# Patient Record
Sex: MALE | Race: BLACK OR AFRICAN AMERICAN | Employment: OTHER | URBAN - METROPOLITAN AREA
[De-identification: names, ages, dates, MRNs, and addresses within clinical notes are randomized per-mention and may not be internally consistent; named-entity substitution may affect disease eponyms.]

---

## 2017-02-11 ENCOUNTER — HOSPITAL ENCOUNTER (INPATIENT)
Facility: CLINIC | Age: 82
LOS: 5 days | Discharge: HOME | DRG: 190 | End: 2017-02-16
Attending: EMERGENCY MEDICINE | Admitting: INTERNAL MEDICINE
Payer: MEDICARE

## 2017-02-11 ENCOUNTER — APPOINTMENT (OUTPATIENT)
Dept: GENERAL RADIOLOGY | Facility: CLINIC | Age: 82
DRG: 190 | End: 2017-02-11
Attending: EMERGENCY MEDICINE
Payer: MEDICARE

## 2017-02-11 DIAGNOSIS — J44.9 COPD (CHRONIC OBSTRUCTIVE PULMONARY DISEASE) (H): Primary | ICD-10-CM

## 2017-02-11 DIAGNOSIS — J44.1 COPD EXACERBATION (H): ICD-10-CM

## 2017-02-11 LAB
ANION GAP SERPL CALCULATED.3IONS-SCNC: 11 MMOL/L (ref 3–14)
BASOPHILS # BLD AUTO: 0 10E9/L (ref 0–0.2)
BASOPHILS NFR BLD AUTO: 0.2 %
BUN SERPL-MCNC: 27 MG/DL (ref 7–30)
CALCIUM SERPL-MCNC: 8.7 MG/DL (ref 8.5–10.1)
CHLORIDE SERPL-SCNC: 99 MMOL/L (ref 94–109)
CO2 SERPL-SCNC: 27 MMOL/L (ref 20–32)
CREAT SERPL-MCNC: 3.18 MG/DL (ref 0.66–1.25)
DIFFERENTIAL METHOD BLD: ABNORMAL
EOSINOPHIL # BLD AUTO: 0.1 10E9/L (ref 0–0.7)
EOSINOPHIL NFR BLD AUTO: 0.7 %
ERYTHROCYTE [DISTWIDTH] IN BLOOD BY AUTOMATED COUNT: 15.2 % (ref 10–15)
FLUAV+FLUBV AG SPEC QL: NEGATIVE
FLUAV+FLUBV AG SPEC QL: NORMAL
GFR SERPL CREATININE-BSD FRML MDRD: 19 ML/MIN/1.7M2
GLUCOSE BLDC GLUCOMTR-MCNC: 134 MG/DL (ref 70–99)
GLUCOSE BLDC GLUCOMTR-MCNC: 366 MG/DL (ref 70–99)
GLUCOSE BLDC GLUCOMTR-MCNC: 392 MG/DL (ref 70–99)
GLUCOSE BLDC GLUCOMTR-MCNC: 414 MG/DL (ref 70–99)
GLUCOSE SERPL-MCNC: 108 MG/DL (ref 70–99)
HCT VFR BLD AUTO: 35.6 % (ref 40–53)
HGB BLD-MCNC: 11.3 G/DL (ref 13.3–17.7)
IMM GRANULOCYTES # BLD: 0.1 10E9/L (ref 0–0.4)
IMM GRANULOCYTES NFR BLD: 0.3 %
INTERPRETATION ECG - MUSE: NORMAL
LACTATE BLD-SCNC: 0.9 MMOL/L (ref 0.7–2.1)
LYMPHOCYTES # BLD AUTO: 1.3 10E9/L (ref 0.8–5.3)
LYMPHOCYTES NFR BLD AUTO: 7.8 %
MCH RBC QN AUTO: 27.4 PG (ref 26.5–33)
MCHC RBC AUTO-ENTMCNC: 31.7 G/DL (ref 31.5–36.5)
MCV RBC AUTO: 86 FL (ref 78–100)
MONOCYTES # BLD AUTO: 1.4 10E9/L (ref 0–1.3)
MONOCYTES NFR BLD AUTO: 8.5 %
NEUTROPHILS # BLD AUTO: 13.3 10E9/L (ref 1.6–8.3)
NEUTROPHILS NFR BLD AUTO: 82.5 %
NRBC # BLD AUTO: 0 10*3/UL
NRBC BLD AUTO-RTO: 0 /100
PLATELET # BLD AUTO: 249 10E9/L (ref 150–450)
POTASSIUM SERPL-SCNC: 4.1 MMOL/L (ref 3.4–5.3)
RBC # BLD AUTO: 4.13 10E12/L (ref 4.4–5.9)
SODIUM SERPL-SCNC: 137 MMOL/L (ref 133–144)
SPECIMEN SOURCE: NORMAL
TROPONIN I SERPL-MCNC: 0.06 UG/L (ref 0–0.04)
TROPONIN I SERPL-MCNC: 0.06 UG/L (ref 0–0.04)
TROPONIN I SERPL-MCNC: 0.07 UG/L (ref 0–0.04)
WBC # BLD AUTO: 16.1 10E9/L (ref 4–11)

## 2017-02-11 PROCEDURE — 71020 XR CHEST 2 VW: CPT

## 2017-02-11 PROCEDURE — 96365 THER/PROPH/DIAG IV INF INIT: CPT

## 2017-02-11 PROCEDURE — 25000132 ZZH RX MED GY IP 250 OP 250 PS 637: Mod: GY | Performed by: INTERNAL MEDICINE

## 2017-02-11 PROCEDURE — 84484 ASSAY OF TROPONIN QUANT: CPT | Performed by: EMERGENCY MEDICINE

## 2017-02-11 PROCEDURE — 80048 BASIC METABOLIC PNL TOTAL CA: CPT | Performed by: EMERGENCY MEDICINE

## 2017-02-11 PROCEDURE — 87804 INFLUENZA ASSAY W/OPTIC: CPT | Performed by: EMERGENCY MEDICINE

## 2017-02-11 PROCEDURE — 25000132 ZZH RX MED GY IP 250 OP 250 PS 637: Mod: GY | Performed by: EMERGENCY MEDICINE

## 2017-02-11 PROCEDURE — A9270 NON-COVERED ITEM OR SERVICE: HCPCS | Mod: GY | Performed by: EMERGENCY MEDICINE

## 2017-02-11 PROCEDURE — 94640 AIRWAY INHALATION TREATMENT: CPT | Mod: 76

## 2017-02-11 PROCEDURE — 00000146 ZZHCL STATISTIC GLUCOSE BY METER IP

## 2017-02-11 PROCEDURE — 25000125 ZZHC RX 250: Performed by: EMERGENCY MEDICINE

## 2017-02-11 PROCEDURE — 84484 ASSAY OF TROPONIN QUANT: CPT | Performed by: INTERNAL MEDICINE

## 2017-02-11 PROCEDURE — 96367 TX/PROPH/DG ADDL SEQ IV INF: CPT

## 2017-02-11 PROCEDURE — 99285 EMERGENCY DEPT VISIT HI MDM: CPT | Mod: 25

## 2017-02-11 PROCEDURE — 94640 AIRWAY INHALATION TREATMENT: CPT

## 2017-02-11 PROCEDURE — 99223 1ST HOSP IP/OBS HIGH 75: CPT | Mod: AI | Performed by: INTERNAL MEDICINE

## 2017-02-11 PROCEDURE — 25000131 ZZH RX MED GY IP 250 OP 636 PS 637: Mod: GY | Performed by: INTERNAL MEDICINE

## 2017-02-11 PROCEDURE — 96375 TX/PRO/DX INJ NEW DRUG ADDON: CPT

## 2017-02-11 PROCEDURE — A9270 NON-COVERED ITEM OR SERVICE: HCPCS | Mod: GY | Performed by: INTERNAL MEDICINE

## 2017-02-11 PROCEDURE — 25000128 H RX IP 250 OP 636: Performed by: INTERNAL MEDICINE

## 2017-02-11 PROCEDURE — 36415 COLL VENOUS BLD VENIPUNCTURE: CPT | Performed by: INTERNAL MEDICINE

## 2017-02-11 PROCEDURE — 25000125 ZZHC RX 250: Performed by: INTERNAL MEDICINE

## 2017-02-11 PROCEDURE — 21000001 ZZH R&B HEART CARE

## 2017-02-11 PROCEDURE — 93005 ELECTROCARDIOGRAM TRACING: CPT

## 2017-02-11 PROCEDURE — 85025 COMPLETE CBC W/AUTO DIFF WBC: CPT | Performed by: EMERGENCY MEDICINE

## 2017-02-11 PROCEDURE — 83605 ASSAY OF LACTIC ACID: CPT | Performed by: EMERGENCY MEDICINE

## 2017-02-11 PROCEDURE — 25000128 H RX IP 250 OP 636: Performed by: EMERGENCY MEDICINE

## 2017-02-11 PROCEDURE — 40000275 ZZH STATISTIC RCP TIME EA 10 MIN

## 2017-02-11 RX ORDER — LISINOPRIL 20 MG/1
20 TABLET ORAL DAILY
Status: DISCONTINUED | OUTPATIENT
Start: 2017-02-11 | End: 2017-02-16 | Stop reason: HOSPADM

## 2017-02-11 RX ORDER — DOXAZOSIN 4 MG/1
8 TABLET ORAL DAILY
Status: DISCONTINUED | OUTPATIENT
Start: 2017-02-11 | End: 2017-02-16 | Stop reason: HOSPADM

## 2017-02-11 RX ORDER — NICOTINE POLACRILEX 4 MG
15-30 LOZENGE BUCCAL
Status: DISCONTINUED | OUTPATIENT
Start: 2017-02-11 | End: 2017-02-16 | Stop reason: HOSPADM

## 2017-02-11 RX ORDER — EPLERENONE 25 MG/1
50 TABLET, FILM COATED ORAL DAILY
Status: DISCONTINUED | OUTPATIENT
Start: 2017-02-11 | End: 2017-02-16 | Stop reason: HOSPADM

## 2017-02-11 RX ORDER — ASPIRIN 325 MG
325 TABLET ORAL ONCE
Status: COMPLETED | OUTPATIENT
Start: 2017-02-11 | End: 2017-02-11

## 2017-02-11 RX ORDER — CARVEDILOL 12.5 MG/1
12.5 TABLET ORAL 2 TIMES DAILY
Status: DISCONTINUED | OUTPATIENT
Start: 2017-02-11 | End: 2017-02-16 | Stop reason: HOSPADM

## 2017-02-11 RX ORDER — AZITHROMYCIN 250 MG/1
250 TABLET, FILM COATED ORAL DAILY
Status: COMPLETED | OUTPATIENT
Start: 2017-02-12 | End: 2017-02-15

## 2017-02-11 RX ORDER — FUROSEMIDE 20 MG
20 TABLET ORAL DAILY
Status: DISCONTINUED | OUTPATIENT
Start: 2017-02-11 | End: 2017-02-16 | Stop reason: HOSPADM

## 2017-02-11 RX ORDER — DILTIAZEM HYDROCHLORIDE 300 MG/1
300 CAPSULE, EXTENDED RELEASE ORAL DAILY
COMMUNITY
End: 2017-07-09

## 2017-02-11 RX ORDER — ATORVASTATIN CALCIUM 40 MG/1
40 TABLET, FILM COATED ORAL AT BEDTIME
Status: DISCONTINUED | OUTPATIENT
Start: 2017-02-11 | End: 2017-02-16 | Stop reason: HOSPADM

## 2017-02-11 RX ORDER — ASPIRIN 81 MG/1
81 TABLET, CHEWABLE ORAL DAILY
COMMUNITY

## 2017-02-11 RX ORDER — LIDOCAINE 40 MG/G
CREAM TOPICAL
Status: DISCONTINUED | OUTPATIENT
Start: 2017-02-11 | End: 2017-02-16 | Stop reason: HOSPADM

## 2017-02-11 RX ORDER — DILTIAZEM HYDROCHLORIDE 300 MG/1
300 CAPSULE, EXTENDED RELEASE ORAL DAILY
Status: DISCONTINUED | OUTPATIENT
Start: 2017-02-11 | End: 2017-02-11

## 2017-02-11 RX ORDER — IPRATROPIUM BROMIDE AND ALBUTEROL SULFATE 2.5; .5 MG/3ML; MG/3ML
3 SOLUTION RESPIRATORY (INHALATION) ONCE
Status: COMPLETED | OUTPATIENT
Start: 2017-02-11 | End: 2017-02-11

## 2017-02-11 RX ORDER — HYDROCHLOROTHIAZIDE 25 MG/1
25 TABLET ORAL DAILY
Status: DISCONTINUED | OUTPATIENT
Start: 2017-02-11 | End: 2017-02-16 | Stop reason: HOSPADM

## 2017-02-11 RX ORDER — ALBUTEROL SULFATE 90 UG/1
1-2 AEROSOL, METERED RESPIRATORY (INHALATION) EVERY 4 HOURS PRN
COMMUNITY
End: 2017-07-13

## 2017-02-11 RX ORDER — CHLORAL HYDRATE 500 MG
1 CAPSULE ORAL DAILY
COMMUNITY

## 2017-02-11 RX ORDER — AZITHROMYCIN 250 MG/1
250 TABLET, FILM COATED ORAL DAILY
Status: DISCONTINUED | OUTPATIENT
Start: 2017-02-12 | End: 2017-02-11

## 2017-02-11 RX ORDER — METHYLPREDNISOLONE SODIUM SUCCINATE 125 MG/2ML
125 INJECTION, POWDER, LYOPHILIZED, FOR SOLUTION INTRAMUSCULAR; INTRAVENOUS ONCE
Status: COMPLETED | OUTPATIENT
Start: 2017-02-11 | End: 2017-02-11

## 2017-02-11 RX ORDER — ASPIRIN 81 MG/1
81 TABLET, CHEWABLE ORAL DAILY
Status: DISCONTINUED | OUTPATIENT
Start: 2017-02-12 | End: 2017-02-16 | Stop reason: HOSPADM

## 2017-02-11 RX ORDER — INSULIN GLARGINE 100 [IU]/ML
26 INJECTION, SOLUTION SUBCUTANEOUS AT BEDTIME
COMMUNITY
End: 2017-02-23

## 2017-02-11 RX ORDER — DEXTROSE MONOHYDRATE 25 G/50ML
25-50 INJECTION, SOLUTION INTRAVENOUS
Status: DISCONTINUED | OUTPATIENT
Start: 2017-02-11 | End: 2017-02-16 | Stop reason: HOSPADM

## 2017-02-11 RX ORDER — ONDANSETRON 4 MG/1
4 TABLET, ORALLY DISINTEGRATING ORAL EVERY 6 HOURS PRN
Status: DISCONTINUED | OUTPATIENT
Start: 2017-02-11 | End: 2017-02-16 | Stop reason: HOSPADM

## 2017-02-11 RX ORDER — ONDANSETRON 2 MG/ML
4 INJECTION INTRAMUSCULAR; INTRAVENOUS EVERY 6 HOURS PRN
Status: DISCONTINUED | OUTPATIENT
Start: 2017-02-11 | End: 2017-02-16 | Stop reason: HOSPADM

## 2017-02-11 RX ORDER — IPRATROPIUM BROMIDE AND ALBUTEROL SULFATE 2.5; .5 MG/3ML; MG/3ML
3 SOLUTION RESPIRATORY (INHALATION)
Status: DISCONTINUED | OUTPATIENT
Start: 2017-02-11 | End: 2017-02-16 | Stop reason: HOSPADM

## 2017-02-11 RX ORDER — AMOXICILLIN 250 MG
1-2 CAPSULE ORAL 2 TIMES DAILY PRN
Status: DISCONTINUED | OUTPATIENT
Start: 2017-02-11 | End: 2017-02-16 | Stop reason: HOSPADM

## 2017-02-11 RX ORDER — METHYLPREDNISOLONE SODIUM SUCCINATE 125 MG/2ML
60 INJECTION, POWDER, LYOPHILIZED, FOR SOLUTION INTRAMUSCULAR; INTRAVENOUS EVERY 8 HOURS
Status: DISCONTINUED | OUTPATIENT
Start: 2017-02-11 | End: 2017-02-13

## 2017-02-11 RX ORDER — ACETAMINOPHEN 325 MG/1
650 TABLET ORAL EVERY 4 HOURS PRN
Status: DISCONTINUED | OUTPATIENT
Start: 2017-02-11 | End: 2017-02-16 | Stop reason: HOSPADM

## 2017-02-11 RX ORDER — CHLORAL HYDRATE 500 MG
1 CAPSULE ORAL DAILY
Status: DISCONTINUED | OUTPATIENT
Start: 2017-02-11 | End: 2017-02-16 | Stop reason: HOSPADM

## 2017-02-11 RX ORDER — HYDRALAZINE HYDROCHLORIDE 20 MG/ML
10 INJECTION INTRAMUSCULAR; INTRAVENOUS EVERY 4 HOURS PRN
Status: DISCONTINUED | OUTPATIENT
Start: 2017-02-11 | End: 2017-02-16 | Stop reason: HOSPADM

## 2017-02-11 RX ORDER — NALOXONE HYDROCHLORIDE 0.4 MG/ML
.1-.4 INJECTION, SOLUTION INTRAMUSCULAR; INTRAVENOUS; SUBCUTANEOUS
Status: DISCONTINUED | OUTPATIENT
Start: 2017-02-11 | End: 2017-02-16 | Stop reason: HOSPADM

## 2017-02-11 RX ORDER — CEFTRIAXONE 1 G/1
1 INJECTION, POWDER, FOR SOLUTION INTRAMUSCULAR; INTRAVENOUS ONCE
Status: COMPLETED | OUTPATIENT
Start: 2017-02-11 | End: 2017-02-11

## 2017-02-11 RX ADMIN — AZITHROMYCIN MONOHYDRATE 500 MG: 500 INJECTION, POWDER, LYOPHILIZED, FOR SOLUTION INTRAVENOUS at 10:18

## 2017-02-11 RX ADMIN — Medication 1 G: at 13:42

## 2017-02-11 RX ADMIN — IPRATROPIUM BROMIDE AND ALBUTEROL SULFATE 3 ML: .5; 3 SOLUTION RESPIRATORY (INHALATION) at 15:22

## 2017-02-11 RX ADMIN — FUROSEMIDE 20 MG: 20 TABLET ORAL at 13:42

## 2017-02-11 RX ADMIN — DOXAZOSIN MESYLATE 8 MG: 4 TABLET ORAL at 21:37

## 2017-02-11 RX ADMIN — METHYLPREDNISOLONE SODIUM SUCCINATE 125 MG: 125 INJECTION, POWDER, FOR SOLUTION INTRAMUSCULAR; INTRAVENOUS at 08:24

## 2017-02-11 RX ADMIN — LISINOPRIL 20 MG: 20 TABLET ORAL at 13:42

## 2017-02-11 RX ADMIN — DILTIAZEM HYDROCHLORIDE 300 MG: 180 CAPSULE, EXTENDED RELEASE ORAL at 13:43

## 2017-02-11 RX ADMIN — IPRATROPIUM BROMIDE AND ALBUTEROL SULFATE 3 ML: .5; 3 SOLUTION RESPIRATORY (INHALATION) at 08:24

## 2017-02-11 RX ADMIN — HYDROCHLOROTHIAZIDE 25 MG: 25 TABLET ORAL at 13:42

## 2017-02-11 RX ADMIN — ASPIRIN 325 MG ORAL TABLET 325 MG: 325 PILL ORAL at 09:27

## 2017-02-11 RX ADMIN — ATORVASTATIN CALCIUM 40 MG: 40 TABLET, FILM COATED ORAL at 21:37

## 2017-02-11 RX ADMIN — CARVEDILOL 12.5 MG: 12.5 TABLET, FILM COATED ORAL at 21:37

## 2017-02-11 RX ADMIN — Medication 1 CAPSULE: at 13:43

## 2017-02-11 RX ADMIN — INSULIN ASPART 5 UNITS: 100 INJECTION, SOLUTION INTRAVENOUS; SUBCUTANEOUS at 18:28

## 2017-02-11 RX ADMIN — CARVEDILOL 12.5 MG: 12.5 TABLET, FILM COATED ORAL at 13:42

## 2017-02-11 RX ADMIN — IPRATROPIUM BROMIDE AND ALBUTEROL SULFATE 3 ML: .5; 3 SOLUTION RESPIRATORY (INHALATION) at 19:38

## 2017-02-11 RX ADMIN — METHYLPREDNISOLONE SODIUM SUCCINATE 62.5 MG: 125 INJECTION, POWDER, FOR SOLUTION INTRAMUSCULAR; INTRAVENOUS at 16:46

## 2017-02-11 RX ADMIN — IPRATROPIUM BROMIDE AND ALBUTEROL SULFATE 3 ML: .5; 3 SOLUTION RESPIRATORY (INHALATION) at 23:05

## 2017-02-11 RX ADMIN — INSULIN GLARGINE 26 UNITS: 100 INJECTION, SOLUTION SUBCUTANEOUS at 21:34

## 2017-02-11 RX ADMIN — CEFTRIAXONE 1 G: 1 INJECTION, POWDER, FOR SOLUTION INTRAMUSCULAR; INTRAVENOUS at 09:34

## 2017-02-11 RX ADMIN — EPLERENONE 50 MG: 25 TABLET, FILM COATED ORAL at 14:38

## 2017-02-11 ASSESSMENT — ENCOUNTER SYMPTOMS
FEVER: 0
COUGH: 1
VOMITING: 0
DIARRHEA: 0
SHORTNESS OF BREATH: 1

## 2017-02-11 NOTE — ED PROVIDER NOTES
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 The patient reports for the past 1.5 weeks he has had a non productive cough. Last night the patient had worsening shortness of breath and used his albuterol inhaler with minimal resolve of his symptoms. He was concerned he may have pneumonia which prompts his visit. The patient notes his grand daughter has had similar symptoms. The patient denies fevers, vomiting, diarrhea, ear pain, chest pain, urinary symptoms, or any associated symptoms. He notes history of low iron. Patient has received both flu and pneumonia shot this year. He is not on home oxygen.          Allergies:  The patient has no known allergies to medications.      Medications:    Atorvastatin  Albuterol inhaler   Aspirin 81 mg  Carvedilol  Aranesp  Colchicine  Tiazac    Past Medical History:    COPD (chronic obstructive pulmonary disease) (H)   HLD (hyperlipidemia)   HTN (hypertension)   CRF (chronic renal failure)   IDDM (insulin dependent diabetes mellitus) (H)   Renal calculi     Past Surgical History:    Tonsillectomy  Adenoidectomy  Renal stent    Family History:    The patient has no pertinent family history.     Social History:  The patient is a former smoker.  The patient consumes alcohol, 1 beer monthly.  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  Moved from [place] in 2012 to live with son.   Presents with son.     Review of Systems   Constitutional: Negative for fever.   HENT: Negative for ear pain.    Respiratory: Positive for cough and shortness of breath.    Cardiovascular: Negative for chest pain.   Gastrointestinal: Negative for vomiting and diarrhea.   All other systems reviewed and are negative.    Physical Exam   First Vitals:  BP: 176/78 mmHg  Pulse: 105  Temp: 97.5  F (36.4  C)  Resp: 20  Height:  "175.3 cm (5' 9\")  Weight: 86.5 kg (190 lb 11.2 oz)  SpO2: 93 %    Physical Exam  Constitutional:  Oriented to person, place, and time.      Appears well-developed and well-nourished. Occasional cough.  HENT:   Head:    Normocephalic and atraumatic.   Right Ear:   Tympanic membrane and external ear normal.   Left Ear:   Tympanic membrane and external ear normal.   Mouth/Throat:   Oropharynx is clear and moist.      Mucous membranes are normal.      1 cm lump on right side of tongue, patient states he has been evaluated for this.   Eyes:    Conjunctivae normal and EOM are normal.      Pupils are equal, round, and reactive to light.   Neck:    Normal range of motion. Neck supple.   Cardiovascular:  Normal rate, regular rhythm, S1 normal and S2 normal.      No gallop and no friction rub. 2-3/6 systolic murmur loudest over right upper chest.   Pulmonary/Chest:  No respiratory distress.      No rhonchi. No rales.      Decreased breath sounds bilaterally. Expiratory wheezes bilaterally with forced expiration.     Abdominal:   Soft. No hepatosplenomegaly. No tenderness.      No rebound and no CVA tenderness.   Musculoskeletal:  Normal range of motion.   Neurological:   Alert and oriented to person, place, and time. Normal strength.      GCS eye subscore is 4. GCS verbal subscore is 5.      GCS motor subscore is 6.   Skin:    Skin is warm and dry.   Psychiatric:   Normal mood and affect.      Speech is normal and behavior is normal.      Judgment and thought content normal.      Cognition and memory are normal.     Emergency Department Course   ECG:  Completed at 0824.   Rate 104 bpm. IN interval 136. QRS duration 86. QT/QTc 346/454. P-R-T axes 66 -6 87. Sinus tachycardia with PACs, ST & T wave abnormality consider lateral ischemia, abnormal ECG     Imaging:  Radiographic findings were communicated with the patient who voiced understanding of the findings.    Chest X-ray (PA & LAT):    Read but not transcribed " electronically, discussed with radiologist. Concerns for subtle left lobe infiltrate.    Laboratory:  CBC: WBC 16.1 (H), HGB 11.3 (L), ow wnl ()  BMP:  (H), Creat 3.18 (H), ow wnl   0800: Troponin: 0.057 (H)   Lactic acid: 0.9  Influenza A/B Antigen: Negative for Influenza Antigen A/B     Interventions:  Aspirin 325 mg tablet  Duoneb 3 mL neb solution  Solumedrol 125 mg  IV  Rocephin 1g IV  Azithromycin 500 mg IV    Emergency Department Course:  Nursing notes and vitals reviewed.  I performed an exam of the patient as documented above.     Blood drawn and patient swabbed. This was sent to the lab for further testing, results above.     The above imaging study(s) and ECG were ordered with results noted above.     The patient received the intervention(s) above.     Findings and plan explained to the Patient. Patient discharged home with instructions regarding supportive care, medications, and reasons to return. The importance of close follow-up was reviewed.     Impression & Plan    Medical Decision Making:  Seven CHA Hussein Finley is a 81 year old male, retired physician, with severe COPD, insulin dependent diabetic, renal failure on dialysis who comes in with cough for the past 1.5 weeks that he believes is pneumonia. He has had the cough however has not been short of breath until last night with exertion. No fevers or chills.  He did get a flu shot. He received a duoneb en route and here. He was able to speak in full sentences. He had decreased breath sounds bilaterally with wheezes with forced expiration. Evaluation here shows elevated white blood cell count. A chest x-ray with subtle left lower lobe infiltrate. Mildly positive troponin and EKG that shows inferolateral ST depression without old EKG for comparison. He received further neb here, solumedrol IV, rocephin, and azithromycin. On review of chart he does have severe aortic stenosis.    I believe most of the symptoms are primarily respiratory  with pneumonia and COPD. He does have a positive troponin with ST changes which I believe is secondary to myocardial strain. Will do flu swab just in light of this being in the community. He does have severe aortic stenosis which he was unaware. Has renal failure, will need dialysis while here.    Diagnosis:    ICD-10-CM    1. COPD (chronic obstructive pulmonary disease) (H) J44.9    2.      Pneumonia  3.      Myocardial strain with positive troponin and EKG changes.  4.      Renal failure on dialysis, will need to be dialyzed.     Disposition:  Inpatient [facility]SC under Dr. Peng.    Maverick BUI, am serving as a scribe at 10:34 AM on 2/11/2017 to document services personally performed by Dr. Molina, based on my observations and the provider's statements to me.      EMERGENCY DEPARTMENT        Adia Molina MD  02/11/17 2426

## 2017-02-11 NOTE — PHARMACY-ADMISSION MEDICATION HISTORY
Admission medication history interview status for the 2/11/2017  admission is complete. See EPIC admission navigator for prior to admission medications     Medication history source reliability:Good    Actions taken by pharmacist (provider contacted, etc):Interviewed patient, brought medication list from [facility] ([place])     Additional medication history information not noted on PTA med list :None    Medication reconciliation/reorder completed by provider prior to medication history? No    Time spent in this activity: 20 minutes    Prior to Admission medications    Medication Sig Last Dose Taking? Auth Provider   albuterol (PROAIR HFA/PROVENTIL HFA/VENTOLIN HFA) 108 (90 BASE) MCG/ACT Inhaler Inhale 1-2 puffs into the lungs every 4 hours as needed for shortness of breath / dyspnea or wheezing prn Yes Unknown, Entered By History   aspirin 81 MG chewable tablet Take 81 mg by mouth daily 2/10/2017 at Unknown time Yes Unknown, Entered By History   ATORVASTATIN CALCIUM PO Take 40 mg by mouth At Bedtime 2/10/2017 at Unknown time Yes Unknown, Entered By History   CARVEDILOL PO Take 12.5 mg by mouth 2 times daily 2/10/2017 at Unknown time Yes Unknown, Entered By History   VITAMIN D, CHOLECALCIFEROL, PO Take 2,000 Units by mouth every other day 2/10/2017 at Unknown time Yes Unknown, Entered By History   COLCHICINE PO Take 1 tab by mouth twice a day for 2 days then one every other day as needed prn No Unknown, Entered By History   darbepoetin libby (ARANESP) 100 MCG/0.5ML injection Inject 40 mcg Subcutaneous every 30 days Past Week at Unknown time Yes Unknown, Entered By History   diltiazem (TIAZAC) 300 MG 24 hr ER beaded capsule Take 300 mg by mouth daily 2/10/2017 at Unknown time Yes Unknown, Entered By History   DOXAZOSIN MESYLATE PO Take 8 mg by mouth daily 2/10/2017 at Unknown time Yes Unknown, Entered By History   EPLERENONE PO Take 50 mg by mouth daily 2/10/2017 at Unknown time Yes Unknown, Entered By  History   FUROSEMIDE PO Take 20 mg by mouth daily 2/10/2017 at Unknown time Yes Unknown, Entered By History   HYDROCHLOROTHIAZIDE PO Take 25 mg by mouth daily 2/10/2017 at Unknown time Yes Unknown, Entered By History   Insulin Aspart (NOVOLOG SC) Sliding scale 2/10/2017 at Unknown time Yes Unknown, Entered By History   insulin glargine (LANTUS) 100 UNIT/ML injection Inject 26 Units Subcutaneous At Bedtime 2/10/2017 at Unknown time Yes Unknown, Entered By History   LISINOPRIL PO Take 20 mg by mouth daily 2/10/2017 at Unknown time Yes Unknown, Entered By History   fish oil-omega-3 fatty acids 1000 MG capsule Take 1 g by mouth daily 2/10/2017 at Unknown time Yes Unknown, Entered By History   B Complex-C-Folic Acid (DIALYVITE 800 PO) Take 1 tablet by mouth daily 2/10/2017 at Unknown time Yes Unknown, Entered By History   tiotropium-olodaterol 2.5-2.5 MCG/ACT AERS Inhale 2 puffs into the lungs daily 2/10/2017 at Unknown time Yes Unknown, Entered By History

## 2017-02-11 NOTE — H&P
[facility]    History and Physical  Hospitalist       Date of Admission:  2/11/2017  Date of Service (when I saw the patient): 02/11/2017    Assessment and Plan  Seven Savage Jr. 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    Acute COPD Exacerbation  In a pt with known severe COPD and on maintenance therapy.  Not O2 dependent. Maintaining sat's thus far in the low 90's. CXR with possible LLL PNA but unclear. Influenza negative.     - Started on Solumedrol 60 mg Q8.   - Duonebs Q 4 and Q 2 prn.    - Treated with IV Rocephin and Azithro in the ER.  Continued on PO Azithro.   - O2 prn.    - Smoking cessation.     NSTEMI  Suspect a type II event in the setting of COPD exacerbation and poor clearance from his CKD. Never c/o chest pain. EKG with some lateral ST seg depression.  Stress test last year per his report with no sign of CAD.      - Will continue to trend trop's.   - C/w Asa, statin, BB, Ace-i.   - Recent Echo in 6/2016 with no WMA's.  Will not repeat at this time unless clinical picture warrants.    - Tele.   - No heparin for now.    ESRD on HD M/W/F   Cr at baseline of around 3.    - Will follow and if it looks like he will be here on Monday I will ask Nephrology to seem him for HD.    - C/w daniel meds.     H/o Severe AS  Denies exertional symptoms outside of his present exacerbation as above.  Echo in 6/2016 with EF 60%, severe AS with valva area 0.8cm, mild MR.    - Do not suspect any current symptoms related to his AS at present.    - Will follow.   - Recommend a Cardiology follow up to discuss valve repair.    Type II DM   On Lantus 26U Qpm and novolog sliding scale.    - Will c/w pta dosing with sliding scale.   - Mod carb diet.    - A1c.   - Will likely need to adjust insulin while on steroids.     HTN  Mildly  hypertensive on admit and suspect this was related to his resp distress.    - C/w pta meds and will follow.    - Hydralazine prn.        DVT Prophylaxis: Pneumatic Compression Devices  Code Status: Full Code as was d/w the patient.     Disposition:  Pending improvement in his acute COPD exacerbation and evaluation of elevated troponin.        Harjeet Peng       Primary Care Physician  No PCP presently.     Chief Complaint  Worsening SOB.     History is obtained from the patient    History of Present Illness  Seven Savage Jr. 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 The patient reports for the past 1.5 weeks he has had a non productive cough and some occasional sob.  His baby granddaughter has not been feeling well recently but other wise reports no other sick contacts.  Yesterday he reports feelig weaker than normal and with worsening sob last night with notable wheezing that did not respond to his usual inhalers. His sob was initially with exertion but has become to be present at rest as well. Due to ongoing and worsening symptoms he was worried he may have pneumonia and called 911 this am.  He denies any chest pain, fevers, chills, nausea, vomiting, diarrhea, dysuria, muscle aches or pains.     Here in the ER he has stbale blood pressures and is afebrile.  He was mildly tachycardic at 105 but saturating at 93% on RA after nebulizers by EMS but no reported hypoxia. Labs show a Cr of 3.18 (baseline), normal LA, Troponin mildy elevated at 0.057, WBC of 16.1 with a left shift and a Hgb of 11.3. Influenza negative.  CXR with hyperinflation and a possible mild haziness in the LLL but unclear if any PNA present.  Official reading is pending. EKG shows sinus tahcy with PAC's and some lateral St segment depression. He was treated with  steroids, nebs and Abx in the ER with some improvement.  He will be admitted for acute COPD exacerbation and elevated troponin.                  Past Medical History   I have reviewed this patient's medical history and updated it with pertinent information if needed.   Past Medical History   Diagnosis Date     COPD (chronic obstructive pulmonary disease) (H)      HLD (hyperlipidemia)      HTN (hypertension)      CRF (chronic renal failure)      IDDM (insulin dependent diabetes mellitus) (H)      Renal calculi      Heart murmur     - Severe AS    Past Surgical History  I have reviewed this patient's surgical history and updated it with pertinent information if needed.  Past Surgical History   Procedure Laterality Date     Tonsillectomy       Adenoidectomy       Endoscopic polypectomy nasal       Through vocal cords     Renal stent       For renal calculi       Prior to Admission Medications  Prior to Admission Medications   Prescriptions Last Dose Informant Patient Reported? Taking?   ATORVASTATIN CALCIUM PO 2/10/2017 at Unknown time Self Yes Yes   Sig: Take 40 mg by mouth At Bedtime   B Complex-C-Folic Acid (DIALYVITE 800 PO) 2/10/2017 at Unknown time Self Yes Yes   Sig: Take 1 tablet by mouth daily   CARVEDILOL PO 2/10/2017 at Unknown time Self Yes Yes   Sig: Take 12.5 mg by mouth 2 times daily   COLCHICINE PO prn Self Yes Yes   Sig: Take 0.6 mg by mouth every 48 hours as needed Take 1 tab by mouth twice a day for 2 days then one every other day as needed   DOXAZOSIN MESYLATE PO 2/10/2017 at Unknown time Self Yes Yes   Sig: Take 8 mg by mouth daily   EPLERENONE PO 2/10/2017 at Unknown time Self Yes Yes   Sig: Take 50 mg by mouth daily   FUROSEMIDE PO 2/10/2017 at Unknown time Self Yes Yes   Sig: Take 20 mg by mouth daily   HYDROCHLOROTHIAZIDE PO 2/10/2017 at Unknown time Self Yes Yes   Sig: Take 25 mg by mouth daily   Insulin Aspart (NOVOLOG SC) 2/10/2017 at Unknown time Self Yes Yes   Sig: Sliding scale    LISINOPRIL PO 2/10/2017 at Unknown time Self Yes Yes   Sig: Take 20 mg by mouth daily   VITAMIN D, CHOLECALCIFEROL, PO 2/10/2017 at Unknown time Self Yes Yes   Sig: Take 2,000 Units by mouth every other day   albuterol (PROAIR HFA/PROVENTIL HFA/VENTOLIN HFA) 108 (90 BASE) MCG/ACT Inhaler prn Self Yes Yes   Sig: Inhale 1-2 puffs into the lungs every 4 hours as needed for shortness of breath / dyspnea or wheezing   aspirin 81 MG chewable tablet 2/10/2017 at Unknown time Self Yes Yes   Sig: Take 81 mg by mouth daily   darbepoetin libby (ARANESP) 100 MCG/0.5ML injection Past Week at Unknown time Self Yes Yes   Sig: Inject 40 mcg Subcutaneous every 30 days   diltiazem (TIAZAC) 300 MG 24 hr ER beaded capsule 2/10/2017 at Unknown time Self Yes Yes   Sig: Take 300 mg by mouth daily   fish oil-omega-3 fatty acids 1000 MG capsule 2/10/2017 at Unknown time Self Yes Yes   Sig: Take 1 g by mouth daily   insulin glargine (LANTUS) 100 UNIT/ML injection 2/10/2017 at Unknown time Self Yes Yes   Sig: Inject 26 Units Subcutaneous At Bedtime   tiotropium-olodaterol 2.5-2.5 MCG/ACT AERS 2/10/2017 at Unknown time Self Yes Yes   Sig: Inhale 2 puffs into the lungs daily      Facility-Administered Medications: None     Allergies  No Known Allergies    Social History  I have reviewed this patient's social history and updated it with pertinent information if needed. Seven Savage Jr. Currently smokes an occasional cigar but has quit cigarettes in 2012 after a > 50 pack year hx. Denies drug use.    Family History  I have reviewed this patient's family history and updated it with pertinent information if needed.   - no pertinent family med hx.     Review of Systems  The 10 point Review of Systems is negative other than noted in the HPI or here.     Physical Exam  Temp: 97.5  F (36.4  C) Temp src: Oral BP: 157/70 mmHg Pulse: 102 Heart Rate: 98 Resp: 24 SpO2: 100 % O2 Device: None (Room air)    Vital Signs with Ranges  Temp:  [97.5  F (36.4   C)] 97.5  F (36.4  C)  Pulse:  [102-105] 102  Heart Rate:  [] 98  Resp:  [14-24] 24  BP: (154-176)/(70-86) 157/70 mmHg  SpO2:  [92 %-100 %] 100 %  190 lbs 11.17 oz    Constitutional: Elderly AAM.  Awake, alert, cooperative, no apparent distress but with notable exp wheezing.   Eyes: Conjunctiva and pupils examined and normal.  HEENT: Moist mucous membranes, normal dentition.   Respiratory: Decreased breath sounds throughout > at bases with expiratory wheezing.  No rales or rhonchi.   Cardiovascular: Regular rate and rhythm, normal S1 and S2, + 3-4/6 systolic murmur.   GI: Soft, non-distended, non-tender, normal bowel sounds.  Lymph/Hematologic: No anterior cervical or supraclavicular adenopathy.  Skin: No rashes, no cyanosis, no edema.  Musculoskeletal: No joint swelling, erythema or tenderness.  Neurologic: Cranial nerves 2-12 intact, normal strength and sensation. No focal deficits.  Psychiatric: Alert, oriented to person, place and time, no obvious anxiety or depression.    Data  Data reviewed today:  I personally reviewed the chest x-ray image(s) showing Possible LLL PNA. .    Recent Labs  Lab 02/11/17  0800   WBC 16.1*   HGB 11.3*   MCV 86         POTASSIUM 4.1   CHLORIDE 99   CO2 27   BUN 27   CR 3.18*   ANIONGAP 11   AARON 8.7   *   TROPI 0.057*       No results found for this or any previous visit (from the past 24 hour(s)).

## 2017-02-11 NOTE — ED NOTES
Bed: ED10  Expected date: 2/11/17  Expected time: 7:15 AM  Means of arrival: Ambulance  Comments:  Juan David 516 SOB 81 male

## 2017-02-11 NOTE — ED NOTES
Pt's WBC came back at 16 and HR >100; therefore, prompted to send lactic acid via EPIC pop-up. Specimen obtained and sent to the lab.

## 2017-02-11 NOTE — LETTER
Transition Communication Hand-off for Care Transitions to Next Level of Care Provider    Name: Seven Savage JrBaljinder  MRN #: [id number]  Primary Care Provider: Physician No Ref-Primary     Primary Clinic: No address on file     Reason for Hospitalization:  COPD (chronic obstructive pulmonary disease) (H) [J44.9]  Admit Date/Time: 2/11/2017  7:34 AM  Discharge Date: 2/16/2017  Payor Source: Payor: MEDICARE / Plan: MEDICARE / Product Type: Medicare /           Reason for Communication Hand-off Referral: Avoidable readmission within 30 days    Discharge Plan:       Concern for non-adherence with plan of care:   Y/N No    Already enrolled in Tele-monitoring program and name of program:  NO  Follow-up specialty is recommended: No    Follow-up plan:  Future Appointments  Date Time Provider Department Center   2/23/2017 3:20 PM Sandip Antunez MD [facility]            Key Recommendations:  Patient is establishing care with Dr. Sandip Antunez for first time and going home on nebulizers which is new.    hSerri Rosales    AVS/Discharge Summary is the source of truth; this is a helpful guide for improved communication of patient story

## 2017-02-11 NOTE — ED NOTES
80yo male who presents to ED with c/o non-productive cough x1-1.5 weeks. Last night developed SOB. Utilized albuterol inhaler for which he has for COPD with minimal relief, so came to ED today. Not on home O2. No recent steroid use. Has received flu vaccine this season as well as pneumonia vaccines previously. Has also been using Delsym OTC.    Denies fevers, chills, congestion, rhinorrhea, nausea, vomiting, diarrhea, constipation or changes with urination.    Pt lives with his son, daughter-in-law, and 2 granddaughters. 1 granddaughter has similar symptoms.    Hx of IDDM and CRF. Received dialysis M-W-F. Reports last dialysis was yesterday with normal amounts of fluid being taken off. Pt still makes urine. Is on furosemide and hydrochlorothiazide.    Lungs diminished bilaterally with expiratory wheezes. Received 1 DuoNeb via EMS. Heart regular and tachycardic with murmur. Pt reports hx of heart murmur.

## 2017-02-11 NOTE — IP AVS SNAPSHOT
[facility]    [address] [place] [address], [address]    [place] [address]    Phone:  [phone]                                       After Visit Summary   2/11/2017    Seven Savage Jr.    MRN: [id number]           After Visit Summary Signature Page     I have received my discharge instructions, and my questions have been answered. I have discussed any challenges I see with this plan with the nurse or doctor.    ..........................................................................................................................................  Patient/Patient Representative Signature      ..........................................................................................................................................  Patient Representative Print Name and Relationship to Patient    ..................................................               ................................................  Date                                            Time    ..........................................................................................................................................  Reviewed by Signature/Title    ...................................................              ..............................................  Date                                                            Time

## 2017-02-11 NOTE — IP AVS SNAPSHOT
MRN:[id number]                      After Visit Summary   2/11/2017    Seven Savage Jr.    MRN: [id number]           Thank you!     Thank you for choosing [facility] for your care. Our goal is always to provide you with excellent care. Hearing back from our patients is one way we can continue to improve our services. Please take a few minutes to complete the written survey that you may receive in the mail after you visit with us. Thank you!        Patient Information     Date Of Birth          1935        About your hospital stay     You were admitted on:  February 11, 2017 You last received care in the:  [facility]    You were discharged on:  February 16, 2017        Reason for your hospital stay       You were admitted for shortness of breath due to COPD exacerbation likely caused by viral URI.                  Who to Call     For medical emergencies, please call 911.  For non-urgent questions about your medical care, please call your primary care provider or clinic, None          Attending Provider     Provider Specialty    Adia Molina MD Emergency Medicine    Harjeet Peng MD Internal Medicine       Primary Care Provider    Physician No Ref-Primary       No address on file        After Care Instructions     Activity       Your activity upon discharge: activity as tolerated            Diet       Follow this diet upon discharge:       Combination Diet Dialysis Diet; 8509-2532 Calories: Moderate Consistent CHO (4-6 CHO units/meal)            Discharge Instructions       [facility] to provide nebulizer machine:  [phone]                  Follow-up Appointments     Follow-up and recommended labs and tests        Follow up to establish primary care provider within 7-14 days for hospital follow- up.  No follow up labs or test are needed.                  Your next 10 appointments already scheduled     Feb 23, 2017  3:20 PM CST   Office Visit  "with Sandip Antunez MD   [facility] ([facility]    [address] [address] [phone]           Bring a current list of meds and any records pertaining to this visit.  For Physicals, please bring immunization records and any forms needing to be filled out.  Please arrive 10 minutes early to complete paperwork.              Pending Results     Date and Time Order Name Status Description    2017 0745 Chest XR,  PA & LAT In process             Statement of Approval     Ordered          17 0836  I have reviewed and agree with all the recommendations and orders detailed in this document.  EFFECTIVE NOW     Approved and electronically signed by:  Brenden Nielsen MD             Admission Information     Date & Time Provider Department Dept. Phone    2017 Harjeet Peng MD [facility] [phone]      Your Vitals Were     Blood Pressure Pulse Temperature Respirations Height Weight    156/62 (BP Location: Right arm) 94 98  F (36.7  C) (Oral) 18 1.753 m (5' 9\") 85.8 kg (189 lb 2.5 oz)    Pulse Oximetry BMI (Body Mass Index)                95% 27.93 kg/m2          MyChart Information     [employer] lets you send messages to your doctor, view your test results, renew your prescriptions, schedule appointments and more. To sign up, go to [email or web address] Click on \"Log in\" on the left side of the screen, which will take you to the Welcome page. Then click on \"Sign up Now\" on the right side of the page.     You will be asked to enter the access code listed below, as well as some personal information. Please follow the directions to create your username and password.     Your access code is: [email or web address]  Expires: 2017  2:04 PM     Your access code will  in 90 days. If you need help or a new code, please call your [facility] or [phone].        Care EveryWhere ID     This " is your Care EveryWhere ID. This could be used by other organizations to access your [place] medical records  [id number]           Review of your medicines      START taking        Dose / Directions    order for DME        Equipment being ordered: Other: Nebulizer machine Treatment Diagnosis: COPD   Quantity:  1 Device   Refills:  0         CONTINUE these medicines which may have CHANGED, or have new prescriptions. If we are uncertain of the size of tablets/capsules you have at home, strength may be listed as something that might have changed.        Dose / Directions    * albuterol 108 (90 BASE) MCG/ACT Inhaler   Commonly known as:  PROAIR HFA/PROVENTIL HFA/VENTOLIN HFA   This may have changed:  Another medication with the same name was added. Make sure you understand how and when to take each.        Dose:  1-2 puff   Inhale 1-2 puffs into the lungs every 4 hours as needed for shortness of breath / dyspnea or wheezing   Refills:  0       * albuterol (2.5 MG/3ML) 0.083% neb solution   This may have changed:  You were already taking a medication with the same name, and this prescription was added. Make sure you understand how and when to take each.        Dose:  1 vial   Take 1 vial (2.5 mg) by nebulization every 6 hours as needed for shortness of breath / dyspnea or wheezing   Quantity:  25 vial   Refills:  0       * Notice:  This list has 2 medication(s) that are the same as other medications prescribed for you. Read the directions carefully, and ask your doctor or other care provider to review them with you.      CONTINUE these medicines which have NOT CHANGED        Dose / Directions    aspirin 81 MG chewable tablet        Dose:  81 mg   Take 81 mg by mouth daily   Refills:  0       ATORVASTATIN CALCIUM PO        Dose:  40 mg   Take 40 mg by mouth At Bedtime   Refills:  0       CARVEDILOL PO        Dose:  12.5 mg   Take 12.5 mg by mouth 2 times daily   Refills:  0       COLCHICINE PO        Dose:  0.6 mg    Take 0.6 mg by mouth every 48 hours as needed Take 1 tab by mouth twice a day for 2 days then one every other day as needed   Refills:  0       darbepoetin libby 100 MCG/0.5ML injection   Commonly known as:  ARANESP        Dose:  40 mcg   Inject 40 mcg Subcutaneous every 30 days   Refills:  0       DIALYVITE 800 PO        Dose:  1 tablet   Take 1 tablet by mouth daily   Refills:  0       diltiazem 300 MG 24 hr ER beaded capsule   Commonly known as:  TIAZAC        Dose:  300 mg   Take 300 mg by mouth daily   Refills:  0       DOXAZOSIN MESYLATE PO        Dose:  8 mg   Take 8 mg by mouth daily   Refills:  0       EPLERENONE PO        Dose:  50 mg   Take 50 mg by mouth daily   Refills:  0       fish oil-omega-3 fatty acids 1000 MG capsule        Dose:  1 g   Take 1 g by mouth daily   Refills:  0       FUROSEMIDE PO        Dose:  20 mg   Take 20 mg by mouth daily   Refills:  0       HYDROCHLOROTHIAZIDE PO        Dose:  25 mg   Take 25 mg by mouth daily   Refills:  0       insulin glargine 100 UNIT/ML injection   Commonly known as:  LANTUS        Dose:  26 Units   Inject 26 Units Subcutaneous At Bedtime   Refills:  0       LISINOPRIL PO        Dose:  20 mg   Take 20 mg by mouth daily   Refills:  0       NOVOLOG SC        Sliding scale   Refills:  0       tiotropium-olodaterol 2.5-2.5 MCG/ACT Aers        Dose:  2 puff   Inhale 2 puffs into the lungs daily   Refills:  0       VITAMIN D (CHOLECALCIFEROL) PO        Dose:  2000 Units   Take 2,000 Units by mouth every other day   Refills:  0            Where to get your medicines      These medications were sent to [facility] - [address]  [address] [facility] [address], [place] [address]     Phone:  [phone]     albuterol (2.5 MG/3ML) 0.083% neb solution         Some of these will need a paper prescription and others can be bought over the counter. Ask your nurse if you have questions.     Bring a paper prescription for each of these  medications     order for DME                Protect others around you: Learn how to safely use, store and throw away your medicines at www.disposemymeds.org.             Medication List: This is a list of all your medications and when to take them. Check marks below indicate your daily home schedule. Keep this list as a reference.      Medications           Morning Afternoon Evening Bedtime As Needed    * albuterol 108 (90 BASE) MCG/ACT Inhaler   Commonly known as:  PROAIR HFA/PROVENTIL HFA/VENTOLIN HFA   Inhale 1-2 puffs into the lungs every 4 hours as needed for shortness of breath / dyspnea or wheezing                                   * albuterol (2.5 MG/3ML) 0.083% neb solution   Take 1 vial (2.5 mg) by nebulization every 6 hours as needed for shortness of breath / dyspnea or wheezing                                   aspirin 81 MG chewable tablet   Take 81 mg by mouth daily   Last time this was given:  81 mg on 2/16/2017  9:15 AM   Next Dose Due:  Tomorrow morning on 2/17/17                                   ATORVASTATIN CALCIUM PO   Take 40 mg by mouth At Bedtime   Last time this was given:  40 mg on 2/15/2017  9:58 PM   Next Dose Due:  Today at bedtime 2/16/17                                   CARVEDILOL PO   Take 12.5 mg by mouth 2 times daily   Last time this was given:  12.5 mg on 2/16/2017  9:16 AM   Next Dose Due:  Today evening on 2/16/17                                      COLCHICINE PO   Take 0.6 mg by mouth every 48 hours as needed Take 1 tab by mouth twice a day for 2 days then one every other day as needed                                   darbepoetin libby 100 MCG/0.5ML injection   Commonly known as:  ARANESP   Inject 40 mcg Subcutaneous every 30 days                                DIALYVITE 800 PO   Take 1 tablet by mouth daily   Next Dose Due:  Tomorrow morning on 2/17/17                                   diltiazem 300 MG 24 hr ER beaded capsule   Commonly known as:  TIAZAC   Take 300 mg by  mouth daily   Next Dose Due:  Tomorrow on 2/17/17                                   DOXAZOSIN MESYLATE PO   Take 8 mg by mouth daily   Last time this was given:  8 mg on 2/15/2017 10:10 PM   Next Dose Due:  Today at bedtime on 2/16/17                                   EPLERENONE PO   Take 50 mg by mouth daily   Last time this was given:  50 mg on 2/16/2017  9:14 AM   Next Dose Due:  Tomorrow morning on 2/17/17                                   fish oil-omega-3 fatty acids 1000 MG capsule   Take 1 g by mouth daily   Last time this was given:  1 g on 2/16/2017  9:15 AM   Next Dose Due:  Tomorrow morning on 2/17/17                                   FUROSEMIDE PO   Take 20 mg by mouth daily   Last time this was given:  20 mg on 2/16/2017  9:15 AM   Next Dose Due:  Tomorrow morning on 2/17/17                                   HYDROCHLOROTHIAZIDE PO   Take 25 mg by mouth daily   Last time this was given:  25 mg on 2/16/2017  9:15 AM   Next Dose Due:  Tomorrow morning on 2/17/17                                   insulin glargine 100 UNIT/ML injection   Commonly known as:  LANTUS   Inject 26 Units Subcutaneous At Bedtime   Last time this was given:  36 Units on 2/15/2017  9:58 PM   Next Dose Due:  Today at bedtime on 2/16/17                                   LISINOPRIL PO   Take 20 mg by mouth daily   Last time this was given:  20 mg on 2/16/2017  9:15 AM   Next Dose Due:  Tomorrow morning on 2/17/17                                   NOVOLOG SC   Sliding scale                                order for DME   Equipment being ordered: Other: Nebulizer machine Treatment Diagnosis: COPD                                tiotropium-olodaterol 2.5-2.5 MCG/ACT Aers   Inhale 2 puffs into the lungs daily   Next Dose Due:  Today evening on 2/16/17                                   VITAMIN D (CHOLECALCIFEROL) PO   Take 2,000 Units by mouth every other day   Next Dose Due:  Tomorrow morning on 2/17/17                                   *  Notice:  This list has 2 medication(s) that are the same as other medications prescribed for you. Read the directions carefully, and ask your doctor or other care provider to review them with you.              More Information        Pneumonia (Adult)  Pneumonia is an infection deep within the lungs. It is in the small air sacs (alveoli). Pneumonia may be caused by a virus or bacteria. Pneumonia caused by bacteria is usually treated with an antibiotic. Severe cases may need to be treated in the hospital. Milder cases can be treated at home. Symptoms usually start to get better during the first 2 days of treatment.    Home care  Follow these guidelines when caring for yourself at home:    Rest at home for the first 2 to 3 days, or until you feel stronger. Don t let yourself get overly tired when you go back to your activities.    Stay away from cigarette smoke - yours or other people s.    You may use acetaminophen or ibuprofen to control fever or pain, unless another medicine was prescribed. If you have chronic liver or kidney disease, talk with your health care provider before using these medicines. Also talk with your provider if you ve had a stomach ulcer or GI bleeding. Don t give aspirin to anyone younger than 18 years of age who is ill with a fever. It may cause severe liver damage.    Your appetite may be poor, so a light diet is fine.    Drink 6 to 8 glasses of fluids every day to make sure you are getting enough fluids. Beverages can include water, sport drinks, sodas without caffeine, juices, tea, or soup. Fluids will help loosen secretions in the lung. This will make it easier for you to cough up the phlegm (sputum). If you also have heart or kidney disease, check with your health care provider before you drink extra fluids.    Take antibiotic medicine prescribed until it is all gone, even if you are feeling better after a few days.  Follow-up care  Follow up with your health care provider in the next 2  to 3 days, or as advised. This is to be sure the medicine is helping you get better.  If you are 65 or older, you should get a pneumococcal vaccine and a yearly flu (influenza) shot. You should also get these vaccines if you have chronic lung disease like asthma, emphysema, or COPD. Ask your provider about this.  When to seek medical advice  Call your health care provider right away if any of these occur:    You don t get better within the first 48 hours of treatment    Shortness of breath gets worse    Rapid breathing (more than 25 breaths per minute)    Coughing up blood    Chest pain gets worse with breathing    Fever of 102 F (38 C) or higher that doesn t get better with fever medicine    Weakness, dizziness, or fainting that gets worse    Thirst or dry mouth that gets worse    Sinus pain, headache, or a stiff neck    Chest pain not caused by coughing    3175-1496 [employer]. [address] [place] [address]. All rights reserved. This information is not intended as a substitute for professional medical care. Always follow your healthcare professional's instructions.                Discharge Instructions: COPD  You have been diagnosed with chronic obstructive pulmonary disease (COPD). This is a name given to a group of diseases that limit the flow of air in and out of your lungs. This makes it harder to breathe. With COPD, you are also more likely to get lung infections. COPD includes chronic bronchitis and emphysema. COPD is most often caused by heavy, long-term cigarette smoking.  Home care  Quit smoking    If you smoke, quit. It is the best thing you can do for your COPD and your overall health.    Join a stop-smoking program. There are even telephone, text message, and Internet programs to help you quit.    Ask your health care provider about medications or other methods to help you quit.    Ask family members to quit smoking as well.    Don't allow people to smoke in your home, in  your car, or when they are around you.  Protect yourself from infection    Wash your hands often. Do your best to keep your hands away from your face. Most germs are spread from your hands to your mouth.    Get a flu shot every year. Also ask your provider about pneumonia vaccines.    Avoid crowds. It's especially important to do this in the winter when more people have colds and flu.    To stay healthy, get enough sleep, exercise regularly, and eat a balanced diet. You should:    Get about 8 hours of sleep every night.    Try to exercise for at least 30 minutes on most days.    Have healthy foods including fruits and vegetables, 100% whole grains, lean meats and fish, and low-fat dairy products. Try to stay away from foods high in fats and sugar.  Take your medications  Take your medications exactly as directed. Don't skip doses.  Manage your stress  Stress can make COPD worse. Use this stress management technique:    Find a quiet place and sit or lie in a comfortable position.    Close your eyes and perform breathing exercises for several minutes. Ask your provider about the best way to breathe.  Pulmonary rehabilitation    Pulmonary rehab can help you feel better. These programs include exercise, breathing techniques, information about COPD, counseling, and help for smokers.    Ask your provider or your local hospital about programs in your area.  When to call your health care provider  Call your provider immediately if you have any of the following:    Shortness of breath, wheezing, or coughing    Increased mucus    Yellow, green, bloody, or smelly mucus    Fever or chills    Tightness in your chest that does not go away with rest or medication    An irregular heartbeat or a feeling that your heart is beating very fast    Swollen ankles     4950-0188 [employer]. [address] [place] [address], [place] [address]. All rights reserved. This information is not intended as a substitute for professional medical  care. Always follow your healthcare professional's instructions.

## 2017-02-11 NOTE — ED NOTES
"[facility]  ED Nurse Handoff Report    ED Chief complaint: Shortness of Breath      ED Diagnosis:   Final diagnoses:   COPD (chronic obstructive pulmonary disease) (H)       Code Status: Full Code    Allergies: No Known Allergies    Activity level:  Independent     Needed?: No    Isolation: No  Infection: Not Applicable    Bariatric?: No      Vital Signs:   Filed Vitals:    02/11/17 0737 02/11/17 0800 02/11/17 0900   BP: 176/78 173/74 154/86   Pulse: 105 102    Temp: 97.5  F (36.4  C)     TempSrc: Oral     Resp: 20 18 14   Height: 1.753 m (5' 9\")     Weight: 86.5 kg (190 lb 11.2 oz)     SpO2: 93% 95% 92%       Cardiac Rhythm: ,    SR with ST depression in inferior-lateral leads (nursing staff unaware if this is new or not given pt has just moved here from [place] in Nov. 2016 and I don't have an old EKG for comparison)    Pain level: 0-10 Pain Scale: 0    Is this patient confused?: No    Patient Report: 82yo male who presents to ED with c/o non-productive cough x1-1.5 weeks. Last night developed SOB. Utilized albuterol inhaler for which he has for COPD with minimal relief, so came to ED today. Not on home O2. No recent steroid use. Has received flu vaccine this season as well as pneumonia vaccines previously. Has also been using Delsym OTC.    Denies fevers, chills, congestion, rhinorrhea, nausea, vomiting, diarrhea, constipation or changes with urination.    Pt lives with his son, daughter-in-law, and 2 granddaughters. 1 granddaughter has similar symptoms.    Hx of IDDM and CRF. Received dialysis M-W-F. Reports last dialysis was yesterday with normal amounts of fluid being taken off. Pt still makes urine. Is on furosemide and hydrochlorothiazide.    Lungs diminished bilaterally with expiratory wheezes. Received 1 DuoNeb via EMS. Heart regular and tachycardic with murmur. Pt reports hx of heart murmur.    CXR -- (+) pneumonia with elevated WBC count; ASA given for elevated Troponin; " however, we don't know patient's baseline given renal failure    Family Comments: Son, Sarkis, at bedside.    OBS brochure/video discussed/provided to patient: N/A    ED Medications:   Medications   cefTRIAXone (ROCEPHIN) 1 g vial to attach to  mL bag for ADULTS or NS 50 mL bag for PEDS (1 g Intravenous New Bag 2/11/17 0934)   azithromycin (ZITHROMAX) 500 mg in NaCl 0.9 % 250 mL intermittent infusion (not administered)   methylPREDNISolone sodium succinate (solu-MEDROL) injection 125 mg (125 mg Intravenous Given 2/11/17 0824)   ipratropium - albuterol 0.5 mg/2.5 mg/3 mL (DUONEB) neb solution 3 mL (3 mLs Nebulization Given 2/11/17 0824)   aspirin tablet 325 mg (325 mg Oral Given 2/11/17 0927)       Drips infusing?:  Yes (Zithromax)    ED NURSE PHONE NUMBER: [phone]

## 2017-02-12 ENCOUNTER — APPOINTMENT (OUTPATIENT)
Dept: OCCUPATIONAL THERAPY | Facility: CLINIC | Age: 82
DRG: 190 | End: 2017-02-12
Attending: INTERNAL MEDICINE
Payer: MEDICARE

## 2017-02-12 LAB
ANION GAP SERPL CALCULATED.3IONS-SCNC: 12 MMOL/L (ref 3–14)
BUN SERPL-MCNC: 60 MG/DL (ref 7–30)
CALCIUM SERPL-MCNC: 8.9 MG/DL (ref 8.5–10.1)
CHLORIDE SERPL-SCNC: 97 MMOL/L (ref 94–109)
CO2 SERPL-SCNC: 25 MMOL/L (ref 20–32)
CREAT SERPL-MCNC: 3.78 MG/DL (ref 0.66–1.25)
ERYTHROCYTE [DISTWIDTH] IN BLOOD BY AUTOMATED COUNT: 14.7 % (ref 10–15)
GFR SERPL CREATININE-BSD FRML MDRD: 15 ML/MIN/1.7M2
GLUCOSE BLDC GLUCOMTR-MCNC: 181 MG/DL (ref 70–99)
GLUCOSE BLDC GLUCOMTR-MCNC: 240 MG/DL (ref 70–99)
GLUCOSE BLDC GLUCOMTR-MCNC: 325 MG/DL (ref 70–99)
GLUCOSE BLDC GLUCOMTR-MCNC: 344 MG/DL (ref 70–99)
GLUCOSE BLDC GLUCOMTR-MCNC: 370 MG/DL (ref 70–99)
GLUCOSE SERPL-MCNC: 177 MG/DL (ref 70–99)
HBA1C MFR BLD: 6.4 % (ref 4.3–6)
HCT VFR BLD AUTO: 32.1 % (ref 40–53)
HGB BLD-MCNC: 10.5 G/DL (ref 13.3–17.7)
MCH RBC QN AUTO: 27.4 PG (ref 26.5–33)
MCHC RBC AUTO-ENTMCNC: 32.7 G/DL (ref 31.5–36.5)
MCV RBC AUTO: 84 FL (ref 78–100)
PLATELET # BLD AUTO: 251 10E9/L (ref 150–450)
POTASSIUM SERPL-SCNC: 4.4 MMOL/L (ref 3.4–5.3)
RBC # BLD AUTO: 3.83 10E12/L (ref 4.4–5.9)
SODIUM SERPL-SCNC: 134 MMOL/L (ref 133–144)
WBC # BLD AUTO: 18.2 10E9/L (ref 4–11)

## 2017-02-12 PROCEDURE — 00000146 ZZHCL STATISTIC GLUCOSE BY METER IP

## 2017-02-12 PROCEDURE — 25000128 H RX IP 250 OP 636: Performed by: INTERNAL MEDICINE

## 2017-02-12 PROCEDURE — 93005 ELECTROCARDIOGRAM TRACING: CPT

## 2017-02-12 PROCEDURE — 99232 SBSQ HOSP IP/OBS MODERATE 35: CPT | Performed by: INTERNAL MEDICINE

## 2017-02-12 PROCEDURE — 99407 BEHAV CHNG SMOKING > 10 MIN: CPT | Performed by: OCCUPATIONAL THERAPIST

## 2017-02-12 PROCEDURE — 40000133 ZZH STATISTIC OT WARD VISIT: Performed by: OCCUPATIONAL THERAPIST

## 2017-02-12 PROCEDURE — 80048 BASIC METABOLIC PNL TOTAL CA: CPT | Performed by: INTERNAL MEDICINE

## 2017-02-12 PROCEDURE — 21000001 ZZH R&B HEART CARE

## 2017-02-12 PROCEDURE — 25000125 ZZHC RX 250: Performed by: INTERNAL MEDICINE

## 2017-02-12 PROCEDURE — 83036 HEMOGLOBIN GLYCOSYLATED A1C: CPT | Performed by: INTERNAL MEDICINE

## 2017-02-12 PROCEDURE — 85027 COMPLETE CBC AUTOMATED: CPT | Performed by: INTERNAL MEDICINE

## 2017-02-12 PROCEDURE — A9270 NON-COVERED ITEM OR SERVICE: HCPCS | Mod: GY | Performed by: INTERNAL MEDICINE

## 2017-02-12 PROCEDURE — 25000131 ZZH RX MED GY IP 250 OP 636 PS 637: Mod: GY | Performed by: INTERNAL MEDICINE

## 2017-02-12 PROCEDURE — 94640 AIRWAY INHALATION TREATMENT: CPT

## 2017-02-12 PROCEDURE — 25000132 ZZH RX MED GY IP 250 OP 250 PS 637: Mod: GY | Performed by: INTERNAL MEDICINE

## 2017-02-12 PROCEDURE — 94640 AIRWAY INHALATION TREATMENT: CPT | Mod: 76

## 2017-02-12 PROCEDURE — 40000275 ZZH STATISTIC RCP TIME EA 10 MIN

## 2017-02-12 PROCEDURE — 93010 ELECTROCARDIOGRAM REPORT: CPT | Performed by: INTERNAL MEDICINE

## 2017-02-12 PROCEDURE — 36415 COLL VENOUS BLD VENIPUNCTURE: CPT | Performed by: INTERNAL MEDICINE

## 2017-02-12 RX ORDER — NICOTINE POLACRILEX 4 MG
15-30 LOZENGE BUCCAL
Status: DISCONTINUED | OUTPATIENT
Start: 2017-02-12 | End: 2017-02-12

## 2017-02-12 RX ORDER — DEXTROSE MONOHYDRATE 25 G/50ML
25-50 INJECTION, SOLUTION INTRAVENOUS
Status: DISCONTINUED | OUTPATIENT
Start: 2017-02-12 | End: 2017-02-12

## 2017-02-12 RX ADMIN — AZITHROMYCIN 250 MG: 250 TABLET, FILM COATED ORAL at 13:22

## 2017-02-12 RX ADMIN — METHYLPREDNISOLONE SODIUM SUCCINATE 62.5 MG: 125 INJECTION, POWDER, FOR SOLUTION INTRAMUSCULAR; INTRAVENOUS at 17:15

## 2017-02-12 RX ADMIN — IPRATROPIUM BROMIDE AND ALBUTEROL SULFATE 3 ML: .5; 3 SOLUTION RESPIRATORY (INHALATION) at 23:43

## 2017-02-12 RX ADMIN — INSULIN ASPART 5 UNITS: 100 INJECTION, SOLUTION INTRAVENOUS; SUBCUTANEOUS at 13:22

## 2017-02-12 RX ADMIN — ASPIRIN 81 MG 81 MG: 81 TABLET ORAL at 08:36

## 2017-02-12 RX ADMIN — DOXAZOSIN MESYLATE 8 MG: 4 TABLET ORAL at 20:52

## 2017-02-12 RX ADMIN — DILTIAZEM HYDROCHLORIDE 300 MG: 180 CAPSULE, EXTENDED RELEASE ORAL at 08:35

## 2017-02-12 RX ADMIN — INSULIN GLARGINE 26 UNITS: 100 INJECTION, SOLUTION SUBCUTANEOUS at 20:52

## 2017-02-12 RX ADMIN — CARVEDILOL 12.5 MG: 12.5 TABLET, FILM COATED ORAL at 20:52

## 2017-02-12 RX ADMIN — FUROSEMIDE 20 MG: 20 TABLET ORAL at 08:35

## 2017-02-12 RX ADMIN — IPRATROPIUM BROMIDE AND ALBUTEROL SULFATE 3 ML: .5; 3 SOLUTION RESPIRATORY (INHALATION) at 12:12

## 2017-02-12 RX ADMIN — CARVEDILOL 12.5 MG: 12.5 TABLET, FILM COATED ORAL at 08:35

## 2017-02-12 RX ADMIN — ATORVASTATIN CALCIUM 40 MG: 40 TABLET, FILM COATED ORAL at 20:52

## 2017-02-12 RX ADMIN — Medication 1 G: at 08:37

## 2017-02-12 RX ADMIN — IPRATROPIUM BROMIDE AND ALBUTEROL SULFATE 3 ML: .5; 3 SOLUTION RESPIRATORY (INHALATION) at 07:43

## 2017-02-12 RX ADMIN — EPLERENONE 50 MG: 25 TABLET, FILM COATED ORAL at 08:36

## 2017-02-12 RX ADMIN — METHYLPREDNISOLONE SODIUM SUCCINATE 62.5 MG: 125 INJECTION, POWDER, FOR SOLUTION INTRAMUSCULAR; INTRAVENOUS at 08:31

## 2017-02-12 RX ADMIN — LISINOPRIL 20 MG: 20 TABLET ORAL at 08:36

## 2017-02-12 RX ADMIN — IPRATROPIUM BROMIDE AND ALBUTEROL SULFATE 3 ML: .5; 3 SOLUTION RESPIRATORY (INHALATION) at 20:37

## 2017-02-12 RX ADMIN — HYDROCHLOROTHIAZIDE 25 MG: 25 TABLET ORAL at 08:35

## 2017-02-12 RX ADMIN — IPRATROPIUM BROMIDE AND ALBUTEROL SULFATE 3 ML: .5; 3 SOLUTION RESPIRATORY (INHALATION) at 15:42

## 2017-02-12 RX ADMIN — Medication 1 CAPSULE: at 08:37

## 2017-02-12 RX ADMIN — INSULIN ASPART 5 UNITS: 100 INJECTION, SOLUTION INTRAVENOUS; SUBCUTANEOUS at 17:14

## 2017-02-12 RX ADMIN — METHYLPREDNISOLONE SODIUM SUCCINATE 62.5 MG: 125 INJECTION, POWDER, FOR SOLUTION INTRAMUSCULAR; INTRAVENOUS at 00:35

## 2017-02-12 NOTE — PROVIDER NOTIFICATION
MD Notification    Notified Person:  MD    Notified Persons Name: Dr. Alvarado    Notification Date/Time:2/12/17 0900    Notification Interaction:  Paged Physician    Purpose of Notification: pe declined 1 Unit of novolog for , pt states its not enough.    Orders Received:    Comments: Pt usually takes 12 u with meals

## 2017-02-12 NOTE — PLAN OF CARE
Problem: Goal Outcome Summary  Goal: Goal Outcome Summary  Outcome: No Change      A/Ox4. VSS on 1.5LPM NC. Sitting at EOB most of shift. Up w SBA. LS exp wheezes, infrequent, nonproductive cough - receives alma nebs with improvement. Tele ST. , 325 - on solumedrol, covered with SSI. LUE AV fistula. Heart murmur noted. On po abx/lasix. Will continue to monitor.

## 2017-02-12 NOTE — PLAN OF CARE
Problem: Goal Outcome Summary  Goal: Goal Outcome Summary  Outcome: No Change  A&O x4. VSS on room air. Up with SBA, calls appropriately. Lung sounds diminished with expiratory wheezes, infrequent non-productive cough - receiving scheduled nebs. Heart murmur present. Tele monitored, ST. Troponins slightly elevated, MD aware - next draw at 2000. Will continue to monitor.

## 2017-02-12 NOTE — PROGRESS NOTES
[facility]    Hospitalist Progress Note    Date of Service (when I saw the patient): 02/12/2017    Assessment & Plan   Seven Savage Jr. 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     Acute COPD Exacerbation  In a pt with known severe COPD and on maintenance therapy. Not O2 dependent. Maintaining sat's thus far in the low 90's. CXR with possible LLL PNA but unclear. Influenza negative.   - Wheezing improved today but still tight.   - Continues on Solumedrol 60 mg Q8.  - Duonebs Q 4 and Q 2 prn.   - Treated with IV Rocephin and Azithro in the ER. Continued on PO Azithro.  - Leukocytosis likely reactive and increased from steroids.   - Taper O2 as able.  On 1.5 L at present.   - Smoking cessation.  - Would like a nebulizer machine with duonebs on discharge.       NSTEMI  Suspect a type II event in the setting of COPD exacerbation and poor clearance from his CKD. Never c/o chest pain. EKG with some lateral ST seg depression. Stress test last year per his report with no sign of CAD.   - Trop peaked at 0.067.  No reported CP.   - C/w Asa, statin, BB, Ace-i.  - Recent Echo in 6/2016 with no WMA's. Will not repeat at this time unless clinical picture warrants.   - Tele.  - Repeat EKG.   - No heparin for now.     ESRD on HD M/W/F   Cr at baseline of around 3.   - Nephrology consulted for HD tomorrow.   - C/w pta meds.      H/o Severe AS  Denies exertional symptoms outside of his present exacerbation as above. Echo in 6/2016 with EF 60%, severe AS with valva area 0.8cm, mild MR.   - Do not suspect any current symptoms related to his AS.   - Recommend a Cardiology follow up to discuss valve repair and repeat Echo as outpt.     Type II DM   On Lantus 26U Qpm and novolog 12 U TID with meals.   - Will c/w pta dosing with sliding  scale.  - Mod carb diet.   - A1c 6.4 .  - Will likely need to adjust insulin while on steroids.      HTN  Mildly hypertensive on admit and suspect this was related to his resp distress.   - C/w pta meds and will follow.   - Hydralazine prn.         DVT Prophylaxis: Pneumatic Compression Devices  Code Status: Full Code as was d/w the patient.      Disposition: Pending continued improvement in his acute COPD exacerbation.  Will need HD tomorrow. Pending exam tomorrow may be able to d/c but explained to him he may need another day.          Harjeet Peng       Interval History   Wheezing improved but still sounds tight and with frequent cough. On CP.      -Data reviewed today: I reviewed all new labs and imaging results over the last 24 hours. I personally reviewed no images or EKG's today.    Physical Exam   Temp: 98.6  F (37  C) Temp src: Oral BP: 138/58 Pulse: 66 Heart Rate: 72 Resp: 18 SpO2: 98 % O2 Device: Nasal cannula Oxygen Delivery: 1.5 LPM  Vitals:    02/11/17 0737 02/12/17 0500   Weight: 86.5 kg (190 lb 11.2 oz) 85.4 kg (188 lb 4.4 oz)     Vital Signs with Ranges  Temp:  [97.9  F (36.6  C)-98.6  F (37  C)] 98.6  F (37  C)  Pulse:  [66] 66  Heart Rate:  [] 72  Resp:  [18-20] 18  BP: (138-171)/(57-90) 138/58  SpO2:  [94 %-100 %] 98 %  I/O last 3 completed shifts:  In: 120 [P.O.:120]  Out: 250 [Urine:250]    Gen: Patient in no acute distress.  Appears comfortable.  Heart:  S1S2+, regular rate and rhythm, 3/6 systolic murmur.  Lungs:  Decreased throughout and still a little tight, No wheezing, rales or rhonchi.   Abdomen:  Soft, non tender, non distended, bowel sounds positive.  Extremities:  No edema.    Medications        insulin aspart  12 Units Subcutaneous TID w/meals     aspirin  81 mg Oral Daily     atorvastatin (LIPITOR) tablet 40 mg  40 mg Oral At Bedtime     B complex-C-folic acid  1 capsule Oral Daily     carvedilol (COREG) tablet 12.5 mg  12.5 mg Oral BID     doxazosin  8 mg Oral  Daily     eplerenone  50 mg Oral Daily     fish oil-omega-3 fatty acids  1 g Oral Daily     furosemide (LASIX) tablet 20 mg  20 mg Oral Daily     hydrochlorothiazide (HYDRODIURIL) tablet 25 mg  25 mg Oral Daily     insulin glargine  26 Units Subcutaneous At Bedtime     lisinopril (PRINIVIL/ZESTRIL) tablet 20 mg  20 mg Oral Daily     sodium chloride (PF)  3 mL Intracatheter Q8H     ipratropium - albuterol 0.5 mg/2.5 mg/3 mL  3 mL Nebulization Q4H While awake     methylPREDNISolone  62.5 mg Intravenous Q8H     insulin aspart  1-7 Units Subcutaneous TID AC     insulin aspart  1-5 Units Subcutaneous At Bedtime     diltiazem  300 mg Oral Daily     - MEDICATION INSTRUCTIONS for Dialysis Patients -   Does not apply See Admin Instructions     azithromycin  250 mg Oral Daily       Data     Recent Labs  Lab 02/12/17  0605 02/11/17  2020 02/11/17  1500 02/11/17  0800   WBC 18.2*  --   --  16.1*   HGB 10.5*  --   --  11.3*   MCV 84  --   --  86     --   --  249     --   --  137   POTASSIUM 4.4  --   --  4.1   CHLORIDE 97  --   --  99   CO2 25  --   --  27   BUN 60*  --   --  27   CR 3.78*  --   --  3.18*   ANIONGAP 12  --   --  11   AARON 8.9  --   --  8.7   *  --   --  108*   TROPI  --  0.063* 0.067* 0.057*       No results found for this or any previous visit (from the past 24 hour(s)).

## 2017-02-12 NOTE — PLAN OF CARE
Problem: Goal Outcome Summary  Goal: Goal Outcome Summary  Outcome: No Change  A&O x4. VSS on 2 L O2. Up with SBA, calls appropriately. Lung sounds diminished with expiratory wheezes, infrequent non-productive cough - receiving scheduled nebs. Heart murmur present. Tele monitored, SR.  and 344. Novolog now scheduled 12 u and sliding scale.

## 2017-02-12 NOTE — PROGRESS NOTES
I spent >=10 minutes providing counseling regarding smoking cessation, this included cessation techniques, resources, strategies, treatment options.

## 2017-02-12 NOTE — CONSULTS
Renal Medicine Chart       MWF schedule [employer] [facility] ([facility])  Ran 02/10/17    2:45 run time  AVF  Dry weight 86.3 kg  Last off weight 86.1 kg    EPO (5200) and Hectorol (2) with run      Admitted with COPD exacerbation  No additional dialysis indicated     Wt Readings from Last 2 Encounters:   02/12/17 85.4 kg (188 lb 4.4 oz)       Plan am dialysis  Dialysis orders entered        Recent Labs  Lab 02/12/17  0605      POTASSIUM 4.4   CHLORIDE 97   CO2 25   ANIONGAP 12   *   BUN 60*   CR 3.78*   GFRESTIMATED 15*   GFRESTBLACK 19*   AARON 8.9           TRISHA Vivar    [facility]  [phone]

## 2017-02-13 LAB
ANION GAP SERPL CALCULATED.3IONS-SCNC: 13 MMOL/L (ref 3–14)
BUN SERPL-MCNC: 89 MG/DL (ref 7–30)
CALCIUM SERPL-MCNC: 8.4 MG/DL (ref 8.5–10.1)
CHLORIDE SERPL-SCNC: 93 MMOL/L (ref 94–109)
CO2 SERPL-SCNC: 25 MMOL/L (ref 20–32)
CREAT SERPL-MCNC: 4.17 MG/DL (ref 0.66–1.25)
GFR SERPL CREATININE-BSD FRML MDRD: 14 ML/MIN/1.7M2
GLUCOSE BLDC GLUCOMTR-MCNC: 175 MG/DL (ref 70–99)
GLUCOSE BLDC GLUCOMTR-MCNC: 266 MG/DL (ref 70–99)
GLUCOSE BLDC GLUCOMTR-MCNC: 306 MG/DL (ref 70–99)
GLUCOSE BLDC GLUCOMTR-MCNC: 328 MG/DL (ref 70–99)
GLUCOSE SERPL-MCNC: 308 MG/DL (ref 70–99)
INTERPRETATION ECG - MUSE: NORMAL
POTASSIUM SERPL-SCNC: 4.5 MMOL/L (ref 3.4–5.3)
SODIUM SERPL-SCNC: 131 MMOL/L (ref 133–144)

## 2017-02-13 PROCEDURE — 94640 AIRWAY INHALATION TREATMENT: CPT

## 2017-02-13 PROCEDURE — 40000275 ZZH STATISTIC RCP TIME EA 10 MIN

## 2017-02-13 PROCEDURE — 90937 HEMODIALYSIS REPEATED EVAL: CPT

## 2017-02-13 PROCEDURE — A9270 NON-COVERED ITEM OR SERVICE: HCPCS | Mod: GY | Performed by: HOSPITALIST

## 2017-02-13 PROCEDURE — 36415 COLL VENOUS BLD VENIPUNCTURE: CPT | Performed by: INTERNAL MEDICINE

## 2017-02-13 PROCEDURE — 25000132 ZZH RX MED GY IP 250 OP 250 PS 637: Mod: GY | Performed by: HOSPITALIST

## 2017-02-13 PROCEDURE — 21000001 ZZH R&B HEART CARE

## 2017-02-13 PROCEDURE — 25000125 ZZHC RX 250: Performed by: INTERNAL MEDICINE

## 2017-02-13 PROCEDURE — 99232 SBSQ HOSP IP/OBS MODERATE 35: CPT | Performed by: HOSPITALIST

## 2017-02-13 PROCEDURE — 94640 AIRWAY INHALATION TREATMENT: CPT | Mod: 76

## 2017-02-13 PROCEDURE — 63400005 ZZH RX 634: Performed by: INTERNAL MEDICINE

## 2017-02-13 PROCEDURE — 25000128 H RX IP 250 OP 636: Performed by: HOSPITALIST

## 2017-02-13 PROCEDURE — 25000128 H RX IP 250 OP 636: Performed by: INTERNAL MEDICINE

## 2017-02-13 PROCEDURE — A9270 NON-COVERED ITEM OR SERVICE: HCPCS | Mod: GY | Performed by: INTERNAL MEDICINE

## 2017-02-13 PROCEDURE — 25000131 ZZH RX MED GY IP 250 OP 636 PS 637: Mod: GY | Performed by: HOSPITALIST

## 2017-02-13 PROCEDURE — 25000132 ZZH RX MED GY IP 250 OP 250 PS 637: Mod: GY | Performed by: INTERNAL MEDICINE

## 2017-02-13 PROCEDURE — 80048 BASIC METABOLIC PNL TOTAL CA: CPT | Performed by: INTERNAL MEDICINE

## 2017-02-13 PROCEDURE — 00000146 ZZHCL STATISTIC GLUCOSE BY METER IP

## 2017-02-13 RX ORDER — METHYLPREDNISOLONE SODIUM SUCCINATE 125 MG/2ML
60 INJECTION, POWDER, LYOPHILIZED, FOR SOLUTION INTRAMUSCULAR; INTRAVENOUS EVERY 12 HOURS
Status: DISCONTINUED | OUTPATIENT
Start: 2017-02-13 | End: 2017-02-14

## 2017-02-13 RX ORDER — POLYETHYLENE GLYCOL 3350 17 G/17G
17 POWDER, FOR SOLUTION ORAL ONCE
Status: COMPLETED | OUTPATIENT
Start: 2017-02-13 | End: 2017-02-13

## 2017-02-13 RX ORDER — DOXERCALCIFEROL 4 UG/2ML
2 INJECTION INTRAVENOUS SEE ADMIN INSTRUCTIONS
Status: DISCONTINUED | OUTPATIENT
Start: 2017-02-13 | End: 2017-02-13

## 2017-02-13 RX ADMIN — ASPIRIN 81 MG 81 MG: 81 TABLET ORAL at 08:23

## 2017-02-13 RX ADMIN — METHYLPREDNISOLONE SODIUM SUCCINATE 62.5 MG: 125 INJECTION, POWDER, FOR SOLUTION INTRAMUSCULAR; INTRAVENOUS at 00:21

## 2017-02-13 RX ADMIN — Medication 1 G: at 08:23

## 2017-02-13 RX ADMIN — DOXAZOSIN MESYLATE 8 MG: 4 TABLET ORAL at 21:39

## 2017-02-13 RX ADMIN — ATORVASTATIN CALCIUM 40 MG: 40 TABLET, FILM COATED ORAL at 21:39

## 2017-02-13 RX ADMIN — IPRATROPIUM BROMIDE AND ALBUTEROL SULFATE 3 ML: .5; 3 SOLUTION RESPIRATORY (INHALATION) at 23:39

## 2017-02-13 RX ADMIN — EPLERENONE 50 MG: 25 TABLET, FILM COATED ORAL at 16:12

## 2017-02-13 RX ADMIN — EPOETIN ALFA 5000 UNITS: 3000 SOLUTION INTRAVENOUS; SUBCUTANEOUS at 15:01

## 2017-02-13 RX ADMIN — HYDROCHLOROTHIAZIDE 25 MG: 25 TABLET ORAL at 08:23

## 2017-02-13 RX ADMIN — CARVEDILOL 12.5 MG: 12.5 TABLET, FILM COATED ORAL at 21:39

## 2017-02-13 RX ADMIN — LISINOPRIL 20 MG: 20 TABLET ORAL at 08:26

## 2017-02-13 RX ADMIN — SODIUM CHLORIDE 250 ML: 9 INJECTION, SOLUTION INTRAVENOUS at 12:37

## 2017-02-13 RX ADMIN — IPRATROPIUM BROMIDE AND ALBUTEROL SULFATE 3 ML: .5; 3 SOLUTION RESPIRATORY (INHALATION) at 19:23

## 2017-02-13 RX ADMIN — Medication 1 CAPSULE: at 08:23

## 2017-02-13 RX ADMIN — CARVEDILOL 12.5 MG: 12.5 TABLET, FILM COATED ORAL at 09:57

## 2017-02-13 RX ADMIN — GUAIFENESIN 10 ML: 100 SOLUTION ORAL at 20:36

## 2017-02-13 RX ADMIN — INSULIN ASPART 15 UNITS: 100 INJECTION, SOLUTION INTRAVENOUS; SUBCUTANEOUS at 17:14

## 2017-02-13 RX ADMIN — IPRATROPIUM BROMIDE AND ALBUTEROL SULFATE 3 ML: .5; 3 SOLUTION RESPIRATORY (INHALATION) at 16:04

## 2017-02-13 RX ADMIN — AZITHROMYCIN 250 MG: 250 TABLET, FILM COATED ORAL at 16:12

## 2017-02-13 RX ADMIN — METHYLPREDNISOLONE SODIUM SUCCINATE 62.5 MG: 125 INJECTION, POWDER, FOR SOLUTION INTRAMUSCULAR; INTRAVENOUS at 20:36

## 2017-02-13 RX ADMIN — DILTIAZEM HYDROCHLORIDE 300 MG: 180 CAPSULE, EXTENDED RELEASE ORAL at 08:23

## 2017-02-13 RX ADMIN — METHYLPREDNISOLONE SODIUM SUCCINATE 62.5 MG: 125 INJECTION, POWDER, FOR SOLUTION INTRAMUSCULAR; INTRAVENOUS at 08:27

## 2017-02-13 RX ADMIN — POLYETHYLENE GLYCOL 3350 17 G: 17 POWDER, FOR SOLUTION ORAL at 16:14

## 2017-02-13 RX ADMIN — INSULIN ASPART 4 UNITS: 100 INJECTION, SOLUTION INTRAVENOUS; SUBCUTANEOUS at 08:28

## 2017-02-13 RX ADMIN — DOXERCALCIFEROL 2 MCG: 2 INJECTION, SOLUTION INTRAVENOUS at 12:38

## 2017-02-13 RX ADMIN — FUROSEMIDE 20 MG: 20 TABLET ORAL at 08:23

## 2017-02-13 RX ADMIN — IPRATROPIUM BROMIDE AND ALBUTEROL SULFATE 3 ML: .5; 3 SOLUTION RESPIRATORY (INHALATION) at 08:30

## 2017-02-13 RX ADMIN — IPRATROPIUM BROMIDE AND ALBUTEROL SULFATE 3 ML: .5; 3 SOLUTION RESPIRATORY (INHALATION) at 12:57

## 2017-02-13 RX ADMIN — INSULIN GLARGINE 36 UNITS: 100 INJECTION, SOLUTION SUBCUTANEOUS at 21:39

## 2017-02-13 NOTE — PROGRESS NOTES
Inpatient Dialysis Progress Note            Assessment and Plan:   1.  ESKD.  Good access function.  He does not seem to have much fluid on.  Set for 0.8 kg.      2.  Anemia.  HGB 10.5.  On EPO 5000 units.      3.  HTN.  BP did rise when he was in more respiratory distress. UP to 180/.   Now it is falling again as he is breathing more comfortably.      4.  FEN. K 3 for K 4.5.      5.  COPD:  Dyspnea is settling down with the addition of BIPAP.  Best to deliver his nebs on time.          Interval History:   Says he slept well.  He does OK when his nebs are on schedule.  His latest neb was a little late so he is feeling more SOB.  He did get the neb after arrival in dialysis, but he was still looking very SOB.  Talking in short sentences.  Sitting straight up on side of bed.  I ordered BIPAP for him and he says that he is feeling a little better.  His mouth is dry.        Dialysis Parameters:     Wt Readings from Last 4 Encounters:   02/13/17 86.5 kg (190 lb 11.2 oz)     I/O last 3 completed shifts:  In: 723 [P.O.:720; I.V.:3]  Out: 650 [Urine:650]  BP Readings from Last 3 Encounters:   02/13/17 158/74       Routine, ONE TIME, Starting today For 1 Occurrences  Weight Loss (kg): 0.8  Dialysis Temp: 36.5  C  Access Device: AVF  Access Site: L arm  Dialyzer: Revaclear  Dialysis Bath: K 3  Blood Flow Rate (mL/min): 400  Total Treatment Time (hrs): 2.75  Heparin: no         Medications and Allergies:   Reviewed in EPIC      epoetin libby (EPOGEN,PROCRIT) inj ESRD  5,000 Units Intravenous See Admin Instructions     doxercalciferol  2 mcg Intravenous See Admin Instructions     methylPREDNISolone  62.5 mg Intravenous Q12H     insulin aspart  1-10 Units Subcutaneous TID AC     insulin aspart  1-7 Units Subcutaneous At Bedtime     insulin aspart  15 Units Subcutaneous TID w/meals     insulin glargine  36 Units Subcutaneous At Bedtime     polyethylene glycol  17 g Oral Once     aspirin  81 mg Oral Daily     atorvastatin  (LIPITOR) tablet 40 mg  40 mg Oral At Bedtime     B complex-C-folic acid  1 capsule Oral Daily     carvedilol (COREG) tablet 12.5 mg  12.5 mg Oral BID     doxazosin  8 mg Oral Daily     eplerenone  50 mg Oral Daily     fish oil-omega-3 fatty acids  1 g Oral Daily     furosemide (LASIX) tablet 20 mg  20 mg Oral Daily     hydrochlorothiazide (HYDRODIURIL) tablet 25 mg  25 mg Oral Daily     lisinopril (PRINIVIL/ZESTRIL) tablet 20 mg  20 mg Oral Daily     sodium chloride (PF)  3 mL Intracatheter Q8H     ipratropium - albuterol 0.5 mg/2.5 mg/3 mL  3 mL Nebulization Q4H While awake     diltiazem  300 mg Oral Daily     - MEDICATION INSTRUCTIONS for Dialysis Patients -   Does not apply See Admin Instructions     azithromycin  250 mg Oral Daily     - MEDICATION INSTRUCTIONS -, - MEDICATION INSTRUCTIONS -, hypromellose-dextran, HOLD MEDICATION, guaiFENesin, naloxone, lidocaine, lidocaine 4%, sodium chloride (PF), acetaminophen, senna-docusate, magnesium hydroxide, ondansetron **OR** ondansetron, ipratropium - albuterol 0.5 mg/2.5 mg/3 mL, glucose **OR** dextrose **OR** glucagon, hydrALAZINE   No Known Allergies           Labs:     BMP  Recent Labs  Lab 02/13/17  0530 02/12/17  0605 02/11/17  0800   * 134 137   POTASSIUM 4.5 4.4 4.1   CHLORIDE 93* 97 99   AARON 8.4* 8.9 8.7   CO2 25 25 27   BUN 89* 60* 27   CR 4.17* 3.78* 3.18*   * 177* 108*     CBC  Recent Labs  Lab 02/12/17  0605 02/11/17  0800   WBC 18.2* 16.1*   HGB 10.5* 11.3*   HCT 32.1* 35.6*   MCV 84 86    249     No results found for: AST, ALT, GGT, ALKPHOS, BILITOTAL, BILICONJ, BILIDIRECT, ALTAGRACIA         Physical Exam:   Vitals were reviewed in [facility]    Wt Readings from Last 3 Encounters:   02/13/17 86.5 kg (190 lb 11.2 oz)       Intake/Output Summary (Last 24 hours) at 02/13/17 1322  Last data filed at 02/13/17 0835   Gross per 24 hour   Intake              603 ml   Output              700 ml   Net              -97 ml       GENERAL APPEARANCE:  pleasant, moderate respiratory distress.    HEENT:  Eyes/ears/nose grossly normal, neck supple  RESP: lungs very poor air entry, no wheezes or crackles audible  CV: distant heart tones  ABDOMEN: soft, nontender, bowel sounds normal  EXTREMITIES/SKIN: no edema, no rashes or lesions     Pt seen on dialysis.  Stable run.  Good BFR.      Attestation:  I have reviewed today's vital signs, notes, medications, labs and imaging.     Jeremy Waldrop MD  [facility]  [phone]

## 2017-02-13 NOTE — PROGRESS NOTES
Potassium   Date Value Ref Range Status   02/13/2017 4.5 3.4 - 5.3 mmol/L Final     Lab Results   Component Value Date    HGB 10.5 02/12/2017     Weight: 86.5 kg (190 lb 11.2 oz)  POST WT 85.8kg  DIALYSIS PROCEDURE NOTE    Patient dialyzed for 2.75 hrs. on a 3 K bath with a net fluid removal of  0.8L.  A BFR of 400 ml/min was obtained via a LUAF using 15gauge needles.    The patient was seen by Dr. Waldrop during the treatment.  Total heparin received during the treatment: 0 units. Sites held x 10 min then  pressure drsgs applied.  Meds  Given:EPO 5,000units IV and Hectoral 2mcg IV Complications: RR 25 SOB but pt refuses to pull off more than 0.8kg. Had a RT Neb and became more SOB pt went on Bipap 35% for one hour and then went to NP 4 liters at the end of dialysis. Pt is still wheezing at the end of dialysis.  Procedure and ESRD teaching done and questions answered. See flowsheet in EPIC for further details and post assessment.  Machine water alarm in place and functioning.  Consent for dialysis signed 2/13/17  Pt returned via Wheelchair  Vascular Access: Aseptic prep done for both on/off.  Report received from:Michelle Lopez R.N.  Report given to:Michelle Lopez R.n.  HEPATITIS B SURFACE ANTIGEN unknown HEPATITIS B SURFACE ANTIBODY Immune DATE 10/27/16  Chlorine/Chloramine water system checked every 4 hours.  Outpatient Dialysis at [facility]

## 2017-02-13 NOTE — PLAN OF CARE
Problem: Goal Outcome Summary  Goal: Goal Outcome Summary  Outcome: No Change  VSS. Tele SRc/ BBB. Pt had 21 beasts of VT and was asymptomatic. Pt still in dialysis. Pt was placed on BiPAP while in dialysis for increased SOB.

## 2017-02-13 NOTE — PROGRESS NOTES
[facility]    Hospitalist Progress Note    Date of Service (when I saw the patient): 02/13/2017    Assessment & Plan   Seven Savage Jr. 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     Acute COPD Exacerbation  In a pt with known severe COPD and on maintenance therapy. Not O2 dependent. CXR with possible LLL PNA but unclear. Influenza negative.  Mild leukocytosis at admission, increase felt due to steroids as continues to clinically improve.   - decrease solumedrol to 60 mg q12h  - Duonebs Q 4 and Q 2 prn.   - continue azithromycin  - Taper O2 as able  - Smoking cessation.  - Would like a nebulizer machine with duonebs on discharge.       NSTEMI  Suspect a type II event in the setting of COPD exacerbation and poor clearance from his CKD, trop peak of 0.067. Never c/o chest pain. EKG with some lateral ST seg depression. Stress test last year per his report with no sign of CAD.  Recent Echo in 6/2016 with no WMA's. Will not repeat at this time unless clinical picture warrants.   - C/w Asa, statin, BB, Ace-i.     ESRD on HD M/W/F   No significant electrolyte abnormalities or volume overload.   - Dialysis today  - C/w pta meds.      H/o Severe AS  Denies exertional symptoms outside of his present exacerbation as above. Echo in 6/2016 with EF 60%, severe AS with valva area 0.8cm, mild MR.   - Do not suspect any current symptoms related to his AS.   - Recommend a Cardiology follow up to discuss valve repair and repeat Echo as outpt.     Type II DM   On Lantus 26U Qpm and novolog 12 U TID with meals.  A1C 6.4%.  - uncontrolled due to steroids, should improve with taper  - increase to high dose SSI, increase lantus to 36 units qhs, increase mealtime insulin to 15 units     HTN  Mildly hypertensive on admit and suspect this was  related to his resp distress.   - C/w pta meds and will follow.   - Hydralazine prn.         DVT Prophylaxis: Pneumatic Compression Devices  Code Status: Full Code as was d/w the patient.      Disposition: Pending continued improvement in his acute COPD exacerbation, transition to oral steroids and weaning from oxygen, likely 2 days.      Brenden Nielsen       Interval History   Reports dyspnea this AM improved with neb, cough is improving, no fever/chills.  No diarrhea or nausea on antibiotics.    -Data reviewed today: I reviewed all new labs and imaging results over the last 24 hours. I personally reviewed no images or EKG's today.    Physical Exam   Temp: 97.3  F (36.3  C) Temp src: Oral BP: 163/70   Heart Rate: 72 Resp: 18 SpO2: 99 % O2 Device: Nasal cannula Oxygen Delivery: 2 LPM  Vitals:    02/11/17 0737 02/12/17 0500 02/13/17 0500   Weight: 86.5 kg (190 lb 11.2 oz) 85.4 kg (188 lb 4.4 oz) 86.5 kg (190 lb 11.2 oz)     Vital Signs with Ranges  Temp:  [97.3  F (36.3  C)-98.1  F (36.7  C)] 97.3  F (36.3  C)  Heart Rate:  [65-72] 72  Resp:  [18-24] 18  BP: (115-163)/(55-70) 163/70  SpO2:  [95 %-99 %] 99 %  I/O last 3 completed shifts:  In: 723 [P.O.:720; I.V.:3]  Out: 650 [Urine:650]    Gen: Patient in no acute distress.  Appears comfortable.  Heart:  S1S2+, regular rate and rhythm, 3/6 systolic murmur.  Lungs:  Mild expiratory wheezing with generally poor air movement, no crackles or tachypnea   Abdomen:  Soft, non tender, non distended, bowel sounds positive.  Extremities:  No edema.  Neuro:  Alert and appropriate, cranial nerves grossly intact    Medications        methylPREDNISolone  62.5 mg Intravenous Q12H     insulin aspart  1-10 Units Subcutaneous TID AC     insulin aspart  1-7 Units Subcutaneous At Bedtime     insulin aspart  15 Units Subcutaneous TID w/meals     insulin glargine  36 Units Subcutaneous At Bedtime     aspirin  81 mg Oral Daily     atorvastatin (LIPITOR) tablet 40 mg  40 mg Oral At Bedtime      B complex-C-folic acid  1 capsule Oral Daily     carvedilol (COREG) tablet 12.5 mg  12.5 mg Oral BID     doxazosin  8 mg Oral Daily     eplerenone  50 mg Oral Daily     fish oil-omega-3 fatty acids  1 g Oral Daily     furosemide (LASIX) tablet 20 mg  20 mg Oral Daily     hydrochlorothiazide (HYDRODIURIL) tablet 25 mg  25 mg Oral Daily     lisinopril (PRINIVIL/ZESTRIL) tablet 20 mg  20 mg Oral Daily     sodium chloride (PF)  3 mL Intracatheter Q8H     ipratropium - albuterol 0.5 mg/2.5 mg/3 mL  3 mL Nebulization Q4H While awake     diltiazem  300 mg Oral Daily     - MEDICATION INSTRUCTIONS for Dialysis Patients -   Does not apply See Admin Instructions     azithromycin  250 mg Oral Daily       Data     Recent Labs  Lab 02/13/17  0530 02/12/17  0605 02/11/17  2020 02/11/17  1500 02/11/17  0800   WBC  --  18.2*  --   --  16.1*   HGB  --  10.5*  --   --  11.3*   MCV  --  84  --   --  86   PLT  --  251  --   --  249   * 134  --   --  137   POTASSIUM 4.5 4.4  --   --  4.1   CHLORIDE 93* 97  --   --  99   CO2 25 25  --   --  27   BUN 89* 60*  --   --  27   CR 4.17* 3.78*  --   --  3.18*   ANIONGAP 13 12  --   --  11   AARON 8.4* 8.9  --   --  8.7   * 177*  --   --  108*   TROPI  --   --  0.063* 0.067* 0.057*       No results found for this or any previous visit (from the past 24 hour(s)).

## 2017-02-13 NOTE — PROVIDER NOTIFICATION
MD Notification    Notified Person:  MD    Notified Persons Name: Gia     Notification Date/Time: 2/13/17 1050    Notification Interaction:  Physician was paged     Purpose of Notification: Pt was sleeping and had 21 beats of VT. He was asymptomatic.     Orders Received: Awaiting for call back, no new orders at this time    Comments: Will continue to monitor,        Addendum: Physician returned call, asked what pt's potassium was and said to continue to monitor.

## 2017-02-14 LAB
GLUCOSE BLDC GLUCOMTR-MCNC: 193 MG/DL (ref 70–99)
GLUCOSE BLDC GLUCOMTR-MCNC: 198 MG/DL (ref 70–99)
GLUCOSE BLDC GLUCOMTR-MCNC: 218 MG/DL (ref 70–99)
GLUCOSE BLDC GLUCOMTR-MCNC: 255 MG/DL (ref 70–99)
GLUCOSE BLDC GLUCOMTR-MCNC: 258 MG/DL (ref 70–99)

## 2017-02-14 PROCEDURE — 21000001 ZZH R&B HEART CARE

## 2017-02-14 PROCEDURE — 94640 AIRWAY INHALATION TREATMENT: CPT

## 2017-02-14 PROCEDURE — 25000128 H RX IP 250 OP 636: Performed by: HOSPITALIST

## 2017-02-14 PROCEDURE — 25000131 ZZH RX MED GY IP 250 OP 636 PS 637: Mod: GY | Performed by: HOSPITALIST

## 2017-02-14 PROCEDURE — 94640 AIRWAY INHALATION TREATMENT: CPT | Mod: 76

## 2017-02-14 PROCEDURE — 40000275 ZZH STATISTIC RCP TIME EA 10 MIN

## 2017-02-14 PROCEDURE — 00000146 ZZHCL STATISTIC GLUCOSE BY METER IP

## 2017-02-14 PROCEDURE — 99232 SBSQ HOSP IP/OBS MODERATE 35: CPT | Performed by: HOSPITALIST

## 2017-02-14 PROCEDURE — 25000125 ZZHC RX 250: Performed by: INTERNAL MEDICINE

## 2017-02-14 PROCEDURE — A9270 NON-COVERED ITEM OR SERVICE: HCPCS | Mod: GY | Performed by: INTERNAL MEDICINE

## 2017-02-14 PROCEDURE — 25000132 ZZH RX MED GY IP 250 OP 250 PS 637: Mod: GY | Performed by: INTERNAL MEDICINE

## 2017-02-14 RX ORDER — PREDNISONE 20 MG/1
40 TABLET ORAL DAILY
Status: DISCONTINUED | OUTPATIENT
Start: 2017-02-15 | End: 2017-02-16

## 2017-02-14 RX ADMIN — HYDROCHLOROTHIAZIDE 25 MG: 25 TABLET ORAL at 08:50

## 2017-02-14 RX ADMIN — EPLERENONE 50 MG: 25 TABLET, FILM COATED ORAL at 08:51

## 2017-02-14 RX ADMIN — CARVEDILOL 12.5 MG: 12.5 TABLET, FILM COATED ORAL at 08:48

## 2017-02-14 RX ADMIN — IPRATROPIUM BROMIDE AND ALBUTEROL SULFATE 3 ML: .5; 3 SOLUTION RESPIRATORY (INHALATION) at 15:19

## 2017-02-14 RX ADMIN — FUROSEMIDE 20 MG: 20 TABLET ORAL at 08:51

## 2017-02-14 RX ADMIN — IPRATROPIUM BROMIDE AND ALBUTEROL SULFATE 3 ML: .5; 3 SOLUTION RESPIRATORY (INHALATION) at 19:08

## 2017-02-14 RX ADMIN — Medication 1 G: at 08:48

## 2017-02-14 RX ADMIN — DOXAZOSIN MESYLATE 8 MG: 4 TABLET ORAL at 22:13

## 2017-02-14 RX ADMIN — GUAIFENESIN 10 ML: 100 SOLUTION ORAL at 00:40

## 2017-02-14 RX ADMIN — IPRATROPIUM BROMIDE AND ALBUTEROL SULFATE 3 ML: .5; 3 SOLUTION RESPIRATORY (INHALATION) at 11:56

## 2017-02-14 RX ADMIN — LISINOPRIL 20 MG: 20 TABLET ORAL at 08:51

## 2017-02-14 RX ADMIN — ATORVASTATIN CALCIUM 40 MG: 40 TABLET, FILM COATED ORAL at 22:06

## 2017-02-14 RX ADMIN — INSULIN GLARGINE 36 UNITS: 100 INJECTION, SOLUTION SUBCUTANEOUS at 22:06

## 2017-02-14 RX ADMIN — DILTIAZEM HYDROCHLORIDE 300 MG: 180 CAPSULE, EXTENDED RELEASE ORAL at 08:50

## 2017-02-14 RX ADMIN — AZITHROMYCIN 250 MG: 250 TABLET, FILM COATED ORAL at 13:48

## 2017-02-14 RX ADMIN — INSULIN ASPART 15 UNITS: 100 INJECTION, SOLUTION INTRAVENOUS; SUBCUTANEOUS at 10:21

## 2017-02-14 RX ADMIN — GUAIFENESIN 10 ML: 100 SOLUTION ORAL at 08:48

## 2017-02-14 RX ADMIN — IPRATROPIUM BROMIDE AND ALBUTEROL SULFATE 3 ML: .5; 3 SOLUTION RESPIRATORY (INHALATION) at 08:20

## 2017-02-14 RX ADMIN — INSULIN ASPART 15 UNITS: 100 INJECTION, SOLUTION INTRAVENOUS; SUBCUTANEOUS at 18:41

## 2017-02-14 RX ADMIN — METHYLPREDNISOLONE SODIUM SUCCINATE 62.5 MG: 125 INJECTION, POWDER, FOR SOLUTION INTRAMUSCULAR; INTRAVENOUS at 08:49

## 2017-02-14 RX ADMIN — Medication 1 CAPSULE: at 08:50

## 2017-02-14 RX ADMIN — ONDANSETRON 4 MG: 4 TABLET, ORALLY DISINTEGRATING ORAL at 18:47

## 2017-02-14 RX ADMIN — SENNOSIDES AND DOCUSATE SODIUM 2 TABLET: 8.6; 5 TABLET ORAL at 08:49

## 2017-02-14 RX ADMIN — GUAIFENESIN 10 ML: 100 SOLUTION ORAL at 22:20

## 2017-02-14 RX ADMIN — INSULIN ASPART 15 UNITS: 100 INJECTION, SOLUTION INTRAVENOUS; SUBCUTANEOUS at 13:49

## 2017-02-14 RX ADMIN — SENNOSIDES AND DOCUSATE SODIUM 1 TABLET: 8.6; 5 TABLET ORAL at 18:43

## 2017-02-14 RX ADMIN — CARVEDILOL 12.5 MG: 12.5 TABLET, FILM COATED ORAL at 22:06

## 2017-02-14 RX ADMIN — GUAIFENESIN 10 ML: 100 SOLUTION ORAL at 18:40

## 2017-02-14 RX ADMIN — ASPIRIN 81 MG 81 MG: 81 TABLET ORAL at 08:50

## 2017-02-14 NOTE — PROGRESS NOTES
Patient is on 3L nasal cannula with SPO2 98%, BBS expiratory wheezes improved after nebs tx. Will continue to monitor.    Stevo Rowan RT  2/14/2017

## 2017-02-14 NOTE — PLAN OF CARE
Problem: Goal Outcome Summary  Goal: Goal Outcome Summary  Outcome: No Change  BiPAP off before pt returned from dialysis. LS diminished and wheezy with a tight cough. Will continue to monitor.

## 2017-02-14 NOTE — PLAN OF CARE
Problem: Goal Outcome Summary  Goal: Goal Outcome Summary  Outcome: No Change  Pt a/o, VSS w/ 3L o2 via nasal cannula. Tele SD w/ PAC & PVC. Denies pain. Up w/ SBA. LS diminished w/ expiratory wheezes throughout. Frequent, tight NPC. PRN Robitussin given per pt request. LUE fistula patent. BG  266 & 198. Receiving IV Solumedrol, nebs, & PO Zithromax. Will continue to monitor.

## 2017-02-14 NOTE — PLAN OF CARE
Problem: Goal Outcome Summary  Goal: Goal Outcome Summary  Outcome: No Change  Up with SBA, BGs remain high but  changed to oral steroids.Lungs diminished bilaterally, oxygen on at 3 liters per NC.

## 2017-02-14 NOTE — PROGRESS NOTES
[facility]    Hospitalist Progress Note    Date of Service (when I saw the patient): 02/14/2017    Assessment & Plan   Seven Savage Jr. 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     Acute COPD Exacerbation  In a pt with known severe COPD and on maintenance therapy. Not O2 dependent. CXR with possible LLL PNA but unclear. Influenza negative.  Mild leukocytosis at admission, increase felt due to steroids as continues to clinically improve. Counseled on smoking cessation.  - briefly on Bipap yesterday during dialysis but reports ongoing improvement and is feeling near his baseline  - stop solumedrol, start prednisone 40 mg po tomorrow  - continue Duonebs Q 4 and Q 2 prn.   - continue azithromycin  - Taper O2 as able  - Would like a nebulizer machine with duonebs on discharge.       NSTEMI  Suspect a type II event in the setting of COPD exacerbation and poor clearance from his CKD, trop peak of 0.067. Never c/o chest pain. EKG with some lateral ST seg depression. Stress test last year per his report with no sign of CAD.  Recent Echo in 6/2016 with no WMA's. Will not repeat at this time unless clinical picture warrants.   - C/w Asa, statin, BB, Ace-i     ESRD on HD M/W/F   No significant electrolyte abnormalities or volume overload.   - Dialyzed 2/13  - C/w pta meds.      H/o Severe AS  Denies exertional symptoms outside of his present exacerbation as above. Echo in 6/2016 with EF 60%, severe AS with valva area 0.8cm, mild MR. Do not suspect any current symptoms related to his AS.   - Recommend a Cardiology follow up to discuss valve repair and repeat Echo as outpt.     Type II DM   On Lantus 26U Qpm and novolog 12 U TID with meals.  A1C 6.4%.  - uncontrolled due to steroids  - as we will be tapering steroids, will  continue Lantus 36 units qhs, aspart 15 units with meals and high dose SSI and taper as appropriate     HTN  Mildly hypertensive on admit and suspect this was related to his resp distress.   - C/w pta meds and will follow.   - Hydralazine prn.         DVT Prophylaxis: Pneumatic Compression Devices  Code Status: Full Code       Disposition: Pending continued improvement in his acute COPD exacerbation, transition to oral steroids and weaning from oxygen, likely 1-2 days.      Brenden Nielsen       Interval History   Reports ongoing improvement in dyspnea.  States he needed Bipap yesterday due to missing his neb treatment.  Ongoing non-producitve cough.  No fever/chills or chest pressure.  No other complaints.    -Data reviewed today: I reviewed all new labs and imaging results over the last 24 hours. I personally reviewed no images or EKG's today.    Physical Exam   Temp: 97.4  F (36.3  C) Temp src: Oral BP: 161/64 Pulse: 76 Heart Rate: 65 Resp: 18 SpO2: 100 % O2 Device: Nasal cannula Oxygen Delivery: 2 LPM  Vitals:    02/12/17 0500 02/13/17 0500 02/14/17 0500   Weight: 85.4 kg (188 lb 4.4 oz) 86.5 kg (190 lb 11.2 oz) 87.6 kg (193 lb 2 oz)     Vital Signs with Ranges  Temp:  [97.4  F (36.3  C)-99.1  F (37.3  C)] 97.4  F (36.3  C)  Pulse:  [66-76] 76  Heart Rate:  [58-80] 65  Resp:  [16-20] 18  BP: (117-164)/(48-78) 161/64  FiO2 (%):  [35 %] 35 %  SpO2:  [98 %-100 %] 100 %  I/O last 3 completed shifts:  In: 240 [P.O.:240]  Out: 1525 [Urine:725; Other:800]    Gen: Patient in no acute distress.  Appears comfortable.  Heart:  S1S2+, regular rate and rhythm, 3/6 systolic murmur.  Lungs:  generally poor air movement, but no wheezing, crackles or tachypnea   Abdomen:  Soft, non tender, non distended, bowel sounds positive.  Extremities:  No edema.  Neuro:  Alert and appropriate, cranial nerves grossly intact    Medications     - MEDICATION INSTRUCTIONS -         insulin glargine  46 Units Subcutaneous At Bedtime      methylPREDNISolone  62.5 mg Intravenous Q12H     insulin aspart  1-10 Units Subcutaneous TID AC     insulin aspart  1-7 Units Subcutaneous At Bedtime     insulin aspart  15 Units Subcutaneous TID w/meals     aspirin  81 mg Oral Daily     atorvastatin (LIPITOR) tablet 40 mg  40 mg Oral At Bedtime     B complex-C-folic acid  1 capsule Oral Daily     carvedilol (COREG) tablet 12.5 mg  12.5 mg Oral BID     doxazosin  8 mg Oral Daily     eplerenone  50 mg Oral Daily     fish oil-omega-3 fatty acids  1 g Oral Daily     furosemide (LASIX) tablet 20 mg  20 mg Oral Daily     hydrochlorothiazide (HYDRODIURIL) tablet 25 mg  25 mg Oral Daily     lisinopril (PRINIVIL/ZESTRIL) tablet 20 mg  20 mg Oral Daily     sodium chloride (PF)  3 mL Intracatheter Q8H     ipratropium - albuterol 0.5 mg/2.5 mg/3 mL  3 mL Nebulization Q4H While awake     diltiazem  300 mg Oral Daily     - MEDICATION INSTRUCTIONS for Dialysis Patients -   Does not apply See Admin Instructions     azithromycin  250 mg Oral Daily       Data     Recent Labs  Lab 02/13/17  0530 02/12/17  0605 02/11/17  2020 02/11/17  1500 02/11/17  0800   WBC  --  18.2*  --   --  16.1*   HGB  --  10.5*  --   --  11.3*   MCV  --  84  --   --  86   PLT  --  251  --   --  249   * 134  --   --  137   POTASSIUM 4.5 4.4  --   --  4.1   CHLORIDE 93* 97  --   --  99   CO2 25 25  --   --  27   BUN 89* 60*  --   --  27   CR 4.17* 3.78*  --   --  3.18*   ANIONGAP 13 12  --   --  11   AARON 8.4* 8.9  --   --  8.7   * 177*  --   --  108*   TROPI  --   --  0.063* 0.067* 0.057*       No results found for this or any previous visit (from the past 24 hour(s)).

## 2017-02-15 LAB
ANION GAP SERPL CALCULATED.3IONS-SCNC: 10 MMOL/L (ref 3–14)
BUN SERPL-MCNC: 91 MG/DL (ref 7–30)
CALCIUM SERPL-MCNC: 8.3 MG/DL (ref 8.5–10.1)
CHLORIDE SERPL-SCNC: 98 MMOL/L (ref 94–109)
CO2 SERPL-SCNC: 27 MMOL/L (ref 20–32)
CREAT SERPL-MCNC: 4.01 MG/DL (ref 0.66–1.25)
ERYTHROCYTE [DISTWIDTH] IN BLOOD BY AUTOMATED COUNT: 14.5 % (ref 10–15)
GFR SERPL CREATININE-BSD FRML MDRD: 14 ML/MIN/1.7M2
GLUCOSE BLDC GLUCOMTR-MCNC: 164 MG/DL (ref 70–99)
GLUCOSE BLDC GLUCOMTR-MCNC: 208 MG/DL (ref 70–99)
GLUCOSE BLDC GLUCOMTR-MCNC: 279 MG/DL (ref 70–99)
GLUCOSE BLDC GLUCOMTR-MCNC: 431 MG/DL (ref 70–99)
GLUCOSE SERPL-MCNC: 231 MG/DL (ref 70–99)
HCT VFR BLD AUTO: 31.7 % (ref 40–53)
HGB BLD-MCNC: 10.2 G/DL (ref 13.3–17.7)
MCH RBC QN AUTO: 26.8 PG (ref 26.5–33)
MCHC RBC AUTO-ENTMCNC: 32.2 G/DL (ref 31.5–36.5)
MCV RBC AUTO: 83 FL (ref 78–100)
PLATELET # BLD AUTO: 291 10E9/L (ref 150–450)
POTASSIUM SERPL-SCNC: 4.6 MMOL/L (ref 3.4–5.3)
RBC # BLD AUTO: 3.81 10E12/L (ref 4.4–5.9)
SODIUM SERPL-SCNC: 135 MMOL/L (ref 133–144)
WBC # BLD AUTO: 15.7 10E9/L (ref 4–11)

## 2017-02-15 PROCEDURE — 25000132 ZZH RX MED GY IP 250 OP 250 PS 637: Mod: GY | Performed by: INTERNAL MEDICINE

## 2017-02-15 PROCEDURE — 90937 HEMODIALYSIS REPEATED EVAL: CPT

## 2017-02-15 PROCEDURE — 80048 BASIC METABOLIC PNL TOTAL CA: CPT | Performed by: HOSPITALIST

## 2017-02-15 PROCEDURE — A9270 NON-COVERED ITEM OR SERVICE: HCPCS | Mod: GY | Performed by: INTERNAL MEDICINE

## 2017-02-15 PROCEDURE — 25000128 H RX IP 250 OP 636: Performed by: INTERNAL MEDICINE

## 2017-02-15 PROCEDURE — 40000275 ZZH STATISTIC RCP TIME EA 10 MIN

## 2017-02-15 PROCEDURE — 36415 COLL VENOUS BLD VENIPUNCTURE: CPT | Performed by: HOSPITALIST

## 2017-02-15 PROCEDURE — 00000146 ZZHCL STATISTIC GLUCOSE BY METER IP

## 2017-02-15 PROCEDURE — 21000001 ZZH R&B HEART CARE

## 2017-02-15 PROCEDURE — 25000131 ZZH RX MED GY IP 250 OP 636 PS 637: Mod: GY | Performed by: HOSPITALIST

## 2017-02-15 PROCEDURE — 63400005 ZZH RX 634: Performed by: INTERNAL MEDICINE

## 2017-02-15 PROCEDURE — 94640 AIRWAY INHALATION TREATMENT: CPT

## 2017-02-15 PROCEDURE — 25000125 ZZHC RX 250: Performed by: INTERNAL MEDICINE

## 2017-02-15 PROCEDURE — 25000125 ZZHC RX 250: Performed by: HOSPITALIST

## 2017-02-15 PROCEDURE — 99232 SBSQ HOSP IP/OBS MODERATE 35: CPT | Performed by: INTERNAL MEDICINE

## 2017-02-15 PROCEDURE — 94640 AIRWAY INHALATION TREATMENT: CPT | Mod: 76

## 2017-02-15 PROCEDURE — 85027 COMPLETE CBC AUTOMATED: CPT | Performed by: HOSPITALIST

## 2017-02-15 RX ORDER — DOXERCALCIFEROL 4 UG/2ML
2 INJECTION INTRAVENOUS ONCE
Status: COMPLETED | OUTPATIENT
Start: 2017-02-15 | End: 2017-02-15

## 2017-02-15 RX ADMIN — MAGNESIUM HYDROXIDE 30 ML: 400 SUSPENSION ORAL at 16:52

## 2017-02-15 RX ADMIN — CARVEDILOL 12.5 MG: 12.5 TABLET, FILM COATED ORAL at 22:10

## 2017-02-15 RX ADMIN — IPRATROPIUM BROMIDE AND ALBUTEROL SULFATE 3 ML: .5; 3 SOLUTION RESPIRATORY (INHALATION) at 23:28

## 2017-02-15 RX ADMIN — EPOETIN ALFA 5000 UNITS: 3000 SOLUTION INTRAVENOUS; SUBCUTANEOUS at 13:05

## 2017-02-15 RX ADMIN — IPRATROPIUM BROMIDE AND ALBUTEROL SULFATE 3 ML: .5; 3 SOLUTION RESPIRATORY (INHALATION) at 20:08

## 2017-02-15 RX ADMIN — GUAIFENESIN 10 ML: 100 SOLUTION ORAL at 18:22

## 2017-02-15 RX ADMIN — PREDNISONE 40 MG: 20 TABLET ORAL at 10:28

## 2017-02-15 RX ADMIN — SALINE NASAL SPRAY 2 SPRAY: 1.5 SOLUTION NASAL at 18:20

## 2017-02-15 RX ADMIN — Medication 1 CAPSULE: at 16:56

## 2017-02-15 RX ADMIN — DOXERCALCIFEROL 2 MCG: 2 INJECTION, SOLUTION INTRAVENOUS at 12:57

## 2017-02-15 RX ADMIN — HYDROCHLOROTHIAZIDE 25 MG: 25 TABLET ORAL at 16:55

## 2017-02-15 RX ADMIN — EPLERENONE 50 MG: 25 TABLET, FILM COATED ORAL at 16:52

## 2017-02-15 RX ADMIN — ASPIRIN 81 MG 81 MG: 81 TABLET ORAL at 16:56

## 2017-02-15 RX ADMIN — FUROSEMIDE 20 MG: 20 TABLET ORAL at 16:56

## 2017-02-15 RX ADMIN — IPRATROPIUM BROMIDE AND ALBUTEROL SULFATE 3 ML: .5; 3 SOLUTION RESPIRATORY (INHALATION) at 13:53

## 2017-02-15 RX ADMIN — INSULIN ASPART 15 UNITS: 100 INJECTION, SOLUTION INTRAVENOUS; SUBCUTANEOUS at 09:30

## 2017-02-15 RX ADMIN — ATORVASTATIN CALCIUM 40 MG: 40 TABLET, FILM COATED ORAL at 21:58

## 2017-02-15 RX ADMIN — IPRATROPIUM BROMIDE AND ALBUTEROL SULFATE 3 ML: .5; 3 SOLUTION RESPIRATORY (INHALATION) at 01:04

## 2017-02-15 RX ADMIN — SODIUM CHLORIDE 250 ML: 9 INJECTION, SOLUTION INTRAVENOUS at 12:31

## 2017-02-15 RX ADMIN — INSULIN ASPART 15 UNITS: 100 INJECTION, SOLUTION INTRAVENOUS; SUBCUTANEOUS at 18:19

## 2017-02-15 RX ADMIN — DOXAZOSIN MESYLATE 8 MG: 4 TABLET ORAL at 22:10

## 2017-02-15 RX ADMIN — INSULIN GLARGINE 36 UNITS: 100 INJECTION, SOLUTION SUBCUTANEOUS at 21:58

## 2017-02-15 RX ADMIN — DILTIAZEM HYDROCHLORIDE 300 MG: 180 CAPSULE, EXTENDED RELEASE ORAL at 16:52

## 2017-02-15 RX ADMIN — IPRATROPIUM BROMIDE AND ALBUTEROL SULFATE 3 ML: .5; 3 SOLUTION RESPIRATORY (INHALATION) at 10:56

## 2017-02-15 RX ADMIN — IPRATROPIUM BROMIDE AND ALBUTEROL SULFATE 3 ML: .5; 3 SOLUTION RESPIRATORY (INHALATION) at 07:46

## 2017-02-15 RX ADMIN — Medication 1 G: at 16:55

## 2017-02-15 RX ADMIN — AZITHROMYCIN 250 MG: 250 TABLET, FILM COATED ORAL at 16:32

## 2017-02-15 RX ADMIN — LISINOPRIL 20 MG: 20 TABLET ORAL at 16:55

## 2017-02-15 RX ADMIN — IPRATROPIUM BROMIDE AND ALBUTEROL SULFATE 3 ML: .5; 3 SOLUTION RESPIRATORY (INHALATION) at 16:14

## 2017-02-15 NOTE — PROGRESS NOTES
Inpatient Dialysis Progress Note            Assessment and Plan:     1. ESKD. Good access function. He does not seem to have much fluid on. Set for 0.8 kg.      2. Anemia. HGB 10.2. On EPO 5000 units.      3. HTN. BP meds held for dialysis.       4. FEN. K 2 for K 4.6     5. COPD: Better.           Interval History:   Breathing better.  Not comfortable in bed.  Better in a chair.    May go home today or tomorrow.      Dialysis Parameters:     Wt Readings from Last 4 Encounters:   02/15/17 84.6 kg (186 lb 8.2 oz)     I/O last 3 completed shifts:  In: 400 [P.O.:400]  Out: 1085 [Urine:1085]  BP Readings from Last 3 Encounters:   02/15/17 130/61       Routine, ONE TIME, Starting today For 1 Occurrences  Weight Loss (kg): 0.8  Dialysis Temp: 36.5  C  Access Device: AVF  Access Site: L arm  Dialyzer: Revaclear  Dialysis Bath: K 2  Blood Flow Rate (mL/min): 400  Total Treatment Time (hrs): 2.75  Heparin: no         Medications and Allergies:   Reviewed in EPIC      sodium chloride 0.9%  250-1,000 mL Intravenous Once in dialysis     epoetin libby         epoetin libby         insulin glargine  36 Units Subcutaneous At Bedtime     predniSONE  40 mg Oral Daily     insulin aspart  1-10 Units Subcutaneous TID AC     insulin aspart  1-7 Units Subcutaneous At Bedtime     insulin aspart  15 Units Subcutaneous TID w/meals     aspirin  81 mg Oral Daily     atorvastatin (LIPITOR) tablet 40 mg  40 mg Oral At Bedtime     B complex-C-folic acid  1 capsule Oral Daily     carvedilol (COREG) tablet 12.5 mg  12.5 mg Oral BID     doxazosin  8 mg Oral Daily     eplerenone  50 mg Oral Daily     fish oil-omega-3 fatty acids  1 g Oral Daily     furosemide (LASIX) tablet 20 mg  20 mg Oral Daily     hydrochlorothiazide (HYDRODIURIL) tablet 25 mg  25 mg Oral Daily     lisinopril (PRINIVIL/ZESTRIL) tablet 20 mg  20 mg Oral Daily     sodium chloride (PF)  3 mL Intracatheter Q8H     ipratropium - albuterol 0.5 mg/2.5 mg/3 mL  3 mL Nebulization Q4H  While awake     diltiazem  300 mg Oral Daily     - MEDICATION INSTRUCTIONS for Dialysis Patients -   Does not apply See Admin Instructions     azithromycin  250 mg Oral Daily     sodium chloride 0.9%, - MEDICATION INSTRUCTIONS -, - MEDICATION INSTRUCTIONS -, hypromellose-dextran, HOLD MEDICATION, guaiFENesin, naloxone, lidocaine, lidocaine 4%, sodium chloride (PF), acetaminophen, senna-docusate, magnesium hydroxide, ondansetron **OR** ondansetron, ipratropium - albuterol 0.5 mg/2.5 mg/3 mL, glucose **OR** dextrose **OR** glucagon, hydrALAZINE   No Known Allergies           Labs:     BMP  Recent Labs  Lab 02/15/17  0610 02/13/17  0530 02/12/17  0605 02/11/17  0800    131* 134 137   POTASSIUM 4.6 4.5 4.4 4.1   CHLORIDE 98 93* 97 99   AARON 8.3* 8.4* 8.9 8.7   CO2 27 25 25 27   BUN 91* 89* 60* 27   CR 4.01* 4.17* 3.78* 3.18*   * 308* 177* 108*     CBC  Recent Labs  Lab 02/15/17  0610 02/12/17  0605 02/11/17  0800   WBC 15.7* 18.2* 16.1*   HGB 10.2* 10.5* 11.3*   HCT 31.7* 32.1* 35.6*   MCV 83 84 86    251 249     No results found for: AST, ALT, GGT, ALKPHOS, BILITOTAL, BILICONJ, BILIDIRECT, ALTAGRACIA         Physical Exam:   Vitals were reviewed in [facility]    Wt Readings from Last 3 Encounters:   02/15/17 84.6 kg (186 lb 8.2 oz)       Intake/Output Summary (Last 24 hours) at 02/15/17 1403  Last data filed at 02/15/17 1345   Gross per 24 hour   Intake              640 ml   Output              845 ml   Net             -205 ml       GENERAL APPEARANCE: pleasant, mild resp distress, a & o  HEENT:  Eyes/ears/nose grossly normal, neck supple  RESP: lungs reduced BS, mild exp wheezes  CV: distant heart tones.   ABDOMEN: soft, nontender, bowel sounds normal  EXTREMITIES/SKIN: no edema, no rashes or lesions     Pt seen on dialysis.  Stable run.  Good BFR.      Attestation:  I have reviewed today's vital signs, notes, medications, labs and imaging.     Jeremy Waldrop MD  [facility] -  Nephrology  [phone]

## 2017-02-15 NOTE — PROGRESS NOTES
Potassium   Date Value Ref Range Status   02/15/2017 4.6 3.4 - 5.3 mmol/L Final     Lab Results   Component Value Date    HGB 10.2 02/15/2017     Weight: 84.6 kg (186 lb 8.2 oz)  POST WT: 84.0 kg  DIALYSIS PROCEDURE NOTE    Patient dialyzed for 2.5 hrs. on a 2 K bath with a net fluid removal of  0.6 L.    Seven requested to end the treatment 15 minutes early. Encouraged him to complete treatment but he declined. MD notified.  A BFR of 400 ml/min was obtained via a left upper arm AVF using 15 gauge needles.      The patient was seen by Dr. Waldrop during the treatment.    Total heparin received during the treatment: 0 units.   Sites held x 15 min then  pressure drsgs applied.    Meds  Given: 5000 units epogen, 2mcg Hectorol   Complications: none.    Procedure and ESRD teaching done and questions answered.   See flowsheet in EPIC for further details and post assessment.  Machine water alarm in place and functioning.  Consent for dialysis verified.  Pt returned via wheelchair.  Vascular Access: Aseptic prep done for both on/off.  Report received from: Meredith Watkins RN  Report given to: Carlos Bryant RN  HEPATITIS B SURFACE ANTIGEN: unknown   HEPATITIS B SURFACE ANTIBODY: Immune DATE10/27/16  Chlorine/Chloramine water system checked every 4 hours.  Outpatient Dialysis at [facility], [facility]    Margot Dawkins RN  [facility] Acute [facility]ialysis

## 2017-02-15 NOTE — PLAN OF CARE
Problem: Goal Outcome Summary  Goal: Goal Outcome Summary  Outcome: No Change  Stable uneventful evening VSS. Denies pain. Cough persists but does get some relief with prescribed cough medication. Zofran given for complaints of nausea with relief. Hoping to discharge tomorrow. Blood sugars in the mid 200's.

## 2017-02-15 NOTE — PLAN OF CARE
Problem: Goal Outcome Summary  Goal: Goal Outcome Summary  Outcome: No Change  A&OX4. Denies chest pain, SOB on exertion. Vitals stable. Up with stand-by assist. TELE shows SR with PVC. LUE fistula intact, with positive bruit &thrill. On 2 L/min oxygen via NC, O2 sat 100%. Held all his medications except prednisone for the dialysis. Pt has gone for the dialysis. Plan to wean off the oxygen and ambulate and D/C today.

## 2017-02-15 NOTE — PLAN OF CARE
Problem: Goal Outcome Summary  Goal: Goal Outcome Summary  Outcome: No Change  VSS on 3LNC. A&O.Tele SR/SB overnight. No c/o pain. Nebs overnight for sob. Patient slept well between nursing cares. Left upper arm fistula intact. Plan for dialysis run today. Continue to monitor.

## 2017-02-15 NOTE — PROGRESS NOTES
[facility]    Hospitalist Progress Note    Date of Service (when I saw the patient): 02/15/2017    Assessment & Plan   Seven Savage Jr. 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     COPD with acute exacerbation  In a pt with known severe COPD and on maintenance therapy. Not O2 dependent. CXR without evidence of significant infiltrate / pneumonia. Influenza negative.  Mild leukocytosis at admission, increase felt due to steroids as continues to clinically improve. Counseled on smoking cessation.  -  Current home medications include: tiotropium-olodaterol      -   Interestingly, patient not on a long acting steroid inhaler  -  Treated with inhaled bronchodilators, steroid burst dose, antibiotics   -  Improved during hospital stay  -  Wean supplemental oxygen 2/15  -  Possible discharge home after HD today (2/15) if he respiratory status is stable with activity and off supplemental oxygen   -  Follow up with primary pulmonologist to assess appropriateness of current med regimen   - Would like a nebulizer machine with duonebs on discharge     Demand ischemia  Suspect a type II event in the setting of COPD exacerbation with hypoxia and poor clearance from his CKD, trop peak of 0.067. Never c/o chest pain. EKG with some lateral ST seg depression. Stress test last year per his report with no sign of CAD.  Recent Echo in 6/2016 with no WMA's. Will not repeat at this time unless clinical picture warrants.   -  Continue medical management with beta blocker, statin, ACE inhibitor, nitrates.  -  Recommend outpatient follow up with primary care physician for further monitoring  -  Would do outpatient stress test if symptoms develop (chest pain, dyspnea on exertion)     ESRD on HD M/W/F   No significant electrolyte abnormalities  or volume overload.   -  Nephrology consult noted      -   Dialyzed 2/13, 2/15     Severe Aortic stenosis  Denies exertional symptoms outside of his present exacerbation as above. Echo in 6/2016 with EF 60%, severe AS with valva area 0.8cm, mild MR. Do not suspect any current symptoms related to his AS.   - Recommend a Cardiology follow up to discuss valve repair and repeat Echo as outpt.     Type II DM   On Lantus 26U Qpm and novolog 12 U TID with meals.  A1C 6.4%.  - sub-optimal control due to steroids  - at discharge will resume home dose of Lantus as steroids will be tapered down     Hypertension   Mildly hypertensive on admit and suspect this was related to his resp distress.   - current home medications include: lisinopril, carvedilol, diltiazem CD, hydrochlorothiazide, eplerenone, doxazosin   -  Changes made during hospital stay: None      -   Pharmacy concerns about hydrochlorothiazide are noted but the patient is resistant to any changed in hydrochlorothiazide       DVT Prophylaxis: Pneumatic Compression Devices  Code Status: Full Code       Disposition: discharge later today if able to ambulate with minimal symptoms on room air, otherwise may discharge 2/16/17     Pan Henderson MD   Text page me here   Available 7 am - 6 pm        Interval History   The patient appears to be doing well. He is seen just before dialysis today. His breathing is improved. His cough is more productive now. He has no fever/chills. He reports ambulating some on the unit. He remains on supplemental oxygen but his SaO2 levels have been high (near 100%). Discussed possible discharge later today.     -Data reviewed today: I reviewed all new labs and imaging results over the last 24 hours. I personally reviewed no images or EKG's today.    Physical Exam   Temp: 97.5  F (36.4  C) Temp src: Oral BP: 145/61 Pulse: 83 Heart Rate: 74 Resp: 16 SpO2: 99 % O2 Device: Nasal cannula Oxygen Delivery: 2 LPM  Vitals:    02/13/17 0500 02/14/17  0500 02/15/17 0500   Weight: 86.5 kg (190 lb 11.2 oz) 87.6 kg (193 lb 2 oz) 84.6 kg (186 lb 8.2 oz)     Vital Signs with Ranges  Temp:  [97.4  F (36.3  C)-97.7  F (36.5  C)] 97.5  F (36.4  C)  Pulse:  [64-83] 83  Heart Rate:  [64-75] 74  Resp:  [16-20] 16  BP: (122-181)/(42-79) 145/61  SpO2:  [97 %-100 %] 99 %  I/O last 3 completed shifts:  In: 400 [P.O.:400]  Out: 1085 [Urine:1085]    General Appearance: Pleasant, alert. No acute distress  Head Exam: Normocephalic  Eye Exam: Conjunctiva clear  OroPharynx Exam: Membranes moist.  Neck Exam:  Supple.   Chest/Respiratory Exam: Normal chest wall and respirations. Decreased air entry bilaterally with no wheezes or crackles noted  Cardiovascular Exam: Regular rate and rhythm. Normal S1, S2. 3/6 systolic murmur. No gallops or rubs. No lower extremity edema.  Gastrointestinal Exam: Soft, nontender and nondistended. Bowel sounds present.  Skin: No rashes or jaundice.  Psychiatric Exam: Alert and oriented, appropriate affect.       Medications     - MEDICATION INSTRUCTIONS -       - MEDICATION INSTRUCTIONS -         sodium chloride 0.9%  250-1,000 mL Intravenous Once in dialysis     epoetin libby         epoetin libby         insulin glargine  36 Units Subcutaneous At Bedtime     predniSONE  40 mg Oral Daily     insulin aspart  1-10 Units Subcutaneous TID AC     insulin aspart  1-7 Units Subcutaneous At Bedtime     insulin aspart  15 Units Subcutaneous TID w/meals     aspirin  81 mg Oral Daily     atorvastatin (LIPITOR) tablet 40 mg  40 mg Oral At Bedtime     B complex-C-folic acid  1 capsule Oral Daily     carvedilol (COREG) tablet 12.5 mg  12.5 mg Oral BID     doxazosin  8 mg Oral Daily     eplerenone  50 mg Oral Daily     fish oil-omega-3 fatty acids  1 g Oral Daily     furosemide (LASIX) tablet 20 mg  20 mg Oral Daily     hydrochlorothiazide (HYDRODIURIL) tablet 25 mg  25 mg Oral Daily     lisinopril (PRINIVIL/ZESTRIL) tablet 20 mg  20 mg Oral Daily     sodium chloride (PF)   3 mL Intracatheter Q8H     ipratropium - albuterol 0.5 mg/2.5 mg/3 mL  3 mL Nebulization Q4H While awake     diltiazem  300 mg Oral Daily     - MEDICATION INSTRUCTIONS for Dialysis Patients -   Does not apply See Admin Instructions     azithromycin  250 mg Oral Daily       Data     Recent Labs  Lab 02/15/17  0610 02/13/17  0530 02/12/17  0605 02/11/17  2020 02/11/17  1500 02/11/17  0800   WBC 15.7*  --  18.2*  --   --  16.1*   HGB 10.2*  --  10.5*  --   --  11.3*   MCV 83  --  84  --   --  86     --  251  --   --  249    131* 134  --   --  137   POTASSIUM 4.6 4.5 4.4  --   --  4.1   CHLORIDE 98 93* 97  --   --  99   CO2 27 25 25  --   --  27   BUN 91* 89* 60*  --   --  27   CR 4.01* 4.17* 3.78*  --   --  3.18*   ANIONGAP 10 13 12  --   --  11   AARON 8.3* 8.4* 8.9  --   --  8.7   * 308* 177*  --   --  108*   TROPI  --   --   --  0.063* 0.067* 0.057*       No results found for this or any previous visit (from the past 24 hour(s)).

## 2017-02-16 ENCOUNTER — MEDICAL CORRESPONDENCE (OUTPATIENT)
Dept: HEALTH INFORMATION MANAGEMENT | Facility: CLINIC | Age: 82
End: 2017-02-16

## 2017-02-16 VITALS
RESPIRATION RATE: 18 BRPM | HEART RATE: 94 BPM | WEIGHT: 189.15 LBS | OXYGEN SATURATION: 98 % | TEMPERATURE: 98 F | BODY MASS INDEX: 28.02 KG/M2 | HEIGHT: 69 IN | SYSTOLIC BLOOD PRESSURE: 144 MMHG | DIASTOLIC BLOOD PRESSURE: 56 MMHG

## 2017-02-16 LAB
GLUCOSE BLDC GLUCOMTR-MCNC: 164 MG/DL (ref 70–99)
GLUCOSE BLDC GLUCOMTR-MCNC: 290 MG/DL (ref 70–99)

## 2017-02-16 PROCEDURE — 99239 HOSP IP/OBS DSCHRG MGMT >30: CPT | Performed by: HOSPITALIST

## 2017-02-16 PROCEDURE — 25000132 ZZH RX MED GY IP 250 OP 250 PS 637: Mod: GY | Performed by: HOSPITALIST

## 2017-02-16 PROCEDURE — 94640 AIRWAY INHALATION TREATMENT: CPT | Mod: 76

## 2017-02-16 PROCEDURE — 94640 AIRWAY INHALATION TREATMENT: CPT

## 2017-02-16 PROCEDURE — A9270 NON-COVERED ITEM OR SERVICE: HCPCS | Mod: GY | Performed by: HOSPITALIST

## 2017-02-16 PROCEDURE — 25000125 ZZHC RX 250: Performed by: INTERNAL MEDICINE

## 2017-02-16 PROCEDURE — A9270 NON-COVERED ITEM OR SERVICE: HCPCS | Mod: GY | Performed by: INTERNAL MEDICINE

## 2017-02-16 PROCEDURE — 25000132 ZZH RX MED GY IP 250 OP 250 PS 637: Mod: GY | Performed by: INTERNAL MEDICINE

## 2017-02-16 PROCEDURE — 00000146 ZZHCL STATISTIC GLUCOSE BY METER IP

## 2017-02-16 PROCEDURE — 40000275 ZZH STATISTIC RCP TIME EA 10 MIN

## 2017-02-16 RX ORDER — ALBUTEROL SULFATE 0.83 MG/ML
1 SOLUTION RESPIRATORY (INHALATION) EVERY 6 HOURS PRN
Qty: 25 VIAL | Refills: 0 | Status: SHIPPED | OUTPATIENT
Start: 2017-02-16 | End: 2017-07-13

## 2017-02-16 RX ORDER — POLYETHYLENE GLYCOL 3350 17 G/17G
17 POWDER, FOR SOLUTION ORAL 2 TIMES DAILY PRN
Status: DISCONTINUED | OUTPATIENT
Start: 2017-02-16 | End: 2017-02-16 | Stop reason: HOSPADM

## 2017-02-16 RX ORDER — BISACODYL 10 MG
10 SUPPOSITORY, RECTAL RECTAL DAILY PRN
Status: DISCONTINUED | OUTPATIENT
Start: 2017-02-16 | End: 2017-02-16 | Stop reason: HOSPADM

## 2017-02-16 RX ADMIN — POLYETHYLENE GLYCOL 3350 17 G: 17 POWDER, FOR SOLUTION ORAL at 12:41

## 2017-02-16 RX ADMIN — HYDROCHLOROTHIAZIDE 25 MG: 25 TABLET ORAL at 09:15

## 2017-02-16 RX ADMIN — IPRATROPIUM BROMIDE AND ALBUTEROL SULFATE 3 ML: .5; 3 SOLUTION RESPIRATORY (INHALATION) at 08:06

## 2017-02-16 RX ADMIN — Medication 1 CAPSULE: at 09:15

## 2017-02-16 RX ADMIN — IPRATROPIUM BROMIDE AND ALBUTEROL SULFATE 3 ML: .5; 3 SOLUTION RESPIRATORY (INHALATION) at 11:15

## 2017-02-16 RX ADMIN — ASPIRIN 81 MG 81 MG: 81 TABLET ORAL at 09:15

## 2017-02-16 RX ADMIN — EPLERENONE 50 MG: 25 TABLET, FILM COATED ORAL at 09:14

## 2017-02-16 RX ADMIN — CARVEDILOL 12.5 MG: 12.5 TABLET, FILM COATED ORAL at 09:16

## 2017-02-16 RX ADMIN — DILTIAZEM HYDROCHLORIDE 300 MG: 180 CAPSULE, EXTENDED RELEASE ORAL at 09:14

## 2017-02-16 RX ADMIN — INSULIN ASPART 15 UNITS: 100 INJECTION, SOLUTION INTRAVENOUS; SUBCUTANEOUS at 07:51

## 2017-02-16 RX ADMIN — BISACODYL 10 MG: 10 SUPPOSITORY RECTAL at 10:53

## 2017-02-16 RX ADMIN — LISINOPRIL 20 MG: 20 TABLET ORAL at 09:15

## 2017-02-16 RX ADMIN — Medication 1 G: at 09:15

## 2017-02-16 RX ADMIN — GUAIFENESIN 10 ML: 100 SOLUTION ORAL at 02:09

## 2017-02-16 RX ADMIN — FUROSEMIDE 20 MG: 20 TABLET ORAL at 09:15

## 2017-02-16 NOTE — PROVIDER NOTIFICATION
Hospitalist paged re:     MD returned page,  Sliding scale insulin dose given,no other order at this time.

## 2017-02-16 NOTE — PLAN OF CARE
Problem: Goal Outcome Summary  Goal: Goal Outcome Summary  Outcome: Improving  A&Ox4. VSS on RA.  Mod CHO diet. Up with SBA.  L arm fistula, positive for bruit and thrill. Denies pain.  BGL at bedtime 431, covered with sliding scale insulin, MD notified.  Lung sounds diminished. Continue to monitor.

## 2017-02-16 NOTE — PLAN OF CARE
Problem: Goal Outcome Summary  Goal: Goal Outcome Summary  Outcome: Adequate for Discharge Date Met:  02/16/17  VSS. Tele shows SR. Pt denies any CP or SOB. Pt didn't have BM since a week. Suppository give, didn't help. Pt refused enema. Mirala powder given. Discharge instructions, medication indications/side effects, and follow up instructions discussed w/ patient. Handouts given. All questions answered. All pt belongings are accounted for and sent home w/ pt. Pt left floor via wheelchair and was driven home by his son.

## 2017-02-16 NOTE — DISCHARGE SUMMARY
[facility]    Discharge Summary  Hospitalist    Date of Admission:  2/11/2017  Date of Discharge:  2/16/2017  Discharging Provider: Brenden Nielsen  Date of Service (when I saw the patient): 02/16/17    Discharge Diagnoses   Acute COPD exacerbation due to viral URI  NSTEMI, type 2  ESRD  Severe AS  DM II on long term insulin  HTN      History of Present Illness   Seven Savage Jr. is an 81 year old male who presented with shortness of breath, found to have COPD exacerbation.    Hospital Course   Seven Savage Jr. was admitted on 2/11/2017.  The following problems were addressed during his hospitalization:    Acute COPD exacerbation due to viral URI  Presents with progressive dyspnea with symptoms consistent with viral URI in setting of recent exposure to grandchild with URI.  CXR without evidence of significant infiltrate / pneumonia. Influenza negative. Mild leukocytosis at admission, increase felt due to steroids as clinically improved without antibiotics. Counseled on smoking cessation.  He completed 5-day steroid burst and course of azithromycin prior to discharge and was able to wean from oxygen.  He will continue PTA inhaler at discharge, will order albuterol neb prn for ongoing wheezing and follow up with PCP for further management.      NSTEMI, type 2  Suspect a type II event in the setting of COPD exacerbation with hypoxia and poor clearance from his CKD, trop peak of 0.067. Never c/o chest pain. EKG with some lateral ST seg depression. Stress test last year per his report with no sign of CAD. Recent Echo in 6/2016 with no WMA's.   Continue medical management with beta blocker, statin, ACE inhibitor, nitrates.     ESRD on HD M/W/F   No significant electrolyte abnormalities or volume overload.  Nephrology consulted for regular dialysis this admission.      Severe Aortic stenosis  Denies exertional symptoms outside of his present exacerbation as above. Echo in 6/2016 with EF 60%, severe AS  with valva area 0.8cm, mild MR. Do not suspect any current symptoms related to his AS.  Recommend Cardiology follow up to discuss valve repair and repeat Echo as outpt.      Type II DM on long term insulin  On Lantus 26U Qpm and novolog 12 U TID with meals. A1C 6.4%.  Hyperglycemic this admission due to steroids but will resume PTA regimen as steroid course complete at discharge.        Hypertension   Continue PTA lisinopril, carvedilol, diltiazem CD, hydrochlorothiazide, eplerenone, doxazosin.    Brenden Nielsen    Significant Results and Procedures   See imaging below    Pending Results   These results will be followed up by: N/A  Unresulted Labs Ordered in the Past 30 Days of this Admission     No orders found from 12/13/2016 to 2/12/2017.          Code Status   Full Code       Primary Care Physician   Physician No Ref-Primary    Constitutional: Well developed, well nourished male in no acute distress  Respiratory: Clear to auscultation bilaterally, no crackles or wheezes  Cardiovascular: Regular rate and rhythm, S1/S2 with 2/6 systolic murmur  GI: Abdomen soft, non-tender, non-distended, normal bowel sounds  Lymph/Hematologic: No edema  Musculoskeletal: Extremities warm and well perfused  Neurologic: Cranial nerves grossly intact, gross motor movements intact, alert and appropriate  Psychiatric: normal affect    Discharge Disposition   Discharged to home  Condition at discharge: Stable    Consultations This Hospital Stay   SMOKING CESSATION PROGRAM IP CONSULT  NEPHROLOGY IP CONSULT    Time Spent on this Encounter   IBrenden, personally saw the patient today and spent greater than 30 minutes discharging this patient.    Discharge Orders     Reason for your hospital stay   You were admitted for shortness of breath due to COPD exacerbation likely caused by viral URI.     Follow-up and recommended labs and tests    Follow up to establish primary care provider within 7-14 days for hospital follow- up.  No follow  up labs or test are needed.     Activity   Your activity upon discharge: activity as tolerated     Full Code     Diet   Follow this diet upon discharge:      Combination Diet Dialysis Diet; 5471-4075 Calories: Moderate Consistent CHO (4-6 CHO units/meal)       Discharge Medications   Current Discharge Medication List      START taking these medications    Details   albuterol (2.5 MG/3ML) 0.083% neb solution Take 1 vial (2.5 mg) by nebulization every 6 hours as needed for shortness of breath / dyspnea or wheezing  Qty: 25 vial, Refills: 0    Associated Diagnoses: COPD exacerbation (H)      order for DME Equipment being ordered: Other: Nebulizer machine  Treatment Diagnosis: COPD  Qty: 1 Device, Refills: 0    Associated Diagnoses: COPD exacerbation (H)         CONTINUE these medications which have NOT CHANGED    Details   albuterol (PROAIR HFA/PROVENTIL HFA/VENTOLIN HFA) 108 (90 BASE) MCG/ACT Inhaler Inhale 1-2 puffs into the lungs every 4 hours as needed for shortness of breath / dyspnea or wheezing      aspirin 81 MG chewable tablet Take 81 mg by mouth daily      ATORVASTATIN CALCIUM PO Take 40 mg by mouth At Bedtime      CARVEDILOL PO Take 12.5 mg by mouth 2 times daily      VITAMIN D, CHOLECALCIFEROL, PO Take 2,000 Units by mouth every other day      COLCHICINE PO Take 0.6 mg by mouth every 48 hours as needed Take 1 tab by mouth twice a day for 2 days then one every other day as needed      darbepoetin libby (ARANESP) 100 MCG/0.5ML injection Inject 40 mcg Subcutaneous every 30 days      diltiazem (TIAZAC) 300 MG 24 hr ER beaded capsule Take 300 mg by mouth daily      DOXAZOSIN MESYLATE PO Take 8 mg by mouth daily      EPLERENONE PO Take 50 mg by mouth daily      FUROSEMIDE PO Take 20 mg by mouth daily      HYDROCHLOROTHIAZIDE PO Take 25 mg by mouth daily      Insulin Aspart (NOVOLOG SC) Sliding scale      insulin glargine (LANTUS) 100 UNIT/ML injection Inject 26 Units Subcutaneous At Bedtime      LISINOPRIL PO Take  20 mg by mouth daily      fish oil-omega-3 fatty acids 1000 MG capsule Take 1 g by mouth daily      B Complex-C-Folic Acid (DIALYVITE 800 PO) Take 1 tablet by mouth daily      tiotropium-olodaterol 2.5-2.5 MCG/ACT AERS Inhale 2 puffs into the lungs daily           Allergies   No Known Allergies  Data   Most Recent 3 CBC's:  Recent Labs   Lab Test  02/15/17   0610  02/12/17   0605  02/11/17   0800   WBC  15.7*  18.2*  16.1*   HGB  10.2*  10.5*  11.3*   MCV  83  84  86   PLT  291  251  249      Most Recent 3 BMP's:  Recent Labs   Lab Test  02/15/17   0610  02/13/17   0530  02/12/17   0605   NA  135  131*  134   POTASSIUM  4.6  4.5  4.4   CHLORIDE  98  93*  97   CO2  27  25  25   BUN  91*  89*  60*   CR  4.01*  4.17*  3.78*   ANIONGAP  10  13  12   AARON  8.3*  8.4*  8.9   GLC  231*  308*  177*     Most Recent 3 Troponin's:  Recent Labs   Lab Test  02/11/17   2020  02/11/17   1500  02/11/17   0800   TROPI  0.063*  0.067*  0.057*     Most Recent TSH, T4 and A1c Labs:  Recent Labs   Lab Test  02/12/17   0605   A1C  6.4*     No results found for this or any previous visit.

## 2017-02-16 NOTE — PROGRESS NOTES
Patient oxygen weaned to room air and sating 95%. BBS diminished/rhonchi. Prn and scheduled neb tx given and tolerated well. Will continue to follow.  2/15/2017  Carlos A Patton

## 2017-02-16 NOTE — PLAN OF CARE
Problem: Goal Outcome Summary  Goal: Goal Outcome Summary  Outcome: Improving  Pt A/O, VSS on RA. Tele: SR. Pt denies pain/SOB. BG @0200= 290. Pt reported sleeping well overnight. Possible d/c today.

## 2017-02-23 ENCOUNTER — OFFICE VISIT (OUTPATIENT)
Dept: INTERNAL MEDICINE | Facility: CLINIC | Age: 82
End: 2017-02-23
Payer: MEDICARE

## 2017-02-23 VITALS
SYSTOLIC BLOOD PRESSURE: 112 MMHG | TEMPERATURE: 97.7 F | HEART RATE: 67 BPM | OXYGEN SATURATION: 93 % | HEIGHT: 69 IN | BODY MASS INDEX: 28.38 KG/M2 | DIASTOLIC BLOOD PRESSURE: 50 MMHG | WEIGHT: 191.6 LBS

## 2017-02-23 DIAGNOSIS — Z09 HOSPITAL DISCHARGE FOLLOW-UP: Primary | ICD-10-CM

## 2017-02-23 DIAGNOSIS — J44.1 CHRONIC OBSTRUCTIVE PULMONARY DISEASE WITH ACUTE EXACERBATION (H): ICD-10-CM

## 2017-02-23 DIAGNOSIS — N18.6 ESRD (END STAGE RENAL DISEASE) ON DIALYSIS (H): ICD-10-CM

## 2017-02-23 DIAGNOSIS — E78.5 HYPERLIPIDEMIA LDL GOAL <100: ICD-10-CM

## 2017-02-23 DIAGNOSIS — I10 ESSENTIAL HYPERTENSION, BENIGN: ICD-10-CM

## 2017-02-23 DIAGNOSIS — Z99.2 ESRD (END STAGE RENAL DISEASE) ON DIALYSIS (H): ICD-10-CM

## 2017-02-23 DIAGNOSIS — M54.50 CHRONIC BILATERAL LOW BACK PAIN WITHOUT SCIATICA: ICD-10-CM

## 2017-02-23 DIAGNOSIS — I35.0 AORTIC VALVE STENOSIS, UNSPECIFIED ETIOLOGY: ICD-10-CM

## 2017-02-23 DIAGNOSIS — Z99.2 TYPE 2 DIABETES MELLITUS WITH CHRONIC KIDNEY DISEASE ON CHRONIC DIALYSIS, WITH LONG-TERM CURRENT USE OF INSULIN (H): ICD-10-CM

## 2017-02-23 DIAGNOSIS — Z79.4 TYPE 2 DIABETES MELLITUS WITH CHRONIC KIDNEY DISEASE ON CHRONIC DIALYSIS, WITH LONG-TERM CURRENT USE OF INSULIN (H): ICD-10-CM

## 2017-02-23 DIAGNOSIS — Z71.89 COUNSELING REGARDING ADVANCED DIRECTIVES: ICD-10-CM

## 2017-02-23 DIAGNOSIS — N18.6 TYPE 2 DIABETES MELLITUS WITH CHRONIC KIDNEY DISEASE ON CHRONIC DIALYSIS, WITH LONG-TERM CURRENT USE OF INSULIN (H): ICD-10-CM

## 2017-02-23 DIAGNOSIS — E11.22 TYPE 2 DIABETES MELLITUS WITH CHRONIC KIDNEY DISEASE ON CHRONIC DIALYSIS, WITH LONG-TERM CURRENT USE OF INSULIN (H): ICD-10-CM

## 2017-02-23 DIAGNOSIS — G89.29 CHRONIC BILATERAL LOW BACK PAIN WITHOUT SCIATICA: ICD-10-CM

## 2017-02-23 PROCEDURE — 99204 OFFICE O/P NEW MOD 45 MIN: CPT | Performed by: INTERNAL MEDICINE

## 2017-02-23 RX ORDER — FUROSEMIDE 20 MG
20 TABLET ORAL DAILY
Qty: 90 TABLET | Refills: 3 | Status: SHIPPED | OUTPATIENT
Start: 2017-02-23 | End: 2018-03-20

## 2017-02-23 RX ORDER — LISINOPRIL 20 MG/1
20 TABLET ORAL DAILY
Qty: 90 TABLET | Refills: 3 | Status: SHIPPED | OUTPATIENT
Start: 2017-02-23 | End: 2018-03-20

## 2017-02-23 RX ORDER — FOLIC ACID/VIT B COMPLEX AND C 0.8 MG
1 TABLET ORAL DAILY
Qty: 90 TABLET | Refills: 1 | Status: SHIPPED | OUTPATIENT
Start: 2017-02-23 | End: 2017-09-08

## 2017-02-23 RX ORDER — ATORVASTATIN CALCIUM 40 MG/1
40 TABLET, FILM COATED ORAL AT BEDTIME
Qty: 90 TABLET | Refills: 1 | Status: SHIPPED | OUTPATIENT
Start: 2017-02-23 | End: 2017-11-10

## 2017-02-23 RX ORDER — CARVEDILOL 12.5 MG/1
12.5 TABLET ORAL 2 TIMES DAILY
Qty: 180 TABLET | Refills: 1 | Status: SHIPPED | OUTPATIENT
Start: 2017-02-23 | End: 2017-09-30

## 2017-02-23 RX ORDER — HYDROCHLOROTHIAZIDE 12.5 MG/1
25 CAPSULE ORAL EVERY MORNING
Qty: 60 CAPSULE | Refills: 5 | Status: SHIPPED | OUTPATIENT
Start: 2017-02-23 | End: 2017-07-01

## 2017-02-23 NOTE — MR AVS SNAPSHOT
After Visit Summary   2/23/2017    Seven Savage Jr.    MRN: [id number]           Patient Information     Date Of Birth          1935        Visit Information        Provider Department      2/23/2017 3:20 PM Sandip Antunez MD [facility]        Today's Diagnoses     Hospital discharge follow-up    -  1    Chronic obstructive pulmonary disease with acute exacerbation (H)        ESRD (end stage renal disease) on dialysis (H)        Type 2 diabetes mellitus with chronic kidney disease on chronic dialysis, with long-term current use of insulin (H)        Aortic valve stenosis, unspecified etiology        Hyperlipidemia LDL goal <100        Essential hypertension, benign        Counseling regarding advanced directives           Follow-ups after your visit        Additional Services     CARDIOLOGY EVAL ADULT REFERRAL       Your provider has referred you to:  [facility]: [facility] [phone]   [email or web address]    Please be aware that coverage of these services is subject to the terms and limitations of your health insurance plan.  Call member services at your health plan with any benefit or coverage questions.      Type of Referral:  New Cardiology Consult    Timeframe requested:  Less than 1 week    Please bring the following to your appointment:  >>   Any x-rays, CTs or MRIs which have been performed.  Contact the facility where they were done to arrange for  prior to your scheduled appointment.    >>   List of current medications  >>   This referral request   >>   Any documents/labs given to you for this referral                  Future tests that were ordered for you today     Open Future Orders        Priority Expected Expires Ordered    TSH with free T4 reflex Routine 2/23/2017 4/30/2017 2/23/2017    Lipid Profile Routine 2/23/2017 4/30/2017 2/23/2017    Hepatic panel Routine 2/23/2017  "2017            Who to contact     If you have questions or need follow up information about today's clinic visit or your schedule please contact [facility] directly at [phone].  Normal or non-critical lab and imaging results will be communicated to you by MyChart, letter or phone within 4 business days after the clinic has received the results. If you do not hear from us within 7 days, please contact the clinic through [facility] or phone. If you have a critical or abnormal lab result, we will notify you by phone as soon as possible.  Submit refill requests through [facility] or call your pharmacy and they will forward the refill request to us. Please allow 3 business days for your refill to be completed.          Additional Information About Your Visit        [facility] Information     [facility] [facility] lets you send messages to your doctor, view your test results, renew your prescriptions, schedule appointments and more. To sign up, go to [email or web address] . Click on \"Log in\" on the left side of the screen, which will take you to the Welcome page. Then click on \"Sign up Now\" on the right side of the page.     You will be asked to enter the access code listed below, as well as some personal information. Please follow the directions to create your username and password.     Your access code is: [email or web address]  Expires: 2017  2:04 PM     Your access code will  in 90 days. If you need help or a new code, please call your [place] clinic or [phone].        Care EveryWhere ID     This is your Care EveryWhere ID. This could be used by other organizations to access your [place] medical records  [id number]        Your Vitals Were     Pulse Temperature Height Pulse Oximetry BMI (Body Mass Index)       67 97.7  F (36.5  C) (Oral) 5' 9\" (1.753 m) 93% 28.29 kg/m2        Blood Pressure from Last 3 Encounters:   17 112/50   17 144/56    Weight from Last 3 Encounters: "   02/23/17 191 lb 9.6 oz (86.9 kg)   02/16/17 189 lb 2.5 oz (85.8 kg)              We Performed the Following     CARDIOLOGY EVAL ADULT REFERRAL          Today's Medication Changes          These changes are accurate as of: 2/23/17  3:32 PM.  If you have any questions, ask your nurse or doctor.               Start taking these medicines.        Dose/Directions    insulin glargine 100 UNIT/ML injection   Commonly known as:  LANTUS   Used for:  Type 2 diabetes mellitus with chronic kidney disease on chronic dialysis, with long-term current use of insulin (H)   Replaces:  insulin glargine 100 UNIT/ML injection   Started by:  Sandip Antunez MD        Dose:  26 Units   Inject 26 Units Subcutaneous At Bedtime   Quantity:  15 mL   Refills:  3       nebulizer/adult mask Kit   Used for:  Chronic obstructive pulmonary disease with acute exacerbation (H)   Started by:  Sandip Antunez MD        Dose:  1 Device   1 Device as needed   Quantity:  1 kit   Refills:  0         These medicines have changed or have updated prescriptions.        Dose/Directions    atorvastatin 40 MG tablet   Commonly known as:  LIPITOR   This may have changed:  medication strength   Used for:  Type 2 diabetes mellitus with chronic kidney disease on chronic dialysis, with long-term current use of insulin (H), Hyperlipidemia LDL goal <100   Changed by:  Sandip Antunez MD        Dose:  40 mg   Take 1 tablet (40 mg) by mouth At Bedtime   Quantity:  90 tablet   Refills:  1       carvedilol 12.5 MG tablet   Commonly known as:  COREG   This may have changed:  medication strength   Used for:  Aortic valve stenosis, unspecified etiology, Essential hypertension, benign   Changed by:  Sandip Antunez MD        Dose:  12.5 mg   Take 1 tablet (12.5 mg) by mouth 2 times daily   Quantity:  180 tablet   Refills:  1       DIALYVITE 800 0.8 MG Tabs   This may have changed:  medication strength   Used for:  ESRD (end stage renal disease) on dialysis (H)   Changed  by:  Sandip Antunez MD        Dose:  1 tablet   Take 1 tablet by mouth daily   Quantity:  90 tablet   Refills:  1       furosemide 20 MG tablet   Commonly known as:  LASIX   This may have changed:  medication strength   Used for:  ESRD (end stage renal disease) on dialysis (H), Essential hypertension, benign   Changed by:  Sandip Antunez MD        Dose:  20 mg   Take 1 tablet (20 mg) by mouth daily   Quantity:  90 tablet   Refills:  3       hydrochlorothiazide 12.5 MG capsule   Commonly known as:  MICROZIDE   This may have changed:    - medication strength  - when to take this   Used for:  ESRD (end stage renal disease) on dialysis (H)   Changed by:  Sandip Antunez MD        Dose:  25 mg   Take 2 capsules (25 mg) by mouth every morning   Quantity:  60 capsule   Refills:  5       lisinopril 20 MG tablet   Commonly known as:  PRINIVIL/ZESTRIL   This may have changed:  medication strength   Used for:  Type 2 diabetes 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 Make sure you understand how and when to take each.   Used for:  COPD exacerbation (H)        Equipment being ordered: Other: Nebulizer machine Treatment Diagnosis: COPD   Quantity:  1 Device   Refills:  0       * order for DME   This may have changed:  You were already taking a medication with the same name, and this prescription was added. Make sure you understand how and when to take each.   Used for:  Chronic obstructive pulmonary disease with acute exacerbation (H)   Changed by:  Sandip Antunez MD        Equipment being ordered: mask to use with Nebulizer   Quantity:  1 Device   Refills:  0       * Notice:  This list has 2 medication(s)  that are the same as other medications prescribed for you. Read the directions carefully, and ask your doctor or other care provider to review them with you.      Stop taking these medicines if you haven't already. Please contact your care team if you have questions.     insulin glargine 100 UNIT/ML injection   Commonly known as:  LANTUS   Replaced by:  insulin glargine 100 UNIT/ML injection   Stopped by:  Sandip Antunez MD                Where to get your medicines      These medications were sent to [facility] [id number] [place] [address] [address] AT [facility] [address], [facility] [address]     Phone:  [phone]     atorvastatin 40 MG tablet    carvedilol 12.5 MG tablet    DIALYVITE 800 0.8 MG Tabs    furosemide 20 MG tablet    hydrochlorothiazide 12.5 MG capsule    insulin glargine 100 UNIT/ML injection    lisinopril 20 MG tablet    nebulizer/adult mask Kit         Some of these will need a paper prescription and others can be bought over the counter.  Ask your nurse if you have questions.     Bring a paper prescription for each of these medications     order for DME                Primary Care Provider    Physician No Ref-Primary       No address on file        Thank you!     Thank you for choosing [facility]  for your care. Our goal is always to provide you with excellent care. Hearing back from our patients is one way we can continue to improve our services. Please take a few minutes to complete the written survey that you may receive in the mail after your visit with us. Thank you!             Your Updated Medication List - Protect others around you: Learn how to safely use, store and throw away your medicines at www.disposemymeds.org.          This list is accurate as of: 2/23/17  3:32 PM.  Always use your most recent med list.                   Brand Name Dispense Instructions for use    * albuterol 108 (90 BASE) MCG/ACT Inhaler    PROAIR  HFA/PROVENTIL HFA/VENTOLIN HFA     Inhale 1-2 puffs into the lungs every 4 hours as needed for shortness of breath / dyspnea or wheezing       * albuterol (2.5 MG/3ML) 0.083% neb solution     25 vial    Take 1 vial (2.5 mg) by nebulization every 6 hours as needed for shortness of breath / dyspnea or wheezing       aspirin 81 MG chewable tablet      Take 81 mg by mouth daily       atorvastatin 40 MG tablet    LIPITOR    90 tablet    Take 1 tablet (40 mg) by mouth At Bedtime       carvedilol 12.5 MG tablet    COREG    180 tablet    Take 1 tablet (12.5 mg) by mouth 2 times daily       COLCHICINE PO      Take 0.6 mg by mouth every 48 hours as needed Take 1 tab by mouth twice a day for 2 days then one every other day as needed       darbepoetin libby 100 MCG/0.5ML injection    ARANESP     Inject 40 mcg Subcutaneous every 30 days       DIALYVITE 800 0.8 MG Tabs     90 tablet    Take 1 tablet by mouth daily       diltiazem 300 MG 24 hr ER beaded capsule    TIAZAC     Take 300 mg by mouth daily       DOXAZOSIN MESYLATE PO      Take 8 mg by mouth daily       EPLERENONE PO      Take 50 mg by mouth daily       fish oil-omega-3 fatty acids 1000 MG capsule      Take 1 g by mouth daily       furosemide 20 MG tablet    LASIX    90 tablet    Take 1 tablet (20 mg) by mouth daily       hydrochlorothiazide 12.5 MG capsule    MICROZIDE    60 capsule    Take 2 capsules (25 mg) by mouth every morning       insulin glargine 100 UNIT/ML injection    LANTUS    15 mL    Inject 26 Units Subcutaneous At Bedtime       lisinopril 20 MG tablet    PRINIVIL/ZESTRIL    90 tablet    Take 1 tablet (20 mg) by mouth daily       nebulizer/adult mask Kit     1 kit    1 Device as needed       NOVOLOG SC      Sliding scale       * order for DME     1 Device    Equipment being ordered: Other: Nebulizer machine Treatment Diagnosis: COPD       * order for DME     1 Device    Equipment being ordered: mask to use with Nebulizer       tiotropium-olodaterol 2.5-2.5  MCG/ACT Aers      Inhale 2 puffs into the lungs daily       VITAMIN D (CHOLECALCIFEROL) PO      Take 2,000 Units by mouth every other day       * Notice:  This list has 4 medication(s) that are the same as other medications prescribed for you. Read the directions carefully, and ask your doctor or other care provider to review them with you.

## 2017-02-23 NOTE — NURSING NOTE
"Chief Complaint   Patient presents with     Hospital F/U       Initial /50 (BP Location: Right arm, Patient Position: Chair, Cuff Size: Adult Large)  Pulse 67  Temp 97.7  F (36.5  C) (Oral)  Ht 5' 9\" (1.753 m)  Wt 191 lb 9.6 oz (86.9 kg)  SpO2 93%  BMI 28.29 kg/m2 Estimated body mass index is 28.29 kg/(m^2) as calculated from the following:    Height as of this encounter: 5' 9\" (1.753 m).    Weight as of this encounter: 191 lb 9.6 oz (86.9 kg).  Medication Reconciliation: complete   Jessica Mendoza, MAGALI      "

## 2017-02-23 NOTE — PROGRESS NOTES
SUBJECTIVE:                                                    Seven Savage Jr. is a 81 year old male who presents to clinic today for the following health issues:    New patient, never seen.  Usually from [place].  M-W-TH dialysis via Dr. Samuel    [facility] Follow-up Visit:    Hospital/Nursing Home/IP Rehab Facility: [facility]  Date of Admission: 2/11/17  Date of Discharge: 2/16/17  Reason(s) for Admission: COPD            Problems taking medications regularly:  None       Medication changes since discharge: None       Problems adhering to non-medication therapy:  None    Summary of hospitalization:  [facility] discharge summary reviewed  Diagnostic Tests/Treatments reviewed.  Follow up needed: Renal, Cardiology  Other Healthcare Providers Involved in Patient s Care:         None  Update since discharge: stable.     Post Discharge Medication Reconciliation: discharge medications reconciled, continue medications without change.  Plan of care communicated with patient     Coding guidelines for this visit:  Type of Medical   Decision Making Face-to-Face Visit       within 7 Days of discharge Face-to-Face Visit        within 14 days of discharge   Moderate Complexity 18522 [id number]   High Complexity 04224 [id number]            Acute COPD exacerbation due to viral URI  Presents with progressive dyspnea with symptoms consistent with viral URI in setting of recent exposure to grandchild with URI. CXR without evidence of significant infiltrate / pneumonia. Influenza negative. Mild leukocytosis at admission, increase felt due to steroids as clinically improved without antibiotics. Counseled on smoking cessation. He completed 5-day steroid burst and course of azithromycin prior to discharge and was able to wean from oxygen. He will continue PTA inhaler at discharge, will order albuterol neb prn for ongoing wheezing and follow up with PCP for further management.      Demand ischemia  Suspect a type II  event in the setting of COPD exacerbation with hypoxia and poor clearance from his CKD, trop peak of 0.067. Never c/o chest pain. EKG with some lateral ST seg depression. Stress test last year per his report with no sign of CAD. Recent Echo in 6/2016 with no WMA's. Continue medical management with beta blocker, statin, ACE inhibitor, nitrates.      ESRD on HD M/W/F   No significant electrolyte abnormalities or volume overload. Nephrology consulted for regular dialysis this admission.      Severe Aortic stenosis  Denies exertional symptoms outside of his present exacerbation as above. Echo in 6/2016 with EF 60%, severe AS with valva area 0.8cm, mild MR. Do not suspect any current symptoms related to his AS. Recommend Cardiology follow up to discuss valve repair and repeat Echo as outpt.      Type II DM on long term insulin  On Lantus 26U Qpm and novolog 12 U TID with meals. A1C 6.4%. Hyperglycemic this admission due to steroids but will resume PTA regimen as steroid course complete at discharge.       Hypertension   Continue PTA lisinopril, carvedilol, diltiazem CD, hydrochlorothiazide, eplerenone, doxazosin.    Other concerns:  1. Requesting order for mask to use with nebulizer  2. Experiencing constipation- has been cutting back on Furosemide   3.  Also has chronic back pain and would like referral and prefers to see D.O.    Problem list and histories reviewed & adjusted, as indicated.  Additional history: as documented    Patient Active Problem List   Diagnosis     COPD exacerbation (H)     Type 2 diabetes mellitus with chronic kidney disease on chronic dialysis, with long-term current use of insulin (H)     Hyperlipidemia LDL goal <100     Aortic valve stenosis, unspecified etiology     ESRD (end stage renal disease) on dialysis (H)     Chronic obstructive pulmonary disease with acute exacerbation (H)     Past Surgical History   Procedure Laterality Date     Tonsillectomy       Adenoidectomy       Endoscopic  polypectomy nasal       Through vocal cords     Renal stent       For renal calculi       Social History   Substance Use Topics     Smoking status: Former Smoker     Smokeless tobacco: Not on file     Alcohol use Yes      Comment: 1 beer per month     History reviewed. No pertinent family history.      Current Outpatient Prescriptions   Medication Sig Dispense Refill     albuterol (2.5 MG/3ML) 0.083% neb solution Take 1 vial (2.5 mg) by nebulization every 6 hours as needed for shortness of breath / dyspnea or wheezing 25 vial 0     order for DME Equipment being ordered: Other: Nebulizer machine  Treatment Diagnosis: COPD 1 Device 0     albuterol (PROAIR HFA/PROVENTIL HFA/VENTOLIN HFA) 108 (90 BASE) MCG/ACT Inhaler Inhale 1-2 puffs into the lungs every 4 hours as needed for shortness of breath / dyspnea or wheezing       aspirin 81 MG chewable tablet Take 81 mg by mouth daily       ATORVASTATIN CALCIUM PO Take 40 mg by mouth At Bedtime       CARVEDILOL PO Take 12.5 mg by mouth 2 times daily       VITAMIN D, CHOLECALCIFEROL, PO Take 2,000 Units by mouth every other day       COLCHICINE PO Take 0.6 mg by mouth every 48 hours as needed Take 1 tab by mouth twice a day for 2 days then one every other day as needed       darbepoetin libby (ARANESP) 100 MCG/0.5ML injection Inject 40 mcg Subcutaneous every 30 days       diltiazem (TIAZAC) 300 MG 24 hr ER beaded capsule Take 300 mg by mouth daily       DOXAZOSIN MESYLATE PO Take 8 mg by mouth daily       EPLERENONE PO Take 50 mg by mouth daily       FUROSEMIDE PO Take 20 mg by mouth daily       HYDROCHLOROTHIAZIDE PO Take 25 mg by mouth daily       Insulin Aspart (NOVOLOG SC) Sliding scale       insulin glargine (LANTUS) 100 UNIT/ML injection Inject 26 Units Subcutaneous At Bedtime       LISINOPRIL PO Take 20 mg by mouth daily       fish oil-omega-3 fatty acids 1000 MG capsule Take 1 g by mouth daily       B Complex-C-Folic Acid (DIALYVITE 800 PO) Take 1 tablet by mouth daily   "     tiotropium-olodaterol 2.5-2.5 MCG/ACT AERS Inhale 2 puffs into the lungs daily       Recent Labs   Lab Test  02/15/17   0610  02/13/17   0530  02/12/17   0605   A1C   --    --   6.4*   CR  4.01*  4.17*  3.78*   GFRESTIMATED  14*  14*  15*   GFRESTBLACK  17*  17*  19*   POTASSIUM  4.6  4.5  4.4      BP Readings from Last 3 Encounters:   02/16/17 144/56    Wt Readings from Last 3 Encounters:   02/16/17 189 lb 2.5 oz (85.8 kg)            Labs reviewed in EPIC  Problem list, Medication list, Allergies, and Medical/Social/Surgical histories reviewed in [facility] and updated as appropriate.    ROS:  C: NEGATIVE for fever, chills, change in weight  E/M: NEGATIVE for ear, mouth and throat problems  CV: NEGATIVE for chest pain, palpitations or peripheral edema  GI: NEGATIVE for nausea, abdominal pain, heartburn, or change in bowel habits  : NEGATIVE for frequency, dysuria, or hematuria  H: NEGATIVE for bleeding problems  P: NEGATIVE for changes in mood or affect    OBJECTIVE:                                                    /50 (BP Location: Right arm, Patient Position: Chair, Cuff Size: Adult Large)  Pulse 67  Temp 97.7  F (36.5  C) (Oral)  Ht 5' 9\" (1.753 m)  Wt 191 lb 9.6 oz (86.9 kg)  SpO2 93%  BMI 28.29 kg/m2  There is no height or weight on file to calculate BMI.  GENERAL: alert and no distress  EYES: Eyes grossly normal to inspection, extraocular movements - intact, and PERRL  HENT: ear canals- normal; TMs- normal; Nose- normal; Mouth- no ulcers, no lesions  NECK: no tenderness, no adenopathy, no asymmetry, no masses, no stiffness; thyroid- normal to palpation  RESP: lungs clear to auscultation - no rales, no rhonchi, no wheezes  CV: regular rates and rhythm, normal S1 S2, no S3 or S4 and noted AS murmur, no click or rub -  MS: extremities- no gross deformities noted  PSYCH: Alert and oriented times 3; speech- coherent , normal rate and volume; able to articulate logical thoughts, able to abstract reason, no " tangential thoughts, no hallucinations or delusions, affect- normal.  Dialysis access left arm     ASSESSMENT/PLAN:                                                      (Z09) Hospital discharge follow-up  (primary encounter diagnosis)  Comment: stable as noted  Plan:     (J44.1) Chronic obstructive pulmonary disease with acute exacerbation (H)  Comment: appears at baseline  Plan: Respiratory Therapy Supplies (NEBULIZER/ADULT         MASK) KIT, order for DME            (N18.6,  Z99.2) ESRD (end stage renal disease) on dialysis (H)  Comment: dialysis as scheduled  Plan: B Complex-C-Folic Acid (DIALYVITE 800) 0.8 MG         TABS, hydrochlorothiazide (MICROZIDE) 12.5 MG         capsule, furosemide (LASIX) 20 MG tablet            (E11.22,  N18.6,  Z99.2,  Z79.4) Type 2 diabetes mellitus with chronic kidney disease on chronic dialysis, with long-term current use of insulin (H)  Comment:   Lab Results   Component Value Date    A1C 6.4 02/12/2017     Plan: atorvastatin (LIPITOR) 40 MG tablet, insulin         glargine (LANTUS) 100 UNIT/ML injection,         lisinopril (PRINIVIL/ZESTRIL) 20 MG tablet, TSH        with free T4 reflex, Lipid Profile, Hepatic         panel        Stable, labs 3 months    (I35.0) Aortic valve stenosis, unspecified etiology  Comment: referral sent, Cardiology to follow, asymptomatic  Plan: carvedilol (COREG) 12.5 MG tablet, CARDIOLOGY         EVAL ADULT REFERRAL            (E78.5) Hyperlipidemia LDL goal <100  Comment:   Plan: atorvastatin (LIPITOR) 40 MG tablet        Labs ordered as fasting    (I10) Essential hypertension, benign  Comment: stable on therapy  Plan: carvedilol (COREG) 12.5 MG tablet, furosemide         (LASIX) 20 MG tablet, lisinopril         (PRINIVIL/ZESTRIL) 20 MG tablet            (Z71.89) Counseling regarding advanced directives  Comment: noted  Plan:     (M54.5,  G89.29) Chronic bilateral low back pain without sciatica  Comment: referral sent as patient prefers to see  D.O.  Plan: ORTHO  REFERRAL            See Patient Instructions    Sandip Antunez MD  [facility]    THE MEDICATION LIST HAS BEEN FULLY RECONCILED BY THE M.MARLY AND THE NURSING STAFF.

## 2017-03-03 DIAGNOSIS — Z99.2 TYPE 2 DIABETES MELLITUS WITH CHRONIC KIDNEY DISEASE ON CHRONIC DIALYSIS, WITH LONG-TERM CURRENT USE OF INSULIN (H): ICD-10-CM

## 2017-03-03 DIAGNOSIS — Z79.4 TYPE 2 DIABETES MELLITUS WITH CHRONIC KIDNEY DISEASE ON CHRONIC DIALYSIS, WITH LONG-TERM CURRENT USE OF INSULIN (H): ICD-10-CM

## 2017-03-03 DIAGNOSIS — N18.6 TYPE 2 DIABETES MELLITUS WITH CHRONIC KIDNEY DISEASE ON CHRONIC DIALYSIS, WITH LONG-TERM CURRENT USE OF INSULIN (H): ICD-10-CM

## 2017-03-03 DIAGNOSIS — E11.22 TYPE 2 DIABETES MELLITUS WITH CHRONIC KIDNEY DISEASE ON CHRONIC DIALYSIS, WITH LONG-TERM CURRENT USE OF INSULIN (H): ICD-10-CM

## 2017-03-03 NOTE — TELEPHONE ENCOUNTER
Reason for Call:  Other call back    Detailed comments: patient would like call back from Dr. Antunez's Nurse. He said he's confused about the quantity of the medical ordered for him. (insulin glargine (LANTUS) 100 UNIT/ML injection)    Phone Number Patient can be reached at: Home number on file [phone] (home)    Best Time: anytime      Can we leave a detailed message on this number? YES    Call taken on 3/3/2017 at 4:11 PM by JADEN CRUZ

## 2017-03-06 NOTE — TELEPHONE ENCOUNTER
Patient requesting that Lantus Rx be changed to receive 3 month supply at once. Order pended for signature.  Jessica Mendoza CMA    insulin glargine (LANTUS) 100 UNIT/ML injection  Last Written Prescription Date: 2/23/17  Last Fill Quantity: 15 ml, # refills: 3  Last Office Visit with [facility], [facility] or [facility] prescribing provider:  2/23/17        BP Readings from Last 3 Encounters:   02/23/17 112/50   02/16/17 144/56     No results found for: MICROL  No results found for: MICROALBUMIN  Creatinine   Date Value Ref Range Status   02/15/2017 4.01 (H) 0.66 - 1.25 mg/dL Final   ]  GFR Estimate   Date Value Ref Range Status   02/15/2017 14 (L) >60 mL/min/1.7m2 Final     Comment:     Non  GFR Calc   02/13/2017 14 (L) >60 mL/min/1.7m2 Final     Comment:     Non  GFR Calc   02/12/2017 15 (L) >60 mL/min/1.7m2 Final     Comment:     Non  GFR Calc     GFR Estimate If Black   Date Value Ref Range Status   02/15/2017 17 (L) >60 mL/min/1.7m2 Final     Comment:      GFR Calc   02/13/2017 17 (L) >60 mL/min/1.7m2 Final     Comment:      GFR Calc   02/12/2017 19 (L) >60 mL/min/1.7m2 Final     Comment:      GFR Calc     No results found for: CHOL  No results found for: HDL  No results found for: LDL  No results found for: TRIG  No results found for: CHOLHDLRATIO  No results found for: AST  No results found for: ALT  Lab Results   Component Value Date    A1C 6.4 02/12/2017     Potassium   Date Value Ref Range Status   02/15/2017 4.6 3.4 - 5.3 mmol/L Final

## 2017-03-07 DIAGNOSIS — Z99.2 TYPE 2 DIABETES MELLITUS WITH CHRONIC KIDNEY DISEASE ON CHRONIC DIALYSIS, WITH LONG-TERM CURRENT USE OF INSULIN (H): Primary | ICD-10-CM

## 2017-03-07 DIAGNOSIS — N18.6 TYPE 2 DIABETES MELLITUS WITH CHRONIC KIDNEY DISEASE ON CHRONIC DIALYSIS, WITH LONG-TERM CURRENT USE OF INSULIN (H): Primary | ICD-10-CM

## 2017-03-07 DIAGNOSIS — Z79.4 TYPE 2 DIABETES MELLITUS WITH CHRONIC KIDNEY DISEASE ON CHRONIC DIALYSIS, WITH LONG-TERM CURRENT USE OF INSULIN (H): Primary | ICD-10-CM

## 2017-03-07 DIAGNOSIS — E11.22 TYPE 2 DIABETES MELLITUS WITH CHRONIC KIDNEY DISEASE ON CHRONIC DIALYSIS, WITH LONG-TERM CURRENT USE OF INSULIN (H): Primary | ICD-10-CM

## 2017-03-07 NOTE — TELEPHONE ENCOUNTER
Pharmacy requesting Lantus rx is changed to pens. New order pended for signature.  Jessica Mendoza, MAGALI

## 2017-03-21 DIAGNOSIS — I10 ESSENTIAL HYPERTENSION, BENIGN: Primary | ICD-10-CM

## 2017-03-21 RX ORDER — EPLERENONE 25 MG/1
50 TABLET, FILM COATED ORAL DAILY
Qty: 60 TABLET | Refills: 1 | Status: CANCELLED | OUTPATIENT
Start: 2017-03-21

## 2017-03-21 NOTE — TELEPHONE ENCOUNTER
EPLERENONE PO      Last Written Prescription Date:    Last Fill Quantity: ,   # refills:   Last Office Visit with [facility], [facility] or [facility] prescribing provider: 02/23/17  Future Office visit:       Routing refill request to provider for review/approval because:  Medication is reported/historical

## 2017-03-28 ENCOUNTER — TELEPHONE (OUTPATIENT)
Dept: INTERNAL MEDICINE | Facility: CLINIC | Age: 82
End: 2017-03-28

## 2017-03-28 NOTE — TELEPHONE ENCOUNTER
Reason for Call:  Medication or medication refill:    Do you use a [facility]?  Name of the pharmacy and phone number for the current request:  [facility] [address] [place] - [phone]    Name of the medication requested:   EPLERENONE PO     Other request: previous medication from patients prior doctor, pt would like to have filled    Can we leave a detailed message on this number? YES    Phone number patient can be reached at: pharmacy ph # [phone], fax # [phone]    Best Time: anytime    Call taken on 3/28/2017 at 1:15 PM by China Tolentino

## 2017-03-29 ENCOUNTER — TELEPHONE (OUTPATIENT)
Dept: INTERNAL MEDICINE | Facility: CLINIC | Age: 82
End: 2017-03-29

## 2017-03-29 NOTE — TELEPHONE ENCOUNTER
Reason for Call:  Other call back    Detailed comments: Pt saw Dr Antunez for the 1st time in Feb.  Dr Antunez filled 5 prescriptions at that time.  The pt states that there are 6 more to refill, but the pharmacy told the pt that he had to make an appt with Dr Antunez before the scripts could be filled.  The pt is upset and wants a call back.  [facility]  [facility] on [address]  [phone].    Phone Number Patient can be reached at: Home number on file [phone] (home)    Best Time: anytime    Can we leave a detailed message on this number? YES    Call taken on 3/29/2017 at 2:58 PM by TRAVIS MARINELLI

## 2017-03-30 ENCOUNTER — OFFICE VISIT (OUTPATIENT)
Dept: NEUROSURGERY | Facility: CLINIC | Age: 82
End: 2017-03-30
Attending: NURSE PRACTITIONER
Payer: MEDICARE

## 2017-03-30 VITALS
WEIGHT: 188 LBS | BODY MASS INDEX: 27.76 KG/M2 | TEMPERATURE: 98.1 F | DIASTOLIC BLOOD PRESSURE: 62 MMHG | SYSTOLIC BLOOD PRESSURE: 141 MMHG | HEART RATE: 69 BPM | OXYGEN SATURATION: 96 %

## 2017-03-30 DIAGNOSIS — M51.369 LUMBAR DEGENERATIVE DISC DISEASE: Primary | ICD-10-CM

## 2017-03-30 PROCEDURE — 99211 OFF/OP EST MAY X REQ PHY/QHP: CPT | Performed by: NURSE PRACTITIONER

## 2017-03-30 PROCEDURE — 99203 OFFICE O/P NEW LOW 30 MIN: CPT | Performed by: NURSE PRACTITIONER

## 2017-03-30 NOTE — PATIENT INSTRUCTIONS
Schedule physical therapy  Please contact the clinic if pain persists and would like an order for injections at [phone].

## 2017-03-30 NOTE — TELEPHONE ENCOUNTER
Left detailed message based off previous TE and consent below,  is not going to prescribe this med.

## 2017-03-30 NOTE — MR AVS SNAPSHOT
After Visit Summary   3/30/2017    Seven Savage Jr.    MRN: [id number]           Patient Information     Date Of Birth          1935        Visit Information        Provider Department      3/30/2017 1:50 PM Adia Ding NP [facility]        Today's Diagnoses     Lumbar degenerative disc disease    -  1      Care Instructions    Schedule physical therapy  Please contact the clinic if pain persists and would like an order for injections at [phone].          Follow-ups after your visit        Additional Services     [facility] PT, HAND, AND CHIROPRACTIC REFERRAL       **This order will print in the [facility] Scheduling Office**    Physical Therapy, Hand Therapy and Chiropractic Care are available through:    *[facility]  *[facility]  *[facility]    Call one number to schedule at any of the above locations: [phone].    Your provider has referred you to: Physical Therapy at [facility] or [facility]    Indication/Reason for Referral: Ankle Pain and Low Back Pain  Onset of Illness: chronic    Therapy Orders: Evaluate and Treat  Special Programs: None  Special Request: None    Lilian Quintana      Additional Comments for the Therapist or Chiropractor:       Please be aware that coverage of these services is subject to the terms and limitations of your health insurance plan.  Call member services at your health plan with any benefit or coverage questions.      Please bring the following to your appointment:    *Your personal calendar for scheduling future appointments  *Comfortable clothing                  Your next 10 appointments already scheduled     Mar 30, 2017  1:50 PM CDT   New Visit with Adia Ding NP   [facility] ([facility])    [address] [place] [address] [id number] [phone]              Who to contact     If you have questions or need  "follow up information about today's clinic visit or your schedule please contact [facility] directly at [phone].  Normal or non-critical lab and imaging results will be communicated to you by MyChart, letter or phone within 4 business days after the clinic has received the results. If you do not hear from us within 7 days, please contact the clinic through [employer] or phone. If you have a critical or abnormal lab result, we will notify you by phone as soon as possible.  Submit refill requests through [employer] or call your pharmacy and they will forward the refill request to us. Please allow 3 business days for your refill to be completed.          Additional Information About Your Visit        [employer] Information     [employer] lets you send messages to your doctor, view your test results, renew your prescriptions, schedule appointments and more. To sign up, go to [email or web address] . Click on \"Log in\" on the left side of the screen, which will take you to the Welcome page. Then click on \"Sign up Now\" on the right side of the page.     You will be asked to enter the access code listed below, as well as some personal information. Please follow the directions to create your username and password.     Your access code is: [email or web address]  Expires: 2017  3:04 PM     Your access code will  in 90 days. If you need help or a new code, please call your [place] clinic or [phone].        Care EveryWhere ID     This is your Care EveryWhere ID. This could be used by other organizations to access your [facility] [place] medical records  [id number]        Your Vitals Were     Pulse Temperature Pulse Oximetry BMI (Body Mass Index)          69 98.1  F (36.7  C) (Oral) 96% 27.76 kg/m2         Blood Pressure from Last 3 Encounters:   17 141/62   17 112/50   17 144/56    Weight from Last 3 Encounters:   17 188 lb (85.3 kg)   17 191 lb 9.6 oz (86.9 kg)   17 189 " lb 2.5 oz (85.8 kg)              We Performed the Following     [facility] PT, HAND, AND CHIROPRACTIC REFERRAL        Primary Care Provider    Physician No Ref-Primary       No address on file        Thank you!     Thank you for choosing [facility]  for your care. Our goal is always to provide you with excellent care. Hearing back from our patients is one way we can continue to improve our services. Please take a few minutes to complete the written survey that you may receive in the mail after your visit with us. Thank you!             Your Updated Medication List - Protect others around you: Learn how to safely use, store and throw away your medicines at www.disposemymeds.org.          This list is accurate as of: 3/30/17 11:29 AM.  Always use your most recent med list.                   Brand Name Dispense Instructions for use    * albuterol 108 (90 BASE) MCG/ACT Inhaler    PROAIR HFA/PROVENTIL HFA/VENTOLIN HFA     Inhale 1-2 puffs into the lungs every 4 hours as needed for shortness of breath / dyspnea or wheezing       * albuterol (2.5 MG/3ML) 0.083% neb solution     25 vial    Take 1 vial (2.5 mg) by nebulization every 6 hours as needed for shortness of breath / dyspnea or wheezing       aspirin 81 MG chewable tablet      Take 81 mg by mouth daily       atorvastatin 40 MG tablet    LIPITOR    90 tablet    Take 1 tablet (40 mg) by mouth At Bedtime       carvedilol 12.5 MG tablet    COREG    180 tablet    Take 1 tablet (12.5 mg) by mouth 2 times daily       COLCHICINE PO      Take 0.6 mg by mouth every 48 hours as needed Reported on 3/30/2017       darbepoetin libby 100 MCG/0.5ML injection    ARANESP     Inject 40 mcg Subcutaneous every 30 days       DIALYVITE 800 0.8 MG Tabs     90 tablet    Take 1 tablet by mouth daily       diltiazem 300 MG 24 hr ER beaded capsule    TIAZAC     Take 300 mg by mouth daily       DOXAZOSIN MESYLATE PO      Take 8 mg by mouth daily       EPLERENONE PO      Take  50 mg by mouth daily       fish oil-omega-3 fatty acids 1000 MG capsule      Take 1 g by mouth daily       furosemide 20 MG tablet    LASIX    90 tablet    Take 1 tablet (20 mg) by mouth daily       hydrochlorothiazide 12.5 MG capsule    MICROZIDE    60 capsule    Take 2 capsules (25 mg) by mouth every morning       * insulin glargine 100 UNIT/ML injection    LANTUS    24 mL    Inject 26 Units Subcutaneous At Bedtime       * insulin glargine 100 UNIT/ML injection    LANTUS SOLOSTAR    24 mL    Inject 26 Units Subcutaneous At Bedtime       lisinopril 20 MG tablet    PRINIVIL/ZESTRIL    90 tablet    Take 1 tablet (20 mg) by mouth daily       nebulizer/adult mask Kit     1 kit    1 Device as needed       NOVOLOG SC      Sliding scale       * order for DME     1 Device    Equipment being ordered: Other: Nebulizer machine Treatment Diagnosis: COPD       * order for DME     1 Device    Equipment being ordered: mask to use with Nebulizer       tiotropium-olodaterol 2.5-2.5 MCG/ACT Aers      Inhale 2 puffs into the lungs daily       VITAMIN D (CHOLECALCIFEROL) PO      Take 2,000 Units by mouth every other day       * Notice:  This list has 6 medication(s) that are the same as other medications prescribed for you. Read the directions carefully, and ask your doctor or other care provider to review them with you.

## 2017-03-30 NOTE — PROGRESS NOTES
"Dr. Mike Lopez  [facility]  Neurosurgery Clinic Visit        CC: Low back pain    Primary care Provider: No Ref-Primary, Physician      Reason For Visit:   I was asked by Dr. Sandip Antunez to consult on the patient for low back pain.      HPI: Seven Savage Jr. is a 81 year old male with a long-standing history of low back pain.  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  He states while in [place] he was been seen by a fellow osteopath who did regular manipulation for his low back pain, lastly in September 2016.  He states the pain is a constant \"discomfort\" that increases with walking and prolonged standing.  He finds comfort with sitting and with bending forward.  He denies radicular pain.  He has not had previous injections, but states he is leaning towards having them done as it has been suggested to him with previous evaluations.  He denies weakness to BLE or changes to bowel or bladder.  He does use a cane/wheelchair if he knows he has to go long distances.    Pain at its worst 3-4  Pain right now:  \".5\"  BP Readings from Last 5 Encounters:   03/30/17 141/62   02/23/17 112/50   02/16/17 144/56       Past Medical History:   Diagnosis Date     COPD (chronic obstructive pulmonary disease) (H)      CRF (chronic renal failure)      Heart murmur      HLD (hyperlipidemia)      HTN (hypertension)      IDDM (insulin dependent diabetes mellitus) (H)      Renal calculi        Past Medical History reviewed with patient during visit.    Past Surgical History:   Procedure Laterality Date     ADENOIDECTOMY       ENDOSCOPIC POLYPECTOMY NASAL      Through vocal cords     Renal Stent      For renal calculi     TONSILLECTOMY       Past Surgical History reviewed with patient during visit.    Current Outpatient Prescriptions   Medication     insulin glargine (LANTUS SOLOSTAR) 100 UNIT/ML injection     insulin glargine (LANTUS) 100 UNIT/ML injection     atorvastatin (LIPITOR) 40 MG tablet "     carvedilol (COREG) 12.5 MG tablet     B Complex-C-Folic Acid (DIALYVITE 800) 0.8 MG TABS     hydrochlorothiazide (MICROZIDE) 12.5 MG capsule     furosemide (LASIX) 20 MG tablet     lisinopril (PRINIVIL/ZESTRIL) 20 MG tablet     Respiratory Therapy Supplies (NEBULIZER/ADULT MASK) KIT     order for DME     albuterol (2.5 MG/3ML) 0.083% neb solution     order for DME     albuterol (PROAIR HFA/PROVENTIL HFA/VENTOLIN HFA) 108 (90 BASE) MCG/ACT Inhaler     aspirin 81 MG chewable tablet     VITAMIN D, CHOLECALCIFEROL, PO     darbepoetin libby (ARANESP) 100 MCG/0.5ML injection     diltiazem (TIAZAC) 300 MG 24 hr ER beaded capsule     DOXAZOSIN MESYLATE PO     EPLERENONE PO     Insulin Aspart (NOVOLOG SC)     fish oil-omega-3 fatty acids 1000 MG capsule     tiotropium-olodaterol 2.5-2.5 MCG/ACT AERS     COLCHICINE PO     No current facility-administered medications for this visit.        Allergies   Allergen Reactions     Pioglitazone Other (See Comments)     Edema & hay fever     Vytorin Other (See Comments)     Muscle aches       Social History     Social History     Marital status:      Spouse name: N/A     Number of children: N/A     Years of education: N/A     Social History Main Topics     Smoking status: Former Smoker     Smokeless tobacco: None     Alcohol use Yes      Comment: 1 beer per month     Drug use: No     Sexual activity: Not Currently     Other Topics Concern     None     Social History Narrative       History reviewed. No pertinent family history.      ROS: 10 point ROS neg other than the symptoms noted above in the HPI.    Vital Signs: /62 (BP Location: Right arm, Cuff Size: Adult Regular)  Pulse 69  Temp 98.1  F (36.7  C) (Oral)  Wt 188 lb (85.3 kg)  SpO2 96%  BMI 27.76 kg/m2    Examination:  Constitutional:  Alert, well nourished, NAD.  HEENT: Normocephalic, atraumatic.   Pulm:  Without shortness of breath   CV:  No pitting edema of BLE.    Neurological:  Awake  Alert  Oriented x  "3  Speech clear  Cranial nerves II - XII intact    Shoulder Abduction:  Right:  5/5   Left:  5/5  Biceps:                      Right:  5/5   Left:  5/5  Triceps:                     Right:  5/5   Left:  5/5  Wrist Extensors:       Right:  5/5   Left:  5/5  Wrist Flexors:           Right:  5/5   Left:  5/5  Intrinsics:                   Right:  5/5   Left:  5/5  Hip Flexor:                Right: 5/5  Left:  5/5  Hip Adductor:             Right:  5/5  Left:  5/5  Hip Abductor:             Right:  5/5  Left:  5/5  Gastroc Soleus:        Right:  5/5  Left:  5/5  Tib/Ant:                      Right:  5/5  Left:  5/5  EHL:                          Right:  5/5  Left:  5/5   Sensation normal to bilateral upper and lower extremities  DTR RLE 1+ LLE 0  Clonus negative    Gait: Able to stand from a seated position. Steady gait, non-myelopathic.  Cervical examination reveals good range of motion.  Mild tenderness to palpation of the cervical spine and paraspinous muscles bilaterally.    Lumbar examination reveals no tenderness of the spine but there is tenderness of paraspinous muscles bilaterally.  Able to perform lumbar flexion easily along with extension.  Hip height is symmetrical. Negative SI joint, sciatic notch or greater trochanteric tenderness to palpation bilaterally.  Straight leg raise is negative bilaterally.      Imaging: Lumbar MRI 8/16/17:  -Multilevel degenerative changes at L4-5, L5-S1 with significant central canal narrowing and mild neural foramen narrowing.    Assessment/Plan:   Lumbar DDD    Seven Savage Jr. is a 81 year old male with a long-standing history of low back pain.  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  He states while in [place] he was been seen by a fellow osteopath who did regular manipulation for his low back pain, lastly in September 2016.  He states the pain is a constant \"discomfort\" that increases with walking and prolonged standing. He denies radicular pain.  We " "reviewed MRI results today and discussed recommendations.  The patient expressed reluctance to have injections right now, \"But I know I'll probably need them.\"  We discussed LESI vs lumbar facet joint injections. He did voice that he prefer the injection be done by an anesthesiologist if he has one.  However, at this time he would like to start with a PT referral as he states he does not feel his pain is severe.  We will place the order and he will schedule.  If he finds the pain increases or continues to be bothersome he will contact us to order an injection.    Patient Instructions   Schedule physical therapy  Please contact the clinic if pain persists and would like an order for injections at [phone].      Adia Ding [facility]  [address] [address]    Tel [phone]  Pager [phone]    "

## 2017-04-01 ENCOUNTER — TRANSFERRED RECORDS (OUTPATIENT)
Dept: HEALTH INFORMATION MANAGEMENT | Facility: CLINIC | Age: 82
End: 2017-04-01

## 2017-04-06 ENCOUNTER — THERAPY VISIT (OUTPATIENT)
Dept: PHYSICAL THERAPY | Facility: CLINIC | Age: 82
End: 2017-04-06
Payer: MEDICARE

## 2017-04-06 DIAGNOSIS — M54.50 CHRONIC BILATERAL LOW BACK PAIN WITHOUT SCIATICA: Primary | ICD-10-CM

## 2017-04-06 DIAGNOSIS — G89.29 CHRONIC BILATERAL LOW BACK PAIN WITHOUT SCIATICA: Primary | ICD-10-CM

## 2017-04-06 PROCEDURE — 97161 PT EVAL LOW COMPLEX 20 MIN: CPT | Mod: GP | Performed by: PHYSICAL THERAPIST

## 2017-04-06 PROCEDURE — G8979 MOBILITY GOAL STATUS: HCPCS | Mod: GP | Performed by: PHYSICAL THERAPIST

## 2017-04-06 PROCEDURE — 97110 THERAPEUTIC EXERCISES: CPT | Mod: GP | Performed by: PHYSICAL THERAPIST

## 2017-04-06 PROCEDURE — G8978 MOBILITY CURRENT STATUS: HCPCS | Mod: GP | Performed by: PHYSICAL THERAPIST

## 2017-04-06 NOTE — PROGRESS NOTES
Subjective:                                       Pertinent medical history includes:  History of fractures, diabetes, high blood pressure, kidney disease and anemia.  Medical allergies: no.  Other surgeries include:  None reported.  Current medications:  Cardiac medication and high blood pressure medication.  Current occupation is Retired .        Barriers include:  None as reported by patient.    Red flags:  None as reported by patient.                        Objective:    System    Physical Exam    General     ROS    Assessment/Plan:

## 2017-04-06 NOTE — PROGRESS NOTES
Physical Therapy Initial Evaluation  April 6, 2017     Precautions/Restrictions/MD instructions: PT eval and treat.     Subjective:   Date of Onset: 3/30/17 (MD orders, chronic condition)  Primary Symptoms/Location of Pain: LBP with radiation into B buttocks. Quality of pain is dull and aching. Pains are described as intermittent in nature. Pain is rated 1/10.   History of symptoms: Pains began gradually as the result of insidious onset. Since onset, symptoms are same.  Worsened by: Standing, walking.    Alleviated by: Sitting, bending forward.  In the past had relief from fellow DO who performed adjustments.   General health as reported by patient: fair  Pertinent medical/surgical history: COPD, renal failure (ESRD), DM, anemia. Imaging: MRI. Current occupational status: Retired DO. Patient's goals are: decrease pain, increase standing/walking tolerance. Return to MD:  PRN.     Therapist Impression:   Seven Savage Jr. is a 81 year old male with chronic history of LBP. He presents with signs and symptoms consistent with lumbar stenosis. These impairments limit his ability to stand, walk for prolonged periods of time. Skilled PT services are necessary in order to reduce impairments and improve independent function.    Objective:  LUMBAR EXAMINATION    Posture: Loss of lumbar lordosis, increased kyphosis.  Able to ambulate short distances with SPC, longer distances uses WC.     Active ROM Limitation   Flexion     Adolfo? Nil, no pain  na   Extension Mod/major, prod pain LB    L R   Rotation Min Min   Sidebend Min/mod Min/mod     Neurological testing (myotomes, sensation, reflexes, nerve tension) not indicated at this time.    HIP RANGE OF MOTION SCREEN  (* = patient s pain)   PROM L PROM R   Hip Flx WNL WNL   Hip IR 90/90 35 35   Hip ER 90/90 50 50   Hip ABD     Hip Ext 0 0     Assessment/Plan:    The patient is a 81 year old male with chief complaint of LBP.    The patient has the following significant findings  with corresponding treatment plan.  Diagnosis 1:  Lumbar stenosis    Pain -  hot/cold therapy, US, electric stimulation, mechanical traction, manual therapy, splint/taping/bracing/orthotics, self management, education, directional preference exercise and home program  Decreased ROM/flexibility - manual therapy, therapeutic exercise and home program  Decreased joint mobility - manual therapy and therapeutic exercise  Decreased strength - therapeutic exercise, therapeutic activities and home program  Impaired gait - gait training  Impaired muscle performance - neuro re-education  Decreased function - therapeutic activities  Impaired posture - neuro re-education    Therapy Evaluation Codes:   1) History comprised of:   Personal factors that impact the plan of care:      Age and Time since onset of symptoms.    Comorbidity factors that impact the plan of care are:      None.     Medications impacting care: None.  2) Examination of Body Systems comprised of:   Body structures and functions that impact the plan of care:      Lumbar spine.   Activity limitations that impact the plan of care are:      Standing and Walking.   Clinical presentation characteristics are:    Stable/Uncomplicated.  3) Presentation comprised of:   Presentation scored as Low complexity with uncomplicated characteristics..  4) Decision-Making    Low complexity using standardized patient assessment instrument and/or measureable assessment of functional outcome.  Cumulative Therapy Evaluation is: Low complexity.    Previous and current functional limitations:  (See Goal Flow Sheet for this information)    Short term and Long term goals: (See Goal Flow Sheet for this information)     Communication ability:  Patient appears to be able to clearly communicate and understand verbal and written communication and follow directions correctly.  Treatment Explanation - The following has been discussed with the patient: RX ordered/plan of care, anticipated  outcomes, and possible risks and side effects.  This patient would benefit from PT intervention to resume normal activities.   Rehab potential is fair to good to meet PT goals per above-listed comorbidities.    Frequency:  1 X week, once daily  Duration:  for 5 weeks  Discharge Plan: Achieve all LTGs, be independent in home treatment program, and reach maximal therapeutic benefit.    Please refer to the daily flowsheet for treatment today, total treatment time and time spent performing 1:1 timed codes.

## 2017-04-06 NOTE — LETTER
DEPARTMENT OF HEALTH AND HUMAN SERVICES  CENTERS FOR MEDICARE & MEDICAID SERVICES    PLAN/UPDATED PLAN OF PROGRESS FOR OUTPATIENT REHABILITATION  PATIENTS NAME:  Seven Savage   : 1935  PROVIDER NUMBER:    [id number]  [id number]  [facility] PHYSICAL THERAPY  MEDICAL RECORD NUMBER: [id number]   START OF CARE DATE:  SOC Date: 17   TYPE:  PT  PRIMARY/TREATMENT DIAGNOSIS: (Pertinent Medical Diagnosis)  Chronic bilateral low back pain without sciatica  VISITS FROM START OF CARE:  1 Rxs Used: 1     Physical Therapy Initial Evaluation  2017   Precautions/Restrictions/MD instructions: PT eval and treat.     Subjective:   Date of Onset: 3/30/17 (MD orders, chronic condition)  Primary Symptoms/Location of Pain: LBP with radiation into B buttocks. Quality of pain is dull and aching. Pains are described as intermittent in nature. Pain is rated 1/10.   History of symptoms: Pains began gradually as the result of insidious onset. Since onset, symptoms are same.  Worsened by: Standing, walking.    Alleviated by: Sitting, bending forward.  In the past had relief from fellow DO who performed adjustments.   General health as reported by patient: fair  Pertinent medical/surgical history: COPD, renal failure (ESRD), DM, anemia. Imaging: MRI. Current occupational status: Retired DO. Patient's goals are: decrease pain, increase standing/walking tolerance. Return to MD:  PRN.   Therapist Impression:   Seven Savage Jr. is a 81 year old male with chronic history of LBP. He presents with signs and symptoms consistent with lumbar stenosis. These impairments limit his ability to stand, walk for prolonged periods of time. Skilled PT services are necessary in order to reduce impairments and improve independent function.  Pertinent medical history includes:  History of fractures, diabetes, high blood pressure, kidney disease and anemia.  Medical allergies: no.  Other  surgeries include:  None reported.  Current medications:  Cardiac medication and high blood pressure medication.  Current occupation is Retired .Barriers include:  None as reported by patient.Red flags:  None as reported by patient.  Objective:  PATIENTS NAME:  Seven Savage   : 1935    LUMBAR EXAMINATION  Posture: Loss of lumbar lordosis, increased kyphosis.  Able to ambulate short distances with SPC, longer distances uses WC.     Active ROM Limitation   Flexion     Adolfo? Nil, no pain  na   Extension Mod/major, prod pain LB    L R   Rotation Min Min   Sidebend Min/mod Min/mod   Neurological testing (myotomes, sensation, reflexes, nerve tension) not indicated at this time.  HIP RANGE OF MOTION SCREEN  (* = patient s pain)   PROM L PROM R   Hip Flx WNL WNL   Hip IR 90/90 35 35   Hip ER 90/90 50 50   Hip ABD     Hip Ext 0 0   Assessment/Plan:    The patient is a 81 year old male with chief complaint of LBP.    The patient has the following significant findings with corresponding treatment plan.  Diagnosis 1:  Lumbar stenosis    Pain -  hot/cold therapy, US, electric stimulation, mechanical traction, manual therapy, splint/taping/bracing/orthotics, self management, education, directional preference exercise and home program  Decreased ROM/flexibility - manual therapy, therapeutic exercise and home program  Decreased joint mobility - manual therapy and therapeutic exercise  Decreased strength - therapeutic exercise, therapeutic activities and home program  Impaired gait - gait training  Impaired muscle performance - neuro re-education  Decreased function - therapeutic activities  Impaired posture - neuro re-education  Therapy Evaluation Codes:   1) History comprised of:   Personal factors that impact the plan of care:      Age and Time since onset of symptoms.    Comorbidity factors that impact the plan of care are:      None.     Medications impacting care: None.  2) Examination of Body Systems comprised  "of:   Body structures and functions that impact the plan of care:      Lumbar spine.   Activity limitations that impact the plan of care are:      Standing and Walking.  PATIENTS NAME:  Seven Savage   : 1935     Clinical presentation characteristics are:    Stable/Uncomplicated.  3) Presentation comprised of:   Presentation scored as Low complexity with uncomplicated characteristics..  4) Decision-Making    Low complexity using standardized patient assessment instrument and/or measureable assessment of functional outcome.  Cumulative Therapy Evaluation is: Low complexity.  Previous and current functional limitations:  (See Goal Flow Sheet for this information)    Short term and Long term goals: (See Goal Flow Sheet for this information)   Communication ability:  Patient appears to be able to clearly communicate and understand verbal and written communication and follow directions correctly.  Treatment Explanation - The following has been discussed with the patient: RX ordered/plan of care, anticipated outcomes, and possible risks and side effects.  This patient would benefit from PT intervention to resume normal activities.   Rehab potential is fair to good to meet PT goals per above-listed comorbidities.  Frequency:  1 X week, once daily  Duration:  for 5 weeks  Discharge Plan: Achieve all LTGs, be independent in home treatment program, and reach maximal therapeutic benefit.    Caregiver Signature/Credentials _____________________________ Date ________       Treating Provider: Phil Teresa DPT, [place]   I have reviewed and certified the need for these services and plan of treatment while under my care.        PHYSICIAN'S SIGNATURE:   _____________________________________  Date___________    Adia Ding    Certification period:  Beginning of Cert date period: 17 to  End of Cert period date: 17     Functional Level Progress Report: Please see attached \"Goal Flow sheet for Functional " "level.\"    ____X____ Continue Services or       ________ DC Services                Service dates: From  SOC Date: 04/06/17 date to present                         "

## 2017-04-07 DIAGNOSIS — R26.9 IMPAIRED GAIT: ICD-10-CM

## 2017-04-07 DIAGNOSIS — M51.369 DDD (DEGENERATIVE DISC DISEASE), LUMBAR: Primary | ICD-10-CM

## 2017-04-07 NOTE — NURSING NOTE
Ok per Adia Ding CNP to order four wheeled walker for patient. DME order placed in [facility].

## 2017-04-11 ENCOUNTER — TELEPHONE (OUTPATIENT)
Dept: NEUROSURGERY | Facility: CLINIC | Age: 82
End: 2017-04-11

## 2017-04-11 ENCOUNTER — THERAPY VISIT (OUTPATIENT)
Dept: PHYSICAL THERAPY | Facility: CLINIC | Age: 82
End: 2017-04-11
Payer: MEDICARE

## 2017-04-11 DIAGNOSIS — M54.50 CHRONIC BILATERAL LOW BACK PAIN WITHOUT SCIATICA: ICD-10-CM

## 2017-04-11 DIAGNOSIS — G89.29 CHRONIC BILATERAL LOW BACK PAIN WITHOUT SCIATICA: ICD-10-CM

## 2017-04-11 PROCEDURE — 97140 MANUAL THERAPY 1/> REGIONS: CPT | Mod: GP | Performed by: PHYSICAL THERAPIST

## 2017-04-11 PROCEDURE — 97110 THERAPEUTIC EXERCISES: CPT | Mod: GP | Performed by: PHYSICAL THERAPIST

## 2017-04-11 PROCEDURE — G0283 ELEC STIM OTHER THAN WOUND: HCPCS | Mod: GP | Performed by: PHYSICAL THERAPIST

## 2017-04-11 NOTE — TELEPHONE ENCOUNTER
Pt stopped by clinic asking about a walker. States Phil, his PT mentioned he would talk to Adia Ding about this request. Ok to leave detailed message on pt's voicemail.

## 2017-04-11 NOTE — TELEPHONE ENCOUNTER
DME order placed for 4 wheeled walker. Spoke to patient. Gave him number for [facility] medical equipment in [place] [phone] to contact. Advised to call back if any further questions or concerns.

## 2017-04-18 ENCOUNTER — THERAPY VISIT (OUTPATIENT)
Dept: PHYSICAL THERAPY | Facility: CLINIC | Age: 82
End: 2017-04-18
Payer: MEDICARE

## 2017-04-18 ENCOUNTER — TELEPHONE (OUTPATIENT)
Dept: NEUROSURGERY | Facility: CLINIC | Age: 82
End: 2017-04-18

## 2017-04-18 DIAGNOSIS — G89.29 CHRONIC BILATERAL LOW BACK PAIN WITHOUT SCIATICA: ICD-10-CM

## 2017-04-18 DIAGNOSIS — M54.50 CHRONIC BILATERAL LOW BACK PAIN WITHOUT SCIATICA: ICD-10-CM

## 2017-04-18 PROCEDURE — 97140 MANUAL THERAPY 1/> REGIONS: CPT | Mod: GP | Performed by: PHYSICAL THERAPIST

## 2017-04-18 PROCEDURE — 97110 THERAPEUTIC EXERCISES: CPT | Mod: GP | Performed by: PHYSICAL THERAPIST

## 2017-04-18 PROCEDURE — G0283 ELEC STIM OTHER THAN WOUND: HCPCS | Mod: GP | Performed by: PHYSICAL THERAPIST

## 2017-04-18 NOTE — TELEPHONE ENCOUNTER
Pt states he attempted to get walker from  [employer], however they do not have new government regulations to cover it as his Medicare is primary. Requesting Rx be sent to [facility] in [place]. Fax: [phone]. Pt was given address to p/u once Rx is filled. [address] [place] [address] and phone number: [phone].

## 2017-04-25 ENCOUNTER — THERAPY VISIT (OUTPATIENT)
Dept: PHYSICAL THERAPY | Facility: CLINIC | Age: 82
End: 2017-04-25
Payer: MEDICARE

## 2017-04-25 DIAGNOSIS — M54.50 CHRONIC BILATERAL LOW BACK PAIN WITHOUT SCIATICA: ICD-10-CM

## 2017-04-25 DIAGNOSIS — G89.29 CHRONIC BILATERAL LOW BACK PAIN WITHOUT SCIATICA: ICD-10-CM

## 2017-04-25 PROCEDURE — 97110 THERAPEUTIC EXERCISES: CPT | Mod: GP | Performed by: PHYSICAL THERAPIST

## 2017-04-25 PROCEDURE — G0283 ELEC STIM OTHER THAN WOUND: HCPCS | Mod: GP | Performed by: PHYSICAL THERAPIST

## 2017-04-25 PROCEDURE — 97140 MANUAL THERAPY 1/> REGIONS: CPT | Mod: GP | Performed by: PHYSICAL THERAPIST

## 2017-04-26 ENCOUNTER — TELEPHONE (OUTPATIENT)
Dept: NEUROSURGERY | Facility: CLINIC | Age: 82
End: 2017-04-26

## 2017-04-26 NOTE — TELEPHONE ENCOUNTER
Pt states he attempted to call Thu in [place] after his order for a walker was faxed to them. They told pt that they will need the last office note and face sheet faxed to them (done today) and a call back from Adia Ding for possible clarification.

## 2017-04-27 NOTE — TELEPHONE ENCOUNTER
Called Thu ([phone]) and was redirected to contact their [facility] location that is currently working on this ([phone]). Spoke to [facility] staff member and they need office visit notes from clinic and PT. They will resubmit to medicare. [phone] 4/27/17.

## 2017-05-02 ENCOUNTER — THERAPY VISIT (OUTPATIENT)
Dept: PHYSICAL THERAPY | Facility: CLINIC | Age: 82
End: 2017-05-02
Payer: MEDICARE

## 2017-05-02 DIAGNOSIS — M54.50 CHRONIC BILATERAL LOW BACK PAIN WITHOUT SCIATICA: ICD-10-CM

## 2017-05-02 DIAGNOSIS — G89.29 CHRONIC BILATERAL LOW BACK PAIN WITHOUT SCIATICA: ICD-10-CM

## 2017-05-02 PROCEDURE — G0283 ELEC STIM OTHER THAN WOUND: HCPCS | Mod: GP | Performed by: PHYSICAL THERAPIST

## 2017-05-02 PROCEDURE — 97140 MANUAL THERAPY 1/> REGIONS: CPT | Mod: GP | Performed by: PHYSICAL THERAPIST

## 2017-05-02 PROCEDURE — 97110 THERAPEUTIC EXERCISES: CPT | Mod: GP | Performed by: PHYSICAL THERAPIST

## 2017-05-09 ENCOUNTER — TELEPHONE (OUTPATIENT)
Dept: NEUROSURGERY | Facility: CLINIC | Age: 82
End: 2017-05-09

## 2017-05-09 ENCOUNTER — THERAPY VISIT (OUTPATIENT)
Dept: PHYSICAL THERAPY | Facility: CLINIC | Age: 82
End: 2017-05-09
Payer: MEDICARE

## 2017-05-09 ENCOUNTER — TELEPHONE (OUTPATIENT)
Dept: PHYSICAL THERAPY | Facility: CLINIC | Age: 82
End: 2017-05-09

## 2017-05-09 DIAGNOSIS — G89.29 CHRONIC BILATERAL LOW BACK PAIN WITHOUT SCIATICA: ICD-10-CM

## 2017-05-09 DIAGNOSIS — M54.50 CHRONIC BILATERAL LOW BACK PAIN WITHOUT SCIATICA: ICD-10-CM

## 2017-05-09 PROCEDURE — 97110 THERAPEUTIC EXERCISES: CPT | Mod: GP | Performed by: PHYSICAL THERAPIST

## 2017-05-09 PROCEDURE — G0283 ELEC STIM OTHER THAN WOUND: HCPCS | Mod: GP | Performed by: PHYSICAL THERAPIST

## 2017-05-09 PROCEDURE — 97140 MANUAL THERAPY 1/> REGIONS: CPT | Mod: GP | Performed by: PHYSICAL THERAPIST

## 2017-05-09 NOTE — TELEPHONE ENCOUNTER
Lamar Cheek,    This is Phil Teresa, physical therapist at the [facility] in [place]. I am writing to you, based on my work with Mr. Savage in physical therapy, to confirm the necessity for him to be issued a four-wheeled walker in order to maximize his potential for therapeutic activities. I am basing this recommendation on his clinical presentation, primarily attributed to his symptomatic lumbar spinal stenosis and chronic COPD that limit him from walking any prolonged distance without needing to rest. He himself reports that a distance of walking from the car/parking lot into the foyer of a building can give him pain radiating into his hips and legs as well as make him short of breath. Additionally, by the end of the day these same problems limit his ability to perform independent mobility and activities of daily living both in his home and in the community.     If there is more information requested for review about his clinical status, please refer to my evaluation and flowsheets. Thanks very much, and please let me know if there is anything else you need from me.     Phil Teresa, DPT, OCS  [facility]

## 2017-05-09 NOTE — TELEPHONE ENCOUNTER
Thought was getting order for walker.   States order was incorrect.  Please call for clarification

## 2017-05-12 ENCOUNTER — DOCUMENTATION ONLY (OUTPATIENT)
Dept: NEUROSURGERY | Facility: CLINIC | Age: 82
End: 2017-05-12

## 2017-05-16 ENCOUNTER — THERAPY VISIT (OUTPATIENT)
Dept: PHYSICAL THERAPY | Facility: CLINIC | Age: 82
End: 2017-05-16
Payer: MEDICARE

## 2017-05-16 DIAGNOSIS — M54.50 CHRONIC BILATERAL LOW BACK PAIN WITHOUT SCIATICA: ICD-10-CM

## 2017-05-16 DIAGNOSIS — G89.29 CHRONIC BILATERAL LOW BACK PAIN WITHOUT SCIATICA: ICD-10-CM

## 2017-05-16 PROCEDURE — 97140 MANUAL THERAPY 1/> REGIONS: CPT | Mod: GP | Performed by: PHYSICAL THERAPIST

## 2017-05-16 PROCEDURE — 97110 THERAPEUTIC EXERCISES: CPT | Mod: GP | Performed by: PHYSICAL THERAPIST

## 2017-05-16 PROCEDURE — G0283 ELEC STIM OTHER THAN WOUND: HCPCS | Mod: GP | Performed by: PHYSICAL THERAPIST

## 2017-05-23 ENCOUNTER — THERAPY VISIT (OUTPATIENT)
Dept: PHYSICAL THERAPY | Facility: CLINIC | Age: 82
End: 2017-05-23
Payer: MEDICARE

## 2017-05-23 DIAGNOSIS — G89.29 CHRONIC BILATERAL LOW BACK PAIN WITHOUT SCIATICA: ICD-10-CM

## 2017-05-23 DIAGNOSIS — M54.50 CHRONIC BILATERAL LOW BACK PAIN WITHOUT SCIATICA: ICD-10-CM

## 2017-05-23 PROCEDURE — 97110 THERAPEUTIC EXERCISES: CPT | Mod: GP | Performed by: PHYSICAL THERAPIST

## 2017-05-23 PROCEDURE — G0283 ELEC STIM OTHER THAN WOUND: HCPCS | Mod: GP | Performed by: PHYSICAL THERAPIST

## 2017-05-23 PROCEDURE — 97140 MANUAL THERAPY 1/> REGIONS: CPT | Mod: GP | Performed by: PHYSICAL THERAPIST

## 2017-06-06 ENCOUNTER — THERAPY VISIT (OUTPATIENT)
Dept: PHYSICAL THERAPY | Facility: CLINIC | Age: 82
End: 2017-06-06
Payer: MEDICARE

## 2017-06-06 DIAGNOSIS — M54.50 CHRONIC BILATERAL LOW BACK PAIN WITHOUT SCIATICA: ICD-10-CM

## 2017-06-06 DIAGNOSIS — G89.29 CHRONIC BILATERAL LOW BACK PAIN WITHOUT SCIATICA: ICD-10-CM

## 2017-06-06 PROCEDURE — 97140 MANUAL THERAPY 1/> REGIONS: CPT | Mod: GP | Performed by: PHYSICAL THERAPIST

## 2017-06-06 PROCEDURE — G0283 ELEC STIM OTHER THAN WOUND: HCPCS | Mod: GP | Performed by: PHYSICAL THERAPIST

## 2017-06-06 PROCEDURE — 97110 THERAPEUTIC EXERCISES: CPT | Mod: GP | Performed by: PHYSICAL THERAPIST

## 2017-06-26 ENCOUNTER — THERAPY VISIT (OUTPATIENT)
Dept: CHIROPRACTIC MEDICINE | Facility: CLINIC | Age: 82
End: 2017-06-26
Payer: MEDICARE

## 2017-06-26 DIAGNOSIS — M99.02 THORACIC SEGMENT DYSFUNCTION: ICD-10-CM

## 2017-06-26 DIAGNOSIS — R29.3 POOR POSTURE: ICD-10-CM

## 2017-06-26 DIAGNOSIS — M54.50 CHRONIC BILATERAL LOW BACK PAIN WITHOUT SCIATICA: ICD-10-CM

## 2017-06-26 DIAGNOSIS — M99.03 SOMATIC DYSFUNCTION OF LUMBAR REGION: Primary | ICD-10-CM

## 2017-06-26 DIAGNOSIS — M53.3 SACRAL PAIN: ICD-10-CM

## 2017-06-26 DIAGNOSIS — G89.29 CHRONIC BILATERAL LOW BACK PAIN WITHOUT SCIATICA: ICD-10-CM

## 2017-06-26 DIAGNOSIS — M99.04 SOMATIC DYSFUNCTION OF SACRAL REGION: ICD-10-CM

## 2017-06-26 PROCEDURE — 99203 OFFICE O/P NEW LOW 30 MIN: CPT | Mod: GA | Performed by: CHIROPRACTOR

## 2017-06-26 PROCEDURE — 98941 CHIROPRACT MANJ 3-4 REGIONS: CPT | Mod: AT | Performed by: CHIROPRACTOR

## 2017-06-26 NOTE — PROGRESS NOTES
Initial Chiropractic Clinic Visit  1/8 treatments    PCP: No Ref-Primary, Physician    Seven Savage Jr. is a 82 year old male who is seen  as a self referral presenting with chronic low back pain . Patient reports that the onset was over 25 years ago however more recently in the past 2 years.  When asked, patient denies:, falling, slipping, bending and reaching or sleeping awkwardly. The pt is currently in PT and is 40% improved. The pt reports intermittent low back pain throughout the years. He has had several accidents in the past which have all contributed to spinal pain. The px is across the low back area in the sacral area. It does not radiate in the extremities. He denies weakness in the extremities, groin pain, bowel or bladder issues or other unusual sx. .  Prior to onset, the patient was able to stand erect and walk. Patient notes that due to symptoms, they can only stand or walk for short periods. Seven Savage Jr. notes   standing rated at a 3/10 painful and prior to this incident it was 1/10.        Injury: There was no acute injury associated with this episode    Location of Pain: bilateral low back and sacrum at the following level(s) T6 , T7 , L3 , L5 , Sacrum  and PSIS Right   Duration of Pain: in the past 2 years   Rating of Pain at worst: 3/10  Rating of Pain Currently: 3/10  Symptoms are better with: PT  Symptoms are worse with: standing and walking  Additional Features: none      Health History  as reported by the patient:    How does the patient rate their own health:   Fair    Current or past medical history:   Anemia, Diabetes, Emphysema, High blood pressure, History of fractures, Kidney disease and Weakness    Medical allergies  None    Past Traumas/Surgeries  Orthopedic: fx of L tibia    Family History  Family History   Problem Relation Age of Onset     Family history unknown: Yes       Medications:  Anti-depressants, Anti-inflammatory and High blood pressure    Occupation:  Retired  "    Primary job tasks:   Driving and Repetitive tasks    Barriers as home/work:   none    Additional health Issues:     None             Seven was asked to complete the Oswestry Low Back Disability Index and Lilian Start Back screening tool.  today in the office.  Disability score: 54%. Keel Start Total Score:5 Sub Score: 2    Review of Systems  Musculoskeletal: as above  Remainder of review of systems is negative including constitutional, CV, pulmonary, GI, Skin and Neurologic except as noted in HPI or medical history.    Past Medical History:   Diagnosis Date     COPD (chronic obstructive pulmonary disease) (H)      CRF (chronic renal failure)      Heart murmur      HLD (hyperlipidemia)      HTN (hypertension)      IDDM (insulin dependent diabetes mellitus) (H)      Renal calculi      Past Surgical History:   Procedure Laterality Date     ADENOIDECTOMY       ENDOSCOPIC POLYPECTOMY NASAL      Through vocal cords     Renal Stent      For renal calculi     TONSILLECTOMY       Objective  There were no vitals taken for this visit.      GENERAL APPEARANCE: healthy, alert and no distress   GAIT: NORMAL  SKIN: no suspicious lesions or rashes  NEURO: Normal strength and tone, mentation intact and speech normal  PSYCH:  mentation appears normal and affect normal/bright    Low back exam:    Inspection:  \"     no visible deformity in the low back       normal skin\",    ROM:       full flexion       limited extension due to pain    Tender:       paraspinal muscles       B QL    Non Tender:       remainder of lumbar spine    Strength:       hip flexion 5/5       knee extension 5/5       ankle dorsiflexion 5/5       ankle plantarflexion 5/5       dorsiflexion of the great toe 5/5    Reflexes:       patellar (L3, L4) symmetric normal       achilles tendons (S1) symmetric normal    Sensation:      grossly intact throughout lower extremities    Special tests:  Kemps - Right positive, low back pain and Left positive, low back pain, " SLR - Right positive low back pain and Right negative, Gaenslen's - Right negative and Left negative and Fabere - Right positive hip and low back pain and Left negative    Segmental spinal dysfunction/restrictions found at:T6 , T7 , L3 , L5 , Sacrum  and PSIS Right     The following soft tissue hypotonicities were observed:Quad lumb: bilateral, referred pain: no  T paraspinals: ache and dull pain, no    Trigger points were found in:none     Muscle spasm found in:Lumbar erector spine, Quad lumb and T-spine paraspinal      Radiology:  None     Assessment:    1. Somatic dysfunction of lumbar region    2. Chronic bilateral low back pain without sciatica    3. Somatic dysfunction of sacral region    4. Sacral pain    5. Thoracic segment dysfunction    6. Poor posture        RX ordered/plan of care  Anticipated outcomes  Possible risks and side effects    After discussing the risk and benefits of care, patient consented to treatment    Prognosis: Guarded      Patient's condition:  Patient had restrictions pre-manipulation    Treatment effectiveness:  Post manipulation there is better intersegmental movement and Patient claims to feel looser post manipulation      Plan:    Procedures:  Evaluation and Management:  57069 Moderate level exam 30 min    CMT:  20666 Chiropractic manipulative treatment 3-4 regions performed   Thoracic: Diversified, T6, T9, Prone  Lumbar: Diversified and Activator, L4, L5, Side posture  Pelvis: Drop Table, Sacrum , PSIS Right , Prone    Modalities:  56625: US:  1.4 Collado/cm squared for 4  minutes at 1 mhz   Location: B QL    Therapeutic procedures:  None    Response to Treatment  Reduction in symptoms as reported by patient      Treatment plan and goals:  Goals:  STANDING: the patient specific goal is to attain the pre-injury status of 1-2 hours comfortably     Frequency of care  Duration of care is estimated to be 4-8 weeks, from the initial treatment.  It is estimated that the patient will need  a total of 4-8 visits to resolve this episode.  For the initial therapeutic trial of care, the frequency is recommended at 1 X week, once daily.  A reevaluation would be clinically appropriate in 4-8 visits, to determine progress and further course of care.    In-Office Treatment  Evaluation  Spinal Chiropractic Manipulative Therapy  ACP discussion         Recommendations:    Instructions:expect soreness    Follow-up:  Return to care in  1 week with check-up.       Discussed the assessment with the patient.      Disclaimer: This note consists of symbols derived from keyboarding, dictation and/or voice recognition software. As a result, there may be errors in the script that have gone undetected. Please consider this when interpreting information found in this chart.

## 2017-06-26 NOTE — MR AVS SNAPSHOT
"              After Visit Summary   6/26/2017    Seven Savage Jr.    MRN: [id number]           Patient Information     Date Of Birth          1935        Visit Information        Provider Department      6/26/2017 3:00 PM Juan Ramon Villarreal [facility]        Today's Diagnoses     Somatic dysfunction of lumbar region    -  1    Chronic bilateral low back pain without sciatica        Somatic dysfunction of sacral region        Sacral pain        Thoracic segment dysfunction        Poor posture           Follow-ups after your visit        Your next 10 appointments already scheduled     Jul 03, 2017  3:00 PM CDT   [facility] Chiropractor with RAFFI Weston ([facility] Nicole  )    [id number]  [facility] [address] [phone]              Who to contact     If you have questions or need follow up information about today's clinic visit or your schedule please contact [facility] directly at [phone].  Normal or non-critical lab and imaging results will be communicated to you by enercasthart, letter or phone within 4 business days after the clinic has received the results. If you do not hear from us within 7 days, please contact the clinic through [employer] or phone. If you have a critical or abnormal lab result, we will notify you by phone as soon as possible.  Submit refill requests through "[employer] or call your pharmacy and they will forward the refill request to us. Please allow 3 business days for your refill to be completed.          Additional Information About Your Visit        enercasthart Information     "[employer] lets you send messages to your doctor, view your test results, renew your prescriptions, schedule appointments and more. To sign up, go to [email or web address]/"[employer] . Click on \"Log in\" on the left side of the screen, which will take you to the Welcome page. Then click on \"Sign up Now\" on the right side of the page.     You will be asked to enter the access code " listed below, as well as some personal information. Please follow the directions to create your username and password.     Your access code is: [email or web address]  Expires: 2017  5:34 PM     Your access code will  in 90 days. If you need help or a new code, please call your [facility] clinic or [phone].        Care EveryWhere ID     This is your Care EveryWhere ID. This could be used by other organizations to access your [facility] medical records  [id number]         Blood Pressure from Last 3 Encounters:   17 141/62   17 112/50   17 144/56    Weight from Last 3 Encounters:   17 85.3 kg (188 lb)   17 86.9 kg (191 lb 9.6 oz)   17 85.8 kg (189 lb 2.5 oz)              We Performed the Following     CHIROPRAC MANIP,SPINAL,3-4 REGIONS     [facility]        Primary Care Provider    Physician No Ref-Primary       No address on file        Equal Access to Services     KATIE [facility]: Hadii aad ku hadasho Soomaali, waaxda luqadaha, qaybta kaalmada adeegyakarin, catrachita coelho . So [facility] [phone].    ATENCIÓN: Si habla español, tiene a turpin disposición servicios gratuitos de asistencia lingüística. Llame al [phone].    We comply with applicable federal civil rights laws and [place] laws. We do not discriminate on the basis of race, color, national origin, age, disability sex, sexual orientation or gender identity.            Thank you!     Thank you for choosing [facility]  for your care. Our goal is always to provide you with excellent care. Hearing back from our patients is one way we can continue to improve our services. Please take a few minutes to complete the written survey that you may receive in the mail after your visit with us. Thank you!             Your Updated Medication List - Protect others around you: Learn how to safely use, store and throw away your medicines at www.disposemymeds.org.          This list is  accurate as of: 6/26/17  5:34 PM.  Always use your most recent med list.                   Brand Name Dispense Instructions for use Diagnosis    * albuterol 108 (90 BASE) MCG/ACT Inhaler    PROAIR HFA/PROVENTIL HFA/VENTOLIN HFA     Inhale 1-2 puffs into the lungs every 4 hours as needed for shortness of breath / dyspnea or wheezing        * albuterol (2.5 MG/3ML) 0.083% neb solution     25 vial    Take 1 vial (2.5 mg) by nebulization every 6 hours as needed for shortness of breath / dyspnea or wheezing    COPD exacerbation (H)       aspirin 81 MG chewable tablet      Take 81 mg by mouth daily        atorvastatin 40 MG tablet    LIPITOR    90 tablet    Take 1 tablet (40 mg) by mouth At Bedtime    Type 2 diabetes mellitus with chronic kidney disease on chronic dialysis, with long-term current use of insulin (H), Hyperlipidemia LDL goal <100       carvedilol 12.5 MG tablet    COREG    180 tablet    Take 1 tablet (12.5 mg) by mouth 2 times daily    Aortic valve stenosis, unspecified etiology, Essential hypertension, benign       COLCHICINE PO      Take 0.6 mg by mouth every 48 hours as needed Reported on 3/30/2017        darbepoetin libby 100 MCG/0.5ML injection    ARANESP     Inject 40 mcg Subcutaneous every 30 days        DIALYVITE 800 0.8 MG Tabs     90 tablet    Take 1 tablet by mouth daily    ESRD (end stage renal disease) on dialysis (H)       diltiazem 300 MG 24 hr ER beaded capsule    TIAZAC     Take 300 mg by mouth daily        doxazosin 8 MG tablet    CARDURA    90 tablet    TAKE 1 TABLET BY MOUTH EVERY NIGHT AT BEDTIME.    Urinary retention       EPLERENONE PO      Take 50 mg by mouth daily        fish oil-omega-3 fatty acids 1000 MG capsule      Take 1 g by mouth daily        furosemide 20 MG tablet    LASIX    90 tablet    Take 1 tablet (20 mg) by mouth daily    ESRD (end stage renal disease) on dialysis (H), Essential hypertension, benign       hydrochlorothiazide 12.5 MG capsule    MICROZIDE    60 capsule     Take 2 capsules (25 mg) by mouth every morning    ESRD (end stage renal disease) on dialysis (H)       * insulin glargine 100 UNIT/ML injection    LANTUS    24 mL    Inject 26 Units Subcutaneous At Bedtime    Type 2 diabetes mellitus with chronic kidney disease on chronic dialysis, with long-term current use of insulin (H)       * insulin glargine 100 UNIT/ML injection    LANTUS SOLOSTAR    24 mL    Inject 26 Units Subcutaneous At Bedtime    Type 2 diabetes mellitus with chronic kidney disease on chronic dialysis, with long-term current use of insulin (H)       lisinopril 20 MG tablet    PRINIVIL/ZESTRIL    90 tablet    Take 1 tablet (20 mg) by mouth daily    Type 2 diabetes mellitus with chronic kidney disease on chronic dialysis, with long-term current use of insulin (H), Essential hypertension, benign       nebulizer/adult mask Kit     1 kit    1 Device as needed    Chronic obstructive pulmonary disease with acute exacerbation (H)       NOVOLOG SC      Sliding scale        * order for DME     1 Device    Equipment being ordered: Other: Nebulizer machine Treatment Diagnosis: COPD    COPD exacerbation (H)       * order for DME     1 Device    Equipment being ordered: mask to use with Nebulizer    Chronic obstructive pulmonary disease with acute exacerbation (H)       * order for DME     1 Device    Equipment being ordered: Four wheeled walker    DDD (degenerative disc disease), lumbar, Impaired gait       tiotropium-olodaterol 2.5-2.5 MCG/ACT Aers      Inhale 2 puffs into the lungs daily        VITAMIN D (CHOLECALCIFEROL) PO      Take 2,000 Units by mouth every other day        * Notice:  This list has 7 medication(s) that are the same as other medications prescribed for you. Read the directions carefully, and ask your doctor or other care provider to review them with you.

## 2017-07-01 DIAGNOSIS — N18.6 ESRD (END STAGE RENAL DISEASE) ON DIALYSIS (H): ICD-10-CM

## 2017-07-01 DIAGNOSIS — Z99.2 ESRD (END STAGE RENAL DISEASE) ON DIALYSIS (H): ICD-10-CM

## 2017-07-01 NOTE — TELEPHONE ENCOUNTER
hydrochlorothiazide (MICROZIDE) 12.5 MG capsule      Last Written Prescription Date: 02/23/17  Last Fill Quantity: 60cap, # refills: 5  Last Office Visit with G, P or [facility] prescribing provider: 02/23/17       Potassium   Date Value Ref Range Status   02/15/2017 4.6 3.4 - 5.3 mmol/L Final     Creatinine   Date Value Ref Range Status   02/15/2017 4.01 (H) 0.66 - 1.25 mg/dL Final     BP Readings from Last 3 Encounters:   03/30/17 141/62   02/23/17 112/50   02/16/17 144/56

## 2017-07-03 ENCOUNTER — THERAPY VISIT (OUTPATIENT)
Dept: CHIROPRACTIC MEDICINE | Facility: CLINIC | Age: 82
End: 2017-07-03
Payer: MEDICARE

## 2017-07-03 DIAGNOSIS — M99.04 SOMATIC DYSFUNCTION OF SACRAL REGION: ICD-10-CM

## 2017-07-03 DIAGNOSIS — R29.3 POOR POSTURE: ICD-10-CM

## 2017-07-03 DIAGNOSIS — M53.3 SACRAL PAIN: ICD-10-CM

## 2017-07-03 DIAGNOSIS — G89.29 CHRONIC BILATERAL LOW BACK PAIN WITHOUT SCIATICA: ICD-10-CM

## 2017-07-03 DIAGNOSIS — M54.50 CHRONIC BILATERAL LOW BACK PAIN WITHOUT SCIATICA: ICD-10-CM

## 2017-07-03 DIAGNOSIS — M99.03 SOMATIC DYSFUNCTION OF LUMBAR REGION: ICD-10-CM

## 2017-07-03 PROCEDURE — 98941 CHIROPRACT MANJ 3-4 REGIONS: CPT | Mod: AT | Performed by: CHIROPRACTOR

## 2017-07-05 DIAGNOSIS — Z99.2 ESRD (END STAGE RENAL DISEASE) ON DIALYSIS (H): ICD-10-CM

## 2017-07-05 DIAGNOSIS — N18.6 ESRD (END STAGE RENAL DISEASE) ON DIALYSIS (H): ICD-10-CM

## 2017-07-05 RX ORDER — HYDROCHLOROTHIAZIDE 12.5 MG/1
25 CAPSULE ORAL EVERY MORNING
Qty: 60 CAPSULE | Refills: 5 | Status: SHIPPED | OUTPATIENT
Start: 2017-07-05 | End: 2017-07-05

## 2017-07-06 RX ORDER — HYDROCHLOROTHIAZIDE 12.5 MG/1
CAPSULE ORAL
Qty: 180 CAPSULE | Refills: 1 | Status: SHIPPED | OUTPATIENT
Start: 2017-07-06 | End: 2018-03-20

## 2017-07-09 ENCOUNTER — CONSENT FORM (OUTPATIENT)
Dept: INTERNAL MEDICINE | Facility: CLINIC | Age: 82
End: 2017-07-09

## 2017-07-09 DIAGNOSIS — I10 ESSENTIAL HYPERTENSION, BENIGN: Primary | ICD-10-CM

## 2017-07-09 NOTE — TELEPHONE ENCOUNTER
diltiazem (TIAZAC) 300 MG 24 hr ER beaded capsule         Last Written Prescription Date: historical  Last Fill Quantity: 90, # refills: historical    Last Office Visit with [facility], P or [facility] prescribing provider:  02/23/17   Future Office Visit:      BP Readings from Last 3 Encounters:   03/30/17 141/62   02/23/17 112/50   02/16/17 144/56     No results found for: ALT  No results found for: CHOL  No results found for: HDL  No results found for: LDL  No results found for: TRIG  No results found for: CHOLHDLRATIO

## 2017-07-10 DIAGNOSIS — I10 ESSENTIAL HYPERTENSION, BENIGN: ICD-10-CM

## 2017-07-10 RX ORDER — DILTIAZEM HYDROCHLORIDE 300 MG/1
300 CAPSULE, EXTENDED RELEASE ORAL DAILY
Qty: 30 CAPSULE | Refills: 0 | Status: SHIPPED | OUTPATIENT
Start: 2017-07-10 | End: 2018-05-22

## 2017-07-10 NOTE — TELEPHONE ENCOUNTER
mariana      Last Written Prescription Date:  na  Last Fill Quantity: na,   # refills: na  Last Office Visit with [facility], P or [facility] prescribing provider: 02/23/17  Future Office visit: 07/13/17   Next 5 appointments (look out 90 days)     Jul 13, 2017  2:20 PM CDT   [facility]   [facility]    [address] [id number] [phone]                   Routing refill request to provider for review/approval because:  Drug not active on patient's medication list

## 2017-07-11 RX ORDER — DILTIAZEM HYDROCHLORIDE 300 MG/1
CAPSULE, EXTENDED RELEASE ORAL
Qty: 90 CAPSULE | Refills: 0 | OUTPATIENT
Start: 2017-07-11

## 2017-07-13 ENCOUNTER — OFFICE VISIT (OUTPATIENT)
Dept: INTERNAL MEDICINE | Facility: CLINIC | Age: 82
End: 2017-07-13
Payer: MEDICARE

## 2017-07-13 VITALS
WEIGHT: 186 LBS | HEART RATE: 57 BPM | TEMPERATURE: 98.1 F | SYSTOLIC BLOOD PRESSURE: 140 MMHG | OXYGEN SATURATION: 97 % | BODY MASS INDEX: 27.47 KG/M2 | DIASTOLIC BLOOD PRESSURE: 60 MMHG

## 2017-07-13 DIAGNOSIS — N18.6 ESRD (END STAGE RENAL DISEASE) ON DIALYSIS (H): ICD-10-CM

## 2017-07-13 DIAGNOSIS — E11.22 TYPE 2 DIABETES MELLITUS WITH CHRONIC KIDNEY DISEASE ON CHRONIC DIALYSIS, WITH LONG-TERM CURRENT USE OF INSULIN (H): Primary | ICD-10-CM

## 2017-07-13 DIAGNOSIS — Z79.4 TYPE 2 DIABETES MELLITUS WITH CHRONIC KIDNEY DISEASE ON CHRONIC DIALYSIS, WITH LONG-TERM CURRENT USE OF INSULIN (H): Primary | ICD-10-CM

## 2017-07-13 DIAGNOSIS — R33.9 URINARY RETENTION: ICD-10-CM

## 2017-07-13 DIAGNOSIS — J44.1 COPD EXACERBATION (H): ICD-10-CM

## 2017-07-13 DIAGNOSIS — N18.6 TYPE 2 DIABETES MELLITUS WITH CHRONIC KIDNEY DISEASE ON CHRONIC DIALYSIS, WITH LONG-TERM CURRENT USE OF INSULIN (H): Primary | ICD-10-CM

## 2017-07-13 DIAGNOSIS — Z99.2 ESRD (END STAGE RENAL DISEASE) ON DIALYSIS (H): ICD-10-CM

## 2017-07-13 DIAGNOSIS — I10 ESSENTIAL HYPERTENSION, BENIGN: ICD-10-CM

## 2017-07-13 DIAGNOSIS — Z23 NEED FOR PNEUMOCOCCAL VACCINATION: ICD-10-CM

## 2017-07-13 DIAGNOSIS — Z99.2 TYPE 2 DIABETES MELLITUS WITH CHRONIC KIDNEY DISEASE ON CHRONIC DIALYSIS, WITH LONG-TERM CURRENT USE OF INSULIN (H): Primary | ICD-10-CM

## 2017-07-13 PROCEDURE — 99214 OFFICE O/P EST MOD 30 MIN: CPT | Performed by: INTERNAL MEDICINE

## 2017-07-13 RX ORDER — DOXAZOSIN 8 MG/1
8 TABLET ORAL AT BEDTIME
Qty: 90 TABLET | Refills: 3 | Status: CANCELLED | OUTPATIENT
Start: 2017-07-13

## 2017-07-13 RX ORDER — ALBUTEROL SULFATE 90 UG/1
1-2 AEROSOL, METERED RESPIRATORY (INHALATION) EVERY 4 HOURS PRN
Qty: 1 INHALER | Refills: 5 | Status: SHIPPED | OUTPATIENT
Start: 2017-07-13

## 2017-07-13 RX ORDER — EPLERENONE 50 MG/1
50 TABLET, FILM COATED ORAL DAILY
Qty: 90 TABLET | Refills: 3 | Status: CANCELLED | OUTPATIENT
Start: 2017-07-13

## 2017-07-13 RX ORDER — DILTIAZEM HYDROCHLORIDE 300 MG/1
300 CAPSULE, EXTENDED RELEASE ORAL DAILY
Qty: 90 CAPSULE | Refills: 3 | Status: CANCELLED | OUTPATIENT
Start: 2017-07-13

## 2017-07-13 RX ORDER — ALBUTEROL SULFATE 0.83 MG/ML
1 SOLUTION RESPIRATORY (INHALATION) EVERY 6 HOURS PRN
Qty: 25 VIAL | Refills: 11 | Status: SHIPPED | OUTPATIENT
Start: 2017-07-13 | End: 2018-03-19

## 2017-07-13 RX ORDER — ALBUTEROL SULFATE 0.83 MG/ML
SOLUTION RESPIRATORY (INHALATION)
Qty: 900 ML | Refills: 11 | OUTPATIENT
Start: 2017-07-13

## 2017-07-13 NOTE — PROGRESS NOTES
SUBJECTIVE:                                                    Seven Savage Jr. is a 82 year old male who presents to clinic today for the following health issues:    PPSV23- DUE, states he received in [place]     Patient has not follow up with cardiology     Diabetes Follow-up    Patient is checking blood sugars: four times daily.    Blood sugar testing frequency justification: Uncontrolled diabetes  Results are as follows:         am - 118-120         lunchtime - 114         suppertime - 115         bedtime - 110    Diabetic concerns: None     Symptoms of hypoglycemia (low blood sugar): none     Paresthesias (numbness or burning in feet) or sores: No     Date of last diabetic eye exam: 2017    Hypertension Follow-up      Outpatient blood pressures are being checked at dialysis.  Results are unknown.    Low Salt Diet: low salt      COPD Follow-Up    Symptoms are currently: slightly worsened- has been out of inhalers     Current fatigue or dyspnea with ambulation: worsened from baseline    Shortness of breath: slightly worsened    Increased or change in Cough/Sputum: yes     Fever(s): No    Baseline ambulation without stopping to rest feet. Able to walk up 1 flights of stairs without stopping to rest.    Any ER/UC or hospital admissions since your last visit? No     History   Smoking Status     Former Smoker   Smokeless Tobacco     Not on file     No results found for: FEV1, [id number]    Amount of exercise or physical activity: None    Problems taking medications regularly: Yes,  Ran out of medications     Medication side effects: none    Diet: regular (no restrictions)     Problem list and histories reviewed & adjusted, as indicated.  Additional history: as documented    Patient Active Problem List   Diagnosis     Type 2 diabetes mellitus with chronic kidney disease on chronic dialysis, with long-term current use of insulin (H)     Hyperlipidemia LDL goal <100     Aortic valve stenosis, unspecified etiology      ESRD (end stage renal disease) on dialysis (H)     Chronic obstructive pulmonary disease with acute exacerbation (H)     Essential hypertension, benign     Counseling regarding advanced directives     Chronic bilateral low back pain without sciatica     Somatic dysfunction of lumbar region     Somatic dysfunction of sacral region     Sacral pain     Poor posture     Past Surgical History:   Procedure Laterality Date     ADENOIDECTOMY       ENDOSCOPIC POLYPECTOMY NASAL      Through vocal cords     Renal Stent      For renal calculi     TONSILLECTOMY         Social History   Substance Use Topics     Smoking status: Former Smoker     Smokeless tobacco: Not on file     Alcohol use Yes      Comment: 1 beer per month     Family History   Problem Relation Age of Onset     Family history unknown: Yes         Current Outpatient Prescriptions   Medication Sig Dispense Refill     albuterol (2.5 MG/3ML) 0.083% neb solution Take 1 vial (2.5 mg) by nebulization every 6 hours as needed for shortness of breath / dyspnea or wheezing 25 vial 11     albuterol (PROAIR HFA/PROVENTIL HFA/VENTOLIN HFA) 108 (90 BASE) MCG/ACT Inhaler Inhale 1-2 puffs into the lungs every 4 hours as needed for shortness of breath / dyspnea or wheezing 1 Inhaler 5     tiotropium-olodaterol 2.5-2.5 MCG/ACT AERS Inhale 2 puffs into the lungs daily 1 Inhaler 5     diltiazem (TIAZAC) 300 MG 24 hr ER beaded capsule Take 1 capsule (300 mg) by mouth daily 30 capsule 0     doxazosin (CARDURA) 8 MG tablet TAKE 1 TABLET BY MOUTH EVERY NIGHT AT BEDTIME. 90 tablet 0     insulin glargine (LANTUS SOLOSTAR) 100 UNIT/ML injection Inject 26 Units Subcutaneous At Bedtime 24 mL 1     atorvastatin (LIPITOR) 40 MG tablet Take 1 tablet (40 mg) by mouth At Bedtime 90 tablet 1     carvedilol (COREG) 12.5 MG tablet Take 1 tablet (12.5 mg) by mouth 2 times daily 180 tablet 1     B Complex-C-Folic Acid (DIALYVITE 800) 0.8 MG TABS Take 1 tablet by mouth daily 90 tablet 1     furosemide  (LASIX) 20 MG tablet Take 1 tablet (20 mg) by mouth daily 90 tablet 3     lisinopril (PRINIVIL/ZESTRIL) 20 MG tablet Take 1 tablet (20 mg) by mouth daily 90 tablet 3     aspirin 81 MG chewable tablet Take 81 mg by mouth daily       VITAMIN D, CHOLECALCIFEROL, PO Take 2,000 Units by mouth every other day       darbepoetin libby (ARANESP) 100 MCG/0.5ML injection Inject 40 mcg Subcutaneous every 30 days       EPLERENONE PO Take 50 mg by mouth daily       Insulin Aspart (NOVOLOG SC) Sliding scale       fish oil-omega-3 fatty acids 1000 MG capsule Take 1 g by mouth daily       hydrochlorothiazide (MICROZIDE) 12.5 MG capsule TAKE 2 CAPSULES(25 MG) BY MOUTH EVERY MORNING (Patient not taking: Reported on 7/13/2017) 180 capsule 1     order for DME Equipment being ordered: Four wheeled walker 1 Device 0     [DISCONTINUED] insulin glargine (LANTUS) 100 UNIT/ML injection Inject 26 Units Subcutaneous At Bedtime 24 mL 1     Respiratory Therapy Supplies (NEBULIZER/ADULT MASK) KIT 1 Device as needed 1 kit 0     order for DME Equipment being ordered: Other: Nebulizer machine  Treatment Diagnosis: COPD 1 Device 0     [DISCONTINUED] albuterol (2.5 MG/3ML) 0.083% neb solution Take 1 vial (2.5 mg) by nebulization every 6 hours as needed for shortness of breath / dyspnea or wheezing 25 vial 0     COLCHICINE PO Take 0.6 mg by mouth every 48 hours as needed Reported on 3/30/2017       [DISCONTINUED] albuterol (PROAIR HFA/PROVENTIL HFA/VENTOLIN HFA) 108 (90 BASE) MCG/ACT Inhaler Inhale 1-2 puffs into the lungs every 4 hours as needed for shortness of breath / dyspnea or wheezing       Allergies   Allergen Reactions     Pioglitazone Other (See Comments)     Edema & hay fever     Vytorin Other (See Comments)     Muscle aches     BP Readings from Last 3 Encounters:   03/30/17 141/62   02/23/17 112/50   02/16/17 144/56    Wt Readings from Last 3 Encounters:   03/30/17 188 lb (85.3 kg)   02/23/17 191 lb 9.6 oz (86.9 kg)   02/16/17 189 lb 2.5 oz  (85.8 kg)            Reviewed and updated as needed this visit by clinical staff       Reviewed and updated as needed this visit by Provider         ROS:  C: NEGATIVE for fever, chills, change in weight  E/M: NEGATIVE for ear, mouth and throat problems  R: NEGATIVE for significant cough with baseline SOB  CV: NEGATIVE for chest pain, palpitations or peripheral edema  GI: NEGATIVE for nausea, abdominal pain, heartburn, or change in bowel habits  : NEGATIVE for frequency, dysuria, or hematuria  M: NEGATIVE for significant arthralgias or myalgia  H: NEGATIVE for bleeding problems  P: NEGATIVE for changes in mood or affect    OBJECTIVE:                                                    /60  Pulse 57  Temp 98.1  F (36.7  C) (Oral)  Wt 186 lb (84.4 kg)  SpO2 97%  BMI 27.47 kg/m2  Body mass index is 27.47 kg/(m^2).  GENERAL: alert and no distress using wheeled walker  EYES: Eyes grossly normal to inspection, extraocular movements - intact, and PERRL  HENT: ear canals- normal; TMs- normal; Nose- normal; Mouth- no ulcers, no lesions  NECK: no tenderness, no adenopathy, no asymmetry, no masses, no stiffness; thyroid- normal to palpation  RESP: lungs clear to auscultation but distant BS - no rales, no rhonchi, no wheezes  CV: regular rates and rhythm, normal S1 S2, no S3 or S4 and no click or rub   MS: extremities- no gross deformities noted with dialysis access left forearm.  PSYCH: Alert and oriented times 3; speech- coherent , normal rate and volume; able to articulate logical thoughts, able to abstract reason, no tangential thoughts, no hallucinations or delusions, affect- normal       ASSESSMENT/PLAN:                                                    (E11.22,  N18.6,  Z99.2,  Z79.4) Type 2 diabetes mellitus with chronic kidney disease on chronic dialysis, with long-term current use of insulin (H)  (primary encounter diagnosis)  Comment: labs ordered as fasting  Plan: Comprehensive metabolic panel,  Hemoglobin A1c,         Albumin Random Urine Quantitative, TSH with         free T4 reflex            (N18.6,  Z99.2) ESRD (end stage renal disease) on dialysis (H)  Comment: stable with dialysis scheduled 3x weekly  Plan: followed by Dr. Samuel    (I10) Essential hypertension, benign  Comment: stable on therapy  Plan: Comprehensive metabolic panel, Lipid Profile            (J44.1) COPD exacerbation (H)  Comment: refilled and stable  Plan: albuterol (2.5 MG/3ML) 0.083% neb solution,         albuterol (PROAIR HFA/PROVENTIL HFA/VENTOLIN         HFA) 108 (90 BASE) MCG/ACT Inhaler,         tiotropium-olodaterol 2.5-2.5 MCG/ACT AERS            (R33.9) Urinary retention  Comment: on therapy as ordererd  Plan:     (Z23) Need for pneumococcal vaccination  Comment: prior immunization done per patient.  Plan: CANCELED: Pneumococcal vaccine 23 valent PPSV23        (Pneumovax) [90669], CANCELED: ADMIN MEDICARE:         Pneumococcal Vaccine ()          See Patient Instructions    Sandip Antunez MD  [facility]    25 minutes spent with this patient, face to face, discussing treatment options for listed problems above as well as side effects of appropriate medications.  Counseling time extended beyond 50% of the clinic visit.  Medication dosing, treatment plan and follow-up were discussed. Also reviewed need for primary care testing for patient.

## 2017-07-13 NOTE — MR AVS SNAPSHOT
After Visit Summary   7/13/2017    Seven Savage Jr.    MRN: [id number]           Patient Information     Date Of Birth          1935        Visit Information        Provider Department      7/13/2017 2:20 PM Sandip Antunez MD [facility]        Today's Diagnoses     Type 2 diabetes mellitus with chronic kidney disease on chronic dialysis, with long-term current use of insulin (H)    -  1    Essential hypertension, benign        Urinary retention        Need for pneumococcal vaccination        COPD exacerbation (H)           Follow-ups after your visit        Future tests that were ordered for you today     Open Future Orders        Priority Expected Expires Ordered    Comprehensive metabolic panel Routine 7/13/2017 7/31/2017 7/13/2017    Lipid Profile Routine 7/13/2017 7/31/2017 7/13/2017    Hemoglobin A1c Routine 7/13/2017 7/31/2017 7/13/2017    Albumin Random Urine Quantitative Routine 7/13/2017 7/31/2017 7/13/2017    TSH with free T4 reflex Routine 7/13/2017 7/31/2017 7/13/2017            Who to contact     If you have questions or need follow up information about today's clinic visit or your schedule please contact [facility] directly at [phone].  Normal or non-critical lab and imaging results will be communicated to you by MyChart, letter or phone within 4 business days after the clinic has received the results. If you do not hear from us within 7 days, please contact the clinic through [employer] or phone. If you have a critical or abnormal lab result, we will notify you by phone as soon as possible.  Submit refill requests through [employer] or call your pharmacy and they will forward the refill request to us. Please allow 3 business days for your refill to be completed.          Additional Information About Your Visit        [employer] Information     [employer] lets you send messages to your doctor, view your test results, renew your  "prescriptions, schedule appointments and more. To sign up, go to [email or web address] . Click on \"Log in\" on the left side of the screen, which will take you to the Welcome page. Then click on \"Sign up Now\" on the right side of the page.     You will be asked to enter the access code listed below, as well as some personal information. Please follow the directions to create your username and password.     Your access code is: [email or web address]  Expires: 2017  5:34 PM     Your access code will  in 90 days. If you need help or a new code, please call your [facility] clinic or [phone].        Care EveryWhere ID     This is your Care EveryWhere ID. This could be used by other organizations to access your [facility] medical records  [id number]        Your Vitals Were     Pulse Temperature Pulse Oximetry BMI (Body Mass Index)          57 98.1  F (36.7  C) (Oral) 97% 27.47 kg/m2         Blood Pressure from Last 3 Encounters:   17 140/60   17 141/62   17 112/50    Weight from Last 3 Encounters:   17 186 lb (84.4 kg)   17 188 lb (85.3 kg)   17 191 lb 9.6 oz (86.9 kg)              We Performed the Following     ADMIN MEDICARE: Pneumococcal Vaccine ()     Pneumococcal vaccine 23 valent PPSV23  (Pneumovax) [42989]          Where to get your medicines      These medications were sent to [facility] [address] [facility] [address] AT [facility] [address]  [address] [facility] [address]     Phone:  [phone]     albuterol (2.5 MG/3ML) 0.083% neb solution    albuterol 108 (90 BASE) MCG/ACT Inhaler    tiotropium-olodaterol 2.5-2.5 MCG/ACT Aers          Primary Care Provider    Physician No Ref-Primary       No address on file        Equal Access to Services     LEONARD CONNELLY AH: Hadii aad ku hadasho Soomaali, waaxda luantoni childs waxay idiin hayaan adeeg kharash la'aan ah. [facility] [phone].    ATENCIÓN: Si corinne reed " a turpin disposición servicios gratuitos de asistencia lingüística. Sami benitez [phone].    We comply with applicable federal civil rights laws and [place] laws. We do not discriminate on the basis of race, color, national origin, age, disability sex, sexual orientation or gender identity.            Thank you!     Thank you for choosing [facility]  for your care. Our goal is always to provide you with excellent care. Hearing back from our patients is one way we can continue to improve our services. Please take a few minutes to complete the written survey that you may receive in the mail after your visit with us. Thank you!             Your Updated Medication List - Protect others around you: Learn how to safely use, store and throw away your medicines at www.disposemymeds.org.          This list is accurate as of: 7/13/17  2:33 PM.  Always use your most recent med list.                   Brand Name Dispense Instructions for use Diagnosis    * albuterol (2.5 MG/3ML) 0.083% neb solution     25 vial    Take 1 vial (2.5 mg) by nebulization every 6 hours as needed for shortness of breath / dyspnea or wheezing    COPD exacerbation (H)       * albuterol 108 (90 BASE) MCG/ACT Inhaler    PROAIR HFA/PROVENTIL HFA/VENTOLIN HFA    1 Inhaler    Inhale 1-2 puffs into the lungs every 4 hours as needed for shortness of breath / dyspnea or wheezing    COPD exacerbation (H)       aspirin 81 MG chewable tablet      Take 81 mg by mouth daily        atorvastatin 40 MG tablet    LIPITOR    90 tablet    Take 1 tablet (40 mg) by mouth At Bedtime    Type 2 diabetes mellitus with chronic kidney disease on chronic dialysis, with long-term current use of insulin (H), Hyperlipidemia LDL goal <100       carvedilol 12.5 MG tablet    COREG    180 tablet    Take 1 tablet (12.5 mg) by mouth 2 times daily    Aortic valve stenosis, unspecified etiology, Essential hypertension, benign       COLCHICINE PO      Take 0.6 mg by  mouth every 48 hours as needed Reported on 3/30/2017        darbepoetin libby 100 MCG/0.5ML injection    ARANESP     Inject 40 mcg Subcutaneous every 30 days        DIALYVITE 800 0.8 MG Tabs     90 tablet    Take 1 tablet by mouth daily    ESRD (end stage renal disease) on dialysis (H)       diltiazem 300 MG 24 hr ER beaded capsule    TIAZAC    30 capsule    Take 1 capsule (300 mg) by mouth daily    Essential hypertension, benign       doxazosin 8 MG tablet    CARDURA    90 tablet    TAKE 1 TABLET BY MOUTH EVERY NIGHT AT BEDTIME.    Urinary retention       EPLERENONE PO      Take 50 mg by mouth daily        fish oil-omega-3 fatty acids 1000 MG capsule      Take 1 g by mouth daily        furosemide 20 MG tablet    LASIX    90 tablet    Take 1 tablet (20 mg) by mouth daily    ESRD (end stage renal disease) on dialysis (H), Essential hypertension, benign       hydrochlorothiazide 12.5 MG capsule    MICROZIDE    180 capsule    TAKE 2 CAPSULES(25 MG) BY MOUTH EVERY MORNING    ESRD (end stage renal disease) on dialysis (H)       insulin glargine 100 UNIT/ML injection    LANTUS SOLOSTAR    24 mL    Inject 26 Units Subcutaneous At Bedtime    Type 2 diabetes mellitus with chronic kidney disease on chronic dialysis, with long-term current use of insulin (H)       lisinopril 20 MG tablet    PRINIVIL/ZESTRIL    90 tablet    Take 1 tablet (20 mg) by mouth daily    Type 2 diabetes mellitus with chronic kidney disease on chronic dialysis, with long-term current use of insulin (H), Essential hypertension, benign       nebulizer/adult mask Kit     1 kit    1 Device as needed    Chronic obstructive pulmonary disease with acute exacerbation (H)       NOVOLOG SC      Sliding scale        * order for DME     1 Device    Equipment being ordered: Other: Nebulizer machine Treatment Diagnosis: COPD    COPD exacerbation (H)       * order for DME     1 Device    Equipment being ordered: Four wheeled walker    DDD (degenerative disc disease),  lumbar, Impaired gait       tiotropium-olodaterol 2.5-2.5 MCG/ACT Aers     1 Inhaler    Inhale 2 puffs into the lungs daily    COPD exacerbation (H)       VITAMIN D (CHOLECALCIFEROL) PO      Take 2,000 Units by mouth every other day        * Notice:  This list has 4 medication(s) that are the same as other medications prescribed for you. Read the directions carefully, and ask your doctor or other care provider to review them with you.

## 2017-07-13 NOTE — NURSING NOTE
"Chief Complaint   Patient presents with     Hypertension     Diabetes     Recheck Medication       Initial /60  Pulse 57  Temp 98.1  F (36.7  C) (Oral)  Wt 186 lb (84.4 kg)  SpO2 97%  BMI 27.47 kg/m2 Estimated body mass index is 27.47 kg/(m^2) as calculated from the following:    Height as of 2/23/17: 5' 9\" (1.753 m).    Weight as of this encounter: 186 lb (84.4 kg).  Medication Reconciliation: complete   Jessica Mendoza, MAGALI      "

## 2017-08-15 DIAGNOSIS — E11.22 TYPE 2 DIABETES MELLITUS WITH CHRONIC KIDNEY DISEASE ON CHRONIC DIALYSIS, WITH LONG-TERM CURRENT USE OF INSULIN (H): ICD-10-CM

## 2017-08-15 DIAGNOSIS — N18.6 TYPE 2 DIABETES MELLITUS WITH CHRONIC KIDNEY DISEASE ON CHRONIC DIALYSIS, WITH LONG-TERM CURRENT USE OF INSULIN (H): ICD-10-CM

## 2017-08-15 DIAGNOSIS — I10 ESSENTIAL HYPERTENSION, BENIGN: ICD-10-CM

## 2017-08-15 DIAGNOSIS — Z99.2 TYPE 2 DIABETES MELLITUS WITH CHRONIC KIDNEY DISEASE ON CHRONIC DIALYSIS, WITH LONG-TERM CURRENT USE OF INSULIN (H): ICD-10-CM

## 2017-08-15 DIAGNOSIS — Z79.4 TYPE 2 DIABETES MELLITUS WITH CHRONIC KIDNEY DISEASE ON CHRONIC DIALYSIS, WITH LONG-TERM CURRENT USE OF INSULIN (H): ICD-10-CM

## 2017-08-15 LAB
ALBUMIN SERPL-MCNC: 3.4 G/DL (ref 3.4–5)
ALP SERPL-CCNC: 83 U/L (ref 40–150)
ALT SERPL W P-5'-P-CCNC: 21 U/L (ref 0–70)
ANION GAP SERPL CALCULATED.3IONS-SCNC: 8 MMOL/L (ref 3–14)
AST SERPL W P-5'-P-CCNC: 16 U/L (ref 0–45)
BILIRUB SERPL-MCNC: 0.7 MG/DL (ref 0.2–1.3)
BUN SERPL-MCNC: 34 MG/DL (ref 7–30)
CALCIUM SERPL-MCNC: 9.2 MG/DL (ref 8.5–10.1)
CHLORIDE SERPL-SCNC: 102 MMOL/L (ref 94–109)
CHOLEST SERPL-MCNC: 145 MG/DL
CO2 SERPL-SCNC: 28 MMOL/L (ref 20–32)
CREAT SERPL-MCNC: 3.3 MG/DL (ref 0.66–1.25)
CREAT UR-MCNC: 66 MG/DL
GFR SERPL CREATININE-BSD FRML MDRD: 18 ML/MIN/1.7M2
GLUCOSE SERPL-MCNC: 80 MG/DL (ref 70–99)
HBA1C MFR BLD: 6.1 % (ref 4.3–6)
HDLC SERPL-MCNC: 41 MG/DL
LDLC SERPL CALC-MCNC: 83 MG/DL
MICROALBUMIN UR-MCNC: 1040 MG/L
MICROALBUMIN/CREAT UR: 1571 MG/G CR (ref 0–17)
NONHDLC SERPL-MCNC: 104 MG/DL
POTASSIUM SERPL-SCNC: 3.8 MMOL/L (ref 3.4–5.3)
PROT SERPL-MCNC: 6.7 G/DL (ref 6.8–8.8)
SODIUM SERPL-SCNC: 138 MMOL/L (ref 133–144)
TRIGL SERPL-MCNC: 106 MG/DL
TSH SERPL DL<=0.005 MIU/L-ACNC: 2.18 MU/L (ref 0.4–4)

## 2017-08-15 PROCEDURE — 84443 ASSAY THYROID STIM HORMONE: CPT | Performed by: INTERNAL MEDICINE

## 2017-08-15 PROCEDURE — 83036 HEMOGLOBIN GLYCOSYLATED A1C: CPT | Performed by: INTERNAL MEDICINE

## 2017-08-15 PROCEDURE — 80061 LIPID PANEL: CPT | Performed by: INTERNAL MEDICINE

## 2017-08-15 PROCEDURE — 36415 COLL VENOUS BLD VENIPUNCTURE: CPT | Performed by: INTERNAL MEDICINE

## 2017-08-15 PROCEDURE — 82043 UR ALBUMIN QUANTITATIVE: CPT | Performed by: INTERNAL MEDICINE

## 2017-08-15 PROCEDURE — 80053 COMPREHEN METABOLIC PANEL: CPT | Performed by: INTERNAL MEDICINE

## 2017-08-23 DIAGNOSIS — Z99.2 TYPE 2 DIABETES MELLITUS WITH CHRONIC KIDNEY DISEASE ON CHRONIC DIALYSIS, WITH LONG-TERM CURRENT USE OF INSULIN (H): Primary | ICD-10-CM

## 2017-08-23 DIAGNOSIS — Z79.4 TYPE 2 DIABETES MELLITUS WITH CHRONIC KIDNEY DISEASE ON CHRONIC DIALYSIS, WITH LONG-TERM CURRENT USE OF INSULIN (H): Primary | ICD-10-CM

## 2017-08-23 DIAGNOSIS — N18.6 TYPE 2 DIABETES MELLITUS WITH CHRONIC KIDNEY DISEASE ON CHRONIC DIALYSIS, WITH LONG-TERM CURRENT USE OF INSULIN (H): Primary | ICD-10-CM

## 2017-08-23 DIAGNOSIS — E11.22 TYPE 2 DIABETES MELLITUS WITH CHRONIC KIDNEY DISEASE ON CHRONIC DIALYSIS, WITH LONG-TERM CURRENT USE OF INSULIN (H): Primary | ICD-10-CM

## 2017-08-23 RX ORDER — PEN NEEDLE, DIABETIC 31 GX5/16"
NEEDLE, DISPOSABLE MISCELLANEOUS
Qty: 100 EACH | Refills: 3 | Status: SHIPPED | OUTPATIENT
Start: 2017-08-23 | End: 2017-09-13

## 2017-09-08 DIAGNOSIS — Z99.2 ESRD (END STAGE RENAL DISEASE) ON DIALYSIS (H): ICD-10-CM

## 2017-09-08 DIAGNOSIS — N18.6 ESRD (END STAGE RENAL DISEASE) ON DIALYSIS (H): ICD-10-CM

## 2017-09-12 RX ORDER — FOLIC ACID/VIT B COMPLEX AND C 0.8 MG
TABLET ORAL
Qty: 90 TABLET | Refills: 0 | Status: SHIPPED | OUTPATIENT
Start: 2017-09-12 | End: 2018-01-02

## 2017-09-13 DIAGNOSIS — E11.22 TYPE 2 DIABETES MELLITUS WITH CHRONIC KIDNEY DISEASE ON CHRONIC DIALYSIS, WITH LONG-TERM CURRENT USE OF INSULIN (H): ICD-10-CM

## 2017-09-13 DIAGNOSIS — Z79.4 TYPE 2 DIABETES MELLITUS WITH CHRONIC KIDNEY DISEASE ON CHRONIC DIALYSIS, WITH LONG-TERM CURRENT USE OF INSULIN (H): ICD-10-CM

## 2017-09-13 DIAGNOSIS — Z99.2 TYPE 2 DIABETES MELLITUS WITH CHRONIC KIDNEY DISEASE ON CHRONIC DIALYSIS, WITH LONG-TERM CURRENT USE OF INSULIN (H): ICD-10-CM

## 2017-09-13 DIAGNOSIS — N18.6 TYPE 2 DIABETES MELLITUS WITH CHRONIC KIDNEY DISEASE ON CHRONIC DIALYSIS, WITH LONG-TERM CURRENT USE OF INSULIN (H): ICD-10-CM

## 2017-09-13 RX ORDER — PEN NEEDLE, DIABETIC 31 GX5/16"
NEEDLE, DISPOSABLE MISCELLANEOUS
Qty: 400 EACH | Refills: 0 | Status: SHIPPED | OUTPATIENT
Start: 2017-09-13 | End: 2017-11-11

## 2017-09-13 NOTE — TELEPHONE ENCOUNTER
novolog listed as historical.  Routing refill request to provider for review/approval because:  Medication is reported/historical

## 2017-09-14 RX ORDER — INSULIN ASPART 100 [IU]/ML
INJECTION, SOLUTION INTRAVENOUS; SUBCUTANEOUS
Qty: 45 ML | Refills: 0 | Status: SHIPPED | OUTPATIENT
Start: 2017-09-14 | End: 2018-03-20

## 2017-09-30 DIAGNOSIS — I35.0 AORTIC VALVE STENOSIS, UNSPECIFIED ETIOLOGY: ICD-10-CM

## 2017-09-30 DIAGNOSIS — I10 ESSENTIAL HYPERTENSION, BENIGN: ICD-10-CM

## 2017-10-02 RX ORDER — CARVEDILOL 12.5 MG/1
TABLET ORAL
Qty: 180 TABLET | Refills: 0 | Status: SHIPPED | OUTPATIENT
Start: 2017-10-02 | End: 2018-01-02

## 2017-10-02 NOTE — TELEPHONE ENCOUNTER
carvedilol (COREG) 12.5 MG tablet      Last Written Prescription Date: 2/23/2017  Last Fill Quantity: 180, # refills: 1    Last Office Visit with [facility], [facility] or [facility] prescribing provider:  7/13/2017   Future Office Visit:        BP Readings from Last 3 Encounters:   07/13/17 140/60   03/30/17 141/62   02/23/17 112/50

## 2017-11-10 DIAGNOSIS — N18.6 TYPE 2 DIABETES MELLITUS WITH CHRONIC KIDNEY DISEASE ON CHRONIC DIALYSIS, WITH LONG-TERM CURRENT USE OF INSULIN (H): ICD-10-CM

## 2017-11-10 DIAGNOSIS — E11.22 TYPE 2 DIABETES MELLITUS WITH CHRONIC KIDNEY DISEASE ON CHRONIC DIALYSIS, WITH LONG-TERM CURRENT USE OF INSULIN (H): ICD-10-CM

## 2017-11-10 DIAGNOSIS — Z79.4 TYPE 2 DIABETES MELLITUS WITH CHRONIC KIDNEY DISEASE ON CHRONIC DIALYSIS, WITH LONG-TERM CURRENT USE OF INSULIN (H): ICD-10-CM

## 2017-11-10 DIAGNOSIS — E78.5 HYPERLIPIDEMIA LDL GOAL <100: ICD-10-CM

## 2017-11-10 DIAGNOSIS — Z99.2 TYPE 2 DIABETES MELLITUS WITH CHRONIC KIDNEY DISEASE ON CHRONIC DIALYSIS, WITH LONG-TERM CURRENT USE OF INSULIN (H): ICD-10-CM

## 2017-11-10 RX ORDER — ATORVASTATIN CALCIUM 40 MG/1
TABLET, FILM COATED ORAL
Qty: 90 TABLET | Refills: 0 | Status: SHIPPED | OUTPATIENT
Start: 2017-11-10 | End: 2018-02-06

## 2017-11-11 DIAGNOSIS — E11.22 TYPE 2 DIABETES MELLITUS WITH CHRONIC KIDNEY DISEASE ON CHRONIC DIALYSIS, WITH LONG-TERM CURRENT USE OF INSULIN (H): ICD-10-CM

## 2017-11-11 DIAGNOSIS — Z79.4 TYPE 2 DIABETES MELLITUS WITH CHRONIC KIDNEY DISEASE ON CHRONIC DIALYSIS, WITH LONG-TERM CURRENT USE OF INSULIN (H): ICD-10-CM

## 2017-11-11 DIAGNOSIS — Z99.2 TYPE 2 DIABETES MELLITUS WITH CHRONIC KIDNEY DISEASE ON CHRONIC DIALYSIS, WITH LONG-TERM CURRENT USE OF INSULIN (H): ICD-10-CM

## 2017-11-11 DIAGNOSIS — N18.6 TYPE 2 DIABETES MELLITUS WITH CHRONIC KIDNEY DISEASE ON CHRONIC DIALYSIS, WITH LONG-TERM CURRENT USE OF INSULIN (H): ICD-10-CM

## 2017-11-14 RX ORDER — PEN NEEDLE, DIABETIC 31 GX5/16"
NEEDLE, DISPOSABLE MISCELLANEOUS
Qty: 400 EACH | Refills: 0 | Status: SHIPPED | OUTPATIENT
Start: 2017-11-14 | End: 2018-06-26

## 2017-11-27 ENCOUNTER — HOSPITAL ENCOUNTER (EMERGENCY)
Facility: CLINIC | Age: 82
Discharge: HOME | End: 2017-11-27
Attending: EMERGENCY MEDICINE | Admitting: EMERGENCY MEDICINE
Payer: MEDICARE

## 2017-11-27 ENCOUNTER — APPOINTMENT (OUTPATIENT)
Dept: GENERAL RADIOLOGY | Facility: CLINIC | Age: 82
End: 2017-11-27
Attending: EMERGENCY MEDICINE
Payer: MEDICARE

## 2017-11-27 VITALS
WEIGHT: 170 LBS | SYSTOLIC BLOOD PRESSURE: 166 MMHG | RESPIRATION RATE: 18 BRPM | HEART RATE: 64 BPM | TEMPERATURE: 97.5 F | BODY MASS INDEX: 25.18 KG/M2 | DIASTOLIC BLOOD PRESSURE: 67 MMHG | HEIGHT: 69 IN | OXYGEN SATURATION: 93 %

## 2017-11-27 DIAGNOSIS — Z99.2 DIALYSIS PATIENT (H): ICD-10-CM

## 2017-11-27 DIAGNOSIS — J81.0 ACUTE PULMONARY EDEMA (H): ICD-10-CM

## 2017-11-27 LAB
ALBUMIN SERPL-MCNC: 3.5 G/DL (ref 3.4–5)
ALP SERPL-CCNC: 87 U/L (ref 40–150)
ALT SERPL W P-5'-P-CCNC: 17 U/L (ref 0–70)
ANION GAP SERPL CALCULATED.3IONS-SCNC: 5 MMOL/L (ref 3–14)
AST SERPL W P-5'-P-CCNC: 18 U/L (ref 0–45)
BASOPHILS # BLD AUTO: 0 10E9/L (ref 0–0.2)
BASOPHILS NFR BLD AUTO: 0.3 %
BILIRUB SERPL-MCNC: 0.7 MG/DL (ref 0.2–1.3)
BUN SERPL-MCNC: 34 MG/DL (ref 7–30)
CALCIUM SERPL-MCNC: 8.9 MG/DL (ref 8.5–10.1)
CHLORIDE SERPL-SCNC: 103 MMOL/L (ref 94–109)
CO2 SERPL-SCNC: 32 MMOL/L (ref 20–32)
CREAT SERPL-MCNC: 3.75 MG/DL (ref 0.66–1.25)
DIFFERENTIAL METHOD BLD: ABNORMAL
EOSINOPHIL # BLD AUTO: 0.2 10E9/L (ref 0–0.7)
EOSINOPHIL NFR BLD AUTO: 1.9 %
ERYTHROCYTE [DISTWIDTH] IN BLOOD BY AUTOMATED COUNT: 14.5 % (ref 10–15)
GFR SERPL CREATININE-BSD FRML MDRD: 16 ML/MIN/1.7M2
GLUCOSE SERPL-MCNC: 106 MG/DL (ref 70–99)
HCT VFR BLD AUTO: 35.4 % (ref 40–53)
HGB BLD-MCNC: 11.3 G/DL (ref 13.3–17.7)
IMM GRANULOCYTES # BLD: 0 10E9/L (ref 0–0.4)
IMM GRANULOCYTES NFR BLD: 0.2 %
INR PPP: 1 (ref 0.86–1.14)
INTERPRETATION ECG - MUSE: NORMAL
LYMPHOCYTES # BLD AUTO: 0.8 10E9/L (ref 0.8–5.3)
LYMPHOCYTES NFR BLD AUTO: 8 %
MCH RBC QN AUTO: 27.5 PG (ref 26.5–33)
MCHC RBC AUTO-ENTMCNC: 31.9 G/DL (ref 31.5–36.5)
MCV RBC AUTO: 86 FL (ref 78–100)
MONOCYTES # BLD AUTO: 0.6 10E9/L (ref 0–1.3)
MONOCYTES NFR BLD AUTO: 6.1 %
NEUTROPHILS # BLD AUTO: 8.7 10E9/L (ref 1.6–8.3)
NEUTROPHILS NFR BLD AUTO: 83.5 %
NRBC # BLD AUTO: 0 10*3/UL
NRBC BLD AUTO-RTO: 0 /100
PLATELET # BLD AUTO: 313 10E9/L (ref 150–450)
POTASSIUM SERPL-SCNC: 3.9 MMOL/L (ref 3.4–5.3)
PROT SERPL-MCNC: 6.9 G/DL (ref 6.8–8.8)
RBC # BLD AUTO: 4.11 10E12/L (ref 4.4–5.9)
SODIUM SERPL-SCNC: 140 MMOL/L (ref 133–144)
TROPONIN I SERPL-MCNC: 0.1 UG/L (ref 0–0.04)
WBC # BLD AUTO: 10.5 10E9/L (ref 4–11)

## 2017-11-27 PROCEDURE — 94640 AIRWAY INHALATION TREATMENT: CPT

## 2017-11-27 PROCEDURE — 80053 COMPREHEN METABOLIC PANEL: CPT | Performed by: EMERGENCY MEDICINE

## 2017-11-27 PROCEDURE — 85025 COMPLETE CBC W/AUTO DIFF WBC: CPT | Performed by: EMERGENCY MEDICINE

## 2017-11-27 PROCEDURE — 71020 XR CHEST 2 VW: CPT

## 2017-11-27 PROCEDURE — 93005 ELECTROCARDIOGRAM TRACING: CPT

## 2017-11-27 PROCEDURE — 85610 PROTHROMBIN TIME: CPT | Performed by: EMERGENCY MEDICINE

## 2017-11-27 PROCEDURE — 25000125 ZZHC RX 250: Performed by: EMERGENCY MEDICINE

## 2017-11-27 PROCEDURE — 84484 ASSAY OF TROPONIN QUANT: CPT | Performed by: EMERGENCY MEDICINE

## 2017-11-27 PROCEDURE — 99285 EMERGENCY DEPT VISIT HI MDM: CPT | Mod: 25

## 2017-11-27 RX ORDER — IPRATROPIUM BROMIDE AND ALBUTEROL SULFATE 2.5; .5 MG/3ML; MG/3ML
3 SOLUTION RESPIRATORY (INHALATION) ONCE
Status: COMPLETED | OUTPATIENT
Start: 2017-11-27 | End: 2017-11-27

## 2017-11-27 RX ADMIN — IPRATROPIUM BROMIDE AND ALBUTEROL SULFATE 3 ML: .5; 3 SOLUTION RESPIRATORY (INHALATION) at 11:19

## 2017-11-27 ASSESSMENT — ENCOUNTER SYMPTOMS
FEVER: 0
COUGH: 0
SHORTNESS OF BREATH: 1

## 2017-11-27 NOTE — DISCHARGE INSTRUCTIONS
Pulmonary Edema  Your healthcare provider has told you that you have pulmonary edema. Read on to learn more about pulmonary edema and how it can be treated.  What is pulmonary edema?     Pulmonary edema is fluid in the air sacs (alveoli) in the lungs.   Pulmonary edema occurs when the air sacs (alveoli) in your lungs fill with fluid. The fluid buildup makes it hard for the lungs to do their job, including getting oxygen from the air you breathe. This can make it hard to breathe. The most common cause of pulmonary edema is heart failure. When the heart doesn t work properly, it can cause pressure to rise in the veins (blood vessels) of the lungs. As pressure builds, fluid leaks out of the congested veins. It fills the alveoli. The extra fluid prevents oxygen from moving through the lungs as it should. But heart failure isn t the only cause of pulmonary edema. Damage to the lungs or kidney failure can also cause fluid to fill the lungs. And in some cases, living or exercising at high altitudes can lead to fluid buildup in the lungs.  How is pulmonary edema diagnosed?  Your healthcare provider does an exam and asks about your health history. You may also have one or more of the following:    Blood tests to take samples of blood.    Imaging tests to take detailed pictures of inside the body. These may include a chest X-ray and ultrasound.    Electrocardiography (ECG)  and echocardiogram to test how well the heart is functioning.  How is pulmonary edema treated?  Treatment usually depends on what s causing the edema. For instance, if it s because of heart failure, treating the heart condition will treat the edema. Treatment can also ease symptoms. Therapy often includes the following:    Oxygen. This may be given through a mask that goes over the nose. It may be given through a small tube that sits under the nose. Sometimes, pressurized air will be needed through a tight fitting mask, using a machine called CPAP or  BiPAP. Or it may be given through a tube placed into the windpipe (trachea). If a tube is required, a ventilator, often called a breathing machine or respirator will be used.    Medicines. These may include water pills (diuretics) to help your body get rid of extra fluid. The fluid passes out of your body as urine. You may also need medicines to treat the heart. These can help your heart work better. This helps reduce fluid buildup in the lungs.  What are the long-term concerns?  If treated right away, pulmonary edema can be improved. It may even be cured. But in some cases, ongoing treatment is needed to help control the problem. This may require having procedures or taking medicines for months or years. In some cases, you may need to use oxygen or breathing equipment for a long time. This can lead to complications such as damage to lung tissue. Your healthcare provider can tell you more if needed.  Call 911  Call 911 if any of these occur:    Chest pain    Severe trouble breathing    Coughing up blood    Skin turns blue      When to call the healthcare provider  Call your the healthcare provider right away if you have any of the following:    Unusual or irregular heartbeat    Unable to speak full sentences before running out of breath    Sweating more than usual   Date Last Reviewed: 2/1/2017 2000-2017 [employer]. [address] [place] [address], [place] [address]. All rights reserved. This information is not intended as a substitute for professional medical care. Always follow your healthcare professional's instructions.

## 2017-11-27 NOTE — ED PROVIDER NOTES
History     Chief Complaint:  Shortness of Breath    HPI   Seven Savage Jr. is a 82 year old male former smoker with a history of hypertension, COPD, diabetes and chronic renal failure on dialysis who presents to the ED by ambulance for evaluation of shortness of breath. The patient reports his shortness of breath came on seven hours prior to arrival. He initially used a rescue inhaler, which he said helped him somewhat. However, he was getting ready to go to his dialysis appointment and states he got short of breath again while walking to his car. He mentions he came into the ED in February for a similar problem, but had a negative work up. He is not on any Oxygen at home. He denies chest pain, fever, cough, or any other symptoms. He has been going to [facility] dialysis in [place] for the past 18 months and his last treatment was three days ago.     Allergies:  Pioglitazone  Vytorin    Medications:    Insulin pen  Lipitor  Coreg  Novolog flexpen  Albuterol  Tiazac  Tiotropium-olodaterol  Cardura  Lasix  Lisinopril  Vitamin D  Colchicine  Aranesp  Eplerenone  Fish Oil    Past Medical History:    COPD  Chronic Renal Failure  Heart murmur  Hyperlipidemia  Hypertension  Diabetes Mellitus  Renal Calculi  Sacral pain  Aortic valve stenosis    Past Surgical History:    Adenoidectomy  Endoscopic polypectomy nasal  Renal stent  Tonsillectomy    Family History:    No past pertinent family history.    Social History:  The patient was brought to the ED by ambulance.  Smoking Status: Former cigarette smoker. Now smokes an occasional cigar.  Alcohol Use: Yes  Marital Status:        Review of Systems   Constitutional: Negative for fever.   Respiratory: Positive for shortness of breath. Negative for cough.    Cardiovascular: Negative for chest pain.   All other systems reviewed and are negative.    Physical Exam   First Vitals:  BP: 196/81  Temp: 97.5F (36.4C) Oral  HR: 73  Resp: 28  SpO2: 91%    Physical Exam  Nursing  note and vitals reviewed.  Constitutional:  Oriented to person, place, and time. Cooperative.   HENT:   Nose:    Nose normal.   Mouth/Throat:   Mucous membranes are normal.   Eyes:    Conjunctivae normal and EOM are normal.      Pupils are equal, round, and reactive to light.   Neck:    Trachea normal.   Cardiovascular:  Bradycardic, regular rhythm, normal heart sounds and normal pulses. No murmur heard.  Pulmonary/Chest:  Slightly tachypneic with slightly diminished breath sounds throughout, more so in the bases.  Abdominal:   Soft. Normal appearance and bowel sounds are normal.      There is no tenderness.      There is no rebound and no CVA tenderness.   Musculoskeletal:  Extremities atraumatic x 4.   Lymphadenopathy:  No cervical adenopathy.   Neurological:   Alert and oriented to person, place, and time. Normal strength.      No cranial nerve deficit or sensory deficit. GCS eye subscore is 4. GCS verbal subscore is 5. GCS motor subscore is 6.   Skin:    Skin is intact. No rash noted.   Psychiatric:   Normal mood and affect.    Emergency Department Course   ECG done at 1131. ECG read at 1140. Indication: Shortness of Breath  Rate 64 bpm. UT interval 174. QRS duration 98. QT/QTc 434/447. P-R-T axes 64 5 106.  Normal Sinus Rhythm.  Possible left atrial enlargement.  T wave abnormality, consider lateral ischemia.  Abnormal ECG.    Imaging:  Radiographic findings were communicated with the patient who voiced understanding of the findings.  XR Chest 2 Views:  Impression: Edema  Per Radiology.    Laboratory:  CBC: WBC 10.5, HGB 11.3(L),   CMP: Glucose 106(H), BUN 34(H), Creat 3.75(H), GFR 19(L) o/w WNL    INR: 1.00  Troponin I: 0.100(H)    Interventions:  1058 Duoneb 3mL    Emergency Department Course:  Nursing notes and vitals reviewed.  I performed an exam of the patient as documented above.     1233 I spoke to Dr. Moses in Nephrology regarding the patient.    1241 I rechecked the patient and informed him  he'll need to go to the dialysis appointment we just set up for him at [facility].    Findings and plan explained to the patient. Patient discharged home with instructions regarding supportive care, medications, and reasons to return. The importance of close follow-up was reviewed.     Impression & Plan    Medical Decision Making:  Seven Savage Jr. is an 82 year old male brought in by ambulance for further evaluation of shortness of breath. This started essentially today on his way to getting ready for dialysis, although he did have an episode earlier today as well. He has no other worrisome symptoms or findings on exam. However, I considered the possibility of pneumonia or CHF as potential causes, as well as cardiac ischemia and pulmonary vascular congestion. I also considered the possibility of a COPD exacerbation. He is not hypoxic here, though he did have somewhat diminished breath sounds and he was slightly tachypneic. I proceeded with the above work up, including EKG, blood work and chest xray. His chest xray is consistent with fluid overload. I do not feel that he needs to be admitted to the hospital though. I spoke with the on call Nephrologist, Dr. Moses, who was in agreement as well and also in agreement that the patient needs dialysis. He was able to arrange for the patient to receive dialysis at the [facility] facility just next door in the [facility]. Therefore, we will be discharging him from here to go immediately over there. He should follow up with his own doctors as soon as possible and return for any worsening symptoms or concerns.    Diagnosis:  1. (J81.0) Acute pulmonary edema (H)    2. (Z99.2) Dialysis patient (H)    Disposition:  The patient will be discharged home.    11/27/2017    EMERGENCY DEPARTMENT    RAMYA, Denisse Lua, am serving as a scribe at 10:55 on November 27, 2017  to document services personally performed by Dr. Simms based on my observations  and the provider's statements to me.           Nitish Simms MD  11/27/17 9681

## 2017-11-27 NOTE — ED NOTES
Bed: ED06  Expected date:   Expected time:   Means of arrival:   Comments:  trudi - 512 - 80's SOB eta 1049

## 2017-11-27 NOTE — ED AVS SNAPSHOT
Emergency Department    [address] [facility] [address]    Phone:  [phone]    Fax:  [phone]                                       Seven Savage Jr.   MRN: [id number]    Department:   Emergency Department   Date of Visit:  11/27/2017           Patient Information     Date Of Birth          1935        Your diagnoses for this visit were:     Acute pulmonary edema (H)     Dialysis patient (H)        You were seen by Nitish Simms MD.      Follow-up Information     Schedule an appointment as soon as possible for a visit with Clinic, [facility].    Contact information:    [address] [place]  [facility] [address] [phone]          Follow up with  Emergency Department.    Specialty:  EMERGENCY MEDICINE    Why:  If symptoms worsen    Contact information:    [address] [facility] [id number] [phone]        Discharge Instructions         Pulmonary Edema  Your healthcare provider has told you that you have pulmonary edema. Read on to learn more about pulmonary edema and how it can be treated.  What is pulmonary edema?     Pulmonary edema is fluid in the air sacs (alveoli) in the lungs.   Pulmonary edema occurs when the air sacs (alveoli) in your lungs fill with fluid. The fluid buildup makes it hard for the lungs to do their job, including getting oxygen from the air you breathe. This can make it hard to breathe. The most common cause of pulmonary edema is heart failure. When the heart doesn t work properly, it can cause pressure to rise in the veins (blood vessels) of the lungs. As pressure builds, fluid leaks out of the congested veins. It fills the alveoli. The extra fluid prevents oxygen from moving through the lungs as it should. But heart failure isn t the only cause of pulmonary edema. Damage to the lungs or kidney failure can also cause fluid to fill the lungs. And in some cases, living or exercising at high altitudes can  lead to fluid buildup in the lungs.  How is pulmonary edema diagnosed?  Your healthcare provider does an exam and asks about your health history. You may also have one or more of the following:    Blood tests to take samples of blood.    Imaging tests to take detailed pictures of inside the body. These may include a chest X-ray and ultrasound.    Electrocardiography (ECG)  and echocardiogram to test how well the heart is functioning.  How is pulmonary edema treated?  Treatment usually depends on what s causing the edema. For instance, if it s because of heart failure, treating the heart condition will treat the edema. Treatment can also ease symptoms. Therapy often includes the following:    Oxygen. This may be given through a mask that goes over the nose. It may be given through a small tube that sits under the nose. Sometimes, pressurized air will be needed through a tight fitting mask, using a machine called CPAP or BiPAP. Or it may be given through a tube placed into the windpipe (trachea). If a tube is required, a ventilator, often called a breathing machine or respirator will be used.    Medicines. These may include water pills (diuretics) to help your body get rid of extra fluid. The fluid passes out of your body as urine. You may also need medicines to treat the heart. These can help your heart work better. This helps reduce fluid buildup in the lungs.  What are the long-term concerns?  If treated right away, pulmonary edema can be improved. It may even be cured. But in some cases, ongoing treatment is needed to help control the problem. This may require having procedures or taking medicines for months or years. In some cases, you may need to use oxygen or breathing equipment for a long time. This can lead to complications such as damage to lung tissue. Your healthcare provider can tell you more if needed.  Call 911  Call 911 if any of these occur:    Chest pain    Severe trouble breathing    Coughing up  blood    Skin turns blue      When to call the healthcare provider  Call your the healthcare provider right away if you have any of the following:    Unusual or irregular heartbeat    Unable to speak full sentences before running out of breath    Sweating more than usual   Date Last Reviewed: 2/1/2017 2000-2017 [employer]. [address] [place] [id number]. All rights reserved. This information is not intended as a substitute for professional medical care. Always follow your healthcare professional's instructions.          24 Hour Appointment Hotline       To make an appointment at any [facility], call [phone] ([phone]). If you don't have a family doctor or clinic, we will help you find one. [facility] clinics are conveniently located to serve the needs of you and your family.             Review of your medicines      Our records show that you are taking the medicines listed below. If these are incorrect, please call your family doctor or clinic.        Dose / Directions Last dose taken    * albuterol (2.5 MG/3ML) 0.083% neb solution   Dose:  1 vial   Quantity:  25 vial        Take 1 vial (2.5 mg) by nebulization every 6 hours as needed for shortness of breath / dyspnea or wheezing   Refills:  11        * albuterol 108 (90 BASE) MCG/ACT Inhaler   Commonly known as:  PROAIR HFA/PROVENTIL HFA/VENTOLIN HFA   Dose:  1-2 puff   Quantity:  1 Inhaler        Inhale 1-2 puffs into the lungs every 4 hours as needed for shortness of breath / dyspnea or wheezing   Refills:  5        aspirin 81 MG chewable tablet   Dose:  81 mg        Take 81 mg by mouth daily   Refills:  0        atorvastatin 40 MG tablet   Commonly known as:  LIPITOR   Quantity:  90 tablet        TAKE 1 TABLET(40 MG) BY MOUTH AT BEDTIME   Refills:  0        B-D U/F 31G X 8 MM   Quantity:  400 each   Generic drug:  insulin pen needle        USE AS DIRECTED FOUR TIMES DAILY.   Refills:  0        carvedilol 12.5 MG  tablet   Commonly known as:  COREG   Quantity:  180 tablet        TAKE 1 TABLET(12.5 MG) BY MOUTH TWICE DAILY   Refills:  0        COLCHICINE PO   Dose:  0.6 mg        Take 0.6 mg by mouth every 48 hours as needed Reported on 3/30/2017   Refills:  0        darbepoetin libby 100 MCG/0.5ML injection   Commonly known as:  ARANESP   Dose:  40 mcg        Inject 40 mcg Subcutaneous every 30 days   Refills:  0        diltiazem 300 MG 24 hr ER beaded capsule   Commonly known as:  TIAZAC   Dose:  300 mg   Quantity:  30 capsule        Take 1 capsule (300 mg) by mouth daily   Refills:  0        doxazosin 8 MG tablet   Commonly known as:  CARDURA   Quantity:  90 tablet        TAKE 1 TABLET BY MOUTH EVERY NIGHT AT BEDTIME.   Refills:  0        EPLERENONE PO   Dose:  50 mg        Take 50 mg by mouth daily   Refills:  0        fish oil-omega-3 fatty acids 1000 MG capsule   Dose:  1 g        Take 1 g by mouth daily   Refills:  0        furosemide 20 MG tablet   Commonly known as:  LASIX   Dose:  20 mg   Quantity:  90 tablet        Take 1 tablet (20 mg) by mouth daily   Refills:  3        hydrochlorothiazide 12.5 MG capsule   Commonly known as:  MICROZIDE   Quantity:  180 capsule        TAKE 2 CAPSULES(25 MG) BY MOUTH EVERY MORNING   Refills:  1        insulin glargine 100 UNIT/ML injection   Commonly known as:  LANTUS SOLOSTAR   Dose:  26 Units   Quantity:  24 mL        Inject 26 Units Subcutaneous At Bedtime   Refills:  1        lisinopril 20 MG tablet   Commonly known as:  PRINIVIL/ZESTRIL   Dose:  20 mg   Quantity:  90 tablet        Take 1 tablet (20 mg) by mouth daily   Refills:  3        nebulizer/adult mask Kit   Dose:  1 Device   Quantity:  1 kit        1 Device as needed   Refills:  0        * NOVOLOG SC        Sliding scale   Refills:  0        * NovoLOG FLEXPEN 100 UNIT/ML injection   Quantity:  45 mL   Generic drug:  insulin aspart        INJECT UNDER THE SKIN USING SLIDING SCALE UP TO 50 UNITS EVERY DAY AS DIRECTED.    Refills:  0        * order for DME   Quantity:  1 Device        Equipment being ordered: Other: Nebulizer machine Treatment Diagnosis: COPD   Refills:  0        * order for DME   Quantity:  1 Device        Equipment being ordered: Four wheeled walker   Refills:  0        BRANDI-HEIDY Tabs   Quantity:  90 tablet        TAKE ONE TABLET BY MOUTH DAILY   Refills:  0        tiotropium-olodaterol 2.5-2.5 MCG/ACT Aers   Dose:  2 puff   Quantity:  1 Inhaler        Inhale 2 puffs into the lungs daily   Refills:  5        VITAMIN D (CHOLECALCIFEROL) PO   Dose:  2000 Units        Take 2,000 Units by mouth every other day   Refills:  0        * Notice:  This list has 6 medication(s) that are the same as other medications prescribed for you. Read the directions carefully, and ask your doctor or other care provider to review them with you.            Procedures and tests performed during your visit     CBC with platelets differential    Comprehensive metabolic panel    EKG 12-lead, tracing only    INR    Peripheral IV: Standard    Troponin I    XR Chest 2 Views      Orders Needing Specimen Collection     None      Pending Results     No orders found from 11/25/2017 to 11/28/2017.            Pending Culture Results     No orders found from 11/25/2017 to 11/28/2017.            Pending Results Instructions     If you had any lab results that were not finalized at the time of your Discharge, you can call the ED Lab Result RN at [phone]. You will be contacted by this team for any positive Lab results or changes in treatment. The nurses are available 7 days a week from 10A to 6:30P.  You can leave a message 24 hours per day and they will return your call.        Test Results From Your Hospital Stay        11/27/2017 11:53 AM      Component Results     Component Value Ref Range & Units Status    WBC 10.5 4.0 - 11.0 10e9/L Final    RBC Count 4.11 (L) 4.4 - 5.9 10e12/L Final    Hemoglobin 11.3 (L) 13.3 - 17.7 g/dL Final    Hematocrit  35.4 (L) 40.0 - 53.0 % Final    MCV 86 78 - 100 fl Final    MCH 27.5 26.5 - 33.0 pg Final    MCHC 31.9 31.5 - 36.5 g/dL Final    RDW 14.5 10.0 - 15.0 % Final    Platelet Count 313 150 - 450 10e9/L Final    Diff Method Automated Method  Final    % Neutrophils 83.5 % Final    % Lymphocytes 8.0 % Final    % Monocytes 6.1 % Final    % Eosinophils 1.9 % Final    % Basophils 0.3 % Final    % Immature Granulocytes 0.2 % Final    Nucleated RBCs 0 0 /100 Final    Absolute Neutrophil 8.7 (H) 1.6 - 8.3 10e9/L Final    Absolute Lymphocytes 0.8 0.8 - 5.3 10e9/L Final    Absolute Monocytes 0.6 0.0 - 1.3 10e9/L Final    Absolute Eosinophils 0.2 0.0 - 0.7 10e9/L Final    Absolute Basophils 0.0 0.0 - 0.2 10e9/L Final    Abs Immature Granulocytes 0.0 0 - 0.4 10e9/L Final    Absolute Nucleated RBC 0.0  Final         11/27/2017 12:05 PM      Component Results     Component Value Ref Range & Units Status    INR 1.00 0.86 - 1.14 Final         11/27/2017 12:18 PM      Component Results     Component Value Ref Range & Units Status    Sodium 140 133 - 144 mmol/L Final    Potassium 3.9 3.4 - 5.3 mmol/L Final    Chloride 103 94 - 109 mmol/L Final    Carbon Dioxide 32 20 - 32 mmol/L Final    Anion Gap 5 3 - 14 mmol/L Final    Glucose 106 (H) 70 - 99 mg/dL Final    Urea Nitrogen 34 (H) 7 - 30 mg/dL Final    Creatinine 3.75 (H) 0.66 - 1.25 mg/dL Final    GFR Estimate 16 (L) >60 mL/min/1.7m2 Final    Non  GFR Calc    GFR Estimate If Black 19 (L) >60 mL/min/1.7m2 Final    African American GFR Calc    Calcium 8.9 8.5 - 10.1 mg/dL Final    Bilirubin Total 0.7 0.2 - 1.3 mg/dL Final    Albumin 3.5 3.4 - 5.0 g/dL Final    Protein Total 6.9 6.8 - 8.8 g/dL Final    Alkaline Phosphatase 87 40 - 150 U/L Final    ALT 17 0 - 70 U/L Final    AST 18 0 - 45 U/L Final         11/27/2017 12:18 PM      Component Results     Component Value Ref Range & Units Status    Troponin I ES 0.100 (H) 0.000 - 0.045 ug/L Final    The 99th percentile for upper  reference range is 0.045 ug/L.  Troponin values   in the range of 0.045 - 0.120 ug/L may be associated with risks of adverse   clinical events.           11/27/2017 12:19 PM      Narrative     XR CHEST 2 VW 11/27/2017 11:56 AM    HISTORY: Short of breath.    COMPARISON: 2/11/2017    FINDINGS: Small bilateral pleural effusions and basilar atelectasis.  Upper lungs are clear. No pneumothorax. Moderate cardiomegaly.        Impression     IMPRESSION: Edema.    TONY PEARSON MD                Clinical Quality Measure: Blood Pressure Screening     Your blood pressure was checked while you were in the emergency department today. The last reading we obtained was  BP: 185/83 . Please read the guidelines below about what these numbers mean and what you should do about them.  If your systolic blood pressure (the top number) is less than 120 and your diastolic blood pressure (the bottom number) is less than 80, then your blood pressure is normal. There is nothing more that you need to do about it.  If your systolic blood pressure (the top number) is 120-139 or your diastolic blood pressure (the bottom number) is 80-89, your blood pressure may be higher than it should be. You should have your blood pressure rechecked within a year by a primary care provider.  If your systolic blood pressure (the top number) is 140 or greater or your diastolic blood pressure (the bottom number) is 90 or greater, you may have high blood pressure. High blood pressure is treatable, but if left untreated over time it can put you at risk for heart attack, stroke, or kidney failure. You should have your blood pressure rechecked by a primary care provider within the next 4 weeks.  If your provider in the emergency department today gave you specific instructions to follow-up with your doctor or provider even sooner than that, you should follow that instruction and not wait for up to 4 weeks for your follow-up visit.        Thank you for choosing [facility]  "      Thank you for choosing [facility] for your care. Our goal is always to provide you with excellent care. Hearing back from our patients is one way we can continue to improve our services. Please take a few minutes to complete the written survey that you may receive in the mail after you visit with us. Thank you!        [employer] Information     [employer] lets you send messages to your doctor, view your test results, renew your prescriptions, schedule appointments and more. To sign up, go to [email or web address] . Click on \"Log in\" on the left side of the screen, which will take you to the Welcome page. Then click on \"Sign up Now\" on the right side of the page.     You will be asked to enter the access code listed below, as well as some personal information. Please follow the directions to create your username and password.     Your access code is: [email or web address]  Expires: 2018 12:50 PM     Your access code will  in 90 days. If you need help or a new code, please call your [facility] clinic or [phone].        Care EveryWhere ID     This is your Care EveryWhere ID. This could be used by other organizations to access your [facility] medical records  [id number]        Equal Access to Services     LEONARD CONNELLY : Ellen phillipo Someghann, waaxda luqadaha, qaybta kaalmada adeegyada, catrachita dong. So [facility] [phone].    ATENCIÓN: Si habla español, tiene a turpin disposición servicios gratuitos de asistencia lingüística. Llame al [phone].    We comply with applicable federal civil rights laws and [place] laws. We do not discriminate on the basis of race, color, national origin, age, disability, sex, sexual orientation, or gender identity.            After Visit Summary       This is your record. Keep this with you and show to your community pharmacist(s) and doctor(s) at your next visit.                  "

## 2017-11-27 NOTE — ED AVS SNAPSHOT
Emergency Department    [address] [place] [address]    Phone:  [phone]    Fax:  [phone]                                       Seven Savage Jr.   MRN: [id number]    Department:   Emergency Department   Date of Visit:  11/27/2017           After Visit Summary Signature Page     I have received my discharge instructions, and my questions have been answered. I have discussed any challenges I see with this plan with the nurse or doctor.    ..........................................................................................................................................  Patient/Patient Representative Signature      ..........................................................................................................................................  Patient Representative Print Name and Relationship to Patient    ..................................................               ................................................  Date                                            Time    ..........................................................................................................................................  Reviewed by Signature/Title    ...................................................              ..............................................  Date                                                            Time

## 2017-12-07 ENCOUNTER — OFFICE VISIT (OUTPATIENT)
Dept: INTERNAL MEDICINE | Facility: CLINIC | Age: 82
End: 2017-12-07
Payer: MEDICARE

## 2017-12-07 VITALS
SYSTOLIC BLOOD PRESSURE: 138 MMHG | HEART RATE: 67 BPM | DIASTOLIC BLOOD PRESSURE: 48 MMHG | TEMPERATURE: 97.5 F | WEIGHT: 186.9 LBS | BODY MASS INDEX: 27.6 KG/M2 | OXYGEN SATURATION: 91 %

## 2017-12-07 DIAGNOSIS — J44.1 COPD EXACERBATION (H): ICD-10-CM

## 2017-12-07 PROCEDURE — 99214 OFFICE O/P EST MOD 30 MIN: CPT | Performed by: INTERNAL MEDICINE

## 2017-12-07 NOTE — PATIENT INSTRUCTIONS
*  Possible inflammation of the airways from viral upper respiratory infection.     *  Use the Stialto inhaler once per day, every day.     *  Use the Albuterol nebs or inahler as needed 4-5 times per day as needed for shortness of breath.    Use Albtuerol before you perform vigorous activity.     *  No evidence for bacterial infection based on exam and history,  no antibiotics indicated.     *  Continue all other medications at the same doses.  Contact your usual pharmacy if you need refills.     *  Check the echocardiogram when you return to [facility] to re-evalauate the status of the aortic valve.    *  Return to see Dr. Antunez in approximately  Mild-late February 2018, sooner if needed.  Please get nonfasting labs done in the [facility] lab 1-2 days before this appointment.  Call [phone] to schedule both appointments.

## 2017-12-07 NOTE — PROGRESS NOTES
SUBJECTIVE:   Seven Savage Jr. is a 82 year old male who presents to clinic today for the following health issues:      Pt is not taking HCTZ    ED/UC Followup:    Facility:  [facility]  Date of visit: 11/27/17  Reason for visit: SOB/pulmonary edema  Current Status: pt has been using nebulizers about TID w/ some relief, is able to walk from parking lot into clinic but any further exertion would trigger SOB       history of CHF.   No orthopena, some dyspnea on exertion.     Wt Readings from Last 10 Encounters:   12/07/17 186 lb 14.4 oz (84.8 kg)   11/27/17 170 lb (77.1 kg)   07/13/17 186 lb (84.4 kg)   03/30/17 188 lb (85.3 kg)   02/23/17 191 lb 9.6 oz (86.9 kg)   02/16/17 189 lb 2.5 oz (85.8 kg)           Problem list and histories reviewed & adjusted, as indicated.  Additional history: as documented        Reviewed and updated as needed this visit by clinical staff       Reviewed and updated as needed this visit by Provider           Past Medical History:  ---------------------------  Past Medical History:   Diagnosis Date     COPD (chronic obstructive pulmonary disease) (H)      CRF (chronic renal failure)      Heart murmur      HLD (hyperlipidemia)      HTN (hypertension)      IDDM (insulin dependent diabetes mellitus) (H)      Renal calculi        Past Surgical History:  ---------------------------  Past Surgical History:   Procedure Laterality Date     ADENOIDECTOMY       ENDOSCOPIC POLYPECTOMY NASAL      Through vocal cords     Renal Stent      For renal calculi     TONSILLECTOMY         Current Medications:  ---------------------------  Current Outpatient Prescriptions   Medication Sig Dispense Refill     B-D U/F 31G X 8 MM insulin pen needle USE AS DIRECTED FOUR TIMES DAILY. 400 each 0     atorvastatin (LIPITOR) 40 MG tablet TAKE 1 TABLET(40 MG) BY MOUTH AT BEDTIME 90 tablet 0     carvedilol (COREG) 12.5 MG tablet TAKE 1 TABLET(12.5 MG) BY MOUTH TWICE DAILY 180 tablet 0     NOVOLOG FLEXPEN 100 UNIT/ML soln  INJECT UNDER THE SKIN USING SLIDING SCALE UP TO 50 UNITS EVERY DAY AS DIRECTED. 45 mL 0     B Complex-C-Folic Acid (BRANDI-HEIDY) TABS TAKE ONE TABLET BY MOUTH DAILY 90 tablet 0     albuterol (2.5 MG/3ML) 0.083% neb solution Take 1 vial (2.5 mg) by nebulization every 6 hours as needed for shortness of breath / dyspnea or wheezing 25 vial 11     albuterol (PROAIR HFA/PROVENTIL HFA/VENTOLIN HFA) 108 (90 BASE) MCG/ACT Inhaler Inhale 1-2 puffs into the lungs every 4 hours as needed for shortness of breath / dyspnea or wheezing 1 Inhaler 5     tiotropium-olodaterol 2.5-2.5 MCG/ACT AERS Inhale 2 puffs into the lungs daily 1 Inhaler 5     diltiazem (TIAZAC) 300 MG 24 hr ER beaded capsule Take 1 capsule (300 mg) by mouth daily 30 capsule 0     doxazosin (CARDURA) 8 MG tablet TAKE 1 TABLET BY MOUTH EVERY NIGHT AT BEDTIME. 90 tablet 0     order for DME Equipment being ordered: Four wheeled walker 1 Device 0     insulin glargine (LANTUS SOLOSTAR) 100 UNIT/ML injection Inject 26 Units Subcutaneous At Bedtime 24 mL 1     furosemide (LASIX) 20 MG tablet Take 1 tablet (20 mg) by mouth daily 90 tablet 3     lisinopril (PRINIVIL/ZESTRIL) 20 MG tablet Take 1 tablet (20 mg) by mouth daily 90 tablet 3     Respiratory Therapy Supplies (NEBULIZER/ADULT MASK) KIT 1 Device as needed 1 kit 0     order for DME Equipment being ordered: Other: Nebulizer machine  Treatment Diagnosis: COPD 1 Device 0     aspirin 81 MG chewable tablet Take 81 mg by mouth daily       VITAMIN D, CHOLECALCIFEROL, PO Take 2,000 Units by mouth every other day       darbepoetin libby (ARANESP) 100 MCG/0.5ML injection Inject 40 mcg Subcutaneous every 30 days       EPLERENONE PO Take 50 mg by mouth daily       Insulin Aspart (NOVOLOG SC) Sliding scale       fish oil-omega-3 fatty acids 1000 MG capsule Take 1 g by mouth daily       hydrochlorothiazide (MICROZIDE) 12.5 MG capsule TAKE 2 CAPSULES(25 MG) BY MOUTH EVERY MORNING (Patient not taking: Reported on 7/13/2017) 180  capsule 1     COLCHICINE PO Take 0.6 mg by mouth every 48 hours as needed Reported on 3/30/2017         Allergies:  -------------  Allergies   Allergen Reactions     Pioglitazone Other (See Comments)     Edema & hay fever     Vytorin Other (See Comments)     Muscle aches       Social History:  -------------------  Social History     Social History     Marital status:      Spouse name: N/A     Number of children: N/A     Years of education: N/A     Occupational History     Not on file.     Social History Main Topics     Smoking status: Former Smoker     Smokeless tobacco: Never Used     Alcohol use Yes      Comment: 1 beer per month     Drug use: No     Sexual activity: Not Currently     Other Topics Concern     Not on file     Social History Narrative       Family Medical History:  ------------------------------  Family History   Problem Relation Age of Onset     Family history unknown: Yes         ROS:  REVIEW OF SYSTEMS:    RESP: positive for cough, dyspnea, wheezing; negative for hemoptysis   CV: negative for chest pain, palpitations, PND, orthopnea;   GI: negative for dysphagia, N/V, pain, melena, diarrhea and constipation  NEURO: negative for numbness/tingling, paralysis, incoordination, or focal weakness     OBJECTIVE:                                                    /48  Pulse 67  Temp 97.5  F (36.4  C) (Oral)  Wt 186 lb 14.4 oz (84.8 kg)  SpO2 91%  BMI 27.6 kg/m2     GENERAL alert and no distress  EYES:  Normal sclera,conjunctiva, EOMI  HENT: oral and posterior pharynx without lesions or erythema, facies symmetric  NECK: Neck supple. No LAD, without thyroidmegaly or JVD., Carotids without bruits.  RESP: breath sounds quiet but clear, diminished respiratory excursion, prolonged expiratory phase, no rhonci, few scattered end exp wheezes, no rales .  CV: RRR normal S1S2 without murmurs, rubs or gallops. PMI normal  LYMPH: no cervical lymph adenopathy appreciated  MS: extremities- no gross  deformities of the visible extremities noted, no edema  PSYCH: Alert and oriented times 3; speech- coherent  SKIN:  No obvious significant skin lesions on visible portions of face          ASSESSMENT/PLAN:                                                      (J44.1) COPD exacerbation (H)  Comment: Most liekly COPD exacerbation.   Does have history of CHF, but exam today not consistent with CHF.   Discussed issues of bronchial disease/bronchospasm.    Recommended symptomatic treatment with bronchodilator (albuterol) as needed.  Due to the degree of inflammation present in exam, Prednisone was not indicated.   Antibiotics are not indicated based on history and exam findings today.  Continue to treat any congestion as needed with decongestants, any allergic components with nonsedating antihistamine.  If sx. recur or worsen, may need more chronic/regular medication such as Advair or similar.  Patient was instructed to contact us if there is any worsening, changes in symptoms, or no improvement.     Plan:        *  Possible inflammation of the airways from viral upper respiratory infection.     *  Use the Stialto inhaler once per day, every day.     *  Use the Albuterol nebs or inahler as needed 4-5 times per day as needed for shortness of breath.    Use Albtuerol before you perform vigorous activity.     *  No evidence for bacterial infection based on exam and history,  no antibiotics indicated.     *  Continue all other medications at the same doses.  Contact your usual pharmacy if you need refills.     *  Check the echocardiogram when you return to [place] to re-evalauate the status of the aortic valve.    *  Return to see Dr. Antunez in approximately  Mild-late February 2018, sooner if needed.  Please [facility]fasting [facility] 1-2 days before this appointment.  Call [phone] to schedule both appointments.             See Patient Instructions    CARLOS ISSA M.D., MD  [facility]  [place]

## 2017-12-07 NOTE — MR AVS SNAPSHOT
After Visit Summary   12/7/2017    Seven Savage Jr.    MRN: [id number]           Patient Information     Date Of Birth          1935        Visit Information        Provider Department      12/7/2017 10:40 AM Werner Sherman MD [facility]        Today's Diagnoses     COPD exacerbation (H)          Care Instructions    *  Possible inflammation of the airways from viral upper respiratory infection.     *  Use the Stialto inhaler once per day, every day.     *  Use the Albuterol nebs or inahler as needed 4-5 times per day as needed for shortness of breath.    Use Albtuerol before you perform vigorous activity.     *  No evidence for bacterial infection based on exam and history,  no antibiotics indicated.     *  Continue all other medications at the same doses.  Contact your usual pharmacy if you need refills.     *  Check the echocardiogram when you return to [place] to re-evalauate the status of the aortic valve.    *  Return to see Dr. Antunez in approximately  Mild-late February 2018, sooner if needed.  Please get nonfasting labs done in the [facility] lab 1-2 days before this appointment.  Call [phone] to schedule both appointments.                 Follow-ups after your visit        Who to contact     If you have questions or need follow up information about today's clinic visit or your schedule please contact [facility] directly at [phone].  Normal or non-critical lab and imaging results will be communicated to you by MyChart, letter or phone within 4 business days after the clinic has received the results. If you do not hear from us within 7 days, please contact the clinic through MyChart or phone. If you have a critical or abnormal lab result, we will notify you by phone as soon as possible.  Submit refill requests through [employer] or call your pharmacy and they will forward the refill request to us. Please allow 3 business  "days for your refill to be completed.          Additional Information About Your Visit        [facility] Information     [employer] lets you send messages to your doctor, view your test results, renew your prescriptions, schedule appointments and more. To sign up, go to [email or web address] . Click on \"Log in\" on the left side of the screen, which will take you to the Welcome page. Then click on \"Sign up Now\" on the right side of the page.     You will be asked to enter the access code listed below, as well as some personal information. Please follow the directions to create your username and password.     Your access code is: [email or web address]  Expires: 2018 12:50 PM     Your access code will  in 90 days. If you need help or a new code, please call your [place] clinic or [phone].        Care EveryWhere ID     This is your Care EveryWhere ID. This could be used by other organizations to access your [place] medical records  [id number]        Your Vitals Were     Pulse Temperature Pulse Oximetry BMI (Body Mass Index)          67 97.5  F (36.4  C) (Oral) 91% 27.6 kg/m2         Blood Pressure from Last 3 Encounters:   17 138/48   17 166/67   17 140/60    Weight from Last 3 Encounters:   17 186 lb 14.4 oz (84.8 kg)   17 170 lb (77.1 kg)   17 186 lb (84.4 kg)              Today, you had the following     No orders found for display       Primary Care Provider Office Phone # Fax #    Sandip Antunez [phone]       [address] [facility] [address]        Equal Access to Services     KATIE CONNELLY : Ellen Elliott, chetna carr, antoni matson, catrachita dong. So [facility] [phone].    ATENCIÓN: Si habla español, tiene a turpin disposición servicios gratuitos de asistencia lingüística. Llame al [phone].    We comply with applicable federal civil rights laws and [place] laws. We do not " discriminate on the basis of race, color, national origin, age, disability, sex, sexual orientation, or gender identity.            Thank you!     Thank you for choosing [facility]  for your care. Our goal is always to provide you with excellent care. Hearing back from our patients is one way we can continue to improve our services. Please take a few minutes to complete the written survey that you may receive in the mail after your visit with us. Thank you!             Your Updated Medication List - Protect others around you: Learn how to safely use, store and throw away your medicines at www.disposemymeds.org.          This list is accurate as of: 12/7/17 12:06 PM.  Always use your most recent med list.                   Brand Name Dispense Instructions for use Diagnosis    * albuterol (2.5 MG/3ML) 0.083% neb solution     25 vial    Take 1 vial (2.5 mg) by nebulization every 6 hours as needed for shortness of breath / dyspnea or wheezing    COPD exacerbation (H)       * albuterol 108 (90 BASE) MCG/ACT Inhaler    PROAIR HFA/PROVENTIL HFA/VENTOLIN HFA    1 Inhaler    Inhale 1-2 puffs into the lungs every 4 hours as needed for shortness of breath / dyspnea or wheezing    COPD exacerbation (H)       aspirin 81 MG chewable tablet      Take 81 mg by mouth daily        atorvastatin 40 MG tablet    LIPITOR    90 tablet    TAKE 1 TABLET(40 MG) BY MOUTH AT BEDTIME    Type 2 diabetes mellitus with chronic kidney disease on chronic dialysis, with long-term current use of insulin (H), Hyperlipidemia LDL goal <100       B-D U/F 31G X 8 MM   Generic drug:  insulin pen needle     400 each    USE AS DIRECTED FOUR TIMES DAILY.    Type 2 diabetes mellitus with chronic kidney disease on chronic dialysis, with long-term current use of insulin (H)       carvedilol 12.5 MG tablet    COREG    180 tablet    TAKE 1 TABLET(12.5 MG) BY MOUTH TWICE DAILY    Aortic valve stenosis, unspecified etiology, Essential  hypertension, benign       COLCHICINE PO      Take 0.6 mg by mouth every 48 hours as needed Reported on 3/30/2017        darbepoetin libby 100 MCG/0.5ML injection    ARANESP     Inject 40 mcg Subcutaneous every 30 days        diltiazem 300 MG 24 hr ER beaded capsule    TIAZAC    30 capsule    Take 1 capsule (300 mg) by mouth daily    Essential hypertension, benign       doxazosin 8 MG tablet    CARDURA    90 tablet    TAKE 1 TABLET BY MOUTH EVERY NIGHT AT BEDTIME.    Urinary retention       EPLERENONE PO      Take 50 mg by mouth daily        fish oil-omega-3 fatty acids 1000 MG capsule      Take 1 g by mouth daily        furosemide 20 MG tablet    LASIX    90 tablet    Take 1 tablet (20 mg) by mouth daily    ESRD (end stage renal disease) on dialysis (H), Essential hypertension, benign       hydrochlorothiazide 12.5 MG capsule    MICROZIDE    180 capsule    TAKE 2 CAPSULES(25 MG) BY MOUTH EVERY MORNING    ESRD (end stage renal disease) on dialysis (H)       insulin glargine 100 UNIT/ML injection    LANTUS SOLOSTAR    24 mL    Inject 26 Units Subcutaneous At Bedtime    Type 2 diabetes mellitus with chronic kidney disease on chronic dialysis, with long-term current use of insulin (H)       lisinopril 20 MG tablet    PRINIVIL/ZESTRIL    90 tablet    Take 1 tablet (20 mg) by mouth daily    Type 2 diabetes mellitus with chronic kidney disease on chronic dialysis, with long-term current use of insulin (H), Essential hypertension, benign       nebulizer/adult mask Kit     1 kit    1 Device as needed    Chronic obstructive pulmonary disease with acute exacerbation (H)       * NOVOLOG SC      Sliding scale        * NovoLOG FLEXPEN 100 UNIT/ML injection   Generic drug:  insulin aspart     45 mL    INJECT UNDER THE SKIN USING SLIDING SCALE UP TO 50 UNITS EVERY DAY AS DIRECTED.    Type 2 diabetes mellitus with chronic kidney disease on chronic dialysis, with long-term current use of insulin (H)       * order for DME     1 Device     Equipment being ordered: Other: Nebulizer machine Treatment Diagnosis: COPD    COPD exacerbation (H)       * order for DME     1 Device    Equipment being ordered: Four wheeled walker    DDD (degenerative disc disease), lumbar, Impaired gait       BRANDI-HEIDY Tabs     90 tablet    TAKE ONE TABLET BY MOUTH DAILY    ESRD (end stage renal disease) on dialysis (H)       tiotropium-olodaterol 2.5-2.5 MCG/ACT Aers     1 Inhaler    Inhale 2 puffs into the lungs daily    COPD exacerbation (H)       VITAMIN D (CHOLECALCIFEROL) PO      Take 2,000 Units by mouth every other day        * Notice:  This list has 6 medication(s) that are the same as other medications prescribed for you. Read the directions carefully, and ask your doctor or other care provider to review them with you.

## 2017-12-07 NOTE — NURSING NOTE
"Chief Complaint   Patient presents with     ER F/U       Initial /48  Pulse 67  Temp 97.5  F (36.4  C) (Oral)  Wt 186 lb 14.4 oz (84.8 kg)  SpO2 91%  BMI 27.6 kg/m2 Estimated body mass index is 27.6 kg/(m^2) as calculated from the following:    Height as of 11/27/17: 5' 9\" (1.753 m).    Weight as of this encounter: 186 lb 14.4 oz (84.8 kg).  Medication Reconciliation: complete   Romina Whitney CMA      "

## 2017-12-08 ASSESSMENT — ANXIETY QUESTIONNAIRES
GAD7 TOTAL SCORE: 0
6. BECOMING EASILY ANNOYED OR IRRITABLE: NOT AT ALL
2. NOT BEING ABLE TO STOP OR CONTROL WORRYING: NOT AT ALL
IF YOU CHECKED OFF ANY PROBLEMS ON THIS QUESTIONNAIRE, HOW DIFFICULT HAVE THESE PROBLEMS MADE IT FOR YOU TO DO YOUR WORK, TAKE CARE OF THINGS AT HOME, OR GET ALONG WITH OTHER PEOPLE: SOMEWHAT DIFFICULT
3. WORRYING TOO MUCH ABOUT DIFFERENT THINGS: NOT AT ALL
7. FEELING AFRAID AS IF SOMETHING AWFUL MIGHT HAPPEN: NOT AT ALL
5. BEING SO RESTLESS THAT IT IS HARD TO SIT STILL: NOT AT ALL
1. FEELING NERVOUS, ANXIOUS, OR ON EDGE: NOT AT ALL

## 2017-12-08 ASSESSMENT — PATIENT HEALTH QUESTIONNAIRE - PHQ9
5. POOR APPETITE OR OVEREATING: NOT AT ALL
SUM OF ALL RESPONSES TO PHQ QUESTIONS 1-9: 1

## 2017-12-09 ASSESSMENT — ANXIETY QUESTIONNAIRES: GAD7 TOTAL SCORE: 0

## 2018-01-02 DIAGNOSIS — I35.0 AORTIC VALVE STENOSIS, UNSPECIFIED ETIOLOGY: ICD-10-CM

## 2018-01-02 DIAGNOSIS — N18.6 ESRD (END STAGE RENAL DISEASE) ON DIALYSIS (H): ICD-10-CM

## 2018-01-02 DIAGNOSIS — Z99.2 ESRD (END STAGE RENAL DISEASE) ON DIALYSIS (H): ICD-10-CM

## 2018-01-02 DIAGNOSIS — I10 ESSENTIAL HYPERTENSION, BENIGN: ICD-10-CM

## 2018-01-03 RX ORDER — FOLIC ACID/VIT B COMPLEX AND C 0.8 MG
TABLET ORAL
Qty: 90 TABLET | Refills: 3 | Status: SHIPPED | OUTPATIENT
Start: 2018-01-03 | End: 2018-11-15

## 2018-01-03 RX ORDER — CARVEDILOL 12.5 MG/1
TABLET ORAL
Qty: 180 TABLET | Refills: 3 | Status: SHIPPED | OUTPATIENT
Start: 2018-01-03 | End: 2018-11-15

## 2018-01-03 NOTE — TELEPHONE ENCOUNTER
B Complex-C-Folic Acid (BRANDI-HEIDY) TABS      Last Written Prescription Date:  9/12/17  Last Fill Quantity: 90 TABLET,   # refills: 0  Last Office Visit: 12/7/17  Future Office visit:       Routing refill request to provider for review/approval because:  Drug not on the [facility], [facility] or [facility] refill protocol or controlled substance        Requested Prescriptions   Pending Prescriptions Disp Refills                       carvedilol (COREG) 12.5 MG tablet [Pharmacy Med Name: CARVEDILOL 12.5MG TABLETS]  Last Written Prescription Date:  10/2/17  Last Fill Quantity: 180 TABLET,  # refills: 0   Last Office Visit with [facility], [facility] or [facility] prescribing provider:  12/7/17   Future Office Visit:      180 tablet 0     Sig: TAKE 1 TABLET(12.5 MG) BY MOUTH TWICE DAILY    Beta-Blockers Protocol Passed    1/2/2018 12:02 PM       Passed - Blood pressure under 140/90    BP Readings from Last 3 Encounters:   12/07/17 138/48   11/27/17 166/67   07/13/17 140/60                Passed - Patient is age 6 or older       Passed - Recent or future visit with authorizing provider's specialty    Patient had office visit in the last year or has a visit in the next 30 days with authorizing provider.  See chart review.

## 2018-01-06 DIAGNOSIS — J44.1 COPD EXACERBATION (H): ICD-10-CM

## 2018-01-06 NOTE — TELEPHONE ENCOUNTER
Requested Prescriptions   Pending Prescriptions Disp Refills     STIOLTO RESPIMAT 2.5-2.5 MCG/ACT AERS [Pharmacy Med Name: STIOLTO RESPIMAT 2.5/2.5MCG INH 4GM]  Last Written Prescription Date:  7/13/17  Last Fill Quantity: 1 inhaler,  # refills: 5   Last Office Visit with [facility] or [facility] prescribing provider:  12/7/17   Future Office Visit:      4 g 0     Sig: INHALE 2 PUFFS INTO THE LUNGS DAILY    Inhaled Steroids Protocol Passed    1/6/2018  3:45 AM       Passed - Patient is age 12 or older       Passed - Recent or future visit with authorizing provider's specialty    Patient had office visit in the last year or has a visit in the next 30 days with authorizing provider.  See chart review.

## 2018-01-08 RX ORDER — TIOTROPIUM BROMIDE AND OLODATEROL 3.124; 2.736 UG/1; UG/1
SPRAY, METERED RESPIRATORY (INHALATION)
Qty: 4 G | Refills: 10 | Status: SHIPPED | OUTPATIENT
Start: 2018-01-08 | End: 2018-12-26

## 2018-01-10 DIAGNOSIS — Z99.2 TYPE 2 DIABETES MELLITUS WITH CHRONIC KIDNEY DISEASE ON CHRONIC DIALYSIS, WITH LONG-TERM CURRENT USE OF INSULIN (H): ICD-10-CM

## 2018-01-10 DIAGNOSIS — Z79.4 TYPE 2 DIABETES MELLITUS WITH CHRONIC KIDNEY DISEASE ON CHRONIC DIALYSIS, WITH LONG-TERM CURRENT USE OF INSULIN (H): ICD-10-CM

## 2018-01-10 DIAGNOSIS — R33.9 URINARY RETENTION: ICD-10-CM

## 2018-01-10 DIAGNOSIS — E11.22 TYPE 2 DIABETES MELLITUS WITH CHRONIC KIDNEY DISEASE ON CHRONIC DIALYSIS, WITH LONG-TERM CURRENT USE OF INSULIN (H): ICD-10-CM

## 2018-01-10 DIAGNOSIS — N18.6 TYPE 2 DIABETES MELLITUS WITH CHRONIC KIDNEY DISEASE ON CHRONIC DIALYSIS, WITH LONG-TERM CURRENT USE OF INSULIN (H): ICD-10-CM

## 2018-01-10 RX ORDER — DOXAZOSIN 8 MG/1
TABLET ORAL
Qty: 90 TABLET | Refills: 3 | Status: SHIPPED | OUTPATIENT
Start: 2018-01-10 | End: 2018-12-31

## 2018-01-10 RX ORDER — HYDROCHLOROTHIAZIDE 25 MG/1
TABLET ORAL
Qty: 90 TABLET | Refills: 0 | OUTPATIENT
Start: 2018-01-10

## 2018-01-10 RX ORDER — INSULIN ASPART 100 [IU]/ML
INJECTION, SOLUTION INTRAVENOUS; SUBCUTANEOUS
Qty: 45 ML | Refills: 0 | OUTPATIENT
Start: 2018-01-10

## 2018-01-10 NOTE — TELEPHONE ENCOUNTER
"Requested Prescriptions   Pending Prescriptions Disp Refills     hydrochlorothiazide (HYDRODIURIL) 25 MG tablet [Pharmacy Med Name: HYDROCHLOROTHIAZIDE 25MG TABLETS]  Last Written Prescription Date:  7/06/2017  Last Fill Quantity: 180,  # refills: 1   Last Office Visit with [facility], [facility] or [facility] prescribing provider:  12/07/2017   Future Office Visit:      90 tablet 0     Sig: TAKE 1 TABLET BY MOUTH ONCE DAILY    Diuretics (Including Combos) Protocol Failed    1/10/2018  9:03 AM       Failed - Normal serum creatinine on file in past 12 months    Recent Labs   Lab Test  11/27/17   1143   CR  3.75*             Passed - Blood pressure under 140/90    BP Readings from Last 3 Encounters:   12/07/17 138/48   11/27/17 166/67 07/13/17 140/60                Passed - Recent or future visit with authorizing provider's specialty    Patient had office visit in the last year or has a visit in the next 30 days with authorizing provider.  See \"Patient Info\" tab in inbasket, or \"Choose Columns\" in Meds & Orders section of the refill encounter.              Passed - Patient is age 18 or older       Passed - Normal serum potassium on file in past 12 months    Recent Labs   Lab Test  11/27/17   1143   POTASSIUM  3.9                   Passed - Normal serum sodium on file in past 12 months    Recent Labs   Lab Test  11/27/17   1143   NA  140              NOVOLOG FLEXPEN 100 UNIT/ML soln [Pharmacy Med Name: NOVOLOG FLEXPEN INJ 5X3ML (ORANGE)]  Last Written Prescription Date:  9/14/2017  Last Fill Quantity: 45 mL,  # refills: 0   Last Office Visit with [facility], [facility] or [facility] prescribing provider:  12/07/2017   Future Office Visit:      45 mL 0     Sig: INJECT UNDER THE SKIN USING SLIDING SCALE UP TO 50 UNITS EVERY DAY AS DIRECTED.    Short Acting Insulin Protocol Passed    1/10/2018  9:03 AM       Passed - Blood pressure less than 140/90 in past 6 months    BP Readings from Last 3 Encounters:   12/07/17 138/48   11/27/17 166/67   07/13/17 " "140/60                Passed - LDL on file in past 12 months    Recent Labs   Lab Test  08/15/17   1219   LDL  83            Passed - Microalbumin on file in past 12 months    Recent Labs   Lab Test  08/15/17   1219   MICROL  1040   UMALCR  1571.00*            Passed - Serum creatinine on file in past 12 months    Recent Labs   Lab Test  11/27/17   1143   CR  3.75*            Passed - HgbA1C in past 3 or 6 months    Recent Labs   Lab Test  08/15/17   1219   A1C  6.1*            Passed - Patient is age 18 or older       Passed - Recent visit with authorizing provider's specialty in past 6 months    Patient had office visit in the last 6 months or has a visit in the next 30 days with authorizing provider.  See \"Patient Info\" tab in inbasket, or \"Choose Columns\" in Meds & Orders section of the refill encounter.             "

## 2018-02-06 DIAGNOSIS — N18.6 TYPE 2 DIABETES MELLITUS WITH CHRONIC KIDNEY DISEASE ON CHRONIC DIALYSIS, WITH LONG-TERM CURRENT USE OF INSULIN (H): ICD-10-CM

## 2018-02-06 DIAGNOSIS — Z79.4 TYPE 2 DIABETES MELLITUS WITH CHRONIC KIDNEY DISEASE ON CHRONIC DIALYSIS, WITH LONG-TERM CURRENT USE OF INSULIN (H): ICD-10-CM

## 2018-02-06 DIAGNOSIS — E11.22 TYPE 2 DIABETES MELLITUS WITH CHRONIC KIDNEY DISEASE ON CHRONIC DIALYSIS, WITH LONG-TERM CURRENT USE OF INSULIN (H): ICD-10-CM

## 2018-02-06 DIAGNOSIS — Z99.2 TYPE 2 DIABETES MELLITUS WITH CHRONIC KIDNEY DISEASE ON CHRONIC DIALYSIS, WITH LONG-TERM CURRENT USE OF INSULIN (H): ICD-10-CM

## 2018-02-06 DIAGNOSIS — E78.5 HYPERLIPIDEMIA LDL GOAL <100: ICD-10-CM

## 2018-02-06 RX ORDER — ATORVASTATIN CALCIUM 40 MG/1
TABLET, FILM COATED ORAL
Qty: 90 TABLET | Refills: 1 | Status: SHIPPED | OUTPATIENT
Start: 2018-02-06 | End: 2018-08-01

## 2018-02-06 NOTE — TELEPHONE ENCOUNTER
"Requested Prescriptions   Pending Prescriptions Disp Refills     atorvastatin (LIPITOR) 40 MG tablet [Pharmacy Med Name: ATORVASTATIN 40MG TABLETS]  Last Written Prescription Date:  11/10/2017  Last Fill Quantity: 90,  # refills: 0   Last Office Visit with [facility] provider:  12/07/2017   Future Office Visit:      90 tablet 0     Sig: TAKE 1 TABLET(40 MG) BY MOUTH AT BEDTIME    Statins Protocol Passed    2/6/2018  3:45 AM       Passed - LDL on file in past 12 months    Recent Labs   Lab Test  08/15/17   1219   LDL  83            Passed - No abnormal creatine kinase in past 12 months    No lab results found.         Passed - Recent or future visit with authorizing provider    Patient had office visit in the last year or has a visit in the next 30 days with authorizing provider.  See \"Patient Info\" tab in inbasket, or \"Choose Columns\" in Meds & Orders section of the refill encounter.            Passed - Patient is age 18 or older          "

## 2018-02-18 NOTE — PROGRESS NOTES
Visit #:  2    Subjective:  Seven Savage Jr. is a 82 year old male who is seen in f/u up for:        Somatic dysfunction of lumbar region  Somatic dysfunction of sacral region  Sacral pain  Poor posture  Chronic bilateral low back pain without sciatica.     Since last visit on 6/26/2017,  Seven Savage Jr. reports:    Area of chief complaint:  Thoracic and Lumbar :  Symptoms are graded at 4/10. The quality is described as stiff, achey, dull.  Motion has increased, but is still not normal. The pt reports 65% subjective improvement since initial presentation. He is pleased with his improvement. The px is on the R side of the low back area and hip. The pt denies radiation of pain in the extremities.  Patient feels that they are improved due to a reduction in symptoms.     Since last visit the patient feels that they are 65 percent  improved from last visit.       Objective:  The following was observed:    P: pain elicited on palpation, T6, T7, L4, L5, SACRUM, R PSIS  A: static palpation demonstrates intersegmental asymmetry , T6, T7, L4, L5, SACRUM, R PSIS  R: motion palpation notes restricted motion  T: hypertonicity at: Gluteal, Lumbar erector spine and Quad lumb R>>L    Segmental spinal dysfunction/restrictions found at:  T6, T7, L4, L5, SACRUM, R PSIS      Assessment:    Diagnoses:      1. Somatic dysfunction of lumbar region    2. Somatic dysfunction of sacral region    3. Sacral pain    4. Poor posture    5. Chronic bilateral low back pain without sciatica        Patient's condition:  Patient had restrictions pre-manipulation    Treatment effectiveness:  Post manipulation there is better intersegmental movement and Patient claims to feel looser post manipulation      Procedures:  CMT:  49580 Chiropractic manipulative treatment 3-4 regions performed   Thoracic: Diversified, T5, T6, T9, Prone  Lumbar: Diversified, L5, Side posture  Pelvis: Drop Table, Sacrum , PSIS Right , Prone    Modalities:  72832: US:   1.5 Coll[place]/cm squared for 3 minutes at 1 mhz   Location: R hip and low back    Therapeutic procedures:  None    Response to Treatment  Reduction in symptoms as reported by patient  STANDING: the patient specific goal is to attain the pre-injury status of 1-2 hours comfortably Prognosis: Good    Progress towards Goals: Patient is making progress towards the goal.     Recommendations:    Instructions:none     Follow-up:  Return to care in 1 week with ACP.          Cont. Synthroid

## 2018-03-14 DIAGNOSIS — Z99.2 TYPE 2 DIABETES MELLITUS WITH CHRONIC KIDNEY DISEASE ON CHRONIC DIALYSIS, WITH LONG-TERM CURRENT USE OF INSULIN (H): ICD-10-CM

## 2018-03-14 DIAGNOSIS — E11.22 TYPE 2 DIABETES MELLITUS WITH CHRONIC KIDNEY DISEASE ON CHRONIC DIALYSIS, WITH LONG-TERM CURRENT USE OF INSULIN (H): ICD-10-CM

## 2018-03-14 DIAGNOSIS — Z79.4 TYPE 2 DIABETES MELLITUS WITH CHRONIC KIDNEY DISEASE ON CHRONIC DIALYSIS, WITH LONG-TERM CURRENT USE OF INSULIN (H): ICD-10-CM

## 2018-03-14 DIAGNOSIS — N18.6 TYPE 2 DIABETES MELLITUS WITH CHRONIC KIDNEY DISEASE ON CHRONIC DIALYSIS, WITH LONG-TERM CURRENT USE OF INSULIN (H): ICD-10-CM

## 2018-03-14 RX ORDER — INSULIN GLARGINE 100 [IU]/ML
INJECTION, SOLUTION SUBCUTANEOUS
Qty: 30 ML | Refills: 0 | Status: SHIPPED | OUTPATIENT
Start: 2018-03-14 | End: 2018-03-20

## 2018-03-14 NOTE — LETTER
[facility]  [address] [id number] [phone]            Seven Savage .  [address] [facility] [address]        March 14, 2018    Dear Seven,    While refilling your prescription today, we noticed that you are due to have labs drawn.  We will refill your prescription for 30 days, but a follow-up appointment must be made before any additional refills can be approved.     Taking care of your health is important to us and we look forward to seeing you in the near future.  Please call us at [phone] or [phone] (or use LogoGrab) to schedule an appointment.     Please disregard this notice if you have already made an appointment.    Sincerely,        [facility]

## 2018-03-14 NOTE — TELEPHONE ENCOUNTER
"Requested Prescriptions   Pending Prescriptions Disp Refills     LANTUS SOLOSTAR 100 UNIT/ML soln [Pharmacy Med Name: LANTUS SOLOSTAR PEN INJ 5 X 3ML]  Last Written Prescription Date:  3/07/2017  Last Fill Quantity: 24 mL,  # refills: 1   Last office visit: 12/7/2017 with prescribing provider:  12/07/2017   Future Office Visit:     30 mL 0     Sig: INJECT 26 UNITS UNDER THE SKIN AT BEDTIME    Long Acting Insulin Protocol Failed    3/14/2018  3:46 AM       Failed - HgbA1C in past 3 or 6 months    Recent Labs   Lab Test  08/15/17   1219   A1C  6.1*            Passed - Blood pressure less than 140/90 in past 6 months    BP Readings from Last 3 Encounters:   12/07/17 138/48   11/27/17 166/67   07/13/17 140/60                Passed - LDL on file in past 12 months    Recent Labs   Lab Test  08/15/17   1219   LDL  83            Passed - Microalbumin on file in past 12 months    Recent Labs   Lab Test  08/15/17   1219   MICROL  1040   UMALCR  1571.00*            Passed - Serum creatinine on file in past 12 months    Recent Labs   Lab Test  11/27/17   1143   CR  3.75*            Passed - Patient is age 18 or older       Passed - Recent (6 mo) or future (30 days) visit within the authorizing provider's specialty    Patient had office visit in the last 6 months or has a visit in the next 30 days with authorizing provider or within the authorizing provider's specialty.  See \"Patient Info\" tab in inbasket, or \"Choose Columns\" in Meds & Orders section of the refill encounter.              "

## 2018-03-19 DIAGNOSIS — J44.1 COPD EXACERBATION (H): ICD-10-CM

## 2018-03-20 ENCOUNTER — OFFICE VISIT (OUTPATIENT)
Dept: INTERNAL MEDICINE | Facility: CLINIC | Age: 83
End: 2018-03-20
Payer: MEDICARE

## 2018-03-20 VITALS
HEART RATE: 62 BPM | HEIGHT: 69 IN | RESPIRATION RATE: 16 BRPM | BODY MASS INDEX: 28.26 KG/M2 | DIASTOLIC BLOOD PRESSURE: 68 MMHG | TEMPERATURE: 97.8 F | SYSTOLIC BLOOD PRESSURE: 138 MMHG | OXYGEN SATURATION: 93 % | WEIGHT: 190.8 LBS

## 2018-03-20 DIAGNOSIS — Z99.2 ESRD (END STAGE RENAL DISEASE) ON DIALYSIS (H): ICD-10-CM

## 2018-03-20 DIAGNOSIS — N18.6 TYPE 2 DIABETES MELLITUS WITH CHRONIC KIDNEY DISEASE ON CHRONIC DIALYSIS, WITH LONG-TERM CURRENT USE OF INSULIN (H): Primary | ICD-10-CM

## 2018-03-20 DIAGNOSIS — E11.22 TYPE 2 DIABETES MELLITUS WITH CHRONIC KIDNEY DISEASE ON CHRONIC DIALYSIS, WITH LONG-TERM CURRENT USE OF INSULIN (H): Primary | ICD-10-CM

## 2018-03-20 DIAGNOSIS — Z79.4 TYPE 2 DIABETES MELLITUS WITH CHRONIC KIDNEY DISEASE ON CHRONIC DIALYSIS, WITH LONG-TERM CURRENT USE OF INSULIN (H): Primary | ICD-10-CM

## 2018-03-20 DIAGNOSIS — N18.6 ESRD (END STAGE RENAL DISEASE) ON DIALYSIS (H): ICD-10-CM

## 2018-03-20 DIAGNOSIS — Z99.2 TYPE 2 DIABETES MELLITUS WITH CHRONIC KIDNEY DISEASE ON CHRONIC DIALYSIS, WITH LONG-TERM CURRENT USE OF INSULIN (H): Primary | ICD-10-CM

## 2018-03-20 DIAGNOSIS — I10 ESSENTIAL HYPERTENSION, BENIGN: ICD-10-CM

## 2018-03-20 LAB — HBA1C MFR BLD: 5.9 % (ref 4.3–6)

## 2018-03-20 PROCEDURE — 83036 HEMOGLOBIN GLYCOSYLATED A1C: CPT | Performed by: INTERNAL MEDICINE

## 2018-03-20 PROCEDURE — 36415 COLL VENOUS BLD VENIPUNCTURE: CPT | Performed by: INTERNAL MEDICINE

## 2018-03-20 PROCEDURE — 99214 OFFICE O/P EST MOD 30 MIN: CPT | Performed by: INTERNAL MEDICINE

## 2018-03-20 RX ORDER — FUROSEMIDE 20 MG
20 TABLET ORAL DAILY
Qty: 90 TABLET | Refills: 3 | Status: SHIPPED | OUTPATIENT
Start: 2018-03-20 | End: 2018-05-22

## 2018-03-20 RX ORDER — LISINOPRIL 20 MG/1
20 TABLET ORAL DAILY
Qty: 90 TABLET | Refills: 3 | Status: ON HOLD | OUTPATIENT
Start: 2018-03-20 | End: 2018-11-07

## 2018-03-20 RX ORDER — ALBUTEROL SULFATE 0.83 MG/ML
SOLUTION RESPIRATORY (INHALATION)
Qty: 75 ML | Refills: 2 | Status: SHIPPED | OUTPATIENT
Start: 2018-03-20 | End: 2018-04-14

## 2018-03-20 ASSESSMENT — PAIN SCALES - GENERAL: PAINLEVEL: MILD PAIN (2)

## 2018-03-20 NOTE — MR AVS SNAPSHOT
"              After Visit Summary   3/20/2018    Seven Savage Jr.    MRN: [id number]           Patient Information     Date Of Birth          1935        Visit Information        Provider Department      3/20/2018 11:40 AM Sandip Antunez MD [facility]        Today's Diagnoses     Type 2 diabetes mellitus with chronic kidney disease on chronic dialysis, with long-term current use of insulin (H)    -  1    ESRD (end stage renal disease) on dialysis (H)        Essential hypertension, benign           Follow-ups after your visit        Follow-up notes from your care team     Return in about 6 months (around 9/20/2018), or if symptoms worsen or fail to improve, for Lab Work, Routine Visit.      Who to contact     If you have questions or need follow up information about today's clinic visit or your schedule please contact [facility] directly at [phone].  Normal or non-critical lab and imaging results will be communicated to you by MyChart, letter or phone within 4 business days after the clinic has received the results. If you do not hear from us within 7 days, please contact the clinic through [email or web address] or phone. If you have a critical or abnormal lab result, we will notify you by phone as soon as possible.  Submit refill requests through Virtual[employer] or call your pharmacy and they will forward the refill request to us. Please allow 3 business days for your refill to be completed.          Additional Information About Your Visit        MyChart Information     Virtualmin lets you send messages to your doctor, view your test results, renew your prescriptions, schedule appointments and more. To sign up, go to [email or web address] . Click on \"Log in\" on the left side of the screen, which will take you to the Welcome page. Then click on \"Sign up Now\" on the right side of the page.     You will be asked to enter the access code listed below, as well as some personal " "information. Please follow the directions to create your username and password.     Your access code is: [email or web address]  Expires: 2018 11:57 AM     Your access code will  in 90 days. If you need help or a new code, please call your [place] clinic or [phone].        Care EveryWhere ID     This is your Care EveryWhere ID. This could be used by other organizations to access your [place] medical records  [id number]        Your Vitals Were     Pulse Temperature Respirations Height Pulse Oximetry BMI (Body Mass Index)    62 97.8  F (36.6  C) (Oral) 16 5' 9\" (1.753 m) 93% 28.18 kg/m2       Blood Pressure from Last 3 Encounters:   18 138/68   17 138/48   17 166/67    Weight from Last 3 Encounters:   18 190 lb 12.8 oz (86.5 kg)   17 186 lb 14.4 oz (84.8 kg)   17 170 lb (77.1 kg)              We Performed the Following     Hemoglobin A1c          Today's Medication Changes          These changes are accurate as of 3/20/18 11:57 AM.  If you have any questions, ask your nurse or doctor.               Start taking these medicines.        Dose/Directions    blood glucose monitoring test strip   Commonly known as:  no brand specified   Used for:  Type 2 diabetes mellitus with chronic kidney disease on chronic dialysis, with long-term current use of insulin (H)   Started by:  Sandip Antunez MD        Use to test blood sugars 3 times daily or as directed. Uses onetouch   Quantity:  300 strip   Refills:  11         These medicines have changed or have updated prescriptions.        Dose/Directions    insulin glargine 100 UNIT/ML injection   Commonly known as:  LANTUS SOLOSTAR   This may have changed:  See the new instructions.   Used for:  Type 2 diabetes mellitus with chronic kidney disease on chronic dialysis, with long-term current use of insulin (H)   Changed by:  Sandip Antunez MD        INJECT 26 UNITS UNDER THE SKIN AT BEDTIME   Quantity:  30 mL   Refills:  5       * " NOVOLOG SC   This may have changed:  Another medication with the same name was changed. Make sure you understand how and when to take each.   Changed by:  Sandip Antunez MD        Sliding scale   Refills:  0       * insulin aspart 100 UNIT/ML injection   Commonly known as:  NovoLOG FLEXPEN   This may have changed:  See the new instructions.   Used for:  Type 2 diabetes mellitus with chronic kidney disease on chronic dialysis, with long-term current use of insulin (H)   Changed by:  Sandip Antunez MD        INJECT UNDER THE SKIN USING SLIDING SCALE UP TO 50 UNITS EVERY DAY AS DIRECTED PRIOR TO MEALS.   Quantity:  45 mL   Refills:  0       * Notice:  This list has 2 medication(s) that are the same as other medications prescribed for you. Read the directions carefully, and ask your doctor or other care provider to review them with you.         Where to get your medicines      These medications were sent to [facility] [address] [place], [place] [id number] [facility] [address] [place] [address]  [address] [facility] [address]     Phone:  [phone]     blood glucose monitoring test strip    furosemide 20 MG tablet    insulin aspart 100 UNIT/ML injection    insulin glargine 100 UNIT/ML injection    lisinopril 20 MG tablet                Primary Care Provider Office Phone # Fax #    Sandip Antunez [phone]       [address] [facility] [address]        Equal Access to Services     LEONARD CONNELLY AH: Hadii aad ku hadasho Soomaali, waaxda luqadaha, qaybta kaalmada adeegyada, catrachita arce hayelizabeth dong. So [facility] [phone].    ATENCIÓN: Si habla español, tiene a turpin disposición servicios gratuitos de asistencia lingüística. Sami benitez [phone].    We comply with applicable federal civil rights laws and [place] laws. We do not discriminate on the basis of race, color, national origin, age, disability, sex, sexual orientation, or gender identity.            Thank you!      Thank you for choosing [facility]  for your care. Our goal is always to provide you with excellent care. Hearing back from our patients is one way we can continue to improve our services. Please take a few minutes to complete the written survey that you may receive in the mail after your visit with us. Thank you!             Your Updated Medication List - Protect others around you: Learn how to safely use, store and throw away your medicines at www.disposemymeds.org.          This list is accurate as of 3/20/18 11:57 AM.  Always use your most recent med list.                   Brand Name Dispense Instructions for use Diagnosis    * albuterol 108 (90 BASE) MCG/ACT Inhaler    PROAIR HFA/PROVENTIL HFA/VENTOLIN HFA    1 Inhaler    Inhale 1-2 puffs into the lungs every 4 hours as needed for shortness of breath / dyspnea or wheezing    COPD exacerbation (H)       * albuterol (2.5 MG/3ML) 0.083% neb solution     75 mL    TAKE 1 VIAL BY NEBULIZATION EVERY 6 HOURS AS NEEDED FOR SHORNESS OF BREATH/DYSPNEA OR WHEEZING    COPD exacerbation (H)       aspirin 81 MG chewable tablet      Take 81 mg by mouth daily        atorvastatin 40 MG tablet    LIPITOR    90 tablet    TAKE 1 TABLET(40 MG) BY MOUTH AT BEDTIME    Type 2 diabetes mellitus with chronic kidney disease on chronic dialysis, with long-term current use of insulin (H), Hyperlipidemia LDL goal <100       B-D U/F 31G X 8 MM   Generic drug:  insulin pen needle     400 each    USE AS DIRECTED FOUR TIMES DAILY.    Type 2 diabetes mellitus with chronic kidney disease on chronic dialysis, with long-term current use of insulin (H)       blood glucose monitoring test strip    no brand specified    300 strip    Use to test blood sugars 3 times daily or as directed. Uses onetouch    Type 2 diabetes mellitus with chronic kidney disease on chronic dialysis, with long-term current use of insulin (H)       carvedilol 12.5 MG tablet    COREG    180 tablet     TAKE 1 TABLET(12.5 MG) BY MOUTH TWICE DAILY    Aortic valve stenosis, unspecified etiology, Essential hypertension, benign       COLCHICINE PO      Take 0.6 mg by mouth every 48 hours as needed Reported on 3/30/2017        darbepoetin libby 100 MCG/0.5ML injection    ARANESP     Inject 40 mcg Subcutaneous every 30 days        diltiazem 300 MG 24 hr ER beaded capsule    TIAZAC    30 capsule    Take 1 capsule (300 mg) by mouth daily    Essential hypertension, benign       doxazosin 8 MG tablet    CARDURA    90 tablet    TAKE 1 TABLET BY MOUTH EVERY NIGHT AT BEDTIME.    Urinary retention       EPLERENONE PO      Take 50 mg by mouth daily        fish oil-omega-3 fatty acids 1000 MG capsule      Take 1 g by mouth daily        furosemide 20 MG tablet    LASIX    90 tablet    Take 1 tablet (20 mg) by mouth daily    ESRD (end stage renal disease) on dialysis (H), Essential hypertension, benign       insulin glargine 100 UNIT/ML injection    LANTUS SOLOSTAR    30 mL    INJECT 26 UNITS UNDER THE SKIN AT BEDTIME    Type 2 diabetes mellitus with chronic kidney disease on chronic dialysis, with long-term current use of insulin (H)       lisinopril 20 MG tablet    PRINIVIL/ZESTRIL    90 tablet    Take 1 tablet (20 mg) by mouth daily    Type 2 diabetes mellitus with chronic kidney disease on chronic dialysis, with long-term current use of insulin (H), Essential hypertension, benign       nebulizer/adult mask Kit kit     1 kit    1 Device as needed    Chronic obstructive pulmonary disease with acute exacerbation (H)       * NOVOLOG SC      Sliding scale        * insulin aspart 100 UNIT/ML injection    NovoLOG FLEXPEN    45 mL    INJECT UNDER THE SKIN USING SLIDING SCALE UP TO 50 UNITS EVERY DAY AS DIRECTED PRIOR TO MEALS.    Type 2 diabetes mellitus with chronic kidney disease on chronic dialysis, with long-term current use of insulin (H)       * order for DME     1 Device    Equipment being ordered: Other: Nebulizer machine  Treatment Diagnosis: COPD    COPD exacerbation (H)       * order for DME     1 Device    Equipment being ordered: Four wheeled walker    DDD (degenerative disc disease), lumbar, Impaired gait       BRANDI-HEIDY Tabs     90 tablet    TAKE ONE TABLET BY MOUTH DAILY    ESRD (end stage renal disease) on dialysis (H)       STIOLTO RESPIMAT 2.5-2.5 MCG/ACT Aers   Generic drug:  tiotropium-olodaterol     4 g    INHALE 2 PUFFS INTO THE LUNGS DAILY    COPD exacerbation (H)       VITAMIN D (CHOLECALCIFEROL) PO      Take 2,000 Units by mouth every other day        * Notice:  This list has 6 medication(s) that are the same as other medications prescribed for you. Read the directions carefully, and ask your doctor or other care provider to review them with you.

## 2018-03-20 NOTE — PROGRESS NOTES
SUBJECTIVE:   Seven Savage Jr. is a 82 year old male who presents to clinic today for the following health issues:    Diabetes Follow-up    Patient is checking blood sugars: three times daily.   Blood sugar testing frequency justification: Adjustment of medication(s)  Results are as follows:         am - 100-115         lunchtime - 110-120         suppertime - 120    Diabetic concerns: None     Symptoms of hypoglycemia (low blood sugar): none     Paresthesias (numbness or burning in feet) or sores: No     Date of last diabetic eye exam: 4/2017    BP Readings from Last 2 Encounters:   12/07/17 138/48   11/27/17 166/67     Hemoglobin A1C (%)   Date Value   08/15/2017 6.1 (H)   02/12/2017 6.4 (H)     LDL Cholesterol Calculated (mg/dL)   Date Value   08/15/2017 83     Hypertension Follow-up      Outpatient blood pressures are not being checked.    Low Salt Diet: low salt      Amount of exercise or physical activity: None    Problems taking medications regularly: No    Medication side effects: none    Diet: regular (no restrictions)    Patient is still undergoing a routine chronic hemodialysis 3 days a week Monday, Wednesday and Friday for roughly 2-4 hours.  He is followed by Dr. DUEÑAS.      Problem list and histories reviewed & adjusted, as indicated.  Additional history: as documented    Patient Active Problem List   Diagnosis     Type 2 diabetes mellitus with chronic kidney disease on chronic dialysis, with long-term current use of insulin (H)     Hyperlipidemia LDL goal <100     Aortic valve stenosis, unspecified etiology     ESRD (end stage renal disease) on dialysis (H)     Chronic obstructive pulmonary disease with acute exacerbation (H)     Essential hypertension, benign     Counseling regarding advanced directives     Chronic bilateral low back pain without sciatica     Somatic dysfunction of lumbar region     Somatic dysfunction of sacral region     Sacral pain     Past Surgical History:   Procedure  Laterality Date     ADENOIDECTOMY       ENDOSCOPIC POLYPECTOMY NASAL      Through vocal cords     Renal Stent      For renal calculi     TONSILLECTOMY         Social History   Substance Use Topics     Smoking status: Former Smoker     Smokeless tobacco: Never Used     Alcohol use Yes      Comment: 1 beer per month     Family History   Problem Relation Age of Onset     Family history unknown: Yes         Current Outpatient Prescriptions   Medication Sig Dispense Refill     albuterol (2.5 MG/3ML) 0.083% neb solution TAKE 1 VIAL BY NEBULIZATION EVERY 6 HOURS AS NEEDED FOR SHORNESS OF BREATH/DYSPNEA OR WHEEZING 75 mL 2     insulin glargine (LANTUS SOLOSTAR) 100 UNIT/ML injection INJECT 26 UNITS UNDER THE SKIN AT BEDTIME 30 mL 5     insulin aspart (NOVOLOG FLEXPEN) 100 UNIT/ML injection INJECT UNDER THE SKIN USING SLIDING SCALE UP TO 50 UNITS EVERY DAY AS DIRECTED PRIOR TO MEALS. 45 mL 0     furosemide (LASIX) 20 MG tablet Take 1 tablet (20 mg) by mouth daily 90 tablet 3     lisinopril (PRINIVIL/ZESTRIL) 20 MG tablet Take 1 tablet (20 mg) by mouth daily 90 tablet 3     blood glucose monitoring (NO BRAND SPECIFIED) test strip Use to test blood sugars 3 times daily or as directed. Uses onetouch 300 strip 11     atorvastatin (LIPITOR) 40 MG tablet TAKE 1 TABLET(40 MG) BY MOUTH AT BEDTIME 90 tablet 1     doxazosin (CARDURA) 8 MG tablet TAKE 1 TABLET BY MOUTH EVERY NIGHT AT BEDTIME. 90 tablet 3     STIOLTO RESPIMAT 2.5-2.5 MCG/ACT AERS INHALE 2 PUFFS INTO THE LUNGS DAILY 4 g 10     B Complex-C-Folic Acid (BRANDI-HEIDY) TABS TAKE ONE TABLET BY MOUTH DAILY 90 tablet 3     carvedilol (COREG) 12.5 MG tablet TAKE 1 TABLET(12.5 MG) BY MOUTH TWICE DAILY 180 tablet 3     B-D U/F 31G X 8 MM insulin pen needle USE AS DIRECTED FOUR TIMES DAILY. 400 each 0     albuterol (PROAIR HFA/PROVENTIL HFA/VENTOLIN HFA) 108 (90 BASE) MCG/ACT Inhaler Inhale 1-2 puffs into the lungs every 4 hours as needed for shortness of breath / dyspnea or wheezing 1  Inhaler 5     diltiazem (TIAZAC) 300 MG 24 hr ER beaded capsule Take 1 capsule (300 mg) by mouth daily 30 capsule 0     order for DME Equipment being ordered: Four wheeled walker 1 Device 0     Respiratory Therapy Supplies (NEBULIZER/ADULT MASK) KIT 1 Device as needed 1 kit 0     order for DME Equipment being ordered: Other: Nebulizer machine  Treatment Diagnosis: COPD 1 Device 0     aspirin 81 MG chewable tablet Take 81 mg by mouth daily       VITAMIN D, CHOLECALCIFEROL, PO Take 2,000 Units by mouth every other day       COLCHICINE PO Take 0.6 mg by mouth every 48 hours as needed Reported on 3/30/2017       darbepoetin libby (ARANESP) 100 MCG/0.5ML injection Inject 40 mcg Subcutaneous every 30 days       EPLERENONE PO Take 50 mg by mouth daily       Insulin Aspart (NOVOLOG SC) Sliding scale       fish oil-omega-3 fatty acids 1000 MG capsule Take 1 g by mouth daily       [DISCONTINUED] LANTUS SOLOSTAR 100 UNIT/ML soln INJECT 26 UNITS UNDER THE SKIN AT BEDTIME 30 mL 0     [DISCONTINUED] NOVOLOG FLEXPEN 100 UNIT/ML soln INJECT UNDER THE SKIN USING SLIDING SCALE UP TO 50 UNITS EVERY DAY AS DIRECTED. 45 mL 0     [DISCONTINUED] furosemide (LASIX) 20 MG tablet Take 1 tablet (20 mg) by mouth daily 90 tablet 3     [DISCONTINUED] lisinopril (PRINIVIL/ZESTRIL) 20 MG tablet Take 1 tablet (20 mg) by mouth daily 90 tablet 3     Allergies   Allergen Reactions     Pioglitazone Other (See Comments)     Edema & hay fever     Vytorin Other (See Comments)     Muscle aches     BP Readings from Last 3 Encounters:   12/07/17 138/48   11/27/17 166/67   07/13/17 140/60    Wt Readings from Last 3 Encounters:   12/07/17 186 lb 14.4 oz (84.8 kg)   11/27/17 170 lb (77.1 kg)   07/13/17 186 lb (84.4 kg)           Reviewed and updated as needed this visit by clinical staff       Reviewed and updated as needed this visit by Provider         ROS:  CONSTITUTIONAL: NEGATIVE for fever, chills, change in weight  EYES: NEGATIVE for vision changes or  "irritation  ENT/MOUTH: NEGATIVE for ear, mouth and throat problems  RESP: NEGATIVE for significant cough or SOB  CV: NEGATIVE for chest pain, palpitations or peripheral edema  GI: NEGATIVE for nausea, abdominal pain, heartburn, or change in bowel habits  : NEGATIVE for frequency, dysuria, or hematuria  MUSCULOSKELETAL: NEGATIVE for significant arthralgias or myalgia  NEURO: NEGATIVE for weakness, dizziness or paresthesias  HEME: NEGATIVE for bleeding problems  PSYCHIATRIC: NEGATIVE for changes in mood or affect    OBJECTIVE:                                                    /68  Pulse 62  Temp 97.8  F (36.6  C) (Oral)  Resp 16  Ht 5' 9\" (1.753 m)  Wt 190 lb 12.8 oz (86.5 kg)  SpO2 93%  BMI 28.18 kg/m2  Body mass index is 28.18 kg/(m^2).  GENERAL:  alert and no distress  EYES: Eyes grossly normal to inspection, extraocular movements - intact, and PERRL  HENT: ear canals- normal; TMs- normal; Nose- normal; Mouth- no ulcers, no lesions  NECK: no tenderness, no adenopathy, no asymmetry, no masses, no stiffness; thyroid- normal to palpation  RESP: lungs clear to auscultation - no rales, no rhonchi, no wheezes  CV: regular rates and rhythm, normal S1 S2, no S3 or S4 and no click or rub   MS: extremities- no gross deformities noted  NEURO:  No focal changes  Dialysis access site noted LUE  PSYCH: Alert and oriented times 3; speech- coherent , normal rate and volume; able to articulate logical thoughts, able to abstract reason, no tangential thoughts, no hallucinations or delusions, affect- normal     ASSESSMENT/PLAN:                                                      (E11.22,  N18.6,  Z99.2,  Z79.4) Type 2 diabetes mellitus with chronic kidney disease on chronic dialysis, with long-term current use of insulin (H)  (primary encounter diagnosis)  Comment: Stable and under fairly decent control continuing with medications as ordered 6 months A1c checked done  Plan: Hemoglobin A1c, insulin glargine (LANTUS    "      SOLOSTAR) 100 UNIT/ML injection, insulin aspart        (NOVOLOG FLEXPEN) 100 UNIT/ML injection,         lisinopril (PRINIVIL/ZESTRIL) 20 MG tablet,         blood glucose monitoring (NO BRAND SPECIFIED)         test strip            (N18.6,  Z99.2) ESRD (end stage renal disease) on dialysis (H)  Comment: On therapy as directed continuing with chronic dialysis  Plan: furosemide (LASIX) 20 MG tablet            (I10) Essential hypertension, benign  Comment: Stable at goal continue with medications as ordered  Plan: furosemide (LASIX) 20 MG tablet, lisinopril         (PRINIVIL/ZESTRIL) 20 MG tablet        Note that the patient has had a hydrochlorothiazide discontinued by another provider.      See Patient Instructions    Sandip Antunez MD  [facility]    THE MEDICATION LIST HAS BEEN FULLY RECONCILED BY THE [facility] AND THE NURSING STAFF.    25 minutes spent with this patient, face to face, discussing treatment options for listed problems above as well as side effects of appropriate medications.  Counseling time extended beyond 50% of the clinic visit.  Medication dosing, treatment plan and follow-up were discussed. Also reviewed need for primary care testing for patient.

## 2018-03-20 NOTE — LETTER
[facility]  [address] [address]  [phone]      3/20/2018       Seven Savage Jr.  [address] [facility] [address]        Dear Seven,      Your Hemoglobin A1C and blood sugar tests look good and I would continue with your medication without change.  These tests should be repeated in 6 months.      Sincerely,      Sandip Antunez MD  Internal Medicine

## 2018-03-20 NOTE — TELEPHONE ENCOUNTER
"Requested Prescriptions   Pending Prescriptions Disp Refills     albuterol (2.5 MG/3ML) 0.083% neb solution [Pharmacy Med Name: ALBUTEROL 0.083%(2.5MG/3ML) 25X3ML] 75 mL 0     Sig: TAKE 1 VIAL BY NEBULIZATION EVERY 6 HOURS AS NEEDED FOR SHORNESS OF BREATH/DYSPNEA OR WHEEZING    Asthma Maintenance Inhalers - Anticholinergics Passed    3/19/2018  3:46 AM       Passed - Patient is age 12 years or older       Passed - Recent (12 mo) or future (30 days) visit within the authorizing provider's specialty    Patient had office visit in the last 12 months or has a visit in the next 30 days with authorizing provider or within the authorizing provider's specialty.  See \"Patient Info\" tab in inbasket, or \"Choose Columns\" in Meds & Orders section of the refill encounter.            Prescription approved per [facility].    "

## 2018-04-03 DIAGNOSIS — I10 ESSENTIAL HYPERTENSION, BENIGN: Primary | ICD-10-CM

## 2018-04-03 RX ORDER — EPLERENONE 50 MG/1
TABLET, FILM COATED ORAL
Qty: 90 TABLET | Refills: 0 | OUTPATIENT
Start: 2018-04-03

## 2018-04-03 RX ORDER — EPLERENONE 25 MG/1
50 TABLET, FILM COATED ORAL DAILY
OUTPATIENT
Start: 2018-04-03

## 2018-04-03 NOTE — TELEPHONE ENCOUNTER
This is not a medication that I prescribed and appears have not prescribed thus needs to come from the originating provider.

## 2018-04-03 NOTE — TELEPHONE ENCOUNTER
Requested Prescriptions   Pending Prescriptions Disp Refills     eplerenone (INSPRA) 25 MG tablet  Last Written Prescription Date:  N/A  Last Fill Quantity: N/A,  # refills: N/A   Last Office Visit: 3/20/2018   Future Office Visit:          Sig: Take 2 tablets (50 mg) by mouth daily    There is no refill protocol information for this order

## 2018-04-03 NOTE — TELEPHONE ENCOUNTER
.Routing refill request to provider for review/approval because:  Medication is reported/historical

## 2018-04-06 ENCOUNTER — HOSPITAL ENCOUNTER (INPATIENT)
Facility: CLINIC | Age: 83
LOS: 3 days | Discharge: HOME | DRG: 193 | End: 2018-04-09
Attending: EMERGENCY MEDICINE | Admitting: INTERNAL MEDICINE
Payer: MEDICARE

## 2018-04-06 ENCOUNTER — APPOINTMENT (OUTPATIENT)
Dept: GENERAL RADIOLOGY | Facility: CLINIC | Age: 83
DRG: 193 | End: 2018-04-06
Attending: EMERGENCY MEDICINE
Payer: MEDICARE

## 2018-04-06 DIAGNOSIS — J18.9 COMMUNITY ACQUIRED PNEUMONIA, UNSPECIFIED LATERALITY: ICD-10-CM

## 2018-04-06 DIAGNOSIS — J44.1 COPD EXACERBATION (H): ICD-10-CM

## 2018-04-06 LAB
ANION GAP SERPL CALCULATED.3IONS-SCNC: 10 MMOL/L (ref 3–14)
BASOPHILS # BLD AUTO: 0 10E9/L (ref 0–0.2)
BASOPHILS NFR BLD AUTO: 0.1 %
BUN SERPL-MCNC: 47 MG/DL (ref 7–30)
CALCIUM SERPL-MCNC: 8.9 MG/DL (ref 8.5–10.1)
CHLORIDE SERPL-SCNC: 102 MMOL/L (ref 94–109)
CO2 SERPL-SCNC: 27 MMOL/L (ref 20–32)
CREAT SERPL-MCNC: 3.87 MG/DL (ref 0.66–1.25)
DIFFERENTIAL METHOD BLD: ABNORMAL
EOSINOPHIL # BLD AUTO: 0.4 10E9/L (ref 0–0.7)
EOSINOPHIL NFR BLD AUTO: 2.9 %
ERYTHROCYTE [DISTWIDTH] IN BLOOD BY AUTOMATED COUNT: 13.8 % (ref 10–15)
FLUAV+FLUBV AG SPEC QL: NEGATIVE
FLUAV+FLUBV AG SPEC QL: NEGATIVE
GFR SERPL CREATININE-BSD FRML MDRD: 15 ML/MIN/1.7M2
GLUCOSE BLDC GLUCOMTR-MCNC: 198 MG/DL (ref 70–99)
GLUCOSE BLDC GLUCOMTR-MCNC: 395 MG/DL (ref 70–99)
GLUCOSE BLDC GLUCOMTR-MCNC: 428 MG/DL (ref 70–99)
GLUCOSE SERPL-MCNC: 199 MG/DL (ref 70–99)
HBA1C MFR BLD: 5.8 % (ref 0–6.4)
HCT VFR BLD AUTO: 29.5 % (ref 40–53)
HGB BLD-MCNC: 9.4 G/DL (ref 13.3–17.7)
IMM GRANULOCYTES # BLD: 0.1 10E9/L (ref 0–0.4)
IMM GRANULOCYTES NFR BLD: 0.5 %
INTERPRETATION ECG - MUSE: NORMAL
LACTATE BLD-SCNC: 1.1 MMOL/L (ref 0.7–2)
LACTATE SERPL-SCNC: 0.9 MMOL/L (ref 0.4–2)
LYMPHOCYTES # BLD AUTO: 0.8 10E9/L (ref 0.8–5.3)
LYMPHOCYTES NFR BLD AUTO: 5.3 %
MCH RBC QN AUTO: 26.8 PG (ref 26.5–33)
MCHC RBC AUTO-ENTMCNC: 31.9 G/DL (ref 31.5–36.5)
MCV RBC AUTO: 84 FL (ref 78–100)
MONOCYTES # BLD AUTO: 1.1 10E9/L (ref 0–1.3)
MONOCYTES NFR BLD AUTO: 7.4 %
NEUTROPHILS # BLD AUTO: 12 10E9/L (ref 1.6–8.3)
NEUTROPHILS NFR BLD AUTO: 83.8 %
NRBC # BLD AUTO: 0 10*3/UL
NRBC BLD AUTO-RTO: 0 /100
PLATELET # BLD AUTO: 332 10E9/L (ref 150–450)
POTASSIUM SERPL-SCNC: 4.2 MMOL/L (ref 3.4–5.3)
PROCALCITONIN SERPL-MCNC: 0.43 NG/ML
RBC # BLD AUTO: 3.51 10E12/L (ref 4.4–5.9)
SODIUM SERPL-SCNC: 139 MMOL/L (ref 133–144)
SPECIMEN SOURCE: NORMAL
WBC # BLD AUTO: 14.4 10E9/L (ref 4–11)

## 2018-04-06 PROCEDURE — 83605 ASSAY OF LACTIC ACID: CPT | Performed by: EMERGENCY MEDICINE

## 2018-04-06 PROCEDURE — 99223 1ST HOSP IP/OBS HIGH 75: CPT | Mod: AI | Performed by: INTERNAL MEDICINE

## 2018-04-06 PROCEDURE — 94640 AIRWAY INHALATION TREATMENT: CPT

## 2018-04-06 PROCEDURE — A9270 NON-COVERED ITEM OR SERVICE: HCPCS | Mod: GY | Performed by: INTERNAL MEDICINE

## 2018-04-06 PROCEDURE — 83036 HEMOGLOBIN GLYCOSYLATED A1C: CPT | Performed by: EMERGENCY MEDICINE

## 2018-04-06 PROCEDURE — 84145 PROCALCITONIN (PCT): CPT | Performed by: EMERGENCY MEDICINE

## 2018-04-06 PROCEDURE — 87804 INFLUENZA ASSAY W/OPTIC: CPT | Performed by: EMERGENCY MEDICINE

## 2018-04-06 PROCEDURE — 87040 BLOOD CULTURE FOR BACTERIA: CPT | Performed by: INTERNAL MEDICINE

## 2018-04-06 PROCEDURE — 00000146 ZZHCL STATISTIC GLUCOSE BY METER IP

## 2018-04-06 PROCEDURE — 94640 AIRWAY INHALATION TREATMENT: CPT | Mod: 76

## 2018-04-06 PROCEDURE — 25000131 ZZH RX MED GY IP 250 OP 636 PS 637: Mod: GY | Performed by: INTERNAL MEDICINE

## 2018-04-06 PROCEDURE — 25000128 H RX IP 250 OP 636: Performed by: EMERGENCY MEDICINE

## 2018-04-06 PROCEDURE — 71046 X-RAY EXAM CHEST 2 VIEWS: CPT

## 2018-04-06 PROCEDURE — 96375 TX/PRO/DX INJ NEW DRUG ADDON: CPT

## 2018-04-06 PROCEDURE — 80048 BASIC METABOLIC PNL TOTAL CA: CPT | Performed by: EMERGENCY MEDICINE

## 2018-04-06 PROCEDURE — 25000125 ZZHC RX 250: Performed by: INTERNAL MEDICINE

## 2018-04-06 PROCEDURE — 25000125 ZZHC RX 250: Performed by: EMERGENCY MEDICINE

## 2018-04-06 PROCEDURE — 12000000 ZZH R&B MED SURG/OB

## 2018-04-06 PROCEDURE — 63400005 ZZH RX 634: Performed by: INTERNAL MEDICINE

## 2018-04-06 PROCEDURE — 25000128 H RX IP 250 OP 636: Performed by: INTERNAL MEDICINE

## 2018-04-06 PROCEDURE — 90937 HEMODIALYSIS REPEATED EVAL: CPT

## 2018-04-06 PROCEDURE — 83605 ASSAY OF LACTIC ACID: CPT | Performed by: INTERNAL MEDICINE

## 2018-04-06 PROCEDURE — 96374 THER/PROPH/DIAG INJ IV PUSH: CPT

## 2018-04-06 PROCEDURE — 25000132 ZZH RX MED GY IP 250 OP 250 PS 637: Mod: GY | Performed by: INTERNAL MEDICINE

## 2018-04-06 PROCEDURE — 5A1D70Z PERFORMANCE OF URINARY FILTRATION, INTERMITTENT, LESS THAN 6 HOURS PER DAY: ICD-10-PCS | Performed by: INTERNAL MEDICINE

## 2018-04-06 PROCEDURE — 99285 EMERGENCY DEPT VISIT HI MDM: CPT | Mod: 25

## 2018-04-06 PROCEDURE — 40000275 ZZH STATISTIC RCP TIME EA 10 MIN

## 2018-04-06 PROCEDURE — 85025 COMPLETE CBC W/AUTO DIFF WBC: CPT | Performed by: EMERGENCY MEDICINE

## 2018-04-06 PROCEDURE — 36415 COLL VENOUS BLD VENIPUNCTURE: CPT | Performed by: INTERNAL MEDICINE

## 2018-04-06 PROCEDURE — 25000131 ZZH RX MED GY IP 250 OP 636 PS 637: Mod: GY

## 2018-04-06 PROCEDURE — 93005 ELECTROCARDIOGRAM TRACING: CPT

## 2018-04-06 RX ORDER — EPLERENONE 25 MG/1
50 TABLET, FILM COATED ORAL DAILY
Status: DISCONTINUED | OUTPATIENT
Start: 2018-04-06 | End: 2018-04-09 | Stop reason: HOSPADM

## 2018-04-06 RX ORDER — AMOXICILLIN 250 MG
1 CAPSULE ORAL 2 TIMES DAILY PRN
Status: DISCONTINUED | OUTPATIENT
Start: 2018-04-06 | End: 2018-04-09 | Stop reason: HOSPADM

## 2018-04-06 RX ORDER — LISINOPRIL 20 MG/1
20 TABLET ORAL DAILY
Status: DISCONTINUED | OUTPATIENT
Start: 2018-04-06 | End: 2018-04-09 | Stop reason: HOSPADM

## 2018-04-06 RX ORDER — CEFTRIAXONE 2 G/1
2 INJECTION, POWDER, FOR SOLUTION INTRAMUSCULAR; INTRAVENOUS ONCE
Status: COMPLETED | OUTPATIENT
Start: 2018-04-06 | End: 2018-04-06

## 2018-04-06 RX ORDER — ATORVASTATIN CALCIUM 40 MG/1
40 TABLET, FILM COATED ORAL DAILY
Status: DISCONTINUED | OUTPATIENT
Start: 2018-04-06 | End: 2018-04-09 | Stop reason: HOSPADM

## 2018-04-06 RX ORDER — NICOTINE POLACRILEX 4 MG
15-30 LOZENGE BUCCAL
Status: DISCONTINUED | OUTPATIENT
Start: 2018-04-06 | End: 2018-04-09 | Stop reason: HOSPADM

## 2018-04-06 RX ORDER — DILTIAZEM HYDROCHLORIDE 300 MG/1
300 CAPSULE, EXTENDED RELEASE ORAL DAILY
Status: DISCONTINUED | OUTPATIENT
Start: 2018-04-06 | End: 2018-04-06

## 2018-04-06 RX ORDER — HEPARIN SODIUM 1000 [USP'U]/ML
1000 INJECTION, SOLUTION INTRAVENOUS; SUBCUTANEOUS
Status: COMPLETED | OUTPATIENT
Start: 2018-04-06 | End: 2018-04-06

## 2018-04-06 RX ORDER — AZITHROMYCIN 250 MG/1
250 TABLET, FILM COATED ORAL EVERY 24 HOURS
Status: DISCONTINUED | OUTPATIENT
Start: 2018-04-07 | End: 2018-04-08

## 2018-04-06 RX ORDER — IPRATROPIUM BROMIDE AND ALBUTEROL SULFATE 2.5; .5 MG/3ML; MG/3ML
3 SOLUTION RESPIRATORY (INHALATION)
Status: DISCONTINUED | OUTPATIENT
Start: 2018-04-06 | End: 2018-04-06

## 2018-04-06 RX ORDER — POLYETHYLENE GLYCOL 3350 17 G/17G
17 POWDER, FOR SOLUTION ORAL DAILY PRN
Status: DISCONTINUED | OUTPATIENT
Start: 2018-04-06 | End: 2018-04-09 | Stop reason: HOSPADM

## 2018-04-06 RX ORDER — FUROSEMIDE 20 MG
20 TABLET ORAL DAILY
Status: DISCONTINUED | OUTPATIENT
Start: 2018-04-06 | End: 2018-04-09 | Stop reason: HOSPADM

## 2018-04-06 RX ORDER — NALOXONE HYDROCHLORIDE 0.4 MG/ML
.1-.4 INJECTION, SOLUTION INTRAMUSCULAR; INTRAVENOUS; SUBCUTANEOUS
Status: DISCONTINUED | OUTPATIENT
Start: 2018-04-06 | End: 2018-04-09 | Stop reason: HOSPADM

## 2018-04-06 RX ORDER — HYDRALAZINE HYDROCHLORIDE 20 MG/ML
10 INJECTION INTRAMUSCULAR; INTRAVENOUS EVERY 4 HOURS PRN
Status: DISCONTINUED | OUTPATIENT
Start: 2018-04-06 | End: 2018-04-09 | Stop reason: HOSPADM

## 2018-04-06 RX ORDER — ONDANSETRON 4 MG/1
4 TABLET, ORALLY DISINTEGRATING ORAL EVERY 6 HOURS PRN
Status: DISCONTINUED | OUTPATIENT
Start: 2018-04-06 | End: 2018-04-09 | Stop reason: HOSPADM

## 2018-04-06 RX ORDER — AMOXICILLIN 250 MG
2 CAPSULE ORAL 2 TIMES DAILY PRN
Status: DISCONTINUED | OUTPATIENT
Start: 2018-04-06 | End: 2018-04-09 | Stop reason: HOSPADM

## 2018-04-06 RX ORDER — PROCHLORPERAZINE MALEATE 5 MG
5 TABLET ORAL EVERY 6 HOURS PRN
Status: DISCONTINUED | OUTPATIENT
Start: 2018-04-06 | End: 2018-04-09 | Stop reason: HOSPADM

## 2018-04-06 RX ORDER — OXYCODONE HYDROCHLORIDE 5 MG/1
5-10 TABLET ORAL
Status: DISCONTINUED | OUTPATIENT
Start: 2018-04-06 | End: 2018-04-09 | Stop reason: HOSPADM

## 2018-04-06 RX ORDER — DOXAZOSIN 4 MG/1
8 TABLET ORAL DAILY
Status: DISCONTINUED | OUTPATIENT
Start: 2018-04-06 | End: 2018-04-06

## 2018-04-06 RX ORDER — ACETAMINOPHEN 650 MG/1
650 SUPPOSITORY RECTAL EVERY 4 HOURS PRN
Status: DISCONTINUED | OUTPATIENT
Start: 2018-04-06 | End: 2018-04-09 | Stop reason: HOSPADM

## 2018-04-06 RX ORDER — PREDNISONE 20 MG/1
40 TABLET ORAL DAILY
Status: DISCONTINUED | OUTPATIENT
Start: 2018-04-06 | End: 2018-04-09 | Stop reason: HOSPADM

## 2018-04-06 RX ORDER — HEPARIN SODIUM 1000 [USP'U]/ML
1000 INJECTION, SOLUTION INTRAVENOUS; SUBCUTANEOUS CONTINUOUS
Status: DISCONTINUED | OUTPATIENT
Start: 2018-04-06 | End: 2018-04-06

## 2018-04-06 RX ORDER — PROCHLORPERAZINE 25 MG
12.5 SUPPOSITORY, RECTAL RECTAL EVERY 12 HOURS PRN
Status: DISCONTINUED | OUTPATIENT
Start: 2018-04-06 | End: 2018-04-09 | Stop reason: HOSPADM

## 2018-04-06 RX ORDER — DOXERCALCIFEROL 4 UG/2ML
6 INJECTION INTRAVENOUS
Status: COMPLETED | OUTPATIENT
Start: 2018-04-06 | End: 2018-04-06

## 2018-04-06 RX ORDER — CARVEDILOL 12.5 MG/1
12.5 TABLET ORAL 2 TIMES DAILY WITH MEALS
Status: DISCONTINUED | OUTPATIENT
Start: 2018-04-06 | End: 2018-04-09 | Stop reason: HOSPADM

## 2018-04-06 RX ORDER — ASPIRIN 81 MG/1
81 TABLET, CHEWABLE ORAL DAILY
Status: DISCONTINUED | OUTPATIENT
Start: 2018-04-06 | End: 2018-04-09 | Stop reason: HOSPADM

## 2018-04-06 RX ORDER — ONDANSETRON 2 MG/ML
4 INJECTION INTRAMUSCULAR; INTRAVENOUS EVERY 6 HOURS PRN
Status: DISCONTINUED | OUTPATIENT
Start: 2018-04-06 | End: 2018-04-09 | Stop reason: HOSPADM

## 2018-04-06 RX ORDER — DEXTROSE MONOHYDRATE 25 G/50ML
25-50 INJECTION, SOLUTION INTRAVENOUS
Status: DISCONTINUED | OUTPATIENT
Start: 2018-04-06 | End: 2018-04-09 | Stop reason: HOSPADM

## 2018-04-06 RX ORDER — ALBUTEROL SULFATE 0.83 MG/ML
2.5 SOLUTION RESPIRATORY (INHALATION)
Status: DISCONTINUED | OUTPATIENT
Start: 2018-04-06 | End: 2018-04-09 | Stop reason: HOSPADM

## 2018-04-06 RX ORDER — ACETAMINOPHEN 325 MG/1
650 TABLET ORAL EVERY 4 HOURS PRN
Status: DISCONTINUED | OUTPATIENT
Start: 2018-04-06 | End: 2018-04-09 | Stop reason: HOSPADM

## 2018-04-06 RX ORDER — METHYLPREDNISOLONE SODIUM SUCCINATE 125 MG/2ML
125 INJECTION, POWDER, LYOPHILIZED, FOR SOLUTION INTRAMUSCULAR; INTRAVENOUS ONCE
Status: COMPLETED | OUTPATIENT
Start: 2018-04-06 | End: 2018-04-06

## 2018-04-06 RX ORDER — DOXAZOSIN 4 MG/1
8 TABLET ORAL AT BEDTIME
Status: DISCONTINUED | OUTPATIENT
Start: 2018-04-06 | End: 2018-04-09 | Stop reason: HOSPADM

## 2018-04-06 RX ORDER — CEFTRIAXONE 1 G/1
1 INJECTION, POWDER, FOR SOLUTION INTRAMUSCULAR; INTRAVENOUS EVERY 24 HOURS
Status: DISCONTINUED | OUTPATIENT
Start: 2018-04-07 | End: 2018-04-08

## 2018-04-06 RX ADMIN — CARVEDILOL 12.5 MG: 12.5 TABLET, FILM COATED ORAL at 17:16

## 2018-04-06 RX ADMIN — DOXAZOSIN MESYLATE 8 MG: 4 TABLET ORAL at 21:01

## 2018-04-06 RX ADMIN — EPOETIN ALFA 4000 UNITS: 4000 SOLUTION INTRAVENOUS; SUBCUTANEOUS at 13:45

## 2018-04-06 RX ADMIN — METHYLPREDNISOLONE SODIUM SUCCINATE 125 MG: 125 INJECTION, POWDER, FOR SOLUTION INTRAMUSCULAR; INTRAVENOUS at 03:11

## 2018-04-06 RX ADMIN — HEPARIN SODIUM 1000 UNITS: 1000 INJECTION, SOLUTION INTRAVENOUS; SUBCUTANEOUS at 12:52

## 2018-04-06 RX ADMIN — DOXERCALCIFEROL 6 MCG: 4 INJECTION INTRAVENOUS at 12:51

## 2018-04-06 RX ADMIN — ALBUTEROL SULFATE 2.5 MG: 2.5 SOLUTION RESPIRATORY (INHALATION) at 08:12

## 2018-04-06 RX ADMIN — SODIUM CHLORIDE 250 ML: 9 INJECTION, SOLUTION INTRAVENOUS at 12:50

## 2018-04-06 RX ADMIN — UMECLIDINIUM BROMIDE AND VILANTEROL TRIFENATATE 1 PUFF: 62.5; 25 POWDER RESPIRATORY (INHALATION) at 08:32

## 2018-04-06 RX ADMIN — CEFTRIAXONE SODIUM 2 G: 2 INJECTION, POWDER, FOR SOLUTION INTRAMUSCULAR; INTRAVENOUS at 03:55

## 2018-04-06 RX ADMIN — HEPARIN SODIUM 1000 UNITS/HR: 1000 INJECTION, SOLUTION INTRAVENOUS; SUBCUTANEOUS at 12:52

## 2018-04-06 RX ADMIN — LISINOPRIL 20 MG: 20 TABLET ORAL at 19:12

## 2018-04-06 RX ADMIN — ATORVASTATIN CALCIUM 40 MG: 40 TABLET, FILM COATED ORAL at 21:01

## 2018-04-06 RX ADMIN — INSULIN ASPART 2 UNITS: 100 INJECTION, SOLUTION INTRAVENOUS; SUBCUTANEOUS at 08:32

## 2018-04-06 RX ADMIN — INSULIN GLARGINE 20 UNITS: 100 INJECTION, SOLUTION SUBCUTANEOUS at 21:00

## 2018-04-06 RX ADMIN — ASPIRIN 81 MG 81 MG: 81 TABLET ORAL at 19:11

## 2018-04-06 RX ADMIN — IPRATROPIUM BROMIDE AND ALBUTEROL SULFATE 3 ML: .5; 3 SOLUTION RESPIRATORY (INHALATION) at 03:50

## 2018-04-06 RX ADMIN — ALBUTEROL SULFATE 2.5 MG: 2.5 SOLUTION RESPIRATORY (INHALATION) at 19:47

## 2018-04-06 RX ADMIN — INSULIN ASPART 1 UNITS: 100 INJECTION, SOLUTION INTRAVENOUS; SUBCUTANEOUS at 17:17

## 2018-04-06 RX ADMIN — AZITHROMYCIN MONOHYDRATE 500 MG: 500 INJECTION, POWDER, LYOPHILIZED, FOR SOLUTION INTRAVENOUS at 05:44

## 2018-04-06 RX ADMIN — FUROSEMIDE 20 MG: 20 TABLET ORAL at 19:12

## 2018-04-06 RX ADMIN — IPRATROPIUM BROMIDE AND ALBUTEROL SULFATE 3 ML: .5; 3 SOLUTION RESPIRATORY (INHALATION) at 03:11

## 2018-04-06 RX ADMIN — PREDNISONE 40 MG: 20 TABLET ORAL at 08:30

## 2018-04-06 ASSESSMENT — ENCOUNTER SYMPTOMS
SHORTNESS OF BREATH: 1
ABDOMINAL PAIN: 0
COUGH: 1
DIARRHEA: 0
VOMITING: 0
NAUSEA: 0
CONSTIPATION: 0
FEVER: 0

## 2018-04-06 NOTE — PROGRESS NOTES
Non-charging note:    Mr Savage was admitted early this morning with what appears to be an acute COPD exacerbation in the setting of  B/l CAP. He is on appropriate therapy and will be seen by Nephrology for HD later today. His vitals are stable, he has remained afebrile and is saturating well on 1.5 L NC. Agree with plan as outlined in the H&P by Dr. Thomas. Will be followed in am.

## 2018-04-06 NOTE — IP AVS SNAPSHOT
MRN:[id number]                      After Visit Summary   4/6/2018    eSven Savage Jr.    MRN: [id number]           Thank you!     Thank you for choosing [facility] for your care. Our goal is always to provide you with excellent care. Hearing back from our patients is one way we can continue to improve our services. Please take a few minutes to complete the written survey that you may receive in the mail after you visit with us. Thank you!        Patient Information     Date Of Birth          1935        Designated Caregiver       Most Recent Value    Caregiver    Will someone help with your care after discharge? yes    Name of designated caregiver Seven    Phone number of caregiver [phone]    Caregiver address [address]      About your hospital stay     You were admitted on:  April 6, 2018 You last received care in the:  [facility] [address] [facility]    You were discharged on:  April 9, 2018        Reason for your hospital stay       Shortness of breath                  Who to Call     For medical emergencies, please call 911.  For non-urgent questions about your medical care, please call your primary care provider or clinic, [phone]          Attending Provider     Provider Specialty    August Hassan MD Emergency Medicine    [facility], Jessica CARMICHAEL MD Internal Medicine       Primary Care Provider Office Phone # Fax #    Sandip Antunez [phone]      After Care Instructions     Activity       Your activity upon discharge: activity as tolerated            Diet       Follow this diet upon discharge: Orders Placed This Encounter      Combination Diet Low Saturated Fat Na <2400mg Diet            Oxygen Adult       [facility] Oxygen Order 1 liter(s) by nasal cannula with activity with use of portable tank. Expected treatment length is 1 months.. Test on conserving device as applicable.    Patients who qualify for home O2 coverage  under the CMS guidelines require ABG tests or O2 sat readings obtained closest to, but no earlier than 2 days prior to the discharge, as evidence of the need for home oxygen therapy. Testing must be performed while patient is in the chronic stable state. See notes for O2 sats.    I certify that this patient, Seven Savage Jr. has been under my care and that I, or a nurse practitioner or physician's assistant working with me, had a face-to-face encounter that meets the face-to-face encounter requirements with this patient on 4/9/2018. The patient, Seven Savage Jr. was evaluated or treated in whole, or in part, for the following medical condition, which necessitates the use of the ordered oxygen. Treatment Diagnosis: End stage COPD    Attending Provider: Scotty Jin  Physician signature: See electronic signature associated with these discharge orders  Date of Order: April 9, 2018                  Follow-up Appointments     Follow-up and recommended labs and tests        Follow up with primary care provider, Sandip Antunez, as scheduled for you on Thursday 4/12/18 at 10:00am, for hospital follow- up.  The following labs/tests are recommended: cbc/bmp.                  Your next 10 appointments already scheduled     Apr 12, 2018 10:00 AM CDT   Office Visit with Sandip Antunez MD   [facility] ([facility]    [address] [place] [id number] [phone]           Bring a current list of meds and any records pertaining to this visit. For Physicals, please bring immunization records and any forms needing to be filled out. Please arrive 10 minutes early to complete paperwork.              Pending Results     Date and Time Order Name Status Description    4/6/2018 0556 Blood culture Preliminary     4/6/2018 0556 Blood culture Preliminary             Statement of Approval     Ordered          04/09/18 1131  I have reviewed and agree with all  "the recommendations and orders detailed in this document.  EFFECTIVE NOW     Approved and electronically signed by:  Scotty Jin DO           18 0916  I have reviewed and agree with all the recommendations and orders detailed in this document.  EFFECTIVE NOW     Approved and electronically signed by:  Scotty Jin DO             Admission Information     Date & Time Provider Department Dept. Phone    2018 Jessica Thomas MD [facility] [facility] [address] [facility] [phone]      Your Vitals Were     Blood Pressure Pulse Temperature Respirations Weight Pulse Oximetry    142/78 95 97.8  F (36.6  C) (Oral) 22 86.3 kg (190 lb 4.1 oz) 94%    BMI (Body Mass Index)                   28.1 kg/m2           MyChart Information     "[employer] lets you send messages to your doctor, view your test results, renew your prescriptions, schedule appointments and more. To sign up, go to [email or web address] . Click on \"Log in\" on the left side of the screen, which will take you to the Welcome page. Then click on \"Sign up Now\" on the right side of the page.     You will be asked to enter the access code listed below, as well as some personal information. Please follow the directions to create your username and password.     Your access code is: [email or web address]  Expires: 2018 11:57 AM     Your access code will  in 90 days. If you need help or a new code, please call your [facility] clinic or [phone].        Care EveryWhere ID     This is your Care EveryWhere ID. This could be used by other organizations to access your [facility] medical records  [id number]        Equal Access to Services     [facility] Center[facility]DR : Hadii gerri Elliott, waaxda luqadaha, qaybta kaalcatrachita connell. So [facility] [phone].    ATENCIÓN: Si habla español, tiene a turpin disposición servicios gratuitos de asistencia lingüística. Llame al [phone].    We comply with " applicable federal civil rights laws and [place] laws. We do not discriminate on the basis of race, color, national origin, age, disability, sex, sexual orientation, or gender identity.               Review of your medicines      START taking        Dose / Directions    levofloxacin 500 MG tablet   Commonly known as:  LEVAQUIN   Used for:  Community acquired pneumonia, unspecified laterality        Dose:  500 mg   Take 1 tablet (500 mg) by mouth daily for 7 days   Quantity:  5 tablet   Refills:  0       predniSONE 10 MG tablet   Commonly known as:  DELTASONE   Used for:  Community acquired pneumonia, unspecified laterality   Notes to Patient:  Make sure to follow instruction.         4 tabs daily for 3 days, then 3 tabs daily for 3 days, then 2 tabs daily for 3 days, then 1 tab daily for 3 days, then stop   Quantity:  30 tablet   Refills:  0         CONTINUE these medicines which have NOT CHANGED        Dose / Directions    * albuterol 108 (90 BASE) MCG/ACT Inhaler   Commonly known as:  PROAIR HFA/PROVENTIL HFA/VENTOLIN HFA        Dose:  1-2 puff   Inhale 1-2 puffs into the lungs every 4 hours as needed for shortness of breath / dyspnea or wheezing   Quantity:  1 Inhaler   Refills:  5       * albuterol (2.5 MG/3ML) 0.083% neb solution        TAKE 1 VIAL BY NEBULIZATION EVERY 6 HOURS AS NEEDED FOR SHORNESS OF BREATH/DYSPNEA OR WHEEZING   Quantity:  75 mL   Refills:  2       aspirin 81 MG chewable tablet        Dose:  81 mg   Take 81 mg by mouth daily   Refills:  0       atorvastatin 40 MG tablet   Commonly known as:  LIPITOR   Used for:  Type 2 diabetes mellitus with chronic kidney disease on chronic dialysis, with long-term current use of insulin (H), Hyperlipidemia LDL goal <100        TAKE 1 TABLET(40 MG) BY MOUTH AT BEDTIME   Quantity:  90 tablet   Refills:  1       B-D U/F 31G X 8 MM   Used for:  Type 2 diabetes mellitus with chronic kidney disease on chronic dialysis, with long-term current use of insulin (H)    Generic drug:  insulin pen needle        USE AS DIRECTED FOUR TIMES DAILY.   Quantity:  400 each   Refills:  0       blood glucose monitoring test strip   Commonly known as:  no brand specified   Used for:  Type 2 diabetes mellitus with chronic kidney disease on chronic dialysis, with long-term current use of insulin (H)        Use to test blood sugars 3 times daily or as directed. Uses onetouch   Quantity:  300 strip   Refills:  11       carvedilol 12.5 MG tablet   Commonly known as:  COREG   Used for:  Aortic valve stenosis, unspecified etiology, Essential hypertension, benign        TAKE 1 TABLET(12.5 MG) BY MOUTH TWICE DAILY   Quantity:  180 tablet   Refills:  3       COLCHICINE PO   Notes to Patient:  Resume home schedule         Dose:  0.6 mg   Take 0.6 mg by mouth every 48 hours as needed Reported on 3/30/2017   Refills:  0       darbepoetin libby 100 MCG/0.5ML injection   Commonly known as:  ARANESP   Notes to Patient:  Per dialysis dosing.         Inject Subcutaneous three times a week At diaylsis M,W,F. Dialysis ([facility]) doses, patient not sure about strength.   Refills:  0       diltiazem 300 MG 24 hr ER beaded capsule   Commonly known as:  TIAZAC   Used for:  Essential hypertension, benign        Dose:  300 mg   Take 1 capsule (300 mg) by mouth daily   Quantity:  30 capsule   Refills:  0       doxazosin 8 MG tablet   Commonly known as:  CARDURA   Used for:  Urinary retention        TAKE 1 TABLET BY MOUTH EVERY NIGHT AT BEDTIME.   Quantity:  90 tablet   Refills:  3       EPLERENONE PO        Dose:  50 mg   Take 50 mg by mouth daily   Refills:  0       fish oil-omega-3 fatty acids 1000 MG capsule   Notes to Patient:  Resume home dose and schedule         Dose:  1 g   Take 1 g by mouth daily   Refills:  0       furosemide 20 MG tablet   Commonly known as:  LASIX   Used for:  ESRD (end stage renal disease) on dialysis (H), Essential hypertension, benign        Dose:  20 mg   Take 1 tablet (20 mg) by mouth  daily   Quantity:  90 tablet   Refills:  3       insulin glargine 100 UNIT/ML injection   Commonly known as:  LANTUS        Dose:  23 Units   Inject 23 Units Subcutaneous At Bedtime (Patient will increase to 26 units at bedtime he states when BG is elevated.   Refills:  0       lisinopril 20 MG tablet   Commonly known as:  PRINIVIL/ZESTRIL   Used for:  Type 2 diabetes mellitus with chronic kidney disease on chronic dialysis, with long-term current use of insulin (H), Essential hypertension, benign        Dose:  20 mg   Take 1 tablet (20 mg) by mouth daily   Quantity:  90 tablet   Refills:  3       nebulizer/adult mask Kit kit   Used for:  Chronic obstructive pulmonary disease with acute exacerbation (H)        Dose:  1 Device   1 Device as needed   Quantity:  1 kit   Refills:  0       NOVOLOG SC   Notes to Patient:  With meals        Dose:  12 Units   Inject 12 Units Subcutaneous 3 times daily (with meals) Patient states with large meals he will increase to 14-16 units.   Refills:  0       * order for DME        Equipment being ordered: Other: Nebulizer machine Treatment Diagnosis: COPD   Quantity:  1 Device   Refills:  0       * order for DME   Used for:  DDD (degenerative disc disease), lumbar, Impaired gait        Equipment being ordered: Four wheeled walker   Quantity:  1 Device   Refills:  0       BRANDI-HEIDY Tabs   Used for:  ESRD (end stage renal disease) on dialysis (H)   Notes to Patient:  Resume home dose and schedule         TAKE ONE TABLET BY MOUTH DAILY   Quantity:  90 tablet   Refills:  3       STIOLTO RESPIMAT 2.5-2.5 MCG/ACT Aers   Generic drug:  tiotropium-olodaterol   Notes to Patient:  Resume home schedule.         INHALE 2 PUFFS INTO THE LUNGS DAILY   Quantity:  4 g   Refills:  10       VITAMIN D (CHOLECALCIFEROL) PO   Notes to Patient:  Resume home schedule         Dose:  2000 Units   Take 2,000 Units by mouth daily   Refills:  0       * Notice:  This list has 4 medication(s) that are the same as  other medications prescribed for you. Read the directions carefully, and ask your doctor or other care provider to review them with you.         Where to get your medicines      These medications were sent to [facility] [place], [place] - [address]  [address] [address]     Phone:  [phone]     levofloxacin 500 MG tablet    predniSONE 10 MG tablet                Protect others around you: Learn how to safely use, store and throw away your medicines at www.disposemymeds.org.        ANTIBIOTIC INSTRUCTION     You've Been Prescribed an Antibiotic - Now What?  Your healthcare team thinks that you or your loved one might have an infection. Some infections can be treated with antibiotics, which are powerful, life-saving drugs. Like all medications, antibiotics have side effects and should only be used when necessary. There are some important things you should know about your antibiotic treatment.      Your healthcare team may run tests before you start taking an antibiotic.    Your team may take samples (e.g., from your blood, urine or other areas) to run tests to look for bacteria. These test can be important to determine if you need an antibiotic at all and, if you do, which antibiotic will work best.      Within a few days, your healthcare team might change or even stop your antibiotic.    Your team may start you on an antibiotic while they are working to find out what is making you sick.    Your team might change your antibiotic because test results show that a different antibiotic would be better to treat your infection.    In some cases, once your team has more information, they learn that you do not need an antibiotic at all. They may find out that you don't have an infection, or that the antibiotic you're taking won't work against your infection. For example, an infection caused by a virus can't be treated with antibiotics. Staying on an antibiotic when you don't need  it is more likely to be harmful than helpful.      You may experience side effects from your antibiotic.    Like all medications, antibiotics have side effects. Some of these can be serious.    Let you healthcare team know if you have any known allergies when you are admitted to the hospital.    One significant side effect of nearly all antibiotics is the risk of severe and sometimes deadly diarrhea caused by Clostridium difficile (C. Difficile). This occurs when a person takes antibiotics because some good germs are destroyed. Antibiotic use allows C. diificile to take over, putting patients at high risk for this serious infection.    As a patient or caregiver, it is important to understand your or your loved one's antibiotic treatment. It is especially important for caregivers to speak up when patients can't speak for themselves. Here are some important questions to ask your healthcare team.    What infection is this antibiotic treating and how do you know I have that infection?    What side effects might occur from this antibiotic?    How long will I need to take this antibiotic?    Is it safe to take this antibiotic with other medications or supplements (e.g., vitamins) that I am taking?     Are there any special directions I need to know about taking this antibiotic? For example, should I take it with food?    How will I be monitored to know whether my infection is responding to the antibiotic?    What tests may help to make sure the right antibiotic is prescribed for me?      Information provided by:  www.cdc.gov/getsmart  U.S. Department of Health and Human Services  Centers for disease Control and Prevention  National Center for Emerging and Zoonotic Infectious Diseases  Division of Healthcare Quality Promotion             Medication List: This is a list of all your medications and when to take them. Check marks below indicate your daily home schedule. Keep this list as a reference.      Medications            Morning Afternoon Evening Bedtime As Needed    * albuterol 108 (90 BASE) MCG/ACT Inhaler   Commonly known as:  PROAIR HFA/PROVENTIL HFA/VENTOLIN HFA   Inhale 1-2 puffs into the lungs every 4 hours as needed for shortness of breath / dyspnea or wheezing                                   * albuterol (2.5 MG/3ML) 0.083% neb solution   TAKE 1 VIAL BY NEBULIZATION EVERY 6 HOURS AS NEEDED FOR SHORNESS OF BREATH/DYSPNEA OR WHEEZING   Last time this was given:  2.5 mg on 4/9/2018 12:05 PM                                   aspirin 81 MG chewable tablet   Take 81 mg by mouth daily   Last time this was given:  81 mg on 4/9/2018  9:03 AM   Next Dose Due:  4/10/2018 0900                                   atorvastatin 40 MG tablet   Commonly known as:  LIPITOR   TAKE 1 TABLET(40 MG) BY MOUTH AT BEDTIME   Last time this was given:  40 mg on 4/8/2018  9:53 PM   Next Dose Due:  4/9/2018 Bedtime                                   B-D U/F 31G X 8 MM   USE AS DIRECTED FOUR TIMES DAILY.   Generic drug:  insulin pen needle                                blood glucose monitoring test strip   Commonly known as:  no brand specified   Use to test blood sugars 3 times daily or as directed. Uses onetouch                                carvedilol 12.5 MG tablet   Commonly known as:  COREG   TAKE 1 TABLET(12.5 MG) BY MOUTH TWICE DAILY   Last time this was given:  12.5 mg on 4/9/2018  9:03 AM   Next Dose Due:  4/9/2018 dinner time.                                       COLCHICINE PO   Take 0.6 mg by mouth every 48 hours as needed Reported on 3/30/2017   Notes to Patient:  Resume home schedule                                    darbepoetin libby 100 MCG/0.5ML injection   Commonly known as:  ARANESP   Inject Subcutaneous three times a week At diaylsis M,W,F. Dialysis ([facility]) doses, patient not sure about strength.   Notes to Patient:  Per dialysis dosing.                                 diltiazem 300 MG 24 hr ER beaded capsule   Commonly known  as:  TIAZAC   Take 1 capsule (300 mg) by mouth daily   Next Dose Due:  4/10/2018 9 am                                    doxazosin 8 MG tablet   Commonly known as:  CARDURA   TAKE 1 TABLET BY MOUTH EVERY NIGHT AT BEDTIME.   Last time this was given:  8 mg on 4/8/2018  9:53 PM   Next Dose Due:  4/9/2018 bedtime                                   EPLERENONE PO   Take 50 mg by mouth daily   Last time this was given:  50 mg on 4/9/2018  9:03 AM   Next Dose Due:  4/10/2018 9am                                   fish oil-omega-3 fatty acids 1000 MG capsule   Take 1 g by mouth daily   Notes to Patient:  Resume home dose and schedule                                 furosemide 20 MG tablet   Commonly known as:  LASIX   Take 1 tablet (20 mg) by mouth daily   Last time this was given:  20 mg on 4/9/2018  9:03 AM   Next Dose Due:  4/10/2018 9am                                    insulin glargine 100 UNIT/ML injection   Commonly known as:  LANTUS   Inject 23 Units Subcutaneous At Bedtime (Patient will increase to 26 units at bedtime he states when BG is elevated.   Last time this was given:  26 Units on 4/8/2018  9:56 PM   Next Dose Due:  4/9/2018 bedtime                                   levofloxacin 500 MG tablet   Commonly known as:  LEVAQUIN   Take 1 tablet (500 mg) by mouth daily for 7 days   Last time this was given:  500 mg on 4/8/2018  8:46 AM                                lisinopril 20 MG tablet   Commonly known as:  PRINIVIL/ZESTRIL   Take 1 tablet (20 mg) by mouth daily   Last time this was given:  20 mg on 4/9/2018  9:03 AM                                   nebulizer/adult mask Kit kit   1 Device as needed                                NOVOLOG SC   Inject 12 Units Subcutaneous 3 times daily (with meals) Patient states with large meals he will increase to 14-16 units.   Notes to Patient:  With meals                                         * order for DME   Equipment being ordered: Other: Nebulizer machine Treatment  Diagnosis: COPD                                * order for DME   Equipment being ordered: Four wheeled walker                                predniSONE 10 MG tablet   Commonly known as:  DELTASONE   4 tabs daily for 3 days, then 3 tabs daily for 3 days, then 2 tabs daily for 3 days, then 1 tab daily for 3 days, then stop   Last time this was given:  40 mg on 4/9/2018  9:03 AM   Notes to Patient:  Make sure to follow instruction.                                    BRANDI-HEIDY Tabs   TAKE ONE TABLET BY MOUTH DAILY   Notes to Patient:  Resume home dose and schedule                                 STIOLTO RESPIMAT 2.5-2.5 MCG/ACT Aers   INHALE 2 PUFFS INTO THE LUNGS DAILY   Generic drug:  tiotropium-olodaterol   Notes to Patient:  Resume home schedule.                                    VITAMIN D (CHOLECALCIFEROL) PO   Take 2,000 Units by mouth daily   Notes to Patient:  Resume home schedule                                    * Notice:  This list has 4 medication(s) that are the same as other medications prescribed for you. Read the directions carefully, and ask your doctor or other care provider to review them with you.

## 2018-04-06 NOTE — PROGRESS NOTES
A&O x4, pleasant. A1, walker at baseline, given bedside urinal. VSS on 1.5L NC. Tele in place. IV abx running from ED. Carb count, glucose checks, and insulin. Nebs sched and PRN. Denies pain, N/V, SOB at rest. LEE. BS+, flatus+. LS exp wheezing bases primarily and dim on upper lobes. Cont pulse ox on - upper 90's O2 with NC 1.5L. Pt belongings with pt. Dialysis patient, L limb alert, bruit and thrill present. Neph consult placed.

## 2018-04-06 NOTE — ED PROVIDER NOTES
History     Chief Complaint:  Shortness of Breath    HPI   Seven Savage Jr. 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 The patient has had approximately 6 days of worsening shortness of breath, with some congestion and a productive nonbloody cough. Of note, he lives with his grandchildren, who frequently have URIs and he feels that his current symptoms likely represent a URI as well. When he felt that his symptoms were worsened this evening while trying to sleep, he contacted EMS for further evaluation here in the ED. The patient was sating at 80% on RA on EMS arrival and he was given one nebulizer en route. He denies any recent fevers, abdominal pain, nausea, vomiting, diarrhea, or constipation. The patient did receive his annual influenza vaccination this year. He has been going to dialysis as scheduled this week at his [facility].     Allergies:  Pioglitazone  Vytorin    Medications:    Albuterol   Insulin (Lantus, Novolog)  Lasix  Lisinopril  Lipitor  Cardura  Coreg  Diltiazem  Aspirin  Aransep    Past Medical History:    COPD  ESRD on dialysis  Aortic valve stenosis  Chronic back pain  Somatic dysfunction of sacral region  Heart Murmur  HTN  hyperlipidemia  DM  Kidney stones    Past Surgical History:    Adenoidectomy  Endoscopic nasal polypectomy  Renal stent placement  Tonsillectomy     Family History:    Unknown family history     Social History:  Presents alone.   Former Smoker.  Positive for alcohol use.   Marital Status:   [5]    Review of Systems   Constitutional: Negative for fever.   HENT: Positive for congestion.    Respiratory: Positive for cough and shortness of breath.    Gastrointestinal: Negative for abdominal pain, constipation, diarrhea, nausea and vomiting.   All other systems reviewed and are  negative.    Physical Exam     Patient Vitals for the past 24 hrs:   BP Temp Temp src Pulse Heart Rate Resp SpO2 Weight   04/06/18 0606 157/62 98  F (36.7  C) Oral 72 - 20 99 % 82.7 kg (182 lb 5.1 oz)   04/06/18 0345 146/58 - - - 68 - 96 % -   04/06/18 0316 151/61 98.4  F (36.9  C) Oral 67 - 18 98 % -   04/06/18 0315 151/61 - - - 68 - 97 % -     Physical Exam  General: Alert, appears chronically ill, otherwise well-developed. Cooperative.     In moderate respiratory distress  HEENT:  Head:  Atraumatic  Ears:  External ears are normal  Mouth/Throat:  Oropharynx is without erythema or exudate and mucous membranes are dry.   Eyes:   Conjunctivae normal and EOM are normal. No scleral icterus.  CV:  Tachycardic rate, regular rhythm, normal heart sounds and radial pulses are 2+ and symmetric.  Systolic murmur.  Resp:  Speaking in full sentences.  Moderate increased work of breath.  Tripoding for support of breathing.      Breath sounds are diminished to left upper with diffuse expiratory wheezing bilaterally  GI:  Abdomen is soft, no distension, no tenderness. No rebound or guarding.  No CVA tenderness bilaterally  MS:  Normal range of motion. No edema. Dialysis fistula to LUE.    Normal strength in all 4 extremities.     Back atraumatic.    No midline cervical, thoracic, or lumbar tenderness  Skin:  Warm and dry.  No rash or lesions noted.  Neuro: Alert. Normal strength.  GCS: 15  Psych:  Normal mood and affect.  Lymph: No anterior or posterior cervical lymphadenopathy noted.    Emergency Department Course   ECG:  Time: 0306  Vent. Rate 70 bpm. OK interval 190. QRS duration 94. QT/QTc 416/449. P-R-T axis 46 -25 106.  Normal sinus rhythm with sinus arrhythmia. ST and T wave abnormality, consider lateral ischemia. T-wave abnormality in lateral leads in seen on EKG dated 11/27/2017. Abnormal ECG. No significant change compared to EKG dated 11/27/2017. Read time: 0318    Imaging:  Radiographic findings were communicated with  the patient who voiced understanding of the findings.    XR Chest 2 views:   Ill-defined hazy opacities in the mid and inferior right  lung and mid left lung. Additionally, there are very small bilateral  pleural effusions. These findings most likely relate to pulmonary  edema, but an infection could have a similar appearance. As per radiology.     Laboratory:  CBC: WBC: 14.4 (H), HGB: 9.4 (L), PLT: 332  BMP: Glucose 199 (H), BUN 47 (H), Creatinine 3.87 (H), GFR Estimate 18 (L), o/w WNL     Lactic Acid: 0.9    Influenza A/B antigen: Negative for A and B    Interventions:  0311 Duoneb 3 mL Nebulization   0350 Duoneb 3 mL Nebulization   0351 Solu-medrol 125 mg IV  0355 Rocephin 2 g IV  0547 Azithromycin 500 mg IV    Emergency Department Course:  Nursing notes and vitals reviewed. 0256 I performed an exam of the patient as documented above.     EKG obtained in the ED, see results above.     IV inserted. Medicine administered as documented above. Blood drawn. This was sent to the lab for further testing, results above.    The patient was sent for a Chest XR while in the emergency department, findings above.     0342 I rechecked the patient and discussed the results of his workup thus far.     0401  I consulted with Dr. Thomas of the hospitalist services. He is in agreement to accept the patient for admission.    Findings and plan explained to the Patient who consents to admission. Discussed the patient with Dr. Thomas, who will admit the patient to a medical bed for further monitoring, evaluation, and treatment.    Impression & Plan    Medical Decision Making:  Patient is a 82-year-old male with a past medical history pertinent for COPD and ESRD currently on dialysis presents with worsening shortness of breath since Saturday.  Patient states he's had increased cough and sputum production with color change to the sputum in the last several days.  He does have sick exposures at home, including his grandchildren.  Patient  did receive the influenza vaccination this year.  Influenza negative on swab here.  Patient's lactate is less than two, low concern for severe sepsis.  Patient's white count is elevated at 14.4 and chest x-ray concerning for potential bilateral opacities.  With a color change in his sputum and increased cough and shortness of breath, I do suspect he may have a community-acquired pneumonia and will treat with ceftriaxone and azithromycin.  Patient also with diffuse expiratory wheezing and diminished sounds bilaterally.  Even after two DuoNeb administrations here, he continued to have significant wheezing.  He was given a dose of methylprednisolone out of concern for COPD exacerbation.  He will be admitted to the hospital due to continued expiratory wheezing, suspected COPD exacerbation & community-acquired pneumonia.  I spoke with Jessica Thomas MD who agreed to admission at this time.  Patient did dialyze fully on Wednesday and is due for dialysis this morning.  Updated patient with imaging and lab findings and need for admission.  Patient agreed to admission.    Diagnosis:    ICD-10-CM   1. Community acquired pneumonia, unspecified laterality J18.9   2. COPD exacerbation (H) J44.1     Disposition:  Admitted to Dr. Thomas of the hospitalist services    I, Lacy Campbell, am serving as a scribe on 4/6/2018 at 2:56 AM to personally document services performed by August Hassan MD based on my observations and the provider's statements to me.       Lacy Campbell  4/6/2018    EMERGENCY DEPARTMENT       August Hassan MD  04/06/18 0624

## 2018-04-06 NOTE — PROGRESS NOTES
Potassium   Date Value Ref Range Status   04/06/2018 4.2 3.4 - 5.3 mmol/L Final     Lab Results   Component Value Date    HGB 9.4 04/06/2018     Weight: 82.7 kg (182 lb 5.1 oz)  POST WT 81.3kg  DIALYSIS PROCEDURE NOTE  Hepatitis status of previous patient on machine log was checked and verified ok to use with this patients hepatitis status.  Patient dialyzed for 2.75 hrs. on a 3 K bath with a net fluid removal of  1.4L.  A BFR of 400 ml/min was obtained via a LAF using 15gauge needles.    The patient was seen by Dr. Waldrop during the treatment.  Total heparin received during the treatment: 1500 units. Sites held x 15 min then  pressure drsgs applied.  Meds  Given:EPO 4,000units IV and hectoral 6mcg Iv Complications: NONE.  Procedure and ESRD teaching done and questions answered. See flowsheet in EPIC for further details and post assessment.  Machine water alarm in place and functioning. Pt only wanted 1.4kg off due to hx cramping. Pt returned via wheelchair  Vascular Access: Aseptic prep done for both on/off.  Report received from:Christal Sanches R.N.  Report given to:Tiffanie Rodríguez R.N.  HEPATITIS B SURFACE ANTIGEN neg DATE 4/2/18 HEPATITIS B SURFACE ANTIBODY Succept DATE 5/1/17  Chlorine/Chloramine water system checked every 4 hours.  Outpatient Dialysis at [facility]

## 2018-04-06 NOTE — PHARMACY-ADMISSION MEDICATION HISTORY
Admission medication history interview status for the 4/6/2018  admission is complete. See EPIC admission navigator for prior to admission medications     Medication history source reliability:Good    Actions taken by pharmacist (provider contacted, etc):Verified all Rx meds through patient's Surescript records in EPIC     Additional medication history information not noted on PTA med list :None    Medication reconciliation/reorder completed by provider prior to medication history? Yes    Time spent in this activity: 20 minutes    Prior to Admission medications    Medication Sig Last Dose Taking? Auth Provider   insulin glargine (LANTUS) 100 UNIT/ML injection Inject 23 Units Subcutaneous At Bedtime (Patient will increase to 26 units at bedtime he states when BG is elevated. 4/5/2018 at hs Yes Unknown, Entered By History   albuterol (2.5 MG/3ML) 0.083% neb solution TAKE 1 VIAL BY NEBULIZATION EVERY 6 HOURS AS NEEDED FOR SHORNESS OF BREATH/DYSPNEA OR WHEEZING 4/5/2018 at Unknown time Yes Sandip Antunez MD   furosemide (LASIX) 20 MG tablet Take 1 tablet (20 mg) by mouth daily 4/5/2018 at am Yes Sandip Antunez MD   lisinopril (PRINIVIL/ZESTRIL) 20 MG tablet Take 1 tablet (20 mg) by mouth daily 4/5/2018 at am Yes Sandip Antunez MD   blood glucose monitoring (NO BRAND SPECIFIED) test strip Use to test blood sugars 3 times daily or as directed. Uses onetouch 4/5/2018 at Unknown time Yes Sandip Antunez MD   atorvastatin (LIPITOR) 40 MG tablet TAKE 1 TABLET(40 MG) BY MOUTH AT BEDTIME 4/5/2018 at hs Yes Sandip Antunez MD   doxazosin (CARDURA) 8 MG tablet TAKE 1 TABLET BY MOUTH EVERY NIGHT AT BEDTIME. 4/5/2018 at hs Yes Sandip Antunez MD   STIOLTO RESPIMAT 2.5-2.5 MCG/ACT AERS INHALE 2 PUFFS INTO THE LUNGS DAILY 4/5/2018 at am Yes Sandip Antunez MD   B Complex-C-Folic Acid (BRANDI-HEIDY) TABS TAKE ONE TABLET BY MOUTH DAILY 4/5/2018 at am Yes Sandip Antunez MD   carvedilol (COREG) 12.5 MG tablet TAKE 1 TABLET(12.5  MG) BY MOUTH TWICE DAILY 4/5/2018 at Unknown time Yes Sandip Antunez MD   B-D U/F 31G X 8 MM insulin pen needle USE AS DIRECTED FOUR TIMES DAILY. 4/5/2018 at Unknown time Yes Sandip Antunez MD   albuterol (PROAIR HFA/PROVENTIL HFA/VENTOLIN HFA) 108 (90 BASE) MCG/ACT Inhaler Inhale 1-2 puffs into the lungs every 4 hours as needed for shortness of breath / dyspnea or wheezing 4/6/2018 at Unknown time Yes Sandip Antunez MD   diltiazem (TIAZAC) 300 MG 24 hr ER beaded capsule Take 1 capsule (300 mg) by mouth daily 4/5/2018 at am Yes Sandip Antunez MD   aspirin 81 MG chewable tablet Take 81 mg by mouth daily 4/5/2018 at am Yes Unknown, Entered By History   VITAMIN D, CHOLECALCIFEROL, PO Take 2,000 Units by mouth daily  4/5/2018 at am Yes Unknown, Entered By History   COLCHICINE PO Take 0.6 mg by mouth every 48 hours as needed Reported on 3/30/2017 Past Month at Unknown time Yes Unknown, Entered By History   darbepoetin libby (ARANESP) 100 MCG/0.5ML injection Inject Subcutaneous three times a week At diaylsis M,W,F Past Week at Unknown time Yes Unknown, Entered By History   EPLERENONE PO Take 50 mg by mouth daily 4/6/2018 at am Yes Unknown, Entered By History   Insulin Aspart (NOVOLOG SC) Inject 12 Units Subcutaneous 3 times daily (with meals) Patient states with large meals he will increase to 14-16 units. 4/5/2018 at Unknown time Yes Unknown, Entered By History   fish oil-omega-3 fatty acids 1000 MG capsule Take 1 g by mouth daily 4/5/2018 at am Yes Unknown, Entered By History   order for DME Equipment being ordered: Adia Reyes NP   Respiratory Therapy Supplies (NEBULIZER/ADULT MASK) KIT 1 Device as needed   Sandip Antunez MD   order for DME Equipment being ordered: Other: Nebulizer machine  Treatment Diagnosis: COPD   Brenden Nielsen MD

## 2018-04-06 NOTE — PLAN OF CARE
"Problem: Patient Care Overview  Goal: Plan of Care/Patient Progress Review  Outcome: Improving  A&Ox4. Full code. VSS on 1.5 L NC. Here w/ COPD exac and PNA. LS exp wheezing throughout. Has nonproductive cough. Duonebs and prednisone ordered. Dyspneic on exertion. Currently in dialysis, left fistula in place. Tele is NSR w/ PACs. BG was 198 before breakfast, has not had another meal. Denied pain. Sepsis screen alert d/t tachypnea and elevated WBC, lactic acid was 1.1. Up w/ 1 assist, has refused to walk this shift, states that he will \"wait to do PT at home if I need it then.\" Fall precautions in place. Will cont to monitor.      "

## 2018-04-06 NOTE — IP AVS SNAPSHOT
[facility] [facility]    [address] [place] [address]    Phone:  [phone]                                       After Visit Summary   4/6/2018    Seven Savage Jr.    MRN: [id number]           After Visit Summary Signature Page     I have received my discharge instructions, and my questions have been answered. I have discussed any challenges I see with this plan with the nurse or doctor.    ..........................................................................................................................................  Patient/Patient Representative Signature      ..........................................................................................................................................  Patient Representative Print Name and Relationship to Patient    ..................................................               ................................................  Date                                            Time    ..........................................................................................................................................  Reviewed by Signature/Title    ...................................................              ..............................................  Date                                                            Time

## 2018-04-06 NOTE — CONSULTS
[facility]    Nephrology Consultation     Date of Admission:  4/6/2018    Assessment & Plan      Seven Savage Jr. is a 82 year old male with ESRD who was admitted on 4/6/2018.     1) ESRD:  He has HD F [facility]. Dr. Samuel, [facility], , 2.75 H, TW 84.5 kg.      2) SOB:  This may be a combination of COPD, infection, and volume overload.  He deserves treatment for all three.  He agrees to UF of 1.4 Liters.    3) Anemia:  HGB 9.4.  He is on EPO 2000 units, but his HGB is falling.  I will give him 4000 units.    4) MBD: He in on Hectorol 6 mcg for 2 HPT -  (goal is 150-600).    5) HTN:  BP borderline controlled.  Will follow.      Plan:    HD per usual schedule.  UF 1.4 L as per his wishes  ABX, bronchodilators per hosp team.      Jeremy Waldrop MD  [facility] - Nephrology  [phone]    Reason for Consult      I was asked to see the patient for ESRD.      Primary Care Physician      Sandip Antunez    Chief Complaint      SOB    History is obtained from the patient and chart review.      History of Present Illness      Seven Savage Jr. is a 82 year old male with ESRD who presents with SOB.    He noted increased SOB following an easter gathering of family.  His grandchildren had some URI that he thinks they may have given him.  His SOB progressed to the point of seeking medical attention early this AM.    He does have leukocytosis, infiltrate on CXR and mild hypoxia.    Past Medical History   I have reviewed this patient's medical history and updated it with pertinent information if needed.   Past Medical History:   Diagnosis Date     COPD (chronic obstructive pulmonary disease) (H)      CRF (chronic renal failure)      Heart murmur      HLD (hyperlipidemia)      HTN (hypertension)      IDDM (insulin dependent diabetes mellitus) (H)      Renal calculi        Past Surgical History   I have reviewed this patient's surgical history and updated it with pertinent  information if needed.  Past Surgical History:   Procedure Laterality Date     ADENOIDECTOMY       ENDOSCOPIC POLYPECTOMY NASAL      Through vocal cords     Renal Stent      For renal calculi     TONSILLECTOMY         Prior to Admission Medications   Prior to Admission Medications   Prescriptions Last Dose Informant Patient Reported? Taking?   B Complex-C-Folic Acid (BRANDI-HEIDY) TABS 2018 at am Pharmacy No Yes   Sig: TAKE ONE TABLET BY MOUTH DAILY   B-D U/F 31G X 8 MM insulin pen needle 2018 at Unknown time Pharmacy No Yes   Sig: USE AS DIRECTED FOUR TIMES DAILY.   COLCHICINE PO Past Month at Unknown time Self Yes Yes   Sig: Take 0.6 mg by mouth every 48 hours as needed Reported on 3/30/2017   EPLERENONE PO 2018 at am Pharmacy Yes Yes   Sig: Take 50 mg by mouth daily   Insulin Aspart (NOVOLOG SC) 2018 at Unknown time Pharmacy Yes Yes   Sig: Inject 12 Units Subcutaneous 3 times daily (with meals) Patient states with large meals he will increase to 14-16 units.   Respiratory Therapy Supplies (NEBULIZER/ADULT MASK) KIT  Pharmacy No No   Si Device as needed   STIOLTO RESPIMAT 2.5-2.5 MCG/ACT AERS 2018 at am Pharmacy No Yes   Sig: INHALE 2 PUFFS INTO THE LUNGS DAILY   VITAMIN D, CHOLECALCIFEROL, PO 2018 at am Self Yes Yes   Sig: Take 2,000 Units by mouth daily    albuterol (2.5 MG/3ML) 0.083% neb solution 2018 at Unknown time Pharmacy No Yes   Sig: TAKE 1 VIAL BY NEBULIZATION EVERY 6 HOURS AS NEEDED FOR SHORNESS OF BREATH/DYSPNEA OR WHEEZING   albuterol (PROAIR HFA/PROVENTIL HFA/VENTOLIN HFA) 108 (90 BASE) MCG/ACT Inhaler 2018 at Unknown time Pharmacy No Yes   Sig: Inhale 1-2 puffs into the lungs every 4 hours as needed for shortness of breath / dyspnea or wheezing   aspirin 81 MG chewable tablet 2018 at am Self Yes Yes   Sig: Take 81 mg by mouth daily   atorvastatin (LIPITOR) 40 MG tablet 2018 at hs Pharmacy No Yes   Sig: TAKE 1 TABLET(40 MG) BY MOUTH AT BEDTIME   blood glucose  monitoring (NO BRAND SPECIFIED) test strip 4/5/2018 at Unknown time Pharmacy No Yes   Sig: Use to test blood sugars 3 times daily or as directed. Uses onetouch   carvedilol (COREG) 12.5 MG tablet 4/5/2018 at Unknown time Pharmacy No Yes   Sig: TAKE 1 TABLET(12.5 MG) BY MOUTH TWICE DAILY   darbepoetin libby (ARANESP) 100 MCG/0.5ML injection Past Week at Unknown time Self Yes Yes   Sig: Inject Subcutaneous three times a week At [facility] M,W,F. Dialysis ([facility]) doses, patient not sure about strength.   diltiazem (TIAZAC) 300 MG 24 hr ER beaded capsule 4/5/2018 at am Pharmacy No Yes   Sig: Take 1 capsule (300 mg) by mouth daily   doxazosin (CARDURA) 8 MG tablet 4/5/2018 at hs Pharmacy No Yes   Sig: TAKE 1 TABLET BY MOUTH EVERY NIGHT AT BEDTIME.   fish oil-omega-3 fatty acids 1000 MG capsule 4/5/2018 at am Self Yes Yes   Sig: Take 1 g by mouth daily   furosemide (LASIX) 20 MG tablet 4/5/2018 at am Pharmacy No Yes   Sig: Take 1 tablet (20 mg) by mouth daily   insulin glargine (LANTUS) 100 UNIT/ML injection 4/5/2018 at hs Pharmacy Yes Yes   Sig: Inject 23 Units Subcutaneous At Bedtime (Patient will increase to 26 units at bedtime he states when BG is elevated.   lisinopril (PRINIVIL/ZESTRIL) 20 MG tablet 4/5/2018 at am Pharmacy No Yes   Sig: Take 1 tablet (20 mg) by mouth daily   order for DME  Pharmacy No No   Sig: Equipment being ordered: Other: Nebulizer machine  Treatment Diagnosis: COPD   order for DME  Pharmacy No No   Sig: Equipment being ordered: Four wheeled walker      Facility-Administered Medications: None     Allergies   Allergies   Allergen Reactions     Pioglitazone Other (See Comments)     Edema & hay fever     Vytorin Other (See Comments)     Muscle aches       Social History   I have reviewed this patient's social history and updated it with pertinent information if needed. Seven Savage Jr.  reports that he has quit smoking. He has never used smokeless tobacco. He reports that he drinks alcohol. He  reports that he does not use illicit drugs.    Family History   I have reviewed this patient's family history and updated it with pertinent information if needed.   Family History   Problem Relation Age of Onset     Family history unknown: Yes       Review of Systems   The 10 point Review of Systems is negative other than noted in the HPI.      Physical Exam   Temp: 97.8  F (36.6  C) Temp src: Oral BP: 150/78 Pulse: 77 Heart Rate: 65 Resp: 20 SpO2: 99 % O2 Device: Nasal cannula Oxygen Delivery: 1.5 LPM  Vital Signs with Ranges  Temp:  [97.8  F (36.6  C)-98.4  F (36.9  C)] 97.8  F (36.6  C)  Pulse:  [67-77] 77  Heart Rate:  [65-68] 65  Resp:  [18-22] 20  BP: (139-173)/(54-78) 150/78  SpO2:  [95 %-99 %] 99 %  182 lbs 5.13 oz    GENERAL: healthy, alert, NAD  HEENT:  Normocephalic. No gross abnormalities.  Pupils equal.  MMM.  Dentition is ok.  CV: RRR, systole M, no clicks, gallops, or rubs, tr BLE ankle edema, no carotid bruits  RESP: Reduced BS bilat, prolonged exp phase, mild exp wheeze  GI: Abdomen soft/nt/nd, BS normal. No masses, organomegaly  MUSCULOSKELETAL: extremities nl - no gross deformities noted, L AVF  SKIN: no suspicious lesions or rashes, dry to touch  NEURO:  Strength normal and symmetric.   PSYCH: mood good, affect appropriate  LYMPH: No palpable ant/post cervical and supraclavicular adenopathy    Data   BMP  Recent Labs  Lab 04/06/18  0305      POTASSIUM 4.2   CHLORIDE 102   AARON 8.9   CO2 27   BUN 47*   CR 3.87*   *     [email or web address](phos:4)  CBC)  Recent Labs  Lab 04/06/18  0305   WBC 14.4*   HGB 9.4*   HCT 29.5*   MCV 84        No lab results found in last 7 days.    Invalid input(s): BILIRUBININDIRECT  No lab results found in last 7 days.  No results found for: D2VIT, D3VIT, DTOT    Recent Labs  Lab 04/06/18  0305   HGB 9.4*   HCT 29.5*   MCV 84     No results for input(s): PTHI in the last 168 hours.

## 2018-04-06 NOTE — PROGRESS NOTES
[facility] Progress Note  [facility]     attempted three visits, sleeping and not in room.    SH will attempt to visit over the weekend.      Bong Montanez  Chaplain Resident

## 2018-04-06 NOTE — ED NOTES
[facility]  ED Nurse Handoff Report    ED Chief complaint: Shortness of Breath (increased SOB tonight, EMS brought in gave 1 duoneb in route sats in the 80's on RA O2 applied @ 4L josue to 95%)      ED Diagnosis:   Final diagnoses:   Community acquired pneumonia, unspecified laterality   COPD exacerbation (H)       Code Status:  MD to address    Allergies:   Allergies   Allergen Reactions     Pioglitazone Other (See Comments)     Edema & hay fever     Vytorin Other (See Comments)     Muscle aches       Activity level - Baseline/Home:  Independent, Walks with cane or walker.  Short distance in home independent usually.     Activity Level - Current:   Stand with Assist     Needed?: No    Isolation: No  Infection: Not Applicable    Bariatric?: No    Vital Signs:   Vitals:    04/06/18 0315 04/06/18 0316 04/06/18 0345   BP: 151/61 151/61 146/58   Pulse:  67    Resp:  18    Temp:  98.4  F (36.9  C)    TempSrc:  Oral    SpO2: 97% 98% 96%       Cardiac Rhythm: ,        Pain level:   0  Is this patient confused?: No     Patient Report: Initial Complaint: SOB  Focused Assessment:  Seven is an 81 yo male, presents via EMS with SOB.  Worse over last 5 days.  Lives with grandchildren who have frequent URI.  Hx of ERSD & COPD.  Last dialysis on Wed (dialysis M/W/F).  Patient reports SOB worse when he was sleeping.  EMS gave duoneb en route, O2 sat in 80s.  Not O2 dependent at home.  Patient reports congestion & productive cough, cough noted in ED which has not been productive.  Expiratory wheezing noted & diminshed breath sounds bilaterally.  Diminished more to upper lobes.  Received 2 duonebs in ED.  O2 sat on room air is 97%.  IV antibiotics started in ED & being treated for community acquired pneumonia.    Tests Performed:  Labs, Chest XR  Abnormal Results:  Refer to EPIC  Treatments provided:  Yanelis Albarado    Family Comments:  None present    OBS brochure/video discussed/provided to patient:  N/A    ED Medications:   Medications   ipratropium - albuterol 0.5 mg/2.5 mg/3 mL (DUONEB) neb solution 3 mL (3 mLs Nebulization Given 4/6/18 0350)   cefTRIAXone (ROCEPHIN) 2 g vial to attach to  ml bag for ADULTS or NS 50 ml bag for PEDS (2 g Intravenous New Bag 4/6/18 0355)   azithromycin (ZITHROMAX) 500 mg in sodium chloride 0.9 % 250 mL intermittent infusion (not administered)   methylPREDNISolone sodium succinate (solu-MEDROL) injection 125 mg (125 mg Intravenous Given 4/6/18 0311)       Drips infusing?:  No    For the majority of the shift this patient was Green.   Interventions performed were monitor, assess.    Severe Sepsis OR Septic Shock Diagnosis Present: No      ED NURSE PHONE NUMBER: [phone]

## 2018-04-06 NOTE — H&P
Admitted:     04/06/2018      PRIMARY CARE PHYSICIAN:  Sandip Antunez MD      PRIMARY NEPHROLOGIST:  Dr. Harjeet Samuel.      CHIEF COMPLAINT:  Shortness of breath.      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  He appears to be a reliable historian.  He states since Monday, April 2, he has noted increasing shortness of breath along with wheezing and nonproductive cough.  He reports no fever, chills, chest pain, nausea, vomiting, diarrhea or other complaints.  In the ER, the patient has been evaluated by Dr. August Hassan, the ER attending physician.  The hematology profile is significant for elevated white count of 14.4.  The screening for influenza is negative.  The electrocardiogram reveals normal sinus rhythm with sinus arrhythmia, and ST-T wave changes in lateral leads.  The chest x-ray shows an ill-defined hazy opacities in the mid and inferior right lung and mid left lung, as well as very small bilateral pleural effusions.  This finding likely represents pulmonary edema versus infection.  In the Emergency Department, the patient has been treated with intravenous ceftriaxone and azithromycin, as well as DuoNeb and methylprednisone.  He is currently saturating 91% on room air and has stable vital signs.  He is being admitted to the hospital under inpatient status.      PAST MEDICAL HISTORY:   1.  Chronic obstructive pulmonary disease.   2.  End-stage renal disease, on hemodialysis every Monday, Wednesday, Friday.   3.  Hypertension.   4.  Dyslipidemia.   5.  Diabetes mellitus type 2 with the most recent hemoglobin A1c of 5.9% and 03/20/2018   6.   Nephrolithiasis.   7.  Severe aortic stenosis.  The valve area was 0.8 square cm with an EF of 60% by echocardiogram in 2016.   8.  Chronic anemia.   9.  Type 2 non-ST elevation myocardial infarction in 2017 due to acute exacerbation of chronic obstructive pulmonary disease.      PAST SURGICAL HISTORY:   1.  Tonsillectomy.   2.  Adenoidectomy.   3.  Endoscopic polypectomy of the vocal cords.   4.  Ureteral stent placement.      FAMILY HISTORY:  Father  of myocarditis at the age of 35.  Mother had diabetes and  at age of 70.      SOCIAL HISTORY:  The patient is .  He is a retired psychiatrist.  He quit smoking 7 years ago.  Prior to that he had smoked more than 50 years of smoking cigarettes 1 pack a day.  He drinks alcohol on very rare occasions.  He does use a cane for ambulation.  He does not use recreational drugs.      MEDICATIONS:    Prior to Admission Medications   Prescriptions Last Dose Informant Patient Reported? Taking?   B Complex-C-Folic Acid (BRANDI-HEIDY) TABS   No No   Sig: TAKE ONE TABLET BY MOUTH DAILY   B-D U/F 31G X 8 MM insulin pen needle   No No   Sig: USE AS DIRECTED FOUR TIMES DAILY.   COLCHICINE PO  Self Yes No   Sig: Take 0.6 mg by mouth every 48 hours as needed Reported on 3/30/2017   EPLERENONE PO  Self Yes No   Sig: Take 50 mg by mouth daily   Insulin Aspart (NOVOLOG SC)  Self Yes No   Sig: Sliding scale   Respiratory Therapy Supplies (NEBULIZER/ADULT MASK) KIT   No No   Si Device as needed   STIOLTO RESPIMAT 2.5-2.5 MCG/ACT AERS   No No   Sig: INHALE 2 PUFFS INTO THE LUNGS DAILY   VITAMIN D, CHOLECALCIFEROL, PO  Self Yes No   Sig: Take 2,000 Units by mouth every other day   albuterol (2.5 MG/3ML) 0.083% neb solution   No No   Sig: TAKE 1 VIAL BY NEBULIZATION EVERY 6 HOURS AS NEEDED FOR SHORNESS OF BREATH/DYSPNEA OR WHEEZING   albuterol (PROAIR HFA/PROVENTIL HFA/VENTOLIN HFA) 108 (90 BASE) MCG/ACT Inhaler   No No   Sig: Inhale 1-2 puffs into the lungs every 4  hours as needed for shortness of breath / dyspnea or wheezing   aspirin 81 MG chewable tablet  Self Yes No   Sig: Take 81 mg by mouth daily   atorvastatin (LIPITOR) 40 MG tablet   No No   Sig: TAKE 1 TABLET(40 MG) BY MOUTH AT BEDTIME   blood glucose monitoring (NO BRAND SPECIFIED) test strip   No No   Sig: Use to test blood sugars 3 times daily or as directed. Uses onetouch   carvedilol (COREG) 12.5 MG tablet   No No   Sig: TAKE 1 TABLET(12.5 MG) BY MOUTH TWICE DAILY   darbepoetin libby (ARANESP) 100 MCG/0.5ML injection  Self Yes No   Sig: Inject 40 mcg Subcutaneous every 30 days   diltiazem (TIAZAC) 300 MG 24 hr ER beaded capsule   No No   Sig: Take 1 capsule (300 mg) by mouth daily   doxazosin (CARDURA) 8 MG tablet   No No   Sig: TAKE 1 TABLET BY MOUTH EVERY NIGHT AT BEDTIME.   fish oil-omega-3 fatty acids 1000 MG capsule  Self Yes No   Sig: Take 1 g by mouth daily   furosemide (LASIX) 20 MG tablet   No No   Sig: Take 1 tablet (20 mg) by mouth daily   insulin aspart (NOVOLOG FLEXPEN) 100 UNIT/ML injection   No No   Sig: INJECT UNDER THE SKIN USING SLIDING SCALE UP TO 50 UNITS EVERY DAY AS DIRECTED PRIOR TO MEALS.   insulin glargine (LANTUS SOLOSTAR) 100 UNIT/ML injection   No No   Sig: INJECT 26 UNITS UNDER THE SKIN AT BEDTIME   lisinopril (PRINIVIL/ZESTRIL) 20 MG tablet   No No   Sig: Take 1 tablet (20 mg) by mouth daily   order for DME   No No   Sig: Equipment being ordered: Other: Nebulizer machine  Treatment Diagnosis: COPD   order for DME   No No   Sig: Equipment being ordered: Four wheeled walker      Facility-Administered Medications: None     ALLERGIES AND  INTOLERANCES:  PIOGLITAZONE AND VYTORIN.      REVIEW OF SYSTEMS:  A 10-point review of system was performed thoroughly and was negative except as stated in the history of present illness.      PHYSICAL EXAMINATION:   GENERAL:  This patient is a very pleasant elderly gentleman who is in no acute distress.   VITAL SIGNS:  Blood pressure 145/58, heart rate  68, respiratory rate 18, temperature 98.4 degrees Fahrenheit, oxygen saturation 92% on room air.   HEENT:  The pupils are round, equal, reactive to light bilaterally.  There is no conjunctival pallor or scleral icterus.  The oral mucosa appears moist.   NECK:  Supple.  I could not appreciate elevated JVP.   LUNGS:  There are bilateral diffuse expiratory wheezing.   CARDIOVASCULAR:  There is normal S1 and S2 with regular rhythm.  There is a grade 2/6 systolic murmur best audible in the left upper sternal border.   ABDOMEN:  Soft, nontender, nondistended.  Bowel sounds are present.   EXTREMITIES:  There is no calf tenderness or lower extremity edema.   SKIN:  There is no jaundice, cyanosis or acute rash.   NEUROLOGIC:  He is awake, alert, oriented x 3.  There is no focal deficit on gross examination.      LABORATORY DATA:   1.  Chemistry profile:  Sodium 139, potassium 4.2, creatinine 3.7, calcium 8.9, glucose 199.   2.  Hematology profile:  White count 14.4, hemoglobin 9.4, platelet count 332,000, MCV 84.   3.  Screening for influenza is negative.      ASSESSMENT AND PLAN:  Mr. Seven Savage is a delightful 82-year-old gentleman with a past medical history notable for COPD, end-stage renal disease, hypertension, dyslipidemia, severe aortic stenosis, and chronic anemia, who has presented with increasing shortness of breath since 04/02/2018, along with wheezing and productive cough.        1.  Acute chronic obstructive pulmonary disease exacerbation with suspected bilateral community-acquired pneumonia:  The patient has presented with several days of increasing shortness of breath, wheezing and nonproductive cough.  No fever or chills.  He has elevated white count 14.4, normal lactic acid of 0.9 and bilateral ill-defined hazy opacities in the mid and inferior right and mid left lung suggestive of possible infection.  The screening for influenza is negative.  I will check a procalcitonin level.  He will be treated with  intravenous ceftriaxone and azithromycin per community-acquired pneumonia.  Additionally, I will treat the patient with prednisone 40 mg p.o. daily for 5 days as well as albuterol nebs, both scheduled and p.r.n.  He will also continue with prior to admission Stiolto.   2.  End-stage renal disease:  The patient is on hemodialysis every Monday, Wednesday and Friday.  He is due for hemodialysis later today.  I will place a consult for Nephrology Service.   3.  Hypertension:  His blood pressure is currently controlled.  He will continue on prior to admission carvedilol 12.5 mg p.o. b.i.d., diltiazem 300 mg p.o. daily, Cardura 8 mg p.o. at bedtime,  Lisinopril 20 mg p.o. daily and Eplerenone 50 mg p.o. daily.  Additionally, I will have IV hydralazine available p.r.n.   4.  Dyslipidemia:  He will continue with prior to admission Lipitor 40 mg p.o. at bedtime.   5.  Diabetes mellitus type 2:  The most recent hemoglobin A1c was 5.9% on 03/20/2018.  His blood glucose is currently less than 200.  At home he is on Lantus 26 units subq at bedtime as well as sliding scale NovoLog.  I will reduce the dose of Lantus to 20 units subcu.  This can be up-titrated based on his blood glucose readings.  In addition, I will place him on sliding scale Novolin and diabetic diet.   6.  Severe aortic stenosis: I I will defer the management to his primary care physicians.   7.  Chronic anemia:  His hemoglobin is stable at 9.4 today.  He is on Aranesp.  I will defer the management to his nephrologist.   8.  Deep venous thrombosis prophylaxis:  Pneumatic compression devices for now.      CODE STATUS:  It was discussed and a full code was established.      DISPOSITION:  I anticipate more than 48 hours of hospital stay.      I would like to thank Dr. Sandip Antunez for allowing the Hospitalist Service to participate in the care of this patient.         DAVID PIERCE MD             D: 04/06/2018   T: 04/06/2018   MT: MEHUL      Name:     TOMMY  MISHA   MRN:      [id number]        Account:      [id number]   :      1935        Admitted:     2018                   Document: [id number]

## 2018-04-06 NOTE — PROGRESS NOTES
RECEIVING UNIT ED HANDOFF REVIEW    ED Nurse Handoff Report was reviewed by: Carol Teran on April 6, 2018 at 5:23 AM

## 2018-04-07 LAB
ANION GAP SERPL CALCULATED.3IONS-SCNC: 9 MMOL/L (ref 3–14)
BUN SERPL-MCNC: 45 MG/DL (ref 7–30)
CALCIUM SERPL-MCNC: 9 MG/DL (ref 8.5–10.1)
CHLORIDE SERPL-SCNC: 98 MMOL/L (ref 94–109)
CO2 SERPL-SCNC: 29 MMOL/L (ref 20–32)
CREAT SERPL-MCNC: 3.51 MG/DL (ref 0.66–1.25)
ERYTHROCYTE [DISTWIDTH] IN BLOOD BY AUTOMATED COUNT: 13.7 % (ref 10–15)
GFR SERPL CREATININE-BSD FRML MDRD: 17 ML/MIN/1.7M2
GLUCOSE BLDC GLUCOMTR-MCNC: 167 MG/DL (ref 70–99)
GLUCOSE BLDC GLUCOMTR-MCNC: 203 MG/DL (ref 70–99)
GLUCOSE BLDC GLUCOMTR-MCNC: 262 MG/DL (ref 70–99)
GLUCOSE BLDC GLUCOMTR-MCNC: 311 MG/DL (ref 70–99)
GLUCOSE BLDC GLUCOMTR-MCNC: 335 MG/DL (ref 70–99)
GLUCOSE BLDC GLUCOMTR-MCNC: 391 MG/DL (ref 70–99)
GLUCOSE BLDC GLUCOMTR-MCNC: 440 MG/DL (ref 70–99)
GLUCOSE SERPL-MCNC: 293 MG/DL (ref 70–99)
HCT VFR BLD AUTO: 27.8 % (ref 40–53)
HGB BLD-MCNC: 9 G/DL (ref 13.3–17.7)
MCH RBC QN AUTO: 27.3 PG (ref 26.5–33)
MCHC RBC AUTO-ENTMCNC: 32.4 G/DL (ref 31.5–36.5)
MCV RBC AUTO: 84 FL (ref 78–100)
PLATELET # BLD AUTO: 335 10E9/L (ref 150–450)
POTASSIUM SERPL-SCNC: 4.3 MMOL/L (ref 3.4–5.3)
RBC # BLD AUTO: 3.3 10E12/L (ref 4.4–5.9)
SODIUM SERPL-SCNC: 136 MMOL/L (ref 133–144)
WBC # BLD AUTO: 15 10E9/L (ref 4–11)

## 2018-04-07 PROCEDURE — 36415 COLL VENOUS BLD VENIPUNCTURE: CPT | Performed by: INTERNAL MEDICINE

## 2018-04-07 PROCEDURE — 00000146 ZZHCL STATISTIC GLUCOSE BY METER IP

## 2018-04-07 PROCEDURE — 94640 AIRWAY INHALATION TREATMENT: CPT | Mod: 76

## 2018-04-07 PROCEDURE — 25000132 ZZH RX MED GY IP 250 OP 250 PS 637: Mod: GY | Performed by: INTERNAL MEDICINE

## 2018-04-07 PROCEDURE — 25000131 ZZH RX MED GY IP 250 OP 636 PS 637: Mod: GY

## 2018-04-07 PROCEDURE — 80048 BASIC METABOLIC PNL TOTAL CA: CPT | Performed by: INTERNAL MEDICINE

## 2018-04-07 PROCEDURE — 94640 AIRWAY INHALATION TREATMENT: CPT

## 2018-04-07 PROCEDURE — 25000131 ZZH RX MED GY IP 250 OP 636 PS 637: Mod: GY | Performed by: HOSPITALIST

## 2018-04-07 PROCEDURE — 40000275 ZZH STATISTIC RCP TIME EA 10 MIN

## 2018-04-07 PROCEDURE — 12000000 ZZH R&B MED SURG/OB

## 2018-04-07 PROCEDURE — 25000125 ZZHC RX 250: Performed by: INTERNAL MEDICINE

## 2018-04-07 PROCEDURE — 99232 SBSQ HOSP IP/OBS MODERATE 35: CPT | Performed by: HOSPITALIST

## 2018-04-07 PROCEDURE — A9270 NON-COVERED ITEM OR SERVICE: HCPCS | Mod: GY | Performed by: INTERNAL MEDICINE

## 2018-04-07 PROCEDURE — 25000128 H RX IP 250 OP 636: Performed by: INTERNAL MEDICINE

## 2018-04-07 PROCEDURE — 94799 UNLISTED PULMONARY SVC/PX: CPT

## 2018-04-07 PROCEDURE — 85027 COMPLETE CBC AUTOMATED: CPT | Performed by: INTERNAL MEDICINE

## 2018-04-07 RX ADMIN — AZITHROMYCIN MONOHYDRATE 250 MG: 250 TABLET ORAL at 00:19

## 2018-04-07 RX ADMIN — PREDNISONE 40 MG: 20 TABLET ORAL at 08:35

## 2018-04-07 RX ADMIN — GUAIFENESIN 10 ML: 200 SOLUTION ORAL at 08:50

## 2018-04-07 RX ADMIN — INSULIN GLARGINE 26 UNITS: 100 INJECTION, SOLUTION SUBCUTANEOUS at 21:57

## 2018-04-07 RX ADMIN — DILTIAZEM HYDROCHLORIDE 300 MG: 180 CAPSULE, EXTENDED RELEASE ORAL at 08:36

## 2018-04-07 RX ADMIN — LISINOPRIL 20 MG: 20 TABLET ORAL at 08:36

## 2018-04-07 RX ADMIN — CARVEDILOL 12.5 MG: 12.5 TABLET, FILM COATED ORAL at 17:49

## 2018-04-07 RX ADMIN — CARVEDILOL 12.5 MG: 12.5 TABLET, FILM COATED ORAL at 08:36

## 2018-04-07 RX ADMIN — ASPIRIN 81 MG 81 MG: 81 TABLET ORAL at 08:36

## 2018-04-07 RX ADMIN — DOXAZOSIN MESYLATE 8 MG: 4 TABLET ORAL at 21:56

## 2018-04-07 RX ADMIN — EPLERENONE 50 MG: 25 TABLET ORAL at 08:35

## 2018-04-07 RX ADMIN — ALBUTEROL SULFATE 2.5 MG: 2.5 SOLUTION RESPIRATORY (INHALATION) at 20:10

## 2018-04-07 RX ADMIN — ALBUTEROL SULFATE 2.5 MG: 2.5 SOLUTION RESPIRATORY (INHALATION) at 12:49

## 2018-04-07 RX ADMIN — FUROSEMIDE 20 MG: 20 TABLET ORAL at 08:36

## 2018-04-07 RX ADMIN — ALBUTEROL SULFATE 2.5 MG: 2.5 SOLUTION RESPIRATORY (INHALATION) at 07:43

## 2018-04-07 RX ADMIN — SENNOSIDES AND DOCUSATE SODIUM 2 TABLET: 8.6; 5 TABLET ORAL at 14:22

## 2018-04-07 RX ADMIN — UMECLIDINIUM BROMIDE AND VILANTEROL TRIFENATATE 1 PUFF: 62.5; 25 POWDER RESPIRATORY (INHALATION) at 08:34

## 2018-04-07 RX ADMIN — ATORVASTATIN CALCIUM 40 MG: 40 TABLET, FILM COATED ORAL at 21:56

## 2018-04-07 RX ADMIN — CEFTRIAXONE 1 G: 1 INJECTION, POWDER, FOR SOLUTION INTRAMUSCULAR; INTRAVENOUS at 04:35

## 2018-04-07 NOTE — PLAN OF CARE
Problem: Patient Care Overview  Goal: Plan of Care/Patient Progress Review  Outcome: No Change  Pt A/O x4. VSS on 1.5L. Exp wheezes throughout lungs. Scheduled nebs. Nonproductive cough, I/S encouraged. Medications administered after dialysis that were held in AM per pharmacist and pt's regular routine. Pt denies pain. Low sat fat, low NA diet, tolerating. , 395, recheck after correction dose, 428, MD notified 8u ordered, pt refusing extra dose. Pt refusing bed alarm. Up with SBA. Voiding adequately. IV SL, IV abx. Dialysis removed 1.4L. Tele NSR. Anticipated D/C per MD pending. Nursing continue to monitor.

## 2018-04-07 NOTE — PROGRESS NOTES
SPIRITUAL HEALTH SERVICES Progress Note  [facility]     visited pt on request. Pt was sitting up in bed and welcomed visit. Pt shared wide ranging discussion of theology, history, and politics. Pt displayed no distress and reports being well supported and connected to family and multiple [place] communities. Pt reports that he is two wonderful grandchildren get him sick every year and he spends a few days in the hospital. SH provided generous reflective listening, laughter and support. No plans to follow but SH remains available by request.    aLz Quintanilla M.Div.  Chaplain Resident  [phone] Pager

## 2018-04-07 NOTE — PLAN OF CARE
Problem: Patient Care Overview  Goal: Plan of Care/Patient Progress Review  Outcome: Improving  Pt A&O x4. Up x1/SBA to BR. Use of BS urinal. Tolerating low fat, low Na diet; adequate PO intake. VSS on 1.5 lpm of O2 NC, O2 sats %. LS expiratory wheezes with an infrequent nonproductive cough. IV SL patent. L arm fistula, bruit and thrill present. Denies pain. . Tele NSR. D/C pending progress, nursing will continue to monitor.

## 2018-04-07 NOTE — PROGRESS NOTES
Hospitalist cross cover note:    Paged by RN regarding: BS>400  Novolog 8 units SC x1 ordered, patient on steroid, changed ISS to high insulin resistence.    Boyd Max MD  Hospitalist.

## 2018-04-07 NOTE — PROGRESS NOTES
Patient is on RA with SpO2 in the low to mid 90's. BS diminished. Pt instructed on the use of acapella. Pt has a nonproductive cough. All nebs were given as ordered.  Will cont to follow.  4/7/2018  Jazmine Lacey RRT

## 2018-04-07 NOTE — PROGRESS NOTES
[facility]  Hospitalist Progress Note        Scotty Jin, DO  04/07/2018        Interval History:      Patient states that he is doing well.          Assessment and Plan:        Mr. Seven Savage is a delightful 82-year-old gentleman with a past medical history notable for COPD, end-stage renal disease, hypertension, dyslipidemia, severe aortic stenosis, and chronic anemia, who has presented with increasing shortness of breath since 04/02/2018, along with wheezing and productive cough.          Acute chronic obstructive pulmonary disease exacerbation with suspected bilateral community-acquired pneumonia:  The patient has presented with several days of increasing shortness of breath, wheezing and nonproductive cough.    - Ceftriaxone and azithromycin.   - Pulmonary hygiene.   - Steroid taper.   - Nebs.   - continue with prior to admission Stiolto.     End-stage renal disease:  The patient is on hemodialysis every Monday, Wednesday and Friday.  - Consult for Nephrology Service.     Hypertension:  His blood pressure is controlled.    - Continue on prior to admission carvedilol, diltiazem, Cardura,  Lisinopril and Eplerenone.    - IV hydralazine available p.r.n.     Dyslipidemia: Stable.   - Continue prior to admission Lipitor.     Diabetes mellitus type 2:  The most recent hemoglobin A1c was 5.9% on 03/20/2018.   - ISS.   - Reduced dose lantus at 26 units qhs.     Severe aortic stenosis: Stable.  - Monitor.     Chronic anemia:  His hemoglobin is stable at 9.4 on admission.  - Aranesp.   - Monitor.     Deep venous thrombosis prophylaxis:  Pneumatic compression devices for now.       CODE: full code      DISPOSITION: Discharge to home once weaned from oxygen and transitioned to oral antibiotics.     Pain: # Pain Assessment:  Current Pain Score 4/7/2018   Patient currently in pain? denies   Pain score (0-10) -   Seven gutiérrez pain level was assessed and he currently denies pain.                      Physical Exam:      Heart Rate: 77    Blood pressure 135/62, pulse 77, temperature 97.8  F (36.6  C), resp. rate 18, weight 83.1 kg (183 lb 3.2 oz), SpO2 92 %.    Vitals:    18 0606 18 0643   Weight: 82.7 kg (182 lb 5.1 oz) 83.1 kg (183 lb 3.2 oz)       Vital Sign Ranges  Temperature Temp  Av.8  F (36.6  C)  Min: 97.4  F (36.3  C)  Max: 98.4  F (36.9  C)   Blood pressure Systolic (24hrs), Av , Min:125 , Max:176        Diastolic (24hrs), Av, Min:58, Max:78      Pulse No Data Recorded   Respirations Resp  Av.8  Min: 18  Max: 20   Pulse oximetry SpO2  Av.1 %  Min: 92 %  Max: 100 %     Vital Signs with Ranges  Temp:  [97.4  F (36.3  C)-98.4  F (36.9  C)] 97.8  F (36.6  C)  Heart Rate:  [65-99] 77  Resp:  [18-20] 18  BP: (125-176)/(58-78) 135/62  SpO2:  [92 %-100 %] 92 %    I/O Last 3 Shifts:   I/O last 3 completed shifts:  In: 480 [P.O.:480]  Out: 1550 [Urine:150; Other:1400]    I/O past 24 hours:     Intake/Output Summary (Last 24 hours) at 18 0817  Last data filed at 18 2041   Gross per 24 hour   Intake              480 ml   Output             1550 ml   Net            -1070 ml     GENERAL: Alert and oriented. NAD. Conversational, appropriate.   HEENT: Normocephalic. EOMI. No icterus or injection. Nares normal.   LUNGS: Wheezing present. No dyspnea at rest.   HEART: Regular rate, systolic murmur. Extremities perfused.   ABDOMEN: Soft, nontender, and nondistended. Positive bowel sounds.   EXTREMITIES: No LE edema noted.   NEUROLOGIC: Moves extremities x4 on command. No acute focal neurologic abnormalities noted.          Prior to Admission Medications:        Prescriptions Prior to Admission   Medication Sig Dispense Refill Last Dose     insulin glargine (LANTUS) 100 UNIT/ML injection Inject 23 Units Subcutaneous At Bedtime (Patient will increase to 26 units at bedtime he states when BG is elevated.   2018 at hs     albuterol (2.5 MG/3ML) 0.083% neb solution TAKE 1  VIAL BY NEBULIZATION EVERY 6 HOURS AS NEEDED FOR SHORNESS OF BREATH/DYSPNEA OR WHEEZING 75 mL 2 4/5/2018 at Unknown time     furosemide (LASIX) 20 MG tablet Take 1 tablet (20 mg) by mouth daily 90 tablet 3 4/5/2018 at am     lisinopril (PRINIVIL/ZESTRIL) 20 MG tablet Take 1 tablet (20 mg) by mouth daily 90 tablet 3 4/5/2018 at am     blood glucose monitoring (NO BRAND SPECIFIED) test strip Use to test blood sugars 3 times daily or as directed. Uses onetouch 300 strip 11 4/5/2018 at Unknown time     atorvastatin (LIPITOR) 40 MG tablet TAKE 1 TABLET(40 MG) BY MOUTH AT BEDTIME 90 tablet 1 4/5/2018 at hs     doxazosin (CARDURA) 8 MG tablet TAKE 1 TABLET BY MOUTH EVERY NIGHT AT BEDTIME. 90 tablet 3 4/5/2018 at hs     STIOLTO RESPIMAT 2.5-2.5 MCG/ACT AERS INHALE 2 PUFFS INTO THE LUNGS DAILY 4 g 10 4/5/2018 at am     B Complex-C-Folic Acid (BRANDI-HEIDY) TABS TAKE ONE TABLET BY MOUTH DAILY 90 tablet 3 4/5/2018 at am     carvedilol (COREG) 12.5 MG tablet TAKE 1 TABLET(12.5 MG) BY MOUTH TWICE DAILY 180 tablet 3 4/5/2018 at Unknown time     B-D U/F 31G X 8 MM insulin pen needle USE AS DIRECTED FOUR TIMES DAILY. 400 each 0 4/5/2018 at Unknown time     albuterol (PROAIR HFA/PROVENTIL HFA/VENTOLIN HFA) 108 (90 BASE) MCG/ACT Inhaler Inhale 1-2 puffs into the lungs every 4 hours as needed for shortness of breath / dyspnea or wheezing 1 Inhaler 5 4/6/2018 at Unknown time     diltiazem (TIAZAC) 300 MG 24 hr ER beaded capsule Take 1 capsule (300 mg) by mouth daily 30 capsule 0 4/5/2018 at am     aspirin 81 MG chewable tablet Take 81 mg by mouth daily   4/5/2018 at am     VITAMIN D, CHOLECALCIFEROL, PO Take 2,000 Units by mouth daily    4/5/2018 at am     COLCHICINE PO Take 0.6 mg by mouth every 48 hours as needed Reported on 3/30/2017   Past Month at Unknown time     darbepoetin libby (ARANESP) 100 MCG/0.5ML injection Inject Subcutaneous three times a week At diaylsis M,W,F. Dialysis ([facility]) doses, patient not sure about strength.    Past Week at Unknown time     EPLERENONE PO Take 50 mg by mouth daily   4/6/2018 at am     Insulin Aspart (NOVOLOG SC) Inject 12 Units Subcutaneous 3 times daily (with meals) Patient states with large meals he will increase to 14-16 units.   4/5/2018 at Unknown time     fish oil-omega-3 fatty acids 1000 MG capsule Take 1 g by mouth daily   4/5/2018 at am     order for DME Equipment being ordered: Four wheeled walker 1 Device 0 Taking     Respiratory Therapy Supplies (NEBULIZER/ADULT MASK) KIT 1 Device as needed 1 kit 0 Taking     order for DME Equipment being ordered: Other: Nebulizer machine  Treatment Diagnosis: COPD 1 Device 0 Taking            Medications:        Current Facility-Administered Medications   Medication Last Rate     Current Facility-Administered Medications   Medication Dose Route Frequency     aspirin  81 mg Oral Daily     atorvastatin  40 mg Oral Daily     carvedilol  12.5 mg Oral BID w/meals     eplerenone  50 mg Oral Daily     furosemide  20 mg Oral Daily     insulin glargine  20 Units Subcutaneous At Bedtime     lisinopril  20 mg Oral Daily     umeclidinium-vilanterol  1 puff Inhalation Daily     cefTRIAXone  1 g Intravenous Q24H     azithromycin  250 mg Oral Q24H     predniSONE  40 mg Oral Daily     albuterol  2.5 mg Nebulization Q6H     diltiazem  300 mg Oral Daily     - MEDICATION INSTRUCTIONS for Dialysis Patients -   Does not apply See Admin Instructions     doxazosin  8 mg Oral At Bedtime     insulin aspart  8 Units Subcutaneous Once     insulin aspart  1-10 Units Subcutaneous TID AC     insulin aspart  1-7 Units Subcutaneous At Bedtime     Current Facility-Administered Medications   Medication Dose Route Frequency     glucose  15-30 g Oral Q15 Min PRN    Or     dextrose  25-50 mL Intravenous Q15 Min PRN    Or     glucagon  1 mg Subcutaneous Q15 Min PRN     naloxone  0.1-0.4 mg Intravenous Q2 Min PRN     melatonin  1 mg Oral At Bedtime PRN     acetaminophen  650 mg Oral Q4H PRN      acetaminophen  650 mg Rectal Q4H PRN     oxyCODONE IR  5-10 mg Oral Q3H PRN     polyethylene glycol  17 g Oral Daily PRN     senna-docusate  1 tablet Oral BID PRN    Or     senna-docusate  2 tablet Oral BID PRN     ondansetron  4 mg Oral Q6H PRN    Or     ondansetron  4 mg Intravenous Q6H PRN     prochlorperazine  5 mg Intravenous Q6H PRN    Or     prochlorperazine  5 mg Oral Q6H PRN    Or     prochlorperazine  12.5 mg Rectal Q12H PRN     albuterol  2.5 mg Nebulization Q2H PRN     hydrALAZINE  10 mg Intravenous Q4H PRN     guaiFENesin  10 mL Oral Q4H PRN            Data:      Lab data reviewed.     Recent Labs  Lab 04/06/18  0305   HGB 9.4*   MCV 84         POTASSIUM 4.2   CHLORIDE 102   CO2 27   BUN 47*   CR 3.87*   ANIONGAP 10   AARON 8.9   *           Imaging:      Imaging data reviewed.     Dr. Scotty Jin D.O.  [facility] Hospitalist  Pager [phone]

## 2018-04-08 LAB
GLUCOSE BLDC GLUCOMTR-MCNC: 158 MG/DL (ref 70–99)
GLUCOSE BLDC GLUCOMTR-MCNC: 175 MG/DL (ref 70–99)
GLUCOSE BLDC GLUCOMTR-MCNC: 231 MG/DL (ref 70–99)
GLUCOSE BLDC GLUCOMTR-MCNC: 316 MG/DL (ref 70–99)
GLUCOSE BLDC GLUCOMTR-MCNC: 357 MG/DL (ref 70–99)

## 2018-04-08 PROCEDURE — 25000132 ZZH RX MED GY IP 250 OP 250 PS 637: Mod: GY | Performed by: HOSPITALIST

## 2018-04-08 PROCEDURE — A9270 NON-COVERED ITEM OR SERVICE: HCPCS | Mod: GY | Performed by: INTERNAL MEDICINE

## 2018-04-08 PROCEDURE — 12000000 ZZH R&B MED SURG/OB

## 2018-04-08 PROCEDURE — 94640 AIRWAY INHALATION TREATMENT: CPT

## 2018-04-08 PROCEDURE — 40000275 ZZH STATISTIC RCP TIME EA 10 MIN

## 2018-04-08 PROCEDURE — 25000132 ZZH RX MED GY IP 250 OP 250 PS 637: Mod: GY | Performed by: INTERNAL MEDICINE

## 2018-04-08 PROCEDURE — 00000146 ZZHCL STATISTIC GLUCOSE BY METER IP

## 2018-04-08 PROCEDURE — A9270 NON-COVERED ITEM OR SERVICE: HCPCS | Mod: GY | Performed by: HOSPITALIST

## 2018-04-08 PROCEDURE — 25000131 ZZH RX MED GY IP 250 OP 636 PS 637: Mod: GY | Performed by: HOSPITALIST

## 2018-04-08 PROCEDURE — 99232 SBSQ HOSP IP/OBS MODERATE 35: CPT | Performed by: HOSPITALIST

## 2018-04-08 PROCEDURE — 80048 BASIC METABOLIC PNL TOTAL CA: CPT | Performed by: HOSPITALIST

## 2018-04-08 PROCEDURE — 25000125 ZZHC RX 250: Performed by: INTERNAL MEDICINE

## 2018-04-08 PROCEDURE — 25000131 ZZH RX MED GY IP 250 OP 636 PS 637: Mod: GY

## 2018-04-08 PROCEDURE — 94640 AIRWAY INHALATION TREATMENT: CPT | Mod: 76

## 2018-04-08 PROCEDURE — 25000128 H RX IP 250 OP 636: Performed by: INTERNAL MEDICINE

## 2018-04-08 RX ORDER — PREDNISONE 10 MG/1
TABLET ORAL
Qty: 30 TABLET | Refills: 0 | Status: SHIPPED | OUTPATIENT
Start: 2018-04-08 | End: 2018-05-22

## 2018-04-08 RX ORDER — LEVOFLOXACIN 500 MG/1
500 TABLET, FILM COATED ORAL DAILY
Qty: 5 TABLET | Refills: 0 | Status: SHIPPED | OUTPATIENT
Start: 2018-04-08 | End: 2018-04-15

## 2018-04-08 RX ORDER — LEVOFLOXACIN 500 MG/1
500 TABLET, FILM COATED ORAL ONCE
Status: COMPLETED | OUTPATIENT
Start: 2018-04-08 | End: 2018-04-08

## 2018-04-08 RX ORDER — LEVOFLOXACIN 250 MG/1
250 TABLET, FILM COATED ORAL EVERY OTHER DAY
Status: DISCONTINUED | OUTPATIENT
Start: 2018-04-10 | End: 2018-04-09 | Stop reason: HOSPADM

## 2018-04-08 RX ADMIN — ASPIRIN 81 MG 81 MG: 81 TABLET ORAL at 08:46

## 2018-04-08 RX ADMIN — ATORVASTATIN CALCIUM 40 MG: 40 TABLET, FILM COATED ORAL at 21:53

## 2018-04-08 RX ADMIN — ALBUTEROL SULFATE 2.5 MG: 2.5 SOLUTION RESPIRATORY (INHALATION) at 07:46

## 2018-04-08 RX ADMIN — PREDNISONE 40 MG: 20 TABLET ORAL at 08:45

## 2018-04-08 RX ADMIN — LISINOPRIL 20 MG: 20 TABLET ORAL at 08:47

## 2018-04-08 RX ADMIN — DOXAZOSIN MESYLATE 8 MG: 4 TABLET ORAL at 21:53

## 2018-04-08 RX ADMIN — CARVEDILOL 12.5 MG: 12.5 TABLET, FILM COATED ORAL at 18:08

## 2018-04-08 RX ADMIN — CARVEDILOL 12.5 MG: 12.5 TABLET, FILM COATED ORAL at 08:45

## 2018-04-08 RX ADMIN — ALBUTEROL SULFATE 2.5 MG: 2.5 SOLUTION RESPIRATORY (INHALATION) at 20:02

## 2018-04-08 RX ADMIN — DILTIAZEM HYDROCHLORIDE 300 MG: 180 CAPSULE, EXTENDED RELEASE ORAL at 08:45

## 2018-04-08 RX ADMIN — LEVOFLOXACIN 500 MG: 500 TABLET, FILM COATED ORAL at 08:46

## 2018-04-08 RX ADMIN — INSULIN GLARGINE 26 UNITS: 100 INJECTION, SOLUTION SUBCUTANEOUS at 21:56

## 2018-04-08 RX ADMIN — AZITHROMYCIN MONOHYDRATE 250 MG: 250 TABLET ORAL at 00:17

## 2018-04-08 RX ADMIN — CEFTRIAXONE 1 G: 1 INJECTION, POWDER, FOR SOLUTION INTRAMUSCULAR; INTRAVENOUS at 04:52

## 2018-04-08 RX ADMIN — EPLERENONE 50 MG: 25 TABLET ORAL at 08:46

## 2018-04-08 RX ADMIN — UMECLIDINIUM BROMIDE AND VILANTEROL TRIFENATATE 1 PUFF: 62.5; 25 POWDER RESPIRATORY (INHALATION) at 08:45

## 2018-04-08 RX ADMIN — FUROSEMIDE 20 MG: 20 TABLET ORAL at 08:46

## 2018-04-08 RX ADMIN — ALBUTEROL SULFATE 2.5 MG: 2.5 SOLUTION RESPIRATORY (INHALATION) at 13:39

## 2018-04-08 NOTE — PROGRESS NOTES
Patient is on RA with SpO2 in the mid 90's. BS diminished. All nebs were given as ordered.  Will cont to follow.  4/8/2018  Jazmine Lacey RRT

## 2018-04-08 NOTE — PROVIDER NOTIFICATION
MD Notification    Notified Person: MD    Notified Person Name: Dr. Max    Notification Date/Time: 04/07/18 at 1825    Notification Interaction: Paged on call    Purpose of Notification: BG recheck was 440 after insulin given    Orders Received: New orders for insulin    Comments:

## 2018-04-08 NOTE — PLAN OF CARE
Problem: Patient Care Overview  Goal: Plan of Care/Patient Progress Review  Outcome: Improving  [id number] Pt is A&Ox4. VSS on RA with O2 sats in mid 90s. LS expiratory wheezes and diminished.  Infrequent nonproductive cough. On scheduled nebs. Tele d/c, loss of IV access. On PO abx. Denies pain or any other discomforts. Up with SBA. Bruit and thrill present on L arm fistula. Cont B/B, uses urinal. BM x1. Low fat/Na diet with good appetite. BGM: 158, 231 and 357.  Discharge pending tomorrow 4/9.

## 2018-04-08 NOTE — PLAN OF CARE
Problem: Patient Care Overview  Goal: Plan of Care/Patient Progress Review  Outcome: Improving  Pt is A&Ox4. VSS on RA with O2 sats in mid 90s. LS dm/clear bilaterally. Infrequent nonproductive cough. On scheduled nebs.Tele NSR with PAC. Denies pain or any other discomforts. Up with SBA. Declined to ambulate in [place]. Bruit and thrill present on L arm fistula. Cont B/B, uses urinal. No Bm this shift. Low fat/Na diet with good appetite.  at dinner. Recheck after insulin was 440, notified provider and new order for insulin were placed. BG at . Will continue to monitor.  Full code

## 2018-04-08 NOTE — PROGRESS NOTES
[facility]  Hospitalist Progress Note        Scotty Jin,   04/08/2018        Interval History:      Patient states he is still having shortness of breath and coughing today.          Assessment and Plan:        Mr. Seven Savage is a delightful 82-year-old gentleman with a past medical history notable for COPD, end-stage renal disease, hypertension, dyslipidemia, severe aortic stenosis, and chronic anemia, who has presented with increasing shortness of breath since 04/02/2018, along with wheezing and productive cough.           Acute chronic obstructive pulmonary disease exacerbation with suspected bilateral community-acquired pneumonia:  The patient has presented with several days of increasing shortness of breath, wheezing and nonproductive cough.    - Patient refusing IV, discussed risks and benefits including death. Patient chooses to not have IV at this time. Verbalized understanding.   - Pulmonary hygiene.   - Steroid taper.   - Nebs.   - continue with prior to admission Stiolto.      End-stage renal disease:  The patient is on hemodialysis every Monday, Wednesday and Friday.  - Consult for Nephrology Service.      Hypertension:  His blood pressure is controlled.    - Continue on prior to admission carvedilol, diltiazem, Cardura,  Lisinopril and Eplerenone.    - IV hydralazine available p.r.n.      Dyslipidemia: Stable.   - Continue prior to admission Lipitor.      Diabetes mellitus type 2:  The most recent hemoglobin A1c was 5.9% on 03/20/2018.   - ISS, high.   - Lantus at 26 units qhs.      Severe aortic stenosis: Stable.  - Monitor.      Chronic anemia:  His hemoglobin is stable at 9.4 on admission.  - Aranesp.   - Monitor.      Deep venous thrombosis prophylaxis:  Pneumatic compression devices for now.       CODE: full code      DISPOSITION: Discharge to home following dialysis on 4/9.     Pain: # Pain Assessment:  Current Pain Score 4/8/2018   Patient currently in pain?  denies   Pain score (0-10) -   Seven gutiérrez pain level was assessed and he currently denies pain.                     Physical Exam:      Heart Rate: 62    Blood pressure 135/53, pulse 59, temperature 98  F (36.7  C), temperature source Oral, resp. rate 16, weight 86 kg (189 lb 9.5 oz), SpO2 95 %.    Vitals:    18 0606 18 0643 18 0702   Weight: 82.7 kg (182 lb 5.1 oz) 83.1 kg (183 lb 3.2 oz) 86 kg (189 lb 9.5 oz)       Vital Sign Ranges  Temperature Temp  Av.6  F (36.4  C)  Min: 97.2  F (36.2  C)  Max: 98  F (36.7  C)   Blood pressure Systolic (24hrs), Av , Min:105 , Max:147        Diastolic (24hrs), Av, Min:45, Max:58      Pulse Pulse  Av.5  Min: 59  Max: 62   Respirations Resp  Av  Min: 16  Max: 16   Pulse oximetry SpO2  Av.9 %  Min: 92 %  Max: 95 %     Vital Signs with Ranges  Temp:  [97.2  F (36.2  C)-98  F (36.7  C)] 98  F (36.7  C)  Pulse:  [59-62] 59  Heart Rate:  [62] 62  Resp:  [16] 16  BP: (105-147)/(45-58) 135/53  SpO2:  [92 %-95 %] 95 %    I/O Last 3 Shifts:   I/O last 3 completed shifts:  In: 520 [P.O.:520]  Out: 325 [Urine:325]    I/O past 24 hours:     Intake/Output Summary (Last 24 hours) at 18 0803  Last data filed at 18 1728   Gross per 24 hour   Intake              240 ml   Output              150 ml   Net               90 ml     GENERAL: Alert and oriented. NAD. Conversational, appropriate.   HEENT: Normocephalic. EOMI. No icterus or injection. Nares normal.   LUNGS: Wheezing present, improved. No dyspnea at rest.   HEART: Regular rate, systolic murmur. Extremities perfused.   ABDOMEN: Soft, nontender, and nondistended. Positive bowel sounds.   EXTREMITIES: No LE edema noted.   NEUROLOGIC: Moves extremities x4 on command. No acute focal neurologic abnormalities noted.          Prior to Admission Medications:        Prescriptions Prior to Admission   Medication Sig Dispense Refill Last Dose     insulin glargine (LANTUS) 100 UNIT/ML injection  Inject 23 Units Subcutaneous At Bedtime (Patient will increase to 26 units at bedtime he states when BG is elevated.   4/5/2018 at hs     albuterol (2.5 MG/3ML) 0.083% neb solution TAKE 1 VIAL BY NEBULIZATION EVERY 6 HOURS AS NEEDED FOR SHORNESS OF BREATH/DYSPNEA OR WHEEZING 75 mL 2 4/5/2018 at Unknown time     furosemide (LASIX) 20 MG tablet Take 1 tablet (20 mg) by mouth daily 90 tablet 3 4/5/2018 at am     lisinopril (PRINIVIL/ZESTRIL) 20 MG tablet Take 1 tablet (20 mg) by mouth daily 90 tablet 3 4/5/2018 at am     blood glucose monitoring (NO BRAND SPECIFIED) test strip Use to test blood sugars 3 times daily or as directed. Uses onetouch 300 strip 11 4/5/2018 at Unknown time     atorvastatin (LIPITOR) 40 MG tablet TAKE 1 TABLET(40 MG) BY MOUTH AT BEDTIME 90 tablet 1 4/5/2018 at hs     doxazosin (CARDURA) 8 MG tablet TAKE 1 TABLET BY MOUTH EVERY NIGHT AT BEDTIME. 90 tablet 3 4/5/2018 at hs     STIOLTO RESPIMAT 2.5-2.5 MCG/ACT AERS INHALE 2 PUFFS INTO THE LUNGS DAILY 4 g 10 4/5/2018 at am     B Complex-C-Folic Acid (BRANDI-HEIDY) TABS TAKE ONE TABLET BY MOUTH DAILY 90 tablet 3 4/5/2018 at am     carvedilol (COREG) 12.5 MG tablet TAKE 1 TABLET(12.5 MG) BY MOUTH TWICE DAILY 180 tablet 3 4/5/2018 at Unknown time     B-D U/F 31G X 8 MM insulin pen needle USE AS DIRECTED FOUR TIMES DAILY. 400 each 0 4/5/2018 at Unknown time     albuterol (PROAIR HFA/PROVENTIL HFA/VENTOLIN HFA) 108 (90 BASE) MCG/ACT Inhaler Inhale 1-2 puffs into the lungs every 4 hours as needed for shortness of breath / dyspnea or wheezing 1 Inhaler 5 4/6/2018 at Unknown time     diltiazem (TIAZAC) 300 MG 24 hr ER beaded capsule Take 1 capsule (300 mg) by mouth daily 30 capsule 0 4/5/2018 at am     aspirin 81 MG chewable tablet Take 81 mg by mouth daily   4/5/2018 at am     VITAMIN D, CHOLECALCIFEROL, PO Take 2,000 Units by mouth daily    4/5/2018 at am     COLCHICINE PO Take 0.6 mg by mouth every 48 hours as needed Reported on 3/30/2017   Past Month at  Unknown time     darbepoetin libby (ARANESP) 100 MCG/0.5ML injection Inject Subcutaneous three times a week At diaylsis M,W,F. Dialysis ([facility]) doses, patient not sure about strength.   Past Week at Unknown time     EPLERENONE PO Take 50 mg by mouth daily   4/6/2018 at am     Insulin Aspart (NOVOLOG SC) Inject 12 Units Subcutaneous 3 times daily (with meals) Patient states with large meals he will increase to 14-16 units.   4/5/2018 at Unknown time     fish oil-omega-3 fatty acids 1000 MG capsule Take 1 g by mouth daily   4/5/2018 at am     order for DME Equipment being ordered: Four wheeled walker 1 Device 0 Taking     Respiratory Therapy Supplies (NEBULIZER/ADULT MASK) KIT 1 Device as needed 1 kit 0 Taking     order for DME Equipment being ordered: Other: Nebulizer machine  Treatment Diagnosis: COPD 1 Device 0 Taking            Medications:        Current Facility-Administered Medications   Medication Last Rate     Current Facility-Administered Medications   Medication Dose Route Frequency     insulin glargine  26 Units Subcutaneous At Bedtime     insulin aspart  6 Units Subcutaneous TID w/meals     aspirin  81 mg Oral Daily     atorvastatin  40 mg Oral Daily     carvedilol  12.5 mg Oral BID w/meals     eplerenone  50 mg Oral Daily     furosemide  20 mg Oral Daily     lisinopril  20 mg Oral Daily     umeclidinium-vilanterol  1 puff Inhalation Daily     cefTRIAXone  1 g Intravenous Q24H     azithromycin  250 mg Oral Q24H     predniSONE  40 mg Oral Daily     albuterol  2.5 mg Nebulization Q6H     diltiazem  300 mg Oral Daily     - MEDICATION INSTRUCTIONS for Dialysis Patients -   Does not apply See Admin Instructions     doxazosin  8 mg Oral At Bedtime     insulin aspart  1-10 Units Subcutaneous TID AC     insulin aspart  1-7 Units Subcutaneous At Bedtime     Current Facility-Administered Medications   Medication Dose Route Frequency     glucose  15-30 g Oral Q15 Min PRN    Or     dextrose  25-50 mL Intravenous Q15  Min PRN    Or     glucagon  1 mg Subcutaneous Q15 Min PRN     naloxone  0.1-0.4 mg Intravenous Q2 Min PRN     melatonin  1 mg Oral At Bedtime PRN     acetaminophen  650 mg Oral Q4H PRN     acetaminophen  650 mg Rectal Q4H PRN     oxyCODONE IR  5-10 mg Oral Q3H PRN     polyethylene glycol  17 g Oral Daily PRN     senna-docusate  1 tablet Oral BID PRN    Or     senna-docusate  2 tablet Oral BID PRN     ondansetron  4 mg Oral Q6H PRN    Or     ondansetron  4 mg Intravenous Q6H PRN     prochlorperazine  5 mg Intravenous Q6H PRN    Or     prochlorperazine  5 mg Oral Q6H PRN    Or     prochlorperazine  12.5 mg Rectal Q12H PRN     albuterol  2.5 mg Nebulization Q2H PRN     hydrALAZINE  10 mg Intravenous Q4H PRN     guaiFENesin  10 mL Oral Q4H PRN            Data:      Lab data reviewed.     Recent Labs  Lab 04/07/18  0815 04/06/18  0305   HGB 9.0* 9.4*   MCV 84 84    332    139   POTASSIUM 4.3 4.2   CHLORIDE 98 102   CO2 29 27   BUN 45* 47*   CR 3.51* 3.87*   ANIONGAP 9 10   AARON 9.0 8.9   * 199*           Imaging:      Imaging data reviewed.     Dr. Scotty Jin D.O.  [facility]  Pager [phone]

## 2018-04-08 NOTE — PROVIDER NOTIFICATION
MD Notification    Notified Person: MD    Notified Person Name: Dr. Jin    Notification Date/Time: 8:23 AM April 8, 2018    Notification Interaction: paged/ telphone    Purpose of Notification: Loss of IV access, pt. On IV abx and tele. Pt. Does not want a new IV access.  Pt. Refusing AM lab draws.    Orders Received: On PO abx. Tele d/c    Comments:

## 2018-04-08 NOTE — PLAN OF CARE
Patient has been assessed for Home Oxygen needs. Oxygen readings:    *Pulse oximetry (SpO2) = 94% on room air at rest while awake.    *SpO2 improved to % on liters/minute at rest.    *SpO2 = 86-88% on room air during activity/with exercise.    *SpO2 improved to 92% on 1 liters/minute during activity/with exercise.

## 2018-04-08 NOTE — PLAN OF CARE
Problem: Patient Care Overview  Goal: Plan of Care/Patient Progress Review  Outcome: Improving  A&Ox4. VSS on RA. Tele sinus dysrhythmia. Assist x1. Low fat, low Na diet. C/o being constipated due to diet. LS diminished. No edema. Denies pain. Will continue to monitor.     0500 rocephin started, infiltrated. Pt c/o burning. IV stopped. Hot pack given. Pt upset to be woken up in the AM for cares, swearing, and refused to have new IV put in. Pt notified that antibiotics cannot be completed and that being on tele and a full code required a patent IV site but pt refused.

## 2018-04-09 ENCOUNTER — DOCUMENTATION ONLY (OUTPATIENT)
Dept: OTHER | Facility: CLINIC | Age: 83
End: 2018-04-09

## 2018-04-09 VITALS
HEART RATE: 95 BPM | OXYGEN SATURATION: 96 % | TEMPERATURE: 97.5 F | BODY MASS INDEX: 28.1 KG/M2 | WEIGHT: 190.26 LBS | SYSTOLIC BLOOD PRESSURE: 160 MMHG | RESPIRATION RATE: 18 BRPM | DIASTOLIC BLOOD PRESSURE: 64 MMHG

## 2018-04-09 LAB
ANION GAP SERPL CALCULATED.3IONS-SCNC: 12 MMOL/L (ref 3–14)
BUN SERPL-MCNC: 90 MG/DL (ref 7–30)
CALCIUM SERPL-MCNC: 8.4 MG/DL (ref 8.5–10.1)
CHLORIDE SERPL-SCNC: 97 MMOL/L (ref 94–109)
CO2 SERPL-SCNC: 23 MMOL/L (ref 20–32)
CREAT SERPL-MCNC: 5.31 MG/DL (ref 0.66–1.25)
ERYTHROCYTE [DISTWIDTH] IN BLOOD BY AUTOMATED COUNT: 13.7 % (ref 10–15)
GFR SERPL CREATININE-BSD FRML MDRD: 10 ML/MIN/1.7M2
GLUCOSE BLDC GLUCOMTR-MCNC: 137 MG/DL (ref 70–99)
GLUCOSE BLDC GLUCOMTR-MCNC: 204 MG/DL (ref 70–99)
GLUCOSE BLDC GLUCOMTR-MCNC: 256 MG/DL (ref 70–99)
GLUCOSE SERPL-MCNC: 219 MG/DL (ref 70–99)
HCT VFR BLD AUTO: 27.4 % (ref 40–53)
HGB BLD-MCNC: 8.9 G/DL (ref 13.3–17.7)
LACTATE BLD-SCNC: 2.2 MMOL/L (ref 0.7–2)
MCH RBC QN AUTO: 26.9 PG (ref 26.5–33)
MCHC RBC AUTO-ENTMCNC: 32.5 G/DL (ref 31.5–36.5)
MCV RBC AUTO: 83 FL (ref 78–100)
PLATELET # BLD AUTO: 373 10E9/L (ref 150–450)
POTASSIUM SERPL-SCNC: 4.5 MMOL/L (ref 3.4–5.3)
RBC # BLD AUTO: 3.31 10E12/L (ref 4.4–5.9)
SODIUM SERPL-SCNC: 132 MMOL/L (ref 133–144)
WBC # BLD AUTO: 14.4 10E9/L (ref 4–11)

## 2018-04-09 PROCEDURE — 40000275 ZZH STATISTIC RCP TIME EA 10 MIN

## 2018-04-09 PROCEDURE — 25000132 ZZH RX MED GY IP 250 OP 250 PS 637: Mod: GY | Performed by: INTERNAL MEDICINE

## 2018-04-09 PROCEDURE — 83605 ASSAY OF LACTIC ACID: CPT | Performed by: HOSPITALIST

## 2018-04-09 PROCEDURE — 94640 AIRWAY INHALATION TREATMENT: CPT | Mod: 76

## 2018-04-09 PROCEDURE — 99207 ZZC NON-BILLABLE SERV PER CHARTING: CPT | Performed by: NURSE PRACTITIONER

## 2018-04-09 PROCEDURE — 99239 HOSP IP/OBS DSCHRG MGMT >30: CPT | Performed by: HOSPITALIST

## 2018-04-09 PROCEDURE — 90937 HEMODIALYSIS REPEATED EVAL: CPT

## 2018-04-09 PROCEDURE — 63400005 ZZH RX 634: Performed by: INTERNAL MEDICINE

## 2018-04-09 PROCEDURE — 94640 AIRWAY INHALATION TREATMENT: CPT

## 2018-04-09 PROCEDURE — 00000146 ZZHCL STATISTIC GLUCOSE BY METER IP

## 2018-04-09 PROCEDURE — 80048 BASIC METABOLIC PNL TOTAL CA: CPT | Performed by: INTERNAL MEDICINE

## 2018-04-09 PROCEDURE — 85027 COMPLETE CBC AUTOMATED: CPT | Performed by: INTERNAL MEDICINE

## 2018-04-09 PROCEDURE — A9270 NON-COVERED ITEM OR SERVICE: HCPCS | Mod: GY | Performed by: INTERNAL MEDICINE

## 2018-04-09 PROCEDURE — 25000128 H RX IP 250 OP 636: Performed by: INTERNAL MEDICINE

## 2018-04-09 PROCEDURE — 25000125 ZZHC RX 250: Performed by: INTERNAL MEDICINE

## 2018-04-09 RX ORDER — HEPARIN SODIUM 1000 [USP'U]/ML
500 INJECTION, SOLUTION INTRAVENOUS; SUBCUTANEOUS
Status: COMPLETED | OUTPATIENT
Start: 2018-04-09 | End: 2018-04-09

## 2018-04-09 RX ORDER — DOXERCALCIFEROL 4 UG/2ML
6 INJECTION INTRAVENOUS
Status: COMPLETED | OUTPATIENT
Start: 2018-04-09 | End: 2018-04-09

## 2018-04-09 RX ORDER — HEPARIN SODIUM 1000 [USP'U]/ML
500 INJECTION, SOLUTION INTRAVENOUS; SUBCUTANEOUS CONTINUOUS
Status: DISCONTINUED | OUTPATIENT
Start: 2018-04-09 | End: 2018-04-09

## 2018-04-09 RX ADMIN — GUAIFENESIN 10 ML: 200 SOLUTION ORAL at 09:07

## 2018-04-09 RX ADMIN — UMECLIDINIUM BROMIDE AND VILANTEROL TRIFENATATE 1 PUFF: 62.5; 25 POWDER RESPIRATORY (INHALATION) at 09:08

## 2018-04-09 RX ADMIN — LISINOPRIL 20 MG: 20 TABLET ORAL at 09:03

## 2018-04-09 RX ADMIN — ASPIRIN 81 MG 81 MG: 81 TABLET ORAL at 09:03

## 2018-04-09 RX ADMIN — SODIUM CHLORIDE 250 ML: 9 INJECTION, SOLUTION INTRAVENOUS at 13:50

## 2018-04-09 RX ADMIN — PREDNISONE 40 MG: 20 TABLET ORAL at 09:03

## 2018-04-09 RX ADMIN — HEPARIN SODIUM 500 UNITS: 1000 INJECTION, SOLUTION INTRAVENOUS; SUBCUTANEOUS at 13:52

## 2018-04-09 RX ADMIN — EPOETIN ALFA 4000 UNITS: 4000 SOLUTION INTRAVENOUS; SUBCUTANEOUS at 16:02

## 2018-04-09 RX ADMIN — ALBUTEROL SULFATE 2.5 MG: 2.5 SOLUTION RESPIRATORY (INHALATION) at 07:41

## 2018-04-09 RX ADMIN — DOXERCALCIFEROL 6 MCG: 4 INJECTION, SOLUTION INTRAVENOUS at 13:50

## 2018-04-09 RX ADMIN — CARVEDILOL 12.5 MG: 12.5 TABLET, FILM COATED ORAL at 09:03

## 2018-04-09 RX ADMIN — FUROSEMIDE 20 MG: 20 TABLET ORAL at 09:03

## 2018-04-09 RX ADMIN — ALBUTEROL SULFATE 2.5 MG: 2.5 SOLUTION RESPIRATORY (INHALATION) at 12:05

## 2018-04-09 RX ADMIN — HEPARIN SODIUM 500 UNITS/HR: 1000 INJECTION, SOLUTION INTRAVENOUS; SUBCUTANEOUS at 13:57

## 2018-04-09 RX ADMIN — ALBUTEROL SULFATE 2.5 MG: 2.5 SOLUTION RESPIRATORY (INHALATION) at 00:07

## 2018-04-09 RX ADMIN — DILTIAZEM HYDROCHLORIDE 300 MG: 180 CAPSULE, EXTENDED RELEASE ORAL at 09:03

## 2018-04-09 RX ADMIN — EPLERENONE 50 MG: 25 TABLET ORAL at 09:03

## 2018-04-09 NOTE — PLAN OF CARE
Problem: Patient Care Overview  Goal: Plan of Care/Patient Progress Review  Outcome: No Change  Alert & oriented x 4, SBA, denies pain, VSS on RA except BP elevated 166/68,  and 204, lung sound diminished,dialysis patient, left arm fistula, bruit and thrill present, possible discharge today after dialysis.

## 2018-04-09 NOTE — PROVIDER NOTIFICATION
MD Notification    Notified Person: Nurse Practioner     Notified Person Name: Darshana Washington     Notification Date/Time: 4/9/18 1600    Notification Interaction: in person    Purpose of Notification: elevated lactic acid 2.2    Orders Received: none    Comments: Per sepsis protocol, hospitalist was notified of elevated lactic acid

## 2018-04-09 NOTE — PROGRESS NOTES
Received intake call for home oxygen at 11:45AM. Reviewed patient's chart; most documentation is in good working order, however, physician notes need to be signed and include something explaining need for oxygen.   12:17AM- Spoke to care coordinator, Re, and explained what we needed with the physician notes. She understood and would work on it. She informed us that patient is about to go to dialysis and will be there for the new few hours, so we may need to wait until patient is done at dialysis to deliver oxygen.  12:20PM- Called to offer choice and patient is okay with [facility] setting them up. Discussed equipment with patient and informed them that we would be to bedside when they return from dialysis.   3:22PM- Spoke with care coordinator, Re, confirmed we received the physician notes, and she suggested we call to dialysis to see if we can take O2 there to educate the patient, and then drop it off in his room so that it is waiting for him there.   3:25PM- Spoke to nurse in dialysis and she approved us bringing oxygen there to educate patient, and gave us the room number.   3:40PM- At bedside with O2.

## 2018-04-09 NOTE — PROGRESS NOTES
[facility]    Sepsis Evaluation Progress Note    Date of Service: 04/09/2018    I was called to see Seven Birdcesar Finley due to abnormal vital signs triggering the Sepsis SIRS screening alert. He is known to have an infection.     Physical Exam    Vital Signs:  Temp: 97.8  F (36.6  C) Temp src: Oral BP: 142/78 Pulse: 95 Heart Rate: 65 Resp: 22 SpO2: 94 % O2 Device: None (Room air)      Lab:  Lactic Acid   Date Value Ref Range Status   04/09/2018 2.2 (H) 0.7 - 2.0 mmol/L Final   page received at 1557 today    The patient is at baseline mental status.    The rest of their physical exam is significant for   Follows commands to wiggle toes & show 2 fingers bilat, fluent speech  3/6 LIONEL loudest in 2nd RSB,  systolic  Exp wheeze bilat uppe & lower lobes, RR 18  NABS, S/NT/ND  +1-2 L ankle edema >RLE.    Assessment and Plan    The SIRS and exam findings are likely due to WBC likely 2/2 steroids, there is no sign of sepsis at this time.    Disposition: The patient will remain on the current unit. We will continue to monitor this patient closely.Pt is scheduled to be dismissed from hospital today.  Discussed case w/ rounding physician.  No need to postpone discharge as scheduled and he may go.     25 minutes spent reviewing chart, examining pt, d/w rounding physician, etc.    NADIR Chaidez CNP

## 2018-04-09 NOTE — DISCHARGE SUMMARY
[facility]    Discharge Summary  Hospitalist    Date of Admission:  4/6/2018  Date of Discharge:  4/9/2018  Discharging Provider: Scotty Jin  Date of Service (when I saw the patient): 04/09/18    History of Present Illness   Mr. 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  He appears to be a reliable historian.  He states since Monday, April 2, he has noted increasing shortness of breath along with wheezing and nonproductive cough.  He reports no fever, chills, chest pain, nausea, vomiting, diarrhea or other complaints.  In the ER, the patient has been evaluated by Dr. August Hassan, the ER attending physician.  The hematology profile is significant for elevated white count of 14.4.  The screening for influenza is negative.  The electrocardiogram reveals normal sinus rhythm with sinus arrhythmia, and ST-T wave changes in lateral leads.  The chest x-ray shows an ill-defined hazy opacities in the mid and inferior right lung and mid left lung, as well as very small bilateral pleural effusions.  This finding likely represents pulmonary edema versus infection.  In the Emergency Department, the patient has been treated with intravenous ceftriaxone and azithromycin, as well as DuoNeb and methylprednisone.  He is currently saturating 91% on room air and has stable vital signs.  He is being admitted to the hospital under inpatient status.     Hospital Course   Seven Savage Jr. was admitted on 4/6/2018.  The following problems were addressed during his hospitalization:    Mr. Seven Savage is a delightful 82-year-old gentleman with a past medical history notable for COPD,  end-stage renal disease, hypertension, dyslipidemia, severe aortic stenosis, and chronic anemia, who has presented with increasing shortness of breath since 04/02/2018, along with wheezing and productive cough.            Acute chronic obstructive pulmonary disease exacerbation with suspected bilateral community-acquired pneumonia:  The patient has presented with several days of increasing shortness of breath, wheezing and nonproductive cough.    - Prednisone taper.   - Home oxygen, qualification completed 4/8 with 1 liter required with activity.   - Pulmonary hygiene.   - Steroid taper.   - continue with prior to admission Stiolto.       End-stage renal disease:  The patient is on hemodialysis every Monday, Wednesday and Friday.  - Consult for Nephrology Service.       Hypertension:  His blood pressure is controlled.    - Continue on prior to admission carvedilol, diltiazem, Cardura,  Lisinopril and Eplerenone.        Dyslipidemia: Stable.   - Continue prior to admission Lipitor.       Diabetes mellitus type 2:  The most recent hemoglobin A1c was 5.9% on 03/20/2018.   - Continue home regimen, discussed with patient.       Severe aortic stenosis: Stable.  - Monitor.       Chronic anemia:  His hemoglobin is stable at 9.4 on admission.  - Aranesp.   - Monitor.       Pending Results   These results will be followed up by PCP  Unresulted Labs Ordered in the Past 30 Days of this Admission     Date and Time Order Name Status Description    4/6/2018 0556 Blood culture Preliminary     4/6/2018 0556 Blood culture Preliminary           Code Status   Full Code       Primary Care Physician   Sandip Antunez    Physical Exam   Temp: 97.5  F (36.4  C) Temp src: Oral BP: 144/50 Pulse: 68 Heart Rate: 66 Resp: 18 SpO2: 93 % O2 Device: None (Room air)    Vitals:    04/07/18 0643 04/08/18 0702 04/09/18 0611   Weight: 83.1 kg (183 lb 3.2 oz) 86 kg (189 lb 9.5 oz) 86.3 kg (190 lb 4.1 oz)     Vital Signs with Ranges  Temp:  [97.5  F  (36.4  C)-97.8  F (36.6  C)] 97.5  F (36.4  C)  Pulse:  [63-68] 68  Heart Rate:  [60-66] 66  Resp:  [18] 18  BP: (144-181)/(50-73) 144/50  SpO2:  [93 %-98 %] 93 %  I/O last 3 completed shifts:  In: 840 [P.O.:840]  Out: 975 [Urine:975]    GENERAL: Alert and oriented. NAD. Conversational, appropriate.   HEENT: Normocephalic. EOMI. No icterus or injection. Nares normal.   LUNGS: Mild wheezing present, improved. No dyspnea at rest.   HEART: Regular rate, systolic murmur. Extremities perfused.   ABDOMEN: Soft, nontender, and nondistended. Positive bowel sounds.   EXTREMITIES: No LE edema noted.   NEUROLOGIC: Moves extremities x4 on command. No acute focal neurologic abnormalities noted.     Discharge Disposition   Discharged to home  Condition at discharge: Stable    Consultations This Hospital Stay   NEPHROLOGY IP CONSULT    Time Spent on this Encounter   I, Scotty Jin, personally saw the patient today and spent greater than 30 minutes discharging this patient.    Discharge Orders     Reason for your hospital stay   Shortness of breath     Follow-up and recommended labs and tests    Follow up with primary care provider, Sandip Antunez, as scheduled for you on Thursday 4/12/18 at 10:00am, for hospital follow- up.  The following labs/tests are recommended: cbc/bmp.     Activity   Your activity upon discharge: activity as tolerated     Full Code     Oxygen Adult   [place] Oxygen Order 1 liter(s) by nasal cannula with activity with use of portable tank. Expected treatment length is 1 months.. Test on conserving device as applicable.    Patients who qualify for home O2 coverage under the CMS guidelines require ABG tests or O2 sat readings obtained closest to, but no earlier than 2 days prior to the discharge, as evidence of the need for home oxygen therapy. Testing must be performed while patient is in the chronic stable state. See notes for O2 sats.    I certify that this patient, Seven Savage . has been  under my care and that I, or a nurse practitioner or physician's assistant working with me, had a face-to-face encounter that meets the face-to-face encounter requirements with this patient on 4/9/2018. The patient, Seven Savage Jr. was evaluated or treated in whole, or in part, for the following medical condition, which necessitates the use of the ordered oxygen. Treatment Diagnosis: End stage COPD    Attending Provider: Scotty Jin  Physician signature: See electronic signature associated with these discharge orders  Date of Order: April 9, 2018     Diet   Follow this diet upon discharge: Orders Placed This Encounter     Combination Diet Low Saturated Fat Na <2400mg Diet       Discharge Medications   Current Discharge Medication List      START taking these medications    Details   predniSONE (DELTASONE) 10 MG tablet 4 tabs daily for 3 days, then 3 tabs daily for 3 days, then 2 tabs daily for 3 days, then 1 tab daily for 3 days, then stop  Qty: 30 tablet, Refills: 0    Associated Diagnoses: Community acquired pneumonia, unspecified laterality      levofloxacin (LEVAQUIN) 500 MG tablet Take 1 tablet (500 mg) by mouth daily for 7 days  Qty: 5 tablet, Refills: 0    Associated Diagnoses: Community acquired pneumonia, unspecified laterality         CONTINUE these medications which have NOT CHANGED    Details   insulin glargine (LANTUS) 100 UNIT/ML injection Inject 23 Units Subcutaneous At Bedtime (Patient will increase to 26 units at bedtime he states when BG is elevated.      albuterol (2.5 MG/3ML) 0.083% neb solution TAKE 1 VIAL BY NEBULIZATION EVERY 6 HOURS AS NEEDED FOR SHORNESS OF BREATH/DYSPNEA OR WHEEZING  Qty: 75 mL, Refills: 2    Associated Diagnoses: COPD exacerbation (H)      furosemide (LASIX) 20 MG tablet Take 1 tablet (20 mg) by mouth daily  Qty: 90 tablet, Refills: 3    Comments: Profile Rx: patient will contact pharmacy when needed  Associated Diagnoses: ESRD (end stage renal disease) on  dialysis (H); Essential hypertension, benign      lisinopril (PRINIVIL/ZESTRIL) 20 MG tablet Take 1 tablet (20 mg) by mouth daily  Qty: 90 tablet, Refills: 3    Associated Diagnoses: Type 2 diabetes mellitus with chronic kidney disease on chronic dialysis, with long-term current use of insulin (H); Essential hypertension, benign      blood glucose monitoring (NO BRAND SPECIFIED) test strip Use to test blood sugars 3 times daily or as directed. Uses onetouch  Qty: 300 strip, Refills: 11    Associated Diagnoses: Type 2 diabetes mellitus with chronic kidney disease on chronic dialysis, with long-term current use of insulin (H)      atorvastatin (LIPITOR) 40 MG tablet TAKE 1 TABLET(40 MG) BY MOUTH AT BEDTIME  Qty: 90 tablet, Refills: 1    Associated Diagnoses: Type 2 diabetes mellitus with chronic kidney disease on chronic dialysis, with long-term current use of insulin (H); Hyperlipidemia LDL goal <100      doxazosin (CARDURA) 8 MG tablet TAKE 1 TABLET BY MOUTH EVERY NIGHT AT BEDTIME.  Qty: 90 tablet, Refills: 3    Associated Diagnoses: Urinary retention      STIOLTO RESPIMAT 2.5-2.5 MCG/ACT AERS INHALE 2 PUFFS INTO THE LUNGS DAILY  Qty: 4 g, Refills: 10    Associated Diagnoses: COPD exacerbation (H)      B Complex-C-Folic Acid (BRANDI-HEIDY) TABS TAKE ONE TABLET BY MOUTH DAILY  Qty: 90 tablet, Refills: 3    Associated Diagnoses: ESRD (end stage renal disease) on dialysis (H)      carvedilol (COREG) 12.5 MG tablet TAKE 1 TABLET(12.5 MG) BY MOUTH TWICE DAILY  Qty: 180 tablet, Refills: 3    Associated Diagnoses: Aortic valve stenosis, unspecified etiology; Essential hypertension, benign      B-D U/F 31G X 8 MM insulin pen needle USE AS DIRECTED FOUR TIMES DAILY.  Qty: 400 each, Refills: 0    Associated Diagnoses: Type 2 diabetes mellitus with chronic kidney disease on chronic dialysis, with long-term current use of insulin (H)      albuterol (PROAIR HFA/PROVENTIL HFA/VENTOLIN HFA) 108 (90 BASE) MCG/ACT Inhaler Inhale 1-2  puffs into the lungs every 4 hours as needed for shortness of breath / dyspnea or wheezing  Qty: 1 Inhaler, Refills: 5    Associated Diagnoses: COPD exacerbation (H)      diltiazem (TIAZAC) 300 MG 24 hr ER beaded capsule Take 1 capsule (300 mg) by mouth daily  Qty: 30 capsule, Refills: 0    Associated Diagnoses: Essential hypertension, benign      aspirin 81 MG chewable tablet Take 81 mg by mouth daily      VITAMIN D, CHOLECALCIFEROL, PO Take 2,000 Units by mouth daily       COLCHICINE PO Take 0.6 mg by mouth every 48 hours as needed Reported on 3/30/2017      darbepoetin libby (ARANESP) 100 MCG/0.5ML injection Inject Subcutaneous three times a week At diaylsis M,W,F. Dialysis ([facility]) doses, patient not sure about strength.      EPLERENONE PO Take 50 mg by mouth daily      Insulin Aspart (NOVOLOG SC) Inject 12 Units Subcutaneous 3 times daily (with meals) Patient states with large meals he will increase to 14-16 units.      fish oil-omega-3 fatty acids 1000 MG capsule Take 1 g by mouth daily      !! order for DME Equipment being ordered: Four wheeled walker  Qty: 1 Device, Refills: 0    Associated Diagnoses: DDD (degenerative disc disease), lumbar; Impaired gait      Respiratory Therapy Supplies (NEBULIZER/ADULT MASK) KIT 1 Device as needed  Qty: 1 kit, Refills: 0    Associated Diagnoses: Chronic obstructive pulmonary disease with acute exacerbation (H)      !! order for DME Equipment being ordered: Other: Nebulizer machine  Treatment Diagnosis: COPD  Qty: 1 Device, Refills: 0    Associated Diagnoses: COPD exacerbation (H)       !! - Potential duplicate medications found. Please discuss with provider.        Allergies   Allergies   Allergen Reactions     Pioglitazone Other (See Comments)     Edema & hay fever     Vytorin Other (See Comments)     Muscle aches     Data   Most Recent 3 CBC's:  Recent Labs   Lab Test  04/07/18   0815  04/06/18   0305  11/27/17   1143   WBC  15.0*  14.4*  10.5   HGB  9.0*  9.4*  11.3*    MCV  84  84  86   PLT  335  332  313      Most Recent 3 BMP's:  Recent Labs   Lab Test  04/07/18   0815  04/06/18   0305  11/27/17   1143   NA  136  139  140   POTASSIUM  4.3  4.2  3.9   CHLORIDE  98  102  103   CO2  29  27  32   BUN  45*  47*  34*   CR  3.51*  3.87*  3.75*   ANIONGAP  9  10  5   AARON  9.0  8.9  8.9   GLC  293*  199*  106*     Most Recent 2 LFT's:  Recent Labs   Lab Test  11/27/17   1143  08/15/17   1219   AST  18  16   ALT  17  21   ALKPHOS  87  83   BILITOTAL  0.7  0.7     Most Recent INR's and Anticoagulation Dosing History:  Anticoagulation Dose History     Recent Dosing and Labs Latest Ref Rng & Units 11/27/2017    INR 0.86 - 1.14 1.00        Most Recent 3 Troponin's:  Recent Labs   Lab Test  11/27/17   1143  02/11/17   2020  02/11/17   1500   TROPI  0.100*  0.063*  0.067*     Most Recent Cholesterol Panel:  Recent Labs   Lab Test  08/15/17   1219   CHOL  145   LDL  83   HDL  41   TRIG  106     Most Recent 6 Bacteria Isolates From Any Culture (See EPIC Reports for Culture Details):  Recent Labs   Lab Test  04/06/18   0823  04/06/18   0640   CULT  No growth after 3 days  No growth after 3 days     Most Recent TSH, T4 and A1c Labs:  Recent Labs   Lab Test  04/06/18   0305   08/15/17   1219   TSH   --    --   2.18   A1C  5.8   < >  6.1*    < > = values in this interval not displayed.     Results for orders placed or performed during the hospital encounter of 04/06/18   XR Chest 2 Views    Narrative    CHEST 2 VIEWS  4/6/2018 3:32 AM     HISTORY: Shortness of breath.    COMPARISON: 11/27/2017.    FINDINGS: Ill-defined hazy opacities in the mid and inferior right  lung and less so in the mid left lung. The size of the cardiac  silhouette is within normal limits. Very small bilateral pleural  effusions. Atherosclerotic calcification in the thoracic aorta.      Impression    IMPRESSION: Ill-defined hazy opacities in the mid and inferior right  lung and mid left lung. Additionally, there are very  small bilateral  pleural effusions. These findings most likely relate to pulmonary  edema, but an infection could have a similar appearance.    BRANDI SWIFT MD

## 2018-04-09 NOTE — PROGRESS NOTES
Potassium   Date Value Ref Range Status   04/09/2018 4.5 3.4 - 5.3 mmol/L Final     Lab Results   Component Value Date    HGB 8.9 04/09/2018     Weight: 86.3 kg (190 lb 4.1 oz)  POST WT 84.9kg  DIALYSIS PROCEDURE NOTE  Hepatitis status of previous patient on machine log was checked and verified ok to use with this patients hepatitis status.  Patient dialyzed for 2.75 hrs. on a 3 K bath with a net fluid removal of  1.4L.  A BFR of 400 ml/min was obtained via a LAF using 15gauge needles.    The patient was seen by Dr. Aguillon during the treatment.  Total heparin received during the treatment: 1500 units. Sites held x 15 min then  pressure drsgs applied.  Meds  Given:EPO 4,000units IV and Hectoral 6mcg IV Complications:  NONE.  Procedure and ESRD teaching done and questions answered. See flowsheet in EPIC for further details and post assessment.  Machine water alarm in place and functioning.  Pt returned via wheelchair  Vascular Access: Aseptic prep done for both on/off.  Report received from:Jeramy Mari R.N.  Report given to:Ellen Perry R.N.  HEPATITIS B SURFACE ANTIGEN neg DATE4/2/18 HEPATITIS B SURFACE ANTIBODY Succept DATE 5/1/16  Chlorine/Chloramine water system checked every 4 hours.  Outpatient Dialysis at [facility]

## 2018-04-09 NOTE — PROGRESS NOTES
Care Coordinator met with pt concerning discharging home today after dialysis.  Oxygen has been ordered through [facility] and a follow up appointment has been made for 4/12/18 with his PCP.    Addendum:Pt's oxygen has been delivered and have communicated to the [facility] dialysis unit.

## 2018-04-09 NOTE — PROGRESS NOTES
Discharge    Patient discharged to home via car with Son  Care plan note: Patient completed his dialysis treatment. Medications and AVS reviewed. Patient vocalized understanding. RX sent with patient. Left with son at 1730.            Listed belongings gathered and returned to patient. Yes   Care Plan and Patient education resolved: Yes   Prescriptions if needed, hard copies sent with patient  NA  Home and hospital acquired medications returned to patient: Yes   Medication Bin checked and emptied on discharge Yes  Follow up appointment made for patient: Yes

## 2018-04-09 NOTE — DISCHARGE INSTRUCTIONS
Oxygen Provider:  Arranged through [employer], contact number [phone].  If you have any questions or concerns please call the oxygen company directly.    Resume dialysis at the [facility] unit M/W/F at your usual time.  [phone].

## 2018-04-09 NOTE — PLAN OF CARE
Problem: Patient Care Overview  Goal: Plan of Care/Patient Progress Review  Outcome: No Change  Care Plan Summary Note: Hypertensive, am BP meds given, rechecked--144/50. +BS, lungs diminished, ronchi on bases, on RA, josue dropped to <88% when sleeping, recovered quickly. /256. Covered with SSI. SBA x1, uses urinal. Tolerating diet. Transferred to HD unit at 1300. Report given to HD RN--BMP/CBC to be drawn by HD RN. Sepsis fired for WBC of 14.4 and RR 22. Lactic drawn by HD RN, result pending. Plan to d/c to home after HD. Monitor.

## 2018-04-09 NOTE — PROGRESS NOTES
[facility]     Renal Progress Note       SHORTHAND KEY FOR MY NOTES:  c = with, s = without, p = after, a = before, x = except, asx = asymptomatic, tx = transplant or treatment, sx = symptoms or symptomatic, cx = canceled or culture, rxn = reaction, yday = yesterday, nl = normal, abx = antibiotics, fxn = function, dx = diagnosis, dz = disease, m/h = melena/hematochezia, c/d/l/ha = cramping/dizziness/lightheadedness/headache, d/c = discharge or diarrhea/constipation, f/c/n/v = fevers/chills/nausea/vomiting, cp/sob = chest pain/shortness of breath.         Assessment/Plan:     1.  ESKD.  Pt dialysing per MWF schedule.  He is above his EDW today, but unsure if the wts are to be believed.  A.  Next run on Weds at [place].  B.  Will call them c a new EDW.    2.  HTN.  Controlled c meds, volume removal.  A.  Continue same plan.    3.  Anemia.  Hb is < 9, but seemingly stable.  No signs of bleeding.  A.  Follow hb, clinically.  B.  Increase EPO to 6,000 qhd as outpt.        Interval History:     Pt is feeling ok.  He is not having any cp/sob/abd pain.  He is dc'ing to home p HD today.          Medications and Allergies:       sodium chloride 0.9%  250 mL Intravenous Once in dialysis     sodium chloride 0.9%  300 mL Hemodialysis Machine Once     epoetin libby (EPOGEN,PROCRIT) inj ESRD  4,000 Units Intravenous Once     doxercalciferol  6 mcg Intravenous Once in dialysis     heparin (porcine)  500 Units Hemodialysis Machine Once in dialysis     insulin aspart  1-22 Units Subcutaneous TID AC     insulin aspart  1-16 Units Subcutaneous At Bedtime     [START ON 4/10/2018] levofloxacin  250 mg Oral Every Other Day     insulin glargine  26 Units Subcutaneous At Bedtime     insulin aspart  6 Units Subcutaneous TID w/meals     aspirin  81 mg Oral Daily     atorvastatin  40 mg Oral Daily     carvedilol  12.5 mg Oral BID w/meals     eplerenone  50 mg Oral Daily     furosemide  20 mg Oral Daily     lisinopril  20 mg  Oral Daily     umeclidinium-vilanterol  1 puff Inhalation Daily     predniSONE  40 mg Oral Daily     albuterol  2.5 mg Nebulization Q6H     diltiazem  300 mg Oral Daily     - MEDICATION INSTRUCTIONS for Dialysis Patients -   Does not apply See Admin Instructions     doxazosin  8 mg Oral At Bedtime      Allergies   Allergen Reactions     Pioglitazone Other (See Comments)     Edema & hay fever     Vytorin Other (See Comments)     Muscle aches          Physical Exam:     Vitals were reviewed    Heart Rate: 66, Blood pressure 145/78, pulse 95, temperature 97.8  F (36.6  C), temperature source Oral, resp. rate 22, weight 86.3 kg (190 lb 4.1 oz), SpO2 94 %.  Wt Readings from Last 3 Encounters:   04/09/18 86.3 kg (190 lb 4.1 oz)   03/20/18 86.5 kg (190 lb 12.8 oz)   12/07/17 84.8 kg (186 lb 14.4 oz)     Intake/Output Summary (Last 24 hours) at 04/09/18 1349  Last data filed at 04/09/18 1047   Gross per 24 hour   Intake              840 ml   Output             1100 ml   Net             -260 ml     GENERAL APPEARANCE: pleasant, NAD, alert  HEENT:  Eyes/ears/nose/neck grossly normal  RESP: lungs cta b c good efforts, no crackles  CV: RRR, nl S1/S2  ABDOMEN: o/s/nt/nd  EXTREMITIES/SKIN: no significant ble edema    Pt seen on HD.  Stable run.  Good BFR via LAF.  -1.5L UF goal.         Data:     CBC RESULTS:     Recent Labs  Lab 04/09/18  1315 04/07/18  0815 04/06/18  0305   WBC 14.4* 15.0* 14.4*   RBC 3.31* 3.30* 3.51*   HGB 8.9* 9.0* 9.4*   HCT 27.4* 27.8* 29.5*    335 332     Basic Metabolic Panel:    Recent Labs  Lab 04/07/18  0815 04/06/18  0305    139   POTASSIUM 4.3 4.2   CHLORIDE 98 102   CO2 29 27   BUN 45* 47*   CR 3.51* 3.87*   * 199*   AARON 9.0 8.9     INRNo lab results found in last 7 days.   Attestation:   I have reviewed today's relevant vital signs, notes, medications, labs and imaging.    Rhys Aguillon MD  [facility] - Nephrology  [phone]

## 2018-04-10 ENCOUNTER — TELEPHONE (OUTPATIENT)
Dept: INTERNAL MEDICINE | Facility: CLINIC | Age: 83
End: 2018-04-10

## 2018-04-10 NOTE — TELEPHONE ENCOUNTER
"Hospital/TCU/ED for chronic condition Discharge Protocol    \"Hi, my name is Celio Mendiola, a registered nurse, and I am calling from [facility].  I am calling to follow up and see how things are going for you after your recent emergency visit/hospital/TCU stay.\"    Tell me how you are doing now that you are home?\" on steroids and antibiotics, dong well at home, got oxygen generator but has not used it yet, using nebs      Discharge Instructions    \"Let's review your discharge instructions.  What is/are the follow-up recommendations?  Pt. Response: seeing Dr. Antunez on Thursday    \"Has an appointment with your primary care provider been scheduled?\"   Yes. (confirm)    \"When you see the provider, I would recommend that you bring your medications with you.\"    Medications    \"Tell me what changed about your medicines when you discharged?\"    Changes to chronic meds?    0-1    \"What questions do you have about your medications?\"    None     New diagnoses of heart failure, COPD, diabetes, or MI?    No     On insulin: \"Did you start on insulin in the hospital or did you have your insulin dose changed?\"  No         Medication reconciliation completed? Yes  Was MTM referral placed (*Make sure to put transitions as reason for referral)?   No    Call Summary    \"What questions or concerns do you have about your recent visit and your follow-up care?\"     none    \"If you have questions or things don't continue to improve, we encourage you contact us through the main clinic number (give number).  Even if the clinic is not open, triage nurses are available 24/7 to help you.     We would like you to know that our clinic has extended hours (provide information).  We also have urgent care (provide details on closest location and hours/contact info)\"      \"Thank you for your time and take care!\"             "

## 2018-04-10 NOTE — TELEPHONE ENCOUNTER
Patient was previously getting this filled by a Dr. Toi Hurtado ([phone]) in [place] and he no longer see's this doctor so pt was wanting it to be filled by current pcp. Per pharmacy patient is taking 50mg 1 time daily

## 2018-04-10 NOTE — TELEPHONE ENCOUNTER
Patient discharged from hospital yesterday. Was given Levaquin with instructions to take for 7 days but only 5 pills prescribed. Patient said he was given 2 doses in hospital but can only find one charted, last on 4/8. Patient said he did not take a dose when he got home yesterday (4/9) because he thinks they gave it to him in hospital. Plans on taking it this morning and will wait to take Renal vitamin until this evening as he was told to space them out.   Patient also says he was given an oxygen generator but has not used it yet. Plans on setting it up today. Says he was told it was optional to use but he desated with activity in the hospital per patient. Is using nebs.    Patient does not need to be called back unless any changes recommended. Will see PCP on 4/12.

## 2018-04-11 DIAGNOSIS — I10 ESSENTIAL HYPERTENSION, BENIGN: ICD-10-CM

## 2018-04-11 DIAGNOSIS — N18.6 ESRD (END STAGE RENAL DISEASE) ON DIALYSIS (H): ICD-10-CM

## 2018-04-11 DIAGNOSIS — Z99.2 ESRD (END STAGE RENAL DISEASE) ON DIALYSIS (H): ICD-10-CM

## 2018-04-11 RX ORDER — FUROSEMIDE 20 MG
TABLET ORAL
Qty: 90 TABLET | Refills: 0 | OUTPATIENT
Start: 2018-04-11

## 2018-04-11 RX ORDER — EPLERENONE 50 MG/1
50 TABLET, FILM COATED ORAL DAILY
Qty: 30 TABLET | Refills: 1 | Status: SHIPPED | OUTPATIENT
Start: 2018-04-11 | End: 2018-05-22

## 2018-04-11 NOTE — TELEPHONE ENCOUNTER
Please note that request like this are medication refill requests therefore they need to be entered as a refill and not just forwarded to the physician, thanks

## 2018-04-12 ENCOUNTER — OFFICE VISIT (OUTPATIENT)
Dept: INTERNAL MEDICINE | Facility: CLINIC | Age: 83
End: 2018-04-12
Payer: MEDICARE

## 2018-04-12 VITALS
BODY MASS INDEX: 27.72 KG/M2 | WEIGHT: 187.7 LBS | DIASTOLIC BLOOD PRESSURE: 72 MMHG | OXYGEN SATURATION: 94 % | TEMPERATURE: 97.3 F | SYSTOLIC BLOOD PRESSURE: 130 MMHG | HEART RATE: 75 BPM

## 2018-04-12 DIAGNOSIS — I35.0 AORTIC VALVE STENOSIS, UNSPECIFIED ETIOLOGY: ICD-10-CM

## 2018-04-12 DIAGNOSIS — Z99.2 TYPE 2 DIABETES MELLITUS WITH CHRONIC KIDNEY DISEASE ON CHRONIC DIALYSIS, WITH LONG-TERM CURRENT USE OF INSULIN (H): ICD-10-CM

## 2018-04-12 DIAGNOSIS — J18.9 PNEUMONIA DUE TO INFECTIOUS ORGANISM, UNSPECIFIED LATERALITY, UNSPECIFIED PART OF LUNG: ICD-10-CM

## 2018-04-12 DIAGNOSIS — Z09 HOSPITAL DISCHARGE FOLLOW-UP: Primary | ICD-10-CM

## 2018-04-12 DIAGNOSIS — I10 ESSENTIAL HYPERTENSION, BENIGN: ICD-10-CM

## 2018-04-12 DIAGNOSIS — Z79.4 TYPE 2 DIABETES MELLITUS WITH CHRONIC KIDNEY DISEASE ON CHRONIC DIALYSIS, WITH LONG-TERM CURRENT USE OF INSULIN (H): ICD-10-CM

## 2018-04-12 DIAGNOSIS — N18.6 ESRD (END STAGE RENAL DISEASE) ON DIALYSIS (H): ICD-10-CM

## 2018-04-12 DIAGNOSIS — Z99.2 ESRD (END STAGE RENAL DISEASE) ON DIALYSIS (H): ICD-10-CM

## 2018-04-12 DIAGNOSIS — J44.1 CHRONIC OBSTRUCTIVE PULMONARY DISEASE WITH ACUTE EXACERBATION (H): Chronic | ICD-10-CM

## 2018-04-12 DIAGNOSIS — N18.6 TYPE 2 DIABETES MELLITUS WITH CHRONIC KIDNEY DISEASE ON CHRONIC DIALYSIS, WITH LONG-TERM CURRENT USE OF INSULIN (H): ICD-10-CM

## 2018-04-12 DIAGNOSIS — E11.22 TYPE 2 DIABETES MELLITUS WITH CHRONIC KIDNEY DISEASE ON CHRONIC DIALYSIS, WITH LONG-TERM CURRENT USE OF INSULIN (H): ICD-10-CM

## 2018-04-12 LAB
BACTERIA SPEC CULT: NO GROWTH
BACTERIA SPEC CULT: NO GROWTH
Lab: NORMAL
SPECIMEN SOURCE: NORMAL
SPECIMEN SOURCE: NORMAL

## 2018-04-12 PROCEDURE — 99495 TRANSJ CARE MGMT MOD F2F 14D: CPT | Performed by: INTERNAL MEDICINE

## 2018-04-12 ASSESSMENT — PAIN SCALES - GENERAL: PAINLEVEL: NO PAIN (0)

## 2018-04-12 NOTE — NURSING NOTE
"Chief Complaint   Patient presents with     Hospital F/U       Initial /72  Pulse 75  Temp 97.3  F (36.3  C) (Oral)  Wt 187 lb 11.2 oz (85.1 kg)  SpO2 94%  BMI 27.72 kg/m2 Estimated body mass index is 27.72 kg/(m^2) as calculated from the following:    Height as of 3/20/18: 5' 9\" (1.753 m).    Weight as of this encounter: 187 lb 11.2 oz (85.1 kg).  Medication Reconciliation: complete   Jessica Mendoza CMA      "

## 2018-04-12 NOTE — PROGRESS NOTES
SUBJECTIVE:   Seven Savage Jr. is a 82 year old male who presents to clinic today for the following health issues:    Hospital Follow-up Visit:    Hospital/Nursing Home/IP Rehab Facility: [facility]  Date of Admission: 4/6/18  Date of Discharge: 4/9/18  Reason(s) for Admission: COPD exacerbation, community acquired pneumonia             Problems taking medications regularly:  None       Medication changes since discharge: None       Problems adhering to non-medication therapy:  None    Summary of hospitalization:  [facility] discharge summary reviewed  Diagnostic Tests/Treatments reviewed.  Follow up needed: noted  Other Healthcare Providers Involved in Patient s Care:         Homecare  Update since discharge: stable.     Post Discharge Medication Reconciliation: discharge medications reconciled, continue medications without change.  Plan of care communicated with patient     Coding guidelines for this visit:  Type of Medical   Decision Making Face-to-Face Visit       within 7 Days of discharge Face-to-Face Visit        within 14 days of discharge   Moderate Complexity [id number]   High Complexity [id number]            Acute chronic obstructive pulmonary disease exacerbation with suspected bilateral community-acquired pneumonia:  The patient has presented with several days of increasing shortness of breath, wheezing and nonproductive cough.    - Prednisone taper.   - Home oxygen, qualification completed 4/8 with 1 liter required with activity.   - Pulmonary hygiene.   - Steroid taper.   - continue with prior to admission Stiolto.       End-stage renal disease:  The patient is on hemodialysis every Monday, Wednesday and Friday.  - Consult for Nephrology Service.       Hypertension:  His blood pressure is controlled.    - Continue on prior to admission carvedilol, diltiazem, Cardura,  Lisinopril and Eplerenone.        Dyslipidemia: Stable.   - Continue prior to admission Lipitor.        Diabetes mellitus type 2:  The most recent hemoglobin A1c was 5.9% on 03/20/2018.   - Continue home regimen, discussed with patient.       Severe aortic stenosis: Stable.  - Monitor.         Other concerns:  1. Requesting DME order for portable oxygen tank. Fax to [facility] medical.  2. Requesting to update handicap parking certificate. Patient states he has problems with stability from spinal stenosis and trouble walking long distances due to shortness of breath     Problem list and histories reviewed & adjusted, as indicated.  Additional history: as documented    Patient Active Problem List   Diagnosis     Type 2 diabetes mellitus with chronic kidney disease on chronic dialysis, with long-term current use of insulin (H)     Hyperlipidemia LDL goal <100     Aortic valve stenosis, unspecified etiology     ESRD (end stage renal disease) on dialysis (H)     Chronic obstructive pulmonary disease with acute exacerbation (H)     Essential hypertension, benign     Counseling regarding advanced directives     Chronic bilateral low back pain without sciatica     Somatic dysfunction of lumbar region     Somatic dysfunction of sacral region     Sacral pain     COPD exacerbation (H)     Past Surgical History:   Procedure Laterality Date     ADENOIDECTOMY       ENDOSCOPIC POLYPECTOMY NASAL      Through vocal cords     Renal Stent      For renal calculi     TONSILLECTOMY         Social History   Substance Use Topics     Smoking status: Former Smoker     Smokeless tobacco: Never Used     Alcohol use Yes      Comment: 1 beer per month     Family History   Problem Relation Age of Onset     Family history unknown: Yes         Current Outpatient Prescriptions   Medication Sig Dispense Refill     eplerenone (INSPRA) 50 MG tablet Take 1 tablet (50 mg) by mouth daily 30 tablet 1     predniSONE (DELTASONE) 10 MG tablet 4 tabs daily for 3 days, then 3 tabs daily for 3 days, then 2 tabs daily for 3 days, then 1 tab daily for 3 days,  then stop 30 tablet 0     levofloxacin (LEVAQUIN) 500 MG tablet Take 1 tablet (500 mg) by mouth daily for 7 days 5 tablet 0     insulin glargine (LANTUS) 100 UNIT/ML injection Inject 23 Units Subcutaneous At Bedtime (Patient will increase to 26 units at bedtime he states when BG is elevated.       albuterol (2.5 MG/3ML) 0.083% neb solution TAKE 1 VIAL BY NEBULIZATION EVERY 6 HOURS AS NEEDED FOR SHORNESS OF BREATH/DYSPNEA OR WHEEZING 75 mL 2     furosemide (LASIX) 20 MG tablet Take 1 tablet (20 mg) by mouth daily 90 tablet 3     lisinopril (PRINIVIL/ZESTRIL) 20 MG tablet Take 1 tablet (20 mg) by mouth daily 90 tablet 3     blood glucose monitoring (NO BRAND SPECIFIED) test strip Use to test blood sugars 3 times daily or as directed. Uses onetouch 300 strip 11     atorvastatin (LIPITOR) 40 MG tablet TAKE 1 TABLET(40 MG) BY MOUTH AT BEDTIME 90 tablet 1     doxazosin (CARDURA) 8 MG tablet TAKE 1 TABLET BY MOUTH EVERY NIGHT AT BEDTIME. 90 tablet 3     STIOLTO RESPIMAT 2.5-2.5 MCG/ACT AERS INHALE 2 PUFFS INTO THE LUNGS DAILY 4 g 10     B Complex-C-Folic Acid (BRANDI-HEIDY) TABS TAKE ONE TABLET BY MOUTH DAILY 90 tablet 3     carvedilol (COREG) 12.5 MG tablet TAKE 1 TABLET(12.5 MG) BY MOUTH TWICE DAILY 180 tablet 3     B-D U/F 31G X 8 MM insulin pen needle USE AS DIRECTED FOUR TIMES DAILY. 400 each 0     albuterol (PROAIR HFA/PROVENTIL HFA/VENTOLIN HFA) 108 (90 BASE) MCG/ACT Inhaler Inhale 1-2 puffs into the lungs every 4 hours as needed for shortness of breath / dyspnea or wheezing 1 Inhaler 5     diltiazem (TIAZAC) 300 MG 24 hr ER beaded capsule Take 1 capsule (300 mg) by mouth daily 30 capsule 0     order for DME Equipment being ordered: Four wheeled walker 1 Device 0     Respiratory Therapy Supplies (NEBULIZER/ADULT MASK) KIT 1 Device as needed 1 kit 0     order for DME Equipment being ordered: Other: Nebulizer machine  Treatment Diagnosis: COPD 1 Device 0     aspirin 81 MG chewable tablet Take 81 mg by mouth daily        VITAMIN D, CHOLECALCIFEROL, PO Take 2,000 Units by mouth daily        COLCHICINE PO Take 0.6 mg by mouth every 48 hours as needed Reported on 3/30/2017       darbepoetin libby (ARANESP) 100 MCG/0.5ML injection Inject Subcutaneous three times a week At [facility] M[facility]F. Dialysis ([facility]) doses, patient not sure about strength.       EPLERENONE PO Take 50 mg by mouth daily       Insulin Aspart (NOVOLOG SC) Inject 12 Units Subcutaneous 3 times daily (with meals) Patient states with large meals he will increase to 14-16 units.       fish oil-omega-3 fatty acids 1000 MG capsule Take 1 g by mouth daily       Allergies   Allergen Reactions     Pioglitazone Other (See Comments)     Edema & hay fever     Vytorin Other (See Comments)     Muscle aches     BP Readings from Last 3 Encounters:   04/09/18 160/64   03/20/18 138/68   12/07/17 138/48    Wt Readings from Last 3 Encounters:   04/09/18 190 lb 4.1 oz (86.3 kg)   03/20/18 190 lb 12.8 oz (86.5 kg)   12/07/17 186 lb 14.4 oz (84.8 kg)                    Reviewed and updated as needed this visit by clinical staff  Tobacco  Allergies  Meds  Med Hx  Surg Hx  Fam Hx  Soc Hx      Reviewed and updated as needed this visit by Provider         ROS:  CONSTITUTIONAL: NEGATIVE for fever, chills, change in weight  ENT/MOUTH: NEGATIVE for ear, mouth and throat problems  RESP: NEGATIVE for significant cough  CV: NEGATIVE for chest pain, palpitations or peripheral edema  GI: NEGATIVE for nausea, abdominal pain, heartburn, or change in bowel habits  : NEGATIVE for frequency, dysuria, or hematuria  MUSCULOSKELETAL: NEGATIVE for significant arthralgias or myalgia  HEME: NEGATIVE for bleeding problems  PSYCHIATRIC: NEGATIVE for changes in mood or affect    OBJECTIVE:                                                    /72  Pulse 75  Temp 97.3  F (36.3  C) (Oral)  Wt 187 lb 11.2 oz (85.1 kg)  SpO2 94%  BMI 27.72 kg/m2  Body mass index is 27.72 kg/(m^2).  GENERAL: alert and no  distress  EYES: Eyes grossly normal to inspection, extraocular movements - intact, and PERRL  HENT: ear canals- normal; TMs- normal; Nose- normal; Mouth- no ulcers, no lesions  NECK: no tenderness, no adenopathy, no asymmetry, no masses, no stiffness; thyroid- normal to palpation  RESP: lungs clear to auscultation - no rales, no rhonchi, no wheezes  CV: regular rates and rhythm, normal S1 S2, no S3 or S4 and noted AS murmur, no click or rub -  MS: extremities- no gross deformities noted  PSYCH: Alert and oriented times 3; speech- coherent , normal rate and volume; able to articulate logical thoughts, able to abstract reason, no tangential thoughts, no hallucinations or delusions, affect- normal     Lab Results   Component Value Date    A1C 5.8 04/06/2018    A1C 5.9 03/20/2018    A1C 6.1 08/15/2017    A1C 6.4 02/12/2017       ASSESSMENT/PLAN:                                                      (Z09) Hospital discharge follow-up  (primary encounter diagnosis)  Comment: stable on therapy  Plan:     (J18.9) Pneumonia due to infectious organism, unspecified laterality, unspecified part of lung  Comment: Able on oral therapy suggest repeat chest x-ray 2 weeks after completion.  Orders placed  Plan: order for DME, XR Chest 2 Views            (J44.1) Chronic obstructive pulmonary disease with acute exacerbation (H)  Comment: Stable with the disability parking sticker filled out as well as order for O2 portable constant  Plan: order for DME        Ice patient and he really should be seen by pulmonary but he prefers to wait at this    (E11.22,  N18.6,  Z99.2,  Z79.4) Type 2 diabetes mellitus with chronic kidney disease on chronic dialysis, with long-term current use of insulin (H)  Comment:   Lab Results   Component Value Date    A1C 5.8 04/06/2018    A1C 5.9 03/20/2018    A1C 6.1 08/15/2017    A1C 6.4 02/12/2017     Plan: We will at present time per noted A1c of 5.8 continue as direct    (I35.0) Aortic valve stenosis,  unspecified etiology  Comment: Discussed need for baseline follow-up with cardiology to continue to follow aortic stenosis per  Plan: CARDIOLOGY EVAL ADULT REFERRAL            (N18.6,  Z99.2) ESRD (end stage renal disease) on dialysis (H)  Comment: Recent 4/9/2018 labs noted and followed for end-stage renal disease  Plan: Dialysis as scheduled    (I10) Essential hypertension, benign  Comment: Stable on current therapy continue with medical management  Plan:       See Patient Instructions    Sandip Antunez MD  [facility]    THE MEDICATION LIST HAS BEEN FULLY RECONCILED BY THE M.D. AND THE NURSING STAFF.

## 2018-04-12 NOTE — MR AVS SNAPSHOT
After Visit Summary   4/12/2018    Seven Savage Jr.    MRN: [id number]           Patient Information     Date Of Birth          1935        Visit Information        Provider Department      4/12/2018 10:00 AM Sandip Antunez MD [facility]        Today's Diagnoses     Hospital discharge follow-up    -  1    Pneumonia due to infectious organism, unspecified laterality, unspecified part of lung        Chronic obstructive pulmonary disease with acute exacerbation (H)        Type 2 diabetes mellitus with chronic kidney disease on chronic dialysis, with long-term current use of insulin (H)        Aortic valve stenosis, unspecified etiology        ESRD (end stage renal disease) on dialysis (H)        Essential hypertension, benign           Follow-ups after your visit        Additional Services     CARDIOLOGY EVAL ADULT REFERRAL       Preferred location:  [facility] [phone]   [email or web address]    Please be aware that coverage of these services is subject to the terms and limitations of your health insurance plan.  Call member services at your health plan with any benefit or coverage questions.      Please bring the following to your appointment:  Any x-rays, CTs or MRIs which have been performed. Contact the facility where they were done to arrange for  prior to your scheduled appointment.    List of current medications  This referral request   Any documents/labs given to you for this referral                  Who to contact     If you have questions or need follow up information about today's clinic visit or your schedule please contact [facility] directly at [phone].  Normal or non-critical lab and imaging results will be communicated to you by MyChart, letter or phone within 4 business days after the clinic has received the results. If you do not hear from us  "within 7 days, please contact the clinic through [employer] or phone. If you have a critical or abnormal lab result, we will notify you by phone as soon as possible.  Submit refill requests through [employer] or call your pharmacy and they will forward the refill request to us. Please allow 3 business days for your refill to be completed.          Additional Information About Your Visit        [employer] Information     [employer] lets you send messages to your doctor, view your test results, renew your prescriptions, schedule appointments and more. To sign up, go to [email or web address] . Click on \"Log in\" on the left side of the screen, which will take you to the Welcome page. Then click on \"Sign up Now\" on the right side of the page.     You will be asked to enter the access code listed below, as well as some personal information. Please follow the directions to create your username and password.     Your access code is: [email or web address]  Expires: 2018 11:57 AM     Your access code will  in 90 days. If you need help or a new code, please call your [facility] clinic or [phone].        Care EveryWhere ID     This is your Care EveryWhere ID. This could be used by other organizations to access your [facility] medical records  [id number]        Your Vitals Were     Pulse Temperature Pulse Oximetry BMI (Body Mass Index)          75 97.3  F (36.3  C) (Oral) 94% 27.72 kg/m2         Blood Pressure from Last 3 Encounters:   18 140/72   18 160/64   18 138/68    Weight from Last 3 Encounters:   18 187 lb 11.2 oz (85.1 kg)   18 190 lb 4.1 oz (86.3 kg)   18 190 lb 12.8 oz (86.5 kg)              We Performed the Following     CARDIOLOGY EVAL ADULT REFERRAL          Today's Medication Changes          These changes are accurate as of 18 10:18 AM.  If you have any questions, ask your nurse or doctor.               Start taking these medicines.        Dose/Directions    order for DME "   Used for:  Chronic obstructive pulmonary disease with acute exacerbation (H), Pneumonia due to infectious organism, unspecified laterality, unspecified part of lung   Started by:  Sandip Antunez MD        Equipment being ordered: portable Oxygen concentrator   Quantity:  1 Device   Refills:  0            Where to get your medicines      Some of these will need a paper prescription and others can be bought over the counter.  Ask your nurse if you have questions.     Bring a paper prescription for each of these medications     order for DME                Primary Care Provider Office Phone # Fax #    Sandip Antunez [phone]       [address] [facility] [address]        Equal Access to Services     [facility]: Hadii aad ku hadasho Soomaali, waaxda luqadaha, qaybta kaalmada adeegyada, waxay idiin hayaan adeeg shubham coelho . So [facility] [phone].    ATENCIÓN: Si habla español, tiene a turpin disposición servicios gratuitos de asistencia lingüística. Llame al [phone].    We comply with applicable federal civil rights laws and [place] laws. We do not discriminate on the basis of race, color, national origin, age, disability, sex, sexual orientation, or gender identity.            Thank you!     Thank you for choosing [facility]  for your care. Our goal is always to provide you with excellent care. Hearing back from our patients is one way we can continue to improve our services. Please take a few minutes to complete the written survey that you may receive in the mail after your visit with us. Thank you!             Your Updated Medication List - Protect others around you: Learn how to safely use, store and throw away your medicines at www.disposemymeds.org.          This list is accurate as of 4/12/18 10:18 AM.  Always use your most recent med list.                   Brand Name Dispense Instructions for use Diagnosis    * albuterol 108 (90 Base) MCG/ACT  Inhaler    PROAIR HFA/PROVENTIL HFA/VENTOLIN HFA    1 Inhaler    Inhale 1-2 puffs into the lungs every 4 hours as needed for shortness of breath / dyspnea or wheezing    COPD exacerbation (H)       * albuterol (2.5 MG/3ML) 0.083% neb solution     75 mL    TAKE 1 VIAL BY NEBULIZATION EVERY 6 HOURS AS NEEDED FOR SHORNESS OF BREATH/DYSPNEA OR WHEEZING    COPD exacerbation (H)       aspirin 81 MG chewable tablet      Take 81 mg by mouth daily        atorvastatin 40 MG tablet    LIPITOR    90 tablet    TAKE 1 TABLET(40 MG) BY MOUTH AT BEDTIME    Type 2 diabetes mellitus with chronic kidney disease on chronic dialysis, with long-term current use of insulin (H), Hyperlipidemia LDL goal <100       B-D U/F 31G X 8 MM   Generic drug:  insulin pen needle     400 each    USE AS DIRECTED FOUR TIMES DAILY.    Type 2 diabetes mellitus with chronic kidney disease on chronic dialysis, with long-term current use of insulin (H)       blood glucose monitoring test strip    no brand specified    300 strip    Use to test blood sugars 3 times daily or as directed. Uses onetouch    Type 2 diabetes mellitus with chronic kidney disease on chronic dialysis, with long-term current use of insulin (H)       carvedilol 12.5 MG tablet    COREG    180 tablet    TAKE 1 TABLET(12.5 MG) BY MOUTH TWICE DAILY    Aortic valve stenosis, unspecified etiology, Essential hypertension, benign       COLCHICINE PO      Take 0.6 mg by mouth every 48 hours as needed Reported on 3/30/2017        darbepoetin libby 100 MCG/0.5ML injection    ARANESP     Inject Subcutaneous three times a week At [facility] M,W,F. Dialysis ([facility]) doses, patient not sure about strength.        diltiazem 300 MG 24 hr ER beaded capsule    TIAZAC    30 capsule    Take 1 capsule (300 mg) by mouth daily    Essential hypertension, benign       doxazosin 8 MG tablet    CARDURA    90 tablet    TAKE 1 TABLET BY MOUTH EVERY NIGHT AT BEDTIME.    Urinary retention       * EPLERENONE PO      Take 50  mg by mouth daily        * eplerenone 50 MG tablet    INSPRA    30 tablet    Take 1 tablet (50 mg) by mouth daily    Essential hypertension, benign       fish oil-omega-3 fatty acids 1000 MG capsule      Take 1 g by mouth daily        furosemide 20 MG tablet    LASIX    90 tablet    Take 1 tablet (20 mg) by mouth daily    ESRD (end stage renal disease) on dialysis (H), Essential hypertension, benign       insulin glargine 100 UNIT/ML injection    LANTUS     Inject 23 Units Subcutaneous At Bedtime (Patient will increase to 26 units at bedtime he states when BG is elevated.        levofloxacin 500 MG tablet    LEVAQUIN    5 tablet    Take 1 tablet (500 mg) by mouth daily for 7 days    Community acquired pneumonia, unspecified laterality       lisinopril 20 MG tablet    PRINIVIL/ZESTRIL    90 tablet    Take 1 tablet (20 mg) by mouth daily    Type 2 diabetes mellitus with chronic kidney disease on chronic dialysis, with long-term current use of insulin (H), Essential hypertension, benign       nebulizer/adult mask Kit kit     1 kit    1 Device as needed    Chronic obstructive pulmonary disease with acute exacerbation (H)       NOVOLOG SC      Inject 12 Units Subcutaneous 3 times daily (with meals) Patient states with large meals he will increase to 14-16 units.        * order for DME     1 Device    Equipment being ordered: Other: Nebulizer machine Treatment Diagnosis: COPD    COPD exacerbation (H)       * order for DME     1 Device    Equipment being ordered: Four wheeled walker    DDD (degenerative disc disease), lumbar, Impaired gait       order for DME     1 Device    Equipment being ordered: portable Oxygen concentrator    Chronic obstructive pulmonary disease with acute exacerbation (H), Pneumonia due to infectious organism, unspecified laterality, unspecified part of lung       predniSONE 10 MG tablet    DELTASONE    30 tablet    4 tabs daily for 3 days, then 3 tabs daily for 3 days, then 2 tabs daily for 3 days,  then 1 tab daily for 3 days, then stop    Community acquired pneumonia, unspecified laterality       BRANDI-HEIDY Tabs     90 tablet    TAKE ONE TABLET BY MOUTH DAILY    ESRD (end stage renal disease) on dialysis (H)       STIOLTO RESPIMAT 2.5-2.5 MCG/ACT Aers   Generic drug:  tiotropium-olodaterol     4 g    INHALE 2 PUFFS INTO THE LUNGS DAILY    COPD exacerbation (H)       VITAMIN D (CHOLECALCIFEROL) PO      Take 2,000 Units by mouth daily        * Notice:  This list has 6 medication(s) that are the same as other medications prescribed for you. Read the directions carefully, and ask your doctor or other care provider to review them with you.

## 2018-04-14 DIAGNOSIS — J44.1 COPD EXACERBATION (H): ICD-10-CM

## 2018-04-16 ENCOUNTER — MEDICAL CORRESPONDENCE (OUTPATIENT)
Dept: HEALTH INFORMATION MANAGEMENT | Facility: CLINIC | Age: 83
End: 2018-04-16

## 2018-04-16 RX ORDER — ALBUTEROL SULFATE 0.83 MG/ML
SOLUTION RESPIRATORY (INHALATION)
Qty: 75 ML | Refills: 3 | Status: SHIPPED | OUTPATIENT
Start: 2018-04-16 | End: 2018-10-02

## 2018-04-16 NOTE — TELEPHONE ENCOUNTER
"Requested Prescriptions   Pending Prescriptions Disp Refills     albuterol (2.5 MG/3ML) 0.083% neb solution [Pharmacy Med Name: ALBUTEROL 0.083%(2.5MG/3ML) 25X3ML]  Last Written Prescription Date:  3/20/2018  Last Fill Quantity: 75 mL,  # refills: 2   Last office visit: 4/12/2018 with prescribing provider:  4/12/2018   Future Office Visit:     75 mL 0     Sig: TAKE 1 VIAL BY NEBULIZATION EVERY 6 HOURS AS NEEDED FOR SHORNESS OF BREATH/DYSPNEA OR WHEEZING    Asthma Maintenance Inhalers - Anticholinergics Passed    4/14/2018  3:46 AM       Passed - Patient is age 12 years or older       Passed - Recent (12 mo) or future (30 days) visit within the authorizing provider's specialty    Patient had office visit in the last 12 months or has a visit in the next 30 days with authorizing provider or within the authorizing provider's specialty.  See \"Patient Info\" tab in inbasket, or \"Choose Columns\" in Meds & Orders section of the refill encounter.            No flowsheet data found.    "

## 2018-04-24 ENCOUNTER — TRANSFERRED RECORDS (OUTPATIENT)
Dept: HEALTH INFORMATION MANAGEMENT | Facility: CLINIC | Age: 83
End: 2018-04-24

## 2018-05-14 DIAGNOSIS — J44.1 COPD EXACERBATION (H): ICD-10-CM

## 2018-05-15 RX ORDER — ALBUTEROL SULFATE 0.83 MG/ML
SOLUTION RESPIRATORY (INHALATION)
Qty: 75 ML | Refills: 3 | OUTPATIENT
Start: 2018-05-15

## 2018-05-22 ENCOUNTER — OFFICE VISIT (OUTPATIENT)
Dept: INTERNAL MEDICINE | Facility: CLINIC | Age: 83
End: 2018-05-22
Payer: MEDICARE

## 2018-05-22 VITALS
BODY MASS INDEX: 27.47 KG/M2 | DIASTOLIC BLOOD PRESSURE: 70 MMHG | WEIGHT: 186 LBS | RESPIRATION RATE: 18 BRPM | TEMPERATURE: 98.1 F | HEART RATE: 59 BPM | SYSTOLIC BLOOD PRESSURE: 150 MMHG | OXYGEN SATURATION: 90 %

## 2018-05-22 DIAGNOSIS — Z79.4 TYPE 2 DIABETES MELLITUS WITH CHRONIC KIDNEY DISEASE ON CHRONIC DIALYSIS, WITH LONG-TERM CURRENT USE OF INSULIN (H): ICD-10-CM

## 2018-05-22 DIAGNOSIS — Z99.2 ESRD (END STAGE RENAL DISEASE) ON DIALYSIS (H): Primary | ICD-10-CM

## 2018-05-22 DIAGNOSIS — J44.1 CHRONIC OBSTRUCTIVE PULMONARY DISEASE WITH ACUTE EXACERBATION (H): Chronic | ICD-10-CM

## 2018-05-22 DIAGNOSIS — I10 ESSENTIAL HYPERTENSION, BENIGN: ICD-10-CM

## 2018-05-22 DIAGNOSIS — N18.6 TYPE 2 DIABETES MELLITUS WITH CHRONIC KIDNEY DISEASE ON CHRONIC DIALYSIS, WITH LONG-TERM CURRENT USE OF INSULIN (H): ICD-10-CM

## 2018-05-22 DIAGNOSIS — I35.0 AORTIC VALVE STENOSIS, UNSPECIFIED ETIOLOGY: ICD-10-CM

## 2018-05-22 DIAGNOSIS — E11.22 TYPE 2 DIABETES MELLITUS WITH CHRONIC KIDNEY DISEASE ON CHRONIC DIALYSIS, WITH LONG-TERM CURRENT USE OF INSULIN (H): ICD-10-CM

## 2018-05-22 DIAGNOSIS — N18.6 ESRD (END STAGE RENAL DISEASE) ON DIALYSIS (H): Primary | ICD-10-CM

## 2018-05-22 DIAGNOSIS — Z99.2 TYPE 2 DIABETES MELLITUS WITH CHRONIC KIDNEY DISEASE ON CHRONIC DIALYSIS, WITH LONG-TERM CURRENT USE OF INSULIN (H): ICD-10-CM

## 2018-05-22 PROCEDURE — 99214 OFFICE O/P EST MOD 30 MIN: CPT | Performed by: INTERNAL MEDICINE

## 2018-05-22 RX ORDER — DILTIAZEM HYDROCHLORIDE 300 MG/1
300 CAPSULE, EXTENDED RELEASE ORAL DAILY
Qty: 90 CAPSULE | Refills: 1 | Status: SHIPPED | OUTPATIENT
Start: 2018-05-22 | End: 2018-11-19

## 2018-05-22 RX ORDER — FUROSEMIDE 20 MG
20 TABLET ORAL DAILY
Qty: 90 TABLET | Refills: 1 | Status: SHIPPED | OUTPATIENT
Start: 2018-05-22 | End: 2018-11-15

## 2018-05-22 RX ORDER — EPLERENONE 50 MG/1
50 TABLET, FILM COATED ORAL DAILY
Qty: 30 TABLET | Refills: 0 | Status: SHIPPED | OUTPATIENT
Start: 2018-05-22 | End: 2018-06-21

## 2018-05-22 NOTE — MR AVS SNAPSHOT
After Visit Summary   5/22/2018    Seven Savage Jr.    MRN: [id number]           Patient Information     Date Of Birth          1935        Visit Information        Provider Department      5/22/2018 3:40 PM Sandip Antunez MD [facility]        Today's Diagnoses     ESRD (end stage renal disease) on dialysis (H)    -  1    Type 2 diabetes mellitus with chronic kidney disease on chronic dialysis, with long-term current use of insulin (H)        Chronic obstructive pulmonary disease with acute exacerbation (H)        Aortic valve stenosis, unspecified etiology        Essential hypertension, benign           Follow-ups after your visit        Follow-up notes from your care team     Return if symptoms worsen or fail to improve.      Your next 10 appointments already scheduled     Jun 21, 2018  9:15 AM CDT   New Visit with Daria Hastings DO   [facility] ([facility])    [address] [place] [address] [id number] [phone] OPT 2              Who to contact     If you have questions or need follow up information about today's clinic visit or your schedule please contact [facility] directly at [phone].  Normal or non-critical lab and imaging results will be communicated to you by MyChart, letter or phone within 4 business days after the clinic has received the results. If you do not hear from us within 7 days, please contact the clinic through MyChart or phone. If you have a critical or abnormal lab result, we will notify you by phone as soon as possible.  Submit refill requests through [employer] or call your pharmacy and they will forward the refill request to us. Please allow 3 business days for your refill to be completed.          Additional Information About Your Visit        MyChart Information     [employer] lets you send messages to your doctor, view your test results,  "renew your prescriptions, schedule appointments and more. To sign up, go to [email or web address] . Click on \"Log in\" on the left side of the screen, which will take you to the Welcome page. Then click on \"Sign up Now\" on the right side of the page.     You will be asked to enter the access code listed below, as well as some personal information. Please follow the directions to create your username and password.     Your access code is: [email or web address]  Expires: 2018 11:57 AM     Your access code will  in 90 days. If you need help or a new code, please call your [facility] clinic or [phone].        Care EveryWhere ID     This is your Care EveryWhere ID. This could be used by other organizations to access your [facility] medical records  [id number]        Your Vitals Were     Pulse Temperature Respirations Pulse Oximetry BMI (Body Mass Index)       59 98.1  F (36.7  C) (Oral) 18 90% 27.47 kg/m2        Blood Pressure from Last 3 Encounters:   18 150/70   18 130/72   18 160/64    Weight from Last 3 Encounters:   18 186 lb (84.4 kg)   18 187 lb 11.2 oz (85.1 kg)   18 190 lb 4.1 oz (86.3 kg)              Today, you had the following     No orders found for display         Today's Medication Changes          These changes are accurate as of 18  4:04 PM.  If you have any questions, ask your nurse or doctor.               These medicines have changed or have updated prescriptions.        Dose/Directions    atorvastatin 40 MG tablet   Commonly known as:  LIPITOR   This may have changed:  See the new instructions.   Used for:  Type 2 diabetes mellitus with chronic kidney disease on chronic dialysis, with long-term current use of insulin (H), Hyperlipidemia LDL goal <100        TAKE 1 TABLET(40 MG) BY MOUTH AT BEDTIME   Quantity:  90 tablet   Refills:  1            Where to get your medicines      These medications were sent to [facility] [address] [place] [address] - " [address] AT [facility] [address]  [address], [facility] [address]     Phone:  [phone]     diltiazem 300 MG 24 hr ER beaded capsule    eplerenone 50 MG tablet    furosemide 20 MG tablet                Primary Care Provider Office Phone # Fax #    Sandip Antunez [phone]       [address]  [facility] [address]        Equal Access to Services     LEONARD CONNELLY : Hadii aad ku hadasho Soomaali, waaxda luqadaha, qaybta kaalmada adeegyada, waxay idiin hayaan adeeg kharash la'elizabeth dong. [facility] [phone].    ATENCIÓN: Si habla español, tiene a turpin disposición servicios gratuitos de asistencia lingüística. [facility] [phone].    We comply with applicable federal civil rights laws and [place] laws. We do not discriminate on the basis of race, color, national origin, age, disability, sex, sexual orientation, or gender identity.            Thank you!     Thank you for choosing [facility]  for your care. Our goal is always to provide you with excellent care. Hearing back from our patients is one way we can continue to improve our services. Please take a few minutes to complete the written survey that you may receive in the mail after your visit with us. Thank you!             Your Updated Medication List - Protect others around you: Learn how to safely use, store and throw away your medicines at www.disposemymeds.org.          This list is accurate as of 5/22/18  4:04 PM.  Always use your most recent med list.                   Brand Name Dispense Instructions for use Diagnosis    * albuterol 108 (90 Base) MCG/ACT Inhaler    PROAIR HFA/PROVENTIL HFA/VENTOLIN HFA    1 Inhaler    Inhale 1-2 puffs into the lungs every 4 hours as needed for shortness of breath / dyspnea or wheezing    COPD exacerbation (H)       * albuterol (2.5 MG/3ML) 0.083% neb solution     75 mL    TAKE 1 VIAL BY NEBULIZATION EVERY 6 HOURS AS NEEDED FOR SHORNESS OF BREATH/DYSPNEA OR  WHEEZING    COPD exacerbation (H)       aspirin 81 MG chewable tablet      Take 81 mg by mouth daily        atorvastatin 40 MG tablet    LIPITOR    90 tablet    TAKE 1 TABLET(40 MG) BY MOUTH AT BEDTIME    Type 2 diabetes mellitus with chronic kidney disease on chronic dialysis, with long-term current use of insulin (H), Hyperlipidemia LDL goal <100       B-D U/F 31G X 8 MM   Generic drug:  insulin pen needle     400 each    USE AS DIRECTED FOUR TIMES DAILY.    Type 2 diabetes mellitus with chronic kidney disease on chronic dialysis, with long-term current use of insulin (H)       blood glucose monitoring test strip    no brand specified    300 strip    Use to test blood sugars 3 times daily or as directed. Uses onetouch    Type 2 diabetes mellitus with chronic kidney disease on chronic dialysis, with long-term current use of insulin (H)       carvedilol 12.5 MG tablet    COREG    180 tablet    TAKE 1 TABLET(12.5 MG) BY MOUTH TWICE DAILY    Aortic valve stenosis, unspecified etiology, Essential hypertension, benign       COLCHICINE PO      Take 0.6 mg by mouth every 48 hours as needed Reported on 3/30/2017        darbepoetin libby 100 MCG/0.5ML injection    ARANESP     [facility] doses, patient not sure about strength.        diltiazem 300 MG 24 hr ER beaded capsule    TIAZAC    90 capsule    Take 1 capsule (300 mg) by mouth daily    Essential hypertension, benign       doxazosin 8 MG tablet    CARDURA    90 tablet    TAKE 1 TABLET BY MOUTH EVERY NIGHT AT BEDTIME.    Urinary retention       * EPLERENONE PO      Take 50 mg by mouth daily        * eplerenone 50 MG tablet    INSPRA    30 tablet    Take 1 tablet (50 mg) by mouth daily    Essential hypertension, benign       fish oil-omega-3 fatty acids 1000 MG capsule      Take 1 g by mouth daily        furosemide 20 MG tablet    LASIX    90 tablet    Take 1 tablet (20 mg) by mouth daily    ESRD (end stage  renal disease) on dialysis (H), Essential hypertension, benign       insulin glargine 100 UNIT/ML injection    LANTUS     Inject 23 Units Subcutaneous At Bedtime (Patient will increase to 26 units at bedtime he states when BG is elevated.        lisinopril 20 MG tablet    PRINIVIL/ZESTRIL    90 tablet    Take 1 tablet (20 mg) by mouth daily    Type 2 diabetes mellitus with chronic kidney disease on chronic dialysis, with long-term current use of insulin (H), Essential hypertension, benign       nebulizer/adult mask Kit kit     1 kit    1 Device as needed    Chronic obstructive pulmonary disease with acute exacerbation (H)       NOVOLOG SC      Inject 12 Units Subcutaneous 3 times daily (with meals) Patient states with large meals he will increase to 14-16 units.        * order for DME     1 Device    Equipment being ordered: Other: Nebulizer machine Treatment Diagnosis: COPD    COPD exacerbation (H)       * order for DME     1 Device    Equipment being ordered: Four wheeled walker    DDD (degenerative disc disease), lumbar, Impaired gait       order for DME     1 Device    Equipment being ordered: portable Oxygen concentrator    Chronic obstructive pulmonary disease with acute exacerbation (H), Pneumonia due to infectious organism, unspecified laterality, unspecified part of lung       BRANDI-HEIDY Tabs     90 tablet    TAKE ONE TABLET BY MOUTH DAILY    ESRD (end stage renal disease) on dialysis (H)       STIOLTO RESPIMAT 2.5-2.5 MCG/ACT Aers   Generic drug:  tiotropium-olodaterol     4 g    INHALE 2 PUFFS INTO THE LUNGS DAILY    COPD exacerbation (H)       VITAMIN D (CHOLECALCIFEROL) PO      Take 2,000 Units by mouth daily        * Notice:  This list has 6 medication(s) that are the same as other medications prescribed for you. Read the directions carefully, and ask your doctor or other care provider to review them with you.

## 2018-05-22 NOTE — PROGRESS NOTES
SUBJECTIVE:   Seven Savage Jr. is a 83 year old male who presents to clinic today for the following health issues:    Swelling       Duration: Began middle of April     Description (location/character/radiation): Bilateral leg swelling/ tightness     Intensity:  moderate    Accompanying signs and symptoms: Hx of chronic LBP- hard to walk with foot swelling. Seen by ortho and x-ray was normal of feet except for swelling      History (similar episodes/previous evaluation): hx of CKD- dialysis every other day     Precipitating or alleviating factors: None    Therapies tried and outcome: Increase lasix to 40 mg for a period of time- helped. Back to taking 20 mg tablet      Other concerns:  1. Cardiology appt 6/24- requesting refills until seen     Problem list and histories reviewed & adjusted, as indicated.  Additional history: as documented    Patient Active Problem List   Diagnosis     Type 2 diabetes mellitus with chronic kidney disease on chronic dialysis, with long-term current use of insulin (H)     Hyperlipidemia LDL goal <100     Aortic valve stenosis, unspecified etiology     ESRD (end stage renal disease) on dialysis (H)     Chronic obstructive pulmonary disease with acute exacerbation (H)     Essential hypertension, benign     Counseling regarding advanced directives     Chronic bilateral low back pain without sciatica     Somatic dysfunction of lumbar region     Somatic dysfunction of sacral region     Sacral pain     COPD exacerbation (H)     Past Surgical History:   Procedure Laterality Date     ADENOIDECTOMY       ENDOSCOPIC POLYPECTOMY NASAL      Through vocal cords     Renal Stent      For renal calculi     TONSILLECTOMY         Social History   Substance Use Topics     Smoking status: Former Smoker     Smokeless tobacco: Never Used     Alcohol use Yes      Comment: 1 beer per month     Family History   Problem Relation Age of Onset     Family history unknown: Yes         Current Outpatient  Prescriptions   Medication Sig Dispense Refill     albuterol (2.5 MG/3ML) 0.083% neb solution TAKE 1 VIAL BY NEBULIZATION EVERY 6 HOURS AS NEEDED FOR SHORNESS OF BREATH/DYSPNEA OR WHEEZING 75 mL 3     albuterol (PROAIR HFA/PROVENTIL HFA/VENTOLIN HFA) 108 (90 BASE) MCG/ACT Inhaler Inhale 1-2 puffs into the lungs every 4 hours as needed for shortness of breath / dyspnea or wheezing 1 Inhaler 5     aspirin 81 MG chewable tablet Take 81 mg by mouth daily       atorvastatin (LIPITOR) 40 MG tablet TAKE 1 TABLET(40 MG) BY MOUTH AT BEDTIME 90 tablet 1     B Complex-C-Folic Acid (BRANDI-HEIDY) TABS TAKE ONE TABLET BY MOUTH DAILY 90 tablet 3     B-D U/F 31G X 8 MM insulin pen needle USE AS DIRECTED FOUR TIMES DAILY. 400 each 0     blood glucose monitoring (NO BRAND SPECIFIED) test strip Use to test blood sugars 3 times daily or as directed. Uses onetouch 300 strip 11     carvedilol (COREG) 12.5 MG tablet TAKE 1 TABLET(12.5 MG) BY MOUTH TWICE DAILY 180 tablet 3     COLCHICINE PO Take 0.6 mg by mouth every 48 hours as needed Reported on 3/30/2017       darbepoetin libby (ARANESP) 100 MCG/0.5ML injection [facility] doses, patient not sure about strength.       diltiazem (TIAZAC) 300 MG 24 hr ER beaded capsule Take 1 capsule (300 mg) by mouth daily 30 capsule 0     doxazosin (CARDURA) 8 MG tablet TAKE 1 TABLET BY MOUTH EVERY NIGHT AT BEDTIME. 90 tablet 3     eplerenone (INSPRA) 50 MG tablet Take 1 tablet (50 mg) by mouth daily 30 tablet 1     EPLERENONE PO Take 50 mg by mouth daily       fish oil-omega-3 fatty acids 1000 MG capsule Take 1 g by mouth daily       furosemide (LASIX) 20 MG tablet Take 1 tablet (20 mg) by mouth daily 90 tablet 3     Insulin Aspart (NOVOLOG SC) Inject 12 Units Subcutaneous 3 times daily (with meals) Patient states with large meals he will increase to 14-16 units.       insulin glargine (LANTUS) 100 UNIT/ML injection Inject 23 Units  Subcutaneous At Bedtime (Patient will increase to 26 units at bedtime he states when BG is elevated.       lisinopril (PRINIVIL/ZESTRIL) 20 MG tablet Take 1 tablet (20 mg) by mouth daily 90 tablet 3     order for DME Equipment being ordered: Other: Nebulizer machine  Treatment Diagnosis: COPD 1 Device 0     order for DME Equipment being ordered: Four wheeled walker 1 Device 0     order for DME Equipment being ordered: portable Oxygen concentrator 1 Device 0     Respiratory Therapy Supplies (NEBULIZER/ADULT MASK) KIT 1 Device as needed 1 kit 0     STIOLTO RESPIMAT 2.5-2.5 MCG/ACT AERS INHALE 2 PUFFS INTO THE LUNGS DAILY 4 g 10     VITAMIN D, CHOLECALCIFEROL, PO Take 2,000 Units by mouth daily        Allergies   Allergen Reactions     Pioglitazone Other (See Comments)     Edema & hay fever     Vytorin Other (See Comments)     Muscle aches     BP Readings from Last 3 Encounters:   04/12/18 130/72   04/09/18 160/64   03/20/18 138/68    Wt Readings from Last 3 Encounters:   04/12/18 187 lb 11.2 oz (85.1 kg)   04/09/18 190 lb 4.1 oz (86.3 kg)   03/20/18 190 lb 12.8 oz (86.5 kg)            Reviewed and updated as needed this visit by clinical staff  Tobacco  Allergies  Meds  Med Hx  Surg Hx  Fam Hx  Soc Hx      Reviewed and updated as needed this visit by Provider       ROS:  CONSTITUTIONAL: NEGATIVE for fever, chills, change in weight  ENT/MOUTH: NEGATIVE for ear, mouth and throat problems  RESP: NEGATIVE for significant cough or SOB  GI: NEGATIVE for nausea, abdominal pain, heartburn, or change in bowel habits  : NEGATIVE for frequency, dysuria, or hematuria  MUSCULOSKELETAL: NEGATIVE for significant arthralgias or myalgia  HEME: NEGATIVE for bleeding problems  PSYCHIATRIC: NEGATIVE for changes in mood or affect    OBJECTIVE:                                                    /70  Pulse 59  Temp 98.1  F (36.7  C) (Oral)  Resp 18  Wt 186 lb (84.4 kg)  SpO2 90%  BMI 27.47 kg/m2  Body mass index is 27.47  kg/(m^2).  GENERAL: alert and no distress un=sing cane.  EYES: Eyes grossly normal to inspection, extraocular movements - intact, and PERRL  HENT: ear canals- normal; TMs- normal; Nose- normal; Mouth- no ulcers, no lesions  NECK: no tenderness, no adenopathy, no asymmetry, no masses, no stiffness; thyroid- normal to palpation  RESP: lungs clear to auscultation - no rales, no rhonchi, no wheezes  CV: regular rates and rhythm, normal S1 S2, no S3 or S4 and no click or rub   MS: extremities- no gross deformities noted  NEURO:  No focal changes  PSYCH: Alert and oriented times 3; speech- coherent , normal rate and volume; able to articulate logical thoughts, able to abstract reason, no tangential thoughts, no hallucinations or delusions, affect- normal     Lab Results   Component Value Date    A1C 5.8 04/06/2018    A1C 5.9 03/20/2018    A1C 6.1 08/15/2017    A1C 6.4 02/12/2017       ASSESSMENT/PLAN:                                                      (N18.6,  Z99.2) ESRD (end stage renal disease) on dialysis (H)  (primary encounter diagnosis)  Comment: We will continue with his dialysis as scheduled under the guidance of Dr. Diaz's  Plan: furosemide (LASIX) 20 MG tablet            (E11.22,  N18.6,  Z99.2,  Z79.4) Type 2 diabetes mellitus with chronic kidney disease on chronic dialysis, with long-term current use of insulin (H)  Comment: Stable per last A1c on current therapy  Plan:     (J44.1) Chronic obstructive pulmonary disease with acute exacerbation (H)  Comment: [facility] where he apparently will be getting a follow-up chest x-ray.  Plan:     (I35.0) Aortic valve stenosis, unspecified etiology  Comment: Stable on therapy continue as directed and follow-up with cardiology as ordered  Plan:     (I10) Essential hypertension, benign  Comment: We will continue with current medication and I have refilled a short supply for the patient until he can  get in to see a specialist as per his record  Plan: eplerenone (INSPRA) 50 MG tablet, diltiazem         (TIAZAC) 300 MG 24 hr ER beaded capsule,         furosemide (LASIX) 20 MG tablet              See Patient Instructions    Sandip Antnuez MD  [facility]    THE MEDICATION LIST HAS BEEN FULLY RECONCILED BY THE [facility]. AND THE NURSING STAFF.    25 minutes spent with this patient, face to face, discussing treatment options for listed problems above as well as side effects of appropriate medications.  Counseling time extended beyond 50% of the clinic visit.  Medication dosing, treatment plan and follow-up were discussed. Also reviewed need for primary care testing for patient.

## 2018-05-24 ENCOUNTER — TRANSFERRED RECORDS (OUTPATIENT)
Dept: HEALTH INFORMATION MANAGEMENT | Facility: CLINIC | Age: 83
End: 2018-05-24

## 2018-06-06 DIAGNOSIS — I10 ESSENTIAL HYPERTENSION, BENIGN: ICD-10-CM

## 2018-06-06 RX ORDER — EPLERENONE 50 MG/1
TABLET, FILM COATED ORAL
Qty: 30 TABLET | Refills: 0 | Status: SHIPPED | OUTPATIENT
Start: 2018-06-06 | End: 2018-06-21

## 2018-06-06 NOTE — TELEPHONE ENCOUNTER
Routing refill request to provider for review/approval because:  Labs out of range:  Creat, sodium, blood pressure

## 2018-06-06 NOTE — TELEPHONE ENCOUNTER
"Requested Prescriptions   Pending Prescriptions Disp Refills     eplerenone (INSPRA) 50 MG tablet [Pharmacy Med Name: EPLERENONE 50MG TABLETS] 30 tablet 0     Sig: TAKE 1 TABLET(50 MG) BY MOUTH DAILY    Diuretics (Including Combos) Protocol Failed    6/6/2018  9:26 AM       Failed - Blood pressure under 140/90 in past 12 months    BP Readings from Last 3 Encounters:   05/22/18 150/70   04/12/18 130/72   04/09/18 160/64                Failed - Normal serum creatinine on file in past 12 months    Recent Labs   Lab Test  04/09/18   1315   CR  5.31*             Failed - Normal serum sodium on file in past 12 months    Recent Labs   Lab Test  04/09/18   1315   NA  132*             Passed - Recent (12 mo) or future (30 days) visit within the authorizing provider's specialty    Patient had office visit in the last 12 months or has a visit in the next 30 days with authorizing provider or within the authorizing provider's specialty.  See \"Patient Info\" tab in inbasket, or \"Choose Columns\" in Meds & Orders section of the refill encounter.           Passed - Patient is age 18 or older       Passed - Normal serum potassium on file in past 12 months    Recent Labs   Lab Test  04/09/18   1315   POTASSIUM  4.5                    Last Written Prescription Date:  hist  Last Fill Quantity: hist,  # refills: hist   Last office visit: 5/22/2018 with prescribing provider:  5/22/18   Future Office Visit:      "

## 2018-06-07 ENCOUNTER — TRANSFERRED RECORDS (OUTPATIENT)
Dept: HEALTH INFORMATION MANAGEMENT | Facility: CLINIC | Age: 83
End: 2018-06-07

## 2018-06-14 ENCOUNTER — THERAPY VISIT (OUTPATIENT)
Dept: CHIROPRACTIC MEDICINE | Facility: CLINIC | Age: 83
End: 2018-06-14
Payer: MEDICARE

## 2018-06-14 DIAGNOSIS — M99.02 THORACIC SEGMENT DYSFUNCTION: ICD-10-CM

## 2018-06-14 DIAGNOSIS — M99.04 SOMATIC DYSFUNCTION OF SACRAL REGION: ICD-10-CM

## 2018-06-14 DIAGNOSIS — M53.3 SACRAL PAIN: ICD-10-CM

## 2018-06-14 DIAGNOSIS — M54.50 CHRONIC BILATERAL LOW BACK PAIN WITHOUT SCIATICA: ICD-10-CM

## 2018-06-14 DIAGNOSIS — M99.03 SOMATIC DYSFUNCTION OF LUMBAR REGION: Primary | ICD-10-CM

## 2018-06-14 DIAGNOSIS — G89.29 CHRONIC BILATERAL LOW BACK PAIN WITHOUT SCIATICA: ICD-10-CM

## 2018-06-14 PROCEDURE — 99212 OFFICE O/P EST SF 10 MIN: CPT | Mod: GA | Performed by: CHIROPRACTOR

## 2018-06-14 PROCEDURE — 98941 CHIROPRACT MANJ 3-4 REGIONS: CPT | Mod: AT | Performed by: CHIROPRACTOR

## 2018-06-14 NOTE — PROGRESS NOTES
Initial Chiropractic Clinic Visit  1/8 treatments    PCP: Sandip Savage Jr. is a 83 year old male who is seen  as a self referral presenting with chronic low back pain . Patient reports that the onset was over 25 years ago however more recently in the past 2 years.  When asked, patient denies:, falling, slipping, bending and reaching or sleeping awkwardly. The pt has had Chiropractic last year with good success.  The pt reports intermittent low back pain throughout the years. He has had several accidents in the past which have all contributed to spinal pain. The px is across the low back area in the sacral area. It does not radiate in the extremities. He denies weakness in the extremities, groin pain, bowel or bladder issues or other unusual sx. .  Prior to onset, the patient was able to stand erect and walk. Patient notes that due to symptoms, they can only stand or walk for short periods. Sevne Savage Jr. notes   standing rated at a 3/10 painful and prior to this incident it was 1/10.        Injury: There was no acute injury associated with this episode    Location of Pain: bilateral low back and sacrum at the following level(s) T6 , T7 , L3 , L5 , Sacrum  and PSIS Right   Duration of Pain: in the past 2 years   Rating of Pain at worst: 4/10  Rating of Pain Currently: 3/10  Symptoms are better with: PT  Symptoms are worse with: standing and walking  Additional Features: none      Health History  as reported by the patient:    How does the patient rate their own health:   Fair    Current or past medical history:   Anemia, Diabetes, Emphysema, High blood pressure, History of fractures, Kidney disease and Weakness    Medical allergies  None    Past Traumas/Surgeries  Orthopedic: fx of L tibia    Family History  Family History   Problem Relation Age of Onset     Family history unknown: Yes       Medications:  Anti-depressants, Anti-inflammatory and High blood pressure    Occupation:  Retired  "    Primary job tasks:   Driving and Repetitive tasks    Barriers as home/work:   none    Additional health Issues:     None             Seven was asked to complete the Oswestry Low Back Disability Index and Lilian Start Back screening tool.  today in the office.  Disability score: 58%. Keel Start Total Score:6 Sub Score: 3    Review of Systems  Musculoskeletal: as above  Remainder of review of systems is negative including constitutional, CV, pulmonary, GI, Skin and Neurologic except as noted in HPI or medical history.    Past Medical History:   Diagnosis Date     COPD (chronic obstructive pulmonary disease) (H)      CRF (chronic renal failure)      Heart murmur      HLD (hyperlipidemia)      HTN (hypertension)      IDDM (insulin dependent diabetes mellitus) (H)      Renal calculi      Past Surgical History:   Procedure Laterality Date     ADENOIDECTOMY       ENDOSCOPIC POLYPECTOMY NASAL      Through vocal cords     Renal Stent      For renal calculi     TONSILLECTOMY       Objective  There were no vitals taken for this visit.      GENERAL APPEARANCE: healthy, alert and no distress   GAIT: NORMAL  SKIN: no suspicious lesions or rashes  NEURO: Normal strength and tone, mentation intact and speech normal  PSYCH:  mentation appears normal and affect normal/bright    Low back exam:    Inspection:  \"     no visible deformity in the low back       normal skin\",    ROM:       full flexion       limited extension due to pain    Tender:       paraspinal muscles       B QL    Non Tender:       remainder of lumbar spine    Strength:       hip flexion 5/5       knee extension 5/5       ankle dorsiflexion 5/5       ankle plantarflexion 5/5       dorsiflexion of the great toe 5/5    Reflexes:       patellar (L3, L4) symmetric normal       achilles tendons (S1) symmetric normal    Sensation:      grossly intact throughout lower extremities    Special tests:  Kemps - Right positive, low back pain and Left positive, low back pain, " SLR - Right positive low back pain and Right negative, Gaenslen's - Right negative and Left negative and Fabere - Right positive hip and low back pain and Left negative    Segmental spinal dysfunction/restrictions found at:T6 , T7 , L3 , L5 , Sacrum  and PSIS Right     The following soft tissue hypotonicities were observed:Quad lumb: bilateral, referred pain: no  T paraspinals: ache and dull pain, no    Trigger points were found in:none     Muscle spasm found in:Lumbar erector spine, Quad lumb and T-spine paraspinal      Radiology:  None     Assessment:    1. Somatic dysfunction of lumbar region    2. Somatic dysfunction of sacral region    3. Sacral pain    4. Chronic bilateral low back pain without sciatica    5. Thoracic segment dysfunction        RX ordered/plan of care  Anticipated outcomes  Possible risks and side effects    After discussing the risk and benefits of care, patient consented to treatment    Prognosis: Guarded      Patient's condition:  Patient had restrictions pre-manipulation    Treatment effectiveness:  Post manipulation there is better intersegmental movement and Patient claims to feel looser post manipulation      Plan:    Procedures:  Evaluation and Management:  84084 low to moderate level exam 30 min    CMT:  69843 Chiropractic manipulative treatment 3-4 regions performed   Thoracic: Diversified, T6, T9, Prone  Lumbar: Diversified and Activator, L4, L5, Side posture  Pelvis: Drop Table, Sacrum , PSIS Right , Prone    Modalities:  17786: US:  1.5 Collado/cm squared for 4  minutes at 1 mhz   Location: B QL    Therapeutic procedures:  None    Response to Treatment  Reduction in symptoms as reported by patient      Treatment plan and goals:  Goals:  STANDING: the patient specific goal is to attain the pre-injury status of 1-2 hours comfortably     Frequency of care  Duration of care is estimated to be 4-8 weeks, from the initial treatment.  It is estimated that the patient will need a total of  4-8 visits to resolve this episode.  For the initial therapeutic trial of care, the frequency is recommended at 1 X week, once daily.  A reevaluation would be clinically appropriate in 4-8 visits, to determine progress and further course of care.    In-Office Treatment  Evaluation  Spinal Chiropractic Manipulative Therapy  ACP discussion         Recommendations:    Instructions:expect soreness    Follow-up:  Return to care in  1 week with check-up.       Discussed the assessment with the patient.      Disclaimer: This note consists of symbols derived from keyboarding, dictation and/or voice recognition software. As a result, there may be errors in the script that have gone undetected. Please consider this when interpreting information found in this chart.

## 2018-06-21 ENCOUNTER — OFFICE VISIT (OUTPATIENT)
Dept: CARDIOLOGY | Facility: CLINIC | Age: 83
End: 2018-06-21
Attending: INTERNAL MEDICINE
Payer: MEDICARE

## 2018-06-21 VITALS
BODY MASS INDEX: 27.74 KG/M2 | HEIGHT: 69 IN | OXYGEN SATURATION: 93 % | SYSTOLIC BLOOD PRESSURE: 156 MMHG | DIASTOLIC BLOOD PRESSURE: 68 MMHG | WEIGHT: 187.3 LBS | HEART RATE: 64 BPM

## 2018-06-21 DIAGNOSIS — I10 ESSENTIAL HYPERTENSION, BENIGN: ICD-10-CM

## 2018-06-21 DIAGNOSIS — N18.6 ESRD (END STAGE RENAL DISEASE) ON DIALYSIS (H): ICD-10-CM

## 2018-06-21 DIAGNOSIS — I35.0 NONRHEUMATIC AORTIC VALVE STENOSIS: ICD-10-CM

## 2018-06-21 DIAGNOSIS — J43.9 PULMONARY EMPHYSEMA, UNSPECIFIED EMPHYSEMA TYPE (H): ICD-10-CM

## 2018-06-21 DIAGNOSIS — Z99.2 ESRD (END STAGE RENAL DISEASE) ON DIALYSIS (H): ICD-10-CM

## 2018-06-21 DIAGNOSIS — E78.5 HYPERLIPIDEMIA LDL GOAL <100: Primary | ICD-10-CM

## 2018-06-21 PROCEDURE — 99203 OFFICE O/P NEW LOW 30 MIN: CPT | Performed by: INTERNAL MEDICINE

## 2018-06-21 PROCEDURE — 93000 ELECTROCARDIOGRAM COMPLETE: CPT | Performed by: INTERNAL MEDICINE

## 2018-06-21 RX ORDER — EPLERENONE 50 MG/1
50 TABLET, FILM COATED ORAL DAILY
Qty: 90 TABLET | Refills: 4 | Status: SHIPPED | OUTPATIENT
Start: 2018-06-21 | End: 2019-06-26

## 2018-06-21 NOTE — MR AVS SNAPSHOT
After Visit Summary   6/21/2018    Seven Savage Jr.    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 (H)        Pulmonary emphysema, unspecified emphysema type (H)        Nonrheumatic aortic valve stenosis           Follow-ups after your visit        Additional Services     Follow-Up with Cardiologist                 Your next 10 appointments already scheduled     Nov 26, 2018  2:15 PM CST   Return Visit with Daria Hastings DO   [facility] ([facility])    [address] [place] [id number] [phone] OPT 2              Future tests that were ordered for you today     Open Future Orders        Priority Expected Expires Ordered    Follow-Up with Cardiologist Routine 10/19/2018 6/21/2019 6/21/2018            Who to contact     If you have questions or need follow up information about today's clinic visit or your schedule please contact [facility] directly at [phone].  Normal or non-critical lab and imaging results will be communicated to you by MyChart, letter or phone within 4 business days after the clinic has received the results. If you do not hear from us within 7 days, please contact the clinic through MyChart or phone. If you have a critical or abnormal lab result, we will notify you by phone as soon as possible.  Submit refill requests through [employer] or call your pharmacy and they will forward the refill request to us. Please allow 3 business days for your refill to be completed.          Additional Information About Your Visit       "  Care EveryWhere ID     This is your Care EveryWhere ID. This could be used by other organizations to access your [place] medical records  [id number]        Your Vitals Were     Pulse Height Pulse Oximetry BMI (Body Mass Index)          64 1.753 m (5' 9\") 93% 27.66 kg/m2         Blood Pressure from Last 3 Encounters:   06/21/18 156/68   05/22/18 150/70   04/12/18 130/72    Weight from Last 3 Encounters:   06/21/18 85 kg (187 lb 4.8 oz)   05/22/18 84.4 kg (186 lb)   04/12/18 85.1 kg (187 lb 11.2 oz)              We Performed the Following     EKG 12-lead complete w/read - Clinics (performed today)          Today's Medication Changes          These changes are accurate as of 6/21/18 10:05 AM.  If you have any questions, ask your nurse or doctor.               These medicines have changed or have updated prescriptions.        Dose/Directions    atorvastatin 40 MG tablet   Commonly known as:  LIPITOR   This may have changed:  See the new instructions.   Used for:  Type 2 diabetes mellitus with chronic kidney disease on chronic dialysis, with long-term current use of insulin (H), Hyperlipidemia LDL goal <100        TAKE 1 TABLET(40 MG) BY MOUTH AT BEDTIME   Quantity:  90 tablet   Refills:  1            Where to get your medicines      [facility] [address] [place] [address] AVE S AT [address]  [address] [facility] [address]     Phone:  [phone]     eplerenone 50 MG tablet                Primary Care Provider Office Phone # Fax #    Sandip Antunez [phone]       [address] [facility] [address]        Equal Access to Services     LEONARD CONNELLY AH: Ellen Elliott, wapriya carr, antoni kaalmada huyen, catrachita dong. So [facility] [phone].    ATENCIÓN: Si habla español, tiene a turpin disposición servicios gratuitos de asistencia lingüística. Llame al [phone].    We comply " with applicable federal civil rights laws and [place] laws. We do not discriminate on the basis of race, color, national origin, age, disability, sex, sexual orientation, or gender identity.            Thank you!     Thank you for choosing [facility]  for your care. Our goal is always to provide you with excellent care. Hearing back from our patients is one way we can continue to improve our services. Please take a few minutes to complete the written survey that you may receive in the mail after your visit with us. Thank you!             Your Updated Medication List - Protect others around you: Learn how to safely use, store and throw away your medicines at www.disposemymeds.org.          This list is accurate as of 6/21/18 10:05 AM.  Always use your most recent med list.                   Brand Name Dispense Instructions for use Diagnosis    * albuterol 108 (90 Base) MCG/ACT Inhaler    PROAIR HFA/PROVENTIL HFA/VENTOLIN HFA    1 Inhaler    Inhale 1-2 puffs into the lungs every 4 hours as needed for shortness of breath / dyspnea or wheezing    COPD exacerbation (H)       * albuterol (2.5 MG/3ML) 0.083% neb solution     75 mL    TAKE 1 VIAL BY NEBULIZATION EVERY 6 HOURS AS NEEDED FOR SHORNESS OF BREATH/DYSPNEA OR WHEEZING    COPD exacerbation (H)       aspirin 81 MG chewable tablet      Take 81 mg by mouth daily        atorvastatin 40 MG tablet    LIPITOR    90 tablet    TAKE 1 TABLET(40 MG) BY MOUTH AT BEDTIME    Type 2 diabetes mellitus with chronic kidney disease on chronic dialysis, with long-term current use of insulin (H), Hyperlipidemia LDL goal <100       B-D U/F 31G X 8 MM   Generic drug:  insulin pen needle     400 each    USE AS DIRECTED FOUR TIMES DAILY.    Type 2 diabetes mellitus with chronic kidney disease on chronic dialysis, with long-term current use of insulin (H)       blood glucose monitoring test strip    no brand specified    300 strip    Use to test  blood sugars 3 times daily or as directed. Uses onetouch    Type 2 diabetes mellitus with chronic kidney disease on chronic dialysis, with long-term current use of insulin (H)       carvedilol 12.5 MG tablet    COREG    180 tablet    TAKE 1 TABLET(12.5 MG) BY MOUTH TWICE DAILY    Aortic valve stenosis, unspecified etiology, Essential hypertension, benign       COLCHICINE PO      Take 0.6 mg by mouth every 48 hours as needed Reported on 3/30/2017        darbepoetin libby 100 MCG/0.5ML injection    ARANESP     Inject Subcutaneous three times a week At [facility] ([facility]) doses, patient not sure about strength.        diltiazem 300 MG 24 hr ER beaded capsule    TIAZAC    90 capsule    Take 1 capsule (300 mg) by mouth daily    Essential hypertension, benign       doxazosin 8 MG tablet    CARDURA    90 tablet    TAKE 1 TABLET BY MOUTH EVERY NIGHT AT BEDTIME.    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        lisinopril 20 MG tablet    PRINIVIL/ZESTRIL    90 tablet    Take 1 tablet (20 mg) by mouth daily    Type 2 diabetes mellitus with chronic kidney disease on chronic dialysis, with long-term current use of insulin (H), Essential hypertension, benign       nebulizer/adult mask Kit kit     1 kit    1 Device as needed    Chronic obstructive pulmonary disease with acute exacerbation (H)       NOVOLOG SC      Inject 12 Units Subcutaneous 3 times daily (with meals) Patient states with  large meals he will increase to 14-16 units.        * order for DME     1 Device    Equipment being ordered: Other: Nebulizer machine Treatment Diagnosis: COPD    COPD exacerbation (H)       * order for DME     1 Device    Equipment being ordered: Four wheeled walker    DDD (degenerative disc disease), lumbar, Impaired gait       order for DME     1 Device    Equipment being ordered: portable Oxygen concentrator    Chronic obstructive pulmonary disease with acute exacerbation (H), Pneumonia due to infectious organism, unspecified laterality, unspecified part of lung       BRANDI-HEIDY Tabs     90 tablet    TAKE ONE TABLET BY MOUTH DAILY    ESRD (end stage renal disease) on dialysis (H)       STIOLTO RESPIMAT 2.5-2.5 MCG/ACT Aers   Generic drug:  tiotropium-olodaterol     4 g    INHALE 2 PUFFS INTO THE LUNGS DAILY    COPD exacerbation (H)       VITAMIN D (CHOLECALCIFEROL) PO      Take 2,000 Units by mouth daily        * Notice:  This list has 4 medication(s) that are the same as other medications prescribed for you. Read the directions carefully, and ask your doctor or other care provider to review them with you.

## 2018-06-21 NOTE — PROGRESS NOTES
Service Date: 06/21/2018      HISTORY OF PRESENT ILLNESS:  Mr. Savage is a very pleasant 83-year-old male who is a transplant from the [place], [place] area.  He moved here recently to help take care of his son who has an eye condition and also help with him financially.      He has a longstanding history of COPD, emphysema from previous tobacco abuse, end-stage renal disease and is on dialysis.  He is committed to [facility] here 3 times per week for dialysis and is following with Dr. Samuel in Nephrology.  He was also diagnosed with aortic stenosis and most recent echocardiogram from 06/07, prior to his move, showed that he had normal LV systolic function with an EF estimated around 55%-60%.  He does have moderate LVH, moderate mitral insufficiency and severe aortic stenosis with a mean gradient of 50, a valve area calculated at 0.7.  He had borderline elevated pulmonary pressures and RV function appeared normal.        He was recently hospitalized in April for a CHF and COPD exacerbation and treated for this successfully.  He states this was due to dietary indiscretion when he went back to [place].  He did eat a very high salt intake which led to his breathing difficulties.  He has subsequently since decrease his sodium, however, it is difficult for him because of the type of foods that he loves.        He has been maintained on an unusual antihypertensive regimen for a dialysis patient.  He has been on eplerenone 50 mg daily in combination with lisinopril 20 mg daily, diltiazem and Cardura as well as carvedilol.  He has been on this combination, including eplerenone, for over 4 years and not had any difficulty with potassium levels.  He was recently prescribed oxygen and uses this intermittently with activity.  He is denying any difficulty breathing at nighttime currently and he denies any peripheral edema.      We did perform an electrocardiogram in office today, which I reviewed.  This essentially shows a normal  sinus rhythm with left axis deviation.  He has ST and T wave abnormalities seen in the lateral leads suggesting possible ischemia in this area.  He has no known history of coronary artery disease.  He has seen a pulmonologist here in [place] in late May who diagnosed him with moderately severe COPD, reduced DLCO consistent with emphysema.  He is on a regimen of inhalers for this as well.      PHYSICAL EXAMINATION:  His blood pressure is 156/68, pulse of 64, weight 187, a body mass index 27.   Cardiovascular tones today are regular, suggesting a normal sinus rhythm.  He has a soft systolic ejection murmur heard best at the right upper sternal border.  He does have a fistula in the left arm, creating bruit as well.  His lungs are diminished posteriorly markedly, but I do not appreciate any rales and he has no peripheral edema on exam.      IMPRESSION:   In summary, Mr. Savage is a very pleasant 83-year-old male with severe aortic stenosis with recent hospitalization for congestive heart failure and pneumonia with underlying history of moderately severe COPD, end-stage renal disease on dialysis and significant spinal stenosis.  He requires intermittent oxygen during the day with activity.  Currently, his breathing is stable and he is not experiencing any symptoms or increased work of breathing and his exam does not support acute decompensated heart failure at this time.      We did discuss possible management options for his aortic valve disease which seems to be his primary cardiac issue, although, I suspect he likely has underlying coronary disease as well.  Mr. Savage and I had a very nice conversation about his philosophy and management of medicine in his health issues.  He states he has led a very full life at age 83 and he is not interested in pursuing any type of aggressive surgical procedures, whether it be cardiac or noncardiac at this time.  His main issues are concerns are with his quality of life.     I  did talk to him a little bit today about a transcutaneous aortic valve replacement that he has upfront decreased risks as compared to an open heart surgery, which I do not feel he would be a good candidate for anyways.  He is willing to at least research this and consider it, but at this time he again reaffirms that he does not want any type of aggressive intervention or surgical procedures, so we will focus on his quality of life, attempting to reduce his sodium intake as much as possible and possibly adjusting his Lasix dose as needed when he chooses to enjoy the foods that he loves that are a little bit higher in salt.  He does continue to urinate and respond to Lasix, so this may be helpful for days when he is eating something with a higher salt intake.     Again, he is on unusual regimen for his antihypertensive medications; however, this has seemingly worked well for him and he has not had any issues, so I have no problem continuing his eplerenone as he will be closely monitoring his potassium levels through dialysis.  He does understand the risks, potential provocation of angina or myocardial infarction with this as well.     He is requesting a routine followup to ensure that his quality of life is maximized with our conservative medical therapy, so I will recommend to follow up in 3-4 months with him.  He will also contact me if he has any interest in pursuing a TAVR or seeing the Structural Heart team.        Please feel free to contact me with any questions you have in regards to his care.  Thank you for allowing me to participate in the care of your very nice patient.      cc:      Sandip Antunez MD     [facility]   [address] [place] [id number]         FAY SAMANIEGO DO             D: 2018   T: 2018   MT: HUNTER      Name:     MISHA SANDERS   MRN:      [id number]        Account:      [id number]   :      1935           Service Date: 2018      Document:  [id number]

## 2018-06-21 NOTE — PROGRESS NOTES
HPI and Plan:   See dictation    Orders Placed This Encounter   Procedures     Follow-Up with Cardiologist     EKG 12-lead complete w/read - Clinics (performed today)       Orders Placed This Encounter   Medications     eplerenone (INSPRA) 50 MG tablet     Sig: Take 1 tablet (50 mg) by mouth daily     Dispense:  90 tablet     Refill:  4       Medications Discontinued During This Encounter   Medication Reason     eplerenone (INSPRA) 50 MG tablet Medication Reconciliation Clean Up     EPLERENONE PO Medication Reconciliation Clean Up     eplerenone (INSPRA) 50 MG tablet Reorder         Encounter Diagnoses   Name Primary?     Hyperlipidemia LDL goal <100 Yes     Essential hypertension, benign      ESRD (end stage renal disease) on dialysis (H)      Pulmonary emphysema, unspecified emphysema type (H)      Nonrheumatic aortic valve stenosis        CURRENT MEDICATIONS:  Current Outpatient Prescriptions   Medication Sig Dispense Refill     albuterol (2.5 MG/3ML) 0.083% neb solution TAKE 1 VIAL BY NEBULIZATION EVERY 6 HOURS AS NEEDED FOR SHORNESS OF BREATH/DYSPNEA OR WHEEZING 75 mL 3     albuterol (PROAIR HFA/PROVENTIL HFA/VENTOLIN HFA) 108 (90 BASE) MCG/ACT Inhaler Inhale 1-2 puffs into the lungs every 4 hours as needed for shortness of breath / dyspnea or wheezing 1 Inhaler 5     aspirin 81 MG chewable tablet Take 81 mg by mouth daily       atorvastatin (LIPITOR) 40 MG tablet TAKE 1 TABLET(40 MG) BY MOUTH AT BEDTIME (Patient taking differently: 20 mg) 90 tablet 1     B Complex-C-Folic Acid (BRANDI-HEIDY) TABS TAKE ONE TABLET BY MOUTH DAILY 90 tablet 3     B-D U/F 31G X 8 MM insulin pen needle USE AS DIRECTED FOUR TIMES DAILY. 400 each 0     blood glucose monitoring (NO BRAND SPECIFIED) test strip Use to test blood sugars 3 times daily or as directed. Uses onetouch 300 strip 11     carvedilol (COREG) 12.5 MG tablet TAKE 1 TABLET(12.5 MG) BY MOUTH TWICE DAILY 180 tablet 3     COLCHICINE PO Take 0.6 mg by mouth every 48 hours as  needed Reported on 3/30/2017       darbepoetin libby (ARANESP) 100 MCG/0.5ML injection [facility] doses, patient not sure about strength.       diltiazem (TIAZAC) 300 MG 24 hr ER beaded capsule Take 1 capsule (300 mg) by mouth daily 90 capsule 1     doxazosin (CARDURA) 8 MG tablet TAKE 1 TABLET BY MOUTH EVERY NIGHT AT BEDTIME. 90 tablet 3     eplerenone (INSPRA) 50 MG tablet Take 1 tablet (50 mg) by mouth daily 90 tablet 4     fish oil-omega-3 fatty acids 1000 MG capsule Take 1 g by mouth daily       furosemide (LASIX) 20 MG tablet Take 1 tablet (20 mg) by mouth daily 90 tablet 1     Insulin Aspart (NOVOLOG SC) Inject 12 Units Subcutaneous 3 times daily (with meals) Patient states with large meals he will increase to 14-16 units.       insulin glargine (LANTUS) 100 UNIT/ML injection Inject 23 Units Subcutaneous At Bedtime (Patient will increase to 26 units at bedtime he states when BG is elevated.       lisinopril (PRINIVIL/ZESTRIL) 20 MG tablet Take 1 tablet (20 mg) by mouth daily 90 tablet 3     order for DME Equipment being ordered: portable Oxygen concentrator 1 Device 0     order for DME Equipment being ordered: Four wheeled walker 1 Device 0     order for DME Equipment being ordered: Other: Nebulizer machine  Treatment Diagnosis: COPD 1 Device 0     Respiratory Therapy Supplies (NEBULIZER/ADULT MASK) KIT 1 Device as needed 1 kit 0     STIOLTO RESPIMAT 2.5-2.5 MCG/ACT AERS INHALE 2 PUFFS INTO THE LUNGS DAILY 4 g 10     VITAMIN D, CHOLECALCIFEROL, PO Take 2,000 Units by mouth daily        [DISCONTINUED] eplerenone (INSPRA) 50 MG tablet TAKE 1 TABLET(50 MG) BY MOUTH DAILY 30 tablet 0     [DISCONTINUED] eplerenone (INSPRA) 50 MG tablet Take 1 tablet (50 mg) by mouth daily 30 tablet 0     [DISCONTINUED] EPLERENONE PO Take 50 mg by mouth daily         ALLERGIES     Allergies   Allergen Reactions     Levaquin [Levofloxacin]      edema     Pioglitazone  "Other (See Comments)     Edema & hay fever     Vytorin Other (See Comments)     Muscle aches       PAST MEDICAL HISTORY:  Past Medical History:   Diagnosis Date     COPD (chronic obstructive pulmonary disease) (H)      CRF (chronic renal failure)      Heart murmur      HLD (hyperlipidemia)      HTN (hypertension)      IDDM (insulin dependent diabetes mellitus) (H)      Renal calculi        PAST SURGICAL HISTORY:  Past Surgical History:   Procedure Laterality Date     ADENOIDECTOMY       ENDOSCOPIC POLYPECTOMY NASAL      Through vocal cords     Renal Stent      For renal calculi     TONSILLECTOMY         FAMILY HISTORY:  Family History   Problem Relation Age of Onset     Family history unknown: Yes       SOCIAL HISTORY:  Social History     Social History     Marital status:      Spouse name: N/A     Number of children: N/A     Years of education: N/A     Social History Main Topics     Smoking status: Former Smoker     Packs/day: 1.00     Years: 70.00     Types: Cigarettes     Smokeless tobacco: Never Used     Alcohol use Yes      Comment: average 1 beer month     Drug use: No     Sexual activity: Not Currently     Other Topics Concern     None     Social History Narrative       Review of Systems:  Skin:  Negative       Eyes:  Positive for glasses    ENT:  Negative      Respiratory:  Positive for dyspnea on exertion O2-2LPM PRN   Cardiovascular:  Negative;palpitations;chest pain;edema;syncope or near-syncope;lightheadedness;dizziness Positive for;fatigue;exercise intolerance    Gastroenterology: Positive for nausea    Genitourinary:  Negative      Musculoskeletal:  Positive for back pain    Neurologic:  Negative      Psychiatric:  Positive for sleep disturbances    Heme/Lymph/Imm:  Negative      Endocrine:  Positive for diabetes dialysis 3 days week    Physical Exam:  Vitals: /68 (BP Location: Right arm, Cuff Size: Adult Large)  Pulse 64  Ht 1.753 m (5' 9\")  Wt 85 kg (187 lb 4.8 oz)  SpO2 93%  BMI " 27.66 kg/m2    Constitutional:           Skin:             Head:           Eyes:           Lymph:      ENT:           Neck:           Respiratory:            Cardiac:                                                           GI:           Extremities and Muscular Skeletal:                 Neurological:           Psych:             CC  Sandip Antunez MD  [address] [place] [id number]

## 2018-06-21 NOTE — LETTER
6/21/2018    Sandip Antunez MD  [address] [facility] [id number]    RE: Seven Savage Jr.       Dear Colleague,    I had the pleasure of seeing Seven Savage Jr. in the [facility].    HPI and Plan:   See dictation    Orders Placed This Encounter   Procedures     Follow-Up with Cardiologist     EKG 12-lead complete w/read - Clinics (performed today)       Orders Placed This Encounter   Medications     eplerenone (INSPRA) 50 MG tablet     Sig: Take 1 tablet (50 mg) by mouth daily     Dispense:  90 tablet     Refill:  4       Medications Discontinued During This Encounter   Medication Reason     eplerenone (INSPRA) 50 MG tablet Medication Reconciliation Clean Up     EPLERENONE PO Medication Reconciliation Clean Up     eplerenone (INSPRA) 50 MG tablet Reorder         Encounter Diagnoses   Name Primary?     Hyperlipidemia LDL goal <100 Yes     Essential hypertension, benign      ESRD (end stage renal disease) on dialysis (H)      Pulmonary emphysema, unspecified emphysema type (H)      Nonrheumatic aortic valve stenosis        CURRENT MEDICATIONS:  Current Outpatient Prescriptions   Medication Sig Dispense Refill     albuterol (2.5 MG/3ML) 0.083% neb solution TAKE 1 VIAL BY NEBULIZATION EVERY 6 HOURS AS NEEDED FOR SHORNESS OF BREATH/DYSPNEA OR WHEEZING 75 mL 3     albuterol (PROAIR HFA/PROVENTIL HFA/VENTOLIN HFA) 108 (90 BASE) MCG/ACT Inhaler Inhale 1-2 puffs into the lungs every 4 hours as needed for shortness of breath / dyspnea or wheezing 1 Inhaler 5     aspirin 81 MG chewable tablet Take 81 mg by mouth daily       atorvastatin (LIPITOR) 40 MG tablet TAKE 1 TABLET(40 MG) BY MOUTH AT BEDTIME (Patient taking differently: 20 mg) 90 tablet 1     B Complex-C-Folic Acid (BRANDI-HEIDY) TABS TAKE ONE TABLET BY MOUTH DAILY 90 tablet 3     B-D U/F 31G X 8 MM insulin pen needle USE AS DIRECTED FOUR TIMES DAILY. 400 each 0     blood glucose monitoring (NO BRAND SPECIFIED) test strip  Use to test blood sugars 3 times daily or as directed. Uses onetouch 300 strip 11     carvedilol (COREG) 12.5 MG tablet TAKE 1 TABLET(12.5 MG) BY MOUTH TWICE DAILY 180 tablet 3     COLCHICINE PO Take 0.6 mg by mouth every 48 hours as needed Reported on 3/30/2017       darbepoetin libby (ARANESP) 100 MCG/0.5ML injection Inject Subcutaneous three times a week At di[facility]lsis M,W,F. Dialysis ([facility]) doses, patient not sure about strength.       diltiazem (TIAZAC) 300 MG 24 hr ER beaded capsule Take 1 capsule (300 mg) by mouth daily 90 capsule 1     doxazosin (CARDURA) 8 MG tablet TAKE 1 TABLET BY MOUTH EVERY NIGHT AT BEDTIME. 90 tablet 3     eplerenone (INSPRA) 50 MG tablet Take 1 tablet (50 mg) by mouth daily 90 tablet 4     fish oil-omega-3 fatty acids 1000 MG capsule Take 1 g by mouth daily       furosemide (LASIX) 20 MG tablet Take 1 tablet (20 mg) by mouth daily 90 tablet 1     Insulin Aspart (NOVOLOG SC) Inject 12 Units Subcutaneous 3 times daily (with meals) Patient states with large meals he will increase to 14-16 units.       insulin glargine (LANTUS) 100 UNIT/ML injection Inject 23 Units Subcutaneous At Bedtime (Patient will increase to 26 units at bedtime he states when BG is elevated.       lisinopril (PRINIVIL/ZESTRIL) 20 MG tablet Take 1 tablet (20 mg) by mouth daily 90 tablet 3     order for DME Equipment being ordered: portable Oxygen concentrator 1 Device 0     order for DME Equipment being ordered: Four wheeled walker 1 Device 0     order for DME Equipment being ordered: Other: Nebulizer machine  Treatment Diagnosis: COPD 1 Device 0     Respiratory Therapy Supplies (NEBULIZER/ADULT MASK) KIT 1 Device as needed 1 kit 0     STIOLTO RESPIMAT 2.5-2.5 MCG/ACT AERS INHALE 2 PUFFS INTO THE LUNGS DAILY 4 g 10     VITAMIN D, CHOLECALCIFEROL, PO Take 2,000 Units by mouth daily        [DISCONTINUED] eplerenone (INSPRA) 50 MG tablet TAKE 1 TABLET(50 MG) BY MOUTH DAILY 30 tablet 0     [DISCONTINUED] eplerenone  (INSPRA) 50 MG tablet Take 1 tablet (50 mg) by mouth daily 30 tablet 0     [DISCONTINUED] EPLERENONE PO Take 50 mg by mouth daily         ALLERGIES     Allergies   Allergen Reactions     Levaquin [Levofloxacin]      edema     Pioglitazone Other (See Comments)     Edema & hay fever     Vytorin Other (See Comments)     Muscle aches       PAST MEDICAL HISTORY:  Past Medical History:   Diagnosis Date     COPD (chronic obstructive pulmonary disease) (H)      CRF (chronic renal failure)      Heart murmur      HLD (hyperlipidemia)      HTN (hypertension)      IDDM (insulin dependent diabetes mellitus) (H)      Renal calculi        PAST SURGICAL HISTORY:  Past Surgical History:   Procedure Laterality Date     ADENOIDECTOMY       ENDOSCOPIC POLYPECTOMY NASAL      Through vocal cords     Renal Stent      For renal calculi     TONSILLECTOMY         FAMILY HISTORY:  Family History   Problem Relation Age of Onset     Family history unknown: Yes       SOCIAL HISTORY:  Social History     Social History     Marital status:      Spouse name: N/A     Number of children: N/A     Years of education: N/A     Social History Main Topics     Smoking status: Former Smoker     Packs/day: 1.00     Years: 70.00     Types: Cigarettes     Smokeless tobacco: Never Used     Alcohol use Yes      Comment: average 1 beer month     Drug use: No     Sexual activity: Not Currently     Other Topics Concern     None     Social History Narrative       Review of Systems:  Skin:  Negative       Eyes:  Positive for glasses    ENT:  Negative      Respiratory:  Positive for dyspnea on exertion O2-2LPM PRN   Cardiovascular:  Negative;palpitations;chest pain;edema;syncope or near-syncope;lightheadedness;dizziness Positive for;fatigue;exercise intolerance    Gastroenterology: Positive for nausea    Genitourinary:  Negative      Musculoskeletal:  Positive for back pain    Neurologic:  Negative      Psychiatric:  Positive for sleep disturbances   "  Heme/Lymph/Imm:  Negative      Endocrine:  Positive for diabetes dialysis 3 days week    Physical Exam:  Vitals: /68 (BP Location: Right arm, Cuff Size: Adult Large)  Pulse 64  Ht 1.753 m (5' 9\")  Wt 85 kg (187 lb 4.8 oz)  SpO2 93%  BMI 27.66 kg/m2    Constitutional:           Skin:             Head:           Eyes:           Lymph:      ENT:           Neck:           Respiratory:            Cardiac:                                                           GI:           Extremities and Muscular Skeletal:                 Neurological:           Psych:             CC  Sandip Antunez MD  [address] [place] [id number]                    Thank you for allowing me to participate in the care of your patient.      Sincerely,     Daria Hastings, DO     [facility]    cc:   Sandip Antunez MD  [address] [place] [phone]        "

## 2018-06-22 DIAGNOSIS — N18.6 TYPE 2 DIABETES MELLITUS WITH CHRONIC KIDNEY DISEASE ON CHRONIC DIALYSIS, WITH LONG-TERM CURRENT USE OF INSULIN (H): ICD-10-CM

## 2018-06-22 DIAGNOSIS — E11.22 TYPE 2 DIABETES MELLITUS WITH CHRONIC KIDNEY DISEASE ON CHRONIC DIALYSIS, WITH LONG-TERM CURRENT USE OF INSULIN (H): ICD-10-CM

## 2018-06-22 DIAGNOSIS — Z99.2 TYPE 2 DIABETES MELLITUS WITH CHRONIC KIDNEY DISEASE ON CHRONIC DIALYSIS, WITH LONG-TERM CURRENT USE OF INSULIN (H): ICD-10-CM

## 2018-06-22 DIAGNOSIS — Z79.4 TYPE 2 DIABETES MELLITUS WITH CHRONIC KIDNEY DISEASE ON CHRONIC DIALYSIS, WITH LONG-TERM CURRENT USE OF INSULIN (H): ICD-10-CM

## 2018-06-22 NOTE — TELEPHONE ENCOUNTER
"Requested Prescriptions   Pending Prescriptions Disp Refills     NOVOLOG FLEXPEN 100 UNIT/ML soln [Pharmacy Med Name: NOVOLOG FLEXPEN INJ 3ML (ORANGE)] 45 mL 0    Last Written Prescription Date:    Last Fill Quantity: ,  # refills:    Last office visit: 5/22/2018 with prescribing provider:  05/22/18   Future Office Visit:  0  Requested Prescriptions   Pending Prescriptions Disp Refills     NOVOLOG FLEXPEN 100 UNIT/ML soln [Pharmacy Med Name: NOVOLOG FLEXPEN INJ 3ML (ORANGE)] 45 mL 0     Sig: INJECT UNDER THE SKIN USING SLIDING SCALE UP TO 50 UNITS EVERY DAY AS DIRECTED BEFORE A MEAL    Short Acting Insulin Protocol Failed    6/22/2018  8:58 AM       Failed - Blood pressure less than 140/90 in past 6 months    BP Readings from Last 3 Encounters:   06/21/18 156/68   05/22/18 150/70   04/12/18 130/72                Passed - LDL on file in past 12 months    Recent Labs   Lab Test  08/15/17   1219   LDL  83            Passed - Microalbumin on file in past 12 months    Recent Labs   Lab Test  08/15/17   1219   MICROL  1040   UMALCR  1571.00*            Passed - Serum creatinine on file in past 12 months    Recent Labs   Lab Test  04/09/18   1315   CR  5.31*            Passed - HgbA1C in past 3 or 6 months    If HgbA1C is 8 or greater, it needs to be on file within the past 3 months.  If less than 8, must be on file within the past 6 months.     Recent Labs   Lab Test  04/06/18   0305   A1C  5.8            Passed - Patient is age 18 or older       Passed - Recent (6 mo) or future (30 days) visit within the authorizing provider's specialty    Patient had office visit in the last 6 months or has a visit in the next 30 days with authorizing provider or within the authorizing provider's specialty.  See \"Patient Info\" tab in inbasket, or \"Choose Columns\" in Meds & Orders section of the refill encounter.           Sig: INJECT UNDER THE SKIN USING SLIDING SCALE UP TO 50 UNITS EVERY DAY AS DIRECTED BEFORE A MEAL    Short Acting " "Insulin Protocol Failed    6/22/2018  8:58 AM       Failed - Blood pressure less than 140/90 in past 6 months    BP Readings from Last 3 Encounters:   06/21/18 156/68   05/22/18 150/70   04/12/18 130/72                Passed - LDL on file in past 12 months    Recent Labs   Lab Test  08/15/17   1219   LDL  83            Passed - Microalbumin on file in past 12 months    Recent Labs   Lab Test  08/15/17   1219   MICROL  1040   UMALCR  1571.00*            Passed - Serum creatinine on file in past 12 months    Recent Labs   Lab Test  04/09/18   1315   CR  5.31*            Passed - HgbA1C in past 3 or 6 months    If HgbA1C is 8 or greater, it needs to be on file within the past 3 months.  If less than 8, must be on file within the past 6 months.     Recent Labs   Lab Test  04/06/18   0305   A1C  5.8            Passed - Patient is age 18 or older       Passed - Recent (6 mo) or future (30 days) visit within the authorizing provider's specialty    Patient had office visit in the last 6 months or has a visit in the next 30 days with authorizing provider or within the authorizing provider's specialty.  See \"Patient Info\" tab in inbasket, or \"Choose Columns\" in Meds & Orders section of the refill encounter.            "

## 2018-06-24 RX ORDER — INSULIN ASPART 100 [IU]/ML
INJECTION, SOLUTION INTRAVENOUS; SUBCUTANEOUS
Qty: 45 ML | Refills: 0 | Status: SHIPPED | OUTPATIENT
Start: 2018-06-24 | End: 2018-09-21

## 2018-06-26 DIAGNOSIS — N18.6 TYPE 2 DIABETES MELLITUS WITH CHRONIC KIDNEY DISEASE ON CHRONIC DIALYSIS, WITH LONG-TERM CURRENT USE OF INSULIN (H): ICD-10-CM

## 2018-06-26 DIAGNOSIS — E11.22 TYPE 2 DIABETES MELLITUS WITH CHRONIC KIDNEY DISEASE ON CHRONIC DIALYSIS, WITH LONG-TERM CURRENT USE OF INSULIN (H): ICD-10-CM

## 2018-06-26 DIAGNOSIS — Z99.2 TYPE 2 DIABETES MELLITUS WITH CHRONIC KIDNEY DISEASE ON CHRONIC DIALYSIS, WITH LONG-TERM CURRENT USE OF INSULIN (H): ICD-10-CM

## 2018-06-26 DIAGNOSIS — Z79.4 TYPE 2 DIABETES MELLITUS WITH CHRONIC KIDNEY DISEASE ON CHRONIC DIALYSIS, WITH LONG-TERM CURRENT USE OF INSULIN (H): ICD-10-CM

## 2018-06-26 NOTE — TELEPHONE ENCOUNTER
"Requested Prescriptions   Pending Prescriptions Disp Refills     B-D U/F 31G X 8 MM insulin pen needle [Pharmacy Med Name: B-D PEN NDL SHRT [id number](5/16) MEDARDO] 400 each 0     Sig: USE AS DIRECTED FOUR TIMES DAILY.    Diabetic Supplies Protocol Passed    6/26/2018  2:40 PM       Passed - Patient is 18 years of age or older       Passed - Recent (6 mo) or future (30 days) visit within the authorizing provider's specialty    Patient had office visit in the last 6 months or has a visit in the next 30 days with authorizing provider.  See \"Patient Info\" tab in inbasket, or \"Choose Columns\" in Meds & Orders section of the refill encounter.            Last Written Prescription Date:  11/14/17  Last Fill Quantity: 400,  # refills: 0   Last office visit: 5/22/2018 with prescribing provider:  5/22/18   Future Office Visit:      "

## 2018-06-27 RX ORDER — PEN NEEDLE, DIABETIC 31 GX5/16"
NEEDLE, DISPOSABLE MISCELLANEOUS
Qty: 400 EACH | Refills: 1 | Status: SHIPPED | OUTPATIENT
Start: 2018-06-27 | End: 2019-01-20

## 2018-07-06 DIAGNOSIS — Z79.4 TYPE 2 DIABETES MELLITUS WITH CHRONIC KIDNEY DISEASE ON CHRONIC DIALYSIS, WITH LONG-TERM CURRENT USE OF INSULIN (H): ICD-10-CM

## 2018-07-06 DIAGNOSIS — E11.22 TYPE 2 DIABETES MELLITUS WITH CHRONIC KIDNEY DISEASE ON CHRONIC DIALYSIS, WITH LONG-TERM CURRENT USE OF INSULIN (H): ICD-10-CM

## 2018-07-06 DIAGNOSIS — N18.6 TYPE 2 DIABETES MELLITUS WITH CHRONIC KIDNEY DISEASE ON CHRONIC DIALYSIS, WITH LONG-TERM CURRENT USE OF INSULIN (H): ICD-10-CM

## 2018-07-06 DIAGNOSIS — Z99.2 TYPE 2 DIABETES MELLITUS WITH CHRONIC KIDNEY DISEASE ON CHRONIC DIALYSIS, WITH LONG-TERM CURRENT USE OF INSULIN (H): ICD-10-CM

## 2018-07-06 NOTE — TELEPHONE ENCOUNTER
"Requested Prescriptions   Pending Prescriptions Disp Refills     LANTUS SOLOSTAR 100 UNIT/ML soln [Pharmacy Med Name: LANTUS SOLOSTAR PEN INJ 3ML]   Last Written Prescription Date:  n/a  Last Fill Quantity: n/a,   # refills: n/a  Last Office Visit: 05/22/2018  Future Office visit:       Routing refill request to provider for review/approval because:  Drug not active on patient's medication list     30 mL 0     Sig: INJECT 26 UNITS UNDER THE SKIN AT BEDTIME    Long Acting Insulin Protocol Failed    7/6/2018  3:46 AM       Failed - Blood pressure less than 140/90 in past 6 months    BP Readings from Last 3 Encounters:   06/21/18 156/68   05/22/18 150/70   04/12/18 130/72                Passed - LDL on file in past 12 months    Recent Labs   Lab Test  08/15/17   1219   LDL  83            Passed - Microalbumin on file in past 12 months    Recent Labs   Lab Test  08/15/17   1219   MICROL  1040   UMALCR  1571.00*            Passed - Serum creatinine on file in past 12 months    Recent Labs   Lab Test  04/09/18   1315   CR  5.31*            Passed - HgbA1C in past 3 or 6 months    If HgbA1C is 8 or greater, it needs to be on file within the past 3 months.  If less than 8, must be on file within the past 6 months.     Recent Labs   Lab Test  04/06/18   0305   A1C  5.8            Passed - Patient is age 18 or older       Passed - Recent (6 mo) or future (30 days) visit within the authorizing provider's specialty    Patient had office visit in the last 6 months or has a visit in the next 30 days with authorizing provider or within the authorizing provider's specialty.  See \"Patient Info\" tab in inbasket, or \"Choose Columns\" in Meds & Orders section of the refill encounter.              "

## 2018-07-09 RX ORDER — INSULIN GLARGINE 100 [IU]/ML
INJECTION, SOLUTION SUBCUTANEOUS
Qty: 30 ML | Refills: 0 | Status: SHIPPED | OUTPATIENT
Start: 2018-07-09 | End: 2018-11-05

## 2018-08-01 DIAGNOSIS — E11.22 TYPE 2 DIABETES MELLITUS WITH CHRONIC KIDNEY DISEASE ON CHRONIC DIALYSIS, WITH LONG-TERM CURRENT USE OF INSULIN (H): ICD-10-CM

## 2018-08-01 DIAGNOSIS — E78.5 HYPERLIPIDEMIA LDL GOAL <100: ICD-10-CM

## 2018-08-01 DIAGNOSIS — Z79.4 TYPE 2 DIABETES MELLITUS WITH CHRONIC KIDNEY DISEASE ON CHRONIC DIALYSIS, WITH LONG-TERM CURRENT USE OF INSULIN (H): ICD-10-CM

## 2018-08-01 DIAGNOSIS — N18.6 TYPE 2 DIABETES MELLITUS WITH CHRONIC KIDNEY DISEASE ON CHRONIC DIALYSIS, WITH LONG-TERM CURRENT USE OF INSULIN (H): ICD-10-CM

## 2018-08-01 DIAGNOSIS — Z99.2 TYPE 2 DIABETES MELLITUS WITH CHRONIC KIDNEY DISEASE ON CHRONIC DIALYSIS, WITH LONG-TERM CURRENT USE OF INSULIN (H): ICD-10-CM

## 2018-08-01 RX ORDER — ATORVASTATIN CALCIUM 40 MG/1
TABLET, FILM COATED ORAL
Qty: 90 TABLET | Refills: 0 | Status: SHIPPED | OUTPATIENT
Start: 2018-08-01 | End: 2018-10-30

## 2018-08-01 NOTE — TELEPHONE ENCOUNTER
"Requested Prescriptions   Pending Prescriptions Disp Refills     atorvastatin (LIPITOR) 40 MG tablet [Pharmacy Med Name: ATORVASTATIN 40MG TABLETS] 90 tablet 0     Sig: TAKE 1 TABLET(40 MG) BY MOUTH AT BEDTIME    Statins Protocol Passed    8/1/2018  3:45 AM       Passed - LDL on file in past 12 months    Recent Labs   Lab Test  08/15/17   1219   LDL  83            Passed - No abnormal creatine kinase in past 12 months    No lab results found.            Passed - Recent (12 mo) or future (30 days) visit within the authorizing provider's specialty    Patient had office visit in the last 12 months or has a visit in the next 30 days with authorizing provider or within the authorizing provider's specialty.  See \"Patient Info\" tab in inbasket, or \"Choose Columns\" in Meds & Orders section of the refill encounter.           Passed - Patient is age 18 or older        Last Written Prescription Date:  2/6/18  Last Fill Quantity: 90,  # refills: 1   Last office visit: 5/22/2018 with prescribing provider:  5/22/18   Future Office Visit:      "

## 2018-09-21 DIAGNOSIS — Z79.4 TYPE 2 DIABETES MELLITUS WITH CHRONIC KIDNEY DISEASE ON CHRONIC DIALYSIS, WITH LONG-TERM CURRENT USE OF INSULIN (H): ICD-10-CM

## 2018-09-21 DIAGNOSIS — E11.22 TYPE 2 DIABETES MELLITUS WITH CHRONIC KIDNEY DISEASE ON CHRONIC DIALYSIS, WITH LONG-TERM CURRENT USE OF INSULIN (H): ICD-10-CM

## 2018-09-21 DIAGNOSIS — Z99.2 TYPE 2 DIABETES MELLITUS WITH CHRONIC KIDNEY DISEASE ON CHRONIC DIALYSIS, WITH LONG-TERM CURRENT USE OF INSULIN (H): ICD-10-CM

## 2018-09-21 DIAGNOSIS — N18.6 TYPE 2 DIABETES MELLITUS WITH CHRONIC KIDNEY DISEASE ON CHRONIC DIALYSIS, WITH LONG-TERM CURRENT USE OF INSULIN (H): ICD-10-CM

## 2018-09-21 NOTE — TELEPHONE ENCOUNTER
"Requested Prescriptions   Pending Prescriptions Disp Refills     NOVOLOG FLEXPEN 100 UNIT/ML soln [Pharmacy Med Name: NOVOLOG FLEXPEN INJ 3ML (ORANGE)] 45 mL 0    Last Written Prescription Date:  6/24/2018  Last Fill Quantity: 45mL,  # refills: 0   Last office visit: 5/22/2018 with prescribing provider:  5/22/2018   Future Office Visit:   Next 5 appointments (look out 90 days)     Nov 01, 2018  1:45 PM CDT   Return Visit with Daria Hastings DO   [facility] ([facility])    [address] [place] [address] [address]  [facility] [address] [phone]                  Sig: INJECT UNDER THE SKIN USING SLIDING SCALE UP TO 50 UNITS EVERY DAY AS DIRECTED BEFORE A MEAL    Short Acting Insulin Protocol Failed    9/21/2018  3:45 AM       Failed - Blood pressure less than 140/90 in past 6 months    BP Readings from Last 3 Encounters:   06/21/18 156/68   05/22/18 150/70   04/12/18 130/72                Failed - LDL on file in past 12 months    Recent Labs   Lab Test  08/15/17   1219   LDL  83            Passed - Microalbumin on file in past 12 months    Recent Labs   Lab Test  08/15/17   1219   MICROL  1040   UMALCR  1571.00*            Passed - Serum creatinine on file in past 12 months    Recent Labs   Lab Test  04/09/18   1315   CR  5.31*            Passed - HgbA1C in past 3 or 6 months    If HgbA1C is 8 or greater, it needs to be on file within the past 3 months.  If less than 8, must be on file within the past 6 months.     Recent Labs   Lab Test  04/06/18   0305   A1C  5.8            Passed - Patient is age 18 or older       Passed - Recent (6 mo) or future (30 days) visit within the authorizing provider's specialty    Patient had office visit in the last 6 months or has a visit in the next 30 days with authorizing provider or within the authorizing provider's specialty.  See \"Patient Info\" tab in inbasket, or \"Choose Columns\" in Meds & Orders section of the refill " encounter.

## 2018-09-24 RX ORDER — INSULIN ASPART 100 [IU]/ML
INJECTION, SOLUTION INTRAVENOUS; SUBCUTANEOUS
Qty: 45 ML | Refills: 0 | Status: SHIPPED | OUTPATIENT
Start: 2018-09-24 | End: 2018-11-05

## 2018-09-24 NOTE — TELEPHONE ENCOUNTER
Routing refill request to provider for review/approval because:  Labs out of range:  Creatinine   Elevated BP

## 2018-10-02 DIAGNOSIS — J44.1 COPD EXACERBATION (H): ICD-10-CM

## 2018-10-02 RX ORDER — ALBUTEROL SULFATE 0.83 MG/ML
SOLUTION RESPIRATORY (INHALATION)
Qty: 75 ML | Refills: 7 | Status: SHIPPED | OUTPATIENT
Start: 2018-10-02

## 2018-10-02 NOTE — TELEPHONE ENCOUNTER
"Requested Prescriptions   Pending Prescriptions Disp Refills     albuterol (2.5 MG/3ML) 0.083% neb solution [Pharmacy Med Name: ALBUTEROL 0.083%(2.5MG/3ML) 25X3ML] 75 mL 0     Sig: TAKE 1 VIAL BY NEBULIZATION EVERY 6 HOURS AS NEEDED FOR SHORNESS OF BREATH/DYSPNEA OR WHEEZING    Asthma Maintenance Inhalers - Anticholinergics Passed    10/2/2018  1:53 PM       Passed - Patient is age 12 years or older       Passed - Recent (12 mo) or future (30 days) visit within the authorizing provider's specialty    Patient had office visit in the last 12 months or has a visit in the next 30 days with authorizing provider or within the authorizing provider's specialty.  See \"Patient Info\" tab in inbasket, or \"Choose Columns\" in Meds & Orders section of the refill encounter.              "

## 2018-10-30 DIAGNOSIS — E78.5 HYPERLIPIDEMIA LDL GOAL <100: ICD-10-CM

## 2018-10-30 DIAGNOSIS — Z79.4 TYPE 2 DIABETES MELLITUS WITH CHRONIC KIDNEY DISEASE ON CHRONIC DIALYSIS, WITH LONG-TERM CURRENT USE OF INSULIN (H): ICD-10-CM

## 2018-10-30 DIAGNOSIS — E11.22 TYPE 2 DIABETES MELLITUS WITH CHRONIC KIDNEY DISEASE ON CHRONIC DIALYSIS, WITH LONG-TERM CURRENT USE OF INSULIN (H): ICD-10-CM

## 2018-10-30 DIAGNOSIS — N18.6 TYPE 2 DIABETES MELLITUS WITH CHRONIC KIDNEY DISEASE ON CHRONIC DIALYSIS, WITH LONG-TERM CURRENT USE OF INSULIN (H): ICD-10-CM

## 2018-10-30 DIAGNOSIS — Z99.2 TYPE 2 DIABETES MELLITUS WITH CHRONIC KIDNEY DISEASE ON CHRONIC DIALYSIS, WITH LONG-TERM CURRENT USE OF INSULIN (H): ICD-10-CM

## 2018-10-30 RX ORDER — ATORVASTATIN CALCIUM 40 MG/1
TABLET, FILM COATED ORAL
Qty: 30 TABLET | Refills: 0 | Status: SHIPPED | OUTPATIENT
Start: 2018-10-30 | End: 2018-11-05

## 2018-10-30 NOTE — TELEPHONE ENCOUNTER
"Requested Prescriptions   Pending Prescriptions Disp Refills     atorvastatin (LIPITOR) 40 MG tablet [Pharmacy Med Name: ATORVASTATIN 40MG TABLETS]  Last Written Prescription Date:  08/01/2018  Last Fill Quantity: 90,  # refills: 0   Last office visit: 5/22/2018 with prescribing provider:   ZULEIKA  Future Office Visit:   Next 5 appointments (look out 90 days)     Nov 01, 2018  1:45 PM CDT   Return Visit with Daria Hastings DO   [facility]    [address]  [facility] [address] [phone]                  90 tablet 0     Sig: TAKE 1 TABLET(40 MG) BY MOUTH AT BEDTIME    Statins Protocol Failed    10/30/2018  3:46 AM       Failed - LDL on file in past 12 months    Recent Labs   Lab Test  08/15/17   1219   LDL  83            Passed - No abnormal creatine kinase in past 12 months    No lab results found.            Passed - Recent (12 mo) or future (30 days) visit within the authorizing provider's specialty    Patient had office visit in the last 12 months or has a visit in the next 30 days with authorizing provider or within the authorizing provider's specialty.  See \"Patient Info\" tab in inbasket, or \"Choose Columns\" in Meds & Orders section of the refill encounter.             Passed - Patient is age 18 or older          "

## 2018-10-30 NOTE — LETTER
[facility]  [address] [id number] [phone]            Seven Savage .  [address]  [facility] [address]        October 30, 2018    Dear Seven,    While refilling your prescription today, we noticed that you are due to have labs drawn.  We will refill your prescription for 30 days, but a follow-up appointment must be made before any additional refills can be approved.     Taking care of your health is important to us and we look forward to seeing you in the near future.  Please call us at [phone] or [phone] (or use Best[employer]) to schedule an appointment.     Please disregard this notice if you have already made an appointment.    Sincerely,        [facility]

## 2018-11-01 ENCOUNTER — OFFICE VISIT (OUTPATIENT)
Dept: CARDIOLOGY | Facility: CLINIC | Age: 83
End: 2018-11-01
Payer: MEDICARE

## 2018-11-01 VITALS
WEIGHT: 189.8 LBS | SYSTOLIC BLOOD PRESSURE: 146 MMHG | BODY MASS INDEX: 28.11 KG/M2 | HEART RATE: 64 BPM | HEIGHT: 69 IN | DIASTOLIC BLOOD PRESSURE: 54 MMHG

## 2018-11-01 DIAGNOSIS — I35.0 NONRHEUMATIC AORTIC VALVE STENOSIS: ICD-10-CM

## 2018-11-01 DIAGNOSIS — Z99.2 ESRD (END STAGE RENAL DISEASE) ON DIALYSIS (H): ICD-10-CM

## 2018-11-01 DIAGNOSIS — I10 ESSENTIAL HYPERTENSION, BENIGN: ICD-10-CM

## 2018-11-01 DIAGNOSIS — J43.9 PULMONARY EMPHYSEMA, UNSPECIFIED EMPHYSEMA TYPE (H): ICD-10-CM

## 2018-11-01 DIAGNOSIS — N18.6 ESRD (END STAGE RENAL DISEASE) ON DIALYSIS (H): ICD-10-CM

## 2018-11-01 DIAGNOSIS — E78.5 HYPERLIPIDEMIA LDL GOAL <100: ICD-10-CM

## 2018-11-01 PROCEDURE — 99213 OFFICE O/P EST LOW 20 MIN: CPT | Performed by: INTERNAL MEDICINE

## 2018-11-01 NOTE — PROGRESS NOTES
HPI and Plan:   See dictation    Orders Placed This Encounter   Procedures     Follow-Up with Cardiologist       No orders of the defined types were placed in this encounter.      There are no discontinued medications.      Encounter Diagnoses   Name Primary?     Hyperlipidemia LDL goal <100      Essential hypertension, benign      Nonrheumatic aortic valve stenosis      Pulmonary emphysema, unspecified emphysema type (H)      ESRD (end stage renal disease) on dialysis (H)        CURRENT MEDICATIONS:  Current Outpatient Prescriptions   Medication Sig Dispense Refill     albuterol (2.5 MG/3ML) 0.083% neb solution TAKE 1 VIAL BY NEBULIZATION EVERY 6 HOURS AS NEEDED FOR SHORNESS OF BREATH/DYSPNEA OR WHEEZING 75 mL 7     albuterol (PROAIR HFA/PROVENTIL HFA/VENTOLIN HFA) 108 (90 BASE) MCG/ACT Inhaler Inhale 1-2 puffs into the lungs every 4 hours as needed for shortness of breath / dyspnea or wheezing 1 Inhaler 5     aspirin 81 MG chewable tablet Take 81 mg by mouth daily       atorvastatin (LIPITOR) 40 MG tablet TAKE 1 TABLET(40 MG) BY MOUTH AT BEDTIME 30 tablet 0     B Complex-C-Folic Acid (BRANDI-HEIDY) TABS TAKE ONE TABLET BY MOUTH DAILY 90 tablet 3     B-D U/F 31G X 8 MM insulin pen needle USE AS DIRECTED FOUR TIMES DAILY. 400 each 1     blood glucose monitoring (NO BRAND SPECIFIED) test strip Use to test blood sugars 3 times daily or as directed. Uses onetouch 300 strip 11     carvedilol (COREG) 12.5 MG tablet TAKE 1 TABLET(12.5 MG) BY MOUTH TWICE DAILY 180 tablet 3     darbepoetin libby (ARANESP) 100 MCG/0.5ML injection Inject Subcutaneous three times a week At diaylsis M,W,F. Dialysis ([facility]) doses, patient not sure about strength.       diltiazem (TIAZAC) 300 MG 24 hr ER beaded capsule Take 1 capsule (300 mg) by mouth daily 90 capsule 1     doxazosin (CARDURA) 8 MG tablet TAKE 1 TABLET BY MOUTH EVERY NIGHT AT BEDTIME. 90 tablet 3     eplerenone (INSPRA) 50 MG tablet Take 1 tablet (50 mg) by mouth daily 90 tablet 4      furosemide (LASIX) 20 MG tablet Take 1 tablet (20 mg) by mouth daily 90 tablet 1     Insulin Aspart (NOVOLOG SC) Inject 12 Units Subcutaneous 3 times daily (with meals) Patient states with large meals he will increase to 14-16 units.       LANTUS SOLOSTAR 100 UNIT/ML soln INJECT 26 UNITS UNDER THE SKIN AT BEDTIME 30 mL 0     lisinopril (PRINIVIL/ZESTRIL) 20 MG tablet Take 1 tablet (20 mg) by mouth daily 90 tablet 3     NOVOLOG FLEXPEN 100 UNIT/ML soln INJECT UNDER THE SKIN USING SLIDING SCALE UP TO 50 UNITS EVERY DAY AS DIRECTED BEFORE A MEAL 45 mL 0     order for DME Equipment being ordered: portable Oxygen concentrator 1 Device 0     order for DME Equipment being ordered: Four wheeled walker 1 Device 0     order for DME Equipment being ordered: Other: Nebulizer machine  Treatment Diagnosis: COPD 1 Device 0     Respiratory Therapy Supplies (NEBULIZER/ADULT MASK) KIT 1 Device as needed 1 kit 0     STIOLTO RESPIMAT 2.5-2.5 MCG/ACT AERS INHALE 2 PUFFS INTO THE LUNGS DAILY 4 g 10     VITAMIN D, CHOLECALCIFEROL, PO Take 2,000 Units by mouth daily        COLCHICINE PO Take 0.6 mg by mouth every 48 hours as needed Reported on 3/30/2017       fish oil-omega-3 fatty acids 1000 MG capsule Take 1 g by mouth daily       insulin glargine (LANTUS) 100 UNIT/ML injection Inject 23 Units Subcutaneous At Bedtime (Patient will increase to 26 units at bedtime he states when BG is elevated.         ALLERGIES     Allergies   Allergen Reactions     Levaquin [Levofloxacin]      edema     Pioglitazone Other (See Comments)     Edema & hay fever     Vytorin Other (See Comments)     Muscle aches       PAST MEDICAL HISTORY:  Past Medical History:   Diagnosis Date     COPD (chronic obstructive pulmonary disease) (H)      CRF (chronic renal failure)      Heart murmur      HLD (hyperlipidemia)      HTN (hypertension)      IDDM (insulin dependent diabetes mellitus) (H)      Renal calculi        PAST SURGICAL HISTORY:  Past Surgical History:  "  Procedure Laterality Date     ADENOIDECTOMY       ENDOSCOPIC POLYPECTOMY NASAL      Through vocal cords     Renal Stent      For renal calculi     TONSILLECTOMY         FAMILY HISTORY:  Family History   Problem Relation Age of Onset     Family history unknown: Yes       SOCIAL HISTORY:  Social History     Social History     Marital status:      Spouse name: N/A     Number of children: N/A     Years of education: N/A     Social History Main Topics     Smoking status: Former Smoker     Packs/day: 1.00     Years: 70.00     Types: Cigarettes     Quit date: 2011     Smokeless tobacco: Never Used     Alcohol use Yes      Comment: average 1 beer month     Drug use: No     Sexual activity: Not Currently     Other Topics Concern     None     Social History Narrative       Review of Systems:  Skin:  Positive for nail changes brittle   Eyes:  Positive for glasses    ENT:  Negative      Respiratory:  Positive for dyspnea on exertion     Cardiovascular:    fatigue;Positive for    Gastroenterology: Negative      Genitourinary:  Negative      Musculoskeletal:  Positive for   spinal stenosis  Neurologic:  Negative      Psychiatric:  Negative      Heme/Lymph/Imm:  Negative      Endocrine:  Positive for diabetes      Physical Exam:  Vitals: /54  Pulse 64  Ht 1.753 m (5' 9\")  Wt 86.1 kg (189 lb 12.8 oz)  BMI 28.03 kg/m2    Constitutional:  cooperative, alert and oriented, well developed, well nourished, in no acute distress        Skin:  warm and dry to the touch          Head:  normocephalic        Eyes:  pupils equal and round        Lymph:      ENT:  no pallor or cyanosis        Neck:  not assessed this visit        Respiratory:       diminished but otherwise clear    Cardiac: regular rhythm       systolic ejection murmur;RUSB          pulses below the femoral arteries are diminished                                      GI:  abdomen soft        Extremities and Muscular Skeletal:  no edema          "     Neurological:  no gross motor deficits;affect appropriate   walks with walker    Psych:  Alert and Oriented x 3          CC  Daria Hastings,   [address]  [place] [address]

## 2018-11-01 NOTE — LETTER
11/1/2018      Sandip Antunez MD  [address] [facility] [address]      RE: Seven CHA Hussein Liang.       Dear Colleague,    I had the pleasure of seeing Seven Birdcesar Finley in the [facility].    Service Date: 11/01/2018      REFERRING PHYSICIAN:  Dr. Sandip Antunez.      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  He is here for a followup visit today.  I saw him initially in June and had discussed options for his severe aortic stenosis.  He is very reluctant to undergoing any type of procedure or aggressive treatment of his aortic stenosis.  We spent a great deal of time talking about the options.  Ultimately he opted for just continued medical management.  In the last 3 months, it sounds like he had gotten a little bit of trouble when he went back to [place].  He indulged in several of his favorite foods and wound up hospitalized and intubated for probably congestive heart failure, I assume, given his salt intake.  They did discuss his salt intake with him and he is much more vigilant in restricting his sodium intake now and he has been doing better over the last several months.  He lives with his family and walks with a walker.  He is not experiencing any orthopneic symptoms currently.  His weight has been pretty stable.  He is dialyzing regularly and overall in general feeling stable with dyspnea with moderate exertion.      PHYSICAL EXAMINATION:     VITAL SIGNS:  Today, his blood pressure is 146/54, pulse is 64, weight 189.   CARDIOVASCULAR:  Tones are regular with occasional ectopy noted.  He has a grade 2 systolic ejection murmur heard without gallop.     LUNGS:  Lungs appeared clear and diminished in the bases posteriorly.     EXTREMITIES:  I do not appreciate any significant peripheral edema.      SUMMARY:  Mr. Savage is a very pleasant 83-year-old male  with a history of severe aortic stenosis, recent hospitalization for suspected acute diastolic congestive heart failure related to severe aortic stenosis.  He is now very vigilant about his salt intake and is improved back to his baseline over the last couple of months.  He is seen by a pulmonologist for his progressive COPD and he follows with Nephrology for his end-stage renal disease, dialyzing 3 days per week.  Once again, I discussed with him the possibility of undergoing a minimally invasive procedure such as TAVR or being considered for this for treatment of his aortic stenosis and once again he is reluctant to any type of procedure related to fixing the valve.  We will focus our goals on improving the quality of his life.  I reaffirmed the importance of a low-sodium diet and would consider, especially around the holidays, increasing his Lasix dose if he does indulge in a salty diet to avoid recurrent heart failure.  This, however, is mainly maintained by his nephrologist and I talked to him a little bit today about how I do not really feel like I have much more to offer him in regards to his valvular heart disease as this is primarily a structural problem and a surgical issue.  It will continue to deteriorate over time which we talked about today, but he would like to continue to follow with me and has suggested a followup visit so I am happy to see him back and continue to follow along in his care.        Please feel free to contact me with any questions you have in regards to his care.      cc:   Sandip Antunez MD   [address]  [address]         FAY SAMANIEGO DO             D: 2018   T: 2018   MT: NÉSTOR      Name:     MISHA SANDERS   MRN:      [id number]        Account:      [id number]   :      1935           Service Date: 2018      Document: [id number]         No facility-administered encounter  medications on file as of 11/1/2018.      Outpatient Encounter Prescriptions as of 11/1/2018   Medication Sig Dispense Refill     albuterol (2.5 MG/3ML) 0.083% neb solution TAKE 1 VIAL BY NEBULIZATION EVERY 6 HOURS AS NEEDED FOR SHORNESS OF BREATH/DYSPNEA OR WHEEZING 75 mL 7     albuterol (PROAIR HFA/PROVENTIL HFA/VENTOLIN HFA) 108 (90 BASE) MCG/ACT Inhaler Inhale 1-2 puffs into the lungs every 4 hours as needed for shortness of breath / dyspnea or wheezing 1 Inhaler 5     aspirin 81 MG chewable tablet Take 81 mg by mouth daily       B Complex-C-Folic Acid (BRANDI-HEIDY) TABS TAKE ONE TABLET BY MOUTH DAILY 90 tablet 3     B-D U/F 31G X 8 MM insulin pen needle USE AS DIRECTED FOUR TIMES DAILY. 400 each 1     blood glucose monitoring (NO BRAND SPECIFIED) test strip Use to test blood sugars 3 times daily or as directed. Uses onetouch 300 strip 11     carvedilol (COREG) 12.5 MG tablet TAKE 1 TABLET(12.5 MG) BY MOUTH TWICE DAILY 180 tablet 3     darbepoetin libby (ARANESP) 100 MCG/0.5ML [facility] doses, patient not sure about strength.       diltiazem (TIAZAC) 300 MG 24 hr ER beaded capsule Take 1 capsule (300 mg) by mouth daily 90 capsule 1     doxazosin (CARDURA) 8 MG tablet TAKE 1 TABLET BY MOUTH EVERY NIGHT AT BEDTIME. 90 tablet 3     eplerenone (INSPRA) 50 MG tablet Take 1 tablet (50 mg) by mouth daily 90 tablet 4     furosemide (LASIX) 20 MG tablet Take 1 tablet (20 mg) by mouth daily 90 tablet 1     Insulin Aspart (NOVOLOG SC) Inject 12 Units Subcutaneous 3 times daily (with meals) Patient states with large meals he will increase to 14-16 units.       lisinopril (PRINIVIL/ZESTRIL) 20 MG tablet Take 1 tablet (20 mg) by mouth daily 90 tablet 3     order for DME Equipment being ordered: portable Oxygen concentrator 1 Device 0     order for DME Equipment being ordered: Four wheeled walker 1 Device 0     order for DME Equipment being ordered:  Other: Nebulizer machine  Treatment Diagnosis: COPD 1 Device 0     Respiratory Therapy Supplies (NEBULIZER/ADULT MASK) KIT 1 Device as needed 1 kit 0     STIOLTO RESPIMAT 2.5-2.5 MCG/ACT AERS INHALE 2 PUFFS INTO THE LUNGS DAILY 4 g 10     VITAMIN D, CHOLECALCIFEROL, PO Take 2,000 Units by mouth daily        [DISCONTINUED] atorvastatin (LIPITOR) 40 MG tablet TAKE 1 TABLET(40 MG) BY MOUTH AT BEDTIME 30 tablet 0     [DISCONTINUED] LANTUS SOLOSTAR 100 UNIT/ML soln INJECT 26 UNITS UNDER THE SKIN AT BEDTIME 30 mL 0     [DISCONTINUED] NOVOLOG FLEXPEN 100 UNIT/ML soln INJECT UNDER THE SKIN USING SLIDING SCALE UP TO 50 UNITS EVERY DAY AS DIRECTED BEFORE A MEAL 45 mL 0     COLCHICINE PO Take 0.6 mg by mouth every 48 hours as needed (gout attacks) Reported on 3/30/2017       fish oil-omega-3 fatty acids 1000 MG capsule Take 1 g by mouth daily       insulin glargine (LANTUS) 100 UNIT/ML injection Inject 23 Units Subcutaneous At Bedtime (Patient will increase to 26 units at bedtime he states when BG is elevated.         Again, thank you for allowing me to participate in the care of your patient.      Sincerely,    Daria Hastings, DO     [facility]

## 2018-11-01 NOTE — PROGRESS NOTES
Service Date: 11/01/2018      REFERRING PHYSICIAN:  Dr. Sandip Antunez.      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  He is here for a followup visit today.  I saw him initially in June and had discussed options for his severe aortic stenosis.  He is very reluctant to undergoing any type of procedure or aggressive treatment of his aortic stenosis.  We spent a great deal of time talking about the options.  Ultimately he opted for just continued medical management.  In the last 3 months, it sounds like he had gotten a little bit of trouble when he went back to [place].  He indulged in several of his favorite foods and wound up hospitalized and intubated for probably congestive heart failure, I assume, given his salt intake.  They did discuss his salt intake with him and he is much more vigilant in restricting his sodium intake now and he has been doing better over the last several months.  He lives with his family and walks with a walker.  He is not experiencing any orthopneic symptoms currently.  His weight has been pretty stable.  He is dialyzing regularly and overall in general feeling stable with dyspnea with moderate exertion.      PHYSICAL EXAMINATION:     VITAL SIGNS:  Today, his blood pressure is 146/54, pulse is 64, weight 189.   CARDIOVASCULAR:  Tones are regular with occasional ectopy noted.  He has a grade 2 systolic ejection murmur heard without gallop.     LUNGS:  Lungs appeared clear and diminished in the bases posteriorly.     EXTREMITIES:  I do not appreciate any significant peripheral edema.      SUMMARY:  Mr. Savage is a very pleasant 83-year-old male with a history of severe aortic stenosis, recent hospitalization for suspected acute diastolic congestive heart failure related to severe aortic stenosis.  He is now very vigilant about his salt intake and is improved back to his  baseline over the last couple of months.  He is seen by a pulmonologist for his progressive COPD and he follows with Nephrology for his end-stage renal disease, dialyzing 3 days per week.  Once again, I discussed with him the possibility of undergoing a minimally invasive procedure such as TAVR or being considered for this for treatment of his aortic stenosis and once again he is reluctant to any type of procedure related to fixing the valve.  We will focus our goals on improving the quality of his life.  I reaffirmed the importance of a low-sodium diet and would consider, especially around the holidays, increasing his Lasix dose if he does indulge in a salty diet to avoid recurrent heart failure.  This, however, is mainly maintained by his nephrologist and I talked to him a little bit today about how I do not really feel like I have much more to offer him in regards to his valvular heart disease as this is primarily a structural problem and a surgical issue.  It will continue to deteriorate over time which we talked about today, but he would like to continue to follow with me and has suggested a followup visit so I am happy to see him back and continue to follow along in his care.        Please feel free to contact me with any questions you have in regards to his care.      cc:   Sandip Antunez MD   [address]  [address]         FAY SAMANIEGO DO             D: 2018   T: 2018   MT: NÉSTOR      Name:     MISHA SANDERS   MRN:      [id number]        Account:      [id number]   :      1935           Service Date: 2018      Document: [id number]

## 2018-11-01 NOTE — LETTER
11/1/2018    Sandip Antunez MD  [address] [facility] [address]    RE: Seven Savage Jr.       Dear Colleague,    I had the pleasure of seeing Seven Savage Jr. in the [facility].    HPI and Plan:   See dictation    Orders Placed This Encounter   Procedures     Follow-Up with Cardiologist       No orders of the defined types were placed in this encounter.      There are no discontinued medications.      Encounter Diagnoses   Name Primary?     Hyperlipidemia LDL goal <100      Essential hypertension, benign      Nonrheumatic aortic valve stenosis      Pulmonary emphysema, unspecified emphysema type (H)      ESRD (end stage renal disease) on dialysis (H)        CURRENT MEDICATIONS:  Current Outpatient Prescriptions   Medication Sig Dispense Refill     albuterol (2.5 MG/3ML) 0.083% neb solution TAKE 1 VIAL BY NEBULIZATION EVERY 6 HOURS AS NEEDED FOR SHORNESS OF BREATH/DYSPNEA OR WHEEZING 75 mL 7     albuterol (PROAIR HFA/PROVENTIL HFA/VENTOLIN HFA) 108 (90 BASE) MCG/ACT Inhaler Inhale 1-2 puffs into the lungs every 4 hours as needed for shortness of breath / dyspnea or wheezing 1 Inhaler 5     aspirin 81 MG chewable tablet Take 81 mg by mouth daily       atorvastatin (LIPITOR) 40 MG tablet TAKE 1 TABLET(40 MG) BY MOUTH AT BEDTIME 30 tablet 0     B Complex-C-Folic Acid (BRANDI-HEIDY) TABS TAKE ONE TABLET BY MOUTH DAILY 90 tablet 3     B-D U/F 31G X 8 MM insulin pen needle USE AS DIRECTED FOUR TIMES DAILY. 400 each 1     blood glucose monitoring (NO BRAND SPECIFIED) test strip Use to test blood sugars 3 times daily or as directed. Uses onetouch 300 strip 11     carvedilol (COREG) 12.5 MG tablet TAKE 1 TABLET(12.5 MG) BY MOUTH TWICE DAILY 180 tablet 3     darbepoetin libby (ARANESP) 100 MCG/0.5ML injection [facility] doses, patient not sure about strength.       diltiazem (TIAZAC) 300 MG 24 hr ER beaded capsule  Take 1 capsule (300 mg) by mouth daily 90 capsule 1     doxazosin (CARDURA) 8 MG tablet TAKE 1 TABLET BY MOUTH EVERY NIGHT AT BEDTIME. 90 tablet 3     eplerenone (INSPRA) 50 MG tablet Take 1 tablet (50 mg) by mouth daily 90 tablet 4     furosemide (LASIX) 20 MG tablet Take 1 tablet (20 mg) by mouth daily 90 tablet 1     Insulin Aspart (NOVOLOG SC) Inject 12 Units Subcutaneous 3 times daily (with meals) Patient states with large meals he will increase to 14-16 units.       LANTUS SOLOSTAR 100 UNIT/ML soln INJECT 26 UNITS UNDER THE SKIN AT BEDTIME 30 mL 0     lisinopril (PRINIVIL/ZESTRIL) 20 MG tablet Take 1 tablet (20 mg) by mouth daily 90 tablet 3     NOVOLOG FLEXPEN 100 UNIT/ML soln INJECT UNDER THE SKIN USING SLIDING SCALE UP TO 50 UNITS EVERY DAY AS DIRECTED BEFORE A MEAL 45 mL 0     order for DME Equipment being ordered: portable Oxygen concentrator 1 Device 0     order for DME Equipment being ordered: Four wheeled walker 1 Device 0     order for DME Equipment being ordered: Other: Nebulizer machine  Treatment Diagnosis: COPD 1 Device 0     Respiratory Therapy Supplies (NEBULIZER/ADULT MASK) KIT 1 Device as needed 1 kit 0     STIOLTO RESPIMAT 2.5-2.5 MCG/ACT AERS INHALE 2 PUFFS INTO THE LUNGS DAILY 4 g 10     VITAMIN D, CHOLECALCIFEROL, PO Take 2,000 Units by mouth daily        COLCHICINE PO Take 0.6 mg by mouth every 48 hours as needed Reported on 3/30/2017       fish oil-omega-3 fatty acids 1000 MG capsule Take 1 g by mouth daily       insulin glargine (LANTUS) 100 UNIT/ML injection Inject 23 Units Subcutaneous At Bedtime (Patient will increase to 26 units at bedtime he states when BG is elevated.         ALLERGIES     Allergies   Allergen Reactions     Levaquin [Levofloxacin]      edema     Pioglitazone Other (See Comments)     Edema & hay fever     Vytorin Other (See Comments)     Muscle aches       PAST MEDICAL HISTORY:  Past Medical History:   Diagnosis Date     COPD (chronic obstructive pulmonary disease)  "(H)      CRF (chronic renal failure)      Heart murmur      HLD (hyperlipidemia)      HTN (hypertension)      IDDM (insulin dependent diabetes mellitus) (H)      Renal calculi        PAST SURGICAL HISTORY:  Past Surgical History:   Procedure Laterality Date     ADENOIDECTOMY       ENDOSCOPIC POLYPECTOMY NASAL      Through vocal cords     Renal Stent      For renal calculi     TONSILLECTOMY         FAMILY HISTORY:  Family History   Problem Relation Age of Onset     Family history unknown: Yes       SOCIAL HISTORY:  Social History     Social History     Marital status:      Spouse name: N/A     Number of children: N/A     Years of education: N/A     Social History Main Topics     Smoking status: Former Smoker     Packs/day: 1.00     Years: 70.00     Types: Cigarettes     Quit date: 2011     Smokeless tobacco: Never Used     Alcohol use Yes      Comment: average 1 beer month     Drug use: No     Sexual activity: Not Currently     Other Topics Concern     None     Social History Narrative       Review of Systems:  Skin:  Positive for nail changes brittle   Eyes:  Positive for glasses    ENT:  Negative      Respiratory:  Positive for dyspnea on exertion     Cardiovascular:    fatigue;Positive for    Gastroenterology: Negative      Genitourinary:  Negative      Musculoskeletal:  Positive for   spinal stenosis  Neurologic:  Negative      Psychiatric:  Negative      Heme/Lymph/Imm:  Negative      Endocrine:  Positive for diabetes      Physical Exam:  Vitals: /54  Pulse 64  Ht 1.753 m (5' 9\")  Wt 86.1 kg (189 lb 12.8 oz)  BMI 28.03 kg/m2    Constitutional:  cooperative, alert and oriented, well developed, well nourished, in no acute distress        Skin:  warm and dry to the touch          Head:  normocephalic        Eyes:  pupils equal and round        Lymph:      ENT:  no pallor or cyanosis        Neck:  not assessed this visit        Respiratory:       diminished but otherwise clear    Cardiac: regular " rhythm       systolic ejection murmur;RUSB          pulses below the femoral arteries are diminished                                      GI:  abdomen soft        Extremities and Muscular Skeletal:  no edema              Neurological:  no gross motor deficits;affect appropriate   walks with walker    Psych:  Alert and Oriented x 3          CC  Daria Hastings DO  [address]  [place] [address]                    Thank you for allowing me to participate in the care of your patient.      Sincerely,     Daria Hastings DO     [facility]    cc:   Daria Hastings DO  [address]  [place] [address]

## 2018-11-01 NOTE — MR AVS SNAPSHOT
"              After Visit Summary   11/1/2018    Seven Savage Jr.    MRN: [id number]           Patient Information     Date Of Birth          1935        Visit Information        Provider Department      11/1/2018 1:45 PM Daria Hastings DO [facility]        Today's Diagnoses     Hyperlipidemia LDL goal <100        Essential hypertension, benign        Nonrheumatic aortic valve stenosis        Pulmonary emphysema, unspecified emphysema type (H)        ESRD (end stage renal disease) on dialysis (H)           Follow-ups after your visit        Additional Services     Follow-Up with Cardiologist                 Future tests that were ordered for you today     Open Future Orders        Priority Expected Expires Ordered    Follow-Up with Cardiologist Routine 3/1/2019 11/1/2019 11/1/2018            Who to contact     If you have questions or need follow up information about today's clinic visit or your schedule please contact [facility] directly at [phone].  Normal or non-critical lab and imaging results will be communicated to you by MyChart, letter or phone within 4 business days after the clinic has received the results. If you do not hear from us within 7 days, please contact the clinic through MyChart or phone. If you have a critical or abnormal lab result, we will notify you by phone as soon as possible.  Submit refill requests through [employer] or call your pharmacy and they will forward the refill request to us. Please allow 3 business days for your refill to be completed.          Additional Information About Your Visit        Care EveryWhere ID     This is your Care EveryWhere ID. This could be used by other organizations to access your [facility] medical records  [id number]        Your Vitals Were     Pulse Height BMI (Body Mass Index)             64 1.753 m (5' 9\") 28.03 kg/m2          Blood Pressure " from Last 3 Encounters:   11/01/18 146/54   06/21/18 156/68   05/22/18 150/70    Weight from Last 3 Encounters:   11/01/18 86.1 kg (189 lb 12.8 oz)   06/21/18 85 kg (187 lb 4.8 oz)   05/22/18 84.4 kg (186 lb)              We Performed the Following     Follow-Up with Cardiologist        Primary Care Provider Office Phone # Fax #    Sandip Antunez [phone]       [address] [place] [id number]        Equal Access to Services     [facility]: Hadii aad ku hadasho Soomaali, waaxda luqadaha, qaybta kaalmada adeegyada, catrachita coelho . So [facility] [phone].    ATENCIÓN: Si habla español, tiene a turpin disposición servicios gratuitos de asistencia lingüística. [facility] [phone].    We comply with applicable federal civil rights laws and [place] laws. We do not discriminate on the basis of race, color, national origin, age, disability, sex, sexual orientation, or gender identity.            Thank you!     Thank you for choosing [facility]  for your care. Our goal is always to provide you with excellent care. Hearing back from our patients is one way we can continue to improve our services. Please take a few minutes to complete the written survey that you may receive in the mail after your visit with us. Thank you!             Your Updated Medication List - Protect others around you: Learn how to safely use, store and throw away your medicines at www.disposemymeds.org.          This list is accurate as of 11/1/18  2:18 PM.  Always use your most recent med list.                   Brand Name Dispense Instructions for use Diagnosis    * albuterol 108 (90 Base) MCG/ACT inhaler    PROAIR HFA/PROVENTIL HFA/VENTOLIN HFA    1 Inhaler    Inhale 1-2 puffs into the lungs every 4 hours as needed for shortness of breath / dyspnea or wheezing    COPD exacerbation (H)       * albuterol (2.5 MG/3ML) 0.083% neb solution     75 mL    TAKE  1 VIAL BY NEBULIZATION EVERY 6 HOURS AS NEEDED FOR SHORNESS OF BREATH/DYSPNEA OR WHEEZING    COPD exacerbation (H)       aspirin 81 MG chewable tablet      Take 81 mg by mouth daily        atorvastatin 40 MG tablet    LIPITOR    30 tablet    TAKE 1 TABLET(40 MG) BY MOUTH AT BEDTIME    Type 2 diabetes mellitus with chronic kidney disease on chronic dialysis, with long-term current use of insulin (H), Hyperlipidemia LDL goal <100       B-D U/F 31G X 8 MM   Generic drug:  insulin pen needle     400 each    USE AS DIRECTED FOUR TIMES DAILY.    Type 2 diabetes mellitus with chronic kidney disease on chronic dialysis, with long-term current use of insulin (H)       blood glucose monitoring test strip    no brand specified    300 strip    Use to test blood sugars 3 times daily or as directed. Uses onetouch    Type 2 diabetes mellitus with chronic kidney disease on chronic dialysis, with long-term current use of insulin (H)       carvedilol 12.5 MG tablet    COREG    180 tablet    TAKE 1 TABLET(12.5 MG) BY MOUTH TWICE DAILY    Aortic valve stenosis, unspecified etiology, Essential hypertension, benign       COLCHICINE PO      Take 0.6 mg by mouth every 48 hours as needed Reported on 3/30/2017        darbepoetin libby 100 MCG/0.5ML injection    ARANESP     Inject Subcutaneous three times a week At diaylsis M,W,F. Dialysis ([facility]) doses, patient not sure about strength.        diltiazem 300 MG 24 hr ER beaded capsule    TIAZAC    90 capsule    Take 1 capsule (300 mg) by mouth daily    Essential hypertension, benign       doxazosin 8 MG tablet    CARDURA    90 tablet    TAKE 1 TABLET BY MOUTH EVERY NIGHT AT BEDTIME.    Urinary retention       eplerenone 50 MG tablet    INSPRA    90 tablet    Take 1 tablet (50 mg) by mouth daily    Essential hypertension, benign       fish oil-omega-3 fatty acids 1000 MG capsule      Take 1 g by mouth daily        furosemide 20 MG tablet    LASIX    90 tablet    Take 1 tablet (20 mg) by mouth  daily    ESRD (end stage renal disease) on dialysis (H), Essential hypertension, benign       * insulin glargine 100 UNIT/ML injection    LANTUS     Inject 23 Units Subcutaneous At Bedtime (Patient will increase to 26 units at bedtime he states when BG is elevated.        * LANTUS SOLOSTAR 100 UNIT/ML injection   Generic drug:  insulin glargine     30 mL    INJECT 26 UNITS UNDER THE SKIN AT BEDTIME    Type 2 diabetes mellitus with chronic kidney disease on chronic dialysis, with long-term current use of insulin (H)       lisinopril 20 MG tablet    PRINIVIL/ZESTRIL    90 tablet    Take 1 tablet (20 mg) by mouth daily    Type 2 diabetes mellitus with chronic kidney disease on chronic dialysis, with long-term current use of insulin (H), Essential hypertension, benign       nebulizer/adult mask Kit kit     1 kit    1 Device as needed    Chronic obstructive pulmonary disease with acute exacerbation (H)       * NOVOLOG SC      Inject 12 Units Subcutaneous 3 times daily (with meals) Patient states with large meals he will increase to 14-16 units.        * NovoLOG FLEXPEN 100 UNIT/ML injection   Generic drug:  insulin aspart     45 mL    INJECT UNDER THE SKIN USING SLIDING SCALE UP TO 50 UNITS EVERY DAY AS DIRECTED BEFORE A MEAL    Type 2 diabetes mellitus with chronic kidney disease on chronic dialysis, with long-term current use of insulin (H)       * order for DME     1 Device    Equipment being ordered: Other: Nebulizer machine Treatment Diagnosis: COPD    COPD exacerbation (H)       * order for DME     1 Device    Equipment being ordered: Four wheeled walker    DDD (degenerative disc disease), lumbar, Impaired gait       order for DME     1 Device    Equipment being ordered: portable Oxygen concentrator    Chronic obstructive pulmonary disease with acute exacerbation (H), Pneumonia due to infectious organism, unspecified laterality, unspecified part of lung       BRANDI-HEIDY Tabs     90 tablet    TAKE ONE TABLET BY MOUTH  DAILY    ESRD (end stage renal disease) on dialysis (H)       STIOLTO RESPIMAT 2.5-2.5 MCG/ACT Aers   Generic drug:  tiotropium-olodaterol     4 g    INHALE 2 PUFFS INTO THE LUNGS DAILY    COPD exacerbation (H)       VITAMIN D (CHOLECALCIFEROL) PO      Take 2,000 Units by mouth daily        * Notice:  This list has 8 medication(s) that are the same as other medications prescribed for you. Read the directions carefully, and ask your doctor or other care provider to review them with you.

## 2018-11-05 ENCOUNTER — HOSPITAL ENCOUNTER (INPATIENT)
Facility: CLINIC | Age: 83
LOS: 3 days | Discharge: HOME | DRG: 291 | End: 2018-11-08
Attending: EMERGENCY MEDICINE | Admitting: HOSPITALIST
Payer: MEDICARE

## 2018-11-05 ENCOUNTER — APPOINTMENT (OUTPATIENT)
Dept: GENERAL RADIOLOGY | Facility: CLINIC | Age: 83
DRG: 291 | End: 2018-11-05
Attending: EMERGENCY MEDICINE
Payer: MEDICARE

## 2018-11-05 DIAGNOSIS — I50.9 ACUTE ON CHRONIC CONGESTIVE HEART FAILURE, UNSPECIFIED HEART FAILURE TYPE (H): ICD-10-CM

## 2018-11-05 DIAGNOSIS — Z99.2 END STAGE RENAL FAILURE ON DIALYSIS (H): ICD-10-CM

## 2018-11-05 DIAGNOSIS — K21.9 GASTROESOPHAGEAL REFLUX DISEASE WITHOUT ESOPHAGITIS: Primary | ICD-10-CM

## 2018-11-05 DIAGNOSIS — J44.9 CHRONIC OBSTRUCTIVE PULMONARY DISEASE, UNSPECIFIED COPD TYPE (H): ICD-10-CM

## 2018-11-05 DIAGNOSIS — N18.6 END STAGE RENAL FAILURE ON DIALYSIS (H): ICD-10-CM

## 2018-11-05 PROBLEM — J96.21 ACUTE ON CHRONIC RESPIRATORY FAILURE WITH HYPOXIA (H): Status: ACTIVE | Noted: 2018-11-05

## 2018-11-05 PROBLEM — R06.02 SHORTNESS OF BREATH: Status: ACTIVE | Noted: 2018-11-05

## 2018-11-05 LAB
ALBUMIN SERPL-MCNC: 3.3 G/DL (ref 3.4–5)
ALP SERPL-CCNC: 55 U/L (ref 40–150)
ALT SERPL W P-5'-P-CCNC: 15 U/L (ref 0–70)
ANION GAP SERPL CALCULATED.3IONS-SCNC: 8 MMOL/L (ref 3–14)
AST SERPL W P-5'-P-CCNC: 13 U/L (ref 0–45)
BASOPHILS # BLD AUTO: 0 10E9/L (ref 0–0.2)
BASOPHILS NFR BLD AUTO: 0.2 %
BILIRUB SERPL-MCNC: 0.5 MG/DL (ref 0.2–1.3)
BUN SERPL-MCNC: 55 MG/DL (ref 7–30)
CALCIUM SERPL-MCNC: 9.2 MG/DL (ref 8.5–10.1)
CHLORIDE SERPL-SCNC: 104 MMOL/L (ref 94–109)
CO2 SERPL-SCNC: 27 MMOL/L (ref 20–32)
CREAT SERPL-MCNC: 5.5 MG/DL (ref 0.66–1.25)
DIFFERENTIAL METHOD BLD: ABNORMAL
EOSINOPHIL # BLD AUTO: 0.3 10E9/L (ref 0–0.7)
EOSINOPHIL NFR BLD AUTO: 1.9 %
ERYTHROCYTE [DISTWIDTH] IN BLOOD BY AUTOMATED COUNT: 15.9 % (ref 10–15)
GFR SERPL CREATININE-BSD FRML MDRD: 10 ML/MIN/1.7M2
GLUCOSE BLDC GLUCOMTR-MCNC: 102 MG/DL (ref 70–99)
GLUCOSE BLDC GLUCOMTR-MCNC: 110 MG/DL (ref 70–99)
GLUCOSE BLDC GLUCOMTR-MCNC: 167 MG/DL (ref 70–99)
GLUCOSE BLDC GLUCOMTR-MCNC: 279 MG/DL (ref 70–99)
GLUCOSE SERPL-MCNC: 105 MG/DL (ref 70–99)
HCT VFR BLD AUTO: 27.3 % (ref 40–53)
HGB BLD-MCNC: 8.5 G/DL (ref 13.3–17.7)
IMM GRANULOCYTES # BLD: 0 10E9/L (ref 0–0.4)
IMM GRANULOCYTES NFR BLD: 0.3 %
LACTATE BLD-SCNC: 0.5 MMOL/L (ref 0.7–2)
LYMPHOCYTES # BLD AUTO: 1 10E9/L (ref 0.8–5.3)
LYMPHOCYTES NFR BLD AUTO: 7.6 %
MCH RBC QN AUTO: 26.6 PG (ref 26.5–33)
MCHC RBC AUTO-ENTMCNC: 31.1 G/DL (ref 31.5–36.5)
MCV RBC AUTO: 85 FL (ref 78–100)
MONOCYTES # BLD AUTO: 0.9 10E9/L (ref 0–1.3)
MONOCYTES NFR BLD AUTO: 6.9 %
NEUTROPHILS # BLD AUTO: 10.8 10E9/L (ref 1.6–8.3)
NEUTROPHILS NFR BLD AUTO: 83.1 %
NRBC # BLD AUTO: 0 10*3/UL
NRBC BLD AUTO-RTO: 0 /100
NT-PROBNP SERPL-MCNC: 8698 PG/ML (ref 0–1800)
PLATELET # BLD AUTO: 338 10E9/L (ref 150–450)
PLATELET # BLD AUTO: 342 10E9/L (ref 150–450)
POTASSIUM SERPL-SCNC: 4.3 MMOL/L (ref 3.4–5.3)
PROCALCITONIN SERPL-MCNC: 0.33 NG/ML
PROT SERPL-MCNC: 6.6 G/DL (ref 6.8–8.8)
RBC # BLD AUTO: 3.2 10E12/L (ref 4.4–5.9)
SODIUM SERPL-SCNC: 139 MMOL/L (ref 133–144)
TROPONIN I SERPL-MCNC: 0.05 UG/L (ref 0–0.04)
TROPONIN I SERPL-MCNC: 0.07 UG/L (ref 0–0.04)
WBC # BLD AUTO: 13 10E9/L (ref 4–11)

## 2018-11-05 PROCEDURE — 96374 THER/PROPH/DIAG INJ IV PUSH: CPT

## 2018-11-05 PROCEDURE — 84484 ASSAY OF TROPONIN QUANT: CPT | Performed by: EMERGENCY MEDICINE

## 2018-11-05 PROCEDURE — 80053 COMPREHEN METABOLIC PANEL: CPT | Performed by: EMERGENCY MEDICINE

## 2018-11-05 PROCEDURE — 25000128 H RX IP 250 OP 636: Performed by: HOSPITALIST

## 2018-11-05 PROCEDURE — 90937 HEMODIALYSIS REPEATED EVAL: CPT

## 2018-11-05 PROCEDURE — 85049 AUTOMATED PLATELET COUNT: CPT | Performed by: INTERNAL MEDICINE

## 2018-11-05 PROCEDURE — 99223 1ST HOSP IP/OBS HIGH 75: CPT | Mod: AI | Performed by: HOSPITALIST

## 2018-11-05 PROCEDURE — 25000131 ZZH RX MED GY IP 250 OP 636 PS 637: Mod: GY | Performed by: HOSPITALIST

## 2018-11-05 PROCEDURE — 93005 ELECTROCARDIOGRAM TRACING: CPT

## 2018-11-05 PROCEDURE — 83605 ASSAY OF LACTIC ACID: CPT | Performed by: HOSPITALIST

## 2018-11-05 PROCEDURE — 12000000 ZZH R&B MED SURG/OB

## 2018-11-05 PROCEDURE — 84145 PROCALCITONIN (PCT): CPT | Performed by: EMERGENCY MEDICINE

## 2018-11-05 PROCEDURE — A9270 NON-COVERED ITEM OR SERVICE: HCPCS | Mod: GY | Performed by: HOSPITALIST

## 2018-11-05 PROCEDURE — G0499 HEPB SCREEN HIGH RISK INDIV: HCPCS | Performed by: INTERNAL MEDICINE

## 2018-11-05 PROCEDURE — 25000128 H RX IP 250 OP 636: Performed by: INTERNAL MEDICINE

## 2018-11-05 PROCEDURE — 83880 ASSAY OF NATRIURETIC PEPTIDE: CPT | Performed by: EMERGENCY MEDICINE

## 2018-11-05 PROCEDURE — 85025 COMPLETE CBC W/AUTO DIFF WBC: CPT | Performed by: EMERGENCY MEDICINE

## 2018-11-05 PROCEDURE — 25000125 ZZHC RX 250: Performed by: HOSPITALIST

## 2018-11-05 PROCEDURE — 25000128 H RX IP 250 OP 636: Performed by: EMERGENCY MEDICINE

## 2018-11-05 PROCEDURE — 71046 X-RAY EXAM CHEST 2 VIEWS: CPT

## 2018-11-05 PROCEDURE — 84484 ASSAY OF TROPONIN QUANT: CPT | Performed by: HOSPITALIST

## 2018-11-05 PROCEDURE — 40000275 ZZH STATISTIC RCP TIME EA 10 MIN

## 2018-11-05 PROCEDURE — 94640 AIRWAY INHALATION TREATMENT: CPT | Mod: 76

## 2018-11-05 PROCEDURE — 63400005 ZZH RX 634: Performed by: INTERNAL MEDICINE

## 2018-11-05 PROCEDURE — 25000132 ZZH RX MED GY IP 250 OP 250 PS 637: Mod: GY | Performed by: HOSPITALIST

## 2018-11-05 PROCEDURE — 86706 HEP B SURFACE ANTIBODY: CPT | Performed by: INTERNAL MEDICINE

## 2018-11-05 PROCEDURE — 99285 EMERGENCY DEPT VISIT HI MDM: CPT | Mod: 25

## 2018-11-05 PROCEDURE — 94640 AIRWAY INHALATION TREATMENT: CPT

## 2018-11-05 PROCEDURE — 84484 ASSAY OF TROPONIN QUANT: CPT | Performed by: INTERNAL MEDICINE

## 2018-11-05 PROCEDURE — 00000146 ZZHCL STATISTIC GLUCOSE BY METER IP

## 2018-11-05 PROCEDURE — 5A1D70Z PERFORMANCE OF URINARY FILTRATION, INTERMITTENT, LESS THAN 6 HOURS PER DAY: ICD-10-PCS | Performed by: INTERNAL MEDICINE

## 2018-11-05 RX ORDER — AMOXICILLIN 250 MG
2 CAPSULE ORAL 2 TIMES DAILY PRN
Status: DISCONTINUED | OUTPATIENT
Start: 2018-11-05 | End: 2018-11-08 | Stop reason: HOSPADM

## 2018-11-05 RX ORDER — ONDANSETRON 2 MG/ML
4 INJECTION INTRAMUSCULAR; INTRAVENOUS EVERY 6 HOURS PRN
Status: DISCONTINUED | OUTPATIENT
Start: 2018-11-05 | End: 2018-11-08 | Stop reason: HOSPADM

## 2018-11-05 RX ORDER — AMOXICILLIN 250 MG
1 CAPSULE ORAL 2 TIMES DAILY PRN
Status: DISCONTINUED | OUTPATIENT
Start: 2018-11-05 | End: 2018-11-08 | Stop reason: HOSPADM

## 2018-11-05 RX ORDER — PROCHLORPERAZINE 25 MG
12.5 SUPPOSITORY, RECTAL RECTAL EVERY 12 HOURS PRN
Status: DISCONTINUED | OUTPATIENT
Start: 2018-11-05 | End: 2018-11-08 | Stop reason: HOSPADM

## 2018-11-05 RX ORDER — ATORVASTATIN CALCIUM 20 MG/1
20 TABLET, FILM COATED ORAL AT BEDTIME
Status: DISCONTINUED | OUTPATIENT
Start: 2018-11-05 | End: 2018-11-08 | Stop reason: HOSPADM

## 2018-11-05 RX ORDER — NALOXONE HYDROCHLORIDE 0.4 MG/ML
.1-.4 INJECTION, SOLUTION INTRAMUSCULAR; INTRAVENOUS; SUBCUTANEOUS
Status: DISCONTINUED | OUTPATIENT
Start: 2018-11-05 | End: 2018-11-08 | Stop reason: HOSPADM

## 2018-11-05 RX ORDER — DOXERCALCIFEROL 4 UG/2ML
7.5 INJECTION INTRAVENOUS
Status: COMPLETED | OUTPATIENT
Start: 2018-11-05 | End: 2018-11-05

## 2018-11-05 RX ORDER — ALBUMIN, HUMAN INJ 5% 5 %
250 SOLUTION INTRAVENOUS
Status: DISCONTINUED | OUTPATIENT
Start: 2018-11-05 | End: 2018-11-05

## 2018-11-05 RX ORDER — ACETAMINOPHEN 325 MG/1
650 TABLET ORAL EVERY 4 HOURS PRN
Status: DISCONTINUED | OUTPATIENT
Start: 2018-11-05 | End: 2018-11-08 | Stop reason: HOSPADM

## 2018-11-05 RX ORDER — ASPIRIN 81 MG/1
81 TABLET, CHEWABLE ORAL DAILY
Status: DISCONTINUED | OUTPATIENT
Start: 2018-11-05 | End: 2018-11-08 | Stop reason: HOSPADM

## 2018-11-05 RX ORDER — HEPARIN SODIUM 5000 [USP'U]/.5ML
5000 INJECTION, SOLUTION INTRAVENOUS; SUBCUTANEOUS EVERY 12 HOURS
Status: DISCONTINUED | OUTPATIENT
Start: 2018-11-05 | End: 2018-11-08 | Stop reason: HOSPADM

## 2018-11-05 RX ORDER — NICOTINE POLACRILEX 4 MG
15-30 LOZENGE BUCCAL
Status: DISCONTINUED | OUTPATIENT
Start: 2018-11-05 | End: 2018-11-08 | Stop reason: HOSPADM

## 2018-11-05 RX ORDER — BISACODYL 10 MG
10 SUPPOSITORY, RECTAL RECTAL DAILY PRN
Status: DISCONTINUED | OUTPATIENT
Start: 2018-11-05 | End: 2018-11-08 | Stop reason: HOSPADM

## 2018-11-05 RX ORDER — ONDANSETRON 4 MG/1
4 TABLET, ORALLY DISINTEGRATING ORAL EVERY 6 HOURS PRN
Status: DISCONTINUED | OUTPATIENT
Start: 2018-11-05 | End: 2018-11-08 | Stop reason: HOSPADM

## 2018-11-05 RX ORDER — EPLERENONE 25 MG/1
50 TABLET, FILM COATED ORAL DAILY
Status: DISCONTINUED | OUTPATIENT
Start: 2018-11-06 | End: 2018-11-08 | Stop reason: HOSPADM

## 2018-11-05 RX ORDER — DEXTROSE MONOHYDRATE 25 G/50ML
25-50 INJECTION, SOLUTION INTRAVENOUS
Status: DISCONTINUED | OUTPATIENT
Start: 2018-11-05 | End: 2018-11-08 | Stop reason: HOSPADM

## 2018-11-05 RX ORDER — ATORVASTATIN CALCIUM 40 MG/1
20 TABLET, FILM COATED ORAL AT BEDTIME
COMMUNITY
End: 2018-11-15

## 2018-11-05 RX ORDER — CARVEDILOL 12.5 MG/1
12.5 TABLET ORAL 2 TIMES DAILY WITH MEALS
Status: DISCONTINUED | OUTPATIENT
Start: 2018-11-05 | End: 2018-11-08 | Stop reason: HOSPADM

## 2018-11-05 RX ORDER — FUROSEMIDE 20 MG
20 TABLET ORAL DAILY
Status: DISCONTINUED | OUTPATIENT
Start: 2018-11-05 | End: 2018-11-08 | Stop reason: HOSPADM

## 2018-11-05 RX ORDER — IPRATROPIUM BROMIDE AND ALBUTEROL SULFATE 2.5; .5 MG/3ML; MG/3ML
3 SOLUTION RESPIRATORY (INHALATION)
Status: COMPLETED | OUTPATIENT
Start: 2018-11-05 | End: 2018-11-06

## 2018-11-05 RX ORDER — DOXAZOSIN 4 MG/1
8 TABLET ORAL AT BEDTIME
Status: DISCONTINUED | OUTPATIENT
Start: 2018-11-05 | End: 2018-11-08 | Stop reason: HOSPADM

## 2018-11-05 RX ORDER — PROCHLORPERAZINE MALEATE 5 MG
5 TABLET ORAL EVERY 6 HOURS PRN
Status: DISCONTINUED | OUTPATIENT
Start: 2018-11-05 | End: 2018-11-08 | Stop reason: HOSPADM

## 2018-11-05 RX ORDER — METHYLPREDNISOLONE SODIUM SUCCINATE 125 MG/2ML
125 INJECTION, POWDER, LYOPHILIZED, FOR SOLUTION INTRAMUSCULAR; INTRAVENOUS ONCE
Status: COMPLETED | OUTPATIENT
Start: 2018-11-05 | End: 2018-11-05

## 2018-11-05 RX ORDER — HYDRALAZINE HYDROCHLORIDE 20 MG/ML
10 INJECTION INTRAMUSCULAR; INTRAVENOUS EVERY 4 HOURS PRN
Status: DISCONTINUED | OUTPATIENT
Start: 2018-11-05 | End: 2018-11-08 | Stop reason: HOSPADM

## 2018-11-05 RX ORDER — ALBUTEROL SULFATE 0.83 MG/ML
3 SOLUTION RESPIRATORY (INHALATION)
Status: DISCONTINUED | OUTPATIENT
Start: 2018-11-05 | End: 2018-11-08 | Stop reason: HOSPADM

## 2018-11-05 RX ORDER — ALBUMIN (HUMAN) 12.5 G/50ML
50 SOLUTION INTRAVENOUS
Status: DISCONTINUED | OUTPATIENT
Start: 2018-11-05 | End: 2018-11-05

## 2018-11-05 RX ORDER — DILTIAZEM HYDROCHLORIDE 300 MG/1
300 CAPSULE, EXTENDED RELEASE ORAL DAILY
Status: DISCONTINUED | OUTPATIENT
Start: 2018-11-06 | End: 2018-11-05

## 2018-11-05 RX ORDER — LISINOPRIL 20 MG/1
20 TABLET ORAL DAILY
Status: DISCONTINUED | OUTPATIENT
Start: 2018-11-06 | End: 2018-11-06

## 2018-11-05 RX ORDER — METHYLPREDNISOLONE SODIUM SUCCINATE 125 MG/2ML
60 INJECTION, POWDER, LYOPHILIZED, FOR SOLUTION INTRAMUSCULAR; INTRAVENOUS EVERY 12 HOURS
Status: DISCONTINUED | OUTPATIENT
Start: 2018-11-05 | End: 2018-11-08 | Stop reason: HOSPADM

## 2018-11-05 RX ADMIN — INSULIN GLARGINE 23 UNITS: 100 INJECTION, SOLUTION SUBCUTANEOUS at 22:47

## 2018-11-05 RX ADMIN — INSULIN ASPART 12 UNITS: 100 INJECTION, SOLUTION INTRAVENOUS; SUBCUTANEOUS at 17:41

## 2018-11-05 RX ADMIN — DOXERCALCIFEROL 7.5 MCG: 4 INJECTION, SOLUTION INTRAVENOUS at 15:35

## 2018-11-05 RX ADMIN — DOXAZOSIN 8 MG: 4 TABLET ORAL at 22:42

## 2018-11-05 RX ADMIN — METHYLPREDNISOLONE SODIUM SUCCINATE 62.5 MG: 125 INJECTION, POWDER, FOR SOLUTION INTRAMUSCULAR; INTRAVENOUS at 22:51

## 2018-11-05 RX ADMIN — SODIUM CHLORIDE 300 ML: 9 INJECTION, SOLUTION INTRAVENOUS at 16:10

## 2018-11-05 RX ADMIN — METHYLPREDNISOLONE SODIUM SUCCINATE 125 MG: 125 INJECTION, POWDER, FOR SOLUTION INTRAMUSCULAR; INTRAVENOUS at 09:40

## 2018-11-05 RX ADMIN — IPRATROPIUM BROMIDE AND ALBUTEROL SULFATE 3 ML: 2.5; .5 SOLUTION RESPIRATORY (INHALATION) at 11:35

## 2018-11-05 RX ADMIN — ATORVASTATIN CALCIUM 20 MG: 20 TABLET, FILM COATED ORAL at 22:42

## 2018-11-05 RX ADMIN — CARVEDILOL 12.5 MG: 12.5 TABLET, FILM COATED ORAL at 17:41

## 2018-11-05 RX ADMIN — FUROSEMIDE 20 MG: 20 TABLET ORAL at 11:23

## 2018-11-05 RX ADMIN — SODIUM CHLORIDE 250 ML: 9 INJECTION, SOLUTION INTRAVENOUS at 13:00

## 2018-11-05 RX ADMIN — ASPIRIN 81 MG 81 MG: 81 TABLET ORAL at 11:24

## 2018-11-05 RX ADMIN — UMECLIDINIUM BROMIDE AND VILANTEROL TRIFENATATE 1 PUFF: 62.5; 25 POWDER RESPIRATORY (INHALATION) at 17:40

## 2018-11-05 RX ADMIN — IPRATROPIUM BROMIDE AND ALBUTEROL SULFATE 3 ML: 2.5; .5 SOLUTION RESPIRATORY (INHALATION) at 19:24

## 2018-11-05 RX ADMIN — IPRATROPIUM BROMIDE AND ALBUTEROL SULFATE 3 ML: 2.5; .5 SOLUTION RESPIRATORY (INHALATION) at 23:25

## 2018-11-05 RX ADMIN — HEPARIN SODIUM 5000 UNITS: 5000 INJECTION, SOLUTION INTRAVENOUS; SUBCUTANEOUS at 22:50

## 2018-11-05 RX ADMIN — INSULIN ASPART 1 UNITS: 100 INJECTION, SOLUTION INTRAVENOUS; SUBCUTANEOUS at 17:40

## 2018-11-05 RX ADMIN — EPOETIN ALFA 5000 UNITS: 3000 SOLUTION INTRAVENOUS; SUBCUTANEOUS at 15:35

## 2018-11-05 ASSESSMENT — ENCOUNTER SYMPTOMS
CHILLS: 0
ABDOMINAL PAIN: 0
DIARRHEA: 0
NAUSEA: 0
VOMITING: 0
FEVER: 0
CHEST TIGHTNESS: 0
BACK PAIN: 0
SHORTNESS OF BREATH: 1

## 2018-11-05 ASSESSMENT — ACTIVITIES OF DAILY LIVING (ADL)
ADLS_ACUITY_SCORE: 11
ADLS_ACUITY_SCORE: 12

## 2018-11-05 NOTE — PROGRESS NOTES
"MD Notification    Notified Person: MD    Notified Person Name:   Amberly    Notification Date/Time:  11/5/2018 1253    Notification Interaction:  Web-paged MD    Purpose of Notification:  Troponin is elevated at 0.046.  Troponin recheck was ordered for 1200.  Patient refused blood draw stating \"I will get it done in dialysis when I go this afternoon.\"    Orders Received:  OK for troponin to be drawn in dialysis.    Comments:  Patient denies pain or other discomfort.  VSS.  Will continue to monitor.      "

## 2018-11-05 NOTE — ED NOTES
Bed: ED07  Expected date:   Expected time:   Means of arrival:   Comments:  514  83 M resp ditress/neb  0703

## 2018-11-05 NOTE — ED PROVIDER NOTES
History     Chief Complaint:  Shortness of Breath    HPI   Seven Savage Jr. is a 83 year old male with a history of COPD, aortic stenosis, end stage renal failure on dialysis MWF, and type 2 diabetes who presents with shortness of breath. The patient reports that 2 days ago he went to [facility] where he had a 10 oz. rib eye steak. He states he typically has issues with retention after having salty meals, so he generally avoids them. The patient states he woke up yesterday short of breath. He is on home oxygen and usually doesn't use it in the morning, but had to keep it on all day yesterday. The patient gave himself albuterol nebulization both yesterday and this morning without significant improvement. He subsequently called EMS who had him on 3 L of oxygen in route to the ED and administered a DuoNeb. The patient states that he has had to be intubated for similar problems with shortness of breath in the past. He denies any fevers, chills, chest pain, pressure or heaviness, or any swelling in his legs. He states that he makes urine everyday and is unsure whether he has gained weight because he has not weighed himself yet today. The patient's PCP is Dr. Sandip Antunez, his cardiologist is Dr. Denisse Hastings, and his nephrologist is Dr. Power. The patient was last dialyzed 3 days ago according to schedule.     Allergies:  Levaquin  Pioglitazone  Vytorin     Medications:    Albuterol  Aspirine  Lipitor  Chandrika-Jocelin  Coreg  Colchicine PO  Aranesp  Tiazac  Cardura  Inspra  Fish oil omega 3 fatty acids  Lasix  Novolog SC  Lantus  Lantus Solostar  Lisinopril  Novolog FlexPen  Stiolto Respimat  Vitamin D, Cholecalciferol PO    Past Medical History:    COPD  CRF  ESRD  Heart Murmur  HLD  Hypertension  IDDM  Renal calculi  Hyperlipidemia  Thoracic segment dysfunction  Type 2 diabetes    Past Surgical History:    Adenoidectomy  Endoscopic polypectomy nasal - through vocal chords  Renal stent for renal  calculi  Tonsillectomy    Family History:    History reviewed. No pertinent family history.     Social History:  Smoking Status: Former Smoker  Alcohol Use: Rare  Patient presents via EMS.  Marital Status:       Review of Systems   Constitutional: Negative for chills and fever.   Respiratory: Positive for shortness of breath. Negative for chest tightness.    Cardiovascular: Negative for chest pain and leg swelling.   Gastrointestinal: Negative for abdominal pain, diarrhea, nausea and vomiting.   Musculoskeletal: Negative for back pain.   All other systems reviewed and are negative.      Physical Exam     Patient Vitals for the past 24 hrs:   BP Temp Temp src Heart Rate Resp SpO2 Weight   11/05/18 1000 147/61 - - 61 15 - -   11/05/18 0945 - - - 61 22 97 % -   11/05/18 0930 145/58 - - 59 13 - -   11/05/18 0915 - - - 58 11 91 % -   11/05/18 0900 138/52 - - 63 16 - -   11/05/18 0817 - - - - - 91 % 77.4 kg (170 lb 9.6 oz)   11/05/18 0807 104/75 98.1  F (36.7  C) Oral 98 18 90 % 84.4 kg (186 lb)   11/05/18 0800 104/75 - - 65 17 - -   11/05/18 0759 - - - 67 24 (!) 86 % -       Physical Exam  GENERAL: well developed, pleasant  HEAD: atraumatic  EYES: pupils reactive, extraocular muscles intact, conjunctivae normal  ENT:  mucus membranes moist  NECK:  trachea midline, normal range of motion  RESPIRATORY: no tachypnea. Diminished lung sounds.  CVS: normal S1/S2, intact distal pulses.  3-4/6 systolic murmur.  ABDOMEN: soft, nontender, nondistention  MUSCULOSKELETAL: no deformities  SKIN: warm and dry, no acute rashes or ulceration  NEURO: GCS 15, cranial nerves intact, alert and oriented x3  PSYCH:  Mood/affect normal    Emergency Department Course     ECG (8:58:37):  Rate 66 bpm. TN interval 210 ms. QRS duration 98 ms. QT/QTc 442/463 ms. P-R-T axes 46 -62 121.   Sinus rhythm with marked sinus arrhythmia with 1st degree AV block.  Left axis deviation.  Incomplete right bundle branch block.  Possible Anteroseptal  infarct, age undetermined.  ST & T wave abnormality, consider lateral ischemia.  Abnormal ECG.   Interpreted at 0902 by Gage Sanches MD.    Imaging:  Radiographic findings were communicated with the patient who voiced understanding of the findings.    XR Chest 2 Views  1. Right basilar infiltrate may be pneumonia.  2. The lungs are hyperinflated.  As read by Radiology.    Laboratory:    CBC: WBC 13.0 (H), HGB 8.5 (L), RBC 3.20 (L), HCT 27.3 (L), MCHC 31.1 (L), RDW 15.9 (H), o/w AWNL ()    CMP: Creatinine 5.50 (H), Glucose 105 (H), BUN 55 (H), GFR Estimate 12 (L), Albumin 3.3 (L), Protein Total 6.6 (L), o/w AWNL    Nt probnp inpatient: 8698 (H)    0802: Troponin I: 0.046 (H)    Procalcitonin: 0.33    Glucose by meter: 110 (H)    Interventions:    0940: soul-Medrol 125 mg IV    Emergency Department Course:  Past medical records, nursing notes, and vitals reviewed.  0804: I performed an exam of the patient and obtained history, as documented above.    IV inserted and blood drawn.    The patient was sent for a chest X-ray while in the emergency department, findings above.    0924: I discussed the case with Dr. Gaming of the hospitalist service regarding the patient.    0930: I discussed the case with Dr. Rodriguez of Nephrology regarding the patient.    0937: Rechecked the patient, findings and plan explained to the patient, who consents to admission. Discussed the patient with Dr. Gaming, who will admit the patient to a monitored bed for further observation, evaluation, and treatment.    Impression & Plan      Medical Decision Making:  Patient presents with shortness of breath and consider broad differential including COPD exacerbation, CHF with food indiscretions, renal failure with chronic dialysis requiring treatment, pneumonia, acute coronary syndrome, amongst others.  Patient does have a history of COPD as well as CHF and notes some recent food indiscretions.  Certainly COPD or CHF symptoms be most  likely.  I am not getting any infectious component from him.  Patient was given duo nebs prior to my arrival and by report had diminished lung sounds and improvement after the neb.  Patient given Solu-Medrol as well.  Spoke with hospitalist as well as nephrology to get him dialyzed.  Dr. Concepcion knows him well and notes that frequently his short of breath episodes are more COPD as opposed to heart failure or renal disease related.    Diagnosis:    ICD-10-CM    1. Acute on chronic congestive heart failure, unspecified heart failure type (H) I50.9 Procalcitonin     Procalcitonin     CANCELED: Procalcitonin     CANCELED: Procalcitonin   2. End stage renal failure on dialysis (H) N18.6     Z99.2    3. Chronic obstructive pulmonary disease, unspecified COPD type (H) J44.9        Disposition:  Admitted to [facility]    Rose Hinson  11/5/2018    EMERGENCY DEPARTMENT  I, Rose Sravan, am serving as a scribe at 8:04 AM on 11/5/2018 to document services personally performed by Gage Sanches MD based on my observations and the provider's statements to me.        Gage Sanches MD  11/05/18 1936

## 2018-11-05 NOTE — PROGRESS NOTES
Admission    Patient arrives to room 636-2 via cart from ED.  Care plan note: Patient is A&Ox4; able to state needs; independent.  VSS; LS diminished with fine rales; LEE; O2sats 90's  On 2L per NC.  Patient denies pain/N/V.    Inpatient nursing criteria listed below were met:    PCD's Documented: Yes  Skin issues/needs documented : Yes  Isolation education started/completed NA  Patient allergies verified with patient: Yes  Verified completion of [place] Risk Assessment Tool:  Yes  Verified completion of Guardianship screening tool: Yes  Fall Prevention: Care plan updated, Education given and documented Yes  Care Plan initiated: Yes  Home medications documented in belongings flowsheet: NA  Patient belongings documented in belongings flowsheet: Yes  Reminder note (belongings/ medications) placed in discharge instructions: Yes  Admission profile/ required documentation complete: Yes

## 2018-11-05 NOTE — PROGRESS NOTES
MD Notification    Notified Person: MD    Notified Person Name:   Amberly    Notification Date/Time:  11/5/2018 4095    Notification Interaction:  Talked with MD.    Purpose of Notification:  Sepsis BPA fired.  Lactate came back at 0.5.  Also, troponin went up to 0.070 from 0.046.    Orders Received:  Recheck troponin as previously ordered.    Comments:  WBC 13 but patient has been afebrile; VSS.  Patient is still in dialysis.  Will continue to monitor.

## 2018-11-05 NOTE — PROGRESS NOTES
Lab Results   Component Value Date    POTASSIUM 4.3 11/05/2018     Lab Results   Component Value Date    HGB 8.5 11/05/2018          Hemodialysis Note:      All machine safety checks completed and passed.  Total chlorine checks less than 0.1ppm   All connections secured, saline line double clamped.  Venous and arterial parameters set  Report rec'd from Danna Junior RN    Rec'd pt per bed acc'd by transport team. Consent obtained for dialysis Rx this hospitalization.  Hepatitis B labs verified on machine log from previous patient.  Good circulation to fistula arm. Time out taken.    LAF cannulated with 15 Ga needles with no difficulty. Tourniquet used.      Pt ran 2.45 hours on a K 3 bath, with 2.5L removed. Blood flow rate of 400 ml/min was obtained.   PRE-WEIGHT:77.4kgs,    TARGET WEIGHT 83.5kgs,       Complications: None.   Education regarding ESRD and fluid removal given to patient with good understanding.     Protocol explained to staff RN regarding possibility of incapacitated dialysis RN.  Vascular Access: Aseptic prep done for both on/off  Catheter Access: Aseptic prep done for both on/off       Dressing intact to IJ catheter.  Total Heparin given: 0    Meds given: Epogen and Hectorol.      Dr. Aguillon visited pt during run.   Transducers checked Q 15 minutes, remain clear throughout Rx.  Machine water alarms in place and functioning.  Total blood volume processed:71.9L  Hemostasis of needle sites achieved after 10 minutes. Patient dialyzes at [place] Q M-W-F.  POST ASSESSMENTS: Skin warm and dry, alert, denies discomfort, Lungs clear, Resp rate regular, apical rate regular. No edema.  See flowsheet in EPIC for further details.  Report given to Danna Junior RN.   Stacy Rossi RN  [facility]

## 2018-11-05 NOTE — PROGRESS NOTES
RECEIVING UNIT ED HANDOFF REVIEW    ED Nurse Handoff Report was reviewed by: Danna Junior on November 5, 2018 at 10:01 AM

## 2018-11-05 NOTE — PHARMACY-ADMISSION MEDICATION HISTORY
Admission medication history interview status for the 11/5/2018  admission is complete. See EPIC admission navigator for prior to admission medications     Medication history source reliability:Good    Actions taken by pharmacist (provider contacted, etc):  Reviewed Sure scripts and interviewed pt.  Pt knows his medications well.     Additional medication history information not noted on PTA med list :    --  Pt hasn't used colchicine in 3 years but still have it for prn gout attacks.    Medication reconciliation/reorder completed by provider prior to medication history? No    Time spent in this activity: 20 minutes    Prior to Admission medications    Medication Sig Last Dose Taking? Auth Provider   albuterol (2.5 MG/3ML) 0.083% neb solution TAKE 1 VIAL BY NEBULIZATION EVERY 6 HOURS AS NEEDED FOR SHORNESS OF BREATH/DYSPNEA OR WHEEZING prn Yes Sandip Antunez MD   albuterol (PROAIR HFA/PROVENTIL HFA/VENTOLIN HFA) 108 (90 BASE) MCG/ACT Inhaler Inhale 1-2 puffs into the lungs every 4 hours as needed for shortness of breath / dyspnea or wheezing prn Yes Sandip Antunez MD   aspirin 81 MG chewable tablet Take 81 mg by mouth daily 11/4/2018 at am Yes Unknown, Entered By History   atorvastatin (LIPITOR) 40 MG tablet Take 20 mg by mouth At Bedtime 11/4/2018 at hs Yes Unknown, Entered By History   carvedilol (COREG) 12.5 MG tablet TAKE 1 TABLET(12.5 MG) BY MOUTH TWICE DAILY 11/4/2018 Yes Sandip Antunez MD   COLCHICINE PO Take 0.6 mg by mouth every 48 hours as needed (gout attacks) Reported on 3/30/2017 prn Yes Unknown, Entered By History   darbepoetin libby (ARANESP) 100 MCG/0.5ML injection Inject Sub[facility] a [facility] doses, patient not sure about strength. 11/2/2018 Yes Unknown, Entered By History   diltiazem (TIAZAC) 300 MG 24 hr ER beaded capsule Take 1 capsule (300 mg) by mouth daily 11/4/2018 Yes Sandip Antunez MD   doxazosin (CARDURA) 8 MG tablet TAKE 1 TABLET BY  MOUTH EVERY NIGHT AT BEDTIME. 11/4/2018 at hs Yes Sandip Antunez MD   eplerenone (INSPRA) 50 MG tablet Take 1 tablet (50 mg) by mouth daily 11/4/2018 at am Yes Daria Hastings DO   fish oil-omega-3 fatty acids 1000 MG capsule Take 1 g by mouth daily 11/4/2018 at am Yes Unknown, Entered By History   furosemide (LASIX) 20 MG tablet Take 1 tablet (20 mg) by mouth daily 11/4/2018 at am Yes Sandip Antunez MD   Insulin Aspart (NOVOLOG SC) Inject 12 Units Subcutaneous 3 times daily (with meals) Patient states with large meals he will increase to 14-16 units. 11/4/2018 Yes Unknown, Entered By History   insulin glargine (LANTUS) 100 UNIT/ML injection Inject 23 Units Subcutaneous At Bedtime (Patient will increase to 26 units at bedtime he states when BG is elevated. 11/4/2018 at hs Yes Unknown, Entered By History   lisinopril (PRINIVIL/ZESTRIL) 20 MG tablet Take 1 tablet (20 mg) by mouth daily 11/4/2018 at am Yes Sandip Antunez MD   STIOLTO RESPIMAT 2.5-2.5 MCG/ACT AERS INHALE 2 PUFFS INTO THE LUNGS DAILY 11/4/2018 Yes Sandip Antunez MD   VITAMIN D, CHOLECALCIFEROL, PO Take 2,000 Units by mouth daily  11/4/2018 Yes Unknown, Entered By History   B Complex-C-Folic Acid (BRANDI-HEIDY) TABS TAKE ONE TABLET BY MOUTH DAILY   Sandip Antunez MD   B-D U/F 31G X 8 MM insulin pen needle USE AS DIRECTED FOUR TIMES DAILY.   Sandip Antunez MD   blood glucose monitoring (NO BRAND SPECIFIED) test strip Use to test blood sugars 3 times daily or as directed. Uses onetouch   Sandip Antunez MD   order for DME Equipment being ordered: portable Oxygen concentrator   Sandip Antunez MD   order for DME Equipment being ordered: Four wheeled Adia Cooper NP   order for DME Equipment being ordered: Other: Nebulizer machine  Treatment Diagnosis: COPD   Brenden Nielsen MD   Respiratory Therapy Supplies (NEBULIZER/ADULT MASK) KIT 1 Device as needed   Sandip Antunez MD

## 2018-11-05 NOTE — ED NOTES
[facility]  ED Nurse Handoff Report    ED Chief complaint: Shortness of Breath (pt has several commorbities, increased shoprtnes of breath as of the last two days , due for dialysis this morning, copd. )      ED Diagnosis:   Final diagnoses:   Acute on chronic congestive heart failure, unspecified heart failure type (H)   End stage renal failure on dialysis (H)   Chronic obstructive pulmonary disease, unspecified COPD type (H)       Code Status: Full Code    Allergies:   Allergies   Allergen Reactions     Levaquin [Levofloxacin]      edema     Pioglitazone Other (See Comments)     Edema & hay fever     Vytorin Other (See Comments)     Muscle aches       Activity level - Baseline/Home:  Independent    Activity Level - Current:   Independent     Needed?: No    Isolation: No  Infection: Not Applicable  Bariatric?: No    Vital Signs:   Vitals:    11/05/18 0800 11/05/18 0807 11/05/18 0817 11/05/18 0900   BP: 104/75 104/75  138/52   Resp: 17 18  16   Temp:  98.1  F (36.7  C)     TempSrc:  Oral     SpO2:  90% 91%    Weight:  84.4 kg (186 lb) 77.4 kg (170 lb 9.6 oz)        Cardiac Rhythm: ,  Sr       Pain level:      Is this patient confused?: No   [place] - Suicide Severity Rating Scale Completed?  Yes  If yes, what color did the patient score?  White    Patient Report: Initial Complaint: increased LEE. Unable to carry on activities without becoming very short of breath   Focused Assessment: alert , became quite short of breath just getting up to scale to weigh. Vitals are stable , not complaining of pain.   Tests Performed: labs, chest xray   Abnormal Results:   Labs Ordered and Resulted from Time of ED Arrival Up to the Time of Departure from the ED   CBC WITH PLATELETS DIFFERENTIAL - Abnormal; Notable for the following:        Result Value    WBC 13.0 (*)     RBC Count 3.20 (*)     Hemoglobin 8.5 (*)     Hematocrit 27.3 (*)     MCHC 31.1 (*)     RDW 15.9 (*)     Absolute Neutrophil 10.8 (*)      All other components within normal limits   COMPREHENSIVE METABOLIC PANEL - Abnormal; Notable for the following:     Glucose 105 (*)     Urea Nitrogen 55 (*)     Creatinine 5.50 (*)     GFR Estimate 10 (*)     GFR Estimate If Black 12 (*)     Albumin 3.3 (*)     Protein Total 6.6 (*)     All other components within normal limits   GLUCOSE BY METER - Abnormal; Notable for the following:     Glucose 110 (*)     All other components within normal limits   NT PROBNP INPATIENT - Abnormal; Notable for the following:     N-Terminal Pro BNP Inpatient 8698 (*)     All other components within normal limits   TROPONIN I - Abnormal; Notable for the following:     Troponin I ES 0.046 (*)     All other components within normal limits   PROCALCITONIN     Treatments provided: pt will be admistted and will need to have dialysis     Family Comments: lives with son , son not present, pt is capable to call and update.     OBS brochure/video discussed/provided to patient/family: N/A              Name of person given brochure if not patient: na              Relationship to patient: na    ED Medications: Medications - No data to display    Drips infusing?:  No    For the majority of the shift this patient was Green.   Interventions performed were admission, oxygen     Severe Sepsis OR Septic Shock Diagnosis Present: No    To be done/followed up on inpatient unit:  dialysis    ED NURSE PHONE NUMBER: [phone]

## 2018-11-05 NOTE — H&P
Admitted:     11/05/2018      PRIMARY CARE PHYSICIAN:  Sandip Antunez MD.      CHIEF COMPLAINT:  Shortness of breath.      HISTORY OF PRESENT ILLNESS:  Mr. Seven Savage is a very pleasant 83-year-old male with past medical history as listed below, notably for ESRD on dialysis, severe aortic stenosis who presented to the ER today with complaints of shortness of breath.  He is on Monday, Wednesday, Friday dialysis, follows Cardiology for severe aortic stenosis, had his last round of dialysis on Friday and after that, he reports that he has been having some dietary indiscretion and has had increased salt intake and fluid intake, went to have steak over the weekend and has been feeling more short of breath over the weekend.  He does use oxygen at home, mostly at night and with some activity, but over the week and found himself using oxygen throughout the day and at night.  He denies any fever, chills or rigors, denied chest pain.  Denies cough, no pain in the abdomen.  Reports a normal bowel and bladder habit.  He presented to ER, was seen by Dr. Sanches and was noted to be low 80s on room air.  He was given a dose of Solu-Medrol and hospitalist was requested admission for further evaluation.  Dr. Sanches also talked to Dr. Samuel from Nephrology who plans to do dialysis today.      REVIEW OF SYSTEMS:  A 10-point review of system was done and was negative apart from those mentioned in the history of present illness.      PAST MEDICAL HISTORY:   1.  Diabetes mellitus type 2.  A1c from 04/2018 was 5.8.   2.  Dyslipidemia.   3.  End-stage renal disease, Monday, Wednesday, Friday dialysis.   4.  Anemia of chronic disease.   5.  Severe aortic stenosis, follows Cardiology and does not want aggressive intervention.   6.  COPD on 2-3 liters home oxygen mostly at night and with activity.   7.  CHF with preserved ejection fraction.  Echo from 06/2018 with EF 55-60% and severe aortic stenosis 0.7 cm2.     8.  Chronic troponin  elevation.   9.  BPH.      MEDICATIONS PRIOR TO ADMISSION:  Prescriptions Prior to Admission   Medication Sig Dispense Refill Last Dose     albuterol (2.5 MG/3ML) 0.083% neb solution TAKE 1 VIAL BY NEBULIZATION EVERY 6 HOURS AS NEEDED FOR SHORNESS OF BREATH/DYSPNEA OR WHEEZING 75 mL 7 prn     albuterol (PROAIR HFA/PROVENTIL HFA/VENTOLIN HFA) 108 (90 BASE) MCG/ACT Inhaler Inhale 1-2 puffs into the lungs every 4 hours as needed for shortness of breath / dyspnea or wheezing 1 Inhaler 5 prn     aspirin 81 MG chewable tablet Take 81 mg by mouth daily   11/4/2018 at am     atorvastatin (LIPITOR) 40 MG tablet Take 20 mg by mouth At Bedtime   11/4/2018 at hs     carvedilol (COREG) 12.5 MG tablet TAKE 1 TABLET(12.5 MG) BY MOUTH TWICE DAILY 180 tablet 3 11/4/2018     COLCHICINE PO Take 0.6 mg by mouth every 48 hours as needed (gout attacks) Reported on 3/30/2017   prn     darbepoetin libby (ARANESP) 100 MCG/0.5ML injection [facility] doses, patient not sure about strength.   11/2/2018     diltiazem (TIAZAC) 300 MG 24 hr ER beaded capsule Take 1 capsule (300 mg) by mouth daily 90 capsule 1 11/4/2018     doxazosin (CARDURA) 8 MG tablet TAKE 1 TABLET BY MOUTH EVERY NIGHT AT BEDTIME. 90 tablet 3 11/4/2018 at hs     eplerenone (INSPRA) 50 MG tablet Take 1 tablet (50 mg) by mouth daily 90 tablet 4 11/4/2018 at am     fish oil-omega-3 fatty acids 1000 MG capsule Take 1 g by mouth daily   11/4/2018 at am     furosemide (LASIX) 20 MG tablet Take 1 tablet (20 mg) by mouth daily 90 tablet 1 11/4/2018 at am     Insulin Aspart (NOVOLOG SC) Inject 12 Units Subcutaneous 3 times daily (with meals) Patient states with large meals he will increase to 14-16 units.   11/4/2018     insulin glargine (LANTUS) 100 UNIT/ML injection Inject 23 Units Subcutaneous At Bedtime (Patient will increase to 26 units at bedtime he states when BG is elevated.   11/4/2018 at      lisinopril  (PRINIVIL/ZESTRIL) 20 MG tablet Take 1 tablet (20 mg) by mouth daily 90 tablet 3 11/4/2018 at am     STIOLTO RESPIMAT 2.5-2.5 MCG/ACT AERS INHALE 2 PUFFS INTO THE LUNGS DAILY 4 g 10 11/4/2018     VITAMIN D, CHOLECALCIFEROL, PO Take 2,000 Units by mouth daily    11/4/2018     B Complex-C-Folic Acid (BRANDI-HEIDY) TABS TAKE ONE TABLET BY MOUTH DAILY 90 tablet 3 Taking     B-D U/F 31G X 8 MM insulin pen needle USE AS DIRECTED FOUR TIMES DAILY. 400 each 1 Taking     blood glucose monitoring (NO BRAND SPECIFIED) test strip Use to test blood sugars 3 times daily or as directed. Uses onetouch 300 strip 11 Taking     order for DME Equipment being ordered: portable Oxygen concentrator 1 Device 0 Taking     order for DME Equipment being ordered: Four wheeled walker 1 Device 0 Taking     order for DME Equipment being ordered: Other: Nebulizer machine  Treatment Diagnosis: COPD 1 Device 0 Taking     Respiratory Therapy Supplies (NEBULIZER/ADULT MASK) KIT 1 Device as needed 1 kit 0 Taking        ALLERGIES:  LEVAQUIN, PIOGLITAZONE and VYTORIN.      SOCIAL HISTORY:  He is a retired psychiatrist.  Quit smoking around 2011.  Prior to that had a 50-pack-year smoking history.  Takes alcohol rarely.  Denies illicit drug use.      FAMILY HISTORY:  Reviewed and not pertinent to current presentation.      PHYSICAL EXAMINATION:   GENERAL:  The patient is conscious, alert, oriented x3, lying comfortably in bed in no apparent distress.  No use of accessory muscles of respiration.   VITAL SIGNS:  Temperature 98.1, heart rate of 61, blood pressure 145/58, saturation 97% on 2-3 liters oxygen.  Does desaturate to mid 70s on exertion.   HEENT:  Pupils are equal and reactive to light and accommodation.  Extraocular movements are intact.  Oral mucosa is moist.   NECK:  Supple, no JVD.   RESPIRATORY:  Diminished breath sounds bilaterally, no significant wheezing or crepitations.   CARDIOVASCULAR:  Normal S1-S2, regular rate and rhythm.  Systolic murmur  present.   ABDOMEN:  Soft, nontender, nondistended, no guarding, rigidity or rebound tenderness.   LOWER EXTREMITIES:  With no edema.   NEUROLOGIC:  No focal neurological deficits noted.  Cranial nerves II-XII grossly intact.   PSYCHIATRIC:  Normal mood and affect.      LABORATORY DATA:  Reviewed in Epic.  CMP notable for creatinine 5.50.  BNP elevated at 698.  Troponin I elevated at 0.046.  CBC with WBC of 13, hemoglobin 8.5, platelet 342.  Chest x-ray reviewed by me shows some bilateral pulmonary vascular congestion, hyperinflated lung fields.  Per Radiology read, there is also a right basilar infiltrate which could be pneumonia.  Chest x-ray does not look much different compared to prior chest x-ray from April.  X-ray was reviewed by myself.      EKG reviewed by me shows a normal sinus rhythm, no acute ST-T wave changes.      ASSESSMENT AND PLAN:  Mr. Seven Savage is an 83-year-old male with diabetes, hypertension, dyslipidemia, ESRD on dialysis, severe aortic stenosis, COPD, CHF with preserved ejection fraction, chronically elevated troponin and BPH who presented to the ER today with complaints of shortness of breath.     1.  Shortness of breath, acute on chronic hypoxic respiratory failure.    - The etiology seems to be multifactorial, could be related to fluid overload related to dietary indiscretion in the setting of ESRD on hemodialysis, CHF with preserved ejection fraction and could also be secondary to likely COPD exacerbation.  Although chest x-ray notes a right lower lobe infiltrate, he has no fever, no cough.  Leukocytosis is chronic.  Do not suspect pneumonia at this time, so will hold off on antibiotics.  We will check a procalcitonin.  Nephrology contacted to initiate dialysis.  Will start DuoNebs q.4 hours as scheduled and albuterol nebs q.2h p.r.n. We will also start Solu-Medrol b.i.d.  Will resume his PTA nebs and inhalers when verified.  Will try to wean down oxygen.     2.  Diabetes mellitus  type 2.  Will resume PTA and NovoLog and Lantus when verified by pharmacy.  We will also have him on sliding scale insulin.   3.  Hypertension.  Resume prior to admission Coreg, diltiazem, lisinopril when verified with pharmacy.   4.  Dyslipidemia.  Resume Lipitor when verified.   5.  Anemia of chronic disease.  Hemoglobin seems to be stable around baseline.   6.  Elevated troponin.  This seems to be chronically elevated.  Denies any chest pain. . No EKG changes.  I do not suspect ACS however, will monitor her on telemetry for the next 24 hours and will trend troponin.   7.  Severe aortic stenosis.  Last echo from 2018 showed EF of 55% -60% and severe aortic stenosis with a valve area of 0.7 cm2.  He followed up with Dr. Hastings and has declined any aggressive intervention.   8.  BPH.  Resume doxazosin when verified.   9.  Deep venous thrombosis prophylaxis with subcutaneous heparin.      CODE STATUS:  FULL CODE.         KIRA CLIFFORD MD             D: 2018   T: 2018   MT: MARCELINA      Name:     MISHA SANDERS   MRN:      [id number]        Account:      [id number]   :      1935        Admitted:     2018                   Document: [id number]       cc: Sandip Antunez MD

## 2018-11-05 NOTE — CONSULTS
"[facility]    RENAL CONSULTATION NOTE    REFERRING MD:  Dr. Gaming    REASON FOR CONSULTATION:  ESKD, SOB    DATE OF CONSULTATION: 11/05/18    SHORTHAND KEY FOR MY NOTES:  c = with, s = without, p = after, a = before, x = except, asx = asymptomatic, tx = transplant or treatment, sx = symptoms or symptomatic, cx = canceled or culture, rxn = reaction, yday = yesterday, nl = normal, abx = antibiotics, fxn = function, dx = diagnosis, dz = disease, m/h = melena/hematochezia, c/d/l/ha = cramping/dizziness/lightheadedness/headache, d/c = discharge or diarrhea/constipation, f/c/n/v = fevers/chills/nausea/vomiting, cp/sob = chest pain/shortness of breath.    HPI: Seven Savage Jr. is a 83 year old male c ESKD 2 DM2/HTN who dialyses MWF via a LAF at the [facility] under the care of Dr. Samuel/Bethanie Cervantes CNP and was admitted on 11/5/2018 c SOB.    Pt states that he has been orthopneic for 2-3d, but has not had any PND.  In addition, he has been having LEE when walking around the house, and he attributes this to his worsening anemia.  He has been losing wt steadily for the past few mos as his appetite hasn't been great.  Denies any cp/sob at rest, abd pain/f/c/n/v.  He still urinates \"49 oz a day\" and denies any UTI sx of d/h.      Currently, he is feeling ok c the aid of supp o2.  He is concerned that he might cramp since \"I cramp when they pull more than 1.2L from me.\"      ROS:  A complete review of systems was performed and is negative x as noted above.    PMH:    Past Medical History:   Diagnosis Date     COPD (chronic obstructive pulmonary disease) (H)      CRF (chronic renal failure)      Heart murmur      HLD (hyperlipidemia)      HTN (hypertension)      IDDM (insulin dependent diabetes mellitus) (H)      Renal calculi      PSH:    Past Surgical History:   Procedure Laterality Date     ADENOIDECTOMY       ENDOSCOPIC POLYPECTOMY NASAL      Through vocal cords     Renal " Stent      For renal calculi     TONSILLECTOMY       MEDICATIONS:      - MEDICATION INSTRUCTIONS for Dialysis Patients -   Does not apply See Admin Instructions     aspirin  81 mg Oral Daily     atorvastatin  20 mg Oral At Bedtime     carvedilol  12.5 mg Oral BID w/meals     [START ON 11/6/2018] diltiazem  300 mg Oral Daily     doxazosin  8 mg Oral At Bedtime     [START ON 11/6/2018] eplerenone  50 mg Oral Daily     furosemide  20 mg Oral Daily     heparin  5,000 Units Subcutaneous Q12H     insulin aspart  1-7 Units Subcutaneous TID AC     insulin aspart  1-5 Units Subcutaneous At Bedtime     insulin aspart  12 Units Subcutaneous TID w/meals     insulin glargine  23 Units Subcutaneous At Bedtime     ipratropium - albuterol 0.5 mg/2.5 mg/3 mL  3 mL Nebulization Q4H While awake     [START ON 11/6/2018] lisinopril  20 mg Oral Daily     methylPREDNISolone  62.5 mg Intravenous Q12H     umeclidinium-vilanterol  1 puff Inhalation Daily     ALLERGIES:    Allergies as of 11/05/2018 - Sandip as Reviewed 11/05/2018   Allergen Reaction Noted     Levaquin [levofloxacin]  06/21/2018     Pioglitazone Other (See Comments) 05/11/2011     Vytorin Other (See Comments) 05/11/2011     FH:    Family History   Problem Relation Age of Onset     Family history unknown: Yes   R/NC    SH:    Social History     Social History     Marital status:      Spouse name: N/A     Number of children: N/A     Years of education: N/A     Occupational History     Not on file.     Social History Main Topics     Smoking status: Former Smoker     Packs/day: 1.00     Years: 70.00     Types: Cigarettes     Quit date: 2011     Smokeless tobacco: Never Used     Alcohol use Yes      Comment: average 1 beer month     Drug use: No     Sexual activity: Not Currently     Other Topics Concern     Not on file     Social History Narrative     PHYSICAL EXAM:    /71  Temp 98.2  F (36.8  C) (Oral)  Resp 24  Wt 77.4 kg (170 lb 9.6 oz)  SpO2 94%  BMI 25.19  kg/m2    GENERAL: awake, alert, NAD  HEENT:  Normocephalic. No gross abnormalities.  MMM.  Dentition is ok x missing a few teeth.  Pupils equal.  EOMI.  No scleral icterus.  CV: RRR c 2/6 murmurs, no clicks, gallops, or rubs, no ble edema.  RESP: Clear bilaterally with good efforts  GI: abdomen o/s/nt/nd, BS present. No masses, organomegaly  MUSCULOSKELETAL: extremities nl - no gross deformities noted  SKIN: no suspicious lesions or rashes, dry to touch  NEURO:  strength ok, symmetric.   PSYCH: mood good, affect appropriate  LYMPH: no palpable ant/post cervical and supraclavicular adenopathy  OTHER:  Access - LAF    Pt seen on HD.  Stable run expected.  -2.5L UF goal.  Good BFR via LAF.    LABS:      CBC RESULTS:     Recent Labs  Lab 11/05/18  0802   WBC 13.0*   RBC 3.20*   HGB 8.5*   HCT 27.3*        BMP RESULTS:    Recent Labs  Lab 11/05/18  0802      POTASSIUM 4.3   CHLORIDE 104   CO2 27   BUN 55*   CR 5.50*   *   AARON 9.2     INRNo lab results found in last 7 days.     DIAGNOSTICS:  Personally reviewed CXR - possible RLL infiltrate, cardiomegaly, some pulm congestion    A/P:  Seven Savage . is a 83 year old male c ESKD who has SOB.    1.  ESKD.  Pt is due to dialyse today per MWF schedule.  He is below his EDW due to steadily losing wt.  Even so, he is sob and seems volume up.  He is concerned re pulling too much fluid due to cramping, but is willing to push it today.  A.  HD today c -2.5L UF goal.  B.  Next planned run on Weds.    2.  SOB.  Pt's CXR shows a possible infiltrate, but he doesn't seem to have pneumonia clinically (no cough, f/c).  It seems more like fluid overload.  A.  Push fluid removal.  B.  May need an extra UF run if breathing doesn't improve.    3.  Anemia.  Pt denies any GI or other bleeding sx.  His hb has been steadily decreasing for the past 6 wks (11s to 8s), prob from holding EPO.  In addition, there could be a dilutional component.  His fatigue is prob related  to this.  A.  Follow hb, clinically.  B.  Continue EPO c HD.  C.  Check fe studies and replace prn.    4.  HTN.  Pt's BP is quite high and he states that he's always in the 170-180.  He will need a new EDW.  A.  Continue same meds/doses.  B.  Follow BPs as we push his EDW.     5.  FEN.  Electrolytes are ok.    Thank you for this consultation. We will follow c you.  Please call if any questions.    Attestation:   I have reviewed today's relevant vital signs, notes, medications, labs and imaging.    Rhys Aguillon MD  [facility] - Nephrology  [phone]

## 2018-11-05 NOTE — IP AVS SNAPSHOT
[facility] [address] [facility]    [address] [address]    Phone:  [phone]                                       After Visit Summary   11/5/2018    Seven Savage Jr.    MRN: [id number]           After Visit Summary Signature Page     I have received my discharge instructions, and my questions have been answered. I have discussed any challenges I see with this plan with the nurse or doctor.    ..........................................................................................................................................  Patient/Patient Representative Signature      ..........................................................................................................................................  Patient Representative Print Name and Relationship to Patient    ..................................................               ................................................  Date                                   Time    ..........................................................................................................................................  Reviewed by Signature/Title    ...................................................              ..............................................  Date                                               Time          22EPIC Rev 08/18

## 2018-11-05 NOTE — IP AVS SNAPSHOT
MRN:[id number]                      After Visit Summary   11/5/2018    Seven Savage Jr.    MRN: [id number]           Thank you!     Thank you for choosing [facility] for your care. Our goal is always to provide you with excellent care. Hearing back from our patients is one way we can continue to improve our services. Please take a few minutes to complete the written survey that you may receive in the mail after you visit with us. Thank you!        Patient Information     Date Of Birth          1935        Designated Caregiver       Most Recent Value    Caregiver    Will someone help with your care after discharge? yes    Name of designated caregiver Seven - son    Phone number of caregiver [phone]    Caregiver [place] [address] [address]      About your hospital stay     You were admitted on:  November 5, 2018 You last received care in the:  [facility] [address] [facility]    You were discharged on:  November 8, 2018        Reason for your hospital stay       Fluid overload                  Who to Call     For medical emergencies, please call 911.  For non-urgent questions about your medical care, please call your primary care provider or clinic, [phone]          Attending Provider     Provider Specialty    Gage Sanches MD Emergency Medicine    [facility]Ad[facility] CenterAbiodun ball MD Internal Medicine       Primary Care Provider Office Phone # Fax #    Sandip Antunez [phone]      After Care Instructions     Activity       Your activity upon discharge: activity as tolerated            Diet       Follow this diet upon discharge: Orders Placed This Encounter      Moderate Consistent CHO Diet                  Follow-up Appointments     Follow-up and recommended labs and tests        Follow up with primary care provider, Sandip Antunez, within 7 days for hospital follow- up.  No follow up labs or test are needed.  Follow up with cardiology  as planned in January  Follow up with nephrology as planned                  Your next 10 appointments already scheduled     Nov 15, 2018  3:20 PM CST   [facility]    [address] [address] [phone]           Bring a current list of meds and any records pertaining to this visit. For Physicals, please bring immunization records and any forms needing to be filled out. Please arrive 10 minutes early to complete paperwork.            Nathaniel 10, 2019  1:45 PM CST   Return Visit with Daria Hastings DO   [facility] ([facility])    [address]  [facility] [place] [facility] [address] [phone] [address]              Further instructions from your care team       Patient Belongings: clothing and necklace.    Pending Results     No orders found from 11/3/2018 to 11/6/2018.            Statement of Approval     Ordered          11/07/18 5005  I have reviewed and agree with all the recommendations and orders detailed in this document.  EFFECTIVE NOW     Approved and electronically signed by:  Jose E Grady DO             Admission Information     Date & Time Provider Department Dept. Phone    11/5/2018 Abiodun Gaming MD [facility] [address] [facility] [phone]      Your Vitals Were     Blood Pressure Pulse Temperature Respirations Weight Pulse Oximetry    140/59 (BP Location: Right arm) 83 97.4  F (36.3  C) (Oral) 16 80.7 kg (177 lb 14.6 oz) 92%    BMI (Body Mass Index)                   26.27 kg/m2           Care EveryWhere ID     This is your Care EveryWhere ID. This could be used by other organizations to access your [facility] medical records  [id number]        Equal Access to Services     LEONARD CONNELLY AH: Ellen Elliott, chetna carr, catrachita barry  saramkdavon polo'aan ah. So [facility] [phone].    ATENCIÓN: Si addila susie, tiene a turpin disposición servicios gratuitos de asistencia lingüística. Sami al [phone].    We comply with applicable federal civil rights laws and [place] laws. We do not discriminate on the basis of race, color, national origin, age, disability, sex, sexual orientation, or gender identity.               Review of your medicines      START taking        Dose / Directions    irbesartan 150 MG tablet   Commonly known as:  AVAPRO   Used for:  Acute on chronic congestive heart failure, unspecified heart failure type (H)        Dose:  150 mg   Take 1 tablet (150 mg) by mouth daily   Quantity:  30 tablet   Refills:  0       predniSONE 20 MG tablet   Commonly known as:  DELTASONE   Used for:  Chronic obstructive pulmonary disease, unspecified COPD type (H)        Dose:  40 mg   Take 2 tablets (40 mg) by mouth daily for 3 days   Quantity:  6 tablet   Refills:  0       ranitidine 150 MG tablet   Commonly known as:  ZANTAC   Used for:  Gastroesophageal reflux disease without esophagitis        Dose:  150 mg   Take 1 tablet (150 mg) by mouth daily as needed for heartburn   Quantity:  30 tablet   Refills:  0         CONTINUE these medicines which have NOT CHANGED        Dose / Directions    * albuterol 108 (90 Base) MCG/ACT inhaler   Commonly known as:  PROAIR HFA/PROVENTIL HFA/VENTOLIN HFA   Used for:  COPD exacerbation (H)        Dose:  1-2 puff   Inhale 1-2 puffs into the lungs every 4 hours as needed for shortness of breath / dyspnea or wheezing   Quantity:  1 Inhaler   Refills:  5       * albuterol (2.5 MG/3ML) 0.083% neb solution   Used for:  COPD exacerbation (H)        TAKE 1 VIAL BY NEBULIZATION EVERY 6 HOURS AS NEEDED FOR SHORNESS OF BREATH/DYSPNEA OR WHEEZING   Quantity:  75 mL   Refills:  7       aspirin 81 MG chewable tablet        Dose:  81 mg   Take 81 mg by mouth daily   Refills:  0       atorvastatin 40 MG tablet   Commonly known as:   LIPITOR        Dose:  20 mg   Take 20 mg by mouth At Bedtime   Refills:  0       B-D U/F 31G X 8 MM   Used for:  Type 2 diabetes mellitus with chronic kidney disease on chronic dialysis, with long-term current use of insulin (H)   Generic drug:  insulin pen needle        USE AS DIRECTED FOUR TIMES DAILY.   Quantity:  400 each   Refills:  1       blood glucose monitoring test strip   Commonly known as:  no brand specified   Used for:  Type 2 diabetes mellitus with chronic kidney disease on chronic dialysis, with long-term current use of insulin (H)        Use to test blood sugars 3 times daily or as directed. Uses onetouch   Quantity:  300 strip   Refills:  11       carvedilol 12.5 MG tablet   Commonly known as:  COREG   Used for:  Aortic valve stenosis, unspecified etiology, Essential hypertension, benign        TAKE 1 TABLET(12.5 MG) BY MOUTH TWICE DAILY   Quantity:  180 tablet   Refills:  3       COLCHICINE PO        Dose:  0.6 mg   Take 0.6 mg by mouth every 48 hours as needed (gout attacks) Reported on 3/30/2017   Refills:  0       darbepoetin libby 100 MCG/0.5ML injection   Commonly known as:  ARANESP        Inject Subcutaneous three times a week At diaylsis M,W,F. Dialysis ([facility]) doses, patient not sure about strength.   Refills:  0       diltiazem 300 MG 24 hr ER beaded capsule   Commonly known as:  TIAZAC   Used for:  Essential hypertension, benign        Dose:  300 mg   Take 1 capsule (300 mg) by mouth daily   Quantity:  90 capsule   Refills:  1       doxazosin 8 MG tablet   Commonly known as:  CARDURA   Used for:  Urinary retention        TAKE 1 TABLET BY MOUTH EVERY NIGHT AT BEDTIME.   Quantity:  90 tablet   Refills:  3       eplerenone 50 MG tablet   Commonly known as:  INSPRA   Used for:  Essential hypertension, benign        Dose:  50 mg   Take 1 tablet (50 mg) by mouth daily   Quantity:  90 tablet   Refills:  4       fish oil-omega-3 fatty acids 1000 MG capsule        Dose:  1 g   Take 1 g by mouth  daily   Refills:  0       furosemide 20 MG tablet   Commonly known as:  LASIX   Used for:  ESRD (end stage renal disease) on dialysis (H), Essential hypertension, benign        Dose:  20 mg   Take 1 tablet (20 mg) by mouth daily   Quantity:  90 tablet   Refills:  1       insulin glargine 100 UNIT/ML injection   Commonly known as:  LANTUS        Dose:  23 Units   Inject 23 Units Subcutaneous At Bedtime (Patient will increase to 26 units at bedtime he states when BG is elevated.   Refills:  0       nebulizer/adult mask Kit kit   Used for:  Chronic obstructive pulmonary disease with acute exacerbation (H)        Dose:  1 Device   1 Device as needed   Quantity:  1 kit   Refills:  0       NOVOLOG SC        Dose:  12 Units   Inject 12 Units Subcutaneous 3 times daily (with meals) Patient states with large meals he will increase to 14-16 units.   Refills:  0       * order for DME   Used for:  COPD exacerbation (H)        Equipment being ordered: Other: Nebulizer machine Treatment Diagnosis: COPD   Quantity:  1 Device   Refills:  0       * order for DME   Used for:  DDD (degenerative disc disease), lumbar, Impaired gait        Equipment being ordered: Four wheeled walker   Quantity:  1 Device   Refills:  0       order for DME   Used for:  Chronic obstructive pulmonary disease with acute exacerbation (H), Pneumonia due to infectious organism, unspecified laterality, unspecified part of lung        Equipment being ordered: portable Oxygen concentrator   Quantity:  1 Device   Refills:  0       BRANDI-HEIDY Tabs   Used for:  ESRD (end stage renal disease) on dialysis (H)        TAKE ONE TABLET BY MOUTH DAILY   Quantity:  90 tablet   Refills:  3       STIOLTO RESPIMAT 2.5-2.5 MCG/ACT Aers   Used for:  COPD exacerbation (H)   Generic drug:  tiotropium-olodaterol        INHALE 2 PUFFS INTO THE LUNGS DAILY   Quantity:  4 g   Refills:  10       VITAMIN D (CHOLECALCIFEROL) PO        Dose:  2000 Units   Take 2,000 Units by mouth daily    Refills:  0       * Notice:  This list has 4 medication(s) that are the same as other medications prescribed for you. Read the directions carefully, and ask your doctor or other care provider to review them with you.      STOP taking     lisinopril 20 MG tablet   Commonly known as:  PRINIVIL/ZESTRIL                Where to get your medicines      These medications were sent to [facility] [place], [place] - [address]  [address], [place] [address]     Phone:  [phone]     irbesartan 150 MG tablet    predniSONE 20 MG tablet    ranitidine 150 MG tablet                Protect others around you: Learn how to safely use, store and throw away your medicines at www.disposemymeds.org.             Medication List: This is a list of all your medications and when to take them. Check marks below indicate your daily home schedule. Keep this list as a reference.      Medications           Morning Afternoon Evening Bedtime As Needed    * albuterol 108 (90 Base) MCG/ACT inhaler   Commonly known as:  PROAIR HFA/PROVENTIL HFA/VENTOLIN HFA   Inhale 1-2 puffs into the lungs every 4 hours as needed for shortness of breath / dyspnea or wheezing                                * albuterol (2.5 MG/3ML) 0.083% neb solution   TAKE 1 VIAL BY NEBULIZATION EVERY 6 HOURS AS NEEDED FOR SHORNESS OF BREATH/DYSPNEA OR WHEEZING   Last time this was given:  2.5 mg on 11/7/2018  6:39 PM                                aspirin 81 MG chewable tablet   Take 81 mg by mouth daily   Last time this was given:  81 mg on 11/8/2018  8:34 AM   Next Dose Due:  Tomorrow                                   atorvastatin 40 MG tablet   Commonly known as:  LIPITOR   Take 20 mg by mouth At Bedtime   Last time this was given:  20 mg on 11/7/2018  9:36 PM   Next Dose Due:  Tonight                                   B-BHAKTI U/F 31G X 8 MM   USE AS DIRECTED FOUR TIMES DAILY.   Generic drug:  insulin pen needle                                 blood glucose monitoring test strip   Commonly known as:  no brand specified   Use to test blood sugars 3 times daily or as directed. Uses onetouch                                carvedilol 12.5 MG tablet   Commonly known as:  COREG   TAKE 1 TABLET(12.5 MG) BY MOUTH TWICE DAILY   Last time this was given:  12.5 mg on 11/8/2018  8:34 AM                                COLCHICINE PO   Take 0.6 mg by mouth every 48 hours as needed (gout attacks) Reported on 3/30/2017                                darbepoetin libby 100 MCG/0.5ML injection   Commonly known as:  ARANESP   Inject Subcutaneous three times a week At [facility] M,W[facility]. Dialysis ([facility]) doses, patient not sure about strength.                                diltiazem 300 MG 24 hr ER beaded capsule   Commonly known as:  TIAZAC   Take 1 capsule (300 mg) by mouth daily                                doxazosin 8 MG tablet   Commonly known as:  CARDURA   TAKE 1 TABLET BY MOUTH EVERY NIGHT AT BEDTIME.   Last time this was given:  8 mg on 11/7/2018  9:37 PM   Next Dose Due:  Tonight                                   eplerenone 50 MG tablet   Commonly known as:  INSPRA   Take 1 tablet (50 mg) by mouth daily   Last time this was given:  50 mg on 11/8/2018  8:33 AM   Next Dose Due:  Tomorrow                                   fish oil-omega-3 fatty acids 1000 MG capsule   Take 1 g by mouth daily                                furosemide 20 MG tablet   Commonly known as:  LASIX   Take 1 tablet (20 mg) by mouth daily   Last time this was given:  20 mg on 11/8/2018  8:34 AM   Next Dose Due:  Tomorrow                                   insulin glargine 100 UNIT/ML injection   Commonly known as:  LANTUS   Inject 23 Units Subcutaneous At Bedtime (Patient will increase to 26 units at bedtime he states when BG is elevated.   Last time this was given:  23 Units on 11/7/2018 10:43 PM                                irbesartan 150 MG tablet   Commonly known as:  AVAPRO   Take 1 tablet  (150 mg) by mouth daily   Last time this was given:  150 mg on 11/8/2018  8:34 AM   Next Dose Due:  Tomorrow                                   nebulizer/adult mask Kit kit   1 Device as needed                                NOVOLOG SC   Inject 12 Units Subcutaneous 3 times daily (with meals) Patient states with large meals he will increase to 14-16 units.                                * order for DME   Equipment being ordered: Other: Nebulizer machine Treatment Diagnosis: COPD                                * order for DME   Equipment being ordered: Four wheeled walker                                order for DME   Equipment being ordered: portable Oxygen concentrator                                predniSONE 20 MG tablet   Commonly known as:  DELTASONE   Take 2 tablets (40 mg) by mouth daily for 3 days   Next Dose Due:  Tomorrow                                   ranitidine 150 MG tablet   Commonly known as:  ZANTAC   Take 1 tablet (150 mg) by mouth daily as needed for heartburn                                BRANDI-HEIDY Tabs   TAKE ONE TABLET BY MOUTH DAILY                                STIOLTO RESPIMAT 2.5-2.5 MCG/ACT Aers   INHALE 2 PUFFS INTO THE LUNGS DAILY   Generic drug:  tiotropium-olodaterol                                VITAMIN D (CHOLECALCIFEROL) PO   Take 2,000 Units by mouth daily                                * Notice:  This list has 4 medication(s) that are the same as other medications prescribed for you. Read the directions carefully, and ask your doctor or other care provider to review them with you.

## 2018-11-06 ENCOUNTER — APPOINTMENT (OUTPATIENT)
Dept: CARDIOLOGY | Facility: CLINIC | Age: 83
DRG: 291 | End: 2018-11-06
Attending: INTERNAL MEDICINE
Payer: MEDICARE

## 2018-11-06 PROBLEM — Z71.89 COUNSELING REGARDING ADVANCED DIRECTIVES: Status: RESOLVED | Noted: 2017-02-23 | Resolved: 2018-11-06

## 2018-11-06 LAB
GLUCOSE BLDC GLUCOMTR-MCNC: 192 MG/DL (ref 70–99)
GLUCOSE BLDC GLUCOMTR-MCNC: 206 MG/DL (ref 70–99)
GLUCOSE BLDC GLUCOMTR-MCNC: 221 MG/DL (ref 70–99)
GLUCOSE BLDC GLUCOMTR-MCNC: 251 MG/DL (ref 70–99)
GLUCOSE BLDC GLUCOMTR-MCNC: 296 MG/DL (ref 70–99)
HBV SURFACE AB SERPL IA-ACNC: 6.07 M[IU]/ML
HBV SURFACE AG SERPL QL IA: NONREACTIVE

## 2018-11-06 PROCEDURE — 40000275 ZZH STATISTIC RCP TIME EA 10 MIN

## 2018-11-06 PROCEDURE — 25000125 ZZHC RX 250: Performed by: HOSPITALIST

## 2018-11-06 PROCEDURE — 93306 TTE W/DOPPLER COMPLETE: CPT | Mod: 26 | Performed by: INTERNAL MEDICINE

## 2018-11-06 PROCEDURE — 25000132 ZZH RX MED GY IP 250 OP 250 PS 637: Mod: GY | Performed by: HOSPITALIST

## 2018-11-06 PROCEDURE — A9270 NON-COVERED ITEM OR SERVICE: HCPCS | Mod: GY | Performed by: HOSPITALIST

## 2018-11-06 PROCEDURE — 25000128 H RX IP 250 OP 636: Performed by: HOSPITALIST

## 2018-11-06 PROCEDURE — 99232 SBSQ HOSP IP/OBS MODERATE 35: CPT | Performed by: INTERNAL MEDICINE

## 2018-11-06 PROCEDURE — 94640 AIRWAY INHALATION TREATMENT: CPT

## 2018-11-06 PROCEDURE — 93306 TTE W/DOPPLER COMPLETE: CPT

## 2018-11-06 PROCEDURE — 12000000 ZZH R&B MED SURG/OB

## 2018-11-06 PROCEDURE — 00000146 ZZHCL STATISTIC GLUCOSE BY METER IP

## 2018-11-06 PROCEDURE — 94640 AIRWAY INHALATION TREATMENT: CPT | Mod: 76

## 2018-11-06 PROCEDURE — 25000131 ZZH RX MED GY IP 250 OP 636 PS 637: Mod: GY | Performed by: HOSPITALIST

## 2018-11-06 PROCEDURE — 99222 1ST HOSP IP/OBS MODERATE 55: CPT | Mod: 25 | Performed by: INTERNAL MEDICINE

## 2018-11-06 RX ORDER — IRBESARTAN 150 MG/1
150 TABLET ORAL DAILY
Status: DISCONTINUED | OUTPATIENT
Start: 2018-11-07 | End: 2018-11-08 | Stop reason: HOSPADM

## 2018-11-06 RX ADMIN — HEPARIN SODIUM 5000 UNITS: 5000 INJECTION, SOLUTION INTRAVENOUS; SUBCUTANEOUS at 10:48

## 2018-11-06 RX ADMIN — IPRATROPIUM BROMIDE AND ALBUTEROL SULFATE 3 ML: 2.5; .5 SOLUTION RESPIRATORY (INHALATION) at 07:30

## 2018-11-06 RX ADMIN — ASPIRIN 81 MG 81 MG: 81 TABLET ORAL at 08:21

## 2018-11-06 RX ADMIN — INSULIN ASPART 12 UNITS: 100 INJECTION, SOLUTION INTRAVENOUS; SUBCUTANEOUS at 08:33

## 2018-11-06 RX ADMIN — DOXAZOSIN 8 MG: 4 TABLET ORAL at 22:59

## 2018-11-06 RX ADMIN — HEPARIN SODIUM 5000 UNITS: 5000 INJECTION, SOLUTION INTRAVENOUS; SUBCUTANEOUS at 23:02

## 2018-11-06 RX ADMIN — INSULIN ASPART 2 UNITS: 100 INJECTION, SOLUTION INTRAVENOUS; SUBCUTANEOUS at 18:44

## 2018-11-06 RX ADMIN — INSULIN ASPART 4 UNITS: 100 INJECTION, SOLUTION INTRAVENOUS; SUBCUTANEOUS at 08:33

## 2018-11-06 RX ADMIN — METHYLPREDNISOLONE SODIUM SUCCINATE 62.5 MG: 125 INJECTION, POWDER, FOR SOLUTION INTRAMUSCULAR; INTRAVENOUS at 23:03

## 2018-11-06 RX ADMIN — IPRATROPIUM BROMIDE AND ALBUTEROL SULFATE 3 ML: 2.5; .5 SOLUTION RESPIRATORY (INHALATION) at 11:30

## 2018-11-06 RX ADMIN — CARVEDILOL 12.5 MG: 12.5 TABLET, FILM COATED ORAL at 18:44

## 2018-11-06 RX ADMIN — EPLERENONE 50 MG: 25 TABLET ORAL at 08:20

## 2018-11-06 RX ADMIN — INSULIN GLARGINE 23 UNITS: 100 INJECTION, SOLUTION SUBCUTANEOUS at 23:02

## 2018-11-06 RX ADMIN — LISINOPRIL 20 MG: 20 TABLET ORAL at 08:21

## 2018-11-06 RX ADMIN — METHYLPREDNISOLONE SODIUM SUCCINATE 62.5 MG: 125 INJECTION, POWDER, FOR SOLUTION INTRAMUSCULAR; INTRAVENOUS at 10:48

## 2018-11-06 RX ADMIN — IPRATROPIUM BROMIDE AND ALBUTEROL SULFATE 3 ML: 2.5; .5 SOLUTION RESPIRATORY (INHALATION) at 15:10

## 2018-11-06 RX ADMIN — INSULIN ASPART 2 UNITS: 100 INJECTION, SOLUTION INTRAVENOUS; SUBCUTANEOUS at 14:17

## 2018-11-06 RX ADMIN — FUROSEMIDE 20 MG: 20 TABLET ORAL at 08:21

## 2018-11-06 RX ADMIN — CARVEDILOL 12.5 MG: 12.5 TABLET, FILM COATED ORAL at 08:21

## 2018-11-06 RX ADMIN — INSULIN ASPART 12 UNITS: 100 INJECTION, SOLUTION INTRAVENOUS; SUBCUTANEOUS at 18:44

## 2018-11-06 RX ADMIN — DILTIAZEM HYDROCHLORIDE 300 MG: 180 CAPSULE, EXTENDED RELEASE ORAL at 08:20

## 2018-11-06 RX ADMIN — UMECLIDINIUM BROMIDE AND VILANTEROL TRIFENATATE 1 PUFF: 62.5; 25 POWDER RESPIRATORY (INHALATION) at 08:21

## 2018-11-06 RX ADMIN — INSULIN ASPART 12 UNITS: 100 INJECTION, SOLUTION INTRAVENOUS; SUBCUTANEOUS at 14:16

## 2018-11-06 RX ADMIN — ATORVASTATIN CALCIUM 20 MG: 20 TABLET, FILM COATED ORAL at 22:59

## 2018-11-06 ASSESSMENT — ACTIVITIES OF DAILY LIVING (ADL)
ADLS_ACUITY_SCORE: 11

## 2018-11-06 NOTE — PLAN OF CARE
Problem: Patient Care Overview  Goal: Plan of Care/Patient Progress Review  Outcome: No Change  BP elevated, other VSS, O2 low 90s 2L NC, LEE. LS dim. Tele NSR. A&Ox4. Up independently, calls appropriately. Tolerating mod carb/renal diet, good appetite. Blood sugars 296 and 204. On IV Solu Medrol BID. HD access fistula on Lt upper arm, dressing CDI, +bruit/thrill, scheduled for dialysis tomorrow. IVSL. Pt declined lab draws, MD aware. Cardiothoracic surgery following for hx of severe aortic stenosis. Echo done this shift. Pt declining surgery at this point. RN will cont to monitor.

## 2018-11-06 NOTE — CONSULTS
Full Cariology consultation dictated.  CV Surgery consultation ordered for severe aortic stenosis.  David Dukes MD, FACC  November 6, 2018 12:17 PM  [id number]

## 2018-11-06 NOTE — PLAN OF CARE
Problem: Patient Care Overview  Goal: Plan of Care/Patient Progress Review  Outcome: Improving  1075-4559: A&Ox4.  Independent.  VSS on 2 LPM via NC, baseline use PRN at home.  Tele: NSR.  Tolerates Mod CHO diet.  Saline locked, received IV steroids.  , 251. LS diminished, LEE.  Heart murmur.  LUE fistula with positive thrill/bruit, covered from receiving dialysis yesterday.  Refused trop recheck last night despite increasing trop levels.  Only wants blood draws during dialysis for fear of anemia.  Nephrology following.  Discharge pending.

## 2018-11-06 NOTE — PLAN OF CARE
Problem: Patient Care Overview  Goal: Plan of Care/Patient Progress Review  Outcome: No Change  Patient is A&Ox4; able to make his needs known; independent.  Sepsis BPA fired - lactate came back at 0.5; WBC elevated otherwise VSS; tele SR with 1st degree AVB.  LS diminished with fine rales; LEE but O2sats are maintained in hi-90's on 2L (baseline).   and 167; tolerating diet well.  Troponin was elevated and went up from 0.046 to 0.070 - MD aware.  Patient denies CP/pain/N/V.  LUE shunt with (+) b/t; had dialysis this pm (dialyzes MWF) - Nephrology following.

## 2018-11-06 NOTE — PROGRESS NOTES
[facility]    Hospitalist Progress Note    Assessment & Plan   Seven Savage is an 83-year-old male with diabetes, hypertension, dyslipidemia, ESRD on dialysis, severe aortic stenosis, COPD, CHF with preserved ejection fraction, chronically elevated troponin and BPH who presented to the ER today with complaints of shortness of breath.     Acute on chronic hypoxic respiratory failure  Possible HFpEF decompensation   Fluid overload in setting of ESRD  Possible COPD exacerbation   Severe aortic stenosis   Suspected to be multifactorial due to above.  Although chest x-ray noted a right lower lobe infiltrate, he has no fever, no cough and his procalcitonin was normal for an ESRD patient.  Leukocytosis was noted but this appears to be chronic.  Do not suspect pneumonia at this time  Last echo from 06/2018 showed EF of 55% -60% and severe aortic stenosis with a valve area of 0.7 cm2.  He has followed up with Dr. Hastings and had been declining any surgical intervention but is now amenable   - Continue Duonebs q4h with Albuterol PRN   - Daily Anoro Ellipta   - Titrate O2 as tolerated   - Will diuresis with dialysis  - Continue Solu-Medrol 62.5 mg q12h for now.  Likely switch to Prednisone tomorrow or day after   - PTA Lasix 20 mg PO   - Echocardiogram ordered   - Nephrology following to help with dialysis.  Patient is normally a M/W/F dialysis   - Cardiology consulted given the severe AS.  Plan to consult surgery though I suspect he is a poor surgical candidate     Diabetes mellitus type 2  Last HgbA1c from 4/6/18 was 5.8.   PTA on Lantus 23 U at bedtime and Novolo 12 U QAC  - Lantus 23 U at bedtime  - Medium intensity SSI    Hypertension  HLP   - Lisinopril switched to Irbesartan 150 mg   - PTA Coreg 12.5 mg BID, Diltiazem 300 mg   - PTA Lipitor    Anemia of chronic disease  Hemoglobin seems to be stable around baseline.     Elevated troponin  This seems to be chronically elevated.  Denies any chest pain. .  No EKG changes.  I do not suspect ACS however    BPH  - PTA Cardura       DVT Prophylaxis: Heparin SQ  Code Status: Full Code    Disposition: Expected discharge TBD.  Still undergoing cardiac evaluation for his AS     Jose E MCKEON Miguel A,   Text Page (7am to 6pm)    Interval History   Patient seen and examined.  His breathing is improved.  No pain currently.  Had a long discussion regarding his AS and he stated to me he would now like to pursue further discussions regarding intervention.     -Data reviewed today: I reviewed all new labs and imaging results over the last 24 hours. I personally reviewed no images or EKG's today.    Physical Exam   Temp: 98.2  F (36.8  C) Temp src: Oral BP: 142/64 Pulse: 83 Heart Rate: 82 Resp: 20 SpO2: 93 % O2 Device: Nasal cannula Oxygen Delivery: 2 LPM  Vitals:    11/05/18 0807 11/05/18 0817   Weight: 84.4 kg (186 lb) 77.4 kg (170 lb 9.6 oz)     Vital Signs with Ranges  Temp:  [97.4  F (36.3  C)-98.3  F (36.8  C)] 98.2  F (36.8  C)  Pulse:  [83] 83  Heart Rate:  [68-83] 82  Resp:  [18-22] 20  BP: (117-169)/(55-64) 142/64  SpO2:  [93 %-100 %] 93 %  I/O last 3 completed shifts:  In: 760 [P.O.:760]  Out: 2725 [Urine:225; Other:2500]    Constitutional: Awake, alert, cooperative, no apparent distress  Respiratory: Clear to auscultation bilaterally, no crackles or wheezing  Cardiovascular: Regular rate and rhythm, normal S1 and S2, and murmur noted  GI: Normal bowel sounds, soft, non-distended, non-tender  Skin/Integumen: No rashes, no cyanosis  MSK:  Trace lower extremity edema noted    Medications       - MEDICATION INSTRUCTIONS for Dialysis Patients -   Does not apply See Admin Instructions     aspirin  81 mg Oral Daily     atorvastatin  20 mg Oral At Bedtime     carvedilol  12.5 mg Oral BID w/meals     diltiazem  300 mg Oral Daily     doxazosin  8 mg Oral At Bedtime     eplerenone  50 mg Oral Daily     furosemide  20 mg Oral Daily     heparin  5,000 Units Subcutaneous Q12H     insulin  aspart  1-7 Units Subcutaneous TID AC     insulin aspart  1-5 Units Subcutaneous At Bedtime     insulin aspart  12 Units Subcutaneous TID w/meals     insulin glargine  23 Units Subcutaneous At Bedtime     ipratropium - albuterol 0.5 mg/2.5 mg/3 mL  3 mL Nebulization Q4H While awake     [START ON 11/7/2018] irbesartan  150 mg Oral Daily     methylPREDNISolone  62.5 mg Intravenous Q12H     sodium chloride (PF)  3 mL Intracatheter Q8H     umeclidinium-vilanterol  1 puff Inhalation Daily       Data     Recent Labs  Lab 11/05/18  1330 11/05/18  0802   WBC  --  13.0*   HGB  --  8.5*   MCV  --  85    342   NA  --  139   POTASSIUM  --  4.3   CHLORIDE  --  104   CO2  --  27   BUN  --  55*   CR  --  5.50*   ANIONGAP  --  8   AARON  --  9.2   GLC  --  105*   ALBUMIN  --  3.3*   PROTTOTAL  --  6.6*   BILITOTAL  --  0.5   ALKPHOS  --  55   ALT  --  15   AST  --  13   TROPI 0.070* 0.046*       Imaging:   No results found for this or any previous visit (from the past 24 hour(s)).

## 2018-11-06 NOTE — CONSULTS
Consult Date:  11/06/2018      CARDIOLOGY CONSULTATION        PRIMARY CARE PHYSICIAN:  Dr. Sandip Antunez      REASON FOR CONSULTATION:  I have been asked by Dr. Jose E Grady to see Seven Savage for evaluation and treatment of severe aortic stenosis.      HISTORY OF PRESENT ILLNESS:  Seven Savage Jr. is a pleasant 83-year-old male originally from [place], moving to [place] and then to [place] to be closer to his family.  This patient is followed by Dr. Denisse Hastings at the [facility].  He has a longstanding history of COPD from previous tobacco abuse, end-stage renal disease, and is on hemodialysis 3 times a week.  He was diagnosed with aortic stenosis and an echocardiogram from 6/7 showed normal left ventricular function with ejection fraction of 50-60%, moderate mitral insufficiency, moderate LVH, and severe aortic stenosis with a mean gradient of 50 mmHg and a valve area calculated at 0.7 cm2.  He had borderline elevated pulmonary pressures and RV function appeared normal.  He was hospitalized in April for congestive heart failure and COPD exacerbation and treated successfully.  This was for the most part due to dietary indiscretions when he went back to [place].  He continues to eat at restaurants such as [employer] , with significant salt intake.        He has a history of hypertension and has been on eplerenone 50 mg daily in combination with lisinopril 20 mg daily, diltiazem, and Cardura, as well as carvedilol.  He has not had any difficulty with his potassium in the last 4 years with this combination.  He was started on oxygen this summer and uses this intermittently, especially with activity.  The patient last saw Dr. Hastings last week and a discussion regarding aortic valve replacement ensued.  The patient was adamant that he did not want to pursue either surgical intervention or TAVR at this time.  The patient states that he went to a restaurant 4 days ago.  The  next morning, he became increasingly short of breath and requiring supplemental oxygen.  He was intending to try to make it through the rest of the weekend until Monday for his hemodialysis, but by yesterday morning he was so short of breath he came to the emergency room.  After dialysis, he is having less shortness of breath.  It is unclear whether this represented a COPD exacerbation or fluid overload.  His chest x-ray demonstrated a right basilar infiltrate, but no pulmonary congestion.  He did not have sputum production, fever, chills, or evidence of pneumonitis.  Other than his dietary indiscretions, the patient notes that he is on a no added salt diet.  He uses his inhalers as directed.  The patient states he is now ready to consider intervention for his aortic stenosis and requests CV Surgery consultation.      ALLERGIES:  LEVAQUIN, PIOGLITAZONE, AND VYTORIN.      MEDICATIONS PRIOR TO ADMISSION:   1.  Albuterol inhaler every 4 hours as needed.   2.  Aspirin 81 mg daily.   3.  Atorvastatin 40 mg one half tablet at bedtime.   4.  Carvedilol 12.5 mg twice daily.    5.  Colchicine 0.6 mg by mouth every 48 hours as needed for gout.   6.  Aranesp 100 mcg injection 3 times a week.   7.  Diltiazem 300 mg 24-hour ER beaded capsule daily.   8.  Doxazosin 8 mg tablet every night at bedtime.     9.  Eplerenone 50 mg tablet daily.   10.  Fish oil omega-3 fatty acid 1000 mg capsule daily.   11.  Furosemide 20 mg by mouth daily.   12.  NovoLog insulin 12 units subcutaneous daily with meals.   13.  Lantus 100 unit/mL injection 23 units subcutaneous at bedtime.   14.  Lisinopril 20 mg daily.   15.  Stiolto Respimat 25-25 inhale 2 puffs into the lungs daily.   16.  Vitamin D 2000 units by mouth daily.   17.  Chandrika-Jocelin tabs 1 by mouth daily.      PAST MEDICAL HISTORY:   1.  End-stage renal disease with dialysis Monday, Wednesday, and Friday.   2.  Type 2 diabetes mellitus with hemoglobin A1c 5.8-5.9%.   3.  Dyslipidemia.   4.   Anemia of chronic disease.   5.  Severe aortic stenosis, as mentioned above.   6.  COPD with 2-3 liters home oxygen mostly at night and with activity.   7.  Chronic troponin elevation.   8.  Benign prostatic hypertrophy.   9.  Status post tonsillectomy.   10.  Renal stent for renal calculus.   11.  Endoscopic polypectomy of his nose.   12.  Adenoidectomy.      No history of peptic ulcer disease, cancer, tuberculosis, hyper or hypothyroidism.      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  He quit smoking in 2014 after a 50-pack-year history.  Rarely drinks alcohol.  Denies illicit drug use.      FAMILY HISTORY:  No premature atherosclerosis.      REVIEW OF SYSTEMS:  A 12-point review of system is performed with pertinent positives and negatives listed in the HPI.  All other review of systems are asked and are negative.      PHYSICAL EXAMINATION:   VITAL SIGNS:  Blood pressure is 142/64, pulse is 82 and regular, respiratory rate 20 and unlabored.  The patient is afebrile.  Current weight is 171 pounds (77 kg).   GENERAL:  The patient is an alert -American male in no acute distress.   HEENT:  Normocephalic, atraumatic without xanthelasma.  No arcus senilis.  No oropharyngeal lesions.  Mucous membranes are moist.   NECK:  Supple without thyromegaly.  Carotid upstroke is brisk with a referred murmur.  No bruits.   CHEST:  Clear to auscultation with increased AP diameter, prolonged expiratory phase.  No wheezes, rales, or rhonchi.  No kyphosis or scoliosis.   CARDIAC:  Regular rate and rhythm, normal S1 and S2 without S3 or S4 gallop.  There is a 2-3/6 roaring systolic ejection murmur, left lower sternal border, the right upper sternal border, and softly into the bilateral carotids.  No other murmurs heard.  No heave, rub, or thrill.  No JVD or HJR.  Pulses are intact without bruits.  No tenderness to palpation across the precordium.   ABDOMEN:  Soft, nontender without organomegaly.   Bowel sounds are present.  No bruits.   EXTREMITIES:  Without cyanosis, clubbing, or edema.  His dialysis fistula is located in the left upper extremity.   SKIN:  Warm, dry, and intact.   NEUROLOGIC:  Alert and oriented x 3.  Cranial nerves II through XII grossly intact.   PSYCHIATRIC:  Affect is normal.      Chest x-ray shows probable COPD.  Right basilar infiltrate.  No pulmonary vascular congestion.  I personally reviewed the patient's chest x-ray.  EKG from yesterday is not available.      LABORATORY:  Glucose is variable at 167-296.  Troponin I ES 0.046 and 0.070.  Creatinine 5.5, BUN 55.  N-terminal proBNP 86 and 98.  Procalcitonin 0.33.  White count 13,000, hemoglobin 8.5, platelet count 342,000.      ASSESSMENT:   1.  Seven Savage is a delightful 83-year-old male with severe aortic stenosis with multiple hospitalizations for probable congestive heart failure and possibly pneumonia with underlying history of moderately severe COPD and end-stage renal disease on dialysis.  He requires intermittent oxygen during the day with activity and at night.  His shortness of breath may or may not have been due to pulmonary edema on this visit.  At this point, the patient's severe aortic stenosis needs to be attended to.  The patient is now willing to visit with the CV surgeon and discuss both surgical intervention or TAVR replacement of his aortic valve.  We discussed his sodium indiscretions and the need for avoidance of restaurant food.  He currently is stable and lungs are clear.   2.  Hypertension.  I have taken the liberty of changing his lisinopril to irbesartan 150 mg daily.  We will need to carefully monitor his potassium levels with his current medications.   3.  End-stage kidney disease, on hemodialysis, currently stable.   4.  Type 2 diabetes mellitus, currently stable.   5.  Dyslipidemia.  The patient remains on Lipitor.     6.  Before surgery for aortic valve replacement, coronary angiography would be  necessary.   7.  Benign prostatic hypertrophy.  The patient is taking doxazosin.      PLAN:   1.  CV Surgery consultation.   2.  Change lisinopril to irbesartan for better blood pressure control.   3.  Continue hemodialysis as before.   4.  Reduce sodium intake from outside meal sources.      It is my pleasure to assist in the care of Misha Sanders .  All his questions were answered to his satisfaction.         KARMEN IRIZARRY MD, [facility]             D: 2018   T: 2018   MT: NG      Name:     MISHA SANDERS   MRN:      [id number]        Account:       [id number]   :      1935           Consult Date:  2018      Document: [id number]       cc: Jose E Ding MD

## 2018-11-06 NOTE — PROGRESS NOTES
"SPIRITUAL HEALTH SERVICES Progress Note  [facility]    Visited pt Seven per pt/family request. Pt welcomed  and said that pt loves to talk with .    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 Pt is currently living with his son, and his son is central to his support system.   Pt loves to talk about history and [employer]. Pt shared various stories about  history, Adventist, and Anabaptism.   Pt said, \"My grandfather was a , and I wanted to be a , but became a psychiatrist.\" \" I love God and all my people.\"      provided listening in care and reflective questions.     I and SH remain available per pt/family request.     Deborah Raygoza  Chaplain Resident     "

## 2018-11-06 NOTE — CONSULTS
CARDIAC SURGERY CONSULT NOTE    Consult Reason: aortic valve stenosis    HPI: 83 year old gentleman with known aortic stenosis. He recently moved here from [place]. He was admitted with dyspnea yesterday.    He has been followed with ECHO for known valve disease for some time. His ECHO report from earlier this year shows valve area of 0.7cm2 and MG of 50. His additional medical history is notable for diabetes, ESRD on dialysis, chronic anemia, and COPD with home O2 use.    He affirms the history as noted. He states that he does not want open heart surgery.       A/P: Patient is a 83 year old male with multiple medical issues, including severe aortic valve stenosis. His STS mortality risk for AVR alone is almost 20%. He is a very poor surgical candidate, and additionally he is not interested in surgery.     Consider evaluation for TAVR if the patient is amenable. Would need ECHO, CTA, carotids, heart cath.      Thank you for the opportunity to participate in the care of this patient.      Yassine Elmore MD        PMH:  Past Medical History:   Diagnosis Date     COPD (chronic obstructive pulmonary disease) (H)      CRF (chronic renal failure)      Heart murmur      HLD (hyperlipidemia)      HTN (hypertension)      IDDM (insulin dependent diabetes mellitus) (H)      Renal calculi          PSH:  Past Surgical History:   Procedure Laterality Date     ADENOIDECTOMY       ENDOSCOPIC POLYPECTOMY NASAL      Through vocal cords     Renal Stent      For renal calculi     TONSILLECTOMY           FH:  Family history is unknown by patient.        Allergies:  Allergies   Allergen Reactions     Levaquin [Levofloxacin]      edema     Pioglitazone Other (See Comments)     Edema & hay fever     Vytorin Other (See Comments)     Muscle aches       Home Meds:  Prescriptions Prior to Admission   Medication Sig Dispense Refill Last Dose     albuterol (2.5 MG/3ML) 0.083% neb solution TAKE 1 VIAL BY NEBULIZATION EVERY 6 HOURS AS NEEDED  FOR SHORNESS OF BREATH/DYSPNEA OR WHEEZING 75 mL 7 prn     albuterol (PROAIR HFA/PROVENTIL HFA/VENTOLIN HFA) 108 (90 BASE) MCG/ACT Inhaler Inhale 1-2 puffs into the lungs every 4 hours as needed for shortness of breath / dyspnea or wheezing 1 Inhaler 5 prn     aspirin 81 MG chewable tablet Take 81 mg by mouth daily   11/4/2018 at am     atorvastatin (LIPITOR) 40 MG tablet Take 20 mg by mouth At Bedtime   11/4/2018 at hs     carvedilol (COREG) 12.5 MG tablet TAKE 1 TABLET(12.5 MG) BY MOUTH TWICE DAILY 180 tablet 3 11/4/2018     COLCHICINE PO Take 0.6 mg by mouth every 48 hours as needed (gout attacks) Reported on 3/30/2017   prn     darbepoetin libby (ARANESP) 100 MCG/0.5ML injection [facility] doses, patient not sure about strength.   11/2/2018     diltiazem (TIAZAC) 300 MG 24 hr ER beaded capsule Take 1 capsule (300 mg) by mouth daily 90 capsule 1 11/4/2018     doxazosin (CARDURA) 8 MG tablet TAKE 1 TABLET BY MOUTH EVERY NIGHT AT BEDTIME. 90 tablet 3 11/4/2018 at hs     eplerenone (INSPRA) 50 MG tablet Take 1 tablet (50 mg) by mouth daily 90 tablet 4 11/4/2018 at am     fish oil-omega-3 fatty acids 1000 MG capsule Take 1 g by mouth daily   11/4/2018 at am     furosemide (LASIX) 20 MG tablet Take 1 tablet (20 mg) by mouth daily 90 tablet 1 11/4/2018 at am     Insulin Aspart (NOVOLOG SC) Inject 12 Units Subcutaneous 3 times daily (with meals) Patient states with large meals he will increase to 14-16 units.   11/4/2018     insulin glargine (LANTUS) 100 UNIT/ML injection Inject 23 Units Subcutaneous At Bedtime (Patient will increase to 26 units at bedtime he states when BG is elevated.   11/4/2018 at hs     lisinopril (PRINIVIL/ZESTRIL) 20 MG tablet Take 1 tablet (20 mg) by mouth daily 90 tablet 3 11/4/2018 at am     STIOLTO RESPIMAT 2.5-2.5 MCG/ACT AERS INHALE 2 PUFFS INTO THE LUNGS DAILY 4 g 10 11/4/2018     VITAMIN D, CHOLECALCIFEROL, PO Take 2,000  Units by mouth daily    11/4/2018     B Complex-C-Folic Acid (BRANDI-HEIDY) TABS TAKE ONE TABLET BY MOUTH DAILY 90 tablet 3 Taking     B-D U/F 31G X 8 MM insulin pen needle USE AS DIRECTED FOUR TIMES DAILY. 400 each 1 Taking     blood glucose monitoring (NO BRAND SPECIFIED) test strip Use to test blood sugars 3 times daily or as directed. Uses onetouch 300 strip 11 Taking     order for DME Equipment being ordered: portable Oxygen concentrator 1 Device 0 Taking     order for DME Equipment being ordered: Four wheeled walker 1 Device 0 Taking     order for DME Equipment being ordered: Other: Nebulizer machine  Treatment Diagnosis: COPD 1 Device 0 Taking     Respiratory Therapy Supplies (NEBULIZER/ADULT MASK) KIT 1 Device as needed 1 kit 0 Taking       ROS: + as noted aboe    Physical Exam:  Temp:  [97.4  F (36.3  C)-98.3  F (36.8  C)] 98.3  F (36.8  C)  Pulse:  [83] 83  Heart Rate:  [68-83] 82  Resp:  [18-22] 20  BP: (117-180)/(55-82) 142/64  SpO2:  [93 %-100 %] 93 %    Gen: NAD, elderly, frail appearing  Lungs: on oxygen, slightly labored breathing  Abd: non distended  Ext: cool  Neuro: AOx3    Labs:  ABG No lab results found in last 7 days.  CBC  Recent Labs  Lab 11/05/18  1330 11/05/18  0802   WBC  --  13.0*   HGB  --  8.5*    342     BMP  Recent Labs  Lab 11/05/18  0802      POTASSIUM 4.3   CHLORIDE 104   CO2 27   BUN 55*   CR 5.50*   *     LFT  Recent Labs  Lab 11/05/18  0802   AST 13   ALT 15   ALKPHOS 55   BILITOTAL 0.5   ALBUMIN 3.3*     PancreasNo lab results found in last 7 days.    Imaging:    None pertinent

## 2018-11-06 NOTE — PLAN OF CARE
Problem: Diabetes Comorbidity  Goal: Diabetes  Patient comorbidity will be monitored for signs and symptoms of hyperglycemia or hypoglycemia. Problems will be absent, minimized or managed by discharge/transition of care.   Outcome: Declining  Blood sugars uncontrolled secondary to steroid use.  Reports having good control of blood sugars at home with A1c's in the 5-5.5 range.

## 2018-11-07 LAB
ALBUMIN SERPL-MCNC: 3.1 G/DL (ref 3.4–5)
ANION GAP SERPL CALCULATED.3IONS-SCNC: 11 MMOL/L (ref 3–14)
BUN SERPL-MCNC: 89 MG/DL (ref 7–30)
CALCIUM SERPL-MCNC: 9.4 MG/DL (ref 8.5–10.1)
CHLORIDE SERPL-SCNC: 98 MMOL/L (ref 94–109)
CO2 SERPL-SCNC: 26 MMOL/L (ref 20–32)
CREAT SERPL-MCNC: 5.62 MG/DL (ref 0.66–1.25)
GFR SERPL CREATININE-BSD FRML MDRD: 10 ML/MIN/1.7M2
GLUCOSE BLDC GLUCOMTR-MCNC: 137 MG/DL (ref 70–99)
GLUCOSE BLDC GLUCOMTR-MCNC: 182 MG/DL (ref 70–99)
GLUCOSE BLDC GLUCOMTR-MCNC: 224 MG/DL (ref 70–99)
GLUCOSE BLDC GLUCOMTR-MCNC: 229 MG/DL (ref 70–99)
GLUCOSE SERPL-MCNC: 223 MG/DL (ref 70–99)
PHOSPHATE SERPL-MCNC: 4.7 MG/DL (ref 2.5–4.5)
POTASSIUM SERPL-SCNC: 4.7 MMOL/L (ref 3.4–5.3)
SODIUM SERPL-SCNC: 135 MMOL/L (ref 133–144)

## 2018-11-07 PROCEDURE — 25000125 ZZHC RX 250: Performed by: HOSPITALIST

## 2018-11-07 PROCEDURE — 25000128 H RX IP 250 OP 636: Performed by: INTERNAL MEDICINE

## 2018-11-07 PROCEDURE — 99232 SBSQ HOSP IP/OBS MODERATE 35: CPT | Performed by: INTERNAL MEDICINE

## 2018-11-07 PROCEDURE — A9270 NON-COVERED ITEM OR SERVICE: HCPCS | Mod: GY | Performed by: HOSPITALIST

## 2018-11-07 PROCEDURE — 00000146 ZZHCL STATISTIC GLUCOSE BY METER IP

## 2018-11-07 PROCEDURE — 94640 AIRWAY INHALATION TREATMENT: CPT

## 2018-11-07 PROCEDURE — A9270 NON-COVERED ITEM OR SERVICE: HCPCS | Mod: GY | Performed by: INTERNAL MEDICINE

## 2018-11-07 PROCEDURE — 90937 HEMODIALYSIS REPEATED EVAL: CPT

## 2018-11-07 PROCEDURE — 94640 AIRWAY INHALATION TREATMENT: CPT | Mod: 76

## 2018-11-07 PROCEDURE — 25000131 ZZH RX MED GY IP 250 OP 636 PS 637: Mod: GY | Performed by: HOSPITALIST

## 2018-11-07 PROCEDURE — 25000132 ZZH RX MED GY IP 250 OP 250 PS 637: Mod: GY | Performed by: INTERNAL MEDICINE

## 2018-11-07 PROCEDURE — 40000275 ZZH STATISTIC RCP TIME EA 10 MIN

## 2018-11-07 PROCEDURE — 80069 RENAL FUNCTION PANEL: CPT | Performed by: INTERNAL MEDICINE

## 2018-11-07 PROCEDURE — 25000128 H RX IP 250 OP 636: Performed by: HOSPITALIST

## 2018-11-07 PROCEDURE — 12000000 ZZH R&B MED SURG/OB

## 2018-11-07 PROCEDURE — 25000132 ZZH RX MED GY IP 250 OP 250 PS 637: Mod: GY | Performed by: HOSPITALIST

## 2018-11-07 PROCEDURE — 99207 ZZC CDG-CODE CATEGORY CHANGED: CPT | Performed by: INTERNAL MEDICINE

## 2018-11-07 PROCEDURE — 63400005 ZZH RX 634: Performed by: INTERNAL MEDICINE

## 2018-11-07 RX ORDER — CALCIUM CARBONATE 500 MG/1
1000 TABLET, CHEWABLE ORAL
Status: DISCONTINUED | OUTPATIENT
Start: 2018-11-07 | End: 2018-11-08 | Stop reason: HOSPADM

## 2018-11-07 RX ORDER — IRBESARTAN 150 MG/1
150 TABLET ORAL DAILY
Qty: 30 TABLET | Refills: 0 | Status: SHIPPED | OUTPATIENT
Start: 2018-11-08 | End: 2018-11-15

## 2018-11-07 RX ORDER — HEPARIN SODIUM 1000 [USP'U]/ML
500 INJECTION, SOLUTION INTRAVENOUS; SUBCUTANEOUS
Status: COMPLETED | OUTPATIENT
Start: 2018-11-07 | End: 2018-11-07

## 2018-11-07 RX ORDER — HEPARIN SODIUM 1000 [USP'U]/ML
500 INJECTION, SOLUTION INTRAVENOUS; SUBCUTANEOUS CONTINUOUS
Status: DISCONTINUED | OUTPATIENT
Start: 2018-11-07 | End: 2018-11-08

## 2018-11-07 RX ORDER — DOXERCALCIFEROL 4 UG/2ML
7.5 INJECTION INTRAVENOUS ONCE
Status: COMPLETED | OUTPATIENT
Start: 2018-11-07 | End: 2018-11-07

## 2018-11-07 RX ORDER — PREDNISONE 20 MG/1
40 TABLET ORAL DAILY
Qty: 6 TABLET | Refills: 0 | Status: ON HOLD | OUTPATIENT
Start: 2018-11-08 | End: 2019-04-25

## 2018-11-07 RX ADMIN — INSULIN ASPART 12 UNITS: 100 INJECTION, SOLUTION INTRAVENOUS; SUBCUTANEOUS at 18:04

## 2018-11-07 RX ADMIN — ALBUTEROL SULFATE 2.5 MG: 2.5 SOLUTION RESPIRATORY (INHALATION) at 18:39

## 2018-11-07 RX ADMIN — METHYLPREDNISOLONE SODIUM SUCCINATE 62.5 MG: 125 INJECTION, POWDER, FOR SOLUTION INTRAMUSCULAR; INTRAVENOUS at 21:37

## 2018-11-07 RX ADMIN — HEPARIN SODIUM 5000 UNITS: 5000 INJECTION, SOLUTION INTRAVENOUS; SUBCUTANEOUS at 21:41

## 2018-11-07 RX ADMIN — CALCIUM CARBONATE (ANTACID) CHEW TAB 500 MG 1000 MG: 500 CHEW TAB at 18:54

## 2018-11-07 RX ADMIN — METHYLPREDNISOLONE SODIUM SUCCINATE 62.5 MG: 125 INJECTION, POWDER, FOR SOLUTION INTRAMUSCULAR; INTRAVENOUS at 10:18

## 2018-11-07 RX ADMIN — EPOETIN ALFA 10000 UNITS: 10000 SOLUTION INTRAVENOUS; SUBCUTANEOUS at 16:13

## 2018-11-07 RX ADMIN — ATORVASTATIN CALCIUM 20 MG: 20 TABLET, FILM COATED ORAL at 21:36

## 2018-11-07 RX ADMIN — DOXERCALCIFEROL 7.5 MCG: 4 INJECTION, SOLUTION INTRAVENOUS at 16:12

## 2018-11-07 RX ADMIN — HEPARIN SODIUM 500 UNITS/HR: 1000 INJECTION INTRAVENOUS; SUBCUTANEOUS at 16:17

## 2018-11-07 RX ADMIN — EPLERENONE 50 MG: 25 TABLET ORAL at 08:13

## 2018-11-07 RX ADMIN — HEPARIN SODIUM 5000 UNITS: 5000 INJECTION, SOLUTION INTRAVENOUS; SUBCUTANEOUS at 10:18

## 2018-11-07 RX ADMIN — INSULIN ASPART 12 UNITS: 100 INJECTION, SOLUTION INTRAVENOUS; SUBCUTANEOUS at 08:14

## 2018-11-07 RX ADMIN — SODIUM CHLORIDE 200 ML: 9 INJECTION, SOLUTION INTRAVENOUS at 16:09

## 2018-11-07 RX ADMIN — CALCIUM CARBONATE (ANTACID) CHEW TAB 500 MG 1000 MG: 500 CHEW TAB at 13:36

## 2018-11-07 RX ADMIN — ALBUTEROL SULFATE 2.5 MG: 2.5 SOLUTION RESPIRATORY (INHALATION) at 08:52

## 2018-11-07 RX ADMIN — DILTIAZEM HYDROCHLORIDE 300 MG: 180 CAPSULE, EXTENDED RELEASE ORAL at 08:13

## 2018-11-07 RX ADMIN — ASPIRIN 81 MG 81 MG: 81 TABLET ORAL at 08:13

## 2018-11-07 RX ADMIN — IRBESARTAN 150 MG: 150 TABLET ORAL at 08:13

## 2018-11-07 RX ADMIN — UMECLIDINIUM BROMIDE AND VILANTEROL TRIFENATATE 1 PUFF: 62.5; 25 POWDER RESPIRATORY (INHALATION) at 08:15

## 2018-11-07 RX ADMIN — INSULIN ASPART 2 UNITS: 100 INJECTION, SOLUTION INTRAVENOUS; SUBCUTANEOUS at 08:14

## 2018-11-07 RX ADMIN — HEPARIN SODIUM 500 UNITS: 1000 INJECTION INTRAVENOUS; SUBCUTANEOUS at 16:17

## 2018-11-07 RX ADMIN — DOXAZOSIN 8 MG: 4 TABLET ORAL at 21:37

## 2018-11-07 RX ADMIN — SODIUM CHLORIDE 200 ML: 9 INJECTION, SOLUTION INTRAVENOUS at 16:08

## 2018-11-07 RX ADMIN — INSULIN GLARGINE 23 UNITS: 100 INJECTION, SOLUTION SUBCUTANEOUS at 22:43

## 2018-11-07 RX ADMIN — CARVEDILOL 12.5 MG: 12.5 TABLET, FILM COATED ORAL at 08:13

## 2018-11-07 RX ADMIN — FUROSEMIDE 20 MG: 20 TABLET ORAL at 08:13

## 2018-11-07 ASSESSMENT — ACTIVITIES OF DAILY LIVING (ADL)
ADLS_ACUITY_SCORE: 12
ADLS_ACUITY_SCORE: 11

## 2018-11-07 NOTE — PLAN OF CARE
Problem: Patient Care Overview  Goal: Plan of Care/Patient Progress Review  Outcome: No Change  A/O x4. VSS on 2 l nc. Up ind. Dialysis today. No c/o pain overnight. Mod carb diet. LEE. Cardiothoracic following for aortic stenosis, pt has declined surgery at this point. Nursing will continue to monitor. Tele nsr.

## 2018-11-07 NOTE — PROGRESS NOTES
"[facility]    Cardiology Progress Note    Date of Service (when I saw the patient): 11/07/2018     Assessment & Plan   Seven Savage Jr. is a 83 year old male who was admitted on 11/5/2018. He is a retired physician.     Severe Aortic Stenosis  -echo this admission shows severe aortic stenosis, mean gradient 54mm Hg, VITO 0.9 cm2, DI 0.26, pEF. Not significantly changed from June echo in [place].  -pt met with surgery today, he would not agree to surgery, he is also a poor surgical candidate.  -pt understands the TAVR procedure, he explained it to me along with significant risks and benefits. At this time he does not want to pursue it, but said he'd consider it in a year. \"I'm not afraid to die\"  -pt wants to meet with Dr. Hastings in January to discuss, in the mean time he does not want cardiac hospital f/u. He does not want to start the testing that would be necessary should he decide to be considered for TAVR. He understands he would need coronary angiogram and CUS.      COPD  -intermittent O2 dependency (2L)    HTN  -ACE switched to ARB yesterday  -controlled    ESKD  -hemodialysis M-W-F    DM2    Dyslipidemia  -lipitor    Follow up 1/10 with Dr. Hastings in [place]. Pt does not want [facility] cardiology f/u, he is not planning to pursue TAVR at this time. He plans to follow up with PMD and nephrology.    Interval History   Feels good, wants dialysis and to go home. Denies CP or SOB    Physical Exam   Temp: 98  F (36.7  C) Temp src: Oral BP: 127/53   Heart Rate: 68 Resp: 18 SpO2: 95 % O2 Device: Nasal cannula Oxygen Delivery: 2 LPM  Vitals:    11/05/18 0807 11/05/18 0817   Weight: 84.4 kg (186 lb) 77.4 kg (170 lb 9.6 oz)     Vital Signs with Ranges  Temp:  [97.6  F (36.4  C)-98.8  F (37.1  C)] 98  F (36.7  C)  Heart Rate:  [68-70] 68  Resp:  [18] 18  BP: (111-130)/(48-61) 127/53  SpO2:  [93 %-98 %] 95 %  I/O last 3 completed shifts:  In: 980 [P.O.:980]  Out: 400 [Urine:400]    Constitutional     alert " and oriented, in no acute distress.     Skin     warm and dry to touch    ENT     no pallor or cyanosis    Neck    Supple    Chest     no tenderness to palpation    Lungs  clear to auscultation, poor air movement    Cardiac  regular rhythm, holosystolic murmur    Abdomen     abdomen soft    Extremities and Back     No edema observed.        Neurological     no gross motor deficits noted, affect appropriate, oriented to time, person and place.    Medications       - MEDICATION INSTRUCTIONS for Dialysis Patients -   Does not apply See Admin Instructions     aspirin  81 mg Oral Daily     atorvastatin  20 mg Oral At Bedtime     carvedilol  12.5 mg Oral BID w/meals     diltiazem  300 mg Oral Daily     doxazosin  8 mg Oral At Bedtime     eplerenone  50 mg Oral Daily     furosemide  20 mg Oral Daily     heparin  5,000 Units Subcutaneous Q12H     insulin aspart  1-7 Units Subcutaneous TID AC     insulin aspart  1-5 Units Subcutaneous At Bedtime     insulin aspart  12 Units Subcutaneous TID w/meals     insulin glargine  23 Units Subcutaneous At Bedtime     irbesartan  150 mg Oral Daily     methylPREDNISolone  62.5 mg Intravenous Q12H     sodium chloride (PF)  3 mL Intracatheter Q8H     umeclidinium-vilanterol  1 puff Inhalation Daily       Data   Results for orders placed or performed during the hospital encounter of 18 (from the past 24 hour(s))   Glucose by meter   Result Value Ref Range    Glucose 206 (H) 70 - 99 mg/dL   Echocardiogram Complete    Narrative    [id number]  [facility]  [id number]  [id number]^[email or web address]^[email or web address]^DAVID           [facility]  Echocardiography Laboratory  [address] [place] [address]        Name: MISHA SANDERS  MRN: [id number]  : 1935  Study Date: 2018 01:38 PM  Age: 83 yrs  Gender: Male  Patient Location: [facility]  Reason For Study: Aortic Valve Disorder  Ordering Physician: SOLO OLIVO  Referring Physician: KENDALL SANCHEZ  Performed By: Usha Martines      BSA: 1.9 m2  Height: 69 in  Weight: 170 lb  HR: 66  BP: 142/64 mmHg  _____________________________________________________________________________  __        Procedure  Complete Portable Echo Adult.  _____________________________________________________________________________  __        Interpretation Summary     1. The left ventricle is normal in size. The visual ejection fraction is  estimated at 60-65%.  2. The right ventricle is normal in structure, function and size.  3. There is moderate (2+) mitral regurgitation.  4. Severe valvular aortic stenosis. Mean 54mmHg, Vmax 4.9m/s, VITO 0.8cm2, DI  0.26.     Echo from 6/2018 ([place]) showed EF 60%, moderate MR, severe AS with mean  51mmHg, Vmax 4.9m/s, VITO 0.7cm2.  _____________________________________________________________________________  __        Left Ventricle  The left ventricle is normal in size. There is moderate concentric left  ventricular hypertrophy. The visual ejection fraction is estimated at 60-65%.  Grade III or advanced diastolic dysfunction. Normal left ventricular wall  motion.     Right Ventricle  The right ventricle is normal in structure, function and size.     Atria  The left atrium is severely dilated. The right atrium is mildly dilated. There  is no atrial shunt seen.     Mitral Valve  There is moderate (2+) mitral regurgitation.        Tricuspid Valve  No tricuspid regurgitation. Right ventricular systolic pressure could not be  approximated due to inadequate tricuspid regurgitation.     Aortic Valve  No aortic regurgitation is present. Severe valvular aortic stenosis. Mean  54mmHg, Vmax 4.9m/s, VITO 0.8cm2, DI 0.26.     Pulmonic Valve  The pulmonic valve is normal in structure and function.     Vessels  Normal ascending, transverse (arch), and descending aorta.     Pericardium  There is no pericardial effusion.        Rhythm  Sinus rhythm was  noted.  _____________________________________________________________________________  __  MMode/2D Measurements & Calculations  RVDd: 3.8 cm  IVSd: 1.6 cm     LVIDd: 4.1 cm  LVIDs: 2.6 cm  LVPWd: 1.6 cm  FS: 37.3 %  LV mass(C)d: 260.7 grams  LV mass(C)dI: 135.2 grams/m2  Ao root diam: 3.0 cm  LA dimension: 5.5 cm  asc Aorta Diam: 3.0 cm  LA/Ao: 1.8  LVOT diam: 2.1 cm  LVOT area: 3.5 cm2  LA Volume (BP): 112.0 ml  LA Volume Index (BP): 58.0 ml/m2  RWT: 0.78           Doppler Measurements & Calculations  MV E max shady: 143.0 cm/sec  MV A max shady: 66.7 cm/sec  MV E/A: 2.1  MV max P.0 mmHg  MV mean P.9 mmHg  MV V2 VTI: 41.6 cm  MVA(VTI): 2.5 cm2  MV P1/2t max shady: 169.1 cm/sec  MV P1/2t: 84.3 msec  MVA(P1/2t): 2.6 cm2  MV dec slope: 587.4 cm/sec2  MV dec time: 0.21 sec  Ao V2 max: 492.5 cm/sec  Ao max P.0 mmHg  Ao V2 mean: 342.6 cm/sec  Ao mean P.9 mmHg  Ao V2 VTI: 113.8 cm  VITO(I,D): 0.92 cm2  VITO(V,D): 0.76 cm2  LV V1 max P.6 mmHg  LV V1 max: 107.7 cm/sec  LV V1 VTI: 30.0 cm  SV(LVOT): 104.3 ml  SI(LVOT): 54.1 ml/m2  PA V2 max: 100.8 cm/sec  PA max P.1 mmHg  PA acc time: 0.09 sec  AV Shady Ratio (DI): 0.22  VITO Index (cm2/m2): 0.48  E/E' av.1  Lateral E/e': 24.7  Medial E/e': 27.6              _____________________________________________________________________________  __        Report approved by: Mario Aguirre 2018 02:44 PM      Glucose by meter   Result Value Ref Range    Glucose 221 (H) 70 - 99 mg/dL   Glucose by meter   Result Value Ref Range    Glucose 192 (H) 70 - 99 mg/dL   Glucose by meter   Result Value Ref Range    Glucose 182 (H) 70 - 99 mg/dL   Glucose by meter   Result Value Ref Range    Glucose 224 (H) 70 - 99 mg/dL       NADIR Beverly, CNP  Cardiology  Pager:  [phone]

## 2018-11-07 NOTE — PLAN OF CARE
Problem: Patient Care Overview  Goal: Plan of Care/Patient Progress Review  Outcome: Improving  A&Ox4. Up independently. VSS, O2 low 90s 2L NC. LEE. Prn neb treatment x1 for c/o SOB. Other VSS. Tele SD. IVSL. Fistula Lt arm +bruit/thrill. Denies pain. Tolerating mod carb/renal diet, good appetite. Blood sugars 224, declined noon blood sugar check. Discharging home today after dialysis today. RN will cont to monitor.     Addendum: 1500-1930: Pt came back from dialysis @ 1800. C/o light headedness. SBA, refusing bed alarm but calls appropriately. VSS, Blood sugar 137. Pt didn't have anybody to pick him up so unable to discharge tonight. Called pt's son and left a message. On call hospitalist notified.

## 2018-11-07 NOTE — PLAN OF CARE
Problem: Patient Care Overview  Goal: Plan of Care/Patient Progress Review  Outcome: No Change  [id number]: A&Ox4.  Independent.  Refused to walk outside of walk to shower.  VSS on 2 LPM via NC, baseline use PRN at home.  Tele: NSR.  Tolerates Mod CHO diet.  Saline locked, received IV steroids.  . LS wheezy, LEE.  Heart murmur.  LUE fistula with positive thrill/bruit, site covered, dialysis 11/7.  Cardiothoracic surgery recommending TAVR, patient not wanting invasive intervention for aortic stenosis.  Discharge pending.

## 2018-11-07 NOTE — PROGRESS NOTES
Inpatient Dialysis Progress Note            Assessment and Plan:   1.  ESKD. On HD MWF.  Running per usual schedule today.  SOB is better with lower TW.      2.  Anemia.  HGB has fallen as outpt perhaps due to reduced dose EPO.  It has been increased.      3.  HTN. Controlled on multiple medicines.      4.  FEN. K 4.5. On K2.      Plan:      He is to leave today.        Interval History:     Feeling well.  Not SOB.  No chest pain.  Questions about timing of irbesartan.  He has been on eplerenone for some time.  Never any high potassium.          Dialysis Parameters:     Wt Readings from Last 4 Encounters:   11/05/18 77.4 kg (170 lb 9.6 oz)   11/01/18 86.1 kg (189 lb 12.8 oz)   06/21/18 85 kg (187 lb 4.8 oz)   05/22/18 84.4 kg (186 lb)     I/O last 3 completed shifts:  In: 980 [P.O.:980]  Out: 300 [Urine:300]  BP Readings from Last 3 Encounters:   11/07/18 127/85   11/01/18 146/54   06/21/18 156/68       Routine, ONE TIME, Starting today For 1 Occurrences  Weight Loss (kg): 2  Dialysis Temp: 36.5  C  Access Device: AVF  Access Site: L arm  Dialyzer: Revaclear  Dialysis Bath: K 2  Blood Flow Rate (mL/min): 450  Total Treatment Time (hrs): 2.75  Heparin: yes         Medications and Allergies:   Reviewed in EPIC      - MEDICATION INSTRUCTIONS for Dialysis Patients -   Does not apply See Admin Instructions     sodium chloride 0.9%  250 mL Intravenous Once in dialysis     sodium chloride 0.9%  300 mL Hemodialysis Machine Once     aspirin  81 mg Oral Daily     atorvastatin  20 mg Oral At Bedtime     carvedilol  12.5 mg Oral BID w/meals     diltiazem  300 mg Oral Daily     doxazosin  8 mg Oral At Bedtime     doxercalciferol  7.5 mcg Intravenous Once     eplerenone  50 mg Oral Daily     epoetin libby (EPOGEN,PROCRIT) inj ESRD  10,000 Units Intravenous Once     furosemide  20 mg Oral Daily     heparin (porcine)  500 Units Hemodialysis Machine Once in dialysis     heparin  5,000 Units Subcutaneous Q12H     insulin aspart  1-7  Units Subcutaneous TID AC     insulin aspart  1-5 Units Subcutaneous At Bedtime     insulin aspart  12 Units Subcutaneous TID w/meals     insulin glargine  23 Units Subcutaneous At Bedtime     irbesartan  150 mg Oral Daily     methylPREDNISolone  62.5 mg Intravenous Q12H     sodium chloride (PF)  3 mL Intracatheter Q8H     umeclidinium-vilanterol  1 puff Inhalation Daily     sodium chloride 0.9%, acetaminophen, albuterol, bisacodyl, calcium carbonate, glucose **OR** dextrose **OR** glucagon, hydrALAZINE, lidocaine (buffered or not buffered), lidocaine (buffered or not buffered), magnesium hydroxide, melatonin, naloxone, ondansetron **OR** ondansetron, prochlorperazine **OR** prochlorperazine **OR** prochlorperazine, senna-docusate **OR** senna-docusate, sodium chloride (PF), - MEDICATION INSTRUCTIONS -     Allergies   Allergen Reactions     Levaquin [Levofloxacin]      edema     Pioglitazone Other (See Comments)     Edema & hay fever     Vytorin Other (See Comments)     Muscle aches              Labs:     BMP  Recent Labs  Lab 11/07/18  1440 11/05/18  0802    139   POTASSIUM 4.7 4.3   CHLORIDE 98 104   AARON 9.4 9.2   CO2 26 27   BUN 89* 55*   CR 5.62* 5.50*   * 105*     CBC  Recent Labs  Lab 11/05/18  1330 11/05/18  0802   WBC  --  13.0*   HGB  --  8.5*   HCT  --  27.3*   MCV  --  85    342     Lab Results   Component Value Date    AST 13 11/05/2018    ALT 15 11/05/2018    ALKPHOS 55 11/05/2018    BILITOTAL 0.5 11/05/2018            Physical Exam:   Vitals were reviewed in [facility]    Wt Readings from Last 3 Encounters:   11/05/18 77.4 kg (170 lb 9.6 oz)   11/01/18 86.1 kg (189 lb 12.8 oz)   06/21/18 85 kg (187 lb 4.8 oz)       Intake/Output Summary (Last 24 hours) at 11/07/18 1601  Last data filed at 11/07/18 1000   Gross per 24 hour   Intake              740 ml   Output              300 ml   Net              440 ml       GENERAL APPEARANCE: pleasant, no distress, a & o  HEENT:  Eyes/ears/nose  grossly normal, neck supple  RESP: lungs clear to auscultation with good efforts, no crackles, rhonchi or wheezes  CV: regular rate and rhythm, normal S1 S2, systole M, no click or rub   ABDOMEN: soft, nontender, bowel sounds normal  EXTREMITIES/SKIN: no edema, no rashes or lesions     Pt seen on dialysis.  Stable run.  Good BFR.      Attestation:  I have reviewed today's vital signs, notes, medications, labs and imaging.     Jeremy Waldrop MD  [facility] - Nephrology  [phone]

## 2018-11-07 NOTE — DISCHARGE SUMMARY
[facility]    Discharge Summary  Hospitalist    Date of Admission:  11/5/2018  Date of Discharge:  11/8/2018  Discharging Provider: Jose E Grady DO    Discharge Diagnoses   1. Acute on chronic hypoxic respiratory failure due to #2-5  2. Possible HFpEF decompensation  3. Fluid overload in setting of ESRD  4. Possible COPD exacerbation   5. Severe aortic stenosis   6. DM type II   7. HTN   8. HLP   9. Anemia of chronic disease  10. Elevated troponin, suspect chronic due to ESRD  11. BPH     History of Present Illness from Dr. Gaming's H&P on 11/5/18   Seven Savage is a very pleasant 83-year-old male with past medical history as listed below, notably for ESRD on dialysis, severe aortic stenosis who presented to the ER today with complaints of shortness of breath.  He is on Monday, Wednesday, Friday dialysis, follows Cardiology for severe aortic stenosis, had his last round of dialysis on Friday and after that, he reports that he has been having some dietary indiscretion and has had increased salt intake and fluid intake, went to have steak over the weekend and has been feeling more short of breath over the weekend.  He does use oxygen at home, mostly at night and with some activity, but over the week and found himself using oxygen throughout the day and at night.  He denies any fever, chills or rigors, denied chest pain.  Denies cough, no pain in the abdomen.  Reports a normal bowel and bladder habit.  He presented to ER, was seen by Dr. Sanches and was noted to be low 80s on room air.  He was given a dose of Solu-Medrol and hospitalist was requested admission for further evaluation.  Dr. Sanches also talked to Dr. Samuel from Nephrology who plans to do dialysis today.     Hospital Course   Seven Savage Jr. was admitted on 11/5/2018.  The following problems were addressed during his hospitalization:    Acute on chronic hypoxic respiratory failure  Possible HFpEF decompensation   Fluid  overload in setting of ESRD  Possible COPD exacerbation   Severe aortic stenosis   Suspected to be multifactorial due to above.  Although chest x-ray noted a right lower lobe infiltrate, he has no fever, no cough and his procalcitonin was normal for an ESRD patient.  Leukocytosis was noted but this appears to be chronic.  Do not suspect pneumonia at this time  Last echo from 06/2018 showed EF of 55% -60% and severe aortic stenosis with a valve area of 0.7 cm2.  He has followed up with Dr. Hastings and had been declining any surgical intervention but is now amenable   - Continue Duonebs q4h with Albuterol PRN   - Daily Anoro Ellipta   - Titrate O2 as tolerated   - Will diuresis with dialysis  - Treated Solu-Medrol 62.5 mg q12h and switched to PO Prednisone at discharge   - PTA Lasix 20 mg PO    - Nephrology following to help with dialysis.  Patient is normally a M/W/F dialysis   - Cardiology consulted given the severe AS.  Surgery also saw the patient and did not feel he was a surgical candidate.  Will follow up with cardiology as an outpatient in January for further TAVR discussions      Of note, initially planned for discharge on 11/7 after dialysis run but patient stated no ride and no one at home so wanted to be discharged next day.     Jose E Grady, DO    Significant Results and Procedures   Echocardiogram:   1. The left ventricle is normal in size. The visual ejection fraction is  estimated at 60-65%.  2. The right ventricle is normal in structure, function and size.  3. There is moderate (2+) mitral regurgitation.  4. Severe valvular aortic stenosis. Mean 54mmHg, Vmax 4.9m/s, VITO 0.8cm2, DI  0.26.    Pending Results   No pending results   Unresulted Labs Ordered in the Past 30 Days of this Admission     No orders found from 9/6/2018 to 11/6/2018.          Code Status   Full Code       Primary Care Physician   Sandip Antunez    Physical Exam   Temp: 97.4  F (36.3  C) Temp src: Oral BP: 140/59   Heart Rate:  66 Resp: 16 SpO2: 92 % O2 Device: None (Room air) Oxygen Delivery: 2 LPM  Vitals:    11/05/18 0807 11/05/18 0817 11/07/18 1755   Weight: 84.4 kg (186 lb) 77.4 kg (170 lb 9.6 oz) 80.7 kg (177 lb 14.6 oz)     Vital Signs with Ranges  Temp:  [97.4  F (36.3  C)-98.5  F (36.9  C)] 97.4  F (36.3  C)  Heart Rate:  [56-68] 66  Resp:  [15-16] 16  BP: (101-140)/(45-85) 140/59  SpO2:  [92 %-99 %] 92 %  I/O last 3 completed shifts:  In: 640 [P.O.:640]  Out: 2200 [Urine:200; Other:2000]    Constitutional: Resting comfortably in bed.  NAD   Eyes: EOMI  ENT: Moist mucous membranes  Respiratory: Clear to auscultation.  No respiratory distress  Cardiovascular: RRR.  3/6 systolic murmur  GI: Bowel sounds present.  No tenderness     Discharge Disposition   Discharged to home  Condition at discharge: Stable    Consultations This Hospital Stay   NEPHROLOGY IP CONSULT  RESPIRATORY CARE IP CONSULT  CARDIOLOGY IP CONSULT  CARDIOTHORACIC SURGERY IP CONSULT  SOCIAL WORK IP CONSULT  CARE TRANSITION RN/SW IP CONSULT    Time Spent on this Encounter   IJose E, personally saw the patient today and spent less than or equal to 30 minutes discharging this patient.    Discharge Orders     Reason for your hospital stay   Fluid overload     Follow-up and recommended labs and tests    Follow up with primary care provider, Sandip Antunez, within 7 days for hospital follow- up.  No follow up labs or test are needed.  Follow up with cardiology as planned in January  Follow up with nephrology as planned     Activity   Your activity upon discharge: activity as tolerated     Diet   Follow this diet upon discharge: Orders Placed This Encounter     Moderate Consistent CHO Diet       Discharge Medications   Current Discharge Medication List      START taking these medications    Details   irbesartan (AVAPRO) 150 MG tablet Take 1 tablet (150 mg) by mouth daily  Qty: 30 tablet, Refills: 0    Comments: Future refills by PCP Dr. Sandip Antunez with  phone number [phone].  Associated Diagnoses: Acute on chronic congestive heart failure, unspecified heart failure type (H)      predniSONE (DELTASONE) 20 MG tablet Take 2 tablets (40 mg) by mouth daily for 3 days  Qty: 6 tablet, Refills: 0    Comments: Future refills by PCP Dr. Sandip Antunez with phone number [phone].  Associated Diagnoses: Chronic obstructive pulmonary disease, unspecified COPD type (H)      ranitidine (ZANTAC) 150 MG tablet Take 1 tablet (150 mg) by mouth daily as needed for heartburn  Qty: 30 tablet, Refills: 0    Comments: Future refills by PCP Dr. Sandip Antunez with phone number [phone].  Associated Diagnoses: Gastroesophageal reflux disease without esophagitis         CONTINUE these medications which have NOT CHANGED    Details   albuterol (2.5 MG/3ML) 0.083% neb solution TAKE 1 VIAL BY NEBULIZATION EVERY 6 HOURS AS NEEDED FOR SHORNESS OF BREATH/DYSPNEA OR WHEEZING  Qty: 75 mL, Refills: 7    Associated Diagnoses: COPD exacerbation (H)      albuterol (PROAIR HFA/PROVENTIL HFA/VENTOLIN HFA) 108 (90 BASE) MCG/ACT Inhaler Inhale 1-2 puffs into the lungs every 4 hours as needed for shortness of breath / dyspnea or wheezing  Qty: 1 Inhaler, Refills: 5    Associated Diagnoses: COPD exacerbation (H)      aspirin 81 MG chewable tablet Take 81 mg by mouth daily      atorvastatin (LIPITOR) 40 MG tablet Take 20 mg by mouth At Bedtime      carvedilol (COREG) 12.5 MG tablet TAKE 1 TABLET(12.5 MG) BY MOUTH TWICE DAILY  Qty: 180 tablet, Refills: 3    Associated Diagnoses: Aortic valve stenosis, unspecified etiology; Essential hypertension, benign      COLCHICINE PO Take 0.6 mg by mouth every 48 hours as needed (gout attacks) Reported on 3/30/2017      darbepoetin libby (ARANESP) 100 MCG/0.5ML injection Inject Subcutaneous three times a week At diaylsis M,W,F. Dialysis ([facility]) doses, patient not sure about strength.      diltiazem (TIAZAC) 300 MG 24 hr ER beaded capsule Take 1 capsule (300  mg) by mouth daily  Qty: 90 capsule, Refills: 1    Associated Diagnoses: Essential hypertension, benign      doxazosin (CARDURA) 8 MG tablet TAKE 1 TABLET BY MOUTH EVERY NIGHT AT BEDTIME.  Qty: 90 tablet, Refills: 3    Associated Diagnoses: Urinary retention      eplerenone (INSPRA) 50 MG tablet Take 1 tablet (50 mg) by mouth daily  Qty: 90 tablet, Refills: 4    Associated Diagnoses: Essential hypertension, benign      fish oil-omega-3 fatty acids 1000 MG capsule Take 1 g by mouth daily      furosemide (LASIX) 20 MG tablet Take 1 tablet (20 mg) by mouth daily  Qty: 90 tablet, Refills: 1    Associated Diagnoses: ESRD (end stage renal disease) on dialysis (H); Essential hypertension, benign      Insulin Aspart (NOVOLOG SC) Inject 12 Units Subcutaneous 3 times daily (with meals) Patient states with large meals he will increase to 14-16 units.      insulin glargine (LANTUS) 100 UNIT/ML injection Inject 23 Units Subcutaneous At Bedtime (Patient will increase to 26 units at bedtime he states when BG is elevated.      STIOLTO RESPIMAT 2.5-2.5 MCG/ACT AERS INHALE 2 PUFFS INTO THE LUNGS DAILY  Qty: 4 g, Refills: 10    Associated Diagnoses: COPD exacerbation (H)      VITAMIN D, CHOLECALCIFEROL, PO Take 2,000 Units by mouth daily       B Complex-C-Folic Acid (BRANDI-HEIDY) TABS TAKE ONE TABLET BY MOUTH DAILY  Qty: 90 tablet, Refills: 3    Associated Diagnoses: ESRD (end stage renal disease) on dialysis (H)      B-D U/F 31G X 8 MM insulin pen needle USE AS DIRECTED FOUR TIMES DAILY.  Qty: 400 each, Refills: 1    Associated Diagnoses: Type 2 diabetes mellitus with chronic kidney disease on chronic dialysis, with long-term current use of insulin (H)      blood glucose monitoring (NO BRAND SPECIFIED) test strip Use to test blood sugars 3 times daily or as directed. Uses onetouch  Qty: 300 strip, Refills: 11    Associated Diagnoses: Type 2 diabetes mellitus with chronic kidney disease on chronic dialysis, with long-term current use of  insulin (H)      !! order for DME Equipment being ordered: portable Oxygen concentrator  Qty: 1 Device, Refills: 0    Associated Diagnoses: Chronic obstructive pulmonary disease with acute exacerbation (H); Pneumonia due to infectious organism, unspecified laterality, unspecified part of lung      !! order for DME Equipment being ordered: Four wheeled walker  Qty: 1 Device, Refills: 0    Associated Diagnoses: DDD (degenerative disc disease), lumbar; Impaired gait      !! order for DME Equipment being ordered: Other: Nebulizer machine  Treatment Diagnosis: COPD  Qty: 1 Device, Refills: 0    Associated Diagnoses: COPD exacerbation (H)      Respiratory Therapy Supplies (NEBULIZER/ADULT MASK) KIT 1 Device as needed  Qty: 1 kit, Refills: 0    Associated Diagnoses: Chronic obstructive pulmonary disease with acute exacerbation (H)       !! - Potential duplicate medications found. Please discuss with provider.      STOP taking these medications       lisinopril (PRINIVIL/ZESTRIL) 20 MG tablet Comments:   Reason for Stopping:             Allergies   Allergies   Allergen Reactions     Levaquin [Levofloxacin]      edema     Pioglitazone Other (See Comments)     Edema & hay fever     Vytorin Other (See Comments)     Muscle aches     Data   Most Recent 3 CBC's:  Recent Labs   Lab Test  11/05/18   1330  11/05/18   0802  04/09/18   1315  04/07/18   0815   WBC   --   13.0*  14.4*  15.0*   HGB   --   8.5*  8.9*  9.0*   MCV   --   85  83  84   PLT  338  342  373  335      Most Recent 3 BMP's:  Recent Labs   Lab Test  11/07/18   1440  11/05/18   0802  04/09/18   1315   NA  135  139  132*   POTASSIUM  4.7  4.3  4.5   CHLORIDE  98  104  97   CO2  26  27  23   BUN  89*  55*  90*   CR  5.62*  5.50*  5.31*   ANIONGAP  11  8  12   AARON  9.4  9.2  8.4*   GLC  223*  105*  219*     Most Recent 2 LFT's:  Recent Labs   Lab Test  11/05/18   0802  11/27/17   1143   AST  13  18   ALT  15  17   ALKPHOS  55  87   BILITOTAL  0.5  0.7     Most Recent  INR's and Anticoagulation Dosing History:  Anticoagulation Dose History     Recent Dosing and Labs Latest Ref Rng & Units 11/27/2017    INR 0.86 - 1.14 1.00        Most Recent 3 Troponin's:  Recent Labs   Lab Test  11/05/18   1330  11/05/18   0802  11/27/17   1143   TROPI  0.070*  0.046*  0.100*     Most Recent Cholesterol Panel:  Recent Labs   Lab Test  08/15/17   1219   CHOL  145   LDL  83   HDL  41   TRIG  106     Most Recent 6 Bacteria Isolates From Any Culture (See EPIC Reports for Culture Details):  Recent Labs   Lab Test  04/06/18   0823  04/06/18   0640   CULT  No growth  No growth     Most Recent TSH, T4 and A1c Labs:  Recent Labs   Lab Test  04/06/18   0305   08/15/17   1219   TSH   --    --   2.18   A1C  5.8   < >  6.1*    < > = values in this interval not displayed.     Results for orders placed or performed during the hospital encounter of 11/05/18   XR Chest 2 Views    Narrative    CHEST  TWO VIEWS November 5, 2018 8:40 AM     HISTORY: Shortness of breath.    COMPARISON: 4/6/2018.    FINDINGS: The heart is at the upper limits normal in size. There is no  pulmonary edema. Thoracic aorta is calcified. Lungs are hyperinflated.  There is a right basilar infiltrate. The lungs are otherwise clear. No  pneumothorax.       Impression    IMPRESSION:   1. Right basilar infiltrate may be pneumonia.  2. The lungs are hyperinflated.    MONTSE KIRBY MD

## 2018-11-08 VITALS
HEART RATE: 83 BPM | RESPIRATION RATE: 16 BRPM | TEMPERATURE: 97.4 F | DIASTOLIC BLOOD PRESSURE: 59 MMHG | SYSTOLIC BLOOD PRESSURE: 140 MMHG | BODY MASS INDEX: 26.27 KG/M2 | WEIGHT: 177.91 LBS | OXYGEN SATURATION: 98 %

## 2018-11-08 LAB — GLUCOSE BLDC GLUCOMTR-MCNC: 164 MG/DL (ref 70–99)

## 2018-11-08 PROCEDURE — A9270 NON-COVERED ITEM OR SERVICE: HCPCS | Mod: GY | Performed by: HOSPITALIST

## 2018-11-08 PROCEDURE — A9270 NON-COVERED ITEM OR SERVICE: HCPCS | Mod: GY | Performed by: INTERNAL MEDICINE

## 2018-11-08 PROCEDURE — 85049 AUTOMATED PLATELET COUNT: CPT | Performed by: HOSPITALIST

## 2018-11-08 PROCEDURE — 25000132 ZZH RX MED GY IP 250 OP 250 PS 637: Mod: GY | Performed by: INTERNAL MEDICINE

## 2018-11-08 PROCEDURE — 00000146 ZZHCL STATISTIC GLUCOSE BY METER IP

## 2018-11-08 PROCEDURE — 40000275 ZZH STATISTIC RCP TIME EA 10 MIN

## 2018-11-08 PROCEDURE — 25000125 ZZHC RX 250: Performed by: HOSPITALIST

## 2018-11-08 PROCEDURE — 99238 HOSP IP/OBS DSCHRG MGMT 30/<: CPT | Performed by: INTERNAL MEDICINE

## 2018-11-08 PROCEDURE — 94640 AIRWAY INHALATION TREATMENT: CPT

## 2018-11-08 PROCEDURE — 25000132 ZZH RX MED GY IP 250 OP 250 PS 637: Mod: GY | Performed by: HOSPITALIST

## 2018-11-08 RX ADMIN — UMECLIDINIUM BROMIDE AND VILANTEROL TRIFENATATE 1 PUFF: 62.5; 25 POWDER RESPIRATORY (INHALATION) at 08:32

## 2018-11-08 RX ADMIN — CARVEDILOL 12.5 MG: 12.5 TABLET, FILM COATED ORAL at 08:34

## 2018-11-08 RX ADMIN — DILTIAZEM HYDROCHLORIDE 300 MG: 180 CAPSULE, EXTENDED RELEASE ORAL at 08:33

## 2018-11-08 RX ADMIN — ALBUTEROL SULFATE 2.5 MG: 2.5 SOLUTION RESPIRATORY (INHALATION) at 09:03

## 2018-11-08 RX ADMIN — FUROSEMIDE 20 MG: 20 TABLET ORAL at 08:34

## 2018-11-08 RX ADMIN — EPLERENONE 50 MG: 25 TABLET ORAL at 08:33

## 2018-11-08 RX ADMIN — CALCIUM CARBONATE (ANTACID) CHEW TAB 500 MG 1000 MG: 500 CHEW TAB at 08:42

## 2018-11-08 RX ADMIN — INSULIN ASPART 1 UNITS: 100 INJECTION, SOLUTION INTRAVENOUS; SUBCUTANEOUS at 08:33

## 2018-11-08 RX ADMIN — INSULIN ASPART 12 UNITS: 100 INJECTION, SOLUTION INTRAVENOUS; SUBCUTANEOUS at 08:33

## 2018-11-08 RX ADMIN — ASPIRIN 81 MG 81 MG: 81 TABLET ORAL at 08:34

## 2018-11-08 RX ADMIN — IRBESARTAN 150 MG: 150 TABLET ORAL at 08:34

## 2018-11-08 ASSESSMENT — ACTIVITIES OF DAILY LIVING (ADL)
ADLS_ACUITY_SCORE: 11

## 2018-11-08 NOTE — PLAN OF CARE
Problem: Patient Care Overview  Goal: Plan of Care/Patient Progress Review  Outcome: Adequate for Discharge Date Met: 11/08/18  Discharge    Patient discharged to Home via wc with Pt's son  Care plan note  VSS, O2 mid 90s RA, low 90s on RA at rest, prn O2 at baseline. LS dim, LEE. Denies CP/SOB. A&Ox4. Up independently. Tolerating mod carb/renal diet, good appetite. Blood sugar 164. Fistula on Lt upper arm, +bruit/thrill. Went through AVS and discharge meds, pt verbalized understanding. IV access removed. [place] packed and ready for discharge. Awaiting pt's son to  the pt.     Listed belongings gathered and returned to patient. Yes  Care Plan and Patient education resolved: Yes  Prescriptions if needed, hard copies sent with patient  NA  Home and hospital acquired medications returned to patient: Yes  Medication Bin checked and emptied on discharge Yes  Follow up appointment made for patient: Yes

## 2018-11-08 NOTE — PROGRESS NOTES
[facility]    Hospitalist Progress Note    Assessment & Plan   Seven Savage is an 83-year-old male with diabetes, hypertension, dyslipidemia, ESRD on dialysis, severe aortic stenosis, COPD, CHF with preserved ejection fraction, chronically elevated troponin and BPH who presented to the ER today with complaints of shortness of breath.      Acute on chronic hypoxic respiratory failure  Possible HFpEF decompensation   Fluid overload in setting of ESRD  Possible COPD exacerbation   Severe aortic stenosis   Suspected to be multifactorial due to above.  Although chest x-ray noted a right lower lobe infiltrate, he has no fever, no cough and his procalcitonin was normal for an ESRD patient.  Leukocytosis was noted but this appears to be chronic.  Do not suspect pneumonia at this time  Last echo from 06/2018 showed EF of 55% -60% and severe aortic stenosis with a valve area of 0.7 cm2.  He has followed up with Dr. Hastings and had been declining any surgical intervention but is now amenable   - Continue Duonebs q4h with Albuterol PRN   - Daily Anoro Ellipta   - Titrate O2 as tolerated   - Will diuresis with dialysis  - Continue Solu-Medrol 62.5 mg q12h for now.  Likely switch to Prednisone tomorrow or day after   - PTA Lasix 20 mg PO   - Nephrology following to help with dialysis.  Patient is normally a M/W/F dialysis   - Cardiology consulted given the severe AS.  Poor surgical candidate.  At this time would prefer continued follow up and discussion as planned     Diabetes mellitus type 2  Last HgbA1c from 4/6/18 was 5.8.   PTA on Lantus 23 U at bedtime and Novolo 12 U QAC  - Lantus 23 U at bedtime  - Medium intensity SSI     Hypertension  HLP   - Lisinopril switched to Irbesartan 150 mg   - PTA Coreg 12.5 mg BID, Diltiazem 300 mg   - PTA Lipitor     Anemia of chronic disease  Hemoglobin seems to be stable around baseline.      Elevated troponin  This seems to be chronically elevated.  Denies any chest pain.  . No EKG changes.  I do not suspect ACS however     BPH  - PTA Cardura       DVT Prophylaxis: Heparin SQ  Code Status: Full Code    Jose E MCKEON Miguel A, DO  Text Page (7am to 6pm)    Interval History   Patient seen and examined.  No complaints and at baseline.  Patient was supposed to be discharged today after dialysis run but stated he had no ride and no one at home so was going to stay the night.     -Data reviewed today: I reviewed all new labs and imaging results over the last 24 hours. I personally reviewed no images or EKG's today.    Physical Exam   Temp: 97.4  F (36.3  C) Temp src: Oral BP: 140/59   Heart Rate: 66 Resp: 16 SpO2: 92 % O2 Device: None (Room air) Oxygen Delivery: 2 LPM  Vitals:    11/05/18 0807 11/05/18 0817 11/07/18 1755   Weight: 84.4 kg (186 lb) 77.4 kg (170 lb 9.6 oz) 80.7 kg (177 lb 14.6 oz)     Vital Signs with Ranges  Temp:  [97.4  F (36.3  C)-98.5  F (36.9  C)] 97.4  F (36.3  C)  Heart Rate:  [56-68] 66  Resp:  [15-16] 16  BP: (101-140)/(45-85) 140/59  SpO2:  [92 %-99 %] 92 %  I/O last 3 completed shifts:  In: 640 [P.O.:640]  Out: 2200 [Urine:200; Other:2000]    Constitutional: Awake, alert, cooperative, no apparent distress  Respiratory: Clear to auscultation bilaterally, no crackles or wheezing  Cardiovascular: Regular rate and rhythm, normal S1 and S2, and systolic murmur noted  GI: Normal bowel sounds, soft, non-distended, non-tender  Skin/Integumen: No rashes, no cyanosis  MSK: No edema     Medications     heparin (porcine) 500 Units/hr (11/07/18 1617)     - MEDICATION INSTRUCTIONS -         - MEDICATION INSTRUCTIONS for Dialysis Patients -   Does not apply See Admin Instructions     aspirin  81 mg Oral Daily     atorvastatin  20 mg Oral At Bedtime     carvedilol  12.5 mg Oral BID w/meals     diltiazem  300 mg Oral Daily     doxazosin  8 mg Oral At Bedtime     eplerenone  50 mg Oral Daily     furosemide  20 mg Oral Daily     heparin  5,000 Units Subcutaneous Q12H     insulin aspart   1-7 Units Subcutaneous TID AC     insulin aspart  1-5 Units Subcutaneous At Bedtime     insulin aspart  12 Units Subcutaneous TID w/meals     insulin glargine  23 Units Subcutaneous At Bedtime     irbesartan  150 mg Oral Daily     methylPREDNISolone  62.5 mg Intravenous Q12H     sodium chloride (PF)  3 mL Intracatheter Q8H     umeclidinium-vilanterol  1 puff Inhalation Daily       Data     Recent Labs  Lab 11/07/18  1440 11/05/18  1330 11/05/18  0802   WBC  --   --  13.0*   HGB  --   --  8.5*   MCV  --   --  85   PLT  --  338 342     --  139   POTASSIUM 4.7  --  4.3   CHLORIDE 98  --  104   CO2 26  --  27   BUN 89*  --  55*   CR 5.62*  --  5.50*   ANIONGAP 11  --  8   AARON 9.4  --  9.2   *  --  105*   ALBUMIN 3.1*  --  3.3*   PROTTOTAL  --   --  6.6*   BILITOTAL  --   --  0.5   ALKPHOS  --   --  55   ALT  --   --  15   AST  --   --  13   TROPI  --  0.070* 0.046*       Imaging:   No results found for this or any previous visit (from the past 24 hour(s)).

## 2018-11-08 NOTE — PROGRESS NOTES
Crosscover note     Patient was plan for discharge this evening after dialysis.  Patient currently states he has nobody at home, does not have transportation and would not like to be discharged.   Continue to monitor,  consult to a.m.  Plan of care discussed with patient's RN

## 2018-11-08 NOTE — PLAN OF CARE
Problem: Patient Care Overview  Goal: Plan of Care/Patient Progress Review  Outcome: Improving  Pt A&Ox4. VSS on 2L. BGM at bedtime was 229. Declined Blood sugar check at 2am. Denies any pain/discomfort. Up independently in room. Dialysis fistula wnl. Discharge today; would like to discharge by 10:30am d/t transportation arrangements

## 2018-11-08 NOTE — CONSULTS
It was planned that pt would discharge yesterday after dialysis.  In discussion with pt this AM, he reported nobody told him he was discharging.  Question validity of this as Hospitalist saw pt before his late dialysis run and had the discharge order in and Nsg reported pt was aware.  The staff were unable to have pt be agreeable to discharge last ping.  This AM pt reports he feels well.  Pt's son will be transporting him this AM at 10:30am.  A follow up PCP appointment has been made for 11/15/18 with Dr Antunez.  No other discharge needs have been identified.  Will send the Discharge Summary to the [facility] dialysis center.

## 2018-11-08 NOTE — PLAN OF CARE
Problem: Patient Care Overview  Goal: Plan of Care/Patient Progress Review  Outcome: Improving  Pt A&Ox4. VSS on 2L. BGM at bedtime was 229. Declined for his Blood sugar to be checked at 2am. Denies any pain/discomfort. Up independently in room. Dialysis fistula wnl. Discharge today; would like to discharge by 10:30am d/t transportation arrangements

## 2018-11-09 ENCOUNTER — TELEPHONE (OUTPATIENT)
Dept: CARDIOLOGY | Facility: CLINIC | Age: 83
End: 2018-11-09

## 2018-11-09 ENCOUNTER — TELEPHONE (OUTPATIENT)
Dept: INTERNAL MEDICINE | Facility: CLINIC | Age: 83
End: 2018-11-09

## 2018-11-09 NOTE — TELEPHONE ENCOUNTER
"  ED for acute condition Discharge Protocol    \"Hi, my name is Katie Busby, a registered nurse, and I am calling from [facility].  I am calling to follow up and see how things are going for you after your recent emergency visit.\"    Tell me how you are doing now that you are home?\" I had fluid overload. I'm doing good. Saw cardiology last week. I had good service there. No SOB since getting out of the hospital. BG doing ok.       Discharge Instructions    \"Let's review your discharge instructions.  What is/are the follow-up recommendations?  Pt. Response: f/u with PCP and Cardiology as needed    \"Has an appointment with your primary care provider been scheduled?\"  Yes. (confirm and remind to bring meds)    Medications    \"Tell me what changed about your medicines when you discharged?\"    Avapro, they told me to stop Lisinopril    \"What questions do you have about your medications?\"   None        Call Summary    \"What questions or concerns do you have about your recent visit and your follow-up care?\"     none    \"If you have questions or things don't continue to improve, we encourage you contact us through the main clinic number (give number).  Even if the clinic is not open, triage nurses are available 24/7 to help you.     We would like you to know that our clinic has extended hours (provide information).  We also have urgent care (provide details on closest location and hours/contact info)\"    \"Thank you for your time and take care!\"        Katie AVENDANO RN, BSN, PHN            "

## 2018-11-09 NOTE — TELEPHONE ENCOUNTER
"Patient was evaluated by cardiology while inpatient for severe aortic stenosis. CV surgery consulted for consideration of TAVR-pt does not want to pursue required TAVR work up at this time. Will discuss with Dr. Hastings, as scheduled in January. Called patient to discuss any post hospital d/c questions he may have, review medication changes, and confirm f/u appts. Pt is currently at dialysis now, and is lightheaded, \"but they are taking care of me.\" RN confirmed with patient that he has an apt scheduled on 1/10/18 with Dr. Hastings. Patient advised to call clinic with any cardiac related questions or concerns prior to this analilia't. Patient verbalized understanding and agreed with plan. MARLY Gutiérrez RN.    "

## 2018-11-15 ENCOUNTER — OFFICE VISIT (OUTPATIENT)
Dept: INTERNAL MEDICINE | Facility: CLINIC | Age: 83
End: 2018-11-15
Payer: MEDICARE

## 2018-11-15 VITALS
BODY MASS INDEX: 28.24 KG/M2 | OXYGEN SATURATION: 91 % | SYSTOLIC BLOOD PRESSURE: 108 MMHG | WEIGHT: 191.2 LBS | RESPIRATION RATE: 16 BRPM | TEMPERATURE: 98 F | DIASTOLIC BLOOD PRESSURE: 58 MMHG | HEART RATE: 76 BPM

## 2018-11-15 DIAGNOSIS — E78.5 HYPERLIPIDEMIA LDL GOAL <100: ICD-10-CM

## 2018-11-15 DIAGNOSIS — K21.9 GASTROESOPHAGEAL REFLUX DISEASE WITHOUT ESOPHAGITIS: ICD-10-CM

## 2018-11-15 DIAGNOSIS — I10 ESSENTIAL HYPERTENSION, BENIGN: ICD-10-CM

## 2018-11-15 DIAGNOSIS — J96.21 ACUTE ON CHRONIC RESPIRATORY FAILURE WITH HYPOXIA (H): ICD-10-CM

## 2018-11-15 DIAGNOSIS — N18.6 ESRD (END STAGE RENAL DISEASE) ON DIALYSIS (H): ICD-10-CM

## 2018-11-15 DIAGNOSIS — I35.0 AORTIC VALVE STENOSIS, UNSPECIFIED ETIOLOGY: ICD-10-CM

## 2018-11-15 DIAGNOSIS — N18.6 TYPE 2 DIABETES MELLITUS WITH CHRONIC KIDNEY DISEASE ON CHRONIC DIALYSIS, WITH LONG-TERM CURRENT USE OF INSULIN (H): ICD-10-CM

## 2018-11-15 DIAGNOSIS — E11.22 TYPE 2 DIABETES MELLITUS WITH CHRONIC KIDNEY DISEASE ON CHRONIC DIALYSIS, WITH LONG-TERM CURRENT USE OF INSULIN (H): ICD-10-CM

## 2018-11-15 DIAGNOSIS — Z09 HOSPITAL DISCHARGE FOLLOW-UP: Primary | ICD-10-CM

## 2018-11-15 DIAGNOSIS — Z79.4 TYPE 2 DIABETES MELLITUS WITH CHRONIC KIDNEY DISEASE ON CHRONIC DIALYSIS, WITH LONG-TERM CURRENT USE OF INSULIN (H): ICD-10-CM

## 2018-11-15 DIAGNOSIS — Z99.2 TYPE 2 DIABETES MELLITUS WITH CHRONIC KIDNEY DISEASE ON CHRONIC DIALYSIS, WITH LONG-TERM CURRENT USE OF INSULIN (H): ICD-10-CM

## 2018-11-15 DIAGNOSIS — Z99.2 ESRD (END STAGE RENAL DISEASE) ON DIALYSIS (H): ICD-10-CM

## 2018-11-15 DIAGNOSIS — Z13.89 SCREENING FOR DIABETIC PERIPHERAL NEUROPATHY: ICD-10-CM

## 2018-11-15 PROCEDURE — 99207 C FOOT EXAM  NO CHARGE: CPT | Mod: 25 | Performed by: INTERNAL MEDICINE

## 2018-11-15 PROCEDURE — 99495 TRANSJ CARE MGMT MOD F2F 14D: CPT | Performed by: INTERNAL MEDICINE

## 2018-11-15 RX ORDER — FOLIC ACID/VIT B COMPLEX AND C 0.8 MG
1 TABLET ORAL DAILY
Qty: 90 TABLET | Refills: 3 | Status: SHIPPED | OUTPATIENT
Start: 2018-11-15

## 2018-11-15 RX ORDER — CARVEDILOL 12.5 MG/1
TABLET ORAL
Qty: 180 TABLET | Refills: 3 | Status: SHIPPED | OUTPATIENT
Start: 2018-11-15

## 2018-11-15 RX ORDER — ATORVASTATIN CALCIUM 40 MG/1
20 TABLET, FILM COATED ORAL AT BEDTIME
Qty: 90 TABLET | Refills: 3 | Status: SHIPPED | OUTPATIENT
Start: 2018-11-15 | End: 2019-04-22

## 2018-11-15 RX ORDER — FUROSEMIDE 20 MG
20 TABLET ORAL DAILY
Qty: 90 TABLET | Refills: 3 | Status: SHIPPED | OUTPATIENT
Start: 2018-11-15

## 2018-11-15 RX ORDER — IRBESARTAN 150 MG/1
150 TABLET ORAL DAILY
Qty: 30 TABLET | Refills: 0 | Status: SHIPPED | OUTPATIENT
Start: 2018-11-15 | End: 2018-12-14

## 2018-11-15 NOTE — PROGRESS NOTES
SUBJECTIVE:   Seven Savage Jr. is a 83 year old male who presents to clinic today for the following health issues:    Hospital Follow-up Visit:    Hospital/Nursing Home/IP Rehab Facility: [facility]  Date of Admission: 11/5/18  Date of Discharge: 11/8/18  Reason(s) for Admission: acute chronic respiratory failure             Problems taking medications regularly:  None       Medication changes since discharge: None       Problems adhering to non-medication therapy:  None    Summary of hospitalization:  [facility] discharge summary reviewed  Diagnostic Tests/Treatments reviewed.  Follow up needed: Cardiology, dialysis  Other Healthcare Providers Involved in Patient s Care:         None  Update since discharge: stable.     Post Discharge Medication Reconciliation: discharge medications reconciled, continue medications without change.  Plan of care communicated with patient     Coding guidelines for this visit:  Type of Medical   Decision Making Face-to-Face Visit       within 7 Days of discharge Face-to-Face Visit        within 14 days of discharge   Moderate Complexity [id number]   High Complexity [id number]            Acute on chronic hypoxic respiratory failure  Possible HFpEF decompensation   Fluid overload in setting of ESRD  Possible COPD exacerbation   Severe aortic stenosis   Suspected to be multifactorial due to above.  Although chest x-ray noted a right lower lobe infiltrate, he has no fever, no cough and his procalcitonin was normal for an ESRD patient.  Leukocytosis was noted but this appears to be chronic.  Do not suspect pneumonia at this time  Last echo from 06/2018 showed EF of 55% -60% and severe aortic stenosis with a valve area of 0.7 cm2.  He has followed up with Dr. Hastings and had been declining any surgical intervention but is now amenable   - Continue Duonebs q4h with Albuterol PRN   - Daily Anoro Ellipta   - Titrate O2 as tolerated   - Will diuresis with  dialysis  - Treated Solu-Medrol 62.5 mg q12h and switched to PO Prednisone at discharge   - PTA Lasix 20 mg PO    - Nephrology following to help with dialysis.  Patient is normally a M/W/F dialysis   - Cardiology consulted given the severe AS.  Surgery also saw the patient and did not feel he was a surgical candidate.  Will follow up with cardiology as an outpatient in January for further TAVR discussions     Problem list and histories reviewed & adjusted, as indicated.  Additional history: as documented    Patient Active Problem List   Diagnosis     Type 2 diabetes mellitus with chronic kidney disease on chronic dialysis, with long-term current use of insulin (H)     Hyperlipidemia LDL goal <100     Aortic valve stenosis, unspecified etiology     ESRD (end stage renal disease) on dialysis (H)     Chronic obstructive pulmonary disease with acute exacerbation (H)     Essential hypertension, benign     COPD exacerbation (H)     Thoracic segment dysfunction     Acute on chronic respiratory failure with hypoxia (H)     Past Surgical History:   Procedure Laterality Date     ADENOIDECTOMY       ENDOSCOPIC POLYPECTOMY NASAL      Through vocal cords     Renal Stent      For renal calculi     TONSILLECTOMY         Social History   Substance Use Topics     Smoking status: Former Smoker     Packs/day: 1.00     Years: 70.00     Types: Cigarettes     Quit date: 2011     Smokeless tobacco: Never Used     Alcohol use Yes      Comment: average 1 beer month     Family History   Problem Relation Age of Onset     Family history unknown: Yes         Current Outpatient Prescriptions   Medication Sig Dispense Refill     albuterol (2.5 MG/3ML) 0.083% neb solution TAKE 1 VIAL BY NEBULIZATION EVERY 6 HOURS AS NEEDED FOR SHORNESS OF BREATH/DYSPNEA OR WHEEZING 75 mL 7     albuterol (PROAIR HFA/PROVENTIL HFA/VENTOLIN HFA) 108 (90 BASE) MCG/ACT Inhaler Inhale 1-2 puffs into the lungs every 4 hours as needed for shortness of breath / dyspnea or  wheezing 1 Inhaler 5     aspirin 81 MG chewable tablet Take 81 mg by mouth daily       atorvastatin (LIPITOR) 40 MG tablet Take 20 mg by mouth At Bedtime       B Complex-C-Folic Acid (BRANDI-HEIDY) TABS TAKE ONE TABLET BY MOUTH DAILY 90 tablet 3     B-D U/F 31G X 8 MM insulin pen needle USE AS DIRECTED FOUR TIMES DAILY. 400 each 1     blood glucose monitoring (NO BRAND SPECIFIED) test strip Use to test blood sugars 3 times daily or as directed. Uses onetouch 300 strip 11     carvedilol (COREG) 12.5 MG tablet TAKE 1 TABLET(12.5 MG) BY MOUTH TWICE DAILY 180 tablet 3     COLCHICINE PO Take 0.6 mg by mouth every 48 hours as needed (gout attacks) Reported on 3/30/2017       darbepoetin libby (ARANESP) 100 MCG/0.5ML injection Inject Subcutaneous three times a week At diaylsis M,W,F. Dialysis ([facility]) doses, patient not sure about strength.       diltiazem (TIAZAC) 300 MG 24 hr ER beaded capsule Take 1 capsule (300 mg) by mouth daily 90 capsule 1     doxazosin (CARDURA) 8 MG tablet TAKE 1 TABLET BY MOUTH EVERY NIGHT AT BEDTIME. 90 tablet 3     eplerenone (INSPRA) 50 MG tablet Take 1 tablet (50 mg) by mouth daily 90 tablet 4     fish oil-omega-3 fatty acids 1000 MG capsule Take 1 g by mouth daily       furosemide (LASIX) 20 MG tablet Take 1 tablet (20 mg) by mouth daily 90 tablet 1     Insulin Aspart (NOVOLOG SC) Inject 12 Units Subcutaneous 3 times daily (with meals) Patient states with large meals he will increase to 14-16 units.       insulin glargine (LANTUS) 100 UNIT/ML injection Inject 23 Units Subcutaneous At Bedtime (Patient will increase to 26 units at bedtime he states when BG is elevated.       irbesartan (AVAPRO) 150 MG tablet Take 1 tablet (150 mg) by mouth daily 30 tablet 0     order for DME Equipment being ordered: portable Oxygen concentrator 1 Device 0     order for DME Equipment being ordered: Four wheeled walker 1 Device 0     order for DME Equipment being ordered: Other: Nebulizer machine  Treatment  Diagnosis: COPD 1 Device 0     ranitidine (ZANTAC) 150 MG tablet Take 1 tablet (150 mg) by mouth daily as needed for heartburn 30 tablet 0     Respiratory Therapy Supplies (NEBULIZER/ADULT MASK) KIT 1 Device as needed 1 kit 0     STIOLTO RESPIMAT 2.5-2.5 MCG/ACT AERS INHALE 2 PUFFS INTO THE LUNGS DAILY 4 g 10     VITAMIN D, CHOLECALCIFEROL, PO Take 2,000 Units by mouth daily        Allergies   Allergen Reactions     Levaquin [Levofloxacin]      edema     Pioglitazone Other (See Comments)     Edema & hay fever     Vytorin Other (See Comments)     Muscle aches     BP Readings from Last 3 Encounters:   11/08/18 140/59   11/01/18 146/54   06/21/18 156/68    Wt Readings from Last 3 Encounters:   11/07/18 177 lb 14.6 oz (80.7 kg)   11/01/18 189 lb 12.8 oz (86.1 kg)   06/21/18 187 lb 4.8 oz (85 kg)         Reviewed and updated as needed this visit by clinical staff       Reviewed and updated as needed this visit by Provider         ROS:  CONSTITUTIONAL: NEGATIVE for fever, chills, change in weight  ENT/MOUTH: NEGATIVE for ear, mouth and throat problems  CV: NEGATIVE for chest pain, palpitations  GI: NEGATIVE for nausea, abdominal pain, heartburn, or change in bowel habits  : NEGATIVE for frequency, dysuria, or hematuria  MUSCULOSKELETAL: NEGATIVE for significant arthralgias or myalgia  HEME: NEGATIVE for bleeding problems  PSYCHIATRIC: NEGATIVE for changes in mood or affect    OBJECTIVE:                                                    /58  Pulse 76  Temp 98  F (36.7  C) (Oral)  Resp 16  Wt 191 lb 3.2 oz (86.7 kg)  SpO2 91%  BMI 28.24 kg/m2  Body mass index is 28.24 kg/(m^2).  GENERAL: alert and no distress using wheelchair  EYES: Eyes grossly normal to inspection, extraocular movements - intact, and PERRL  HENT: ear canals- normal; TMs- normal; Nose- normal; Mouth- no ulcers, no lesions  NECK: no tenderness, no adenopathy, no asymmetry, no masses, no stiffness; thyroid- normal to palpation  RESP: lungs  clear to auscultation - no rales, no rhonchi, no wheezes  CV: regular rates and rhythm, normal S1 S2, no S3 or S4 and noted AS murmur, no click or rub -  MS: extremities- no gross deformities noted with intact left arm dialysis access.  NEURO:  No focal changes  PSYCH: Alert and oriented times 3; speech- coherent , normal rate and volume; able to articulate logical thoughts, able to abstract reason, no tangential thoughts, no hallucinations or delusions, affect- normal.     ASSESSMENT/PLAN:                                                      (Z09) Hospital discharge follow-up  (primary encounter diagnosis)  Comment: Appears overall stable with a baseline chronic illnesses  Plan:     (N18.6,  Z99.2) ESRD (end stage renal disease) on dialysis (H)  Comment: Continuing with dialysis on Monday Wednesdays and Fridays as scheduled  Plan: furosemide (LASIX) 20 MG tablet, B         Complex-C-Folic Acid (BRANDI-HEIDY) TABS            (J96.21) Acute on chronic respiratory failure with hypoxia (H)  Comment: Appears stable with no distinct evidence of volume overload  Plan: irbesartan (AVAPRO) 150 MG tablet        Continue with current medications follow-up with cardiology as schedule    (E11.22,  N18.6,  Z99.2,  Z79.4) Type 2 diabetes mellitus with chronic kidney disease on chronic dialysis, with long-term current use of insulin (H)  Comment: Recheck A1c and continue with current medications  Plan: HEMOGLOBIN A1C, Lipid panel reflex to direct         LDL Fasting, atorvastatin (LIPITOR) 40 MG         tablet        Insulin as ordered    (I35.0) Aortic valve stenosis, unspecified etiology  Comment: We will be discussing with cardiology need for potential surgical intervention and TAVR  Plan: carvedilol (COREG) 12.5 MG tablet            (I10) Essential hypertension, benign  Comment: Stable on current therapy continue with medical management  Plan: carvedilol (COREG) 12.5 MG tablet, furosemide         (LASIX) 20 MG tablet             (K21.9) Gastroesophageal reflux disease without esophagitis  Comment: Oral therapy as directed  Plan: ranitidine (ZANTAC) 150 MG tablet            (E78.5) Hyperlipidemia LDL goal <100  Comment: I have placed orders for fasting lipid panel the patient will schedule accordingly  Plan: Lipid panel reflex to direct LDL Fasting            (Z13.89) Screening for diabetic peripheral neuropathy  Comment: As noted without significant change in baseline  Plan: FOOT EXAM  NO CHARGE [47737.114]            See Patient Instructions    Sandip Antunez MD  [facility]    THE MEDICATION LIST HAS BEEN FULLY RECONCILED BY THE M.D. AND THE NURSING STAFF.

## 2018-11-15 NOTE — MR AVS SNAPSHOT
After Visit Summary   11/15/2018    Seven Savage Jr.    MRN: [id number]           Patient Information     Date Of Birth          1935        Visit Information        Provider Department      11/15/2018 3:20 PM Sandip Antunez MD [facility]        Today's Diagnoses     Hospital discharge follow-up    -  1    ESRD (end stage renal disease) on dialysis (H)        Acute on chronic respiratory failure with hypoxia (H)        Type 2 diabetes mellitus with chronic kidney disease on chronic dialysis, with long-term current use of insulin (H)        Aortic valve stenosis, unspecified etiology        Essential hypertension, benign        Gastroesophageal reflux disease without esophagitis        Hyperlipidemia LDL goal <100        Screening for diabetic peripheral neuropathy           Follow-ups after your visit        Follow-up notes from your care team     Return if symptoms worsen or fail to improve, for diabetes check 6 months.      Your next 10 appointments already scheduled     Nathaniel 10, 2019  1:45 PM CST   Return Visit with Daria Hastings DO   [facility] ([facility])    [address] [place] [id number] [phone] OPT 2              Future tests that were ordered for you today     Open Future Orders        Priority Expected Expires Ordered    Lipid panel reflex to direct LDL Fasting Routine 11/15/2018 12/31/2018 11/15/2018            Who to contact     If you have questions or need follow up information about today's clinic visit or your schedule please contact [facility] directly at [phone].  Normal or non-critical lab and imaging results will be communicated to you by MyChart, letter or phone within 4 business days after the clinic has received the results. If you do not hear from us within 7 days, please contact the clinic through MyChart or phone. If you  have a critical or abnormal lab result, we will notify you by phone as soon as possible.  Submit refill requests through [employer] or call your pharmacy and they will forward the refill request to us. Please allow 3 business days for your refill to be completed.          Additional Information About Your Visit        Care EveryWhere ID     This is your Care EveryWhere ID. This could be used by other organizations to access your [facility] medical records  [id number]        Your Vitals Were     Pulse Temperature Respirations Pulse Oximetry BMI (Body Mass Index)       76 98  F (36.7  C) (Oral) 16 91% 28.24 kg/m2        Blood Pressure from Last 3 Encounters:   11/15/18 108/58   11/08/18 140/59   11/01/18 146/54    Weight from Last 3 Encounters:   11/15/18 191 lb 3.2 oz (86.7 kg)   11/07/18 177 lb 14.6 oz (80.7 kg)   11/01/18 189 lb 12.8 oz (86.1 kg)              We Performed the Following     FOOT EXAM  NO CHARGE [19929.114]     HEMOGLOBIN A1C          Today's Medication Changes          These changes are accurate as of 11/15/18  3:37 PM.  If you have any questions, ask your nurse or doctor.               These medicines have changed or have updated prescriptions.        Dose/Directions    BRANDI-HEIDY Tabs   This may have changed:  See the new instructions.   Used for:  ESRD (end stage renal disease) on dialysis (H)        Dose:  1 tablet   Take 1 tablet by mouth daily   Quantity:  90 tablet   Refills:  3            Where to get your medicines      These medications were sent to [facility] [id number] [place], [place] [id number] [facility] [place] [address] [address] [facility] [address]     Phone:  [phone]     atorvastatin 40 MG tablet    carvedilol 12.5 MG tablet    furosemide 20 MG tablet    irbesartan 150 MG tablet    ranitidine 150 MG tablet    BRANDI-HEIDY Tabs                Primary Care Provider Office Phone # Fax #    Sandip Antunez [phone] [address] [place] [address]  [place] [address]        Equal Access to Services     TRACEYKATIE MARICEL : Hadii gerri andre gabo Elliott, wazhaoda luramonaashleyha, qaarturta kaericcatrachita sweeneymkdavon dong. So [facility] [phone].    ATENCIÓN: Si habla español, tiene a turpin disposición servicios gratuitos de asistencia lingüística. Aideame al [phone].    We comply with applicable federal civil rights laws and [place] laws. We do not discriminate on the basis of race, color, national origin, age, disability, sex, sexual orientation, or gender identity.            Thank you!     Thank you for choosing [facility]  for your care. Our goal is always to provide you with excellent care. Hearing back from our patients is one way we can continue to improve our services. Please take a few minutes to complete the written survey that you may receive in the mail after your visit with us. Thank you!             Your Updated Medication List - Protect others around you: Learn how to safely use, store and throw away your medicines at www.disposemymeds.org.          This list is accurate as of 11/15/18  3:37 PM.  Always use your most recent med list.                   Brand Name Dispense Instructions for use Diagnosis    * albuterol 108 (90 Base) MCG/ACT inhaler    PROAIR HFA/PROVENTIL HFA/VENTOLIN HFA    1 Inhaler    Inhale 1-2 puffs into the lungs every 4 hours as needed for shortness of breath / dyspnea or wheezing    COPD exacerbation (H)       * albuterol (2.5 MG/3ML) 0.083% neb solution     75 mL    TAKE 1 VIAL BY NEBULIZATION EVERY 6 HOURS AS NEEDED FOR SHORNESS OF BREATH/DYSPNEA OR WHEEZING    COPD exacerbation (H)       aspirin 81 MG chewable tablet      Take 81 mg by mouth daily        atorvastatin 40 MG tablet    LIPITOR    90 tablet    Take 0.5 tablets (20 mg) by mouth At Bedtime    Type 2 diabetes mellitus with chronic kidney disease on chronic dialysis, with long-term current use of insulin (H)       B-D U/F 31G X 8  MM   Generic drug:  insulin pen needle     400 each    USE AS DIRECTED FOUR TIMES DAILY.    Type 2 diabetes mellitus with chronic kidney disease on chronic dialysis, with long-term current use of insulin (H)       blood glucose monitoring test strip    no brand specified    300 strip    Use to test blood sugars 3 times daily or as directed. Uses onetouch    Type 2 diabetes mellitus with chronic kidney disease on chronic dialysis, with long-term current use of insulin (H)       carvedilol 12.5 MG tablet    COREG    180 tablet    TAKE 1 TABLET(12.5 MG) BY MOUTH TWICE DAILY    Aortic valve stenosis, unspecified etiology, Essential hypertension, benign       COLCHICINE PO      Take 0.6 mg by mouth every 48 hours as needed (gout attacks) Reported on 3/30/2017        darbepoetin libby 100 MCG/0.5ML injection    ARANESP     Inject Subcutaneous three times a week At [facility] M,W[facility]F. Dialysis ([facility]) doses, patient not sure about strength.        diltiazem 300 MG 24 hr ER beaded capsule    TIAZAC    90 capsule    Take 1 capsule (300 mg) by mouth daily    Essential hypertension, benign       doxazosin 8 MG tablet    CARDURA    90 tablet    TAKE 1 TABLET BY MOUTH EVERY NIGHT AT BEDTIME.    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        irbesartan 150 MG tablet    AVAPRO    30 tablet    Take 1 tablet (150 mg) by mouth daily    Acute on chronic respiratory failure with hypoxia (H)       nebulizer/adult  mask Kit kit     1 kit    1 Device as needed    Chronic obstructive pulmonary disease with acute exacerbation (H)       NOVOLOG SC      Inject 12 Units Subcutaneous 3 times daily (with meals) Patient states with large meals he will increase to 14-16 units.        * order for DME     1 Device    Equipment being ordered: Other: Nebulizer machine Treatment Diagnosis: COPD    COPD exacerbation (H)       * order for DME     1 Device    Equipment being ordered: Four wheeled walker    DDD (degenerative disc disease), lumbar, Impaired gait       order for DME     1 Device    Equipment being ordered: portable Oxygen concentrator    Chronic obstructive pulmonary disease with acute exacerbation (H), Pneumonia due to infectious organism, unspecified laterality, unspecified part of lung       ranitidine 150 MG tablet    ZANTAC    90 tablet    Take 1 tablet (150 mg) by mouth daily as needed for heartburn    Gastroesophageal reflux disease without esophagitis       BRANDI-HEIDY Tabs     90 tablet    Take 1 tablet by mouth daily    ESRD (end stage renal disease) on dialysis (H)       STIOLTO RESPIMAT 2.5-2.5 MCG/ACT Aers   Generic drug:  tiotropium-olodaterol     4 g    INHALE 2 PUFFS INTO THE LUNGS DAILY    COPD exacerbation (H)       VITAMIN D (CHOLECALCIFEROL) PO      Take 2,000 Units by mouth daily        * Notice:  This list has 4 medication(s) that are the same as other medications prescribed for you. Read the directions carefully, and ask your doctor or other care provider to review them with you.

## 2018-11-15 NOTE — LETTER
[facility]  [address] [address]  [phone]        March 6, 2019    Seven Savage Jr.  [address]  [facility] [address]              Dear Seven Savage Jr.    This is to remind you that your fasting labs is due.    You may call our office at [phone] to schedule an appointment.    Please disregard this notice if you have already had your labs drawn or made an appointment.        Sincerely,        Sandip Antunez MD

## 2018-11-19 DIAGNOSIS — I10 ESSENTIAL HYPERTENSION, BENIGN: ICD-10-CM

## 2018-11-19 NOTE — TELEPHONE ENCOUNTER
"Requested Prescriptions   Pending Prescriptions Disp Refills     diltiazem (TIAZAC) 300 MG 24 hr ER beaded capsule [Pharmacy Med Name: DILTIAZEM ER 300MG CAPSULES (24 HR)] 90 capsule 0    Last Written Prescription Date:  5/22/2018  Last Fill Quantity: 90,  # refills: 1   Last office visit: 11/15/2018 with prescribing provider:  11/15/2018   Future Office Visit:   Next 5 appointments (look out 90 days)     Nathaniel 10, 2019  1:45 PM CST   Return Visit with Daria Hastings DO   [facility] ([facility])    [address] [place], [place] [address]  [facility] [address] [phone] OPT 2                  Sig: TAKE 1 CAPSULE(300 MG) BY MOUTH DAILY    Calcium Channel Blockers Protocol  Failed    11/19/2018  9:05 AM       Failed - Normal serum creatinine on file in past 12 months    Recent Labs   Lab Test  11/07/18   1440   CR  5.62*            Passed - Blood pressure under 140/90 in past 12 months    BP Readings from Last 3 Encounters:   11/15/18 108/58   11/08/18 140/59   11/01/18 146/54                Passed - Normal ALT in past 12 months    Recent Labs   Lab Test  11/05/18   0802   ALT  15            Passed - Recent (12 mo) or future (30 days) visit within the authorizing provider's specialty    Patient had office visit in the last 12 months or has a visit in the next 30 days with authorizing provider or within the authorizing provider's specialty.  See \"Patient Info\" tab in inbasket, or \"Choose Columns\" in Meds & Orders section of the refill encounter.             Passed - Patient is age 18 or older          "

## 2018-11-20 RX ORDER — DILTIAZEM HYDROCHLORIDE 300 MG/1
CAPSULE, EXTENDED RELEASE ORAL
Qty: 90 CAPSULE | Refills: 0 | Status: SHIPPED | OUTPATIENT
Start: 2018-11-20 | End: 2019-02-25

## 2018-12-18 ENCOUNTER — OFFICE VISIT (OUTPATIENT)
Dept: URGENT CARE | Facility: URGENT CARE | Age: 83
End: 2018-12-18
Payer: MEDICARE

## 2018-12-18 ENCOUNTER — ANCILLARY PROCEDURE (OUTPATIENT)
Dept: GENERAL RADIOLOGY | Facility: CLINIC | Age: 83
End: 2018-12-18
Attending: PHYSICIAN ASSISTANT
Payer: MEDICARE

## 2018-12-18 VITALS — SYSTOLIC BLOOD PRESSURE: 144 MMHG | DIASTOLIC BLOOD PRESSURE: 55 MMHG | HEART RATE: 70 BPM | TEMPERATURE: 96.8 F

## 2018-12-18 DIAGNOSIS — M99.02 THORACIC SEGMENT DYSFUNCTION: ICD-10-CM

## 2018-12-18 DIAGNOSIS — M79.645 PAIN OF FINGER OF LEFT HAND: ICD-10-CM

## 2018-12-18 DIAGNOSIS — M25.532 LEFT WRIST PAIN: ICD-10-CM

## 2018-12-18 DIAGNOSIS — N18.6 TYPE 2 DIABETES MELLITUS WITH CHRONIC KIDNEY DISEASE ON CHRONIC DIALYSIS, WITH LONG-TERM CURRENT USE OF INSULIN (H): ICD-10-CM

## 2018-12-18 DIAGNOSIS — Z79.4 TYPE 2 DIABETES MELLITUS WITH CHRONIC KIDNEY DISEASE ON CHRONIC DIALYSIS, WITH LONG-TERM CURRENT USE OF INSULIN (H): ICD-10-CM

## 2018-12-18 DIAGNOSIS — Z99.2 ESRD (END STAGE RENAL DISEASE) ON DIALYSIS (H): ICD-10-CM
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
## 2018-12-18 DIAGNOSIS — Z99.2 TYPE 2 DIABETES MELLITUS WITH CHRONIC KIDNEY DISEASE ON CHRONIC DIALYSIS, WITH LONG-TERM CURRENT USE OF INSULIN (H): ICD-10-CM

## 2018-12-18 DIAGNOSIS — E11.22 TYPE 2 DIABETES MELLITUS WITH CHRONIC KIDNEY DISEASE ON CHRONIC DIALYSIS, WITH LONG-TERM CURRENT USE OF INSULIN (H): ICD-10-CM

## 2018-12-18 DIAGNOSIS — N18.6 ESRD (END STAGE RENAL DISEASE) ON DIALYSIS (H): ICD-10-CM

## 2018-12-18 PROCEDURE — 99214 OFFICE O/P EST MOD 30 MIN: CPT | Performed by: PHYSICIAN ASSISTANT

## 2018-12-18 PROCEDURE — 73130 X-RAY EXAM OF HAND: CPT | Mod: LT

## 2018-12-18 RX ORDER — HYDROCODONE BITARTRATE AND ACETAMINOPHEN 5; 325 MG/1; MG/1
1 TABLET ORAL EVERY 6 HOURS PRN
Qty: 8 TABLET | Refills: 0 | Status: SHIPPED | OUTPATIENT
Start: 2018-12-18 | End: 2019-04-16

## 2018-12-18 NOTE — PROGRESS NOTES
SUBJECTIVE:  Chief Complaint   Patient presents with     Wrist Injury     left wrist swelling with pain/injury from slip and fall on ice yesterday.      Seven Savage Jr. is a 83 year old male presents with a chief complaint of left wrist pain, tenderness and decreased range of motion.  The injury occurred 1 day(s) ago.   The injury happened while at home. How: fall immediate pain.  The patient complained of moderate pain  and has had decreased ROM.  Pain exacerbated by movement.  Relieved by rest and ice.  He treated it initially with ice. This is the first time this type of injury has occurred to this patient.     Past Medical History:   Diagnosis Date     COPD (chronic obstructive pulmonary disease) (H)      CRF (chronic renal failure)      Heart murmur      HLD (hyperlipidemia)      HTN (hypertension)      IDDM (insulin dependent diabetes mellitus) (H)      Renal calculi      Allergies   Allergen Reactions     Levaquin [Levofloxacin]      edema     Pioglitazone Other (See Comments)     Edema & hay fever     Vytorin Other (See Comments)     Muscle aches     Social History     Tobacco Use     Smoking status: Former Smoker     Packs/day: 1.00     Years: 70.00     Pack years: 70.00     Types: Cigarettes     Last attempt to quit:      Years since quittin.9     Smokeless tobacco: Never Used   Substance Use Topics     Alcohol use: Yes     Comment: average 1 beer month     Family History   Family history unknown: Yes         ROS:  CONSTITUTIONAL:NEGATIVE for fever, chills, change in weight  INTEGUMENTARY/SKIN: NEGATIVE for wrist swelling  ENT/MOUTH: NEGATIVE for ear, mouth and throat problems  RESP:NEGATIVE for significant cough or SOB  CV: NEGATIVE for chest pain, palpitations or peripheral edema  GI: NEGATIVE for nausea, abdominal pain, heartburn, or change in bowel habits  : negative for dysuria, hematuria, decreased urinary stream, erectile dysfunction  MUSCULOSKELETAL: Positive for right wrist and hand  tenderness, pain  NEURO: NEGATIVE for weakness, dizziness or paresthesias    EXAM:   /55   Pulse 70   Temp 96.8  F (36  C) (Oral)   Gen: healthy,alert,no distress  Extremity: wrist has swelling, point tenderness  and decreased ROM .   There is not compromise to the distal circulation.  Pulses are +2 and CRT is brisk  CHEST: clear to auscultation  CV: regular rate and rhythm  EXTREMITIES: peripheral pulses normal  MS:  Positive for right wrist, finger pain with movements and palpation.  Negative for snuff box tenderness  SKIN: Negative for swelling  NEURO: Normal strength and tone, sensory exam grossly normal, mentation intact and speech normal    X-RAY was done and negative for acute changes including fracture Xray read by Arben Valencia at time of visit    ASSESSMENT/PLAN    ICD-10-CM    1. Fall, initial encounter W19.XXXA XR Hand Left G/E 3 Views     CANCELED: XR Wrist Left G/E 3 Views   2. Left wrist pain M25.532 order for DME     HYDROcodone-acetaminophen (NORCO) 5-325 MG tablet     CANCELED: XR Wrist Left G/E 3 Views   3. Pain of finger of left hand M79.645 XR Hand Left G/E 3 Views     HYDROcodone-acetaminophen (NORCO) 5-325 MG tablet   4. Type 2 diabetes mellitus with chronic kidney disease on chronic dialysis, with long-term current use of insulin (H) E11.22 Monitor blood sugars    N18.6     Z99.2     Z79.4    5. Thoracic segment dysfunction M99.02 HYDROcodone-acetaminophen (NORCO) 5-325 MG tablet   6. ESRD (end stage renal disease) on dialysis (H) N18.6     Z99.2      Wear sling for comfort   Advised to wear wrist thumb spica splint  Recheck with PCP in 10 days if still painful and have another xray done

## 2018-12-21 DIAGNOSIS — Z99.2 TYPE 2 DIABETES MELLITUS WITH CHRONIC KIDNEY DISEASE ON CHRONIC DIALYSIS, WITH LONG-TERM CURRENT USE OF INSULIN (H): ICD-10-CM

## 2018-12-21 DIAGNOSIS — N18.6 TYPE 2 DIABETES MELLITUS WITH CHRONIC KIDNEY DISEASE ON CHRONIC DIALYSIS, WITH LONG-TERM CURRENT USE OF INSULIN (H): ICD-10-CM

## 2018-12-21 DIAGNOSIS — Z99.2 ESRD (END STAGE RENAL DISEASE) ON DIALYSIS (H): ICD-10-CM

## 2018-12-21 DIAGNOSIS — E11.22 TYPE 2 DIABETES MELLITUS WITH CHRONIC KIDNEY DISEASE ON CHRONIC DIALYSIS, WITH LONG-TERM CURRENT USE OF INSULIN (H): ICD-10-CM

## 2018-12-21 DIAGNOSIS — N18.6 ESRD (END STAGE RENAL DISEASE) ON DIALYSIS (H): ICD-10-CM

## 2018-12-21 DIAGNOSIS — Z79.4 TYPE 2 DIABETES MELLITUS WITH CHRONIC KIDNEY DISEASE ON CHRONIC DIALYSIS, WITH LONG-TERM CURRENT USE OF INSULIN (H): ICD-10-CM

## 2018-12-21 RX ORDER — FOLIC ACID/VIT B COMPLEX AND C 0.8 MG
TABLET ORAL
Qty: 90 TABLET | Refills: 0 | Status: SHIPPED | OUTPATIENT
Start: 2018-12-21 | End: 2019-03-28

## 2018-12-21 RX ORDER — INSULIN ASPART 100 [IU]/ML
INJECTION, SOLUTION INTRAVENOUS; SUBCUTANEOUS
Qty: 45 ML | Refills: 0 | Status: SHIPPED | OUTPATIENT
Start: 2018-12-21 | End: 2019-03-28

## 2018-12-21 NOTE — TELEPHONE ENCOUNTER
"Requested Prescriptions   Pending Prescriptions Disp Refills     NOVOLOG FLEXPEN 100 UNIT/ML soln [Pharmacy Med Name: NOVOLOG FLEXPEN INJ 3ML (ORANGE)] 45 mL 0     Sig: INJECT UNDER THE SKIN USING SLIDING SCALE UP TO 50 UNITS EVERY DAY AS DIRECTED BEFORE A MEAL    Short Acting Insulin Protocol Failed - 12/21/2018  8:41 AM       Failed - Blood pressure less than 140/90 in past 6 months    BP Readings from Last 3 Encounters:   12/18/18 144/55   11/15/18 108/58   11/08/18 140/59                Failed - LDL on file in past 12 months    Recent Labs   Lab Test 08/15/17  1219   LDL 83            Failed - Microalbumin on file in past 12 months    Recent Labs   Lab Test 08/15/17  1219   MICROL 1,040   UMALCR 1,571.00*            Failed - HgbA1C in past 3 or 6 months    If HgbA1C is 8 or greater, it needs to be on file within the past 3 months.  If less than 8, must be on file within the past 6 months.     Recent Labs   Lab Test 04/06/18  0305   A1C 5.8            Passed - Serum creatinine on file in past 12 months    Recent Labs   Lab Test 11/07/18  1440   CR 5.62*            Passed - Patient is age 18 or older       Passed - Recent (6 mo) or future (30 days) visit within the authorizing provider's specialty    Patient had office visit in the last 6 months or has a visit in the next 30 days with authorizing provider or within the authorizing provider's specialty.  See \"Patient Info\" tab in inbasket, or \"Choose Columns\" in Meds & Orders section of the refill encounter.            B Complex-C-Folic Acid (BRANDI-HEIDY) TABS [Pharmacy Med Name: BRANDI-HEIDY TABLETS] 90 tablet 0     Sig: TAKE ONE TABLET BY MOUTH DAILY    There is no refill protocol information for this order        Routing refill request to provider for review/approval because:  Drug not on the [facility] refill protocol    out of range:  blood pressure          "

## 2018-12-26 DIAGNOSIS — J44.1 COPD EXACERBATION (H): ICD-10-CM

## 2018-12-27 NOTE — TELEPHONE ENCOUNTER
"Requested Prescriptions   Pending Prescriptions Disp Refills     STIOLTO RESPIMAT 2.5-2.5 MCG/ACT AERS [Pharmacy Med Name: STIOLTO RESPIMAT 2.5/2.5MCG INH 4GM]  Last Written Prescription Date:  01/08/2018  Last Fill Quantity: 4g,  # refills: 10   Last Office Visit: 11/15/2018   Future Office Visit:    Next 5 appointments (look out 90 days)    Nathaniel 10, 2019  1:45 PM CST  Return Visit with Daria Hastings DO  [facility] ([facility]) [address] [place] [id number]  [phone] OPT 2          4 g 0     Sig: INHALE 2 PUFFS INTO THE LUNGS DAILY    Inhaled Steroids Protocol Passed - 12/26/2018 10:54 AM       Passed - Patient is age 12 or older       Passed - Recent (12 mo) or future (30 days) visit within the authorizing provider's specialty    Patient had office visit in the last 12 months or has a visit in the next 30 days with authorizing provider or within the authorizing provider's specialty.  See \"Patient Info\" tab in inbasket, or \"Choose Columns\" in Meds & Orders section of the refill encounter.                "

## 2018-12-30 DIAGNOSIS — J96.21 ACUTE ON CHRONIC RESPIRATORY FAILURE WITH HYPOXIA (H): ICD-10-CM

## 2018-12-30 NOTE — TELEPHONE ENCOUNTER
"Requested Prescriptions   Pending Prescriptions Disp Refills     irbesartan (AVAPRO) 150 MG tablet [Pharmacy Med Name: IRBESARTAN 150MG TABLETS] 30 tablet 0    Last Written Prescription Date:  12/17/2018  Last Fill Quantity: 30,  # refills: 0   Last office visit: 11/15/2018 with prescribing provider:  11/15/2018   Future Office Visit:   Next 5 appointments (look out 90 days)    Nathaniel 10, 2019  1:45 PM CST  Return Visit with Daria Hastings DO  [facility] ([facility]) [address] [place] [id number] [phone] OPT 2          Sig: TAKE 1 TABLET(150 MG) BY MOUTH DAILY    Angiotensin-II Receptors Failed - 12/30/2018  1:38 PM       Failed - Blood pressure under 140/90 in past 12 months    BP Readings from Last 3 Encounters:   12/18/18 144/55   11/15/18 108/58   11/08/18 140/59                Failed - Normal serum creatinine on file in past 12 months    Recent Labs   Lab Test 11/07/18  1440   CR 5.62*            Passed - Recent (12 mo) or future (30 days) visit within the authorizing provider's specialty    Patient had office visit in the last 12 months or has a visit in the next 30 days with authorizing provider or within the authorizing provider's specialty.  See \"Patient Info\" tab in inbasket, or \"Choose Columns\" in Meds & Orders section of the refill encounter.             Passed - Patient is age 18 or older       Passed - Normal serum potassium on file in past 12 months    Recent Labs   Lab Test 11/07/18  1440   POTASSIUM 4.7                      "

## 2018-12-31 DIAGNOSIS — R33.9 URINARY RETENTION: ICD-10-CM

## 2018-12-31 RX ORDER — TIOTROPIUM BROMIDE AND OLODATEROL 3.124; 2.736 UG/1; UG/1
SPRAY, METERED RESPIRATORY (INHALATION)
Qty: 4 G | Refills: 0 | Status: SHIPPED | OUTPATIENT
Start: 2018-12-31 | End: 2019-01-31

## 2019-01-01 NOTE — TELEPHONE ENCOUNTER
"Requested Prescriptions   Pending Prescriptions Disp Refills     doxazosin (CARDURA) 8 MG tablet [Pharmacy Med Name: DOXAZOSIN 8MG TABLETS]  Last Written Prescription Date:  01/10/2018  Last Fill Quantity: 90,  # refills: 3   Last office visit: 11/15/2018 with prescribing provider:  ZULEIKA   Future Office Visit:   Next 5 appointments (look out 90 days)    Nathaniel 10, 2019  1:45 PM CST  Return Visit with Daria Hastings DO  [facility] ([facility]) [address] [place] [id number] [phone] OPT 2          90 tablet 0     Sig: TAKE 1 TABLET BY MOUTH EVERY NIGHT AT BEDTIME.    Alpha Blockers Failed - 12/31/2018  3:46 AM       Failed - Blood pressure under 140/90 in past 12 months    BP Readings from Last 3 Encounters:   12/18/18 144/55   11/15/18 108/58   11/08/18 140/59                Passed - Recent (12 mo) or future (30 days) visit within the authorizing provider's specialty    Patient had office visit in the last 12 months or has a visit in the next 30 days with authorizing provider or within the authorizing provider's specialty.  See \"Patient Info\" tab in inbasket, or \"Choose Columns\" in Meds & Orders section of the refill encounter.             Passed - Patient does not have Tadalafil, Vardenafil, or Sildenafil on their medication list       Passed - Patient is 18 years of age or older          "

## 2019-01-02 RX ORDER — DOXAZOSIN 8 MG/1
TABLET ORAL
Qty: 90 TABLET | Refills: 0 | Status: SHIPPED | OUTPATIENT
Start: 2019-01-02 | End: 2019-05-01

## 2019-01-02 RX ORDER — IRBESARTAN 150 MG/1
TABLET ORAL
Qty: 30 TABLET | Refills: 0 | Status: SHIPPED | OUTPATIENT
Start: 2019-01-02 | End: 2019-03-09

## 2019-01-02 NOTE — TELEPHONE ENCOUNTER
Routing refill request to provider for review/approval because:  Elevated BP and creatinine

## 2019-01-20 DIAGNOSIS — Z79.4 TYPE 2 DIABETES MELLITUS WITH CHRONIC KIDNEY DISEASE ON CHRONIC DIALYSIS, WITH LONG-TERM CURRENT USE OF INSULIN (H): ICD-10-CM

## 2019-01-20 DIAGNOSIS — Z99.2 TYPE 2 DIABETES MELLITUS WITH CHRONIC KIDNEY DISEASE ON CHRONIC DIALYSIS, WITH LONG-TERM CURRENT USE OF INSULIN (H): ICD-10-CM

## 2019-01-20 DIAGNOSIS — E11.22 TYPE 2 DIABETES MELLITUS WITH CHRONIC KIDNEY DISEASE ON CHRONIC DIALYSIS, WITH LONG-TERM CURRENT USE OF INSULIN (H): ICD-10-CM

## 2019-01-20 DIAGNOSIS — N18.6 TYPE 2 DIABETES MELLITUS WITH CHRONIC KIDNEY DISEASE ON CHRONIC DIALYSIS, WITH LONG-TERM CURRENT USE OF INSULIN (H): ICD-10-CM

## 2019-01-20 DIAGNOSIS — Z99.2 ESRD (END STAGE RENAL DISEASE) ON DIALYSIS (H): ICD-10-CM

## 2019-01-20 DIAGNOSIS — I35.0 AORTIC VALVE STENOSIS, UNSPECIFIED ETIOLOGY: ICD-10-CM

## 2019-01-20 DIAGNOSIS — I10 ESSENTIAL HYPERTENSION, BENIGN: ICD-10-CM

## 2019-01-20 DIAGNOSIS — N18.6 ESRD (END STAGE RENAL DISEASE) ON DIALYSIS (H): ICD-10-CM

## 2019-01-21 RX ORDER — FUROSEMIDE 20 MG
TABLET ORAL
Qty: 90 TABLET | Refills: 0 | OUTPATIENT
Start: 2019-01-21

## 2019-01-21 RX ORDER — PEN NEEDLE, DIABETIC 31 GX5/16"
NEEDLE, DISPOSABLE MISCELLANEOUS
Qty: 400 EACH | Refills: 1 | Status: SHIPPED | OUTPATIENT
Start: 2019-01-21 | End: 2019-05-01

## 2019-01-21 RX ORDER — CARVEDILOL 12.5 MG/1
TABLET ORAL
Qty: 180 TABLET | Refills: 0 | OUTPATIENT
Start: 2019-01-21

## 2019-01-21 RX ORDER — EPLERENONE 50 MG/1
TABLET, FILM COATED ORAL
Qty: 30 TABLET | Refills: 0 | OUTPATIENT
Start: 2019-01-21

## 2019-01-21 NOTE — TELEPHONE ENCOUNTER
"Requested Prescriptions   Pending Prescriptions Disp Refills     B-D U/F 31G X 8 MM insulin pen needle [Pharmacy Med Name: B-D PEN NDL SHRT [id number](5/16) MEDARDO]  Last Written Prescription Date:  06/27/2018  Last Fill Quantity: 400,  # refills: 01   Last Office Visit: 11/15/2018   Future Office Visit:      400 each 0     Sig: USE AS DIRECTED FOUR TIMES DAILY.    Diabetic Supplies Protocol Passed - 1/20/2019  5:52 PM       Passed - Medication is active on med list       Passed - Patient is 18 years of age or older       Passed - Recent (6 mo) or future (30 days) visit within the authorizing provider's specialty    Patient had office visit in the last 6 months or has a visit in the next 30 days with authorizing provider.  See \"Patient Info\" tab in inbasket, or \"Choose Columns\" in Meds & Orders section of the refill encounter.            carvedilol (COREG) 12.5 MG tablet [Pharmacy Med Name: CARVEDILOL 12.5MG TABLETS]  Last Written Prescription Date:  11/15/2018  Last Fill Quantity: 180,  # refills: 03   Last Office Visit: 11/15/2018   Future Office Visit:      180 tablet 0     Sig: TAKE 1 TABLET(12.5 MG) BY MOUTH TWICE DAILY    Beta-Blockers Protocol Failed - 1/20/2019  5:52 PM       Failed - Blood pressure under 140/90 in past 12 months    BP Readings from Last 3 Encounters:   12/18/18 144/55   11/15/18 108/58   11/08/18 140/59                Passed - Patient is age 6 or older       Passed - Recent (12 mo) or future (30 days) visit within the authorizing provider's specialty    Patient had office visit in the last 12 months or has a visit in the next 30 days with authorizing provider or within the authorizing provider's specialty.  See \"Patient Info\" tab in in[employer], or \"Choose Columns\" in Meds & Orders section of the refill encounter.             Passed - Medication is active on med list        furosemide (LASIX) 20 MG tablet [Pharmacy Med Name: FUROSEMIDE 20MG TABLETS]  Last Written Prescription Date:  11/15/2018  Last " "Fill Quantity: 90,  # refills: 03   Last Office Visit: 11/15/2018   Future Office Visit:      90 tablet 0     Sig: TAKE 1 TABLET(20 MG) BY MOUTH DAILY    Diuretics (Including Combos) Protocol Failed - 1/20/2019  5:52 PM       Failed - Blood pressure under 140/90 in past 12 months    BP Readings from Last 3 Encounters:   12/18/18 144/55   11/15/18 108/58   11/08/18 140/59                Failed - Normal serum creatinine on file in past 12 months    Recent Labs   Lab Test 11/07/18  1440   CR 5.62*             Passed - Recent (12 mo) or future (30 days) visit within the authorizing provider's specialty    Patient had office visit in the last 12 months or has a visit in the next 30 days with authorizing provider or within the authorizing provider's specialty.  See \"Patient Info\" tab in inbasket, or \"Choose Columns\" in Meds & Orders section of the refill encounter.             Passed - Medication is active on med list       Passed - Patient is age 18 or older       Passed - Normal serum potassium on file in past 12 months    Recent Labs   Lab Test 11/07/18  1440   POTASSIUM 4.7                   Passed - Normal serum sodium on file in past 12 months    Recent Labs   Lab Test 11/07/18  1440                 eplerenone (INSPRA) 50 MG tablet [Pharmacy Med Name: EPLERENONE 50MG TABLETS]  Last Written Prescription Date:  06/21/2018  Last Fill Quantity: 90,  # refills: 04   Last Office Visit: 11/15/2018   Future Office Visit:      30 tablet 0     Sig: TAKE 1 TABLET(50 MG) BY MOUTH DAILY    Diuretics (Including Combos) Protocol Failed - 1/20/2019  5:52 PM       Failed - Blood pressure under 140/90 in past 12 months    BP Readings from Last 3 Encounters:   12/18/18 144/55   11/15/18 108/58   11/08/18 140/59                Failed - Normal serum creatinine on file in past 12 months    Recent Labs   Lab Test 11/07/18  1440   CR 5.62*             Passed - Recent (12 mo) or future (30 days) visit within the authorizing provider's " "specialty    Patient had office visit in the last 12 months or has a visit in the next 30 days with authorizing provider or within the authorizing provider's specialty.  See \"Patient Info\" tab in inbasket, or \"Choose Columns\" in Meds & Orders section of the refill encounter.             Passed - Medication is active on med list       Passed - Patient is age 18 or older       Passed - Normal serum potassium on file in past 12 months    Recent Labs   Lab Test 11/07/18  1440   POTASSIUM 4.7                   Passed - Normal serum sodium on file in past 12 months    Recent Labs   Lab Test 11/07/18  1440                   "

## 2019-01-22 NOTE — TELEPHONE ENCOUNTER
RX's not needed. Pharmacy has refills because 2 were filled on 11/15/18 for 1 year supply.   And eplerenone has 2 refills left.       B-D PEN NDL SHRT [id number](5/16) MEDARDO  Prescription approved per [facility] Refill Protocol.

## 2019-01-28 ENCOUNTER — DOCUMENTATION ONLY (OUTPATIENT)
Dept: SLEEP MEDICINE | Facility: CLINIC | Age: 84
End: 2019-01-28

## 2019-01-31 DIAGNOSIS — J44.1 COPD EXACERBATION (H): ICD-10-CM

## 2019-01-31 RX ORDER — TIOTROPIUM BROMIDE AND OLODATEROL 3.124; 2.736 UG/1; UG/1
SPRAY, METERED RESPIRATORY (INHALATION)
Qty: 4 G | Refills: 9 | Status: SHIPPED | OUTPATIENT
Start: 2019-01-31

## 2019-01-31 NOTE — TELEPHONE ENCOUNTER
"Requested Prescriptions   Pending Prescriptions Disp Refills     STIOLTO RESPIMAT 2.5-2.5 MCG/ACT AERS [Pharmacy Med Name: STIOLTO RESPIMAT 2.5/2.5MCG INH 4GM] 4 g 0     Sig: INHALE 2 PUFFS INTO THE LUNGS DAILY    Inhaled Steroids Protocol Passed - 1/31/2019  9:04 AM       Passed - Patient is age 12 or older       Passed - Recent (12 mo) or future (30 days) visit within the authorizing provider's specialty    Patient had office visit in the last 12 months or has a visit in the next 30 days with authorizing provider or within the authorizing provider's specialty.  See \"Patient Info\" tab in inbasket, or \"Choose Columns\" in Meds & Orders section of the refill encounter.             Passed - Medication is active on med list          "

## 2019-02-04 ENCOUNTER — TRANSFERRED RECORDS (OUTPATIENT)
Dept: HEALTH INFORMATION MANAGEMENT | Facility: CLINIC | Age: 84
End: 2019-02-04

## 2019-02-06 ENCOUNTER — HOSPITAL ENCOUNTER (EMERGENCY)
Facility: CLINIC | Age: 84
Discharge: HOME | End: 2019-02-06
Attending: EMERGENCY MEDICINE | Admitting: EMERGENCY MEDICINE
Payer: MEDICARE

## 2019-02-06 ENCOUNTER — TRANSFERRED RECORDS (OUTPATIENT)
Dept: HEALTH INFORMATION MANAGEMENT | Facility: CLINIC | Age: 84
End: 2019-02-06

## 2019-02-06 ENCOUNTER — APPOINTMENT (OUTPATIENT)
Dept: GENERAL RADIOLOGY | Facility: CLINIC | Age: 84
End: 2019-02-06
Attending: EMERGENCY MEDICINE
Payer: MEDICARE

## 2019-02-06 VITALS
TEMPERATURE: 98.4 F | OXYGEN SATURATION: 97 % | DIASTOLIC BLOOD PRESSURE: 49 MMHG | RESPIRATION RATE: 20 BRPM | HEART RATE: 67 BPM | SYSTOLIC BLOOD PRESSURE: 136 MMHG

## 2019-02-06 DIAGNOSIS — D63.1 ANEMIA DUE TO CHRONIC KIDNEY DISEASE, ON CHRONIC DIALYSIS (H): ICD-10-CM

## 2019-02-06 DIAGNOSIS — N18.6 ANEMIA DUE TO CHRONIC KIDNEY DISEASE, ON CHRONIC DIALYSIS (H): ICD-10-CM

## 2019-02-06 DIAGNOSIS — Z99.2 ANEMIA DUE TO CHRONIC KIDNEY DISEASE, ON CHRONIC DIALYSIS (H): ICD-10-CM

## 2019-02-06 LAB
ANION GAP SERPL CALCULATED.3IONS-SCNC: 6 MMOL/L (ref 3–14)
BASOPHILS # BLD AUTO: 0 10E9/L (ref 0–0.2)
BASOPHILS NFR BLD AUTO: 0.2 %
BUN SERPL-MCNC: 13 MG/DL (ref 7–30)
CALCIUM SERPL-MCNC: 8.4 MG/DL (ref 8.5–10.1)
CHLORIDE SERPL-SCNC: 100 MMOL/L (ref 94–109)
CO2 SERPL-SCNC: 32 MMOL/L (ref 20–32)
CREAT SERPL-MCNC: 2.33 MG/DL (ref 0.66–1.25)
DIFFERENTIAL METHOD BLD: ABNORMAL
EOSINOPHIL # BLD AUTO: 0.1 10E9/L (ref 0–0.7)
EOSINOPHIL NFR BLD AUTO: 1.6 %
ERYTHROCYTE [DISTWIDTH] IN BLOOD BY AUTOMATED COUNT: 16.9 % (ref 10–15)
GFR SERPL CREATININE-BSD FRML MDRD: 25 ML/MIN/{1.73_M2}
GLUCOSE SERPL-MCNC: 115 MG/DL (ref 70–99)
HCT VFR BLD AUTO: 24.3 % (ref 40–53)
HEMOCCULT STL QL: POSITIVE
HGB BLD-MCNC: 7.4 G/DL (ref 13.3–17.7)
IMM GRANULOCYTES # BLD: 0 10E9/L (ref 0–0.4)
IMM GRANULOCYTES NFR BLD: 0.3 %
LYMPHOCYTES # BLD AUTO: 0.6 10E9/L (ref 0.8–5.3)
LYMPHOCYTES NFR BLD AUTO: 6.1 %
MCH RBC QN AUTO: 25.1 PG (ref 26.5–33)
MCHC RBC AUTO-ENTMCNC: 30.5 G/DL (ref 31.5–36.5)
MCV RBC AUTO: 82 FL (ref 78–100)
MONOCYTES # BLD AUTO: 0.5 10E9/L (ref 0–1.3)
MONOCYTES NFR BLD AUTO: 5.7 %
NEUTROPHILS # BLD AUTO: 7.8 10E9/L (ref 1.6–8.3)
NEUTROPHILS NFR BLD AUTO: 86.1 %
NRBC # BLD AUTO: 0 10*3/UL
NRBC BLD AUTO-RTO: 0 /100
PLATELET # BLD AUTO: 295 10E9/L (ref 150–450)
POTASSIUM SERPL-SCNC: 3.3 MMOL/L (ref 3.4–5.3)
RBC # BLD AUTO: 2.95 10E12/L (ref 4.4–5.9)
SODIUM SERPL-SCNC: 138 MMOL/L (ref 133–144)
WBC # BLD AUTO: 9 10E9/L (ref 4–11)

## 2019-02-06 PROCEDURE — 85025 COMPLETE CBC W/AUTO DIFF WBC: CPT | Performed by: EMERGENCY MEDICINE

## 2019-02-06 PROCEDURE — 71046 X-RAY EXAM CHEST 2 VIEWS: CPT

## 2019-02-06 PROCEDURE — 99284 EMERGENCY DEPT VISIT MOD MDM: CPT | Mod: 25

## 2019-02-06 PROCEDURE — 82272 OCCULT BLD FECES 1-3 TESTS: CPT | Performed by: EMERGENCY MEDICINE

## 2019-02-06 PROCEDURE — 80048 BASIC METABOLIC PNL TOTAL CA: CPT | Performed by: EMERGENCY MEDICINE

## 2019-02-06 ASSESSMENT — ENCOUNTER SYMPTOMS
SHORTNESS OF BREATH: 1
FEVER: 0

## 2019-02-06 NOTE — ED AVS SNAPSHOT
Emergency Department  [address] [place] [id number]  Phone:  [phone]  Fax:  [phone]                                    Seven Savage Jr.   MRN: [id number]    Department:   Emergency Department   Date of Visit:  2/6/2019           After Visit Summary Signature Page    I have received my discharge instructions, and my questions have been answered. I have discussed any challenges I see with this plan with the nurse or doctor.    ..........................................................................................................................................  Patient/Patient Representative Signature      ..........................................................................................................................................  Patient Representative Print Name and Relationship to Patient    ..................................................               ................................................  Date                                   Time    ..........................................................................................................................................  Reviewed by Signature/Title    ...................................................              ..............................................  Date                                               Time          22EPIC Rev 08/18

## 2019-02-06 NOTE — PROGRESS NOTES
I was asked to see this patient as he is requesting assistance with transportation to dialysis and doctor appointments. I spoke with this patient and he didn't need assistance with transportation what he wants is a portable oxygen tank.  He mentioned his Pulmonologist sent in the orders and they didn't get them, then they got them but he is on the list for a portable tank.  He is serviced thru [facility].  I contacted [facility] and spoke with Denisse who said this patient does have a portable tank but he wants one with a shoulder strap.  Denisse said they just got a bunch in and she knows he is on the list but doesn't know where on the list he is. She said she will contact him tomorrow and update him if he will get the shoulder strap portable O2.  This patient will discharge to home.  I asked how he will get home and he said his son will take him.  I asked this patient to call his son to have him come here now.  If this patient's son doesn't have an O2 tank it is ok to send a tank home with him and [facility] will pick it up tomorrow.  I have updated the staff on this discharge plan.

## 2019-02-06 NOTE — DISCHARGE INSTRUCTIONS
Coping with Anemia (Low Red Blood Cells)  What is anemia?  Anemia means you have fewer red blood cells than normal, or you do not have enough iron in your blood. When this happens, your red blood cells cannot carry enough oxygen to the rest of your body.  Symptoms include:    Feeling very weak, tired or short of breath    Feeling dizzy or light-headed    Skin, gums, nail beds or lower eyelids that are pale.  How is it treated?  Anemia can be treated with:    Procrit (epoetin libby)    Aranesp (darbepoetin libby)    Iron pills    Other: _________________________.  These medicines may increase your iron, help your body make red blood cells, or treat the cause of your anemia.  Severe anemia requires a blood transfusion. In this case you would receive donated blood through an IV line (a small tube in your vein). This usually takes four to five hours. You can receive blood at one of [facility] outpatient infusion centers.  You will need a number of blood tests to see if your treatment is working.  What else can I do to treat or prevent anemia?    Eat a healthy diet with lots of iron-rich foods (like beef, liver, canned salmon, dried fruits and fortified cereals). Drink lots of fluids.    Sleep more at night and take naps during the day.    Plan your day to include rest periods. Don't try to over-schedule yourself. Save your energy for the things that are most important to you.    Try to do light exercise every day. Avoid heavy exercise or activity.    Don't be afraid to ask for help when you need it. Family and friends can help with , shopping, housework or driving.    Don't get up too quickly when lying or sitting. This may make you dizzy. Change positions slowly.    Avoid injuries that might cause bleeding or bruising. Keep your home as safe as possible. Do not use razors or sharp knives.    Make time for activities that help you relax (meditation, reading, talking with friends, listening to music).  When  "should I call my care team?  Call your care team if:    You feel very weak and tired all the time.    You have trouble breathing or feel short of breath.    You are dizzy or light-headed.    Your heart rate is faster than normal.    You have headaches often.    You have heavy vaginal bleeding (soaking one pad an hour) or a change in your periods. This includes increased bleeding or bleeding between cycles.  Comments:  __________________________________________  __________________________________________  __________________________________________  __________________________________________  __________________________________________  __________________________________________  __________________________________________  __________________________________________  __________________________________________  __________________________________________  For informational purposes only. Not to replace the advice of your health care provider.  Copyright   2006 [employer] [facility]. All rights reserved. [employer] [id number] - REV 12/15.    Protein and Kidney Disease  Your body needs the right amount of protein to build muscles, repair tissues and fight infections.   Protein breaks down into waste products, which must be removed by the kidneys. If you have kidney disease, eating too much protein could put stress on your kidneys. You may be able to prevent further damage to your kidneys by limiting the amount of protein you eat.   Try to eat more animal proteins than plant proteins. Animal proteins are \"complete proteins.\" They include meat, poultry, fish, seafood, eggs and milk products. At least half or your daily protein should come from these foods.  Limit your animal proteins to ________ ounces every day.  Examples  1 ounce of protein:     Egg    Chicken wing      cup cottage cheese or a slice of cheese about the size of a keesha (limit to one serving of cheese or cottage cheese each day)  2 ounces of " protein:    Chicken leg or thigh    1/4 cup meat, fish or poultry (such as chicken or turkey)    Thin pork chop  3 ounces of protein:    Medium hamburger (a quarter pounder)    Thick pork chop    Medium fish fillet  Tips  1 ounce of meat is about 7 grams of protein.  A 3- to 4-ounce piece of meat is about the size of a deck of playing cards.     1 ounce of cheese is about the size of a keesha.     Calories and Kidney Disease  The energy you get from food is measured in calories. Your body needs calories to function and to stay at a healthy weight.   Since you may be eating less protein, you may also be eating fewer calories. Not eating enough calories may cause your body to break down its own muscles for energy. This also puts stress on the kidneys.   Eating more fat can be an easy way to take in extra calories without feeling full. The following fats contain about 50 calories per servin teaspoon margarine or butter    1 teaspoon vegetable oil    1 tablespoon salad dressing    2 tablespoons mayonnaise    1/4 cup whipped topping (non-dairy)  These high-calorie foods are low in protein, phosphorus and sodium. Each contains about 100 calories per servin ounces Polo-Aid or lemonade    8 ounces non-cola soda pop    8 ounces apple cider    2 tablespoons jam or jelly    20 jellybeans    2 tablespoons honey or syrup  For informational purposes only. Not to replace the advice of your health care provider. Copyright   2004 [facility]. All rights reserved. [employer] [id number] - REV     Protein and Kidney Disease  Your body needs the right amount of protein to build muscles, repair tissues and fight infections.   Protein breaks down into waste products, which must be removed by the kidneys. If you have kidney disease, eating too much protein could put stress on your kidneys. You may be able to prevent further damage to your kidneys by limiting the amount of protein you eat.   Try to eat more  "animal proteins than plant proteins. Animal proteins are \"complete proteins.\" They include meat, poultry, fish, seafood, eggs and milk products. At least half or your daily protein should come from these foods.  Limit your animal proteins to ________ ounces every day.  Examples  1 ounce of protein:     Egg    Chicken wing      cup cottage cheese or a slice of cheese about the size of a keesha (limit to one serving of cheese or cottage cheese each day)  2 ounces of protein:    Chicken leg or thigh    1/4 cup meat, fish or poultry (such as chicken or turkey)    Thin pork chop  3 ounces of protein:    Medium hamburger (a quarter pounder)    Thick pork chop    Medium fish fillet  Tips  1 ounce of meat is about 7 grams of protein.  A 3- to 4-ounce piece of meat is about the size of a deck of playing cards.     1 ounce of cheese is about the size of a keesha.     Calories and Kidney Disease  The energy you get from food is measured in calories. Your body needs calories to function and to stay at a healthy weight.   Since you may be eating less protein, you may also be eating fewer calories. Not eating enough calories may cause your body to break down its own muscles for energy. This also puts stress on the kidneys.   Eating more fat can be an easy way to take in extra calories without feeling full. The following fats contain about 50 calories per servin teaspoon margarine or butter    1 teaspoon vegetable oil    1 tablespoon salad dressing    2 tablespoons mayonnaise    1/4 cup whipped topping (non-dairy)  These high-calorie foods are low in protein, phosphorus and sodium. Each contains about 100 calories per servin ounces Polo-Aid or lemonade    8 ounces non-cola soda pop    8 ounces apple cider    2 tablespoons jam or jelly    20 jellybeans    2 tablespoons honey or syrup  For informational purposes only. Not to replace the advice of your health care provider. Copyright   2004 [facility]. All " rights reserved. [employer] 160600 - REV 03/16

## 2019-02-06 NOTE — ED NOTES
Bed: ST03  Expected date:   Expected time:   Means of arrival:   Comments:  trudi - 516 - 82M dialysis pt abnormal labs eta 1440

## 2019-02-06 NOTE — ED PROVIDER NOTES
History     Chief Complaint:  Abnormal labs     HPI   Seven Savage Jr. is a 83 year old male with a history of Aortic valve stenosis, hypertension, chronic obstructive pulmonary disease, and diabetes mellitus 2 who presents with abnormal labs. The patient was at dialysis today where his hemoglobin was found to be lower than usual. He had a HGB of 7.2 today from a HGB of 7.5 a few days ago. The patient goes to dialysis on mondays, wednesdays, and fridays and never misses it. He has also noticed a blood sugar around 529 lately as well as increased shortness of breath upon exertion. The patient has been on home oxygen for at least the last 6 weeks and is hoping to obtain a better portable oxygen unit. Due to concern for the HGB, the patient has visited the ED for evaluation and treatment. Upon arrival, the patient is afebrile and reports dark greenish stool with no recent change in color or consistency. He denies any chest pain or recent illness.     Lab results:  HGB today: 7.2  HGB 2/4/19: 7.5    Allergies:  levaquin  Pioglitazone  vytorin    Medications:    albuterol  lipitor  Coreg  Colchicine  aranesp  Tiazac  cardura  inspra  Lasix  novolog  lantus  avapro  zantax    Past Medical History:    chronic obstructive pulmonary disease   Chronic renal failure  Heart murmur  Hyperlipidemia  Hypertension  Insulin dependent diabetes mellitus  Renal calculi   Aortic valve stenosis  Diabetes mellitus 2    Past Surgical History:    adenoidectomy  Endoscopic polypectomy  Renal stent   Tonsillectomy    Family History:    No past pertinent family history.    Social History:  Marital Status:     Smoker:   Former   Smokeless:   Never   Alcohol:   Yes   Drugs:   No   Lives at:   With son   Arrives with:   No one   Clinic:    Unknown   Work:   Unknown     Review of Systems   Constitutional: Negative for fever.   Respiratory: Positive for shortness of breath.    Cardiovascular: Negative for chest pain.   All other  systems reviewed and are negative.    Physical Exam     Patient Vitals for the past 24 hrs:   BP Temp Temp src Pulse Heart Rate Resp SpO2   02/06/19 1500 136/49 -- -- 67 -- 20 97 %   02/06/19 1456 -- 98.4  F (36.9  C) Oral -- -- -- --   02/06/19 1454 147/58 -- -- -- 76 20 (!) 88 %     Physical Exam  General: Alert and cooperative with exam. Patient in mild distress. Normal mentation.  Head:  Scalp is NC/AT  Eyes:  No scleral icterus, PERRL  ENT:  The external nose and ears are normal. The oropharynx is normal and without erythema; mucus membranes are moist. Uvula midline, no evidence of deep space infection.  Neck:  Normal range of motion without rigidity.  CV:  Regular rate and rhythm.  Upper extremity dialysis fistula present C/D/I  Resp:  Breath sounds are clear bilaterally.  Nasal cannula in place    Non-labored, no retractions or accessory muscle use  GI:  Abdomen is soft, no distension, no tenderness. No peritoneal signs.  Rectal exam demonstrates dark green stool with normal rectal tone  MS:  No lower extremity edema   Skin:  Warm and dry, No rash or lesions noted.  Neuro: Oriented x 3. No gross motor deficits.          Emergency Department Course   Imaging:  Radiographic findings were communicated with the patient who voiced understanding of the findings.    XR Chest PA & LAT:   Mild hyperinflation both lungs. Small bilateral pleural  effusions have increased slightly in prominence since the previous  exam. Mild associated infiltrate and/or atelectasis is noted at both  lung bases, also increased slightly in prominence. Heart size appears  stable. Mild pulmonary venous congestion is not significantly changed.  Aortic calcification. as per radiology.     Laboratory:  BMP: Glucose 115, potassium 3.3, creatinine 2.33, GFR 25, calcium 8.4, o/w WNL  CBC: WBC: 9.0, HGB: 7.4, PLT: 295  Occult blood stool: positive     Emergency Department Course:  (3281) I performed an exam of the patient as documented above.       Work up completed. Results above.    (1455)  Oxygen started via nasal cannula.   (1500)  Rectal exam performed. Stool was green.   (1502)  IV started  (1504)  I rechecked the patient.  (1651)  I spoke with Dr. Samuel, nephrologist  (1655) I rechecked the patient and discussed the results of their workup thus far.      Findings and plan explained. Patient discharged home with instructions regarding supportive care, medications, and reasons to return. The importance of close follow-up was reviewed.     I personally reviewed the laboratory results with them and answered all related questions prior to discharge.    Impression & Plan    Medical Decision Making:  Patient is a 83-year-old male who is dialysis dependent presents with anemia.  Patient's medical history and records were reviewed.  On evaluation, patient is on his baseline oxygen requirement and does not appear to be in any respiratory distress.  Chest x-ray obtained and demonstrates no significant change from previous as noted above.  Patient afebrile without infectious symptoms.  Labs demonstrate evidence of chronic renal insufficiency as well as anemia (hemoglobin 7.4; not significantly changed from 2 days ago (7.5)).  Rectal exam was performed and demonstrated dark green stool, Hemoccult positive though patient has hemorrhoids.  Patient reports no significant change in stool color over the last several weeks.  Given stable anemia, it is unlikely that the patient is having any significant GI bleed at this time.  This was discussed with Dr. Samuel (patient's nephrologist) who will follow-up with patient on Friday in dialysis clinic.  Patient met with Reema, our care coordinator, to help arrange for additional outpatient resources.  At the time discharge patient was hemodynamically stable, neurologically intact, and afebrile.  Presentation consistent with anemia of chronic disease.    Diagnosis:    ICD-10-CM    1. Anemia due to chronic kidney disease, on  chronic dialysis (H) N18.6     D63.1     Z99.2      Disposition:  discharged to home    Scribe Disclosure:  I, Stefano Garcia, am serving as a scribe on 2/6/2019 at 2:51 PM to personally document services performed by Erick Betancourt DO based on my observations and the provider's statements to me.     Stefano Garcia  2/6/2019    EMERGENCY DEPARTMENT       Erick Betnacourt DO  02/06/19 2125

## 2019-02-25 DIAGNOSIS — I10 ESSENTIAL HYPERTENSION, BENIGN: ICD-10-CM

## 2019-02-26 RX ORDER — DILTIAZEM HYDROCHLORIDE 300 MG/1
CAPSULE, EXTENDED RELEASE ORAL
Qty: 90 CAPSULE | Refills: 0 | Status: SHIPPED | OUTPATIENT
Start: 2019-02-26 | End: 2019-05-29

## 2019-02-26 NOTE — TELEPHONE ENCOUNTER
Requested Prescriptions   Pending Prescriptions Disp Refills     diltiazem ER (TIAZAC) 300 MG 24 hr ER beaded capsule  Last Written Prescription Date:  11/20/2018  Last Fill Quantity: 90,  # refills: 0   Last Office Visit: 11/15/2018   Future Office Visit:      90 capsule 0    There is no refill protocol information for this order

## 2019-02-26 NOTE — TELEPHONE ENCOUNTER
"Requested Prescriptions   Pending Prescriptions Disp Refills     diltiazem ER (TIAZAC) 300 MG 24 hr ER beaded capsule 90 capsule 0    Calcium Channel Blockers Protocol  Failed - 2/25/2019  8:37 PM       Failed - Normal serum creatinine on file in past 12 months    Recent Labs   Lab Test 02/06/19  1520   CR 2.33*            Passed - Blood pressure under 140/90 in past 12 months    BP Readings from Last 3 Encounters:   02/06/19 136/49   12/18/18 144/55   11/15/18 108/58                Passed - Normal ALT in past 12 months    Recent Labs   Lab Test 11/05/18  0802   ALT 15            Passed - Recent (12 mo) or future (30 days) visit within the authorizing provider's specialty    Patient had office visit in the last 12 months or has a visit in the next 30 days with authorizing provider or within the authorizing provider's specialty.  See \"Patient Info\" tab in inbasket, or \"Choose Columns\" in Meds & Orders section of the refill encounter.             Passed - Medication is active on med list       Passed - Patient is age 18 or older        Routing refill request to provider for review/approval because:  Labs out of range:  creat        "

## 2019-03-09 DIAGNOSIS — J96.21 ACUTE ON CHRONIC RESPIRATORY FAILURE WITH HYPOXIA (H): ICD-10-CM

## 2019-03-09 NOTE — TELEPHONE ENCOUNTER
"Requested Prescriptions   Pending Prescriptions Disp Refills     irbesartan (AVAPRO) 150 MG tablet [Pharmacy Med Name: IRBESARTAN 150MG TABLETS] 30 tablet 0    Last Written Prescription Date:  1/2/2019  Last Fill Quantity: 30,  # refills: 0   Last office visit: 11/15/2018 with prescribing provider:  11/15/2018   Future Office Visit:     Sig: TAKE 1 TABLET(150 MG) BY MOUTH DAILY    Angiotensin-II Receptors Failed - 3/9/2019  9:04 AM       Failed - Normal serum creatinine on file in past 12 months    Recent Labs   Lab Test 02/06/19  1520   CR 2.33*            Failed - Normal serum potassium on file in past 12 months    Recent Labs   Lab Test 02/06/19  1520   POTASSIUM 3.3*                   Passed - Blood pressure under 140/90 in past 12 months    BP Readings from Last 3 Encounters:   02/06/19 136/49   12/18/18 144/55   11/15/18 108/58                Passed - Recent (12 mo) or future (30 days) visit within the authorizing provider's specialty    Patient had office visit in the last 12 months or has a visit in the next 30 days with authorizing provider or within the authorizing provider's specialty.  See \"Patient Info\" tab in inbasket, or \"Choose Columns\" in Meds & Orders section of the refill encounter.             Passed - Medication is active on med list       Passed - Patient is age 18 or older          "

## 2019-03-11 RX ORDER — IRBESARTAN 150 MG/1
TABLET ORAL
Qty: 90 TABLET | Refills: 3 | Status: SHIPPED | OUTPATIENT
Start: 2019-03-11

## 2019-03-14 ENCOUNTER — TRANSFERRED RECORDS (OUTPATIENT)
Dept: HEALTH INFORMATION MANAGEMENT | Facility: CLINIC | Age: 84
End: 2019-03-14

## 2019-03-27 DIAGNOSIS — E11.22 TYPE 2 DIABETES MELLITUS WITH CHRONIC KIDNEY DISEASE ON CHRONIC DIALYSIS, WITH LONG-TERM CURRENT USE OF INSULIN (H): ICD-10-CM

## 2019-03-27 DIAGNOSIS — Z99.2 ESRD (END STAGE RENAL DISEASE) ON DIALYSIS (H): ICD-10-CM

## 2019-03-27 DIAGNOSIS — N18.6 ESRD (END STAGE RENAL DISEASE) ON DIALYSIS (H): ICD-10-CM

## 2019-03-27 DIAGNOSIS — Z79.4 TYPE 2 DIABETES MELLITUS WITH CHRONIC KIDNEY DISEASE ON CHRONIC DIALYSIS, WITH LONG-TERM CURRENT USE OF INSULIN (H): ICD-10-CM

## 2019-03-27 DIAGNOSIS — Z99.2 TYPE 2 DIABETES MELLITUS WITH CHRONIC KIDNEY DISEASE ON CHRONIC DIALYSIS, WITH LONG-TERM CURRENT USE OF INSULIN (H): ICD-10-CM

## 2019-03-27 DIAGNOSIS — N18.6 TYPE 2 DIABETES MELLITUS WITH CHRONIC KIDNEY DISEASE ON CHRONIC DIALYSIS, WITH LONG-TERM CURRENT USE OF INSULIN (H): ICD-10-CM

## 2019-03-27 RX ORDER — FOLIC ACID/VIT B COMPLEX AND C 0.8 MG
TABLET ORAL
Qty: 90 TABLET | Refills: 0 | OUTPATIENT
Start: 2019-03-27

## 2019-03-27 RX ORDER — INSULIN ASPART 100 [IU]/ML
INJECTION, SOLUTION INTRAVENOUS; SUBCUTANEOUS
Qty: 45 ML | Refills: 0 | OUTPATIENT
Start: 2019-03-27

## 2019-03-27 NOTE — TELEPHONE ENCOUNTER
Requested Prescriptions   Pending Prescriptions Disp Refills     B Complex-C-Folic Acid (BRANDI-HEIDY) TABS [Pharmacy Med Name: BRANDI-HEIDY TABLETS] 90 tablet 0     Sig: TAKE ONE TABLET BY MOUTH DAILY    There is no refill protocol information for this order       Duplicate. #90 with 3 refills sent on 11/15/2018    Katie AVENDANO RN, BSN, PHN

## 2019-03-27 NOTE — TELEPHONE ENCOUNTER
Patient says he uses a sliding scale, asked him what doses he uses/what is sliding scale is and he said he will just talk to Dr. Antunez tomorrow about this. Has appt tomorrow, please address at appt. He does not need refilled today. Patient made appt because he says he has been having problems. He is a dialysis patient, had previously wanted his nephrologist to refer him to a hematologist but they never did so he was trying to treat himself. Says his hemoglobin is low and they are having trouble getting it up. Received blood in the hospital. Patient says he will discuss everything with Dr. Antunez tomorrow.

## 2019-03-27 NOTE — TELEPHONE ENCOUNTER
Please note that I need verification as to the dosage that the patient is using for insurance refill.  Please verify with patient and enter dosing into [facility] for refill

## 2019-03-27 NOTE — TELEPHONE ENCOUNTER
"  NOVOLOG FLEXPEN 100 UNIT/ML soln [Pharmacy Med Name: NOVOLOG FLEXPEN INJ 3ML (ORANGE)] 45 mL 0     Sig: INJECT UNDER THE SKIN USING SLIDING SCALE UP TO 50 UNITS EVERY DAY AS DIRECTED BEFORE A MEAL    Short Acting Insulin Protocol Failed - 3/27/2019  9:04 AM       Failed - LDL on file in past 12 months    Recent Labs   Lab Test 08/15/17  1219   LDL 83   Last Written Prescription Date:  12/21/2018  Last Fill Quantity: 45,  # refills: 0   Last office visit: 11/15/2018 with prescribing provider:  Sandip Antunez     Future Office Visit:   Next 5 appointments (look out 90 days)    Mar 28, 2019  1:40 PM CDT  Office Visit with Sandip Antunez MD  [facility] ([facility]) [address] [place] [id number] [phone]                Failed - Microalbumin on file in past 12 months    Recent Labs   Lab Test 08/15/17  1219   MICROL 1,040   UMALCR 1,571.00*            Failed - HgbA1C in past 3 or 6 months    If HgbA1C is 8 or greater, it needs to be on file within the past 3 months.  If less than 8, must be on file within the past 6 months.     Recent Labs   Lab Test 04/06/18  0305   A1C 5.8            Passed - Blood pressure less than 140/90 in past 6 months    BP Readings from Last 3 Encounters:   02/06/19 136/49   12/18/18 144/55   11/15/18 108/58                Passed - Serum creatinine on file in past 12 months    Recent Labs   Lab Test 02/06/19  1520   CR 2.33*            Passed - Medication is active on med list       Passed - Patient is age 18 or older       Passed - Recent (6 mo) or future (30 days) visit within the authorizing provider's specialty    Patient had office visit in the last 6 months or has a visit in the next 30 days with authorizing provider or within the authorizing provider's specialty.  See \"Patient Info\" tab in inbasket, or \"Choose Columns\" in Meds & Orders section of the refill encounter.            Routing refill request to provider " for review/approval because:  Labs not current:  LDL-2017, A1c  Patient due for diabetes follow up- No showed last scheduled visit.    Katie AVENDANO RN, BSN, PHN

## 2019-03-28 ENCOUNTER — OFFICE VISIT (OUTPATIENT)
Dept: INTERNAL MEDICINE | Facility: CLINIC | Age: 84
End: 2019-03-28
Payer: MEDICARE

## 2019-03-28 VITALS
OXYGEN SATURATION: 92 % | SYSTOLIC BLOOD PRESSURE: 140 MMHG | HEIGHT: 69 IN | RESPIRATION RATE: 20 BRPM | HEART RATE: 67 BPM | TEMPERATURE: 97.8 F | WEIGHT: 176.6 LBS | DIASTOLIC BLOOD PRESSURE: 62 MMHG | BODY MASS INDEX: 26.16 KG/M2

## 2019-03-28 DIAGNOSIS — R33.9 URINARY RETENTION: ICD-10-CM

## 2019-03-28 DIAGNOSIS — M54.5 LOW BACK PAIN, UNSPECIFIED BACK PAIN LATERALITY, UNSPECIFIED CHRONICITY, WITH SCIATICA PRESENCE UNSPECIFIED: ICD-10-CM

## 2019-03-28 DIAGNOSIS — Z99.2 ESRD (END STAGE RENAL DISEASE) ON DIALYSIS (H): ICD-10-CM

## 2019-03-28 DIAGNOSIS — Z99.2 TYPE 2 DIABETES MELLITUS WITH CHRONIC KIDNEY DISEASE ON CHRONIC DIALYSIS, WITH LONG-TERM CURRENT USE OF INSULIN (H): Primary | ICD-10-CM

## 2019-03-28 DIAGNOSIS — N18.6 ESRD (END STAGE RENAL DISEASE) ON DIALYSIS (H): ICD-10-CM

## 2019-03-28 DIAGNOSIS — E11.22 TYPE 2 DIABETES MELLITUS WITH CHRONIC KIDNEY DISEASE ON CHRONIC DIALYSIS, WITH LONG-TERM CURRENT USE OF INSULIN (H): Primary | ICD-10-CM

## 2019-03-28 DIAGNOSIS — Z79.4 TYPE 2 DIABETES MELLITUS WITH CHRONIC KIDNEY DISEASE ON CHRONIC DIALYSIS, WITH LONG-TERM CURRENT USE OF INSULIN (H): Primary | ICD-10-CM

## 2019-03-28 DIAGNOSIS — N18.6 TYPE 2 DIABETES MELLITUS WITH CHRONIC KIDNEY DISEASE ON CHRONIC DIALYSIS, WITH LONG-TERM CURRENT USE OF INSULIN (H): Primary | ICD-10-CM

## 2019-03-28 PROCEDURE — 99214 OFFICE O/P EST MOD 30 MIN: CPT | Performed by: INTERNAL MEDICINE

## 2019-03-28 RX ORDER — CYCLOBENZAPRINE HCL 5 MG
5 TABLET ORAL 3 TIMES DAILY PRN
Qty: 30 TABLET | Refills: 0 | Status: SHIPPED | OUTPATIENT
Start: 2019-03-28 | End: 2019-04-16

## 2019-03-28 RX ORDER — DOXAZOSIN 8 MG/1
8 TABLET ORAL AT BEDTIME
Qty: 90 TABLET | Refills: 3 | Status: CANCELLED | OUTPATIENT
Start: 2019-03-28

## 2019-03-28 ASSESSMENT — MIFFLIN-ST. JEOR: SCORE: 1486.43

## 2019-03-28 NOTE — PROGRESS NOTES
SUBJECTIVE:   Seven Savage Jr. is a 83 year old male who presents to clinic today for the following health issues:    Patient was recently seen in the oncology and hematology clinic for evaluation of his anemia which is been ongoing    Hospital Follow-up Visit:    Hospital/Nursing Home/ Rehab Facility: [facility]   Date of Admission: 2/13/19  Date of Discharge: 2/17/19  Reason(s) for Admission: Acute respiratory failure             Problems taking medications regularly:  None       Medication changes since discharge: None       Problems adhering to non-medication therapy:  None    Summary of hospitalization:  [facility] discharge summary reviewed  Discharge summary unavailable  Diagnostic Tests/Treatments reviewed.  Follow up needed: noted  Other Healthcare Providers Involved in Patient s Care:         None  Update since discharge: stable.     Post Discharge Medication Reconciliation: discharge medications reconciled, continue medications without change.  Plan of care communicated with patient     Coding guidelines for this visit:  Type of Medical   Decision Making Face-to-Face Visit       within 7 Days of discharge Face-to-Face Visit        within 14 days of discharge   Moderate Complexity [id number]   High Complexity [id number]          Back Pain       Duration: Began after hospital discharge         Specific cause: none    Description:   Location of pain: low back right  Character of pain: dull ache and constant  Pain radiation:none  New numbness or weakness in legs, not attributed to pain: Weakness      Intensity: Currently 6/10    History:   Pain interferes with job: Not applicable  History of back problems: no prior back problems  Any previous MRI or X-rays: None  Sees a specialist for back pain:  No  Therapies tried without relief: Tylenol    Alleviating factors:   Improved by: took half of Norco- helped him to sleep     Precipitating factors:  Worsened by: Transitioning from sitting  to standing           Accompanying Signs & Symptoms:  Risk of Fracture:  Age >64  Risk of Cauda Equina:  None  Risk of Infection:  None  Risk of Cancer:  None  Risk of Ankylosing Spondylitis:  Onset at age <35, male, AND morning back stiffness. no      Problem list and histories reviewed & adjusted, as indicated.  Additional history: as documented    Patient Active Problem List   Diagnosis     Type 2 diabetes mellitus with chronic kidney disease on chronic dialysis, with long-term current use of insulin (H)     Hyperlipidemia LDL goal <100     Aortic valve stenosis, unspecified etiology     ESRD (end stage renal disease) on dialysis (H)     Chronic obstructive pulmonary disease with acute exacerbation (H)     Essential hypertension, benign     COPD exacerbation (H)     Thoracic segment dysfunction     Acute on chronic respiratory failure with hypoxia (H)     Past Surgical History:   Procedure Laterality Date     ADENOIDECTOMY       ENDOSCOPIC POLYPECTOMY NASAL      Through vocal cords     Renal Stent      For renal calculi     TONSILLECTOMY         Social History     Tobacco Use     Smoking status: Former Smoker     Packs/day: 1.00     Years: 70.00     Pack years: 70.00     Types: Cigarettes     Last attempt to quit:      Years since quittin.2     Smokeless tobacco: Never Used   Substance Use Topics     Alcohol use: Yes     Comment: average 1 beer month     Family History   Family history unknown: Yes         Current Outpatient Medications   Medication Sig Dispense Refill     albuterol (2.5 MG/3ML) 0.083% neb solution TAKE 1 VIAL BY NEBULIZATION EVERY 6 HOURS AS NEEDED FOR SHORNESS OF BREATH/DYSPNEA OR WHEEZING 75 mL 7     albuterol (PROAIR HFA/PROVENTIL HFA/VENTOLIN HFA) 108 (90 BASE) MCG/ACT Inhaler Inhale 1-2 puffs into the lungs every 4 hours as needed for shortness of breath / dyspnea or wheezing 1 Inhaler 5     aspirin 81 MG chewable tablet Take 81 mg by mouth daily       atorvastatin (LIPITOR) 40 MG  tablet Take 0.5 tablets (20 mg) by mouth At Bedtime 90 tablet 3     B Complex-C-Folic Acid (BRANDI-HEIDY) TABS Take 1 tablet by mouth daily 90 tablet 3     B-D U/F 31G X 8 MM insulin pen needle USE AS DIRECTED FOUR TIMES DAILY. 400 each 1     blood glucose monitoring (NO BRAND SPECIFIED) test strip Use to test blood sugars 3 times daily or as directed. Uses onetouch 300 strip 11     carvedilol (COREG) 12.5 MG tablet TAKE 1 TABLET(12.5 MG) BY MOUTH TWICE DAILY 180 tablet 3     COLCHICINE PO Take 0.6 mg by mouth every 48 hours as needed (gout attacks) Reported on 3/30/2017       cyclobenzaprine (FLEXERIL) 5 MG tablet Take 1 tablet (5 mg) by mouth 3 times daily as needed for muscle spasms 30 tablet 0     darbepoetin libby (ARANESP) 100 MCG/0.5ML injection [facility] doses, patient not sure about strength.       diltiazem ER (TIAZAC) 300 MG 24 hr ER beaded capsule TAKE 1 CAPSULE(300 MG) BY MOUTH DAILY 90 capsule 0     doxazosin (CARDURA) 8 MG tablet TAKE 1 TABLET BY MOUTH EVERY NIGHT AT BEDTIME. 90 tablet 0     eplerenone (INSPRA) 50 MG tablet Take 1 tablet (50 mg) by mouth daily 90 tablet 4     fish oil-omega-3 fatty acids 1000 MG capsule Take 1 g by mouth daily       furosemide (LASIX) 20 MG tablet Take 1 tablet (20 mg) by mouth daily 90 tablet 3     HYDROcodone-acetaminophen (NORCO) 5-325 MG tablet Take 1 tablet by mouth every 6 hours as needed for severe pain 8 tablet 0     Insulin Aspart (NOVOLOG SC) Inject 12 Units Subcutaneous 3 times daily (with meals) Patient states with large meals he will increase to 14-16 units.       insulin glargine (LANTUS) 100 UNIT/ML injection Inject 23 Units Subcutaneous At Bedtime (Patient will increase to 26 units at bedtime he states when BG is elevated.       irbesartan (AVAPRO) 150 MG tablet TAKE 1 TABLET(150 MG) BY MOUTH DAILY 90 tablet 3     order for DME Equipment being ordered: left thumb spica splint 1 Device 0      order for DME Equipment being ordered: portable Oxygen concentrator 1 Device 0     order for DME Equipment being ordered: Four wheeled walker 1 Device 0     order for DME Equipment being ordered: Other: Nebulizer machine  Treatment Diagnosis: COPD 1 Device 0     ranitidine (ZANTAC) 150 MG tablet Take 1 tablet (150 mg) by mouth daily as needed for heartburn 90 tablet 3     Respiratory Therapy Supplies (NEBULIZER/ADULT MASK) KIT 1 Device as needed 1 kit 0     STIOLTO RESPIMAT 2.5-2.5 MCG/ACT AERS INHALE 2 PUFFS INTO THE LUNGS DAILY 4 g 9     VITAMIN D, CHOLECALCIFEROL, PO Take 2,000 Units by mouth daily        Allergies   Allergen Reactions     Levaquin [Levofloxacin]      edema     Pioglitazone Other (See Comments)     Edema & hay fever     Vytorin Other (See Comments)     Muscle aches     Recent Labs   Lab Test 02/06/19  1520 11/07/18  1440 11/05/18  0802  04/06/18  0305 03/20/18  1223 11/27/17  1143 08/15/17  1219   A1C  --   --   --   --  5.8 5.9  --  6.1*   LDL  --   --   --   --   --   --   --  83   HDL  --   --   --   --   --   --   --  41   TRIG  --   --   --   --   --   --   --  106   ALT  --   --  15  --   --   --  17 21   CR 2.33* 5.62* 5.50*   < > 3.87*  --  3.75* 3.30*   GFRESTIMATED 25* 10* 10*   < > 15*  --  16* 18*   GFRESTBLACK 29* 12* 12*   < > 18*  --  19* 22*   POTASSIUM 3.3* 4.7 4.3   < > 4.2  --  3.9 3.8   TSH  --   --   --   --   --   --   --  2.18    < > = values in this interval not displayed.      BP Readings from Last 3 Encounters:   02/06/19 136/49   12/18/18 144/55   11/15/18 108/58    Wt Readings from Last 3 Encounters:   11/15/18 86.7 kg (191 lb 3.2 oz)   11/07/18 80.7 kg (177 lb 14.6 oz)   11/01/18 86.1 kg (189 lb 12.8 oz)              Labs reviewed in EPIC    Reviewed and updated as needed this visit by clinical staff       Reviewed and updated as needed this visit by Provider         ROS:  CONSTITUTIONAL: NEGATIVE for fever, chills, change in weight  ENT/MOUTH: NEGATIVE for ear, mouth  "and throat problems  RESP: NEGATIVE for significant cough or SOB  CV: NEGATIVE for chest pain, palpitations or peripheral edema  GI: NEGATIVE for nausea, abdominal pain, heartburn, or change in bowel habits  : NEGATIVE for frequency, dysuria, or hematuria  NEURO: NEGATIVE for weakness, dizziness or paresthesias  HEME: NEGATIVE for bleeding problems  PSYCHIATRIC: NEGATIVE for changes in mood or affect    OBJECTIVE:                                                    /62   Pulse 67   Temp 97.8  F (36.6  C) (Oral)   Resp 20   Ht 1.753 m (5' 9\")   Wt 80.1 kg (176 lb 9.6 oz)   SpO2 92%   BMI 26.08 kg/m    Body mass index is 26.08 kg/m .  GENERAL;   alert and no distress in whellchair  EYES: Eyes grossly normal to inspection, extraocular movements - intact, and PERRL  HENT: ear canals- normal; TMs- normal; Nose- normal; Mouth- no ulcers, no lesions  NECK: no tenderness, no adenopathy, no asymmetry, no masses, no stiffness; thyroid- normal to palpation  Dialysis access noted left upper arm  RESP: lungs clear to auscultation - no rales, no rhonchi, no wheezes  CV: regular rates and rhythm, normal S1 S2, no S3 or S4 and  no click or rub   MS: extremities- no gross deformities noted, mild subjective lower SI pain.  PSYCH: Alert and oriented times 3; speech- coherent , normal rate and volume; able to articulate logical thoughts, able to abstract reason, no tangential thoughts, no hallucinations or delusions, affect- normal     ASSESSMENT/PLAN:                                                      (E11.22,  N18.6,  Z99.2,  Z79.4) Type 2 diabetes mellitus with chronic kidney disease on chronic dialysis, with long-term current use of insulin (H)  (primary encounter diagnosis)  Comment:   Lab Results   Component Value Date    A1C 5.8 04/06/2018    A1C 5.9 03/20/2018    A1C 6.1 08/15/2017    A1C 6.4 02/12/2017     Plan: Stable per recent A1c done 2/13/2019 at [facility], 5.4    (N18.6,  Z99.2) ESRD (end stage " renal disease) on dialysis (H)  Comment: Doing dialysis as directed 3 times weekly under the guidance of Dr. Diaz's  Plan:     (R33.9) Urinary retention  Comment: stable on therapy  Plan:     (M54.5) Low back pain, unspecified back pain laterality, unspecified chronicity, with sciatica presence unspecified  Comment: Appears more mechanical and chronic in nature, suggest starting trial of physical therapy for which the patient will be seen today and patient would like to trial also of muscle relaxant which he has used in the past  Plan: [facility],         cyclobenzaprine (FLEXERIL) 5 MG tablet            See Patient Instructions    Sandip Antunez MD  [facility]

## 2019-04-02 ENCOUNTER — THERAPY VISIT (OUTPATIENT)
Dept: PHYSICAL THERAPY | Facility: CLINIC | Age: 84
End: 2019-04-02
Payer: MEDICARE

## 2019-04-02 DIAGNOSIS — M54.50 ACUTE RIGHT-SIDED LOW BACK PAIN WITHOUT SCIATICA: ICD-10-CM

## 2019-04-02 DIAGNOSIS — M54.5 LOW BACK PAIN, UNSPECIFIED BACK PAIN LATERALITY, UNSPECIFIED CHRONICITY, WITH SCIATICA PRESENCE UNSPECIFIED: ICD-10-CM

## 2019-04-02 PROCEDURE — 97161 PT EVAL LOW COMPLEX 20 MIN: CPT | Mod: GP | Performed by: PHYSICAL THERAPIST

## 2019-04-02 PROCEDURE — 97110 THERAPEUTIC EXERCISES: CPT | Mod: GP | Performed by: PHYSICAL THERAPIST

## 2019-04-02 PROCEDURE — 97035 APP MDLTY 1+ULTRASOUND EA 15: CPT | Mod: GP | Performed by: PHYSICAL THERAPIST

## 2019-04-02 NOTE — PROGRESS NOTES
[facility] Initial Evaluation  Subjective:  The history is provided by the patient. No  was used.   Seven Savage Jr. is a 83 year old male with a lumbar condition.  Condition occurred with:  Other reason.  Condition occurred: other.  This is a new condition  Approximately 3-2-19 he had to go into hospital d/t low hemoglobin and when there he was transferred from bed to bed and began to note R LBP. Has sharp pain with transitioning sit to/from supine and stand, also with walking. Has been using WC when out of house for past 3 years, no assistive device for walking household distances, can only tolerate up to 10 feet right now. Has urinal by the bed at night. Has COPD and has been using O2 for 6-7 months. On dialysis. History includes falling over a fence 35 years ago with R LBP that persisted 6 months. Finally had an SI thrust maneuver that resolved his sx's. This episode had a thrust maneuver by a chiropractor but it was not helpful. Has had US, heat in the past which has been helpful and he is hoping to repeat in PT.    Patient reports pain:  Lumbar spine right.    Pain is described as aching and sharp and is intermittent and reported as 7/10.   Worse during: varies.  Symptoms are exacerbated by certain positions, walking and standing and relieved by rest.  Since onset symptoms are unchanged.        General health as reported by patient is fair.                  Barriers include:  None as reported by the patient.    Red flags:  None as reported by the patient.                        Objective:  Standing Alignment:    Cervical/Thoracic:  Forward head  Shoulder/UE:  Rounded shoulders  Lumbar:  Lordosis decr            Gait:  Pt arrived in clinic in WC. Took a few steps without assistive device in clinic with decreased harinder and stride length with flexed trunk.                   Lumbar/SI Evaluation  ROM:    AROM Lumbar:   Flexion:        Hands to knees (R LBP)  Ext:                     25%   Side Bend:        Left:     Right:   Rotation:           Left:     Right:   Side Glide:        Left:     Right:                     Lumbar Palpation:      Tenderness present at Right: Quadratus Lumborum; Erector Spinae and PSIS                                                     General     ROS    YUE: started with no pain, no effect during/after, increased motion. More upright trunk after. Trial 3x/day at home at counter. Will increase reps if helpful. Instructions to stop if worse.    Assessment/Plan:    Patient is a 83 year old male with lumbar complaints. He arrived to clinic in , uses O2 d/t COPD, on dialysis. He has flexed standing and walking posture that was improved with trial of YUE at counter gently. Trial at home 3x/day and will increase if helpful. Instructions to stop if worse. He had decreased sx's today after rx.  Patient has the following significant findings with corresponding treatment plan.                Diagnosis 1:  R LBP  Pain -  hot/cold therapy, US, manual therapy, education, directional preference exercise and home program  Decreased ROM/flexibility - manual therapy and therapeutic exercise  Impaired gait - gait training  Decreased function - therapeutic activities  Impaired posture - neuro re-education    Therapy Evaluation Codes:   1) History comprised of:   Personal factors that impact the plan of care:      Age.    Comorbidity factors that impact the plan of care are:      None.     Medications impacting care: None.  2) Examination of Body Systems comprised of:   Body structures and functions that impact the plan of care:      Lumbar spine.   Activity limitations that impact the plan of care are:      Bathing, Bending, Dressing, Lifting, Sitting, Standing, Walking and Sleeping.  3) Clinical presentation characteristics are:   Stable/Uncomplicated.  4) Decision-Making    Low complexity using standardized patient assessment instrument and/or measureable assessment  of functional outcome.  Cumulative Therapy Evaluation is: Low complexity.    Previous and current functional limitations:  (See Goal Flow Sheet for this information)    Short term and Long term goals: (See Goal Flow Sheet for this information)     Communication ability:  Patient appears to be able to clearly communicate and understand verbal and written communication and follow directions correctly.  Treatment Explanation - The following has been discussed with the patient:   RX ordered/plan of care  Anticipated outcomes  Possible risks and side effects  This patient would benefit from PT intervention to resume normal activities.   Rehab potential is good.    Frequency:  1 X week, once daily  Duration:  for 8 weeks  Discharge Plan:  Achieve all LTG.  Independent in home treatment program.  Reach maximal therapeutic benefit.    Please refer to the daily flowsheet for treatment today, total treatment time and time spent performing 1:1 timed codes.

## 2019-04-02 NOTE — LETTER
DEPARTMENT OF HEALTH AND HUMAN SERVICES  CENTERS FOR MEDICARE & MEDICAID SERVICES    PLAN/UPDATED PLAN OF PROGRESS FOR OUTPATIENT REHABILITATION    PATIENTS NAME:  Seven Savage   : 1935  PROVIDER NUMBER:    [id number]  [id number]    PROVIDER NAME: [facility] PHYSICAL [facility]  MEDICAL RECORD NUMBER: [id number]   START OF CARE DATE:  SOC Date: 19   TYPE:  PT  PRIMARY/TREATMENT DIAGNOSIS: (Pertinent Medical Diagnosis)     Low back pain, unspecified back pain laterality, unspecified chronicity, with sciatica presence unspecified  Acute right-sided low back pain without sciatica    VISITS FROM START OF CARE:  Rxs Used: 1     [place] [facility] Initial Evaluation  Subjective:  Seven Savage Jr. is a 83 year old male with a lumbar condition.  Condition occurred with:  Other reason.  Condition occurred: other.  This is a new condition  Approximately 3-2-19 he had to go into hospital d/t low hemoglobin and when there he was transferred from bed to bed and began to note R LBP. Has sharp pain with transitioning sit to/from supine and stand, also with walking. Has been using WC when out of house for past 3 years, no assistive device for walking household distances, can only tolerate up to 10 feet right now. Has urinal by the bed at night. Has COPD and has been using O2 for 6-7 months. On dialysis. History includes falling over a fence 35 years ago with R LBP that persisted 6 months. Finally had an SI thrust maneuver that resolved his sx's. This episode had a thrust maneuver by a chiropractor but it was not helpful. Has had US, heat in the past which has been helpful and he is hoping to repeat in PT.  Patient reports pain:  Lumbar spine right.    Pain is described as aching and sharp and is intermittent and reported as 7/10.   Worse during: varies.  Symptoms are exacerbated by certain positions, walking and standing and relieved by rest.  Since onset  symptoms are unchanged.  General health as reported by patient is fair. Barriers include:  None as reported by the patient.  Red flags:  None as reported by the patient.  Pertinent medical history includes:  High blood pressure and kidney disease (pain at night/rest).  Medical allergies: yes (Levoquin).  Current medications:  High blood pressure medication, pain medication, heparin/coumadin and muscle relaxants.  Oswestry Score: 53.33 %           Objective:  Standing Alignment:    Cervical/Thoracic:  Forward head  Shoulder/UE:  Rounded shoulders  Lumbar:  Lordosis decr  Gait:  Pt arrived in clinic in . Took a few steps without assistive device in clinic with decreased harinder and stride length with flexed trunk.          Lumbar/SI Evaluation  ROM:    AROM Lumbar:   Flexion:        Hands to knees (R LBP)  Ext:                    25%   Side Bend:        Left:     Right:   Rotation:           Left:     Right:   Side Glide:        Left:     Right:   Lumbar Palpation:    Tenderness present at Right: Quadratus Lumborum; Erector Spinae and PSIS    ROS  YUE: started with no pain, no effect during/after, increased motion. More upright trunk after. Trial 3x/day at home at counter. Will increase reps if helpful. Instructions to stop if worse.    Assessment/Plan:    Patient is a 83 year old male with lumbar complaints. He arrived to clinic in , uses O2 d/t COPD, on dialysis. He has flexed standing and walking posture that was improved with trial of YUE at counter gently. Trial at home 3x/day and will increase if helpful. Instructions to stop if worse. He had decreased sx's today after rx.  Patient has the following significant findings with corresponding treatment plan.                Diagnosis 1:  R LBP  Pain -  hot/cold therapy, US, manual therapy, education, directional preference exercise and home program  Decreased ROM/flexibility - manual therapy and therapeutic exercise  Impaired gait - gait training  Decreased  function - therapeutic activities  Impaired posture - neuro re-education    Therapy Evaluation Codes:   1) History comprised of:   Personal factors that impact the plan of care:      Age.    Comorbidity factors that impact the plan of care are:      None.     Medications impacting care: None.  2) Examination of Body Systems comprised of:   Body structures and functions that impact the plan of care:      Lumbar spine.   Activity limitations that impact the plan of care are:      Bathing, Bending, Dressing, Lifting, Sitting, Standing, Walking and Sleeping.  3) Clinical presentation characteristics are:   Stable/Uncomplicated.  4) Decision-Making    Low complexity using standardized patient assessment instrument and/or m  measureable assessment of functional outcome.  Cumulative Therapy Evaluation is: Low complexity.    Previous and current functional limitations:  (See Goal Flow Sheet for this information)    Short term and Long term goals: (See Goal Flow Sheet for this information)         Communication ability:  Patient appears to be able to clearly communicate and understand verbal and written communication and follow directions correctly.  Treatment Explanation - The following has been discussed with the patient:   RX ordered/plan of care  Anticipated outcomes  Possible risks and side effects  This patient would benefit from PT intervention to resume normal activities.   Rehab potential is good.    Frequency:  1 X week, once daily  Duration:  for 8 weeks  Discharge Plan:  Achieve all LTG.  Independent in home treatment program.  Reach maximal therapeutic benefit.    Caregiver Signature/Credentials _____________________________ Date ________       Treating Provider: Mary Anne Davis, PT   I have reviewed and certified the need for these services and plan of treatment while under my care.        PHYSICIAN'S SIGNATURE:   _____________________________  Date___________                                Sandip Antunez  "MD    Certification period:  Beginning of Cert date period: 04/02/19 to  End of Cert period date: 06/11/19     Functional Level Progress Report: Please see attached \"Goal Flow sheet for Functional level.\"    ____X____ Continue Services or       ________ DC Services                Service dates: From  SOC Date: 04/02/19 date to present                         "

## 2019-04-03 DIAGNOSIS — Z79.4 TYPE 2 DIABETES MELLITUS WITH CHRONIC KIDNEY DISEASE ON CHRONIC DIALYSIS, WITH LONG-TERM CURRENT USE OF INSULIN (H): ICD-10-CM

## 2019-04-03 DIAGNOSIS — Z99.2 TYPE 2 DIABETES MELLITUS WITH CHRONIC KIDNEY DISEASE ON CHRONIC DIALYSIS, WITH LONG-TERM CURRENT USE OF INSULIN (H): ICD-10-CM

## 2019-04-03 DIAGNOSIS — I10 ESSENTIAL HYPERTENSION, BENIGN: ICD-10-CM

## 2019-04-03 DIAGNOSIS — N18.6 TYPE 2 DIABETES MELLITUS WITH CHRONIC KIDNEY DISEASE ON CHRONIC DIALYSIS, WITH LONG-TERM CURRENT USE OF INSULIN (H): ICD-10-CM

## 2019-04-03 DIAGNOSIS — E11.22 TYPE 2 DIABETES MELLITUS WITH CHRONIC KIDNEY DISEASE ON CHRONIC DIALYSIS, WITH LONG-TERM CURRENT USE OF INSULIN (H): ICD-10-CM

## 2019-04-03 RX ORDER — LISINOPRIL 20 MG/1
TABLET ORAL
Qty: 90 TABLET | Refills: 0 | OUTPATIENT
Start: 2019-04-03

## 2019-04-03 NOTE — TELEPHONE ENCOUNTER
"Requested Prescriptions   Pending Prescriptions Disp Refills     lisinopril (PRINIVIL/ZESTRIL) 20 MG tablet [Pharmacy Med Name: LISINOPRIL 20MG TABLETS] 90 tablet 0     Sig: TAKE 1 TABLET(20 MG) BY MOUTH DAILY    ACE Inhibitors (Including Combos) Protocol Failed - 4/3/2019  9:08 AM       Failed - Blood pressure under 140/90 in past 12 months    BP Readings from Last 3 Encounters:   03/28/19 140/62   02/06/19 136/49   12/18/18 144/55                Failed - Medication is active on med list       Failed - Normal serum creatinine on file in past 12 months    Recent Labs   Lab Test 02/06/19  1520   CR 2.33*            Failed - Normal serum potassium on file in past 12 months    Recent Labs   Lab Test 02/06/19  1520   POTASSIUM 3.3*            Passed - Recent (12 mo) or future (30 days) visit within the authorizing provider's specialty    Patient had office visit in the last 12 months or has a visit in the next 30 days with authorizing provider or within the authorizing provider's specialty.  See \"Patient Info\" tab in inbasket, or \"Choose Columns\" in Meds & Orders section of the refill encounter.             Passed - Patient is age 18 or older        Routing refill request to provider for review/approval because:  Labs out of range:  Creat, pot, bp        "

## 2019-04-05 NOTE — PROGRESS NOTES
[facility] Initial Evaluation  Subjective:                                       Pertinent medical history includes:  High blood pressure and kidney disease (pain at night/rest).  Medical allergies: yes (Levoquin).    Current medications:  High blood pressure medication, pain medication, heparin/coumadin and muscle relaxants.                    Oswestry Score: 53.33 %                 Objective:  System    Physical Exam    General     ROS    Assessment/Plan:

## 2019-04-12 ENCOUNTER — TELEPHONE (OUTPATIENT)
Dept: INTERNAL MEDICINE | Facility: CLINIC | Age: 84
End: 2019-04-12

## 2019-04-12 DIAGNOSIS — M54.5 LOW BACK PAIN, UNSPECIFIED BACK PAIN LATERALITY, UNSPECIFIED CHRONICITY, WITH SCIATICA PRESENCE UNSPECIFIED: Primary | ICD-10-CM

## 2019-04-12 NOTE — TELEPHONE ENCOUNTER
Patient called and was seen at [facility] here on 3rd floor.  They suggested he call PCP to get a referral for an   Epiderol.  His pain is very bad.  Patient would like to talk with a nurse about this.  # [phone]

## 2019-04-12 NOTE — TELEPHONE ENCOUNTER
An epidural cannot be ordered w/o an imaging study to determine if patient is even a candidate for such.  I will order an MRI and this will need to be done prior, please inform patient.

## 2019-04-12 NOTE — TELEPHONE ENCOUNTER
Pt calling and wants an epidural ordered . [facility] agrees per pt do not see their note entered yet. . Please order epidural.Nano Angeles RN  (M54.5) Low back pain, unspecified back pain laterality, unspecified chronicity, with sciatica presence unspecified  Comment: Appears more mechanical and chronic in nature, suggest starting trial of physical therapy for which the patient will be seen today and patient would like to trial also of muscle relaxant which he has used in the past  Plan: [facility] PT, [facility], AND CHIROPRACTIC REFERRAL,         cyclobenzaprine (FLEXERIL) 5 MG tablet

## 2019-04-12 NOTE — TELEPHONE ENCOUNTER
Patient is requesting a pain medication till he can get into Physical Therapy and get in for imaging and the epidural if needed. Please approve short term supply for patient .Nano Angeles RN

## 2019-04-15 NOTE — TELEPHONE ENCOUNTER
Patient currently at dialysis. Please confirm that he is not taking lisinopril and is taking Avapro. Try calling later.

## 2019-04-15 NOTE — TELEPHONE ENCOUNTER
No pain meds can be prescribed w/o appt   monitor coags as INR 1.21 and patient is no on any AC monitor coags as INR 1.21 and patient is no on any AC    Doing well, continue SCD  May start heparin 5000 units q no driving for 24 hours

## 2019-04-16 ENCOUNTER — OFFICE VISIT (OUTPATIENT)
Dept: INTERNAL MEDICINE | Facility: CLINIC | Age: 84
End: 2019-04-16
Payer: MEDICARE

## 2019-04-16 ENCOUNTER — HOSPITAL ENCOUNTER (OUTPATIENT)
Dept: MRI IMAGING | Facility: CLINIC | Age: 84
Discharge: HOME | End: 2019-04-16
Attending: INTERNAL MEDICINE | Admitting: INTERNAL MEDICINE
Payer: MEDICARE

## 2019-04-16 ENCOUNTER — TELEPHONE (OUTPATIENT)
Dept: INTERNAL MEDICINE | Facility: CLINIC | Age: 84
End: 2019-04-16

## 2019-04-16 VITALS
DIASTOLIC BLOOD PRESSURE: 70 MMHG | OXYGEN SATURATION: 86 % | SYSTOLIC BLOOD PRESSURE: 170 MMHG | HEART RATE: 87 BPM | RESPIRATION RATE: 20 BRPM

## 2019-04-16 DIAGNOSIS — M54.50 ACUTE RIGHT-SIDED LOW BACK PAIN WITHOUT SCIATICA: Primary | ICD-10-CM

## 2019-04-16 DIAGNOSIS — M54.5 LOW BACK PAIN, UNSPECIFIED BACK PAIN LATERALITY, UNSPECIFIED CHRONICITY, WITH SCIATICA PRESENCE UNSPECIFIED: ICD-10-CM

## 2019-04-16 PROCEDURE — 72148 MRI LUMBAR SPINE W/O DYE: CPT

## 2019-04-16 PROCEDURE — 99213 OFFICE O/P EST LOW 20 MIN: CPT | Performed by: INTERNAL MEDICINE

## 2019-04-16 RX ORDER — CYCLOBENZAPRINE HCL 5 MG
5 TABLET ORAL 3 TIMES DAILY PRN
Qty: 90 TABLET | Refills: 3 | Status: SHIPPED | OUTPATIENT
Start: 2019-04-16 | End: 2019-05-28 | Stop reason: ALTCHOICE

## 2019-04-16 RX ORDER — HYDROCODONE BITARTRATE AND ACETAMINOPHEN 5; 325 MG/1; MG/1
0.5 TABLET ORAL EVERY 6 HOURS PRN
Qty: 30 TABLET | Refills: 0 | Status: SHIPPED | OUTPATIENT
Start: 2019-04-16 | End: 2019-05-03

## 2019-04-16 ASSESSMENT — ANXIETY QUESTIONNAIRES
1. FEELING NERVOUS, ANXIOUS, OR ON EDGE: NOT AT ALL
3. WORRYING TOO MUCH ABOUT DIFFERENT THINGS: NOT AT ALL
GAD7 TOTAL SCORE: 1
6. BECOMING EASILY ANNOYED OR IRRITABLE: SEVERAL DAYS
5. BEING SO RESTLESS THAT IT IS HARD TO SIT STILL: NOT AT ALL
IF YOU CHECKED OFF ANY PROBLEMS ON THIS QUESTIONNAIRE, HOW DIFFICULT HAVE THESE PROBLEMS MADE IT FOR YOU TO DO YOUR WORK, TAKE CARE OF THINGS AT HOME, OR GET ALONG WITH OTHER PEOPLE: NOT DIFFICULT AT ALL
2. NOT BEING ABLE TO STOP OR CONTROL WORRYING: NOT AT ALL
7. FEELING AFRAID AS IF SOMETHING AWFUL MIGHT HAPPEN: NOT AT ALL

## 2019-04-16 ASSESSMENT — PATIENT HEALTH QUESTIONNAIRE - PHQ9: 5. POOR APPETITE OR OVEREATING: NOT AT ALL

## 2019-04-16 NOTE — TELEPHONE ENCOUNTER
Prior Authorization Retail Medication Request    Medication/Dose: cyclobenzaprine (FLEXERIL) 5 MG tablet  ICD code (if different than what is on RX):    Previously Tried and Failed:    Rationale:      Insurance Name:  Medicare  Insurance ID:  [id number]  Key: [id number]      Pharmacy Information (if different than what is on RX)  Name:  [facility]  Phone:  [phone]

## 2019-04-16 NOTE — PROGRESS NOTES
SUBJECTIVE:                                                      HPI: Seven Savage Jr. is a markedly pleasant 83 year old male who presents with back pain:    Seen by PCP a couple weeks ago for acute, right-sided, low back pain. Patient was referred to physical therapy with no improvement in pain.  Patient desires WEST, but MRI needed for localization. MRI ordered, but for some reason patient was told that he needed an open MRI due to his oxygen dependence. This is simply not true. Working with our , the patient was scheduled for his MRI this evening at [facility].  The MRI staff are aware that the patient is oxygen dependent and will work with him accordingly.    Re: pain management prior to WEST, patient requests refills of Flexeril and Norco. These have been working well for him.  He hopes that once he receives his WEST he will no longer need them.    The medication, allergy, and problem lists have been reviewed and updated as appropriate.     OBJECTIVE:                                                      /70   Pulse 87   Resp 20   SpO2 (!) 86%   Constitutional: chronically ill-appearing  Musculoskeletal: in wheelchair  Psych: normal judgment and insight; normal mood and affect; recent and remote memory intact    ASSESSMENT/PLAN:                                                      (M54.5) Acute right-sided low back pain without sciatica  (primary encounter diagnosis)  Plan:    - patient scheduled for MRI this evening; once MRI completed and read, he can be referred for WEST.   - Flexeril and Norco prescribed - to be used sparingly as needed for moderate to severe pain.    The instructions on the AVS were discussed and explained to the patient. Patient expressed understanding of instructions.    (Chart documentation was completed, in part, with [employer] voice-recognition software. Even though reviewed, some grammatical, spelling, and word errors may remain.)    Laila Nelson,  MD   [address] [address]  T: [phone], F: [phone]

## 2019-04-17 ASSESSMENT — ASTHMA QUESTIONNAIRES: ACT_TOTALSCORE: 18

## 2019-04-17 ASSESSMENT — ANXIETY QUESTIONNAIRES: GAD7 TOTAL SCORE: 1

## 2019-04-18 ENCOUNTER — OFFICE VISIT (OUTPATIENT)
Dept: INTERNAL MEDICINE | Facility: CLINIC | Age: 84
End: 2019-04-18
Payer: MEDICARE

## 2019-04-18 VITALS
HEART RATE: 66 BPM | DIASTOLIC BLOOD PRESSURE: 70 MMHG | TEMPERATURE: 97.3 F | RESPIRATION RATE: 18 BRPM | SYSTOLIC BLOOD PRESSURE: 136 MMHG

## 2019-04-18 DIAGNOSIS — Z12.5 SCREENING PSA (PROSTATE SPECIFIC ANTIGEN): ICD-10-CM

## 2019-04-18 DIAGNOSIS — M80.08XD AGE-RELATED OSTEOPOROSIS WITH CURRENT PATHOLOGICAL FRACTURE OF VERTEBRA WITH ROUTINE HEALING, SUBSEQUENT ENCOUNTER: ICD-10-CM

## 2019-04-18 DIAGNOSIS — S32.040A CLOSED COMPRESSION FRACTURE OF FOURTH LUMBAR VERTEBRA, INITIAL ENCOUNTER: Primary | ICD-10-CM

## 2019-04-18 LAB
RAD FLAG-ADDENDUM: ABNORMAL
RADIOLOGIST FLAGS: ABNORMAL

## 2019-04-18 PROCEDURE — 82306 VITAMIN D 25 HYDROXY: CPT | Performed by: INTERNAL MEDICINE

## 2019-04-18 PROCEDURE — 99214 OFFICE O/P EST MOD 30 MIN: CPT | Performed by: INTERNAL MEDICINE

## 2019-04-18 PROCEDURE — G0103 PSA SCREENING: HCPCS | Performed by: INTERNAL MEDICINE

## 2019-04-18 PROCEDURE — 84165 PROTEIN E-PHORESIS SERUM: CPT | Performed by: INTERNAL MEDICINE

## 2019-04-18 PROCEDURE — 00000402 ZZHCL STATISTIC TOTAL PROTEIN: Performed by: INTERNAL MEDICINE

## 2019-04-18 PROCEDURE — 36415 COLL VENOUS BLD VENIPUNCTURE: CPT | Performed by: INTERNAL MEDICINE

## 2019-04-18 ASSESSMENT — PATIENT HEALTH QUESTIONNAIRE - PHQ9: SUM OF ALL RESPONSES TO PHQ QUESTIONS 1-9: 1

## 2019-04-18 ASSESSMENT — PAIN SCALES - GENERAL: PAINLEVEL: EXTREME PAIN (9)

## 2019-04-18 NOTE — PROGRESS NOTES
SUBJECTIVE:   Seven Savage Jr. is a 84 year old male who presents to clinic today for the following   health issues:    Follow up MRI results from 19 :    IMPRESSION:  Findings consistent with acute/subacute L4 vertebral body  compression fracture. Mild vertebral body height loss. No retropulsion  of fragments. Mild degenerative change for age.    Additional history: as documented    Reviewed  and updated as needed this visit by clinical staff       Reviewed and updated as needed this visit by Provider       Patient Active Problem List   Diagnosis     Type 2 diabetes mellitus with chronic kidney disease on chronic dialysis, with long-term current use of insulin (H)     Hyperlipidemia LDL goal <100     Aortic valve stenosis, unspecified etiology     ESRD (end stage renal disease) on dialysis (H)     Chronic obstructive pulmonary disease with acute exacerbation (H)     Essential hypertension, benign     COPD exacerbation (H)     Thoracic segment dysfunction     Acute on chronic respiratory failure with hypoxia (H)     Acute right-sided low back pain without sciatica     Past Surgical History:   Procedure Laterality Date     ADENOIDECTOMY       ENDOSCOPIC POLYPECTOMY NASAL      Through vocal cords     Renal Stent      For renal calculi     TONSILLECTOMY         Social History     Tobacco Use     Smoking status: Former Smoker     Packs/day: 1.00     Years: 70.00     Pack years: 70.00     Types: Cigarettes     Last attempt to quit:      Years since quittin.2     Smokeless tobacco: Never Used   Substance Use Topics     Alcohol use: Yes     Comment: average 1 beer month     Family History   Family history unknown: Yes         Current Outpatient Medications   Medication Sig Dispense Refill     albuterol (2.5 MG/3ML) 0.083% neb solution TAKE 1 VIAL BY NEBULIZATION EVERY 6 HOURS AS NEEDED FOR SHORNESS OF BREATH/DYSPNEA OR WHEEZING 75 mL 7     albuterol (PROAIR HFA/PROVENTIL HFA/VENTOLIN HFA) 108 (90 BASE)  MCG/ACT Inhaler Inhale 1-2 puffs into the lungs every 4 hours as needed for shortness of breath / dyspnea or wheezing 1 Inhaler 5     aspirin 81 MG chewable tablet Take 81 mg by mouth daily       atorvastatin (LIPITOR) 40 MG tablet Take 0.5 tablets (20 mg) by mouth At Bedtime 90 tablet 3     B Complex-C-Folic Acid (BRANDI-HEIDY) TABS Take 1 tablet by mouth daily 90 tablet 3     B-D U/F 31G X 8 MM insulin pen needle USE AS DIRECTED FOUR TIMES DAILY. 400 each 1     blood glucose monitoring (NO BRAND SPECIFIED) test strip Use to test blood sugars 3 times daily or as directed. Uses onetouch 300 strip 11     carvedilol (COREG) 12.5 MG tablet TAKE 1 TABLET(12.5 MG) BY MOUTH TWICE DAILY 180 tablet 3     COLCHICINE PO Take 0.6 mg by mouth every 48 hours as needed (gout attacks) Reported on 3/30/2017       cyclobenzaprine (FLEXERIL) 5 MG tablet Take 1 tablet (5 mg) by mouth 3 times daily as needed for muscle spasms 90 tablet 3     darbepoetin libby (ARANESP) 100 MCG/0.5ML injection Inject Subcutaneous three times a week At diaylsis M,W,F. Dialysis ([facility]) doses, patient not sure about strength.       diltiazem ER (TIAZAC) 300 MG 24 hr ER beaded capsule TAKE 1 CAPSULE(300 MG) BY MOUTH DAILY 90 capsule 0     doxazosin (CARDURA) 8 MG tablet TAKE 1 TABLET BY MOUTH EVERY NIGHT AT BEDTIME. 90 tablet 0     eplerenone (INSPRA) 50 MG tablet Take 1 tablet (50 mg) by mouth daily 90 tablet 4     fish oil-omega-3 fatty acids 1000 MG capsule Take 1 g by mouth daily       furosemide (LASIX) 20 MG tablet Take 1 tablet (20 mg) by mouth daily 90 tablet 3     HYDROcodone-acetaminophen (NORCO) 5-325 MG tablet Take 0.5 tablets by mouth every 6 hours as needed for severe pain 30 tablet 0     Insulin Aspart (NOVOLOG SC) Inject 12 Units Subcutaneous 3 times daily (with meals) Patient states with large meals he will increase to 14-16 units.       insulin glargine (LANTUS) 100 UNIT/ML injection Inject 23 Units Subcutaneous At Bedtime (Patient will  increase to 26 units at bedtime he states when BG is elevated.       irbesartan (AVAPRO) 150 MG tablet TAKE 1 TABLET(150 MG) BY MOUTH DAILY 90 tablet 3     order for DME Equipment being ordered: left thumb spica splint 1 Device 0     order for DME Equipment being ordered: portable Oxygen concentrator 1 Device 0     order for DME Equipment being ordered: Four wheeled walker 1 Device 0     order for DME Equipment being ordered: Other: Nebulizer machine  Treatment Diagnosis: COPD 1 Device 0     ranitidine (ZANTAC) 150 MG tablet Take 1 tablet (150 mg) by mouth daily as needed for heartburn 90 tablet 3     Respiratory Therapy Supplies (NEBULIZER/ADULT MASK) KIT 1 Device as needed 1 kit 0     STIOLTO RESPIMAT 2.5-2.5 MCG/ACT AERS INHALE 2 PUFFS INTO THE LUNGS DAILY 4 g 9     VITAMIN D, CHOLECALCIFEROL, PO Take 2,000 Units by mouth daily        Allergies   Allergen Reactions     Levaquin [Levofloxacin]      edema     Pioglitazone Other (See Comments)     Edema & hay fever     Vytorin Other (See Comments)     Muscle aches     BP Readings from Last 3 Encounters:   04/16/19 170/70   03/28/19 140/62   02/06/19 136/49    Wt Readings from Last 3 Encounters:   03/28/19 80.1 kg (176 lb 9.6 oz)   11/15/18 86.7 kg (191 lb 3.2 oz)   11/07/18 80.7 kg (177 lb 14.6 oz)           ROS:  CONSTITUTIONAL: NEGATIVE for fever, chills, change in weight  ENT/MOUTH: NEGATIVE for ear, mouth and throat problems  RESP: NEGATIVE for significant cough or SOB  CV: NEGATIVE for chest pain, palpitations or peripheral edema  GI: NEGATIVE for nausea, abdominal pain, heartburn, or change in bowel habits  : NEGATIVE for frequency, dysuria, or hematuria  NEURO: NEGATIVE for weakness, dizziness or paresthesias  HEME: NEGATIVE for bleeding problems  PSYCHIATRIC: NEGATIVE for changes in mood or affect    OBJECTIVE:                                                    /70   Pulse 66   Temp 97.3  F (36.3  C) (Oral)   Resp 18   There is no height or weight  on file to calculate BMI.  GENERAL: alert and in mild distress complaining of back pain.  RESP: lungs clear to auscultation - no rales, no rhonchi, no wheezes  CV: regular rates and rhythm, normal S1 S2, no S3 or S4 and no click or rub   MS: extremities- no gross deformities noted  NEURO:  No focal changes  BACK: no CVA tenderness, noted lower paralumbar midline L4 tenderness  PSYCH: Alert and oriented times 3; speech- coherent , normal rate and volume; able to articulate logical thoughts, able to abstract reason, no tangential thoughts, no hallucinations or delusions, affect- normal       ASSESSMENT/PLAN:                                                      (S32.040A) Closed compression fracture of fourth lumbar vertebra, initial encounter (H)  (primary encounter diagnosis)  Comment: Suggest metabolic evaluation for secondary sources and potential referral to endocrinology for determination of injectable therapy options  Plan: Vitamin D Deficiency, Protein electrophoresis,         ENDOCRINOLOGY ADULT REFERRAL, IR Lumbar         Vertebroplasty        Patient not an ideal candidate for bisphosphonate therapy due to end-stage renal disease.    (M80.08XD) Age-related osteoporosis with current pathological fracture of vertebra with routine healing, subsequent encounter   Comment:  Believe patient may be candidate for L4 vertebroplasty based on ongoing pain issues.  Plan: Vitamin D Deficiency, IR Lumbar Vertebroplasty        Contacted [place] radiology and patient will be contacted to see if he is a candidate    See Patient Instructions    Sandip Antunez MD  [facility]

## 2019-04-19 ENCOUNTER — TELEPHONE (OUTPATIENT)
Dept: INTERNAL MEDICINE | Facility: CLINIC | Age: 84
End: 2019-04-19

## 2019-04-19 DIAGNOSIS — R10.84 ABDOMINAL PAIN, GENERALIZED: ICD-10-CM

## 2019-04-19 DIAGNOSIS — S32.040A CLOSED COMPRESSION FRACTURE OF FOURTH LUMBAR VERTEBRA, INITIAL ENCOUNTER: Primary | ICD-10-CM

## 2019-04-19 DIAGNOSIS — Z12.5 SCREENING PSA (PROSTATE SPECIFIC ANTIGEN): ICD-10-CM

## 2019-04-19 LAB
ALBUMIN SERPL ELPH-MCNC: 3.7 G/DL (ref 3.7–5.1)
ALPHA1 GLOB SERPL ELPH-MCNC: 0.4 G/DL (ref 0.2–0.4)
ALPHA2 GLOB SERPL ELPH-MCNC: 0.8 G/DL (ref 0.5–0.9)
B-GLOBULIN SERPL ELPH-MCNC: 0.6 G/DL (ref 0.6–1)
DEPRECATED CALCIDIOL+CALCIFEROL SERPL-MC: 71 UG/L (ref 20–75)
GAMMA GLOB SERPL ELPH-MCNC: 0.5 G/DL (ref 0.7–1.6)
M PROTEIN SERPL ELPH-MCNC: 0 G/DL
PROT PATTERN SERPL ELPH-IMP: ABNORMAL
PSA SERPL-ACNC: 3.51 UG/L (ref 0–4)

## 2019-04-19 NOTE — TELEPHONE ENCOUNTER
CT Scan of Abdomen/Plevis and PSA has been ordered for patient per Dr. Antunez. Patient has been notified that imaging will be in contact with him to schedule CT SCAN.

## 2019-04-19 NOTE — TELEPHONE ENCOUNTER
Jennifer from [facility] calling. Says Dr. Antunez was considering doing vertebroplasty but radiologist looked at the MRI and does not think he is a candidate. They are faxing over full report. Placed in PCPs folder. Looks like patient needs tumor work up?

## 2019-04-22 ENCOUNTER — TELEPHONE (OUTPATIENT)
Dept: INTERNAL MEDICINE | Facility: CLINIC | Age: 84
End: 2019-04-22

## 2019-04-22 ENCOUNTER — TRANSFERRED RECORDS (OUTPATIENT)
Dept: HEALTH INFORMATION MANAGEMENT | Facility: CLINIC | Age: 84
End: 2019-04-22

## 2019-04-22 ENCOUNTER — HOSPITAL ENCOUNTER (INPATIENT)
Facility: CLINIC | Age: 84
LOS: 4 days | Discharge: NURSING, REHAB OR LONG-TERM CARE | DRG: 542 | End: 2019-04-26
Attending: EMERGENCY MEDICINE | Admitting: STUDENT IN AN ORGANIZED HEALTH CARE EDUCATION/TRAINING PROGRAM
Payer: MEDICARE

## 2019-04-22 ENCOUNTER — APPOINTMENT (OUTPATIENT)
Dept: GENERAL RADIOLOGY | Facility: CLINIC | Age: 84
DRG: 542 | End: 2019-04-22
Attending: EMERGENCY MEDICINE
Payer: MEDICARE

## 2019-04-22 ENCOUNTER — APPOINTMENT (OUTPATIENT)
Dept: ULTRASOUND IMAGING | Facility: CLINIC | Age: 84
DRG: 542 | End: 2019-04-22
Attending: EMERGENCY MEDICINE
Payer: MEDICARE

## 2019-04-22 ENCOUNTER — APPOINTMENT (OUTPATIENT)
Dept: CT IMAGING | Facility: CLINIC | Age: 84
DRG: 542 | End: 2019-04-22
Attending: EMERGENCY MEDICINE
Payer: MEDICARE

## 2019-04-22 DIAGNOSIS — Z99.2 TYPE 2 DIABETES MELLITUS WITH CHRONIC KIDNEY DISEASE ON CHRONIC DIALYSIS, WITH LONG-TERM CURRENT USE OF INSULIN (H): Primary | ICD-10-CM

## 2019-04-22 DIAGNOSIS — Z87.891 PERSONAL HISTORY OF TOBACCO USE, PRESENTING HAZARDS TO HEALTH: ICD-10-CM

## 2019-04-22 DIAGNOSIS — R91.8 LUNG MASS: ICD-10-CM

## 2019-04-22 DIAGNOSIS — J44.9 CHRONIC OBSTRUCTIVE PULMONARY DISEASE, UNSPECIFIED COPD TYPE (H): ICD-10-CM

## 2019-04-22 DIAGNOSIS — E11.22 TYPE 2 DIABETES MELLITUS WITH CHRONIC KIDNEY DISEASE ON CHRONIC DIALYSIS, WITH LONG-TERM CURRENT USE OF INSULIN (H): Primary | ICD-10-CM

## 2019-04-22 DIAGNOSIS — Z79.4 TYPE 2 DIABETES MELLITUS WITH CHRONIC KIDNEY DISEASE ON CHRONIC DIALYSIS, WITH LONG-TERM CURRENT USE OF INSULIN (H): Primary | ICD-10-CM

## 2019-04-22 DIAGNOSIS — N18.6 TYPE 2 DIABETES MELLITUS WITH CHRONIC KIDNEY DISEASE ON CHRONIC DIALYSIS, WITH LONG-TERM CURRENT USE OF INSULIN (H): Primary | ICD-10-CM

## 2019-04-22 DIAGNOSIS — S32.040A CLOSED COMPRESSION FRACTURE OF FOURTH LUMBAR VERTEBRA, INITIAL ENCOUNTER: ICD-10-CM

## 2019-04-22 DIAGNOSIS — M48.50XS PATHOLOGICAL COMPRESSION FRACTURE OF VERTEBRA, SEQUELA: ICD-10-CM

## 2019-04-22 LAB
ALBUMIN SERPL-MCNC: 3.1 G/DL (ref 3.4–5)
ALBUMIN UR-MCNC: 100 MG/DL
ALP SERPL-CCNC: 61 U/L (ref 40–150)
ALT SERPL W P-5'-P-CCNC: 12 U/L (ref 0–70)
ANION GAP SERPL CALCULATED.3IONS-SCNC: 7 MMOL/L (ref 3–14)
APPEARANCE UR: CLEAR
AST SERPL W P-5'-P-CCNC: 15 U/L (ref 0–45)
BASOPHILS # BLD AUTO: 0 10E9/L (ref 0–0.2)
BASOPHILS NFR BLD AUTO: 0.2 %
BILIRUB SERPL-MCNC: 1.4 MG/DL (ref 0.2–1.3)
BILIRUB UR QL STRIP: NEGATIVE
BUN SERPL-MCNC: 20 MG/DL (ref 7–30)
CALCIUM SERPL-MCNC: 9.4 MG/DL (ref 8.5–10.1)
CHLORIDE SERPL-SCNC: 99 MMOL/L (ref 94–109)
CO2 SERPL-SCNC: 31 MMOL/L (ref 20–32)
COLOR UR AUTO: YELLOW
CREAT SERPL-MCNC: 2.98 MG/DL (ref 0.66–1.25)
DIFFERENTIAL METHOD BLD: ABNORMAL
EOSINOPHIL # BLD AUTO: 0.1 10E9/L (ref 0–0.7)
EOSINOPHIL NFR BLD AUTO: 0.9 %
ERYTHROCYTE [DISTWIDTH] IN BLOOD BY AUTOMATED COUNT: 17.9 % (ref 10–15)
GFR SERPL CREATININE-BSD FRML MDRD: 18 ML/MIN/{1.73_M2}
GLUCOSE BLDC GLUCOMTR-MCNC: 156 MG/DL (ref 70–99)
GLUCOSE SERPL-MCNC: 193 MG/DL (ref 70–99)
GLUCOSE UR STRIP-MCNC: NEGATIVE MG/DL
HCT VFR BLD AUTO: 32.7 % (ref 40–53)
HGB BLD-MCNC: 10 G/DL (ref 13.3–17.7)
HGB UR QL STRIP: ABNORMAL
IMM GRANULOCYTES # BLD: 0 10E9/L (ref 0–0.4)
IMM GRANULOCYTES NFR BLD: 0.2 %
INTERPRETATION ECG - MUSE: NORMAL
KETONES UR STRIP-MCNC: NEGATIVE MG/DL
LEUKOCYTE ESTERASE UR QL STRIP: ABNORMAL
LIPASE SERPL-CCNC: 85 U/L (ref 73–393)
LYMPHOCYTES # BLD AUTO: 0.7 10E9/L (ref 0.8–5.3)
LYMPHOCYTES NFR BLD AUTO: 5.6 %
MCH RBC QN AUTO: 25.7 PG (ref 26.5–33)
MCHC RBC AUTO-ENTMCNC: 30.6 G/DL (ref 31.5–36.5)
MCV RBC AUTO: 84 FL (ref 78–100)
MONOCYTES # BLD AUTO: 0.9 10E9/L (ref 0–1.3)
MONOCYTES NFR BLD AUTO: 7.4 %
NEUTROPHILS # BLD AUTO: 10 10E9/L (ref 1.6–8.3)
NEUTROPHILS NFR BLD AUTO: 85.7 %
NITRATE UR QL: NEGATIVE
NRBC # BLD AUTO: 0 10*3/UL
NRBC BLD AUTO-RTO: 0 /100
PH UR STRIP: 6.5 PH (ref 5–7)
PLATELET # BLD AUTO: 362 10E9/L (ref 150–450)
POTASSIUM SERPL-SCNC: 3.6 MMOL/L (ref 3.4–5.3)
PROT SERPL-MCNC: 6.3 G/DL (ref 6.8–8.8)
RBC # BLD AUTO: 3.89 10E12/L (ref 4.4–5.9)
RBC #/AREA URNS AUTO: 1 /HPF (ref 0–2)
SODIUM SERPL-SCNC: 137 MMOL/L (ref 133–144)
SOURCE: ABNORMAL
SP GR UR STRIP: 1.01 (ref 1–1.03)
UROBILINOGEN UR STRIP-MCNC: 2 MG/DL (ref 0–2)
WBC # BLD AUTO: 11.6 10E9/L (ref 4–11)
WBC #/AREA URNS AUTO: 4 /HPF (ref 0–5)

## 2019-04-22 PROCEDURE — 74176 CT ABD & PELVIS W/O CONTRAST: CPT

## 2019-04-22 PROCEDURE — 83690 ASSAY OF LIPASE: CPT | Performed by: EMERGENCY MEDICINE

## 2019-04-22 PROCEDURE — A9270 NON-COVERED ITEM OR SERVICE: HCPCS | Performed by: STUDENT IN AN ORGANIZED HEALTH CARE EDUCATION/TRAINING PROGRAM

## 2019-04-22 PROCEDURE — 25000131 ZZH RX MED GY IP 250 OP 636 PS 637: Performed by: STUDENT IN AN ORGANIZED HEALTH CARE EDUCATION/TRAINING PROGRAM

## 2019-04-22 PROCEDURE — 93971 EXTREMITY STUDY: CPT | Mod: LT

## 2019-04-22 PROCEDURE — 25000132 ZZH RX MED GY IP 250 OP 250 PS 637: Performed by: STUDENT IN AN ORGANIZED HEALTH CARE EDUCATION/TRAINING PROGRAM

## 2019-04-22 PROCEDURE — 73552 X-RAY EXAM OF FEMUR 2/>: CPT | Mod: LT

## 2019-04-22 PROCEDURE — 99223 1ST HOSP IP/OBS HIGH 75: CPT | Mod: AI | Performed by: STUDENT IN AN ORGANIZED HEALTH CARE EDUCATION/TRAINING PROGRAM

## 2019-04-22 PROCEDURE — 80053 COMPREHEN METABOLIC PANEL: CPT | Performed by: EMERGENCY MEDICINE

## 2019-04-22 PROCEDURE — 81001 URINALYSIS AUTO W/SCOPE: CPT | Performed by: EMERGENCY MEDICINE

## 2019-04-22 PROCEDURE — 12000000 ZZH R&B MED SURG/OB

## 2019-04-22 PROCEDURE — 93005 ELECTROCARDIOGRAM TRACING: CPT

## 2019-04-22 PROCEDURE — 99285 EMERGENCY DEPT VISIT HI MDM: CPT | Mod: 25

## 2019-04-22 PROCEDURE — 00000146 ZZHCL STATISTIC GLUCOSE BY METER IP

## 2019-04-22 PROCEDURE — 85025 COMPLETE CBC W/AUTO DIFF WBC: CPT | Performed by: EMERGENCY MEDICINE

## 2019-04-22 RX ORDER — IRBESARTAN 150 MG/1
150 TABLET ORAL DAILY
Status: DISCONTINUED | OUTPATIENT
Start: 2019-04-23 | End: 2019-04-26 | Stop reason: HOSPADM

## 2019-04-22 RX ORDER — ATORVASTATIN CALCIUM 40 MG/1
40 TABLET, FILM COATED ORAL DAILY
COMMUNITY

## 2019-04-22 RX ORDER — HYDROMORPHONE HYDROCHLORIDE 1 MG/ML
.3-.5 INJECTION, SOLUTION INTRAMUSCULAR; INTRAVENOUS; SUBCUTANEOUS EVERY 4 HOURS PRN
Status: DISCONTINUED | OUTPATIENT
Start: 2019-04-22 | End: 2019-04-26 | Stop reason: HOSPADM

## 2019-04-22 RX ORDER — ONDANSETRON 2 MG/ML
4 INJECTION INTRAMUSCULAR; INTRAVENOUS EVERY 6 HOURS PRN
Status: DISCONTINUED | OUTPATIENT
Start: 2019-04-22 | End: 2019-04-26 | Stop reason: HOSPADM

## 2019-04-22 RX ORDER — CYCLOBENZAPRINE HCL 5 MG
5 TABLET ORAL 3 TIMES DAILY PRN
Status: DISCONTINUED | OUTPATIENT
Start: 2019-04-22 | End: 2019-04-26 | Stop reason: HOSPADM

## 2019-04-22 RX ORDER — NICOTINE POLACRILEX 4 MG
15-30 LOZENGE BUCCAL
Status: DISCONTINUED | OUTPATIENT
Start: 2019-04-22 | End: 2019-04-26 | Stop reason: HOSPADM

## 2019-04-22 RX ORDER — OXYCODONE HYDROCHLORIDE 5 MG/1
5-10 TABLET ORAL
Status: DISCONTINUED | OUTPATIENT
Start: 2019-04-22 | End: 2019-04-23

## 2019-04-22 RX ORDER — NALOXONE HYDROCHLORIDE 0.4 MG/ML
.1-.4 INJECTION, SOLUTION INTRAMUSCULAR; INTRAVENOUS; SUBCUTANEOUS
Status: DISCONTINUED | OUTPATIENT
Start: 2019-04-22 | End: 2019-04-26 | Stop reason: HOSPADM

## 2019-04-22 RX ORDER — ASPIRIN 81 MG/1
81 TABLET, CHEWABLE ORAL DAILY
Status: DISCONTINUED | OUTPATIENT
Start: 2019-04-23 | End: 2019-04-26 | Stop reason: HOSPADM

## 2019-04-22 RX ORDER — ATORVASTATIN CALCIUM 10 MG/1
40 TABLET, FILM COATED ORAL DAILY
Status: DISCONTINUED | OUTPATIENT
Start: 2019-04-23 | End: 2019-04-24

## 2019-04-22 RX ORDER — CARVEDILOL 6.25 MG/1
12.5 TABLET ORAL 2 TIMES DAILY WITH MEALS
Status: DISCONTINUED | OUTPATIENT
Start: 2019-04-22 | End: 2019-04-26 | Stop reason: HOSPADM

## 2019-04-22 RX ORDER — ONDANSETRON 4 MG/1
4 TABLET, ORALLY DISINTEGRATING ORAL EVERY 6 HOURS PRN
Status: DISCONTINUED | OUTPATIENT
Start: 2019-04-22 | End: 2019-04-26 | Stop reason: HOSPADM

## 2019-04-22 RX ORDER — FLUTICASONE PROPIONATE 220 UG/1
1 AEROSOL, METERED RESPIRATORY (INHALATION) 2 TIMES DAILY
COMMUNITY
End: 2019-08-14

## 2019-04-22 RX ORDER — DEXTROSE MONOHYDRATE 25 G/50ML
25-50 INJECTION, SOLUTION INTRAVENOUS
Status: DISCONTINUED | OUTPATIENT
Start: 2019-04-22 | End: 2019-04-26 | Stop reason: HOSPADM

## 2019-04-22 RX ADMIN — CARVEDILOL 12.5 MG: 6.25 TABLET, FILM COATED ORAL at 20:54

## 2019-04-22 RX ADMIN — INSULIN GLARGINE 18 UNITS: 100 INJECTION, SOLUTION SUBCUTANEOUS at 22:29

## 2019-04-22 RX ADMIN — OXYCODONE HYDROCHLORIDE 5 MG: 5 TABLET ORAL at 22:38

## 2019-04-22 NOTE — TELEPHONE ENCOUNTER
Followed up with patient re: Scheduling CT Scan. Patient states he prefers to have a face to face with Dr. Antunez before he does any further testing. Tried to assist patient with scheduling, but patient stated he needs to check with his son for transportation.

## 2019-04-22 NOTE — TELEPHONE ENCOUNTER
PA Initiation    Medication: cyclobenzaprine (FLEXERIL) 5 MG tablet -   Insurance Company: [employer] D - Phone [phone] Fax [phone]  Pharmacy Filling the Rx: [facility] [address] [place] [address] [address] AT [facility]  Filling Pharmacy Phone: [phone]  Filling Pharmacy Fax: [phone]  Start Date: 4/22/2019

## 2019-04-22 NOTE — ED NOTES
Bed: ED22  Expected date:   Expected time:   Means of arrival:   Comments:  [facility] Back and ext pain 84 m

## 2019-04-22 NOTE — ED PROVIDER NOTES
History     Chief Complaint:  Back and left leg pain    HPI   Seven Savage Jr. is a 84 year old male with a history of aortic stenosis, COPD and who has been on dialysis for 3 years who presents to the emergency department today for evaluation of back pain and left-sided leg pain. The patient states he has been having back pain since he had a massage with a chiropractor 3 weeks ago. He then had an MRI of his lumbar spine which found a L4 vertebral body compression fracture which was later over-read as potential metastasis. This morning the patient woke up with left thigh pain, 3/10 intensity. While he was walking around getting ready to go to dialysis, he states that his pain increased to a 7/10. He took a Norco for the back pain, but states that it did nothing to help his leg pain. He denies any vomiting, fever, falls, leg swelling. He lives in a private home with his son.       MRI LUMBAR SPINE WITHOUT CONTRAST   4/16/2019  Findings consistent with acute/subacute L4 vertebral body  compression fracture. Mild vertebral body height loss. No retropulsion  of fragments. Mild degenerative change for age.    With regard to the abnormal L4 vertebral body with fracture of the  superior endplate, axial T1-weighted images 30 and 31 raise the  possibility of underlying malignancy such as metastatic disease, as  the lateral cortex bilaterally appears destroyed, with an apparent  discrete mass anterolaterally on the right on image 30 measuring 3.0 x  3.4 cm diameter. There is no epidural soft tissue tumor. No other  lesions are seen to indicate metastases.    Allergies:  Levaquin [Levofloxacin]  Pioglitazone  Vytorin    Medications:    albuterol (2.5 MG/3ML) 0.083% neb solution  albuterol (PROAIR HFA/PROVENTIL HFA/VENTOLIN HFA) 108 (90 BASE) MCG/ACT Inhaler  aspirin 81 MG chewable tablet  atorvastatin (LIPITOR) 40 MG tablet  carvedilol (COREG) 12.5 MG tablet  COLCHICINE PO  cyclobenzaprine (FLEXERIL) 5 MG  "tablet  darbepoetin libby (ARANESP) 100 MCG/0.5ML injection  diltiazem ER (TIAZAC) 300 MG 24 hr ER beaded capsule  doxazosin (CARDURA) 8 MG tablet  eplerenone (INSPRA) 50 MG tablet  fish oil-omega-3 fatty acids 1000 MG capsule  furosemide (LASIX) 20 MG tablet  HYDROcodone-acetaminophen (NORCO) 5-325 MG tablet  Insulin Aspart (NOVOLOG SC)  insulin glargine (LANTUS) 100 UNIT/ML injection  irbesartan (AVAPRO) 150 MG tablet  ranitidine (ZANTAC) 150 MG tablet     Past Medical History:    COPD (chronic obstructive pulmonary disease)   CRF (chronic renal failure)   Heart murmur   HLD (hyperlipidemia)   HTN (hypertension)   IDDM (insulin dependent diabetes mellitus)    Renal calculi     Past Surgical History:    History reviewed. No pertinent surgical history.    Family History:    History reviewed. No pertinent family history.    Social History:  The patient was accompanied to the ED by EMS.  Smoking Status: Former Smoker  Smokeless Tobacco: Never Used  Alcohol Use: Positive   Marital Status:     Retired physician, worked in [employer].    Review of Systems   All other systems reviewed and are negative.    Physical Exam     Patient Vitals for the past 24 hrs:   BP Temp Temp src Pulse Heart Rate Resp SpO2 Height   04/22/19 2312 -- -- -- -- -- 16 -- --   04/22/19 2238 -- -- -- -- -- 17 -- --   04/22/19 2026 168/57 97.2  F (36.2  C) Oral -- 67 18 95 % --   04/22/19 1838 154/59 -- -- 65 -- 18 92 % --   04/22/19 1752 -- -- -- -- 62 18 92 % --   04/22/19 1603 158/63 97.8  F (36.6  C) Oral 66 -- 18 (!) 88 % 1.702 m (5' 7\")      Physical Exam  General: male recumbent in room 22  HENT: mucous membranes moist  CV: regular rate, regular rhythm, 4/6 murmur, no LE edema, +LUE fistula with palpable thrill  Resp: normal effort, clear throughout, no crackles or wheezing  GI: abdomen soft and nontender, no guarding  MSK:   Cervical spine: nontender with FROM  Thoracic spine: nontender, no CVAT  Lumbar spine: mild tenderness to lower " back  Pelvis stable.  FROM bilateral hips and knees without deformity.  Pt points to left medial mid-thigh as area of discomfort, where there are no appreciable abnormalities on exam  Skin: appropriately warm and dry, no erythema/ecchymosis/vesicles to back  Neuro: alert, clear speech, oriented, hip flexion/extension normal, SILT bilateral legs and feet  Psych: cooperative    Emergency Department Course     ECG:  ECG taken at 1609, ECG read at 1642  Normal sinus rhythm  LAD  ST and T wave abnormalities no longer present when compared to prior ECG on 04/06/18  Rate 66 bpm. NY interval 192 ms. QRS duration 114 ms. QT/QTc 434/454 ms. P-R-T axes 72 -47 97.    Imaging:  Radiology findings were communicated with the patient who voiced understanding of the findings.    XR Femur Left 2 Views  Extensive atherosclerotic calcified plaque in the  superficial femoral artery. Normal bony structures.  Reading per radiology    CT Chest Abdomen Pelvis w/o Contrast  1. A spiculated right upper lobe pulmonary nodule is highly suspicious  for malignancy, such as bronchogenic carcinoma.  2. Ill-defined lytic change in the L4 vertebral body with mild  anterior compression is suspicious for a metastatic lesion and an  associated pathologic compression fracture, age indeterminate.  3. Multiple low density liver lesions are not well characterized on  this noncontrast scan, but are considered suspicious for metastatic  disease.  4. Emphysema.  5. Mild mediastinal adenopathy is nonspecific, however metastatic  involvement cannot be excluded.  6. Multiple bilateral adrenal nodules are indeterminate, and  metastatic disease is not excluded.  7. Bilateral renal atrophy.  8. Multiple hyperdense renal lesions bilaterally could represent  hemorrhagic or proteinaceous renal cysts, however solid renal neoplasm  cannot be excluded.  9. Moderate prostatic enlargement.  10. Infrarenal abdominal aortic aneurysm measures up to 3.2 cm AP.  Reading per  radiology    US Lower Extremity Venous Duplex Left  No evidence of deep venous thrombosis.  Reading per radiology    Laboratory:  Laboratory findings were communicated with the patient who voiced understanding of the findings.    Lipase: 85  CBC: WBC 11.6, HGB 10.0,   CMP: Glucose 193, Creatinine 2.98, GFR 21, Bilirubin 1.4, Albumin 3.1, Protein total 6.3    Emergency Department Course:    1625 Nursing notes and vitals reviewed.    I performed electronic chart review in EPIC.    1640 I performed an exam of the patient as documented above.     1700 IV was inserted and blood was drawn for laboratory testing, results above.     1711 The patient was sent for a US while in the emergency department, results above.      1733 The patient was sent for a CT while in the emergency department, results above.      1807 The patient was sent for a XR while in the emergency department, results above.      1900 I spoke with Dr. Hernandez of the Hospitalist service regarding patient's presentation, findings, and plan of care.     1920 I personally reviewed the imaging and lab results with the patient and answered all related questions prior to admission.    Impression & Plan      Medical Decision Making:  Regarding his back pain, there is concern that this represents metastasis and CT imaging was performed to evaluate for possible primary, with note made of a lung mass certainly could be neoplastic in etiology particularly given his age and history of extensive tobacco use.  Vital signs are satisfactory.  Regarding his left thigh pain, no evidence of DVT, acute arterial compromise, major bony pathology, or other more immediately dangerous condition.  No evidence of superficial infection, abscess, necrotizing infection, or compartment syndrome.  Given his pain, primarily in the back, he request hospitalization I think this is reasonable.  Further oncologic workup can be further considered while here though this is not the primary  motive for hospitalization.    Diagnosis:    ICD-10-CM    1. Lung mass R91.8     suspect cancer   2. Pathological compression fracture of vertebra, sequela M48.50XS    3. Personal history of tobacco use, presenting hazards to health Z87.891      Disposition:   Admission    Scribe Disclosure:  I, Vivek Perry, am serving as a scribe at 4:30 PM on 4/22/2019 to document services personally performed by Eric Herman MD based on my observations and the provider's statements to me.     EMERGENCY DEPARTMENT    This record was created at least in part using electronic voice recognition software, so please excuse any typographical errors.         Eric Herman MD  04/22/19 9661

## 2019-04-23 LAB
ERYTHROCYTE [DISTWIDTH] IN BLOOD BY AUTOMATED COUNT: 17.7 % (ref 10–15)
GLUCOSE BLDC GLUCOMTR-MCNC: 143 MG/DL (ref 70–99)
GLUCOSE BLDC GLUCOMTR-MCNC: 149 MG/DL (ref 70–99)
GLUCOSE BLDC GLUCOMTR-MCNC: 151 MG/DL (ref 70–99)
GLUCOSE BLDC GLUCOMTR-MCNC: 152 MG/DL (ref 70–99)
GLUCOSE BLDC GLUCOMTR-MCNC: 99 MG/DL (ref 70–99)
HCT VFR BLD AUTO: 30.2 % (ref 40–53)
HGB BLD-MCNC: 9.2 G/DL (ref 13.3–17.7)
MCH RBC QN AUTO: 25.7 PG (ref 26.5–33)
MCHC RBC AUTO-ENTMCNC: 30.5 G/DL (ref 31.5–36.5)
MCV RBC AUTO: 84 FL (ref 78–100)
PLATELET # BLD AUTO: 314 10E9/L (ref 150–450)
RBC # BLD AUTO: 3.58 10E12/L (ref 4.4–5.9)
WBC # BLD AUTO: 8 10E9/L (ref 4–11)

## 2019-04-23 PROCEDURE — 99223 1ST HOSP IP/OBS HIGH 75: CPT | Performed by: NURSE PRACTITIONER

## 2019-04-23 PROCEDURE — A9270 NON-COVERED ITEM OR SERVICE: HCPCS | Performed by: STUDENT IN AN ORGANIZED HEALTH CARE EDUCATION/TRAINING PROGRAM

## 2019-04-23 PROCEDURE — 99232 SBSQ HOSP IP/OBS MODERATE 35: CPT | Performed by: HOSPITALIST

## 2019-04-23 PROCEDURE — 12000000 ZZH R&B MED SURG/OB

## 2019-04-23 PROCEDURE — 00000146 ZZHCL STATISTIC GLUCOSE BY METER IP

## 2019-04-23 PROCEDURE — 25000132 ZZH RX MED GY IP 250 OP 250 PS 637: Performed by: HOSPITALIST

## 2019-04-23 PROCEDURE — 85027 COMPLETE CBC AUTOMATED: CPT | Performed by: STUDENT IN AN ORGANIZED HEALTH CARE EDUCATION/TRAINING PROGRAM

## 2019-04-23 PROCEDURE — L0625 LO FLEX L1-BELOW L5 PRE OTS: HCPCS

## 2019-04-23 PROCEDURE — A9270 NON-COVERED ITEM OR SERVICE: HCPCS | Performed by: NURSE PRACTITIONER

## 2019-04-23 PROCEDURE — A9270 NON-COVERED ITEM OR SERVICE: HCPCS | Performed by: HOSPITALIST

## 2019-04-23 PROCEDURE — 36415 COLL VENOUS BLD VENIPUNCTURE: CPT | Performed by: STUDENT IN AN ORGANIZED HEALTH CARE EDUCATION/TRAINING PROGRAM

## 2019-04-23 PROCEDURE — 25000131 ZZH RX MED GY IP 250 OP 636 PS 637: Performed by: STUDENT IN AN ORGANIZED HEALTH CARE EDUCATION/TRAINING PROGRAM

## 2019-04-23 PROCEDURE — 82784 ASSAY IGA/IGD/IGG/IGM EACH: CPT | Performed by: INTERNAL MEDICINE

## 2019-04-23 PROCEDURE — 86334 IMMUNOFIX E-PHORESIS SERUM: CPT | Performed by: INTERNAL MEDICINE

## 2019-04-23 PROCEDURE — 36415 COLL VENOUS BLD VENIPUNCTURE: CPT | Performed by: INTERNAL MEDICINE

## 2019-04-23 PROCEDURE — 25000132 ZZH RX MED GY IP 250 OP 250 PS 637: Performed by: STUDENT IN AN ORGANIZED HEALTH CARE EDUCATION/TRAINING PROGRAM

## 2019-04-23 PROCEDURE — 25000132 ZZH RX MED GY IP 250 OP 250 PS 637: Performed by: NURSE PRACTITIONER

## 2019-04-23 RX ORDER — EPLERENONE 25 MG/1
50 TABLET, FILM COATED ORAL DAILY
Status: DISCONTINUED | OUTPATIENT
Start: 2019-04-23 | End: 2019-04-26 | Stop reason: HOSPADM

## 2019-04-23 RX ORDER — HYDROMORPHONE HYDROCHLORIDE 2 MG/1
2-4 TABLET ORAL
Status: DISCONTINUED | OUTPATIENT
Start: 2019-04-23 | End: 2019-04-26 | Stop reason: HOSPADM

## 2019-04-23 RX ORDER — AMOXICILLIN 250 MG
1-2 CAPSULE ORAL 2 TIMES DAILY
Status: DISCONTINUED | OUTPATIENT
Start: 2019-04-23 | End: 2019-04-26 | Stop reason: HOSPADM

## 2019-04-23 RX ORDER — FLUTICASONE PROPIONATE 220 UG/1
1 AEROSOL, METERED RESPIRATORY (INHALATION) 2 TIMES DAILY
Status: DISCONTINUED | OUTPATIENT
Start: 2019-04-23 | End: 2019-04-26 | Stop reason: HOSPADM

## 2019-04-23 RX ORDER — ALBUTEROL SULFATE 90 UG/1
1-2 AEROSOL, METERED RESPIRATORY (INHALATION) EVERY 4 HOURS PRN
Status: DISCONTINUED | OUTPATIENT
Start: 2019-04-23 | End: 2019-04-26 | Stop reason: HOSPADM

## 2019-04-23 RX ORDER — POLYETHYLENE GLYCOL 3350 17 G/17G
17 POWDER, FOR SOLUTION ORAL 2 TIMES DAILY PRN
Status: DISCONTINUED | OUTPATIENT
Start: 2019-04-23 | End: 2019-04-26 | Stop reason: HOSPADM

## 2019-04-23 RX ORDER — ATORVASTATIN CALCIUM 40 MG/1
40 TABLET, FILM COATED ORAL DAILY
Status: DISCONTINUED | OUTPATIENT
Start: 2019-04-23 | End: 2019-04-26 | Stop reason: HOSPADM

## 2019-04-23 RX ORDER — ALBUTEROL SULFATE 0.83 MG/ML
2.5 SOLUTION RESPIRATORY (INHALATION) EVERY 4 HOURS PRN
Status: DISCONTINUED | OUTPATIENT
Start: 2019-04-23 | End: 2019-04-26 | Stop reason: HOSPADM

## 2019-04-23 RX ADMIN — POLYETHYLENE GLYCOL 3350 17 G: 17 POWDER, FOR SOLUTION ORAL at 19:01

## 2019-04-23 RX ADMIN — OXYCODONE HYDROCHLORIDE 5 MG: 5 TABLET ORAL at 08:06

## 2019-04-23 RX ADMIN — POLYETHYLENE GLYCOL 3350 17 G: 17 POWDER, FOR SOLUTION ORAL at 11:16

## 2019-04-23 RX ADMIN — INSULIN GLARGINE 18 UNITS: 100 INJECTION, SOLUTION SUBCUTANEOUS at 21:29

## 2019-04-23 RX ADMIN — ATORVASTATIN CALCIUM 40 MG: 10 TABLET, FILM COATED ORAL at 08:05

## 2019-04-23 RX ADMIN — FLUTICASONE PROPIONATE 1 PUFF: 220 AEROSOL, METERED RESPIRATORY (INHALATION) at 21:28

## 2019-04-23 RX ADMIN — FLUTICASONE PROPIONATE 1 PUFF: 220 AEROSOL, METERED RESPIRATORY (INHALATION) at 15:16

## 2019-04-23 RX ADMIN — CARVEDILOL 12.5 MG: 6.25 TABLET, FILM COATED ORAL at 08:05

## 2019-04-23 RX ADMIN — IRBESARTAN 150 MG: 150 TABLET ORAL at 08:06

## 2019-04-23 RX ADMIN — CARVEDILOL 12.5 MG: 6.25 TABLET, FILM COATED ORAL at 19:01

## 2019-04-23 RX ADMIN — ASPIRIN 81 MG 81 MG: 81 TABLET ORAL at 08:06

## 2019-04-23 RX ADMIN — INSULIN ASPART 1 UNITS: 100 INJECTION, SOLUTION INTRAVENOUS; SUBCUTANEOUS at 19:03

## 2019-04-23 RX ADMIN — INSULIN ASPART 1 UNITS: 100 INJECTION, SOLUTION INTRAVENOUS; SUBCUTANEOUS at 13:12

## 2019-04-23 ASSESSMENT — ACTIVITIES OF DAILY LIVING (ADL)
ADLS_ACUITY_SCORE: 17
ADLS_ACUITY_SCORE: 19
ADLS_ACUITY_SCORE: 17
ADLS_ACUITY_SCORE: 19
ADLS_ACUITY_SCORE: 19
ADLS_ACUITY_SCORE: 17

## 2019-04-23 NOTE — CONSULTS
[facility]    Palliative Care Consultation     Seven Savage Jr.  MRN# [id number]  Date of Admission:  4/22/2019  Date of Service (when I saw the patient): 04/23/19  Reason for consult: Consulted by Dr. Trinh for Symptom management, Goals of care    Assessment & Plan   Seven Savage Jr. is a 84 year old male with PMH significant for ESRD on hemodialysis, COPD on home O2, DM2, HTN, and HLD who presents with back and left sided leg pain. He is found to have a L4 vertebral compression fracture, along with a right upper lobe lung nodule and liver lesions concerning for metastatic malignancy. Oncology has been consulted. We are consulted for symptom management and goals of care.     Introduced the scope of our practice to Sarkis. Discussed our potential roles for symptom management, support/coping, and decisional support (aka goals of care).     Symptoms/Recommendations   1. Back pain. 2/2 L4 vertebral compression fracture. Pain is overall managed at this point.   -Pt was fitted with a LSO corset   -Due to ESRD, in replacement of oxycodone, will recommend dilaudid 2-4mg PO Q2hrs PRN pain   -Flexeril 5mg PO TID PRN  -Consideration of palliative XRT to L4 lesion for palliation and pain management     2. Constipation. No BM in 5 days.   -Senna 1-2 tabs PO BID  -Miralax BID PRN    3. Goals of care. Pt aware that he has 2 terminal diagnoses, ESRD and metastatic cancer, likely lung primary. He has met with oncology and is deciding about chemotherapy and/or radiation. He would like to take time to think this over, and will follow up as an outpatient with oncology to further discuss. He ultimately understands he will die from this, and expresses that he is not worried or afraid. He is in a good place mentally, and is coming to terms with everything. I offer the option of palliative care either through [facility] vs [place], for ongoing support in symptom management. We also discuss the distinction between  hospice and palliative medicine. If pt is considering hospice, may be reasonable to look into whether he can qualify under his metastatic cancer and be able to continue his dialysis, given 2 separate terminal diagnoses. He did not express interest in me checking into this today. He did wonder about the option of home dialysis; will ask the unit care coordinator to look into this.     Support/Coping  -Wife  in . Pt has a supportive and local son.   -Appreciate spiritual health support, as pt reports having a strong aranza     Decisional Support, Goals of Care, Counseling & Coordination  Decisional Capacity Intact?  -Yes   Health Care Directive on File?  -No  Code Status/Resuscitation Preferences?  -DNR/DNI     Case reviewed with Dr. Griffiths, bedside RN Jennifer, and unit care coordinator Sonia.     Thank you for involving us in the care of this patient and family. We will sign off at this time. Please do not hesitate to contact me with questions or concerns or the on-call provider for our team if evening or weekend.    Teresa SCHMITZ, [facility]  Palliative Medicine   Pager [phone]    Attestation:  Total time on the floor involved in the patient's care: 70 minutes  Total time spent in counseling/care coordination: >50%    Chief Complaint   Back and left leg pain     History is obtained from the patient, staff, and extensive chart review.     Past Medical History    I have reviewed this patient's medical history and updated it with pertinent information if needed.   Past Medical History:   Diagnosis Date     COPD (chronic obstructive pulmonary disease) (H)      CRF (chronic renal failure)      Heart murmur      HLD (hyperlipidemia)      HTN (hypertension)      IDDM (insulin dependent diabetes mellitus) (H)      Renal calculi        Past Surgical History   I have reviewed this patient's surgical history and updated it with pertinent information if needed.  Past Surgical History:   Procedure Laterality Date      ADENOIDECTOMY       ENDOSCOPIC POLYPECTOMY NASAL      Through vocal cords     Renal Stent      For renal calculi     TONSILLECTOMY         Social History   Living situation: Independent     Family system: Wife  in . 1 son supportive and local     Self-identified support system: As above     Employment/education: Psychiatrist, also worked in addiction medicine     Activities/interests: Medicine, talking about God and [employer]     Use of community resources: None     Mormon affiliation: Yes     Involvement in aranza community: Yes     Impact of illness on patient: Pt has ESRD and now met cancer. He is working through his [employer]     Family History   I have reviewed this patient's family history and updated it with pertinent information if needed.   Family History   Family history unknown: Yes       Allergies   Allergies   Allergen Reactions     Levaquin [Levofloxacin]      edema     Pioglitazone Other (See Comments)     Edema & hay fever     Vytorin Other (See Comments)     Muscle aches       Medications   Current Facility-Administered Medications Ordered in Epic   Medication Dose Route Frequency Last Rate Last Dose     - MEDICATION INSTRUCTIONS for Dialysis Patients -   Does not apply See Admin Instructions         albuterol (PROAIR HFA/PROVENTIL HFA/VENTOLIN HFA) 108 (90 Base) MCG/ACT inhaler 1-2 puff  1-2 puff Inhalation Q4H PRN         albuterol (PROVENTIL) neb solution 2.5 mg  2.5 mg Nebulization Q4H PRN         aspirin (ASA) chewable tablet 81 mg  81 mg Oral Daily   81 mg at 19 0806     atorvastatin (LIPITOR) tablet 40 mg  40 mg Oral Daily         atorvastatin (LIPITOR) tablet 40 mg  40 mg Oral Daily   40 mg at 19 08     carvedilol (COREG) tablet 12.5 mg  12.5 mg Oral BID w/meals   12.5 mg at 19 08     cyclobenzaprine (FLEXERIL) tablet 5 mg  5 mg Oral TID PRN         glucose gel 15-30 g  15-30 g Oral Q15 Min PRN        Or     dextrose 50 % injection 25-50 mL  25-50 mL Intravenous  Q15 Min PRN        Or     glucagon injection 1 mg  1 mg Subcutaneous Q15 Min PRN         diltiazem ER (DILT-XR) 300 mg  300 mg Oral Daily         eplerenone (INSPRA) tablet 50 mg  50 mg Oral Daily         fluticasone (FLOVENT HFA) 220 MCG/ACT Inhaler 1 puff  1 puff Inhalation BID         HYDROmorphone (DILAUDID) tablet 2-4 mg  2-4 mg Oral Q3H PRN         HYDROmorphone (PF) (DILAUDID) injection 0.3-0.5 mg  0.3-0.5 mg Intravenous Q4H PRN         insulin aspart (NovoLOG) inj (RAPID ACTING)  1-3 Units Subcutaneous TID AC   1 Units at 04/23/19 1312     insulin aspart (NovoLOG) inj (RAPID ACTING)  1-3 Units Subcutaneous At Bedtime         insulin glargine (LANTUS PEN) injection 18 Units  18 Units Subcutaneous At Bedtime   18 Units at 04/22/19 2229     irbesartan (AVAPRO) tablet 150 mg  150 mg Oral Daily   150 mg at 04/23/19 0806     melatonin tablet 1 mg  1 mg Oral At Bedtime PRN         naloxone (NARCAN) injection 0.1-0.4 mg  0.1-0.4 mg Intravenous Q2 Min PRN         ondansetron (ZOFRAN-ODT) ODT tab 4 mg  4 mg Oral Q6H PRN        Or     ondansetron (ZOFRAN) injection 4 mg  4 mg Intravenous Q6H PRN         polyethylene glycol (MIRALAX/GLYCOLAX) Packet 17 g  17 g Oral BID PRN   17 g at 04/23/19 1116     ranitidine (ZANTAC) tablet 150 mg  150 mg Oral Daily PRN         sodium chloride (PF) 0.9% PF flush 3 mL  3 mL Intracatheter Q8H   3 mL at 04/23/19 1120     No current Epic-ordered outpatient medications on file.       Review of Systems   The comprehensive review of systems is negative other than noted here and in the assessment/plan.    Palliative Symptom Review (0=no symptom/no concern, 1=mild, 2=moderate, 3=severe):  Pain: 1-2  Constipation: 2-3    Physical Exam   Temp: 96.5  F (35.8  C) Temp src: Oral BP: 144/44 Pulse: 66 Heart Rate: 64 Resp: 16 SpO2: 92 % O2 Device: Nasal cannula Oxygen Delivery: 2 LPM  There were no vitals filed for this visit.  CONSTITUTIONAL: Chronically ill elderly man seen lying in bed in NAD,  A&Ox3. Calm and cooperative.  HEENT: NCAT  RESPIRATORY: Slightly labored respiratory effort on NC at rest   SKIN: Warm and intact. No concerning lesions or rashes on exposed skin surfaces   NEUROLOGIC: Appropriately responsive during interview  PSYCH: Affect congruent     Data   Results for orders placed or performed during the hospital encounter of 04/22/19 (from the past 24 hour(s))   EKG 12 lead   Result Value Ref Range    Interpretation ECG Click View Image link to view waveform and result    CBC with platelets differential   Result Value Ref Range    WBC 11.6 (H) 4.0 - 11.0 10e9/L    RBC Count 3.89 (L) 4.4 - 5.9 10e12/L    Hemoglobin 10.0 (L) 13.3 - 17.7 g/dL    Hematocrit 32.7 (L) 40.0 - 53.0 %    MCV 84 78 - 100 fl    MCH 25.7 (L) 26.5 - 33.0 pg    MCHC 30.6 (L) 31.5 - 36.5 g/dL    RDW 17.9 (H) 10.0 - 15.0 %    Platelet Count 362 150 - 450 10e9/L    Diff Method Automated Method     % Neutrophils 85.7 %    % Lymphocytes 5.6 %    % Monocytes 7.4 %    % Eosinophils 0.9 %    % Basophils 0.2 %    % Immature Granulocytes 0.2 %    Nucleated RBCs 0 0 /100    Absolute Neutrophil 10.0 (H) 1.6 - 8.3 10e9/L    Absolute Lymphocytes 0.7 (L) 0.8 - 5.3 10e9/L    Absolute Monocytes 0.9 0.0 - 1.3 10e9/L    Absolute Eosinophils 0.1 0.0 - 0.7 10e9/L    Absolute Basophils 0.0 0.0 - 0.2 10e9/L    Abs Immature Granulocytes 0.0 0 - 0.4 10e9/L    Absolute Nucleated RBC 0.0    Comprehensive metabolic panel   Result Value Ref Range    Sodium 137 133 - 144 mmol/L    Potassium 3.6 3.4 - 5.3 mmol/L    Chloride 99 94 - 109 mmol/L    Carbon Dioxide 31 20 - 32 mmol/L    Anion Gap 7 3 - 14 mmol/L    Glucose 193 (H) 70 - 99 mg/dL    Urea Nitrogen 20 7 - 30 mg/dL    Creatinine 2.98 (H) 0.66 - 1.25 mg/dL    GFR Estimate 18 (L) >60 mL/min/[1.73_m2]    GFR Estimate If Black 21 (L) >60 mL/min/[1.73_m2]    Calcium 9.4 8.5 - 10.1 mg/dL    Bilirubin Total 1.4 (H) 0.2 - 1.3 mg/dL    Albumin 3.1 (L) 3.4 - 5.0 g/dL    Protein Total 6.3 (L) 6.8 - 8.8 g/dL     Alkaline Phosphatase 61 40 - 150 U/L    ALT 12 0 - 70 U/L    AST 15 0 - 45 U/L   Lipase   Result Value Ref Range    Lipase 85 73 - 393 U/L   US Lower Extremity Venous Duplex Left    Narrative    VENOUS ULTRASOUND LEFT LEG  4/22/2019 5:29 PM     HISTORY: Left lower extremity pain and swelling.    COMPARISON: None.    FINDINGS:  Examination of the deep veins with graded compression and  color flow Doppler with spectral wave form analysis shows no evidence  of thrombus in the common femoral vein, femoral vein, popliteal vein  or calf veins.  There is no venous insufficiency.      Impression    IMPRESSION: No evidence of deep venous thrombosis.    SARABJIT JERRY MD   CT Chest Abdomen Pelvis w/o Contrast    Narrative    CT CHEST, ABDOMEN, PELVIS WITHOUT CONTRAST   4/22/2019 5:47 PM     HISTORY: Subacute low back pain. Evaluate for metastatic disease.  History of end-stage renal disease.    TECHNIQUE: Volumetric helical sections were acquired from the thoracic  inlet to the ischial tuberosities. Coronal images were also  reconstructed. Radiation dose for this scan was reduced using  automated exposure control, adjustment of the mA and/or kV according  to patient size, or iterative reconstruction technique.    COMPARISON: None.    FINDINGS:    Chest: There is a spiculated nodule in the right upper lobe laterally  abutting the pleura, and measuring 2.7 x 2.1 cm (series 3 image 13).  Emphysematous changes are noted throughout both lungs. There are small  bilateral pleural effusions. Mild associated consolidation at both  lung bases posteriorly most likely represent atelectasis, although  pneumonia could also possibly have this appearance. Small pericardial  effusion. Coronary artery calcification. Atherosclerotic calcification  of the thoracic aorta and coronary arteries. There is mild mediastinal  adenopathy. For example, an enlarged subcarinal lymph node measures  2.6 x 1.8 cm.    Abdomen and Pelvis: Multiple  hypoenhancing liver lesions are not well  characterized on this noncontrast scan, but are suspicious for  metastatic disease. The spleen, gallbladder, and pancreas have  unremarkable noncontrast appearances. Multiple bilateral adrenal  nodules are indeterminate, and metastatic disease cannot be excluded,  with the largest on the left measuring 3 cm. Bilateral renal atrophy  is noted, moderate on the left and marked on the right. Multiple  hyperdense liver lesions could represent hemorrhagic or proteinaceous  renal cysts, although solid renal neoplasm cannot be excluded, with  the largest on the left measuring 3.3 cm. A few smaller bilateral  renal cysts are also noted. No hydronephrosis. Curvilinear  calcifications in both renal sowmya are most likely vascular.    There is advanced atherosclerotic aortoiliac calcification. Infrarenal  abdominal aortic aneurysm measures 3.2 cm AP x 2.9 cm transverse. No  bowel obstruction. Colonic diverticulosis, without convincing evidence  for diverticulitis. There is a moderate amount of stool in the  ascending and transverse colon. Unremarkable appendix. No free fluid  in the pelvis. There is moderate prostatic enlargement. A prominent  duodenal diverticulum is noted. Degenerative changes are noted  throughout the thoracolumbar spine. Ill-defined lytic change within  the L4 vertebral body is suspicious for metastatic disease. There is  mild age-indeterminate anterior compression also noted in the L4  vertebral body, suspicious for a pathologic compression fracture.      Impression    IMPRESSION:   1. A spiculated right upper lobe pulmonary nodule is highly suspicious  for malignancy, such as bronchogenic carcinoma.  2. Ill-defined lytic change in the L4 vertebral body with mild  anterior compression is suspicious for a metastatic lesion and an  associated pathologic compression fracture, age indeterminate.  3. Multiple low density liver lesions are not well characterized on  this  noncontrast scan, but are considered suspicious for metastatic  disease.  4. Emphysema.  5. Mild mediastinal adenopathy is nonspecific, however metastatic  involvement cannot be excluded.  6. Multiple bilateral adrenal nodules are indeterminate, and  metastatic disease is not excluded.  7. Bilateral renal atrophy.  8. Multiple hyperdense renal lesions bilaterally could represent  hemorrhagic or proteinaceous renal cysts, however solid renal neoplasm  cannot be excluded.  9. Moderate prostatic enlargement.  10. Infrarenal abdominal aortic aneurysm measures up to 3.2 cm AP.      CLAUDIO DAO MD   XR Femur Left 2 Views    Narrative    XR LEFT FEMUR TWO VIEWS   4/22/2019 6:34 PM     HISTORY: Acute atraumatic left thigh pain, possible cancer, evaluate  for bony pathology.    COMPARISON: None.      Impression    IMPRESSION: Extensive atherosclerotic calcified plaque in the  superficial femoral artery. Normal bony structures.    SARABJIT JERRY MD   UA with Microscopic   Result Value Ref Range    Color Urine Yellow     Appearance Urine Clear     Glucose Urine Negative NEG^Negative mg/dL    Bilirubin Urine Negative NEG^Negative    Ketones Urine Negative NEG^Negative mg/dL    Specific Gravity Urine 1.010 1.003 - 1.035    Blood Urine Trace (A) NEG^Negative    pH Urine 6.5 5.0 - 7.0 pH    Protein Albumin Urine 100 (A) NEG^Negative mg/dL    Urobilinogen mg/dL 2.0 0.0 - 2.0 mg/dL    Nitrite Urine Negative NEG^Negative    Leukocyte Esterase Urine Trace (A) NEG^Negative    Source Midstream Urine     WBC Urine 4 0 - 5 /HPF    RBC Urine 1 0 - 2 /HPF   Glucose by meter   Result Value Ref Range    Glucose 156 (H) 70 - 99 mg/dL   Orthopedic Surgery IP Consult: Patient to be seen: Routine - within 24 hours; compression fracture from metastatic disease; Consultant may enter orders: Yes; Requesting provider? Hospitalist (if different from attending physician)    Narrative    Amada Ríos PA-C     4/23/2019 10:36 AM  [place]  [facility]    Orthopedic Consultation    Seven Savage Jr. MRN# [id number]   Age: 84 year old YOB: 1935     Date of Admission:  4/22/2019    Reason for consult: Compression fracture from metastatic disease       Requesting physician: Dr. 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 Determine if radiation therapy could be used   for L4 met  WBAT            Chief Complaint:   Low back pain         History of Present Illness:   This patient is a 84 year old male who presents with the   following condition requiring a hospital admission:    Mr. Savage states that he has had back pain for 3 weeks. He   states this began after having an adjustment with a newer   chiropractor that he had not previously seen. He was seen by Dr. Laila Nelson at [facility] who ordered a MR scan for his lumbar   spine. He was then seen in follow-up by Dr. Antunez. At that time   the read did not include the diagnosis of metastasis. A   vertebroplasty was ordered and when IR reviewed patient imaging   and information it was found that there was metastasis and a CT   scan was ordered. He presented to the ED after he began to have   left thigh pain.   At time of visit, he denies any thigh pain or radiculopathy. He   has focal low back pain. Denies and fevers, chills, malaise,   night sweats, or unexplained weight loss. He has seen a   chiropractor for many years which successful has managed some   chronic back pain.  Of note he is also a dialysis patient.          Past Medical History:     Past Medical History:   Diagnosis Date     COPD (chronic obstructive pulmonary disease) (H)      CRF (chronic renal failure)      Heart murmur      HLD (hyperlipidemia)      HTN (hypertension)       IDDM (insulin dependent diabetes mellitus) (H)      Renal calculi              Past Surgical History:     Past Surgical History:   Procedure Laterality Date     ADENOIDECTOMY       ENDOSCOPIC POLYPECTOMY NASAL      Through vocal cords     Renal Stent      For renal calculi     TONSILLECTOMY               Social History:     Social History     Tobacco Use     Smoking status: Former Smoker     Packs/day: 1.00     Years: 70.00     Pack years: 70.00     Types: Cigarettes     Last attempt to quit: 2011     Years since quittin.3     Smokeless tobacco: Never Used   Substance Use Topics     Alcohol use: Yes     Comment: average 1 beer month             Family History:     Family History   Family history unknown: Yes             Immunizations:     VACCINE/DOSE   Diptheria   DPT   DTAP   HBIG   Hepatitis A   Hepatitis B   HIB   Influenza   Measles   Meningococcal   MMR   Mumps   Pneumococcal   Polio   Rubella   Small Pox   TDAP   Varicella   Zoster             Allergies:     Allergies   Allergen Reactions     Levaquin [Levofloxacin]      edema     Pioglitazone Other (See Comments)     Edema & hay fever     Vytorin Other (See Comments)     Muscle aches             Medications:     Current Facility-Administered Medications   Medication     - MEDICATION INSTRUCTIONS for Dialysis Patients -     aspirin (ASA) chewable tablet 81 mg     atorvastatin (LIPITOR) tablet 40 mg     carvedilol (COREG) tablet 12.5 mg     cyclobenzaprine (FLEXERIL) tablet 5 mg     glucose gel 15-30 g    Or     dextrose 50 % injection 25-50 mL    Or     glucagon injection 1 mg     HYDROmorphone (PF) (DILAUDID) injection 0.3-0.5 mg     insulin aspart (NovoLOG) inj (RAPID ACTING)     insulin aspart (NovoLOG) inj (RAPID ACTING)     insulin glargine (LANTUS PEN) injection 18 Units     irbesartan (AVAPRO) tablet 150 mg     melatonin tablet 1 mg     naloxone (NARCAN) injection 0.1-0.4 mg     ondansetron (ZOFRAN-ODT) ODT tab 4 mg    Or     ondansetron  "(ZOFRAN) injection 4 mg     oxyCODONE (ROXICODONE) tablet 5-10 mg     ranitidine (ZANTAC) tablet 150 mg             Review of Systems:   CV: NEGATIVE for chest pain, palpitations or peripheral edema  C: NEGATIVE for fever, chills, change in weight  E/M: NEGATIVE for ear, mouth and throat problems  R: NEGATIVE for significant cough or SOB          Physical Exam:   All vitals have been reviewed  Patient Vitals for the past 24 hrs:   BP Temp Temp src Pulse Heart Rate Resp SpO2 Height   04/23/19 0202 136/46 97.3  F (36.3  C) Oral -- 64 16 96 % --   04/22/19 2312 -- -- -- -- -- 16 -- --   04/22/19 2238 -- -- -- -- -- 17 -- --   04/22/19 2026 168/57 97.2  F (36.2  C) Oral -- 67 18 95 % --   04/22/19 1838 154/59 -- -- 65 -- 18 92 % --   04/22/19 1752 -- -- -- -- 62 18 92 % --   04/22/19 1603 158/63 97.8  F (36.6  C) Oral 66 -- 18 (!) 88 %   1.702 m (5' 7\")       Intake/Output Summary (Last 24 hours) at 4/23/2019 0739  Last data filed at 4/22/2019 2130  Gross per 24 hour   Intake --   Output 75 ml   Net -75 ml     Patient laying comfortably in bed   No ecchymosis or erythema over entirety of the left leg  No TTP at mid thigh, left  No notable swelling or edema  Full ROM of left knee - 0 to 120 degrees  Stable to varus and valgus stress  No TTP over medial or lateral joint line  Hip flexes to 100 degrees  Internal rotation 30 degrees, External rotation 15 degrees  No pain with internal/external rotation while in flexion  No TTP over great trochanter  Bilateral calves are soft, non-tender.  Bilateral lower extremity is NVI.  Sensation intact bilateral lower extremities  5/5 motor with resisted dorsi and plantar flexion bilaterally  5/5 hip flexors  5/5 quad  5/5 EHL  +Dp pulse          Data:   All laboratory data reviewed  Results for orders placed or performed during the hospital   encounter of 04/22/19   CT Chest Abdomen Pelvis w/o Contrast    Narrative    CT CHEST, ABDOMEN, PELVIS WITHOUT CONTRAST   4/22/2019 5:47 PM "     HISTORY: Subacute low back pain. Evaluate for metastatic disease.  History of end-stage renal disease.    TECHNIQUE: Volumetric helical sections were acquired from the   thoracic  inlet to the ischial tuberosities. Coronal images were also  reconstructed. Radiation dose for this scan was reduced using  automated exposure control, adjustment of the mA and/or kV   according  to patient size, or iterative reconstruction technique.    COMPARISON: None.    FINDINGS:    Chest: There is a spiculated nodule in the right upper lobe   laterally  abutting the pleura, and measuring 2.7 x 2.1 cm (series 3 image   13).  Emphysematous changes are noted throughout both lungs. There are   small  bilateral pleural effusions. Mild associated consolidation at   both  lung bases posteriorly most likely represent atelectasis,   although  pneumonia could also possibly have this appearance. Small   pericardial  effusion. Coronary artery calcification. Atherosclerotic   calcification  of the thoracic aorta and coronary arteries. There is mild   mediastinal  adenopathy. For example, an enlarged subcarinal lymph node   measures  2.6 x 1.8 cm.    Abdomen and Pelvis: Multiple hypoenhancing liver lesions are not   well  characterized on this noncontrast scan, but are suspicious for  metastatic disease. The spleen, gallbladder, and pancreas have  unremarkable noncontrast appearances. Multiple bilateral adrenal  nodules are indeterminate, and metastatic disease cannot be   excluded,  with the largest on the left measuring 3 cm. Bilateral renal   atrophy  is noted, moderate on the left and marked on the right. Multiple  hyperdense liver lesions could represent hemorrhagic or   proteinaceous  renal cysts, although solid renal neoplasm cannot be excluded,   with  the largest on the left measuring 3.3 cm. A few smaller bilateral  renal cysts are also noted. No hydronephrosis. Curvilinear  calcifications in both renal sowmya are most likely  vascular.    There is advanced atherosclerotic aortoiliac calcification.   Infrarenal  abdominal aortic aneurysm measures 3.2 cm AP x 2.9 cm transverse.   No  bowel obstruction. Colonic diverticulosis, without convincing   evidence  for diverticulitis. There is a moderate amount of stool in the  ascending and transverse colon. Unremarkable appendix. No free   fluid  in the pelvis. There is moderate prostatic enlargement. A   prominent  duodenal diverticulum is noted. Degenerative changes are noted  throughout the thoracolumbar spine. Ill-defined lytic change   within  the L4 vertebral body is suspicious for metastatic disease. There   is  mild age-indeterminate anterior compression also noted in the L4  vertebral body, suspicious for a pathologic compression fracture.      Impression    IMPRESSION:   1. A spiculated right upper lobe pulmonary nodule is highly   suspicious  for malignancy, such as bronchogenic carcinoma.  2. Ill-defined lytic change in the L4 vertebral body with mild  anterior compression is suspicious for a metastatic lesion and an  associated pathologic compression fracture, age indeterminate.  3. Multiple low density liver lesions are not well characterized   on  this noncontrast scan, but are considered suspicious for   metastatic  disease.  4. Emphysema.  5. Mild mediastinal adenopathy is nonspecific, however metastatic  involvement cannot be excluded.  6. Multiple bilateral adrenal nodules are indeterminate, and  metastatic disease is not excluded.  7. Bilateral renal atrophy.  8. Multiple hyperdense renal lesions bilaterally could represent  hemorrhagic or proteinaceous renal cysts, however solid renal   neoplasm  cannot be excluded.  9. Moderate prostatic enlargement.  10. Infrarenal abdominal aortic aneurysm measures up to 3.2 cm   AP.      CLAUDIO DAO MD   US Lower Extremity Venous Duplex Left    Narrative    VENOUS ULTRASOUND LEFT LEG  4/22/2019 5:29 PM     HISTORY: Left lower  extremity pain and swelling.    COMPARISON: None.    FINDINGS:  Examination of the deep veins with graded compression   and  color flow Doppler with spectral wave form analysis shows no   evidence  of thrombus in the common femoral vein, femoral vein, popliteal   vein  or calf veins.  There is no venous insufficiency.      Impression    IMPRESSION: No evidence of deep venous thrombosis.    SARABJIT JERRY MD   XR Femur Left 2 Views    Narrative    XR LEFT FEMUR TWO VIEWS   4/22/2019 6:34 PM     HISTORY: Acute atraumatic left thigh pain, possible cancer,   evaluate  for bony pathology.    COMPARISON: None.      Impression    IMPRESSION: Extensive atherosclerotic calcified plaque in the  superficial femoral artery. Normal bony structures.    SARABJIT JERRY MD   CBC with platelets differential   Result Value Ref Range    WBC 11.6 (H) 4.0 - 11.0 10e9/L    RBC Count 3.89 (L) 4.4 - 5.9 10e12/L    Hemoglobin 10.0 (L) 13.3 - 17.7 g/dL    Hematocrit 32.7 (L) 40.0 - 53.0 %    MCV 84 78 - 100 fl    MCH 25.7 (L) 26.5 - 33.0 pg    MCHC 30.6 (L) 31.5 - 36.5 g/dL    RDW 17.9 (H) 10.0 - 15.0 %    Platelet Count 362 150 - 450 10e9/L    Diff Method Automated Method     % Neutrophils 85.7 %    % Lymphocytes 5.6 %    % Monocytes 7.4 %    % Eosinophils 0.9 %    % Basophils 0.2 %    % Immature Granulocytes 0.2 %    Nucleated RBCs 0 0 /100    Absolute Neutrophil 10.0 (H) 1.6 - 8.3 10e9/L    Absolute Lymphocytes 0.7 (L) 0.8 - 5.3 10e9/L    Absolute Monocytes 0.9 0.0 - 1.3 10e9/L    Absolute Eosinophils 0.1 0.0 - 0.7 10e9/L    Absolute Basophils 0.0 0.0 - 0.2 10e9/L    Abs Immature Granulocytes 0.0 0 - 0.4 10e9/L    Absolute Nucleated RBC 0.0    Comprehensive metabolic panel   Result Value Ref Range    Sodium 137 133 - 144 mmol/L    Potassium 3.6 3.4 - 5.3 mmol/L    Chloride 99 94 - 109 mmol/L    Carbon Dioxide 31 20 - 32 mmol/L    Anion Gap 7 3 - 14 mmol/L    Glucose 193 (H) 70 - 99 mg/dL    Urea Nitrogen 20 7 - 30 mg/dL    Creatinine 2.98  (H) 0.66 - 1.25 mg/dL    GFR Estimate 18 (L) >60 mL/min/[1.73_m2]    GFR Estimate If Black 21 (L) >60 mL/min/[1.73_m2]    Calcium 9.4 8.5 - 10.1 mg/dL    Bilirubin Total 1.4 (H) 0.2 - 1.3 mg/dL    Albumin 3.1 (L) 3.4 - 5.0 g/dL    Protein Total 6.3 (L) 6.8 - 8.8 g/dL    Alkaline Phosphatase 61 40 - 150 U/L    ALT 12 0 - 70 U/L    AST 15 0 - 45 U/L   Lipase   Result Value Ref Range    Lipase 85 73 - 393 U/L   UA with Microscopic   Result Value Ref Range    Color Urine Yellow     Appearance Urine Clear     Glucose Urine Negative NEG^Negative mg/dL    Bilirubin Urine Negative NEG^Negative    Ketones Urine Negative NEG^Negative mg/dL    Specific Gravity Urine 1.010 1.003 - 1.035    Blood Urine Trace (A) NEG^Negative    pH Urine 6.5 5.0 - 7.0 pH    Protein Albumin Urine 100 (A) NEG^Negative mg/dL    Urobilinogen mg/dL 2.0 0.0 - 2.0 mg/dL    Nitrite Urine Negative NEG^Negative    Leukocyte Esterase Urine Trace (A) NEG^Negative    Source Midstream Urine     WBC Urine 4 0 - 5 /HPF    RBC Urine 1 0 - 2 /HPF   Glucose by meter   Result Value Ref Range    Glucose 156 (H) 70 - 99 mg/dL   CBC with platelets   Result Value Ref Range    WBC 8.0 4.0 - 11.0 10e9/L    RBC Count 3.58 (L) 4.4 - 5.9 10e12/L    Hemoglobin 9.2 (L) 13.3 - 17.7 g/dL    Hematocrit 30.2 (L) 40.0 - 53.0 %    MCV 84 78 - 100 fl    MCH 25.7 (L) 26.5 - 33.0 pg    MCHC 30.5 (L) 31.5 - 36.5 g/dL    RDW 17.7 (H) 10.0 - 15.0 %    Platelet Count 314 150 - 450 10e9/L   Glucose by meter   Result Value Ref Range    Glucose 143 (H) 70 - 99 mg/dL   EKG 12 lead   Result Value Ref Range    Interpretation ECG Click View Image link to view waveform and   result           Attestation:  I have reviewed today's vital signs, notes, medications, labs and   imaging with Dr. Freeman.  Amount of time performed on this consult: 30 minutes.    Amada Ríos PA-C      Glucose by meter   Result Value Ref Range    Glucose 143 (H) 70 - 99 mg/dL   CBC with platelets   Result Value Ref  Range    WBC 8.0 4.0 - 11.0 10e9/L    RBC Count 3.58 (L) 4.4 - 5.9 10e12/L    Hemoglobin 9.2 (L) 13.3 - 17.7 g/dL    Hematocrit 30.2 (L) 40.0 - 53.0 %    MCV 84 78 - 100 fl    MCH 25.7 (L) 26.5 - 33.0 pg    MCHC 30.5 (L) 31.5 - 36.5 g/dL    RDW 17.7 (H) 10.0 - 15.0 %    Platelet Count 314 150 - 450 10e9/L   Glucose by meter   Result Value Ref Range    Glucose 99 70 - 99 mg/dL   Glucose by meter   Result Value Ref Range    Glucose 151 (H) 70 - 99 mg/dL

## 2019-04-23 NOTE — PLAN OF CARE
Pt with R back pain, oxy helpful, might consider flexaril later.   Ortho brace applied today.  No neuro sx in L[facility].  Lynnette Po well, constipated, miralax given. Low uop d/t dialysis.  Palliative, ortho, onc saw pt today. Pt aware of likely diagnosis of lung CA. Dyspnea noted,chronic, ordered inhalers.

## 2019-04-23 NOTE — PLAN OF CARE
VSS on 2L.  Pt slept well overnight, no pain meds needed.  , 143.  Plan for hem/onc, nephrology, ortho, and palliative consults today.

## 2019-04-23 NOTE — PHARMACY-ADMISSION MEDICATION HISTORY
Admission medication history interview status for the 4/22/2019  admission is complete. See EPIC admission navigator for prior to admission medications     Medication history source reliability:Good    Actions taken by pharmacist (provider contacted, etc): Reviewed SureScripts & med list from dialysis     Additional medication history information not noted on PTA med list :  -He receives epogen, hectorol, venofer with dialysis    Medication reconciliation/reorder completed by provider prior to medication history? Yes    Time spent in this activity: 20 min    Prior to Admission medications    Medication Sig Last Dose Taking? Auth Provider   albuterol (2.5 MG/3ML) 0.083% neb solution TAKE 1 VIAL BY NEBULIZATION EVERY 6 HOURS AS NEEDED FOR SHORNESS OF BREATH/DYSPNEA OR WHEEZING Past Week at Unknown time Yes Sandip Antunez MD   albuterol (PROAIR HFA/PROVENTIL HFA/VENTOLIN HFA) 108 (90 BASE) MCG/ACT Inhaler Inhale 1-2 puffs into the lungs every 4 hours as needed for shortness of breath / dyspnea or wheezing Past Month at Unknown time Yes Sandip Antunez MD   aspirin 81 MG chewable tablet Take 81 mg by mouth daily 4/22/2019 at am Yes Unknown, Entered By History   atorvastatin (LIPITOR) 40 MG tablet Take 40 mg by mouth daily 4/22/2019 at am Yes Unknown, Entered By History   B Complex-C-Folic Acid (BRANDI-HEIDY) TABS Take 1 tablet by mouth daily 4/22/2019 at am Yes Sandip Antunez MD   carvedilol (COREG) 12.5 MG tablet TAKE 1 TABLET(12.5 MG) BY MOUTH TWICE DAILY 4/22/2019 at am Yes Sandip Antunez MD   cyclobenzaprine (FLEXERIL) 5 MG tablet Take 1 tablet (5 mg) by mouth 3 times daily as needed for muscle spasms Past Week at Unknown time Yes Laila Nelson MD   diltiazem ER (TIAZAC) 300 MG 24 hr ER beaded capsule TAKE 1 CAPSULE(300 MG) BY MOUTH DAILY 4/22/2019 at am Yes Sandip Antunez MD   doxazosin (CARDURA) 8 MG tablet TAKE 1 TABLET BY MOUTH EVERY NIGHT AT BEDTIME.  Patient taking differently: TAKE 1/2 TABLET (4mg)  BY MOUTH EVERY NIGHT AT BEDTIME. 4/21/2019 at pm Yes Sandip Antunez MD   eplerenone (INSPRA) 50 MG tablet Take 1 tablet (50 mg) by mouth daily 4/22/2019 at am Yes Daria Hastings,    fish oil-omega-3 fatty acids 1000 MG capsule Take 1 g by mouth daily 4/22/2019 at am Yes Unknown, Entered By History   fluticasone (FLOVENT HFA) 220 MCG/ACT inhaler Inhale 1 puff into the lungs 2 times daily 4/22/2019 at Unknown time Yes Unknown, Entered By History   furosemide (LASIX) 20 MG tablet Take 1 tablet (20 mg) by mouth daily 4/22/2019 at am Yes Sandip Antunez MD   HYDROcodone-acetaminophen (NORCO) 5-325 MG tablet Take 0.5 tablets by mouth every 6 hours as needed for severe pain 4/22/2019 at 1 tabx2 Yes Laila Nelson MD   Insulin Aspart (NOVOLOG SC) Inject 12 Units Subcutaneous 3 times daily (with meals) Patient states with large meals he will increase to 14-16 units. 4/21/2019 at Unknown time Yes Unknown, Entered By History   insulin glargine (LANTUS) 100 UNIT/ML injection Inject 18-23 Units Subcutaneous At Bedtime (Patient will increase to 26 units at bedtime he states when BG is elevated.  4/21/2019 at 18 units Yes Unknown, Entered By History   irbesartan (AVAPRO) 150 MG tablet TAKE 1 TABLET(150 MG) BY MOUTH DAILY 4/22/2019 at am Yes Sandip Antunez MD   ranitidine (ZANTAC) 150 MG tablet Take 1 tablet (150 mg) by mouth daily as needed for heartburn Past Month at Unknown time Yes Sandip Antunez MD   STIOLTO RESPIMAT 2.5-2.5 MCG/ACT AERS INHALE 2 PUFFS INTO THE LUNGS DAILY 4/22/2019 at am Yes Sandip Antunez MD   VITAMIN D, CHOLECALCIFEROL, PO Take 2,000 Units by mouth daily  4/22/2019 at am Yes Unknown, Entered By History   B-D U/F 31G X 8 MM insulin pen needle USE AS DIRECTED FOUR TIMES DAILY.   Sandip Antunez MD   blood glucose monitoring (NO BRAND SPECIFIED) test strip Use to test blood sugars 3 times daily or as directed. Uses onetouch   Sandip Antunez MD   COLCHICINE PO Take 0.6 mg by mouth  every 48 hours as needed (gout attacks) Reported on 3/30/2017 More than a month at Unknown time  Unknown, Entered By History   order for DME Equipment being ordered: left thumb spica splint   Arben Valencia PA-C   order for DME Equipment being ordered: portable Oxygen concentrator   Sandip Antunez MD   order for DME Equipment being ordered: Four wheeled walker   Adia Ding NP   order for DME Equipment being ordered: Other: Nebulizer machine  Treatment Diagnosis: COPD   Brenden Nielsen MD   Respiratory Therapy Supplies (NEBULIZER/ADULT MASK) KIT 1 Device as needed   Sandip Antunez MD

## 2019-04-23 NOTE — PROGRESS NOTES
"SP[facility]UAL [facility] Progress Note  [facility]    Initial visit with pt Seven per pt's request.     Pt shared that he has metastatic cancer, COPD, and compression fracture of vertebra. Pt said that he had a good life, \"I traveled many places, [place], [place], and [place].\" and states, \"I am not afraid of dying. It is natural process.\" Pt said that he is peaceful and ready to see God.     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  Pt stated that his grandfather was a [employer] .    Pt lost his wife in 1960 and has only one son who lives in [place]. Pt addressed that his son and two grandchildren are very important to him.    Pt hopes the pain will be reduced. Pt's medical staff came in during the conversation, and the visit was interrupted. Pt appreciated  visit and requested another visit tomorrow.     I will follow up with pt for duration of stay.       Deborah Raygoza  Chaplain Resident    "

## 2019-04-23 NOTE — PROGRESS NOTES
[facility]    Medicine Progress Note - Hospitalist Service       Date of Admission:  4/22/2019  Assessment & Plan   Seven Savage Jr. is a 84 year old male admitted on 4/22/2019. He presents with back pain and left sided leg pain. CT and MRI scanning showed an L4 compression fracture as well as a malignant appearing lung nodule and possible metastatic liver lesions.     L4 vertebral body with mild anterior compression, possibly pathologic   Pain is currently controlled.    Orthopedic and palliative care services were consulted.    Malignant appearing lesions in lung, liver, spine and possible kidneys and adrenals.  Oncology consulted    COPD (chronic obstructive pulmonary disease)  Chronic respiratory failure on home oxygen  Stable     End-stage renal disease  On hemodialysis  F  Nephrology consulted    Essential hypertension  Hyperlipidemia  Continue Coreg/ASA/Statin/diltiazem/Lasix    Type 2 diabetes mellitus with long-term current use of insulin   Hgb A1c 5.8%  On Lantus 23 U at bedtime- decreased on admission to 18 units.  Glucometer < 100 this morning- if still < 100 tomorrow morning will decrease again    Diet: Moderate Consistent CHO Diet    DVT Prophylaxis: Pneumatic Compression Devices  Dougherty Catheter: not present  Code Status: DNR/DNI      Disposition Plan   Expected discharge: 1-2 days , recommended to prior living arrangement once adequate pain management/ tolerating PO medications.  Entered: Erick Griffiths MD 04/23/2019, 10:14 AM       The patient's care was discussed with the Patient.    Erick Griffiths MD  Hospitalist Service  [facility]    ______________________________________________________________________    Interval History   Pain is controlled.  No specific complaints.    Data reviewed today: I reviewed all medications, new labs and imaging results over the last 24 hours. I personally reviewed no images or EKG's today.    Physical Exam    Vital Signs: Temp: 96.5  F (35.8  C) Temp src: Oral BP: 144/44 Pulse: 66 Heart Rate: 64 Resp: 16 SpO2: 92 % O2 Device: Nasal cannula Oxygen Delivery: 2 LPM  Weight: 0 lbs 0 oz  Constitutional: awake, alert, cooperative, no apparent distress, and appears stated age  Respiratory: No increased work of breathing, good air exchange, clear to auscultation bilaterally, no crackles or wheezing  Cardiovascular: RR 3/6 SM  GI: No scars, normal bowel sounds, soft, non-distended, non-tender  Skin: no rashes and no jaundice  Neurologic: Awake, alert, oriented to name, place and time.  Cranial nerves II-XII are grossly intact.  Motor is 5 out of 5 bilaterally  Neuropsychiatric: General: normal, calm and normal eye contact    Data   Recent Labs   Lab 04/23/19  0713 04/22/19  1615   WBC 8.0 11.6*   HGB 9.2* 10.0*   MCV 84 84    362   NA  --  137   POTASSIUM  --  3.6   CHLORIDE  --  99   CO2  --  31   BUN  --  20   CR  --  2.98*   ANIONGAP  --  7   AARON  --  9.4   GLC  --  193*   ALBUMIN  --  3.1*   PROTTOTAL  --  6.3*   BILITOTAL  --  1.4*   ALKPHOS  --  61   ALT  --  12   AST  --  15   LIPASE  --  85     Recent Results (from the past 24 hour(s))   US Lower Extremity Venous Duplex Left    Narrative    VENOUS ULTRASOUND LEFT LEG  4/22/2019 5:29 PM     HISTORY: Left lower extremity pain and swelling.    COMPARISON: None.    FINDINGS:  Examination of the deep veins with graded compression and  color flow Doppler with spectral wave form analysis shows no evidence  of thrombus in the common femoral vein, femoral vein, popliteal vein  or calf veins.  There is no venous insufficiency.      Impression    IMPRESSION: No evidence of deep venous thrombosis.    SARABJIT JERRY MD   CT Chest Abdomen Pelvis w/o Contrast    Narrative    CT CHEST, ABDOMEN, PELVIS WITHOUT CONTRAST   4/22/2019 5:47 PM     HISTORY: Subacute low back pain. Evaluate for metastatic disease.  History of end-stage renal disease.    TECHNIQUE: Volumetric helical  sections were acquired from the thoracic  inlet to the ischial tuberosities. Coronal images were also  reconstructed. Radiation dose for this scan was reduced using  automated exposure control, adjustment of the mA and/or kV according  to patient size, or iterative reconstruction technique.    COMPARISON: None.    FINDINGS:    Chest: There is a spiculated nodule in the right upper lobe laterally  abutting the pleura, and measuring 2.7 x 2.1 cm (series 3 image 13).  Emphysematous changes are noted throughout both lungs. There are small  bilateral pleural effusions. Mild associated consolidation at both  lung bases posteriorly most likely represent atelectasis, although  pneumonia could also possibly have this appearance. Small pericardial  effusion. Coronary artery calcification. Atherosclerotic calcification  of the thoracic aorta and coronary arteries. There is mild mediastinal  adenopathy. For example, an enlarged subcarinal lymph node measures  2.6 x 1.8 cm.    Abdomen and Pelvis: Multiple hypoenhancing liver lesions are not well  characterized on this noncontrast scan, but are suspicious for  metastatic disease. The spleen, gallbladder, and pancreas have  unremarkable noncontrast appearances. Multiple bilateral adrenal  nodules are indeterminate, and metastatic disease cannot be excluded,  with the largest on the left measuring 3 cm. Bilateral renal atrophy  is noted, moderate on the left and marked on the right. Multiple  hyperdense liver lesions could represent hemorrhagic or proteinaceous  renal cysts, although solid renal neoplasm cannot be excluded, with  the largest on the left measuring 3.3 cm. A few smaller bilateral  renal cysts are also noted. No hydronephrosis. Curvilinear  calcifications in both renal sowmya are most likely vascular.    There is advanced atherosclerotic aortoiliac calcification. Infrarenal  abdominal aortic aneurysm measures 3.2 cm AP x 2.9 cm transverse. No  bowel obstruction. Colonic  diverticulosis, without convincing evidence  for diverticulitis. There is a moderate amount of stool in the  ascending and transverse colon. Unremarkable appendix. No free fluid  in the pelvis. There is moderate prostatic enlargement. A prominent  duodenal diverticulum is noted. Degenerative changes are noted  throughout the thoracolumbar spine. Ill-defined lytic change within  the L4 vertebral body is suspicious for metastatic disease. There is  mild age-indeterminate anterior compression also noted in the L4  vertebral body, suspicious for a pathologic compression fracture.      Impression    IMPRESSION:   1. A spiculated right upper lobe pulmonary nodule is highly suspicious  for malignancy, such as bronchogenic carcinoma.  2. Ill-defined lytic change in the L4 vertebral body with mild  anterior compression is suspicious for a metastatic lesion and an  associated pathologic compression fracture, age indeterminate.  3. Multiple low density liver lesions are not well characterized on  this noncontrast scan, but are considered suspicious for metastatic  disease.  4. Emphysema.  5. Mild mediastinal adenopathy is nonspecific, however metastatic  involvement cannot be excluded.  6. Multiple bilateral adrenal nodules are indeterminate, and  metastatic disease is not excluded.  7. Bilateral renal atrophy.  8. Multiple hyperdense renal lesions bilaterally could represent  hemorrhagic or proteinaceous renal cysts, however solid renal neoplasm  cannot be excluded.  9. Moderate prostatic enlargement.  10. Infrarenal abdominal aortic aneurysm measures up to 3.2 cm AP.      CLAUDIO DAO MD   XR Femur Left 2 Views    Narrative    XR LEFT FEMUR TWO VIEWS   4/22/2019 6:34 PM     HISTORY: Acute atraumatic left thigh pain, possible cancer, evaluate  for bony pathology.    COMPARISON: None.      Impression    IMPRESSION: Extensive atherosclerotic calcified plaque in the  superficial femoral artery. Normal bony  structures.    SARABJIT JERRY MD

## 2019-04-23 NOTE — CONSULTS
[facility]    Orthopedic Consultation    Seven Savage Jr. MRN# [id number]   Age: 84 year old YOB: 1935     Date of Admission:  4/22/2019    Reason for consult: Compression fracture from metastatic disease       Requesting physician: Dr. 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 Determine if radiation therapy could be used for L4 met  WBAT            Chief Complaint:   Low back pain         History of Present Illness:   This patient is a 84 year old male who presents with the following condition requiring a hospital admission:    Mr. Savage states that he has had back pain for 3 weeks. He states this began after having an adjustment with a newer chiropractor that he had not previously seen. He was seen by Dr. Laila Nelson at [facility] who ordered a MR scan for his lumbar spine. He was then seen in follow-up by Dr. Antunez. At that time the read did not include the diagnosis of metastasis. A vertebroplasty was ordered and when IR reviewed patient imaging and information it was found that there was metastasis and a CT scan was ordered. He presented to the ED after he began to have left thigh pain.   At time of visit, he denies any thigh pain or radiculopathy. He has focal low back pain. Denies and fevers, chills, malaise, night sweats, or unexplained weight loss. He has seen a chiropractor for many years which successful has managed some chronic back pain.  Of note he is also a dialysis patient.          Past Medical History:     Past Medical History:   Diagnosis Date     COPD (chronic obstructive pulmonary disease) (H)      CRF (chronic renal failure)      Heart murmur      HLD (hyperlipidemia)      HTN (hypertension)      IDDM (insulin  dependent diabetes mellitus) (H)      Renal calculi              Past Surgical History:     Past Surgical History:   Procedure Laterality Date     ADENOIDECTOMY       ENDOSCOPIC POLYPECTOMY NASAL      Through vocal cords     Renal Stent      For renal calculi     TONSILLECTOMY               Social History:     Social History     Tobacco Use     Smoking status: Former Smoker     Packs/day: 1.00     Years: 70.00     Pack years: 70.00     Types: Cigarettes     Last attempt to quit:      Years since quittin.3     Smokeless tobacco: Never Used   Substance Use Topics     Alcohol use: Yes     Comment: average 1 beer month             Family History:     Family History   Family history unknown: Yes             Immunizations:     VACCINE/DOSE   Diptheria   DPT   DTAP   HBIG   Hepatitis A   Hepatitis B   HIB   Influenza   Measles   Meningococcal   MMR   Mumps   Pneumococcal   Polio   Rubella   Small Pox   TDAP   Varicella   Zoster             Allergies:     Allergies   Allergen Reactions     Levaquin [Levofloxacin]      edema     Pioglitazone Other (See Comments)     Edema & hay fever     Vytorin Other (See Comments)     Muscle aches             Medications:     Current Facility-Administered Medications   Medication     - MEDICATION INSTRUCTIONS for Dialysis Patients -     aspirin (ASA) chewable tablet 81 mg     atorvastatin (LIPITOR) tablet 40 mg     carvedilol (COREG) tablet 12.5 mg     cyclobenzaprine (FLEXERIL) tablet 5 mg     glucose gel 15-30 g    Or     dextrose 50 % injection 25-50 mL    Or     glucagon injection 1 mg     HYDROmorphone (PF) (DILAUDID) injection 0.3-0.5 mg     insulin aspart (NovoLOG) inj (RAPID ACTING)     insulin aspart (NovoLOG) inj (RAPID ACTING)     insulin glargine (LANTUS PEN) injection 18 Units     irbesartan (AVAPRO) tablet 150 mg     melatonin tablet 1 mg     naloxone (NARCAN) injection 0.1-0.4 mg     ondansetron (ZOFRAN-ODT) ODT tab 4 mg    Or     ondansetron (ZOFRAN) injection 4 mg  "    oxyCODONE (ROXICODONE) tablet 5-10 mg     ranitidine (ZANTAC) tablet 150 mg             Review of Systems:   CV: NEGATIVE for chest pain, palpitations or peripheral edema  C: NEGATIVE for fever, chills, change in weight  E/M: NEGATIVE for ear, mouth and throat problems  R: NEGATIVE for significant cough or SOB          Physical Exam:   All vitals have been reviewed  Patient Vitals for the past 24 hrs:   BP Temp Temp src Pulse Heart Rate Resp SpO2 Height   04/23/19 0202 136/46 97.3  F (36.3  C) Oral -- 64 16 96 % --   04/22/19 2312 -- -- -- -- -- 16 -- --   04/22/19 2238 -- -- -- -- -- 17 -- --   04/22/19 2026 168/57 97.2  F (36.2  C) Oral -- 67 18 95 % --   04/22/19 1838 154/59 -- -- 65 -- 18 92 % --   04/22/19 1752 -- -- -- -- 62 18 92 % --   04/22/19 1603 158/63 97.8  F (36.6  C) Oral 66 -- 18 (!) 88 % 1.702 m (5' 7\")       Intake/Output Summary (Last 24 hours) at 4/23/2019 0739  Last data filed at 4/22/2019 2130  Gross per 24 hour   Intake --   Output 75 ml   Net -75 ml     Patient laying comfortably in bed   No ecchymosis or erythema over entirety of the left leg  No TTP at mid thigh, left  No notable swelling or edema  Full ROM of left knee - 0 to 120 degrees  Stable to varus and valgus stress  No TTP over medial or lateral joint line  Hip flexes to 100 degrees  Internal rotation 30 degrees, External rotation 15 degrees  No pain with internal/external rotation while in flexion  No TTP over great trochanter  Bilateral calves are soft, non-tender.  Bilateral lower extremity is NVI.  Sensation intact bilateral lower extremities  5/5 motor with resisted dorsi and plantar flexion bilaterally  5/5 hip flexors  5/5 quad  5/5 EHL  +Dp pulse          Data:   All laboratory data reviewed  Results for orders placed or performed during the hospital encounter of 04/22/19   CT Chest Abdomen Pelvis w/o Contrast    Narrative    CT CHEST, ABDOMEN, PELVIS WITHOUT CONTRAST   4/22/2019 5:47 PM     HISTORY: Subacute low back " pain. Evaluate for metastatic disease.  History of end-stage renal disease.    TECHNIQUE: Volumetric helical sections were acquired from the thoracic  inlet to the ischial tuberosities. Coronal images were also  reconstructed. Radiation dose for this scan was reduced using  automated exposure control, adjustment of the mA and/or kV according  to patient size, or iterative reconstruction technique.    COMPARISON: None.    FINDINGS:    Chest: There is a spiculated nodule in the right upper lobe laterally  abutting the pleura, and measuring 2.7 x 2.1 cm (series 3 image 13).  Emphysematous changes are noted throughout both lungs. There are small  bilateral pleural effusions. Mild associated consolidation at both  lung bases posteriorly most likely represent atelectasis, although  pneumonia could also possibly have this appearance. Small pericardial  effusion. Coronary artery calcification. Atherosclerotic calcification  of the thoracic aorta and coronary arteries. There is mild mediastinal  adenopathy. For example, an enlarged subcarinal lymph node measures  2.6 x 1.8 cm.    Abdomen and Pelvis: Multiple hypoenhancing liver lesions are not well  characterized on this noncontrast scan, but are suspicious for  metastatic disease. The spleen, gallbladder, and pancreas have  unremarkable noncontrast appearances. Multiple bilateral adrenal  nodules are indeterminate, and metastatic disease cannot be excluded,  with the largest on the left measuring 3 cm. Bilateral renal atrophy  is noted, moderate on the left and marked on the right. Multiple  hyperdense liver lesions could represent hemorrhagic or proteinaceous  renal cysts, although solid renal neoplasm cannot be excluded, with  the largest on the left measuring 3.3 cm. A few smaller bilateral  renal cysts are also noted. No hydronephrosis. Curvilinear  calcifications in both renal sowmya are most likely vascular.    There is advanced atherosclerotic aortoiliac calcification.  Infrarenal  abdominal aortic aneurysm measures 3.2 cm AP x 2.9 cm transverse. No  bowel obstruction. Colonic diverticulosis, without convincing evidence  for diverticulitis. There is a moderate amount of stool in the  ascending and transverse colon. Unremarkable appendix. No free fluid  in the pelvis. There is moderate prostatic enlargement. A prominent  duodenal diverticulum is noted. Degenerative changes are noted  throughout the thoracolumbar spine. Ill-defined lytic change within  the L4 vertebral body is suspicious for metastatic disease. There is  mild age-indeterminate anterior compression also noted in the L4  vertebral body, suspicious for a pathologic compression fracture.      Impression    IMPRESSION:   1. A spiculated right upper lobe pulmonary nodule is highly suspicious  for malignancy, such as bronchogenic carcinoma.  2. Ill-defined lytic change in the L4 vertebral body with mild  anterior compression is suspicious for a metastatic lesion and an  associated pathologic compression fracture, age indeterminate.  3. Multiple low density liver lesions are not well characterized on  this noncontrast scan, but are considered suspicious for metastatic  disease.  4. Emphysema.  5. Mild mediastinal adenopathy is nonspecific, however metastatic  involvement cannot be excluded.  6. Multiple bilateral adrenal nodules are indeterminate, and  metastatic disease is not excluded.  7. Bilateral renal atrophy.  8. Multiple hyperdense renal lesions bilaterally could represent  hemorrhagic or proteinaceous renal cysts, however solid renal neoplasm  cannot be excluded.  9. Moderate prostatic enlargement.  10. Infrarenal abdominal aortic aneurysm measures up to 3.2 cm AP.      CLAUDIO DAO MD   US Lower Extremity Venous Duplex Left    Narrative    VENOUS ULTRASOUND LEFT LEG  4/22/2019 5:29 PM     HISTORY: Left lower extremity pain and swelling.    COMPARISON: None.    FINDINGS:  Examination of the deep veins with  graded compression and  color flow Doppler with spectral wave form analysis shows no evidence  of thrombus in the common femoral vein, femoral vein, popliteal vein  or calf veins.  There is no venous insufficiency.      Impression    IMPRESSION: No evidence of deep venous thrombosis.    SARABJIT JERRY MD   XR Femur Left 2 Views    Narrative    XR LEFT FEMUR TWO VIEWS   4/22/2019 6:34 PM     HISTORY: Acute atraumatic left thigh pain, possible cancer, evaluate  for bony pathology.    COMPARISON: None.      Impression    IMPRESSION: Extensive atherosclerotic calcified plaque in the  superficial femoral artery. Normal bony structures.    SARABJIT JERRY MD   CBC with platelets differential   Result Value Ref Range    WBC 11.6 (H) 4.0 - 11.0 10e9/L    RBC Count 3.89 (L) 4.4 - 5.9 10e12/L    Hemoglobin 10.0 (L) 13.3 - 17.7 g/dL    Hematocrit 32.7 (L) 40.0 - 53.0 %    MCV 84 78 - 100 fl    MCH 25.7 (L) 26.5 - 33.0 pg    MCHC 30.6 (L) 31.5 - 36.5 g/dL    RDW 17.9 (H) 10.0 - 15.0 %    Platelet Count 362 150 - 450 10e9/L    Diff Method Automated Method     % Neutrophils 85.7 %    % Lymphocytes 5.6 %    % Monocytes 7.4 %    % Eosinophils 0.9 %    % Basophils 0.2 %    % Immature Granulocytes 0.2 %    Nucleated RBCs 0 0 /100    Absolute Neutrophil 10.0 (H) 1.6 - 8.3 10e9/L    Absolute Lymphocytes 0.7 (L) 0.8 - 5.3 10e9/L    Absolute Monocytes 0.9 0.0 - 1.3 10e9/L    Absolute Eosinophils 0.1 0.0 - 0.7 10e9/L    Absolute Basophils 0.0 0.0 - 0.2 10e9/L    Abs Immature Granulocytes 0.0 0 - 0.4 10e9/L    Absolute Nucleated RBC 0.0    Comprehensive metabolic panel   Result Value Ref Range    Sodium 137 133 - 144 mmol/L    Potassium 3.6 3.4 - 5.3 mmol/L    Chloride 99 94 - 109 mmol/L    Carbon Dioxide 31 20 - 32 mmol/L    Anion Gap 7 3 - 14 mmol/L    Glucose 193 (H) 70 - 99 mg/dL    Urea Nitrogen 20 7 - 30 mg/dL    Creatinine 2.98 (H) 0.66 - 1.25 mg/dL    GFR Estimate 18 (L) >60 mL/min/[1.73_m2]    GFR Estimate If Black 21 (L) >60  mL/min/[1.73_m2]    Calcium 9.4 8.5 - 10.1 mg/dL    Bilirubin Total 1.4 (H) 0.2 - 1.3 mg/dL    Albumin 3.1 (L) 3.4 - 5.0 g/dL    Protein Total 6.3 (L) 6.8 - 8.8 g/dL    Alkaline Phosphatase 61 40 - 150 U/L    ALT 12 0 - 70 U/L    AST 15 0 - 45 U/L   Lipase   Result Value Ref Range    Lipase 85 73 - 393 U/L   UA with Microscopic   Result Value Ref Range    Color Urine Yellow     Appearance Urine Clear     Glucose Urine Negative NEG^Negative mg/dL    Bilirubin Urine Negative NEG^Negative    Ketones Urine Negative NEG^Negative mg/dL    Specific Gravity Urine 1.010 1.003 - 1.035    Blood Urine Trace (A) NEG^Negative    pH Urine 6.5 5.0 - 7.0 pH    Protein Albumin Urine 100 (A) NEG^Negative mg/dL    Urobilinogen mg/dL 2.0 0.0 - 2.0 mg/dL    Nitrite Urine Negative NEG^Negative    Leukocyte Esterase Urine Trace (A) NEG^Negative    Source Midstream Urine     WBC Urine 4 0 - 5 /HPF    RBC Urine 1 0 - 2 /HPF   Glucose by meter   Result Value Ref Range    Glucose 156 (H) 70 - 99 mg/dL   CBC with platelets   Result Value Ref Range    WBC 8.0 4.0 - 11.0 10e9/L    RBC Count 3.58 (L) 4.4 - 5.9 10e12/L    Hemoglobin 9.2 (L) 13.3 - 17.7 g/dL    Hematocrit 30.2 (L) 40.0 - 53.0 %    MCV 84 78 - 100 fl    MCH 25.7 (L) 26.5 - 33.0 pg    MCHC 30.5 (L) 31.5 - 36.5 g/dL    RDW 17.7 (H) 10.0 - 15.0 %    Platelet Count 314 150 - 450 10e9/L   Glucose by meter   Result Value Ref Range    Glucose 143 (H) 70 - 99 mg/dL   EKG 12 lead   Result Value Ref Range    Interpretation ECG Click View Image link to view waveform and result           Attestation:  I have reviewed today's vital signs, notes, medications, labs and imaging with Dr. Freeman.  Amount of time performed on this consult: 30 minutes.    Amada Ríos PA-C

## 2019-04-23 NOTE — ED NOTES
"[facility]  ED Nurse Handoff Report    ED Chief complaint: Generalized Weakness      ED Diagnosis:   Final diagnoses:   Generalized weakness       Code Status: See hospitalist note    Allergies:   Allergies   Allergen Reactions     Levaquin [Levofloxacin]      edema     Pioglitazone Other (See Comments)     Edema & hay fever     Vytorin Other (See Comments)     Muscle aches       Activity level - Baseline/Home:  Independent    Activity Level - Current:   Stand with Assist     Needed?: No    Isolation: No  Infection: Not Applicable  Bariatric?: No    Vital Signs:   Vitals:    04/22/19 1603 04/22/19 1752 04/22/19 1838   BP: 158/63  154/59   Pulse: 66  65   Resp: 18 18 18   Temp: 97.8  F (36.6  C)     TempSrc: Oral     SpO2: (!) 88% 92% 92%   Height: 1.702 m (5' 7\")         Cardiac Rhythm: ,        Pain level: 0-10 Pain Scale: 5    Is this patient confused?: No   Does this patient have a guardian?  No         If yes, is there guardianship documents in the Epic \"Code/ACP\" activity?  N/A         Guardian Notified?  N/A  [place] - Suicide Severity Rating Scale Completed?  Yes  If yes, what color did the patient score?  White    Patient Report: Initial Complaint: back and leg pain along with some generalized weakness  Focused Assessment: Brought in by EMS from dialysis for increased back pain and left sided leg pain.  This pain started about 3 weeks ago after getting a massage at the chiropractor office.  Did have an MRI of the lumbar spine finding L4  vertebral body fracture with possible CA.  This morning woke up with some increased left leg pain 3/10, while walking around increased to 7/10.  Does take Norco for back pain, however did not do anything for the leg pain today.  Did finish dialysis today prior to coming to ED.      Tests Performed: blood work, CT scan, x-ray and US  Abnormal Results: see blood work and radiology studies   Treatments provided: saline lock    Family Comments: none " present    OBS brochure/video discussed/provided to patient/family: Yes              Name of person given brochure if not patient: n/a              Relationship to patient: n/a    ED Medications: Medications - No data to display    Drips infusing?:  No    For the majority of the shift this patient was Green.   Interventions performed were basic cares.    Severe Sepsis OR Septic Shock Diagnosis Present: No    To be done/followed up on inpatient unit:  Need     ED NURSE PHONE NUMBER: *[phone]

## 2019-04-23 NOTE — PLAN OF CARE
Pt arrived on floor @2030. A&O x4, VSS on 2 L NC.  C/o leg/back pain, PRN oxy given x1. UA sent, unremarkable. Up with assist of 1. Discharge pending consults tomorrow.

## 2019-04-23 NOTE — H&P
[facility]    History and Physical - Hospitalist Service       Date of Admission:  4/22/2019    Assessment & Plan     Seven Savage Jr. is a 84 year old male admitted on 4/22/2019. He presents with back pain and left sided leg pain.     Right upper lobe pulmonary nodule  L4 vertebral body with mild anterior compression   Assessment: CT showed Right upper lobe pulmonary nodule is highly suspicious for malignancy, such as bronchogenic carcinoma. Also CT abdomen showed findings suspicious for a metastatic lesion in L4 vertebral bodu and an associated pathologic compression fracture, age indeterminate. CT abdomen also showed multiple low density liver lesions also suspicious for metastatic disease. Had extensive discussion with patient, he wishes to be DNR/DNI but would like to have further discussions with oncology and palliative care regarding his new diagnosis of likely metastatic disease.  Plan:  - admit to inpatient for pain control/further oncology work up  - Oncology eval in AM  - Orthopedic surgery consult for compression fracture  - Tylenol scheduled, oxycodone/IV Dilaudid  - Palliative care consult for symptom management  - Follow vitals/temp    COPD (chronic obstructive pulmonary disease)  On home O2, on admission there is no obvious acute exacerbation on admission   Plan:  - Supplemental O2 as needed  - continue home inahler    End-stage renal disease  Assessment: on hemodialysis  MWF  Plan:  - Nephrology consulted for HD    Essential hypertension  Hyperlipidemia  Assessment/Plan: continue PTA Coreg/ASA/Statin/Diltiazem/lasix once verified    Type 2 diabetes mellitus with long-term current use of insulin   Assessment: On Lantus 23 U at bedtime  Plan  - Reduced Lantus dose to 18 U at bedtime  - sliding scale insulin as needed  - Hypoglycemia protocol       Diet: Regular Diet  DVT Prophylaxis: Pneumatic Compression Devices  Dougherty Catheter: not present  Code Status: FULL CODE    Disposition  Plan   Expected discharge: 2 - 3 days, recommended to prior living arrangement once adequate pain management/ tolerating PO medications.  Entered: Umesh Trinh MD 04/22/2019, 7:03 PM     The patient's care was discussed with the Patient and ED provider.    Umesh Trinh MD  [facility]    ______________________________________________________________________    Chief Complaint     Back Pain    History is obtained from the patient    History of Present Illness      Seven Savage Jr. is a 84 year old male with PMH of end-stage renal disease on hemodialysis Monday Wednesday Friday, hypertension, COPD on home oxygen, aortic stenosis who presents for evaluation of back pain and left-sided leg pain.    Patient was that he has had ongoing back pain for many years now, but over the past month, his back pain is significantly worsened.  He reports that he had a massage with chiropractor 3 weeks ago, afterwards an MRI was obtained on 04/16 of his lumbar spine which showed an L4 vertebral compression fracture.  He saw his primary care provider who recommended that the patient be evaluated by interventional radiology for possible vertebroplasty and he was referred to endocrinology for determination of possible injection therapy for his compression fracture that was suggested to be secondary to metabolic etiology.  It was not thought at that time that his compression fracture was secondary to metastasis.     Patient reports that on the morning of admission, he woke up with left thigh pain that was dull and 3/10 in intensity, but as he began to ambulate more and had to dialysis, his pain significantly worsened.  He took a Norco for his back pain, but did not provide any significant pain relief.  Thus given the persistent pain he decided to be evaluated in the emergency department.  He reports a weight loss of 10 pounds over the last 6 months.  He denies any fevers or night sweats.  He denies any joint swelling,  recent trauma/falls, calf pain.  He denies any chest pain/shortness of breath.  He denies any diarrhea, blood in stool, nausea/vomiting or abdominal pain.  He is a former smoker, he has COPD and for which she is on home supplemental O2.  He currently lives at home with his son.  At this time patient has no other complaints.    Review of Systems    The 10 point Review of Systems is negative other than noted in the HPI or here.     Past Medical History    I have reviewed this patient's medical history and updated it with pertinent information if needed.   Past Medical History:   Diagnosis Date     COPD (chronic obstructive pulmonary disease) (H)      CRF (chronic renal failure)      Heart murmur      HLD (hyperlipidemia)      HTN (hypertension)      IDDM (insulin dependent diabetes mellitus) (H)      Renal calculi        Past Surgical History   I have reviewed this patient's surgical history and updated it with pertinent information if needed.  Past Surgical History:   Procedure Laterality Date     ADENOIDECTOMY       ENDOSCOPIC POLYPECTOMY NASAL      Through vocal cords     Renal Stent      For renal calculi     TONSILLECTOMY         Social History   I have reviewed this patient's social history and updated it with pertinent information if needed.  Social History     Tobacco Use     Smoking status: Former Smoker     Packs/day: 1.00     Years: 70.00     Pack years: 70.00     Types: Cigarettes     Last attempt to quit: 2011     Years since quittin.3     Smokeless tobacco: Never Used   Substance Use Topics     Alcohol use: Yes     Comment: average 1 beer month     Drug use: No       Family History   I have reviewed this patient's family history and updated it with pertinent information if needed.   Family History   Family history unknown: Yes     Prior to Admission Medications   Prior to Admission Medications   Prescriptions Last Dose Informant Patient Reported? Taking?   B Complex-C-Folic Acid (BRANDI-HEIDY) TABS   No  No   Sig: Take 1 tablet by mouth daily   B-D U/F 31G X 8 MM insulin pen needle   No No   Sig: USE AS DIRECTED FOUR TIMES DAILY.   COLCHICINE PO  Self Yes No   Sig: Take 0.6 mg by mouth every 48 hours as needed (gout attacks) Reported on 3/30/2017   HYDROcodone-acetaminophen (NORCO) 5-325 MG tablet   No No   Sig: Take 0.5 tablets by mouth every 6 hours as needed for severe pain   Insulin Aspart (NOVOLOG SC)  Pharmacy Yes No   Sig: Inject 12 Units Subcutaneous 3 times daily (with meals) Patient states with large meals he will increase to 14-16 units.   Respiratory Therapy Supplies (NEBULIZER/ADULT MASK) KIT  Pharmacy No No   Si Device as needed   STIOLTO RESPIMAT 2.5-2.5 MCG/ACT AERS   No No   Sig: INHALE 2 PUFFS INTO THE LUNGS DAILY   VITAMIN D, CHOLECALCIFEROL, PO  Self Yes No   Sig: Take 2,000 Units by mouth daily    albuterol (2.5 MG/3ML) 0.083% neb solution   No No   Sig: TAKE 1 VIAL BY NEBULIZATION EVERY 6 HOURS AS NEEDED FOR SHORNESS OF BREATH/DYSPNEA OR WHEEZING   albuterol (PROAIR HFA/PROVENTIL HFA/VENTOLIN HFA) 108 (90 BASE) MCG/ACT Inhaler  Pharmacy No No   Sig: Inhale 1-2 puffs into the lungs every 4 hours as needed for shortness of breath / dyspnea or wheezing   aspirin 81 MG chewable tablet  Self Yes No   Sig: Take 81 mg by mouth daily   atorvastatin (LIPITOR) 40 MG tablet   No No   Sig: Take 0.5 tablets (20 mg) by mouth At Bedtime   blood glucose monitoring (NO BRAND SPECIFIED) test strip  Pharmacy No No   Sig: Use to test blood sugars 3 times daily or as directed. Uses onetouch   carvedilol (COREG) 12.5 MG tablet   No No   Sig: TAKE 1 TABLET(12.5 MG) BY MOUTH TWICE DAILY   cyclobenzaprine (FLEXERIL) 5 MG tablet   No No   Sig: Take 1 tablet (5 mg) by mouth 3 times daily as needed for muscle spasms   darbepoetin libby (ARANESP) 100 MCG/0.5ML injection  Self Yes No   Sig: Inject Subcutaneous three times a week At diaylsis M,W,F. Dialysis ([facility]) doses, patient not sure about strength.   diltiazem ER  (TIAZAC) 300 MG 24 hr ER beaded capsule   No No   Sig: TAKE 1 CAPSULE(300 MG) BY MOUTH DAILY   doxazosin (CARDURA) 8 MG tablet   No No   Sig: TAKE 1 TABLET BY MOUTH EVERY NIGHT AT BEDTIME.   eplerenone (INSPRA) 50 MG tablet   No No   Sig: Take 1 tablet (50 mg) by mouth daily   fish oil-omega-3 fatty acids 1000 MG capsule  Self Yes No   Sig: Take 1 g by mouth daily   furosemide (LASIX) 20 MG tablet   No No   Sig: Take 1 tablet (20 mg) by mouth daily   insulin glargine (LANTUS) 100 UNIT/ML injection  Pharmacy Yes No   Sig: Inject 23 Units Subcutaneous At Bedtime (Patient will increase to 26 units at bedtime he states when BG is elevated.   irbesartan (AVAPRO) 150 MG tablet   No No   Sig: TAKE 1 TABLET(150 MG) BY MOUTH DAILY   order for DME  Pharmacy No No   Sig: Equipment being ordered: Other: Nebulizer machine  Treatment Diagnosis: COPD   order for DME  Pharmacy No No   Sig: Equipment being ordered: Four wheeled walker   order for DME   No No   Sig: Equipment being ordered: portable Oxygen concentrator   order for DME   No No   Sig: Equipment being ordered: left thumb spica splint   ranitidine (ZANTAC) 150 MG tablet   No No   Sig: Take 1 tablet (150 mg) by mouth daily as needed for heartburn      Facility-Administered Medications: None     Allergies   Allergies   Allergen Reactions     Levaquin [Levofloxacin]      edema     Pioglitazone Other (See Comments)     Edema & hay fever     Vytorin Other (See Comments)     Muscle aches     Physical Exam   Vital Signs: Temp: 97.8  F (36.6  C) Temp src: Oral BP: 154/59 Pulse: 65 Heart Rate: 62 Resp: 18 SpO2: 92 % O2 Device: Nasal cannula Oxygen Delivery: 2 LPM  Weight: 0 lbs 0 oz    Constitutional: awake, alert, cooperative, no apparent distress, and appears stated age  Eyes: Lids and lashes normal, pupils equal, round and reactive to light, extra ocular muscles intact, sclera clear, conjunctiva normal  ENT: Normocephalic, without obvious abnormality, atraumatic, sinuses  nontender on palpation, external ears without lesions, oral pharynx with moist mucous membranes, tonsils without erythema or exudates, gums normal and good dentition.  Hematologic / Lymphatic: no cervical lymphadenopathy  Respiratory: No increased work of breathing, good air exchange, clear to auscultation bilaterally, no crackles or wheezing  Cardiovascular: Normal apical impulse, regular rate and rhythm, normal S1 and S2, no S3 or S4, and no murmur noted  GI: No scars, normal bowel sounds, soft, non-distended, non-tender, no masses palpated, no hepatosplenomegally  Skin: normal skin color, texture, turgor  Musculoskeletal: There is no redness, warmth, or swelling of the joints.  Full range of motion noted.  Motor strength is 5 out of 5 all extremities bilaterally.  Tone is normal.  Neurologic: Awake, alert, oriented to name, place and time.  Cranial nerves II-XII are grossly intact.  Motor is 5 out of 5 bilaterally.   Sensory is intact.   Neuropsychiatric: normal mood and affect    Data   Data reviewed today: I reviewed all medications, new labs and imaging results over the last 24 hours. I personally reviewed the chest CT image(s) showing see below and the abdominal CT image(s) showing see below.    IMPRESSION:   1. A spiculated right upper lobe pulmonary nodule is highly suspicious  for malignancy, such as bronchogenic carcinoma.  2. Ill-defined lytic change in the L4 vertebral body with mild  anterior compression is suspicious for a metastatic lesion and an  associated pathologic compression fracture, age indeterminate.  3. Multiple low density liver lesions are not well characterized on  this noncontrast scan, but are considered suspicious for metastatic  disease.  4. Emphysema.  5. Mild mediastinal adenopathy is nonspecific, however metastatic  involvement cannot be excluded.  6. Multiple bilateral adrenal nodules are indeterminate, and  metastatic disease is not excluded.  7. Bilateral renal atrophy.  8.  Multiple hyperdense renal lesions bilaterally could represent  hemorrhagic or proteinaceous renal cysts, however solid renal neoplasm  cannot be excluded.  9. Moderate prostatic enlargement.  10. Infrarenal abdominal aortic aneurysm measures up to 3.2 cm AP.    Most Recent 3 CBC's:  Recent Labs   Lab Test 04/22/19  1615 02/06/19  1520 11/05/18  1330 11/05/18  0802   WBC 11.6* 9.0  --  13.0*   HGB 10.0* 7.4*  --  8.5*   MCV 84 82  --  85    295 338 342     Most Recent 3 BMP's:  Recent Labs   Lab Test 04/22/19  1615 02/06/19  1520 11/07/18  1440    138 135   POTASSIUM 3.6 3.3* 4.7   CHLORIDE 99 100 98   CO2 31 32 26   BUN 20 13 89*   CR 2.98* 2.33* 5.62*   ANIONGAP 7 6 11   AARON 9.4 8.4* 9.4   * 115* 223*     Most Recent 2 LFT's:  Recent Labs   Lab Test 04/22/19  1615 11/05/18  0802   AST 15 13   ALT 12 15   ALKPHOS 61 55   BILITOTAL 1.4* 0.5     Most Recent 3 INR's:  Recent Labs   Lab Test 11/27/17  1143   INR 1.00     Most Recent Urinalysis:No lab results found.  Most Recent ESR & CRP:No lab results found.  Recent Results (from the past 24 hour(s))   US Lower Extremity Venous Duplex Left    Narrative    VENOUS ULTRASOUND LEFT LEG  4/22/2019 5:29 PM     HISTORY: Left lower extremity pain and swelling.    COMPARISON: None.    FINDINGS:  Examination of the deep veins with graded compression and  color flow Doppler with spectral wave form analysis shows no evidence  of thrombus in the common femoral vein, femoral vein, popliteal vein  or calf veins.  There is no venous insufficiency.      Impression    IMPRESSION: No evidence of deep venous thrombosis.    SARABJIT JERRY MD   CT Chest Abdomen Pelvis w/o Contrast    Narrative    CT CHEST, ABDOMEN, PELVIS WITHOUT CONTRAST   4/22/2019 5:47 PM     HISTORY: Subacute low back pain. Evaluate for metastatic disease.  History of end-stage renal disease.    TECHNIQUE: Volumetric helical sections were acquired from the thoracic  inlet to the ischial tuberosities.  Coronal images were also  reconstructed. Radiation dose for this scan was reduced using  automated exposure control, adjustment of the mA and/or kV according  to patient size, or iterative reconstruction technique.    COMPARISON: None.    FINDINGS:    Chest: There is a spiculated nodule in the right upper lobe laterally  abutting the pleura, and measuring 2.7 x 2.1 cm (series 3 image 13).  Emphysematous changes are noted throughout both lungs. There are small  bilateral pleural effusions. Mild associated consolidation at both  lung bases posteriorly most likely represent atelectasis, although  pneumonia could also possibly have this appearance. Small pericardial  effusion. Coronary artery calcification. Atherosclerotic calcification  of the thoracic aorta and coronary arteries. There is mild mediastinal  adenopathy. For example, an enlarged subcarinal lymph node measures  2.6 x 1.8 cm.    Abdomen and Pelvis: Multiple hypoenhancing liver lesions are not well  characterized on this noncontrast scan, but are suspicious for  metastatic disease. The spleen, gallbladder, and pancreas have  unremarkable noncontrast appearances. Multiple bilateral adrenal  nodules are indeterminate, and metastatic disease cannot be excluded,  with the largest on the left measuring 3 cm. Bilateral renal atrophy  is noted, moderate on the left and marked on the right. Multiple  hyperdense liver lesions could represent hemorrhagic or proteinaceous  renal cysts, although solid renal neoplasm cannot be excluded, with  the largest on the left measuring 3.3 cm. A few smaller bilateral  renal cysts are also noted. No hydronephrosis. Curvilinear  calcifications in both renal sowmya are most likely vascular.    There is advanced atherosclerotic aortoiliac calcification. Infrarenal  abdominal aortic aneurysm measures 3.2 cm AP x 2.9 cm transverse. No  bowel obstruction. Colonic diverticulosis, without convincing evidence  for diverticulitis. There is a  moderate amount of stool in the  ascending and transverse colon. Unremarkable appendix. No free fluid  in the pelvis. There is moderate prostatic enlargement. A prominent  duodenal diverticulum is noted. Degenerative changes are noted  throughout the thoracolumbar spine. Ill-defined lytic change within  the L4 vertebral body is suspicious for metastatic disease. There is  mild age-indeterminate anterior compression also noted in the L4  vertebral body, suspicious for a pathologic compression fracture.      Impression    IMPRESSION:   1. A spiculated right upper lobe pulmonary nodule is highly suspicious  for malignancy, such as bronchogenic carcinoma.  2. Ill-defined lytic change in the L4 vertebral body with mild  anterior compression is suspicious for a metastatic lesion and an  associated pathologic compression fracture, age indeterminate.  3. Multiple low density liver lesions are not well characterized on  this noncontrast scan, but are considered suspicious for metastatic  disease.  4. Emphysema.  5. Mild mediastinal adenopathy is nonspecific, however metastatic  involvement cannot be excluded.  6. Multiple bilateral adrenal nodules are indeterminate, and  metastatic disease is not excluded.  7. Bilateral renal atrophy.  8. Multiple hyperdense renal lesions bilaterally could represent  hemorrhagic or proteinaceous renal cysts, however solid renal neoplasm  cannot be excluded.  9. Moderate prostatic enlargement.  10. Infrarenal abdominal aortic aneurysm measures up to 3.2 cm AP.       XR Femur Left 2 Views    Narrative    XR LEFT FEMUR TWO VIEWS   4/22/2019 6:34 PM     HISTORY: Acute atraumatic left thigh pain, possible cancer, evaluate  for bony pathology.    COMPARISON: None.      Impression    IMPRESSION: Extensive atherosclerotic calcified plaque in the  superficial femoral artery. Normal bony structures.

## 2019-04-23 NOTE — PROGRESS NOTES
"S: Pt was seen today at [facility] for eval/fitting for an LSO corset as ordered.  Dx L4fx  OG:  Reduce pain and motion of the lumbar spine.  A: At this time I have fit pt with a size 36-48\" panel LS corset with Velcro closures. The pt was instructed on donning/doffing; care and cleaning of the LSO was explained. Care was taken in selection of the proper size LSO to insure an intimate fit.  Upon completion of the initial fitting the pt had no complaints, and the fit of the orthosis appeared to be satisfactory at this time.   P: The patient was instructed to call if any problems or questions arise.   Sonny DAVIDSON LO        "

## 2019-04-23 NOTE — PROGRESS NOTES
RECEIVING UNIT ED HANDOFF REVIEW    ED Nurse Handoff Report was reviewed by: Kelin Grande on April 22, 2019 at 7:48 PM

## 2019-04-23 NOTE — PROGRESS NOTES
[facility] Consultation    Seven Savage Jr. MRN# [id number]   YOB: 1935 Age: 84 year old   Date of Admission: 4/22/2019  Requesting physician: Dr. Trinh  Reason for consult: Pulmonary nodule, L4 compression fracture, anemia           Assessment and Plan:     1. Pulmonary nodule with findings concerning for metastatic disease in the liver, bone, kidneys, adrenal glands.  Today we discussed biopsy both for identification of tumor type plus assessment for mutations that may make him eligible for targeted therapies/immunotherapy.  He would like some time to think this over before committing to biopsy.  We discussed the prospect of radiation therapy to the L4 lesion. For radiation therapy, a tissue biopsy would be preferred.  If he declines biopsy, could also consider  PET scan to confirm malignancy.  We will continue this discussion during hospitalization but will also arrange for him to meet again with Dr. Robles as an outpatient.     2. Anemia with initial work-up not diagnostic for cause of anemia.  Now, with suspicion of malignancy, this may offer explanation.  Hemoglobin is 9.2 compared to 9.5 in our office on 3/14/19.     3. 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 Pain related to acute/subacute compression fracture at L4.  Imaging suggests that this may be pathologic due to metastatic disease. No epidural soft tissue tumor identified. Has been seen by ortho and brace has been recommended for stability.  Radiation therapy to this site would be beneficial.  If patient opts against biopsy to confirm malignancy, could consider PET scan (see #1 above)    5. 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 Urine and blood immunofixation has been ordered to rule out occult monoclonal bands.     Discussed with Dr. Robles who will see patient today as well.    Ramon Espitia, APRN, CNP  [facility]  [phone]             Chief  Complaint:   Weakness, back pain.          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  He has a history of hypertension, diabetes and end-stage renal disease.  He has been on dialysis since 2016 and is managed on erythropoietin and venofer. Hemoglobin had been around 10-11 but more recently fell to 7.2. He was given 2 unit(s) [facility].  His work up in our clinic included a haptoglobin of 115.  Reticulocyte count of 3.5.  IgG level of 458.  IgA 199.  IgM 20.  TSH was 2.87.  .  Serum protein electrophoresis demonstrated no monoclonal protein.  Peripheral morphology demonstrated moderate hypochromic, normocytic anemia with mild reticulocytosis.  Rare schistocytes were present.  There were no apparent dysplastic or neoplastic changes.    Given no obvious explanation for the patient's anemia, a bone marrow biopsy was recommended but he declined.    He subsequently seen in the ER on 4/22/19 for evaluation of back and left leg pain.      He had been seen by a chiropractor and underwent an MRI scan of his lumbar spine which demonstrated an L4 vertebral compression fracture which was later over-read as concerning for metastatic disease.      While in the ER, he underwent a CT scan of the chest abdomen and pelvis with contrast which demonstrated a spiculated right upper lobe pulmonary nodule, highly suspicious for malignancy such as bronchogenic carcinoma.  There was lytic change in the L4 vertebral body with mild anterior compression, suspicious for metastatic lesion and an associated pathologic compression fracture, age indeterminate.  Multiple low-density liver lesions, not well characterized on a noncontrast scan but considered suspicious for metastatic disease.  There was mild mediastinal adenopathy.  Multiple bilateral adrenal nodules were indeterminant.  There are multiple hyperdense renal lesions bilaterally which could  "represent hemorrhagic or proteinaceous renal cysts.  Solid renal neoplasm could not be excluded.  There was moderate prostatic enlargement and an infra renal abdominal aortic aneurysm measuring 3.2 cm.      We are now asked to see him to discuss these findings.     He denies weight loss, head ache, confusion, imbalance, fever, nausea, vomiting, dyspnea, cough, chest pain. He has been living independently with his son in a private home.          Physical Exam:   Vitals were reviewed  Blood pressure 144/44, pulse 66, temperature 96.5  F (35.8  C), temperature source Oral, resp. rate 16, height 1.702 m (5' 7\"), SpO2 92 %.  Temperatures:  Current - Temp: 96.5  F (35.8  C); Max - Temp  Av.2  F (36.2  C)  Min: 96.5  F (35.8  C)  Max: 97.8  F (36.6  C)  Respiration range: Resp  Av.1  Min: 16  Max: 18  Pulse range: Pulse  Av.7  Min: 65  Max: 66  Blood pressure range: Systolic (24hrs), Av , Min:136 , Max:168   ; Diastolic (24hrs), Av, Min:44, Max:63    Pulse oximetry range: SpO2  Av.5 %  Min: 88 %  Max: 96 %    Intake/Output Summary (Last 24 hours) at 2019 1421  Last data filed at 2019 1032  Gross per 24 hour   Intake 240 ml   Output 125 ml   Net 115 ml       GENERAL: No acute distress.  SKIN: No rashes or jaundice.  HEENT: Normocephalic, atraumatic. Eyes anicteric. Oropharynx is clear.  LYMPH: No palpable lymphadenopathy in the cervical, supraclavicular, axillary, or inguinal regions.  HEART: Regular rate and rhythm with no murmurs.  LUNGS: Clear bilaterally.  ABDOMEN: Soft, nontender, nondistended with no palpable hepatosplenomegaly.  EXTREMITIES: No clubbing, cyanosis, or edema.  MUSCULOSKELETAL: mild pain overlying the lumbar spine with palpation.   MENTAL: Alert and oriented to person, place, and time.  NEURO: Cranial nerves II through XII grossly intact with no focal motor or sensory deficits.            Past Medical History:   I have reviewed this patient's past medical " history  Hypertension since age 50, diabetes since age 50, chronic kidney disease started in 2014 and end-stage renal disease required dialysis since 2016. Anemia of chronic kidney disease. History of gout. Arthritis and spinal stenosis. Coronary disease. History of kidney stones. COPD. Hematoma in the tongue after dental work couple of years ago led to irregularity of the tongue surface. Aortic stenosis and heart murmur. Adenoidectomy. Polypectomy. Renal stent. Tonsillectomy.              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     Social History     Tobacco Use     Smoking status: Former Smoker     Packs/day: 1.00     Years: 70.00     Pack years: 70.00     Types: Cigarettes     Last attempt to quit:      Years since quittin.3     Smokeless tobacco: Never Used   Substance Use Topics     Alcohol use: Yes     Comment: average 1 beer month             Family History:   I have reviewed this patient's family history  Family History   Family history unknown: Yes             Allergies:     Allergies   Allergen Reactions     Levaquin [Levofloxacin]      edema     Pioglitazone Other (See Comments)     Edema & hay fever     Vytorin Other (See Comments)     Muscle aches             Medications:   I have reviewed this patient's current medications  Medications Prior to Admission   Medication Sig Dispense Refill Last Dose     albuterol (2.5 MG/3ML) 0.083% neb solution TAKE 1 VIAL BY NEBULIZATION EVERY 6 HOURS AS NEEDED FOR SHORNESS OF BREATH/DYSPNEA OR WHEEZING 75 mL 7 Past Week at Unknown time     albuterol (PROAIR HFA/PROVENTIL HFA/VENTOLIN HFA) 108 (90 BASE) MCG/ACT Inhaler Inhale 1-2 puffs into the lungs every 4 hours as needed for shortness of breath / dyspnea or wheezing 1 Inhaler 5 Past Month at Unknown time     aspirin 81 MG chewable tablet Take  81 mg by mouth daily   4/22/2019 at am     atorvastatin (LIPITOR) 40 MG tablet Take 40 mg by mouth daily   4/22/2019 at am     B Complex-C-Folic Acid (BRANDI-HEIDY) TABS Take 1 tablet by mouth daily 90 tablet 3 4/22/2019 at am     carvedilol (COREG) 12.5 MG tablet TAKE 1 TABLET(12.5 MG) BY MOUTH TWICE DAILY 180 tablet 3 4/22/2019 at am     cyclobenzaprine (FLEXERIL) 5 MG tablet Take 1 tablet (5 mg) by mouth 3 times daily as needed for muscle spasms 90 tablet 3 Past Week at Unknown time     diltiazem ER (TIAZAC) 300 MG 24 hr ER beaded capsule TAKE 1 CAPSULE(300 MG) BY MOUTH DAILY 90 capsule 0 4/22/2019 at am     doxazosin (CARDURA) 8 MG tablet TAKE 1 TABLET BY MOUTH EVERY NIGHT AT BEDTIME. (Patient taking differently: TAKE 1/2 TABLET (4mg) BY MOUTH EVERY NIGHT AT BEDTIME.) 90 tablet 0 4/21/2019 at pm     eplerenone (INSPRA) 50 MG tablet Take 1 tablet (50 mg) by mouth daily 90 tablet 4 4/22/2019 at am     fish oil-omega-3 fatty acids 1000 MG capsule Take 1 g by mouth daily   4/22/2019 at am     fluticasone (FLOVENT HFA) 220 MCG/ACT inhaler Inhale 1 puff into the lungs 2 times daily   4/22/2019 at Unknown time     furosemide (LASIX) 20 MG tablet Take 1 tablet (20 mg) by mouth daily 90 tablet 3 4/22/2019 at am     HYDROcodone-acetaminophen (NORCO) 5-325 MG tablet Take 0.5 tablets by mouth every 6 hours as needed for severe pain 30 tablet 0 4/22/2019 at 1 tabx2     Insulin Aspart (NOVOLOG SC) Inject 12 Units Subcutaneous 3 times daily (with meals) Patient states with large meals he will increase to 14-16 units.   4/21/2019 at Unknown time     insulin glargine (LANTUS) 100 UNIT/ML injection Inject 18-23 Units Subcutaneous At Bedtime (Patient will increase to 26 units at bedtime he states when BG is elevated.    4/21/2019 at 18 units     irbesartan (AVAPRO) 150 MG tablet TAKE 1 TABLET(150 MG) BY MOUTH DAILY 90 tablet 3 4/22/2019 at am     ranitidine (ZANTAC) 150 MG tablet Take 1 tablet (150 mg) by mouth daily as needed for  heartburn 90 tablet 3 Past Month at Unknown time     STIOLTO RESPIMAT 2.5-2.5 MCG/ACT AERS INHALE 2 PUFFS INTO THE LUNGS DAILY 4 g 9 2019 at am     VITAMIN D, CHOLECALCIFEROL, PO Take 2,000 Units by mouth daily    2019 at am     B-D U/F 31G X 8 MM insulin pen needle USE AS DIRECTED FOUR TIMES DAILY. 400 each 1 Taking     blood glucose monitoring (NO BRAND SPECIFIED) test strip Use to test blood sugars 3 times daily or as directed. Uses onetouch 300 strip 11 Taking     COLCHICINE PO Take 0.6 mg by mouth every 48 hours as needed (gout attacks) Reported on 3/30/2017   More than a month at Unknown time     order for DME Equipment being ordered: left thumb spica splint 1 Device 0 Taking     order for DME Equipment being ordered: portable Oxygen concentrator 1 Device 0 Taking     order for DME Equipment being ordered: Four wheeled walker 1 Device 0 Taking     order for DME Equipment being ordered: Other: Nebulizer machine  Treatment Diagnosis: COPD 1 Device 0 Taking     [] predniSONE (DELTASONE) 20 MG tablet Take 2 tablets (40 mg) by mouth daily for 3 days 6 tablet 0      Respiratory Therapy Supplies (NEBULIZER/ADULT MASK) KIT 1 Device as needed 1 kit 0 Taking     Current Facility-Administered Medications Ordered in Epic   Medication Dose Route Frequency Last Rate Last Dose     - MEDICATION INSTRUCTIONS for Dialysis Patients -   Does not apply See Admin Instructions         albuterol (PROAIR HFA/PROVENTIL HFA/VENTOLIN HFA) 108 (90 Base) MCG/ACT inhaler 1-2 puff  1-2 puff Inhalation Q4H PRN         albuterol (PROVENTIL) neb solution 2.5 mg  2.5 mg Nebulization Q4H PRN         aspirin (ASA) chewable tablet 81 mg  81 mg Oral Daily   81 mg at 19 0806     atorvastatin (LIPITOR) tablet 40 mg  40 mg Oral Daily         atorvastatin (LIPITOR) tablet 40 mg  40 mg Oral Daily   40 mg at 19 08     carvedilol (COREG) tablet 12.5 mg  12.5 mg Oral BID w/meals   12.5 mg at 19 0805     cyclobenzaprine  (FLEXERIL) tablet 5 mg  5 mg Oral TID PRN         glucose gel 15-30 g  15-30 g Oral Q15 Min PRN        Or     dextrose 50 % injection 25-50 mL  25-50 mL Intravenous Q15 Min PRN        Or     glucagon injection 1 mg  1 mg Subcutaneous Q15 Min PRN         diltiazem ER (DILT-XR) 300 mg  300 mg Oral Daily         eplerenone (INSPRA) tablet 50 mg  50 mg Oral Daily         fluticasone (FLOVENT HFA) 220 MCG/ACT Inhaler 1 puff  1 puff Inhalation BID         HYDROmorphone (DILAUDID) tablet 2-4 mg  2-4 mg Oral Q3H PRN         HYDROmorphone (PF) (DILAUDID) injection 0.3-0.5 mg  0.3-0.5 mg Intravenous Q4H PRN         insulin aspart (NovoLOG) inj (RAPID ACTING)  1-3 Units Subcutaneous TID AC   1 Units at 04/23/19 1312     insulin aspart (NovoLOG) inj (RAPID ACTING)  1-3 Units Subcutaneous At Bedtime         insulin glargine (LANTUS PEN) injection 18 Units  18 Units Subcutaneous At Bedtime   18 Units at 04/22/19 2229     irbesartan (AVAPRO) tablet 150 mg  150 mg Oral Daily   150 mg at 04/23/19 0806     melatonin tablet 1 mg  1 mg Oral At Bedtime PRN         naloxone (NARCAN) injection 0.1-0.4 mg  0.1-0.4 mg Intravenous Q2 Min PRN         ondansetron (ZOFRAN-ODT) ODT tab 4 mg  4 mg Oral Q6H PRN        Or     ondansetron (ZOFRAN) injection 4 mg  4 mg Intravenous Q6H PRN         polyethylene glycol (MIRALAX/GLYCOLAX) Packet 17 g  17 g Oral BID PRN   17 g at 04/23/19 1116     ranitidine (ZANTAC) tablet 150 mg  150 mg Oral Daily PRN         sodium chloride (PF) 0.9% PF flush 3 mL  3 mL Intracatheter Q8H   3 mL at 04/23/19 1120     No current Epic-ordered outpatient medications on file.             Review of Systems:   The 10 point Review of Systems is negative other than noted in the HPI.            Data:   Data   Results for orders placed or performed during the hospital encounter of 04/22/19 (from the past 24 hour(s))   EKG 12 lead   Result Value Ref Range    Interpretation ECG Click View Image link to view waveform and result     CBC with platelets differential   Result Value Ref Range    WBC 11.6 (H) 4.0 - 11.0 10e9/L    RBC Count 3.89 (L) 4.4 - 5.9 10e12/L    Hemoglobin 10.0 (L) 13.3 - 17.7 g/dL    Hematocrit 32.7 (L) 40.0 - 53.0 %    MCV 84 78 - 100 fl    MCH 25.7 (L) 26.5 - 33.0 pg    MCHC 30.6 (L) 31.5 - 36.5 g/dL    RDW 17.9 (H) 10.0 - 15.0 %    Platelet Count 362 150 - 450 10e9/L    Diff Method Automated Method     % Neutrophils 85.7 %    % Lymphocytes 5.6 %    % Monocytes 7.4 %    % Eosinophils 0.9 %    % Basophils 0.2 %    % Immature Granulocytes 0.2 %    Nucleated RBCs 0 0 /100    Absolute Neutrophil 10.0 (H) 1.6 - 8.3 10e9/L    Absolute Lymphocytes 0.7 (L) 0.8 - 5.3 10e9/L    Absolute Monocytes 0.9 0.0 - 1.3 10e9/L    Absolute Eosinophils 0.1 0.0 - 0.7 10e9/L    Absolute Basophils 0.0 0.0 - 0.2 10e9/L    Abs Immature Granulocytes 0.0 0 - 0.4 10e9/L    Absolute Nucleated RBC 0.0    Comprehensive metabolic panel   Result Value Ref Range    Sodium 137 133 - 144 mmol/L    Potassium 3.6 3.4 - 5.3 mmol/L    Chloride 99 94 - 109 mmol/L    Carbon Dioxide 31 20 - 32 mmol/L    Anion Gap 7 3 - 14 mmol/L    Glucose 193 (H) 70 - 99 mg/dL    Urea Nitrogen 20 7 - 30 mg/dL    Creatinine 2.98 (H) 0.66 - 1.25 mg/dL    GFR Estimate 18 (L) >60 mL/min/[1.73_m2]    GFR Estimate If Black 21 (L) >60 mL/min/[1.73_m2]    Calcium 9.4 8.5 - 10.1 mg/dL    Bilirubin Total 1.4 (H) 0.2 - 1.3 mg/dL    Albumin 3.1 (L) 3.4 - 5.0 g/dL    Protein Total 6.3 (L) 6.8 - 8.8 g/dL    Alkaline Phosphatase 61 40 - 150 U/L    ALT 12 0 - 70 U/L    AST 15 0 - 45 U/L   Lipase   Result Value Ref Range    Lipase 85 73 - 393 U/L   US Lower Extremity Venous Duplex Left    Narrative    VENOUS ULTRASOUND LEFT LEG  4/22/2019 5:29 PM     HISTORY: Left lower extremity pain and swelling.    COMPARISON: None.    FINDINGS:  Examination of the deep veins with graded compression and  color flow Doppler with spectral wave form analysis shows no evidence  of thrombus in the common femoral  vein, femoral vein, popliteal vein  or calf veins.  There is no venous insufficiency.      Impression    IMPRESSION: No evidence of deep venous thrombosis.    SARABJIT JERRY MD   CT Chest Abdomen Pelvis w/o Contrast    Narrative    CT CHEST, ABDOMEN, PELVIS WITHOUT CONTRAST   4/22/2019 5:47 PM     HISTORY: Subacute low back pain. Evaluate for metastatic disease.  History of end-stage renal disease.    TECHNIQUE: Volumetric helical sections were acquired from the thoracic  inlet to the ischial tuberosities. Coronal images were also  reconstructed. Radiation dose for this scan was reduced using  automated exposure control, adjustment of the mA and/or kV according  to patient size, or iterative reconstruction technique.    COMPARISON: None.    FINDINGS:    Chest: There is a spiculated nodule in the right upper lobe laterally  abutting the pleura, and measuring 2.7 x 2.1 cm (series 3 image 13).  Emphysematous changes are noted throughout both lungs. There are small  bilateral pleural effusions. Mild associated consolidation at both  lung bases posteriorly most likely represent atelectasis, although  pneumonia could also possibly have this appearance. Small pericardial  effusion. Coronary artery calcification. Atherosclerotic calcification  of the thoracic aorta and coronary arteries. There is mild mediastinal  adenopathy. For example, an enlarged subcarinal lymph node measures  2.6 x 1.8 cm.    Abdomen and Pelvis: Multiple hypoenhancing liver lesions are not well  characterized on this noncontrast scan, but are suspicious for  metastatic disease. The spleen, gallbladder, and pancreas have  unremarkable noncontrast appearances. Multiple bilateral adrenal  nodules are indeterminate, and metastatic disease cannot be excluded,  with the largest on the left measuring 3 cm. Bilateral renal atrophy  is noted, moderate on the left and marked on the right. Multiple  hyperdense liver lesions could represent hemorrhagic or  proteinaceous  renal cysts, although solid renal neoplasm cannot be excluded, with  the largest on the left measuring 3.3 cm. A few smaller bilateral  renal cysts are also noted. No hydronephrosis. Curvilinear  calcifications in both renal sowmya are most likely vascular.    There is advanced atherosclerotic aortoiliac calcification. Infrarenal  abdominal aortic aneurysm measures 3.2 cm AP x 2.9 cm transverse. No  bowel obstruction. Colonic diverticulosis, without convincing evidence  for diverticulitis. There is a moderate amount of stool in the  ascending and transverse colon. Unremarkable appendix. No free fluid  in the pelvis. There is moderate prostatic enlargement. A prominent  duodenal diverticulum is noted. Degenerative changes are noted  throughout the thoracolumbar spine. Ill-defined lytic change within  the L4 vertebral body is suspicious for metastatic disease. There is  mild age-indeterminate anterior compression also noted in the L4  vertebral body, suspicious for a pathologic compression fracture.      Impression    IMPRESSION:   1. A spiculated right upper lobe pulmonary nodule is highly suspicious  for malignancy, such as bronchogenic carcinoma.  2. Ill-defined lytic change in the L4 vertebral body with mild  anterior compression is suspicious for a metastatic lesion and an  associated pathologic compression fracture, age indeterminate.  3. Multiple low density liver lesions are not well characterized on  this noncontrast scan, but are considered suspicious for metastatic  disease.  4. Emphysema.  5. Mild mediastinal adenopathy is nonspecific, however metastatic  involvement cannot be excluded.  6. Multiple bilateral adrenal nodules are indeterminate, and  metastatic disease is not excluded.  7. Bilateral renal atrophy.  8. Multiple hyperdense renal lesions bilaterally could represent  hemorrhagic or proteinaceous renal cysts, however solid renal neoplasm  cannot be excluded.  9. Moderate prostatic  enlargement.  10. Infrarenal abdominal aortic aneurysm measures up to 3.2 cm AP.      CLAUDIO DAO MD   XR Femur Left 2 Views    Narrative    XR LEFT FEMUR TWO VIEWS   4/22/2019 6:34 PM     HISTORY: Acute atraumatic left thigh pain, possible cancer, evaluate  for bony pathology.    COMPARISON: None.      Impression    IMPRESSION: Extensive atherosclerotic calcified plaque in the  superficial femoral artery. Normal bony structures.    SARABJIT JERRY MD   UA with Microscopic   Result Value Ref Range    Color Urine Yellow     Appearance Urine Clear     Glucose Urine Negative NEG^Negative mg/dL    Bilirubin Urine Negative NEG^Negative    Ketones Urine Negative NEG^Negative mg/dL    Specific Gravity Urine 1.010 1.003 - 1.035    Blood Urine Trace (A) NEG^Negative    pH Urine 6.5 5.0 - 7.0 pH    Protein Albumin Urine 100 (A) NEG^Negative mg/dL    Urobilinogen mg/dL 2.0 0.0 - 2.0 mg/dL    Nitrite Urine Negative NEG^Negative    Leukocyte Esterase Urine Trace (A) NEG^Negative    Source Midstream Urine     WBC Urine 4 0 - 5 /HPF    RBC Urine 1 0 - 2 /HPF   Glucose by meter   Result Value Ref Range    Glucose 156 (H) 70 - 99 mg/dL   Orthopedic Surgery IP Consult: Patient to be seen: Routine - within 24 hours; compression fracture from metastatic disease; Consultant may enter orders: Yes; Requesting provider? Hospitalist (if different from attending physician)    Narrative    Amada Ríos PA-C     4/23/2019 10:36 AM  [facility]    Orthopedic Consultation    Seven Savage  MRN# [id number]   Age: 84 year old YOB: 1935     Date of Admission:  4/22/2019    Reason for consult: Compression fracture from metastatic disease       Requesting physician: Dr. 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 Determine if radiation therapy could be used   for L4 met  WBAT            Chief Complaint:   Low back pain         History of Present Illness:   This patient is a 84 year old male who presents with the   following condition requiring a hospital admission:    Mr. Savage states that he has had back pain for 3 weeks. He   states this began after having an adjustment with a newer   chiropractor that he had not previously seen. He was seen by Dr. Laila Nelson at [facility] who ordered a MR scan for his lumbar   spine. He was then seen in follow-up by Dr. Antunez. At that time   the read did not include the diagnosis of metastasis. A   vertebroplasty was ordered and when IR reviewed patient imaging   and information it was found that there was metastasis and a CT   scan was ordered. He presented to the ED after he began to have   left thigh pain.   At time of visit, he denies any thigh pain or radiculopathy. He   has focal low back pain. Denies and fevers, chills, malaise,   night sweats, or unexplained weight loss. He has seen a   chiropractor for many years which successful has managed some   chronic back pain.  Of note he is also a dialysis patient.          Past Medical History:     Past Medical History:   Diagnosis Date     COPD (chronic obstructive pulmonary disease) (H)      CRF (chronic renal failure)      Heart murmur      HLD (hyperlipidemia)      HTN (hypertension)      IDDM (insulin dependent diabetes mellitus) (H)      Renal calculi              Past Surgical History:     Past Surgical History:   Procedure Laterality Date     ADENOIDECTOMY       ENDOSCOPIC POLYPECTOMY NASAL      Through vocal cords     Renal Stent      For renal calculi     TONSILLECTOMY               Social History:     Social History     Tobacco Use     Smoking status: Former Smoker     Packs/day: 1.00     Years: 70.00     Pack years: 70.00     Types: Cigarettes     Last  attempt to quit: 2011     Years since quittin.3     Smokeless tobacco: Never Used   Substance Use Topics     Alcohol use: Yes     Comment: average 1 beer month             Family History:     Family History   Family history unknown: Yes             Immunizations:     VACCINE/DOSE   Diptheria   DPT   DTAP   HBIG   Hepatitis A   Hepatitis B   HIB   Influenza   Measles   Meningococcal   MMR   Mumps   Pneumococcal   Polio   Rubella   Small Pox   TDAP   Varicella   Zoster             Allergies:     Allergies   Allergen Reactions     Levaquin [Levofloxacin]      edema     Pioglitazone Other (See Comments)     Edema & hay fever     Vytorin Other (See Comments)     Muscle aches             Medications:     Current Facility-Administered Medications   Medication     - MEDICATION INSTRUCTIONS for Dialysis Patients -     aspirin (ASA) chewable tablet 81 mg     atorvastatin (LIPITOR) tablet 40 mg     carvedilol (COREG) tablet 12.5 mg     cyclobenzaprine (FLEXERIL) tablet 5 mg     glucose gel 15-30 g    Or     dextrose 50 % injection 25-50 mL    Or     glucagon injection 1 mg     HYDROmorphone (PF) (DILAUDID) injection 0.3-0.5 mg     insulin aspart (NovoLOG) inj (RAPID ACTING)     insulin aspart (NovoLOG) inj (RAPID ACTING)     insulin glargine (LANTUS PEN) injection 18 Units     irbesartan (AVAPRO) tablet 150 mg     melatonin tablet 1 mg     naloxone (NARCAN) injection 0.1-0.4 mg     ondansetron (ZOFRAN-ODT) ODT tab 4 mg    Or     ondansetron (ZOFRAN) injection 4 mg     oxyCODONE (ROXICODONE) tablet 5-10 mg     ranitidine (ZANTAC) tablet 150 mg             Review of Systems:   CV: NEGATIVE for chest pain, palpitations or peripheral edema  C: NEGATIVE for fever, chills, change in weight  E/M: NEGATIVE for ear, mouth and throat problems  R: NEGATIVE for significant cough or SOB          Physical Exam:   All vitals have been reviewed  Patient Vitals for the past 24 hrs:   BP Temp Temp src Pulse Heart Rate Resp SpO2 Height  "  04/23/19 0202 136/46 97.3  F (36.3  C) Oral -- 64 16 96 % --   04/22/19 2312 -- -- -- -- -- 16 -- --   04/22/19 2238 -- -- -- -- -- 17 -- --   04/22/19 2026 168/57 97.2  F (36.2  C) Oral -- 67 18 95 % --   04/22/19 1838 154/59 -- -- 65 -- 18 92 % --   04/22/19 1752 -- -- -- -- 62 18 92 % --   04/22/19 1603 158/63 97.8  F (36.6  C) Oral 66 -- 18 (!) 88 %   1.702 m (5' 7\")       Intake/Output Summary (Last 24 hours) at 4/23/2019 0739  Last data filed at 4/22/2019 2130  Gross per 24 hour   Intake --   Output 75 ml   Net -75 ml     Patient laying comfortably in bed   No ecchymosis or erythema over entirety of the left leg  No TTP at mid thigh, left  No notable swelling or edema  Full ROM of left knee - 0 to 120 degrees  Stable to varus and valgus stress  No TTP over medial or lateral joint line  Hip flexes to 100 degrees  Internal rotation 30 degrees, External rotation 15 degrees  No pain with internal/external rotation while in flexion  No TTP over great trochanter  Bilateral calves are soft, non-tender.  Bilateral lower extremity is NVI.  Sensation intact bilateral lower extremities  5/5 motor with resisted dorsi and plantar flexion bilaterally  5/5 hip flexors  5/5 quad  5/5 EHL  +Dp pulse          Data:   All laboratory data reviewed  Results for orders placed or performed during the hospital   encounter of 04/22/19   CT Chest Abdomen Pelvis w/o Contrast    Narrative    CT CHEST, ABDOMEN, PELVIS WITHOUT CONTRAST   4/22/2019 5:47 PM     HISTORY: Subacute low back pain. Evaluate for metastatic disease.  History of end-stage renal disease.    TECHNIQUE: Volumetric helical sections were acquired from the   thoracic  inlet to the ischial tuberosities. Coronal images were also  reconstructed. Radiation dose for this scan was reduced using  automated exposure control, adjustment of the mA and/or kV   according  to patient size, or iterative reconstruction technique.    COMPARISON: None.    FINDINGS:    Chest: There is a " spiculated nodule in the right upper lobe   laterally  abutting the pleura, and measuring 2.7 x 2.1 cm (series 3 image   13).  Emphysematous changes are noted throughout both lungs. There are   small  bilateral pleural effusions. Mild associated consolidation at   both  lung bases posteriorly most likely represent atelectasis,   although  pneumonia could also possibly have this appearance. Small   pericardial  effusion. Coronary artery calcification. Atherosclerotic   calcification  of the thoracic aorta and coronary arteries. There is mild   mediastinal  adenopathy. For example, an enlarged subcarinal lymph node   measures  2.6 x 1.8 cm.    Abdomen and Pelvis: Multiple hypoenhancing liver lesions are not   well  characterized on this noncontrast scan, but are suspicious for  metastatic disease. The spleen, gallbladder, and pancreas have  unremarkable noncontrast appearances. Multiple bilateral adrenal  nodules are indeterminate, and metastatic disease cannot be   excluded,  with the largest on the left measuring 3 cm. Bilateral renal   atrophy  is noted, moderate on the left and marked on the right. Multiple  hyperdense liver lesions could represent hemorrhagic or   proteinaceous  renal cysts, although solid renal neoplasm cannot be excluded,   with  the largest on the left measuring 3.3 cm. A few smaller bilateral  renal cysts are also noted. No hydronephrosis. Curvilinear  calcifications in both renal sowmya are most likely vascular.    There is advanced atherosclerotic aortoiliac calcification.   Infrarenal  abdominal aortic aneurysm measures 3.2 cm AP x 2.9 cm transverse.   No  bowel obstruction. Colonic diverticulosis, without convincing   evidence  for diverticulitis. There is a moderate amount of stool in the  ascending and transverse colon. Unremarkable appendix. No free   fluid  in the pelvis. There is moderate prostatic enlargement. A   prominent  duodenal diverticulum is noted. Degenerative changes are  noted  throughout the thoracolumbar spine. Ill-defined lytic change   within  the L4 vertebral body is suspicious for metastatic disease. There   is  mild age-indeterminate anterior compression also noted in the L4  vertebral body, suspicious for a pathologic compression fracture.      Impression    IMPRESSION:   1. A spiculated right upper lobe pulmonary nodule is highly   suspicious  for malignancy, such as bronchogenic carcinoma.  2. Ill-defined lytic change in the L4 vertebral body with mild  anterior compression is suspicious for a metastatic lesion and an  associated pathologic compression fracture, age indeterminate.  3. Multiple low density liver lesions are not well characterized   on  this noncontrast scan, but are considered suspicious for   metastatic  disease.  4. Emphysema.  5. Mild mediastinal adenopathy is nonspecific, however metastatic  involvement cannot be excluded.  6. Multiple bilateral adrenal nodules are indeterminate, and  metastatic disease is not excluded.  7. Bilateral renal atrophy.  8. Multiple hyperdense renal lesions bilaterally could represent  hemorrhagic or proteinaceous renal cysts, however solid renal   neoplasm  cannot be excluded.  9. Moderate prostatic enlargement.  10. Infrarenal abdominal aortic aneurysm measures up to 3.2 cm   AP.      CLAUDIO DAO MD   US Lower Extremity Venous Duplex Left    Narrative    VENOUS ULTRASOUND LEFT LEG  4/22/2019 5:29 PM     HISTORY: Left lower extremity pain and swelling.    COMPARISON: None.    FINDINGS:  Examination of the deep veins with graded compression   and  color flow Doppler with spectral wave form analysis shows no   evidence  of thrombus in the common femoral vein, femoral vein, popliteal   vein  or calf veins.  There is no venous insufficiency.      Impression    IMPRESSION: No evidence of deep venous thrombosis.    SARABJIT JERRY MD   XR Femur Left 2 Views    Narrative    XR LEFT FEMUR TWO VIEWS   4/22/2019 6:34 PM      HISTORY: Acute atraumatic left thigh pain, possible cancer,   evaluate  for bony pathology.    COMPARISON: None.      Impression    IMPRESSION: Extensive atherosclerotic calcified plaque in the  superficial femoral artery. Normal bony structures.    SARABJIT JERRY MD   CBC with platelets differential   Result Value Ref Range    WBC 11.6 (H) 4.0 - 11.0 10e9/L    RBC Count 3.89 (L) 4.4 - 5.9 10e12/L    Hemoglobin 10.0 (L) 13.3 - 17.7 g/dL    Hematocrit 32.7 (L) 40.0 - 53.0 %    MCV 84 78 - 100 fl    MCH 25.7 (L) 26.5 - 33.0 pg    MCHC 30.6 (L) 31.5 - 36.5 g/dL    RDW 17.9 (H) 10.0 - 15.0 %    Platelet Count 362 150 - 450 10e9/L    Diff Method Automated Method     % Neutrophils 85.7 %    % Lymphocytes 5.6 %    % Monocytes 7.4 %    % Eosinophils 0.9 %    % Basophils 0.2 %    % Immature Granulocytes 0.2 %    Nucleated RBCs 0 0 /100    Absolute Neutrophil 10.0 (H) 1.6 - 8.3 10e9/L    Absolute Lymphocytes 0.7 (L) 0.8 - 5.3 10e9/L    Absolute Monocytes 0.9 0.0 - 1.3 10e9/L    Absolute Eosinophils 0.1 0.0 - 0.7 10e9/L    Absolute Basophils 0.0 0.0 - 0.2 10e9/L    Abs Immature Granulocytes 0.0 0 - 0.4 10e9/L    Absolute Nucleated RBC 0.0    Comprehensive metabolic panel   Result Value Ref Range    Sodium 137 133 - 144 mmol/L    Potassium 3.6 3.4 - 5.3 mmol/L    Chloride 99 94 - 109 mmol/L    Carbon Dioxide 31 20 - 32 mmol/L    Anion Gap 7 3 - 14 mmol/L    Glucose 193 (H) 70 - 99 mg/dL    Urea Nitrogen 20 7 - 30 mg/dL    Creatinine 2.98 (H) 0.66 - 1.25 mg/dL    GFR Estimate 18 (L) >60 mL/min/[1.73_m2]    GFR Estimate If Black 21 (L) >60 mL/min/[1.73_m2]    Calcium 9.4 8.5 - 10.1 mg/dL    Bilirubin Total 1.4 (H) 0.2 - 1.3 mg/dL    Albumin 3.1 (L) 3.4 - 5.0 g/dL    Protein Total 6.3 (L) 6.8 - 8.8 g/dL    Alkaline Phosphatase 61 40 - 150 U/L    ALT 12 0 - 70 U/L    AST 15 0 - 45 U/L   Lipase   Result Value Ref Range    Lipase 85 73 - 393 U/L   UA with Microscopic   Result Value Ref Range    Color Urine Yellow     Appearance  Urine Clear     Glucose Urine Negative NEG^Negative mg/dL    Bilirubin Urine Negative NEG^Negative    Ketones Urine Negative NEG^Negative mg/dL    Specific Gravity Urine 1.010 1.003 - 1.035    Blood Urine Trace (A) NEG^Negative    pH Urine 6.5 5.0 - 7.0 pH    Protein Albumin Urine 100 (A) NEG^Negative mg/dL    Urobilinogen mg/dL 2.0 0.0 - 2.0 mg/dL    Nitrite Urine Negative NEG^Negative    Leukocyte Esterase Urine Trace (A) NEG^Negative    Source Midstream Urine     WBC Urine 4 0 - 5 /HPF    RBC Urine 1 0 - 2 /HPF   Glucose by meter   Result Value Ref Range    Glucose 156 (H) 70 - 99 mg/dL   CBC with platelets   Result Value Ref Range    WBC 8.0 4.0 - 11.0 10e9/L    RBC Count 3.58 (L) 4.4 - 5.9 10e12/L    Hemoglobin 9.2 (L) 13.3 - 17.7 g/dL    Hematocrit 30.2 (L) 40.0 - 53.0 %    MCV 84 78 - 100 fl    MCH 25.7 (L) 26.5 - 33.0 pg    MCHC 30.5 (L) 31.5 - 36.5 g/dL    RDW 17.7 (H) 10.0 - 15.0 %    Platelet Count 314 150 - 450 10e9/L   Glucose by meter   Result Value Ref Range    Glucose 143 (H) 70 - 99 mg/dL   EKG 12 lead   Result Value Ref Range    Interpretation ECG Click View Image link to view waveform and   result           Attestation:  I have reviewed today's vital signs, notes, medications, labs and   imaging with Dr. Freeman.  Amount of time performed on this consult: 30 minutes.    Amada Ríos PA-C      Glucose by meter   Result Value Ref Range    Glucose 143 (H) 70 - 99 mg/dL   CBC with platelets   Result Value Ref Range    WBC 8.0 4.0 - 11.0 10e9/L    RBC Count 3.58 (L) 4.4 - 5.9 10e12/L    Hemoglobin 9.2 (L) 13.3 - 17.7 g/dL    Hematocrit 30.2 (L) 40.0 - 53.0 %    MCV 84 78 - 100 fl    MCH 25.7 (L) 26.5 - 33.0 pg    MCHC 30.5 (L) 31.5 - 36.5 g/dL    RDW 17.7 (H) 10.0 - 15.0 %    Platelet Count 314 150 - 450 10e9/L   Glucose by meter   Result Value Ref Range    Glucose 99 70 - 99 mg/dL   Glucose by meter   Result Value Ref Range    Glucose 151 (H) 70 - 99 mg/dL

## 2019-04-24 DIAGNOSIS — R33.9 URINARY RETENTION: ICD-10-CM

## 2019-04-24 LAB
ALBUMIN SERPL-MCNC: 2.8 G/DL (ref 3.4–5)
ANION GAP SERPL CALCULATED.3IONS-SCNC: 7 MMOL/L (ref 3–14)
BUN SERPL-MCNC: 40 MG/DL (ref 7–30)
CALCIUM SERPL-MCNC: 9.9 MG/DL (ref 8.5–10.1)
CHLORIDE SERPL-SCNC: 104 MMOL/L (ref 94–109)
CO2 SERPL-SCNC: 29 MMOL/L (ref 20–32)
CREAT SERPL-MCNC: 5.02 MG/DL (ref 0.66–1.25)
GFR SERPL CREATININE-BSD FRML MDRD: 10 ML/MIN/{1.73_M2}
GLUCOSE BLDC GLUCOMTR-MCNC: 106 MG/DL (ref 70–99)
GLUCOSE BLDC GLUCOMTR-MCNC: 176 MG/DL (ref 70–99)
GLUCOSE BLDC GLUCOMTR-MCNC: 89 MG/DL (ref 70–99)
GLUCOSE SERPL-MCNC: 95 MG/DL (ref 70–99)
HGB BLD-MCNC: 9.4 G/DL (ref 13.3–17.7)
IGA SERPL-MCNC: 217 MG/DL (ref 70–380)
IGG SERPL-MCNC: 496 MG/DL (ref 695–1620)
IGM SERPL-MCNC: 23 MG/DL (ref 60–265)
PHOSPHATE SERPL-MCNC: 4.7 MG/DL (ref 2.5–4.5)
POTASSIUM SERPL-SCNC: 4 MMOL/L (ref 3.4–5.3)
PROT PATTERN SERPL IFE-IMP: ABNORMAL
SODIUM SERPL-SCNC: 140 MMOL/L (ref 133–144)

## 2019-04-24 PROCEDURE — 25000132 ZZH RX MED GY IP 250 OP 250 PS 637: Performed by: STUDENT IN AN ORGANIZED HEALTH CARE EDUCATION/TRAINING PROGRAM

## 2019-04-24 PROCEDURE — 00000146 ZZHCL STATISTIC GLUCOSE BY METER IP

## 2019-04-24 PROCEDURE — 85018 HEMOGLOBIN: CPT | Performed by: INTERNAL MEDICINE

## 2019-04-24 PROCEDURE — 5A1D70Z PERFORMANCE OF URINARY FILTRATION, INTERMITTENT, LESS THAN 6 HOURS PER DAY: ICD-10-PCS | Performed by: INTERNAL MEDICINE

## 2019-04-24 PROCEDURE — A9270 NON-COVERED ITEM OR SERVICE: HCPCS | Performed by: HOSPITALIST

## 2019-04-24 PROCEDURE — 25000131 ZZH RX MED GY IP 250 OP 636 PS 637: Performed by: HOSPITALIST

## 2019-04-24 PROCEDURE — 86335 IMMUNFIX E-PHORSIS/URINE/CSF: CPT | Performed by: INTERNAL MEDICINE

## 2019-04-24 PROCEDURE — 25000132 ZZH RX MED GY IP 250 OP 250 PS 637: Performed by: HOSPITALIST

## 2019-04-24 PROCEDURE — 12000000 ZZH R&B MED SURG/OB

## 2019-04-24 PROCEDURE — 25000132 ZZH RX MED GY IP 250 OP 250 PS 637: Performed by: NURSE PRACTITIONER

## 2019-04-24 PROCEDURE — 80069 RENAL FUNCTION PANEL: CPT | Performed by: INTERNAL MEDICINE

## 2019-04-24 PROCEDURE — 36415 COLL VENOUS BLD VENIPUNCTURE: CPT | Performed by: INTERNAL MEDICINE

## 2019-04-24 PROCEDURE — 99232 SBSQ HOSP IP/OBS MODERATE 35: CPT | Performed by: HOSPITALIST

## 2019-04-24 PROCEDURE — 90937 HEMODIALYSIS REPEATED EVAL: CPT

## 2019-04-24 PROCEDURE — 25000128 H RX IP 250 OP 636: Performed by: INTERNAL MEDICINE

## 2019-04-24 PROCEDURE — 99207 ZZC CDG-MDM COMPONENT: MEETS LOW - DOWN CODED: CPT | Performed by: HOSPITALIST

## 2019-04-24 PROCEDURE — A9270 NON-COVERED ITEM OR SERVICE: HCPCS | Performed by: NURSE PRACTITIONER

## 2019-04-24 PROCEDURE — A9270 NON-COVERED ITEM OR SERVICE: HCPCS | Performed by: STUDENT IN AN ORGANIZED HEALTH CARE EDUCATION/TRAINING PROGRAM

## 2019-04-24 RX ORDER — DOXERCALCIFEROL 4 UG/2ML
5.5 INJECTION INTRAVENOUS
Status: COMPLETED | OUTPATIENT
Start: 2019-04-24 | End: 2019-04-24

## 2019-04-24 RX ADMIN — POLYETHYLENE GLYCOL 3350 17 G: 17 POWDER, FOR SOLUTION ORAL at 20:11

## 2019-04-24 RX ADMIN — ATORVASTATIN CALCIUM 40 MG: 40 TABLET, FILM COATED ORAL at 08:47

## 2019-04-24 RX ADMIN — DILTIAZEM HYDROCHLORIDE 300 MG: 180 CAPSULE, EXTENDED RELEASE ORAL at 08:48

## 2019-04-24 RX ADMIN — IRBESARTAN 150 MG: 150 TABLET ORAL at 08:47

## 2019-04-24 RX ADMIN — CARVEDILOL 12.5 MG: 6.25 TABLET, FILM COATED ORAL at 18:36

## 2019-04-24 RX ADMIN — FLUTICASONE PROPIONATE 1 PUFF: 220 AEROSOL, METERED RESPIRATORY (INHALATION) at 08:50

## 2019-04-24 RX ADMIN — CYCLOBENZAPRINE HYDROCHLORIDE 5 MG: 5 TABLET, FILM COATED ORAL at 00:10

## 2019-04-24 RX ADMIN — FLUTICASONE PROPIONATE 1 PUFF: 220 AEROSOL, METERED RESPIRATORY (INHALATION) at 20:12

## 2019-04-24 RX ADMIN — SODIUM CHLORIDE 250 ML: 9 INJECTION, SOLUTION INTRAVENOUS at 14:11

## 2019-04-24 RX ADMIN — HYDROMORPHONE HYDROCHLORIDE 2 MG: 2 TABLET ORAL at 08:48

## 2019-04-24 RX ADMIN — INSULIN GLARGINE 15 UNITS: 100 INJECTION, SOLUTION SUBCUTANEOUS at 21:37

## 2019-04-24 RX ADMIN — EPLERENONE 50 MG: 25 TABLET ORAL at 10:49

## 2019-04-24 RX ADMIN — CARVEDILOL 12.5 MG: 6.25 TABLET, FILM COATED ORAL at 08:48

## 2019-04-24 RX ADMIN — SODIUM CHLORIDE 300 ML: 9 INJECTION, SOLUTION INTRAVENOUS at 14:11

## 2019-04-24 RX ADMIN — SENNOSIDES AND DOCUSATE SODIUM 2 TABLET: 8.6; 5 TABLET ORAL at 20:11

## 2019-04-24 RX ADMIN — ASPIRIN 81 MG 81 MG: 81 TABLET ORAL at 08:48

## 2019-04-24 RX ADMIN — DOXERCALCIFEROL 5.5 MCG: 4 INJECTION, SOLUTION INTRAVENOUS at 14:11

## 2019-04-24 RX ADMIN — SENNOSIDES AND DOCUSATE SODIUM 2 TABLET: 8.6; 5 TABLET ORAL at 08:48

## 2019-04-24 ASSESSMENT — ACTIVITIES OF DAILY LIVING (ADL)
ADLS_ACUITY_SCORE: 19

## 2019-04-24 ASSESSMENT — MIFFLIN-ST. JEOR: SCORE: 1398.63

## 2019-04-24 NOTE — PROGRESS NOTES
Patient states that the corset LSO brace is helping with his pain and is comfortable  Continue wearing this when out of bed for activity and also in bed if helping with pain    Follow-up as an outpatient on PRN basis  Patient is deciding if he would like to pursue treatment for Ca/radiation to spine as well    Ortho will sign off  Please re-consult with any changes in LE neuro/motor status    Amada Ríos PAC

## 2019-04-24 NOTE — TELEPHONE ENCOUNTER
Prior Authorization Approval    Authorization Effective Date:    Authorization Expiration Date: 4/22/2020  Medication: cyclobenzaprine (FLEXERIL) 5 MG tablet - APPROVED  Approved Dose/Quantity:   Reference #: [id number]   Insurance Company: [employer] Part D - Phone [phone] Fax [phone]  Expected CoPay:       CoPay Card Available:      Foundation Assistance Needed:    Which Pharmacy is filling the prescription (Not needed for infusion/clinic administered): [facility] [address] [place] [address] [address] AT [facility] [place] [facility]  Pharmacy Notified: Yes  Patient Notified: Comment:  **Instructed pharmacy to notify patient when script is ready to /ship.**

## 2019-04-24 NOTE — PLAN OF CARE
A/Ox4, garbled speech at times. Hypertensive, improved with scheduled meds. Other VSS. PRN PO dilaudid given x1 for lower back pain. Brace in place per Ortho rec. BG checks before meals with sliding scale insulin. Down for dialysis today. PT ordered. Will continue to monitor.

## 2019-04-24 NOTE — CONSULTS
[facility]    Nephrology Consultation     Date of Admission:  4/22/2019    Assessment & Plan      Seven Savage Jr. is a 84 year old male with ESKD who was admitted on 4/22/2019.     1) Lung Mass: Likely a lung cancer with metastatic disease to multiple sites including liver and skeleton.      2) ESKD: Due for HD today.  He has hemodialysis at [facility] under direction of Dr. Samuel/Bethanie Cervantes NP.  He runs MWF for 2.75 H via a L AVF at a BFR of 450 to a target weight of 80 kg.      3) Anemia:  He has been under evaluation from Dr. Robles for same.  He has been on Epogen 12,000 units three times weekly.  I will hold this in light of new likely malignancy.      4) MBD:  His most recent PTH is 297.  He is on Hectorol 5.5 mcg three times weekly    Plan:    HD today per usual.  Next run Friday  Hold ALLIE in light of new Dx of probably malignancy.      Jeremy Waldrop MD  [facility] - Nephrology  [phone]    Reason for Consult      I was asked to see the patient for ESKD.    Primary Care Physician      Sandip Antunez    Chief Complaint      Pain in back and L leg.      History is obtained from the patient and chart review.      History of Present Illness      Seven Savage Jr. is a 84 year old male with ESKD who presents with back and leg pain.      After evaluation his pain has been found to have a lung mass suspicious for lung cancer.  He also has lesion at L4 that is suspicious for metastatic disease with pathologic fracture.  Other findings include multiple lesions in his liver suspicious for metastatic disease.  Adrenal nodules and mediastinal adenopathy may also suggest metastatic disease.    His pain is now better controlled.    He has seen Dr. Robles who plans a PET scan as outpt and further treatment based on that.  Perhaps radiation.      He has hemodialysis at [facility] under direction of Dr. Samuel/Bethanie Cervantes NP.  He runs MWF for 2.75 H via  a L AVF at a BFR of 450 to a target weight of 80 kg.        Past Medical History   I have reviewed this patient's medical history and updated it with pertinent information if needed.   Past Medical History:   Diagnosis Date     COPD (chronic obstructive pulmonary disease) (H)      CRF (chronic renal failure)      Heart murmur      HLD (hyperlipidemia)      HTN (hypertension)      IDDM (insulin dependent diabetes mellitus) (H)      Renal calculi        Past Surgical History   I have reviewed this patient's surgical history and updated it with pertinent information if needed.  Past Surgical History:   Procedure Laterality Date     ADENOIDECTOMY       ENDOSCOPIC POLYPECTOMY NASAL      Through vocal cords     Renal Stent      For renal calculi     TONSILLECTOMY         Prior to Admission Medications   Prior to Admission Medications   Prescriptions Last Dose Informant Patient Reported? Taking?   B Complex-C-Folic Acid (BRANDI-HEIDY) TABS 2019 at am  No Yes   Sig: Take 1 tablet by mouth daily   B-D U/F 31G X 8 MM insulin pen needle   No No   Sig: USE AS DIRECTED FOUR TIMES DAILY.   COLCHICINE PO More than a month at Unknown time Self Yes No   Sig: Take 0.6 mg by mouth every 48 hours as needed (gout attacks) Reported on 3/30/2017   HYDROcodone-acetaminophen (NORCO) 5-325 MG tablet 2019 at 1 tabx2  No Yes   Sig: Take 0.5 tablets by mouth every 6 hours as needed for severe pain   Insulin Aspart (NOVOLOG SC) 2019 at Unknown time Pharmacy Yes Yes   Sig: Inject 12 Units Subcutaneous 3 times daily (with meals) Patient states with large meals he will increase to 14-16 units.   Respiratory Therapy Supplies (NEBULIZER/ADULT MASK) KIT  Pharmacy No No   Si Device as needed   STIOLTO RESPIMAT 2.5-2.5 MCG/ACT AERS 2019 at am  No Yes   Sig: INHALE 2 PUFFS INTO THE LUNGS DAILY   VITAMIN D, CHOLECALCIFEROL, PO 2019 at am Self Yes Yes   Sig: Take 2,000 Units by mouth daily    albuterol (2.5 MG/3ML) 0.083% neb  solution Past Week at Unknown time  No Yes   Sig: TAKE 1 VIAL BY NEBULIZATION EVERY 6 HOURS AS NEEDED FOR SHORNESS OF BREATH/DYSPNEA OR WHEEZING   albuterol (PROAIR HFA/PROVENTIL HFA/VENTOLIN HFA) 108 (90 BASE) MCG/ACT Inhaler Past Month at Unknown time Pharmacy No Yes   Sig: Inhale 1-2 puffs into the lungs every 4 hours as needed for shortness of breath / dyspnea or wheezing   aspirin 81 MG chewable tablet 4/22/2019 at am Self Yes Yes   Sig: Take 81 mg by mouth daily   atorvastatin (LIPITOR) 40 MG tablet 4/22/2019 at am  Yes Yes   Sig: Take 40 mg by mouth daily   blood glucose monitoring (NO BRAND SPECIFIED) test strip  Pharmacy No No   Sig: Use to test blood sugars 3 times daily or as directed. Uses onetouch   carvedilol (COREG) 12.5 MG tablet 4/22/2019 at am  No Yes   Sig: TAKE 1 TABLET(12.5 MG) BY MOUTH TWICE DAILY   cyclobenzaprine (FLEXERIL) 5 MG tablet Past Week at Unknown time  No Yes   Sig: Take 1 tablet (5 mg) by mouth 3 times daily as needed for muscle spasms   diltiazem ER (TIAZAC) 300 MG 24 hr ER beaded capsule 4/22/2019 at am  No Yes   Sig: TAKE 1 CAPSULE(300 MG) BY MOUTH DAILY   doxazosin (CARDURA) 8 MG tablet 4/21/2019 at pm  No Yes   Sig: TAKE 1 TABLET BY MOUTH EVERY NIGHT AT BEDTIME.   Patient taking differently: TAKE 1/2 TABLET (4mg) BY MOUTH EVERY NIGHT AT BEDTIME.   eplerenone (INSPRA) 50 MG tablet 4/22/2019 at am  No Yes   Sig: Take 1 tablet (50 mg) by mouth daily   fish oil-omega-3 fatty acids 1000 MG capsule 4/22/2019 at am Self Yes Yes   Sig: Take 1 g by mouth daily   fluticasone (FLOVENT HFA) 220 MCG/ACT inhaler 4/22/2019 at Unknown time  Yes Yes   Sig: Inhale 1 puff into the lungs 2 times daily   furosemide (LASIX) 20 MG tablet 4/22/2019 at am  No Yes   Sig: Take 1 tablet (20 mg) by mouth daily   insulin glargine (LANTUS) 100 UNIT/ML injection 4/21/2019 at 18 units Pharmacy Yes Yes   Sig: Inject 18-23 Units Subcutaneous At Bedtime (Patient will increase to 26 units at bedtime he states  when BG is elevated.    irbesartan (AVAPRO) 150 MG tablet 4/22/2019 at am  No Yes   Sig: TAKE 1 TABLET(150 MG) BY MOUTH DAILY   order for DME  Pharmacy No No   Sig: Equipment being ordered: Other: Nebulizer machine  Treatment Diagnosis: COPD   order for DME  Pharmacy No No   Sig: Equipment being ordered: Four wheeled walker   order for DME   No No   Sig: Equipment being ordered: portable Oxygen concentrator   order for DME   No No   Sig: Equipment being ordered: left thumb spica splint   predniSONE (DELTASONE) 20 MG tablet   No No   Sig: Take 2 tablets (40 mg) by mouth daily for 3 days   ranitidine (ZANTAC) 150 MG tablet Past Month at Unknown time  No Yes   Sig: Take 1 tablet (150 mg) by mouth daily as needed for heartburn      Facility-Administered Medications: None     Allergies   Allergies   Allergen Reactions     Levaquin [Levofloxacin]      edema     Pioglitazone Other (See Comments)     Edema & hay fever     Vytorin Other (See Comments)     Muscle aches       Social History   I have reviewed this patient's social history and updated it with pertinent information if needed. Seven SEMAJ Savage Jr.  reports that he quit smoking about 8 years ago. His smoking use included cigarettes. He has a 70.00 pack-year smoking history. He has never used smokeless tobacco. He reports that he drinks alcohol. He reports that he does not use drugs.    Family History   I have reviewed this patient's family history and updated it with pertinent information if needed.   Family History   Family history unknown: Yes       Review of Systems   The 10 point Review of Systems is negative other than noted in the HPI.      Physical Exam   Temp: 95.6  F (35.3  C) Temp src: Oral BP: 142/54 Pulse: 71 Heart Rate: 71 Resp: 16 SpO2: 92 % O2 Device: Nasal cannula Oxygen Delivery: 2 LPM  Vital Signs with Ranges  Temp:  [95.6  F (35.3  C)-97.2  F (36.2  C)] 95.6  F (35.3  C)  Pulse:  [71] 71  Heart Rate:  [67-75] 71  Resp:  [16] 16  BP:  (142-184)/(53-74) 142/54  SpO2:  [92 %-99 %] 92 %  0 lbs 0 oz    GENERAL: healthy, alert, NAD  HEENT:  Normocephalic. No gross abnormalities.  Pupils equal.  MMM.  Dentition is ok.  CV: RRR, no murmurs, no clicks, gallops, or rubs, no edema, no carotid bruits  RESP: Clear bilaterally with good efforts  GI: Abdomen soft/nt/nd, BS normal. No masses, organomegaly  MUSCULOSKELETAL: extremities nl - no gross deformities noted  SKIN: no suspicious lesions or rashes, dry to touch  NEURO:  Strength normal and symmetric.   PSYCH: mood good, affect appropriate  LYMPH: No palpable ant/post cervical and supraclavicular adenopathy    Data   BMP  Recent Labs   Lab 04/24/19  0734 04/22/19  1615    137   POTASSIUM 4.0 3.6   CHLORIDE 104 99   AARON 9.9 9.4   CO2 29 31   BUN 40* 20   CR 5.02* 2.98*   GLC 95 193*     [email or web address](phos:4)  CBC)  Recent Labs   Lab 04/24/19  0734 04/23/19  0713 04/22/19  1615   WBC  --  8.0 11.6*   HGB 9.4* 9.2* 10.0*   HCT  --  30.2* 32.7*   MCV  --  84 84   PLT  --  314 362     Recent Labs   Lab 04/22/19  1615   AST 15   ALT 12   ALKPHOS 61   BILITOTAL 1.4*     No lab results found in last 7 days.  No results found for: D2VIT, D3VIT, DTOT  Recent Labs   Lab 04/24/19  0734 04/23/19  0713   HGB 9.4* 9.2*   HCT  --  30.2*   MCV  --  84     No results for input(s): PTHI in the last 168 hours.

## 2019-04-24 NOTE — PLAN OF CARE
A&O. VSS on 2L O2. Up with 1 assist. Pain well controlled with flexeril and oxy. No BM overnight. Refused 0200 blood glucose. Plans for dialysis today.

## 2019-04-24 NOTE — PLAN OF CARE
A&O x4, VSS on 2L O2 (baseline) except elevated BP, improved w/ scheduled coreg. Denies pain. Diabetic diet, fair appetite. A1 w/ walker to bathroom, dyspnea on exertion and tires easily during activity. Lumbar spine corset in place. No BM for 5 days, pt refused bedtime senna. Plan for dialysis tomorrow. Discharge pending pt's decisions regarding goals of care.

## 2019-04-24 NOTE — PROGRESS NOTES
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  He has a history of hypertension, diabetes and end-stage renal disease.  He has been on dialysis since 2016 and is managed on erythropoietin and venofer. Hemoglobin had been around 10-11 but more recently fell to 7.2. He was given 2 unit(s) [facility].  His work up in our clinic included a haptoglobin of 115.  Reticulocyte count of 3.5.  IgG level of 458.  IgA 199.  IgM 20.  TSH was 2.87.  .  Serum protein electrophoresis demonstrated no monoclonal protein.  Peripheral morphology demonstrated moderate hypochromic, normocytic anemia with mild reticulocytosis.  Rare schistocytes were present.  There were no apparent dysplastic or neoplastic changes.     Given no obvious explanation for the patient's anemia, a bone marrow biopsy was recommended but he declined.     He subsequently seen in the ER on 4/22/19 for evaluation of back and left leg pain.       He had been seen by a chiropractor and underwent an MRI scan of his lumbar spine which demonstrated an L4 vertebral compression fracture which was later over-read as concerning for metastatic disease.       While in the ER, he underwent a CT scan of the chest abdomen and pelvis with contrast which demonstrated a spiculated right upper lobe pulmonary nodule, highly suspicious for malignancy such as bronchogenic carcinoma.  There was lytic change in the L4 vertebral body with mild anterior compression, suspicious for metastatic lesion and an associated pathologic compression fracture, age indeterminate.  Multiple low-density liver lesions, not well characterized on a noncontrast scan but considered suspicious for metastatic disease.  There was mild mediastinal adenopathy.  Multiple bilateral adrenal nodules were indeterminant.  There are multiple hyperdense renal lesions  bilaterally which could represent hemorrhagic or proteinaceous renal cysts.  Solid renal neoplasm could not be excluded.  There was moderate prostatic enlargement and an infra renal abdominal aortic aneurysm measuring 3.2 cm.    Pain is fairly well controlled, seen by palliative and meds adjusted.  No neuro symptoms in LEs.  Seen by Dr. Freeman, recommended soft brace, and activities instructions, no surgery planned.  Constipation for few days, started on regimen.         Medications:       - MEDICATION INSTRUCTIONS for Dialysis Patients -   Does not apply See Admin Instructions     aspirin  81 mg Oral Daily     atorvastatin  40 mg Oral Daily     carvedilol  12.5 mg Oral BID w/meals     zz extemporaneous template  300 mg Oral Daily     eplerenone  50 mg Oral Daily     fluticasone  1 puff Inhalation BID     insulin aspart  1-3 Units Subcutaneous TID AC     insulin aspart  1-3 Units Subcutaneous At Bedtime     insulin glargine  18 Units Subcutaneous At Bedtime     irbesartan  150 mg Oral Daily     senna-docusate  1-2 tablet Oral BID     sodium chloride (PF)  3 mL Intracatheter Q8H                ROS:   RESP, CV, GI,, MUSCULOSKELETAL, HEME/ALLERGY/IMMUNE,  Skin: reviewed and negative except the positives mentioned in this note            Physical Exam:       Vital Sign Ranges  Temp:  [95.6  F (35.3  C)-97.2  F (36.2  C)] 95.6  F (35.3  C)  Pulse:  [71] 71  Heart Rate:  [67-75] 71  Resp:  [16] 16  BP: (149-184)/(53-74) 180/74  SpO2:  [92 %-99 %] 92 %      I/O last 3 completed shifts:  In: 1040 [P.O.:1040]  Out: 160 [Urine:160]    Constitutional: Awake, alert, cooperative, no apparent distress  HEENT: unremarkable, on home O2  Neck: Supple, no adenopathy, thyroid symmetric, not enlarged and no tenderness  Lungs: No increased work of breathing, good air exchange, clear to auscultation bilaterally, no crackles or wheezing.  Cardiovascular: Regular rate and rhythm, normal S1 and S2,no murmur noted.  Abdomen: No scars,  normal bowel sounds, soft, non-distended, non-tender, no masses palpated, no hepatosplenomegally.  Ext: no edema, cyanosis           Data:       ROUTINE LABS (Last four results)  CMP  Recent Labs   Lab 04/24/19  0734 04/22/19  1615    137   POTASSIUM 4.0 3.6   CHLORIDE 104 99   CO2 29 31   ANIONGAP 7 7   GLC 95 193*   BUN 40* 20   CR 5.02* 2.98*   GFRESTIMATED 10* 18*   GFRESTBLACK 11* 21*   AARON 9.9 9.4   PHOS 4.7*  --    PROTTOTAL  --  6.3*   ALBUMIN 2.8* 3.1*   BILITOTAL  --  1.4*   ALKPHOS  --  61   AST  --  15   ALT  --  12     CBC  Recent Labs   Lab 04/24/19  0734 04/23/19  0713 04/22/19  1615   WBC  --  8.0 11.6*   RBC  --  3.58* 3.89*   HGB 9.4* 9.2* 10.0*   HCT  --  30.2* 32.7*   MCV  --  84 84   MCH  --  25.7* 25.7*   MCHC  --  30.5* 30.6*   RDW  --  17.7* 17.9*   PLT  --  314 362     INRNo lab results found in last 7 days.         Assessment and Plan:   1. Pulmonary nodule with findings concerning for metastatic disease in the liver, bone, kidneys, adrenal glands. Pt declines biopsy and wants to think about it as outpatient.   consider radiation therapy to the L4 lesion. Generally radiation oncology require tissue biopsy but can be skipped if diagnosis is certain and pt declines  Biopsy for comfort. would be preferred.  He agrees with  PET scan, I will arrange as outpatient and discuss with him further.   In terms of systemic therapy, pt leaning toward comfort care regardless (reasonable in his situation)      2. Anemia with initial work-up not diagnostic for cause of anemia.  Now, with suspicion of malignancy, this may offer explanation.  Hemoglobin is 9.2 compared to 9.5 in our office on 3/14/19.      3. Dialysis dependent renal failure receiving dialysis M-W-F     4. Pain related to acute/subacute compression fracture at L4.  Imaging suggests that this may be pathologic due to metastatic disease. No epidural soft tissue tumor identified. Has been seen by ortho and brace has been recommended for  stability.  Radiation therapy to this site would be beneficial.  If patient opts against biopsy to confirm malignancy, could consider PET scan (see #1 above)     5. 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             Kevin Robles M.D.

## 2019-04-24 NOTE — PROGRESS NOTES
Potassium   Date Value Ref Range Status   04/24/2019 4.0 3.4 - 5.3 mmol/L Final     Lab Results   Component Value Date    HGB 9.4 04/24/2019     Weight: 75 kg (165 lb 5.5 oz)  DIALYSIS PROCEDURE NOTE    Hepatitis status of previous patient on machine log was checked and ok to use with this patient hepatitis status.    Patient dialyzed for 2.75 hrs on a 3 K bath with a net fluid removal of 0L.  A BFR of 400ml/min was obtained via LUE AVF with 15 gauge needles. Patient was seen by Dr. Waldrop during treatment. Total heparin received during treatment: 0 units.    Meds given: Hectorol   Complications: none        Procedure and ESRD teaching done and questions answered. See flowsheet in EPIC for further details and post assessment. Machine water alarm in place and functioning.CHLORINE AND CHLORAMINES CHECK on WATER SYSTEM EVERY 4 hours. Consent signed: yes  PT returned via w/c  Access: Aseptic prep done for both on/off.   Report received from: Jose Duenas RN  Report given to: Jose Duenas RN    Outpatient Dialysis at [facility]    Simin Foreman RN, 4/24/2019, 3:09 PM

## 2019-04-24 NOTE — TELEPHONE ENCOUNTER
"Requested Prescriptions   Pending Prescriptions Disp Refills     doxazosin (CARDURA) 8 MG tablet [Pharmacy Med Name: DOXAZOSIN 8MG TABLETS] 90 tablet 0     Sig: TAKE 1 TABLET BY MOUTH EVERY NIGHT AT BEDTIME.       Alpha Blockers Failed - 4/24/2019 10:23 AM        Failed - Blood pressure under 140/90 in past 12 months     BP Readings from Last 3 Encounters:   04/24/19 180/74   04/18/19 136/70   04/16/19 170/70                 Passed - Recent (12 mo) or future (30 days) visit within the authorizing provider's specialty     Patient had office visit in the last 12 months or has a visit in the next 30 days with authorizing provider or within the authorizing provider's specialty.  See \"Patient Info\" tab in inbasket, or \"Choose Columns\" in Meds & Orders section of the refill encounter.              Passed - Patient does not have Tadalafil, Vardenafil, or Sildenafil on their medication list        Passed - Medication is active on med list        Passed - Patient is 18 years of age or older        Last Written Prescription Date:  1/2/19  Last Fill Quantity: 90,  # refills: 0   Last office visit: 4/18/2019 with prescribing provider:  4/18/19   Future Office Visit:      "

## 2019-04-24 NOTE — PROGRESS NOTES
[facility]    Medicine Progress Note - Hospitalist Service       Date of Admission:  4/22/2019  Assessment & Plan   Seven Savage Jr. is a 84 year old male admitted on 4/22/2019. He presents with back pain and left sided leg pain. CT and MRI scanning showed an L4 compression fracture as well as a malignant appearing lung nodule and possible metastatic liver lesions.     L4 vertebral body with mild anterior compression, possibly pathologic   Pain is currently controlled.    Orthopedist recommends conservative management with a brace.  PT consulted.  If this is determined to be pathologic then radiation therapy would be recommended.    Malignant appearing lesions in lung, liver, spine and possible kidneys and adrenals.  After discussion with the oncologist, the patient will follow-up in the clinic and he has not decided whether or not he wants a biopsy and/or PET scan.    COPD (chronic obstructive pulmonary disease)  Chronic respiratory failure on home oxygen  Stable     End-stage renal disease  On hemodialysis  F  Nephrology managing    Essential hypertension  Hyperlipidemia  Continue Coreg/ASA/Statin/diltiazem/Lasix    Type 2 diabetes mellitus with long-term current use of insulin   Hgb A1c 5.8%  On Lantus 23 U at bedtime- decreased on admission to 18 units.  Glucometer still under 100 this morning so we will further decrease his Lantus.    Diet: Moderate Consistent CHO Diet    DVT Prophylaxis: Pneumatic Compression Devices  Dougherty Catheter: not present  Code Status: DNR/DNI      Disposition Plan   Expected discharge: To a TCU and a bed is available.  Entered: Erick Griffiths MD 04/24/2019, 1:25 PM       The patient's care was discussed with the Patient.    Erick Griffiths MD  Hospitalist Service  [facility]    ______________________________________________________________________    Interval History   Pain is controlled.  No specific complaints.    Data reviewed  today: I reviewed all medications, new labs and imaging results over the last 24 hours. I personally reviewed no images or EKG's today.    Physical Exam   Vital Signs: Temp: 95.8  F (35.4  C) Temp src: Oral BP: 145/59 Pulse: 71 Heart Rate: 60 Resp: 16 SpO2: 92 % O2 Device: Nasal cannula Oxygen Delivery: 2 LPM  Weight: 165 lbs 5.52 oz  Constitutional: awake, alert, cooperative, no apparent distress, and appears stated age  Respiratory: No increased work of breathing, good air exchange, clear to auscultation bilaterally, no crackles or wheezing  Cardiovascular: RR 3/6 SM  GI: No scars, normal bowel sounds, soft, non-distended, non-tender  Skin: no rashes and no jaundice  Neurologic: Awake, alert, oriented to name, place and time.  Cranial nerves II-XII are grossly intact.  Motor is 5 out of 5 bilaterally  Neuropsychiatric: General: normal, calm and normal eye contact    Data   Recent Labs   Lab 04/24/19  0734 04/23/19  0713 04/22/19  1615   WBC  --  8.0 11.6*   HGB 9.4* 9.2* 10.0*   MCV  --  84 84   PLT  --  314 362     --  137   POTASSIUM 4.0  --  3.6   CHLORIDE 104  --  99   CO2 29  --  31   BUN 40*  --  20   CR 5.02*  --  2.98*   ANIONGAP 7  --  7   AARON 9.9  --  9.4   GLC 95  --  193*   ALBUMIN 2.8*  --  3.1*   PROTTOTAL  --   --  6.3*   BILITOTAL  --   --  1.4*   ALKPHOS  --   --  61   ALT  --   --  12   AST  --   --  15   LIPASE  --   --  85     No results found for this or any previous visit (from the past 24 hour(s)).

## 2019-04-25 ENCOUNTER — APPOINTMENT (OUTPATIENT)
Dept: PHYSICAL THERAPY | Facility: CLINIC | Age: 84
DRG: 542 | End: 2019-04-25
Payer: MEDICARE

## 2019-04-25 LAB
GLUCOSE BLDC GLUCOMTR-MCNC: 114 MG/DL (ref 70–99)
GLUCOSE BLDC GLUCOMTR-MCNC: 114 MG/DL (ref 70–99)
GLUCOSE BLDC GLUCOMTR-MCNC: 140 MG/DL (ref 70–99)
GLUCOSE BLDC GLUCOMTR-MCNC: 65 MG/DL (ref 70–99)
GLUCOSE BLDC GLUCOMTR-MCNC: 69 MG/DL (ref 70–99)
PROT ELPH PNL UR ELPH: NORMAL

## 2019-04-25 PROCEDURE — 99207 ZZC CDG-MDM COMPONENT: MEETS LOW - DOWN CODED: CPT | Performed by: HOSPITALIST

## 2019-04-25 PROCEDURE — 97161 PT EVAL LOW COMPLEX 20 MIN: CPT | Mod: GP | Performed by: PHYSICAL THERAPIST

## 2019-04-25 PROCEDURE — 25000132 ZZH RX MED GY IP 250 OP 250 PS 637: Performed by: STUDENT IN AN ORGANIZED HEALTH CARE EDUCATION/TRAINING PROGRAM

## 2019-04-25 PROCEDURE — 25000132 ZZH RX MED GY IP 250 OP 250 PS 637: Performed by: NURSE PRACTITIONER

## 2019-04-25 PROCEDURE — 97530 THERAPEUTIC ACTIVITIES: CPT | Mod: GP | Performed by: PHYSICAL THERAPIST

## 2019-04-25 PROCEDURE — A9270 NON-COVERED ITEM OR SERVICE: HCPCS | Performed by: NURSE PRACTITIONER

## 2019-04-25 PROCEDURE — 12000000 ZZH R&B MED SURG/OB

## 2019-04-25 PROCEDURE — 99232 SBSQ HOSP IP/OBS MODERATE 35: CPT | Performed by: HOSPITALIST

## 2019-04-25 PROCEDURE — 25000125 ZZHC RX 250: Performed by: HOSPITALIST

## 2019-04-25 PROCEDURE — A9270 NON-COVERED ITEM OR SERVICE: HCPCS | Performed by: HOSPITALIST

## 2019-04-25 PROCEDURE — 40000894 ZZH STATISTIC OT IP EVAL DEFER

## 2019-04-25 PROCEDURE — 00000146 ZZHCL STATISTIC GLUCOSE BY METER IP

## 2019-04-25 PROCEDURE — A9270 NON-COVERED ITEM OR SERVICE: HCPCS | Performed by: STUDENT IN AN ORGANIZED HEALTH CARE EDUCATION/TRAINING PROGRAM

## 2019-04-25 PROCEDURE — 25000132 ZZH RX MED GY IP 250 OP 250 PS 637: Performed by: HOSPITALIST

## 2019-04-25 PROCEDURE — 25000131 ZZH RX MED GY IP 250 OP 636 PS 637: Performed by: HOSPITALIST

## 2019-04-25 RX ADMIN — ASPIRIN 81 MG 81 MG: 81 TABLET ORAL at 08:35

## 2019-04-25 RX ADMIN — EPLERENONE 50 MG: 25 TABLET ORAL at 08:34

## 2019-04-25 RX ADMIN — CYCLOBENZAPRINE HYDROCHLORIDE 5 MG: 5 TABLET, FILM COATED ORAL at 22:10

## 2019-04-25 RX ADMIN — SENNOSIDES AND DOCUSATE SODIUM 2 TABLET: 8.6; 5 TABLET ORAL at 20:27

## 2019-04-25 RX ADMIN — HYDROMORPHONE HYDROCHLORIDE 2 MG: 2 TABLET ORAL at 12:31

## 2019-04-25 RX ADMIN — SENNOSIDES AND DOCUSATE SODIUM 2 TABLET: 8.6; 5 TABLET ORAL at 08:34

## 2019-04-25 RX ADMIN — ATORVASTATIN CALCIUM 40 MG: 40 TABLET, FILM COATED ORAL at 08:35

## 2019-04-25 RX ADMIN — CYCLOBENZAPRINE HYDROCHLORIDE 5 MG: 5 TABLET, FILM COATED ORAL at 00:53

## 2019-04-25 RX ADMIN — FLUTICASONE PROPIONATE 1 PUFF: 220 AEROSOL, METERED RESPIRATORY (INHALATION) at 20:27

## 2019-04-25 RX ADMIN — INSULIN GLARGINE 10 UNITS: 100 INJECTION, SOLUTION SUBCUTANEOUS at 22:07

## 2019-04-25 RX ADMIN — POLYETHYLENE GLYCOL 3350 17 G: 17 POWDER, FOR SOLUTION ORAL at 08:34

## 2019-04-25 RX ADMIN — FLUTICASONE PROPIONATE 1 PUFF: 220 AEROSOL, METERED RESPIRATORY (INHALATION) at 08:39

## 2019-04-25 RX ADMIN — ALBUTEROL SULFATE 2.5 MG: 2.5 SOLUTION RESPIRATORY (INHALATION) at 09:09

## 2019-04-25 RX ADMIN — DILTIAZEM HYDROCHLORIDE 300 MG: 180 CAPSULE, EXTENDED RELEASE ORAL at 08:38

## 2019-04-25 RX ADMIN — IRBESARTAN 150 MG: 150 TABLET ORAL at 08:34

## 2019-04-25 RX ADMIN — CARVEDILOL 12.5 MG: 6.25 TABLET, FILM COATED ORAL at 08:34

## 2019-04-25 RX ADMIN — CARVEDILOL 12.5 MG: 6.25 TABLET, FILM COATED ORAL at 18:17

## 2019-04-25 RX ADMIN — HYDROMORPHONE HYDROCHLORIDE 2 MG: 2 TABLET ORAL at 19:18

## 2019-04-25 RX ADMIN — UMECLIDINIUM BROMIDE AND VILANTEROL TRIFENATATE 1 PUFF: 62.5; 25 POWDER RESPIRATORY (INHALATION) at 09:58

## 2019-04-25 ASSESSMENT — MIFFLIN-ST. JEOR
SCORE: 1417.48
SCORE: 1408.86

## 2019-04-25 ASSESSMENT — ACTIVITIES OF DAILY LIVING (ADL)
ADLS_ACUITY_SCORE: 19
ADLS_ACUITY_SCORE: 23
ADLS_ACUITY_SCORE: 23
ADLS_ACUITY_SCORE: 19

## 2019-04-25 NOTE — PROGRESS NOTES
[facility]    Medicine Progress Note - Hospitalist Service       Date of Admission:  4/22/2019  Assessment & Plan   Seven Savage Jr. is a 84 year old male admitted on 4/22/2019. He presents with back pain and left sided leg pain. CT and MRI scanning showed an L4 compression fracture as well as a malignant appearing lung nodule and possible metastatic liver lesions.     L4 vertebral body with mild anterior compression, possibly pathologic   Pain is currently controlled.    Orthopedist recommends conservative management with a brace.  PT consulted.  If this is determined to be pathologic then radiation therapy would be recommended.    Malignant appearing lesions in lung, liver, spine and possible kidneys and adrenals.  After discussion with the oncologist, the patient will follow-up in the clinic and he has not decided whether or not he wants a biopsy and/or PET scan.    COPD (chronic obstructive pulmonary disease)  Chronic respiratory failure on home oxygen  Resume Stiolto Respimat     End-stage renal disease  On hemodialysis  MWF  Nephrology managing    Essential hypertension  Hyperlipidemia  Continue Coreg/ASA/Statin/diltiazem/Lasix    Type 2 diabetes mellitus with long-term current use of insulin   Hgb A1c 5.8%  On Lantus 23 U at bedtime- decreased on admission to 18 units.  Glucometer still under 100 this morning despite lowering to 15 units- will decrease to 10 units.    Diet: Moderate Consistent CHO Diet    DVT Prophylaxis: Pneumatic Compression Devices  Dougherty Catheter: not present  Code Status: DNR/DNI      Disposition Plan   Expected discharge: To a TCU and a bed is available.  Entered: Erick Griffiths MD 04/25/2019, 11:06 AM       The patient's care was discussed with the Patient.    Erick Griffiths MD  Hospitalist Service  [facility]    ______________________________________________________________________    Interval History   Pain is controlled.  No  specific complaints.    Data reviewed today: I reviewed all medications, new labs and imaging results over the last 24 hours. I personally reviewed no images or EKG's today.    Physical Exam   Vital Signs: Temp: 97.4  F (36.3  C) Temp src: Oral BP: 142/49 Pulse: 67 Heart Rate: 67 Resp: 18 SpO2: 95 % O2 Device: None (Room air) Oxygen Delivery: 2 LPM  Weight: 167 lbs 9.6 oz  Constitutional: awake, alert, cooperative, no apparent distress, and appears stated age  Respiratory: No increased work of breathing, good air exchange, clear to auscultation bilaterally, no crackles or wheezing  Cardiovascular: RR 3/6 SM  GI: No scars, normal bowel sounds, soft, non-distended, non-tender  Skin: no rashes and no jaundice  Neurologic: Awake, alert, oriented to name, place and time.  Cranial nerves II-XII are grossly intact.  Motor is 5 out of 5 bilaterally  Neuropsychiatric: General: normal, calm and normal eye contact    Data   Recent Labs   Lab 04/24/19  0734 04/23/19  0713 04/22/19  1615   WBC  --  8.0 11.6*   HGB 9.4* 9.2* 10.0*   MCV  --  84 84   PLT  --  314 362     --  137   POTASSIUM 4.0  --  3.6   CHLORIDE 104  --  99   CO2 29  --  31   BUN 40*  --  20   CR 5.02*  --  2.98*   ANIONGAP 7  --  7   AARON 9.9  --  9.4   GLC 95  --  193*   ALBUMIN 2.8*  --  3.1*   PROTTOTAL  --   --  6.3*   BILITOTAL  --   --  1.4*   ALKPHOS  --   --  61   ALT  --   --  12   AST  --   --  15   LIPASE  --   --  85     No results found for this or any previous visit (from the past 24 hour(s)).

## 2019-04-25 NOTE — PROGRESS NOTES
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  He has a history of hypertension, diabetes and end-stage renal disease.  He has been on dialysis since 2016 and is managed on erythropoietin and venofer. Hemoglobin had been around 10-11 but more recently fell to 7.2. He was given 2 unit(s) [facility].  His work up in our clinic included a haptoglobin of 115.  Reticulocyte count of 3.5.  IgG level of 458.  IgA 199.  IgM 20.  TSH was 2.87.  .  Serum protein electrophoresis demonstrated no monoclonal protein.  Peripheral morphology demonstrated moderate hypochromic, normocytic anemia with mild reticulocytosis.  Rare schistocytes were present.  There were no apparent dysplastic or neoplastic changes.     Given no obvious explanation for the patient's anemia, a bone marrow biopsy was recommended but he declined.     He subsequently seen in the ER on 4/22/19 for evaluation of back and left leg pain.       He had been seen by a chiropractor and underwent an MRI scan of his lumbar spine which demonstrated an L4 vertebral compression fracture which was later over-read as concerning for metastatic disease.       While in the ER, he underwent a CT scan of the chest abdomen and pelvis with contrast which demonstrated a spiculated right upper lobe pulmonary nodule, highly suspicious for malignancy such as bronchogenic carcinoma.  There was lytic change in the L4 vertebral body with mild anterior compression, suspicious for metastatic lesion and an associated pathologic compression fracture, age indeterminate.  Multiple low-density liver lesions, not well characterized on a noncontrast scan but considered suspicious for metastatic disease.  There was mild mediastinal adenopathy.  Multiple bilateral adrenal nodules were indeterminant.  There are multiple hyperdense renal lesions  bilaterally which could represent hemorrhagic or proteinaceous renal cysts.  Solid renal neoplasm could not be excluded.  There was moderate prostatic enlargement and an infra renal abdominal aortic aneurysm measuring 3.2 cm.    Pain is fairly well controlled, seen by palliative and meds adjusted.  No neuro symptoms in LEs.  Seen by Dr. Freeman, recommended soft brace, and activities instructions, no surgery planned.  Pain is under good control.  He is at peace with diagnosis and his focus is on symptom control.         Medications:       - MEDICATION INSTRUCTIONS for Dialysis Patients -   Does not apply See Admin Instructions     aspirin  81 mg Oral Daily     atorvastatin  40 mg Oral Daily     carvedilol  12.5 mg Oral BID w/meals     zz extemporaneous template  300 mg Oral Daily     eplerenone  50 mg Oral Daily     fluticasone  1 puff Inhalation BID     insulin aspart  1-3 Units Subcutaneous TID AC     insulin aspart  1-3 Units Subcutaneous At Bedtime     insulin glargine  10 Units Subcutaneous At Bedtime     irbesartan  150 mg Oral Daily     senna-docusate  1-2 tablet Oral BID     sodium chloride (PF)  3 mL Intracatheter Q8H     umeclidinium-vilanterol  1 puff Inhalation Daily                ROS:   RESP, CV, GI,, MUSCULOSKELETAL, HEME/ALLERGY/IMMUNE,  Skin: reviewed and negative except the positives mentioned in this note            Physical Exam:       Vital Sign Ranges  Temp:  [95.3  F (35.2  C)-97.4  F (36.3  C)] 97.4  F (36.3  C)  Pulse:  [67] 67  Heart Rate:  [64-67] 67  Resp:  [18] 18  BP: (137-163)/(45-59) 142/49  SpO2:  [95 %-97 %] 95 %      I/O last 3 completed shifts:  In: 100 [P.O.:100]  Out: 375 [Urine:375]    Constitutional: Awake, alert, cooperative, no apparent distress  HEENT: unremarkable, on home O2  Unchanged           Data:       ROUTINE LABS (Last four results)  CMP  Recent Labs   Lab 04/24/19  0734 04/22/19  1615    137   POTASSIUM 4.0 3.6   CHLORIDE 104 99   CO2 29 31   ANIONGAP 7 7    GLC 95 193*   BUN 40* 20   CR 5.02* 2.98*   GFRESTIMATED 10* 18*   GFRESTBLACK 11* 21*   AARON 9.9 9.4   PHOS 4.7*  --    PROTTOTAL  --  6.3*   ALBUMIN 2.8* 3.1*   BILITOTAL  --  1.4*   ALKPHOS  --  61   AST  --  15   ALT  --  12     CBC  Recent Labs   Lab 04/24/19  0734 04/23/19  0713 04/22/19  1615   WBC  --  8.0 11.6*   RBC  --  3.58* 3.89*   HGB 9.4* 9.2* 10.0*   HCT  --  30.2* 32.7*   MCV  --  84 84   MCH  --  25.7* 25.7*   MCHC  --  30.5* 30.6*   RDW  --  17.7* 17.9*   PLT  --  314 362     Urine immune fixation negative          Assessment and Plan:   1. Pulmonary nodule with findings concerning for metastatic disease in the liver, bone, kidneys, adrenal glands. Pt declines biopsy and wants to think about it as outpatient.   consider radiation therapy to the L4 lesion. Generally radiation oncology require tissue biopsy but can be skipped if diagnosis is certain and pt declines  Biopsy for comfort. would be preferred.  He agrees with  PET scan, I will arrange as outpatient and discuss with him further.   In terms of systemic therapy, pt leaning toward comfort care regardless (reasonable in his situation)      2. Anemia with initial work-up not diagnostic for cause of anemia.  Now, with suspicion of malignancy, this may offer explanation.  Hemoglobin is 9.2 compared to 9.5 in our office on 3/14/19.      3. Dialysis dependent renal failure receiving dialysis M-W-F     4. Pain related to acute/subacute compression fracture at L4.  Imaging suggests that this may be pathologic due to metastatic disease. No epidural soft tissue tumor identified. Has been seen by ortho and brace has been recommended for stability.  Radiation therapy to this site would be beneficial.      5. 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    Plan to see in office with PET scan.         Kevin Robles M.D.

## 2019-04-25 NOTE — PROGRESS NOTES
04/25/19 1400   Quick Adds   Type of Visit Initial PT Evaluation   Living Environment   Lives With child(maxx), adult;grandchild(maxx)   Living Arrangements house   Home Accessibility stairs to enter home   Number of Stairs, Main Entrance 3   Transportation Anticipated family or friend will provide   Living Environment Comment Lives with son, daughter-in-law, grandchildren   Self-Care   Usual Activity Tolerance good   Current Activity Tolerance poor   Regular Exercise No   Activity/Exercise/Self-Care Comment Reports 1 month ago was able to ambulate independently (limited by SOB - on Home O2 2L at baseline); was able to perform ADLs/IADLs independently, was driving. Within the past month pt's mobiity has regressed to needing signficant assistance to ambulate household distances, needed assist with transportation and up/down stairs, assist with ADLs   Functional Level Prior   Ambulation   (prior to 1 month ago no AD; within past month FWW and Assist)   Transferring   (prior to 1 month ago no AD; within past month FWW and Assist)   Toileting 2-->assistive person   Bathing 2-->assistive person   Fall history within last six months yes   Number of times patient has fallen within last six months 1   Which of the above functional risks had a recent onset or change? ambulation;transferring;toileting   General Information   Onset of Illness/Injury or Date of Surgery - Date 04/22/19   Referring Physician Erick Griffiths MD   Patient/Family Goals Statement Not stated   Pertinent History of Current Problem (include personal factors and/or comorbidities that impact the POC) Pt is an 83yo male admitted for evaluation of back pain and left sided leg pain. CT and MRI scanning showed an L4 compression fracture as well as a malignant appearing lung nodule and possible metastatic liver lesions.   Precautions/Limitations fall precautions;spinal precautions;oxygen therapy device and L/min  (2L O2 at baseline)   Weight-Bearing  Status - LLE weight-bearing as tolerated   Weight-Bearing Status - RLE weight-bearing as tolerated   General Observations Pt resting in bed with LSO corset donned   General Info Comments Brace OOB; up with assist   Cognitive Status Examination   Orientation orientation to person, place and time   Level of Consciousness alert   Follows Commands and Answers Questions 100% of the time;able to follow multistep instructions   Personal Safety and Judgment intact   Pain Assessment   Patient Currently in Pain Yes, see Vital Sign flowsheet   Posture    Posture Forward head position;Protracted shoulders   Range of Motion (ROM)   ROM Comment BLE WNL with AROM   Strength   Strength Comments BLE appears at least 3/5 with functional transfers without focal deficits noted   Bed Mobility   Bed Mobility Comments Eric supine>sit   Transfer Skills   Transfer Comments Eric sit>stand to FWW   Gait   Gait Comments able to take a few steps at bedside with FWW and Eric, no buckling noted   Balance   Balance Comments Good at EOB   Sensory Examination   Sensory Perception Comments Denies N/T   General Therapy Interventions   Planned Therapy Interventions balance training;bed mobility training;gait training;strengthening;transfer training;home program guidelines;progressive activity/exercise   Clinical Impression   Criteria for Skilled Therapeutic Intervention yes, treatment indicated   PT Diagnosis Difficulty ambulating   Influenced by the following impairments Pain, weakness, decreased activity tolerance, decreased balance   Functional limitations due to impairments Decreased safety and IND with functional transfers, gait, stairs   Clinical Presentation Evolving/Changing   Clinical Presentation Rationale pain, treatment plan pending   Clinical Decision Making (Complexity) Low complexity   Therapy Frequency` 5 times/week   Predicted Duration of Therapy Intervention (days/wks) 4 days   Anticipated Discharge Disposition Transitional Care  "Facility;Home with Home Therapy   Risk & Benefits of therapy have been explained Yes   Patient, Family & other staff in agreement with plan of care Yes   [facility] TM \"6 Clicks\"   2016, Trustees of [facility], under license to [employer].  All rights reserved.   6 Clicks Short Forms Basic Mobility Inpatient Short Form   [facility] AM-PAC  \"6 Clicks\" V.2 Basic Mobility Inpatient Short Form   1. Turning from your back to your side while in a flat bed without using bedrails? 4 - None   2. Moving from lying on your back to sitting on the side of a flat bed without using bedrails? 3 - A Little   3. Moving to and from a bed to a chair (including a wheelchair)? 3 - A Little   4. Standing up from a chair using your arms (e.g., wheelchair, or bedside chair)? 3 - A Little   5. To walk in hospital room? 3 - A Little   6. Climbing 3-5 steps with a railing? 2 - A Lot   Basic Mobility Raw Score (Score out of 24.Lower scores equate to lower levels of function) 18   Total Evaluation Time   Total Evaluation Time (Minutes) 10     "

## 2019-04-25 NOTE — PROGRESS NOTES
"SP[facility] Progress Note  [facility]    Follow up visit with pt Seven.     Pt said that he eats better and his pain is getting better. Pt shared about his arazna that he is not afraid of dying, \"I was in God's hands and am in God's hands.\"     Pt loves to talk about religions, and  provided a reflective conversation. Pt also shared about his family and ancestor.     Pt said that he is living with his son who lives in [place], and his finance is good.       provided a reflective conversation, attentive listening, and support.     I will follow up with pt for duration of stay.     Deborah Raygoza  Chaplain Resident    "

## 2019-04-25 NOTE — PLAN OF CARE
A/Ox4, garbled speech at times. VSS. Pain in lower back controlled with PRN PO dilaudid. Up A1 with walker - soft brace in place. Mod carb diet with BG checks. BG 65 this AM - up to 114 this afternoon. No BM - Miralax x1 and scheduled senna. Dialysis patient - plan to run tomorrow. Plan to discharge to TCU.

## 2019-04-25 NOTE — CONSULTS
SW Consult Note:    Care Transition Initial Assessment - SW     Met with: Patient    Active Problems:    Type 2 diabetes mellitus with chronic kidney disease on chronic dialysis, with long-term current use of insulin (H)    Hyperlipidemia LDL goal <100    Aortic valve stenosis, unspecified etiology    ESRD (end stage renal disease) on dialysis (H)    Chronic obstructive pulmonary disease with acute exacerbation (H)    Essential hypertension, benign    COPD exacerbation (H)    Compression fracture of L4 lumbar vertebra (H)       DATA  Lives With: child(maxx), adult, grandchild(maxx)      Quality of Family Relationships: helpful, involved, supportive  Description of Support System: Supportive, Involved  Who is your support system?: Children  Support Assessment: Adequate family and caregiver support, Adequate social supports.   Identified issues/concerns regarding health management: Per social service protocol for discharge planning, patient was admitted on 4/22/19 with a primary diagnosis of compression fracture of L4 Lumbar Vertebra. The tentative date of discharge is 4/25/19. Reviewed chart and spoke with patient regarding discharge plans.  Per patient, he resides at home with his son, daughter in law and grandchildren.  Discussed discharge recommendations of TCU placement and patient states that he would like to be in the [place] area as his family lives there and he goes to Dialysis at [facility].  SW discussed [facility] as they would be close to dialysis clinic as well as [facility], [facility] and [facility]. Patient stated that he would like to have transportation arranged and is in understanding of private pay fee.  SW spoke with patient's son Sarkis who stated that he will bring patient's portable oxygen tank for transport.         Quality of Family Relationships: helpful, involved, supportive     ASSESSMENT  Cognitive Status:  awake, alert and oriented  Concerns to be addressed: Discharge  planning.     PLAN  Financial costs for the patient includes: Discussed transportation fee.   Patient given options and choices for discharge: Yes  Patient/family is agreeable to the plan?  Yes  [facility]  Patient anticipates discharging to:  TCU    MIC Patel, LGSW

## 2019-04-25 NOTE — DISCHARGE SUMMARY
[facility]  Hospitalist Discharge Summary       Date of Admission:  4/22/2019  Date of Discharge:  4/25/2019  Discharging Provider: Erick Griffiths MD  Discharge Diagnoses   1. L4 vertebral body with mild anterior compression, possibly pathologic   2. Malignant appearing lesions in lung, liver, spine and possible kidneys and adrenals.    History of     COPD (chronic obstructive pulmonary disease)    Chronic respiratory failure on home oxygen  End-stage renal disease/stage 5 chronic kidney disease on hemodialysis     Anemia of end-stage renal disease/  Essential hypertension    Hyperlipidemia  Type 2 diabetes mellitus with long-term current use of insulin    Follow-ups Needed After Discharge   Follow-up Appointments     Follow Up and recommended labs and tests      Hemodialysis as scheduled.  Oncology clinic as scheduled.             Unresulted Labs Ordered in the Past 30 Days of this Admission     No orders found from 2/21/2019 to 4/23/2019.      These results will be followed up by oncologist    Discharge Disposition   Discharged to short-term care facility  Condition at discharge: Stable    Hospital Course   Seven Savage Jr. is a 84 year old male admitted on 4/22/2019. He presents with back pain and left sided leg pain. CT and MRI scanning showed an L4 compression fracture as well as a malignant appearing lung nodule and possible metastatic liver lesions.     L4 vertebral body with mild anterior compression, possibly pathologic   Pain is currently controlled.    Orthopedist recommends conservative management with a brace.  PT consulted.  If this is determined to be pathologic then radiation therapy would be recommended.    Malignant appearing lesions in lung, liver, spine and possible kidneys and adrenals.  After discussion with the oncologist, the patient will follow-up in the clinic and he has not decided whether or not he wants a biopsy and/or PET scan. He will follow up in the  oncology clinic.    COPD (chronic obstructive pulmonary disease)  Chronic respiratory failure on home oxygen  Continue PTA inhaled bronchodilators and oxygen by nasal canula     End-stage renal disease  Continue hemodialysis  MWF      Essential hypertension  Hyperlipidemia  Continue Coreg/ASA/Statin/diltiazem/Lasix    Type 2 diabetes mellitus with long-term current use of insulin   Hgb A1c 5.8%  Has been having morning hypoglycemia  Insulin adjusted  Discharge on 10 units of Lantus and Novolog 4 units tid plus correction scale insulin     Consultations This Hospital Stay   HEMATOLOGY & ONCOLOGY IP CONSULT  PALLIATIVE CARE ADULT IP CONSULT  ORTHOPEDIC SURGERY IP CONSULT  NEPHROLOGY IP CONSULT  ORTHOSIS BRACE IP CONSULT  PALLIATIVE CARE ADULT IP CONSULT  SOCIAL WORK IP CONSULT  PHYSICAL THERAPY ADULT IP CONSULT  OCCUPATIONAL THERAPY ADULT IP CONSULT  PHYSICAL THERAPY ADULT IP CONSULT  OCCUPATIONAL THERAPY ADULT IP CONSULT    Code Status   DNR/DNI    Time Spent on this Encounter   I, Erick Griffiths, personally saw the patient today and spent less than or equal to 30 minutes discharging this patient.       Erick Griffiths MD  [facility]  ______________________________________________________________________    Physical Exam   Vital Signs: Temp: 97.8  F (36.6  C) Temp src: Oral BP: 120/52 Pulse: 61 Heart Rate: 65 Resp: 14 SpO2: 100 % O2 Device: Nasal cannula Oxygen Delivery: 3 LPM  Weight: 168 lbs 3.2 oz  Constitutional: awake, alert, cooperative, no apparent distress, and appears stated age  Respiratory: mild exp wheeze  Cardiovascular:  regular rate and rhythm, normal S1 and S2, no S3 or S4, and no murmur noted  Neurologic: Awake, alert, oriented to name, place and time.  Cranial nerves II-XII are grossly intact.  Motor is 5 out of 5 bilaterally.         Primary Care Physician   Sandip Antunez    Discharge Orders      General info for SNF    Length of Stay Estimate: Short Term Care:  Estimated # of Days <30  Condition at Discharge: Improving  Level of care:skilled   Rehabilitation Potential: Good  Admission H&P remains valid and up-to-date: Yes  Recent Chemotherapy: N/A  Use Nursing Home Standing Orders: Yes     Mantoux instructions    Give two-step Mantoux (PPD) Per Facility Policy Yes     Reason for your hospital stay    Back pain from spine fracture     Glucose monitor nursing POCT    Before meals and at bedtime     Follow Up and recommended labs and tests    Hemodialysis as scheduled.  Oncology clinic as scheduled.     Activity - Up with nursing assistance    Use back brace     DNR/DNI     Physical Therapy Adult Consult    Evaluate and treat as clinically indicated.    Reason:  Compression fracture     Occupational Therapy Adult Consult    Evaluate and treat as clinically indicated.    Reason:  Compression fracture     Oxygen - Nasal cannula    2 liters/m by nasal canula continuously     Advance Diet as Tolerated    Follow this diet upon discharge: dialysis and diabetic diet       Significant Results and Procedures   Most Recent 3 CBC's:  Recent Labs   Lab Test 04/24/19  0734 04/23/19  0713 04/22/19  1615 02/06/19  1520   WBC  --  8.0 11.6* 9.0   HGB 9.4* 9.2* 10.0* 7.4*   MCV  --  84 84 82   PLT  --  314 362 295     Most Recent 3 BMP's:  Recent Labs   Lab Test 04/26/19  0650 04/24/19  0734 04/22/19  1615 02/06/19  1520   NA  --  140 137 138   POTASSIUM 4.4 4.0 3.6 3.3*   CHLORIDE  --  104 99 100   CO2  --  29 31 32   BUN  --  40* 20 13   CR  --  5.02* 2.98* 2.33*   ANIONGAP  --  7 7 6   AARON  --  9.9 9.4 8.4*   GLC  --  95 193* 115*   ,   Results for orders placed or performed during the hospital encounter of 04/22/19   CT Chest Abdomen Pelvis w/o Contrast    Narrative    CT CHEST, ABDOMEN, PELVIS WITHOUT CONTRAST   4/22/2019 5:47 PM     HISTORY: Subacute low back pain. Evaluate for metastatic disease.  History of end-stage renal disease.    TECHNIQUE: Volumetric helical sections were  acquired from the thoracic  inlet to the ischial tuberosities. Coronal images were also  reconstructed. Radiation dose for this scan was reduced using  automated exposure control, adjustment of the mA and/or kV according  to patient size, or iterative reconstruction technique.    COMPARISON: None.    FINDINGS:    Chest: There is a spiculated nodule in the right upper lobe laterally  abutting the pleura, and measuring 2.7 x 2.1 cm (series 3 image 13).  Emphysematous changes are noted throughout both lungs. There are small  bilateral pleural effusions. Mild associated consolidation at both  lung bases posteriorly most likely represent atelectasis, although  pneumonia could also possibly have this appearance. Small pericardial  effusion. Coronary artery calcification. Atherosclerotic calcification  of the thoracic aorta and coronary arteries. There is mild mediastinal  adenopathy. For example, an enlarged subcarinal lymph node measures  2.6 x 1.8 cm.    Abdomen and Pelvis: Multiple hypoenhancing liver lesions are not well  characterized on this noncontrast scan, but are suspicious for  metastatic disease. The spleen, gallbladder, and pancreas have  unremarkable noncontrast appearances. Multiple bilateral adrenal  nodules are indeterminate, and metastatic disease cannot be excluded,  with the largest on the left measuring 3 cm. Bilateral renal atrophy  is noted, moderate on the left and marked on the right. Multiple  hyperdense liver lesions could represent hemorrhagic or proteinaceous  renal cysts, although solid renal neoplasm cannot be excluded, with  the largest on the left measuring 3.3 cm. A few smaller bilateral  renal cysts are also noted. No hydronephrosis. Curvilinear  calcifications in both renal sowmya are most likely vascular.    There is advanced atherosclerotic aortoiliac calcification. Infrarenal  abdominal aortic aneurysm measures 3.2 cm AP x 2.9 cm transverse. No  bowel obstruction. Colonic  diverticulosis, without convincing evidence  for diverticulitis. There is a moderate amount of stool in the  ascending and transverse colon. Unremarkable appendix. No free fluid  in the pelvis. There is moderate prostatic enlargement. A prominent  duodenal diverticulum is noted. Degenerative changes are noted  throughout the thoracolumbar spine. Ill-defined lytic change within  the L4 vertebral body is suspicious for metastatic disease. There is  mild age-indeterminate anterior compression also noted in the L4  vertebral body, suspicious for a pathologic compression fracture.      Impression    IMPRESSION:   1. A spiculated right upper lobe pulmonary nodule is highly suspicious  for malignancy, such as bronchogenic carcinoma.  2. Ill-defined lytic change in the L4 vertebral body with mild  anterior compression is suspicious for a metastatic lesion and an  associated pathologic compression fracture, age indeterminate.  3. Multiple low density liver lesions are not well characterized on  this noncontrast scan, but are considered suspicious for metastatic  disease.  4. Emphysema.  5. Mild mediastinal adenopathy is nonspecific, however metastatic  involvement cannot be excluded.  6. Multiple bilateral adrenal nodules are indeterminate, and  metastatic disease is not excluded.  7. Bilateral renal atrophy.  8. Multiple hyperdense renal lesions bilaterally could represent  hemorrhagic or proteinaceous renal cysts, however solid renal neoplasm  cannot be excluded.  9. Moderate prostatic enlargement.  10. Infrarenal abdominal aortic aneurysm measures up to 3.2 cm AP.      CLAUDIO DAO MD   US Lower Extremity Venous Duplex Left    Narrative    VENOUS ULTRASOUND LEFT LEG  4/22/2019 5:29 PM     HISTORY: Left lower extremity pain and swelling.    COMPARISON: None.    FINDINGS:  Examination of the deep veins with graded compression and  color flow Doppler with spectral wave form analysis shows no evidence  of thrombus in the  common femoral vein, femoral vein, popliteal vein  or calf veins.  There is no venous insufficiency.      Impression    IMPRESSION: No evidence of deep venous thrombosis.    SARABJIT JERRY MD   XR Femur Left 2 Views    Narrative    XR LEFT FEMUR TWO VIEWS   4/22/2019 6:34 PM     HISTORY: Acute atraumatic left thigh pain, possible cancer, evaluate  for bony pathology.    COMPARISON: None.      Impression    IMPRESSION: Extensive atherosclerotic calcified plaque in the  superficial femoral artery. Normal bony structures.    SARABJIT JERRY MD       Discharge Medications   Current Discharge Medication List      START taking these medications    Details   insulin aspart (NOVOLOG PEN) 100 UNIT/ML pen Do Not give Correction Insulin if BG <140.  For  - 214 give 1 unit.  For  - 289 give 2 unit.  For  - 364 give 4 units.  For BG = or > 365 give 6 units    Associated Diagnoses: Type 2 diabetes mellitus with chronic kidney disease on chronic dialysis, with long-term current use of insulin (H)         CONTINUE these medications which have CHANGED    Details   insulin aspart (NOVOLOG VIAL) 100 UNITS/ML vial Inject 4 Units Subcutaneous 3 times daily (with meals) Patient states with large meals he will increase to 14-16 units.    Associated Diagnoses: Type 2 diabetes mellitus with chronic kidney disease on chronic dialysis, with long-term current use of insulin (H)      insulin glargine (LANTUS SOLOSTAR PEN) 100 UNIT/ML pen Inject 10 Units Subcutaneous At Bedtime (Patient will increase to 26 units at bedtime he states when BG is elevated.    Associated Diagnoses: Type 2 diabetes mellitus with chronic kidney disease on chronic dialysis, with long-term current use of insulin (H)         CONTINUE these medications which have NOT CHANGED    Details   albuterol (2.5 MG/3ML) 0.083% neb solution TAKE 1 VIAL BY NEBULIZATION EVERY 6 HOURS AS NEEDED FOR SHORNESS OF BREATH/DYSPNEA OR WHEEZING  Qty: 75 mL, Refills: 7     Associated Diagnoses: COPD exacerbation (H)      albuterol (PROAIR HFA/PROVENTIL HFA/VENTOLIN HFA) 108 (90 BASE) MCG/ACT Inhaler Inhale 1-2 puffs into the lungs every 4 hours as needed for shortness of breath / dyspnea or wheezing  Qty: 1 Inhaler, Refills: 5    Associated Diagnoses: COPD exacerbation (H)      aspirin 81 MG chewable tablet Take 81 mg by mouth daily      atorvastatin (LIPITOR) 40 MG tablet Take 40 mg by mouth daily      B Complex-C-Folic Acid (BRANDI-HEIDY) TABS Take 1 tablet by mouth daily  Qty: 90 tablet, Refills: 3    Associated Diagnoses: ESRD (end stage renal disease) on dialysis (H)      carvedilol (COREG) 12.5 MG tablet TAKE 1 TABLET(12.5 MG) BY MOUTH TWICE DAILY  Qty: 180 tablet, Refills: 3    Associated Diagnoses: Aortic valve stenosis, unspecified etiology; Essential hypertension, benign      cyclobenzaprine (FLEXERIL) 5 MG tablet Take 1 tablet (5 mg) by mouth 3 times daily as needed for muscle spasms  Qty: 90 tablet, Refills: 3      diltiazem ER (TIAZAC) 300 MG 24 hr ER beaded capsule TAKE 1 CAPSULE(300 MG) BY MOUTH DAILY  Qty: 90 capsule, Refills: 0    Associated Diagnoses: Essential hypertension, benign      doxazosin (CARDURA) 8 MG tablet TAKE 1 TABLET BY MOUTH EVERY NIGHT AT BEDTIME.  Qty: 90 tablet, Refills: 0    Associated Diagnoses: Urinary retention      eplerenone (INSPRA) 50 MG tablet Take 1 tablet (50 mg) by mouth daily  Qty: 90 tablet, Refills: 4    Associated Diagnoses: Essential hypertension, benign      fish oil-omega-3 fatty acids 1000 MG capsule Take 1 g by mouth daily      fluticasone (FLOVENT HFA) 220 MCG/ACT inhaler Inhale 1 puff into the lungs 2 times daily      furosemide (LASIX) 20 MG tablet Take 1 tablet (20 mg) by mouth daily  Qty: 90 tablet, Refills: 3    Associated Diagnoses: ESRD (end stage renal disease) on dialysis (H); Essential hypertension, benign      HYDROcodone-acetaminophen (NORCO) 5-325 MG tablet Take 0.5 tablets by mouth every 6 hours as needed for severe  pain  Qty: 30 tablet, Refills: 0      irbesartan (AVAPRO) 150 MG tablet TAKE 1 TABLET(150 MG) BY MOUTH DAILY  Qty: 90 tablet, Refills: 3    Associated Diagnoses: Acute on chronic respiratory failure with hypoxia (H)      ranitidine (ZANTAC) 150 MG tablet Take 1 tablet (150 mg) by mouth daily as needed for heartburn  Qty: 90 tablet, Refills: 3    Associated Diagnoses: Gastroesophageal reflux disease without esophagitis      STIOLTO RESPIMAT 2.5-2.5 MCG/ACT AERS INHALE 2 PUFFS INTO THE LUNGS DAILY  Qty: 4 g, Refills: 9    Associated Diagnoses: COPD exacerbation (H)      VITAMIN D, CHOLECALCIFEROL, PO Take 2,000 Units by mouth daily       B-D U/F 31G X 8 MM insulin pen needle USE AS DIRECTED FOUR TIMES DAILY.  Qty: 400 each, Refills: 1    Associated Diagnoses: Type 2 diabetes mellitus with chronic kidney disease on chronic dialysis, with long-term current use of insulin (H)      blood glucose monitoring (NO BRAND SPECIFIED) test strip Use to test blood sugars 3 times daily or as directed. Uses onetouch  Qty: 300 strip, Refills: 11    Associated Diagnoses: Type 2 diabetes mellitus with chronic kidney disease on chronic dialysis, with long-term current use of insulin (H)      COLCHICINE PO Take 0.6 mg by mouth every 48 hours as needed (gout attacks) Reported on 3/30/2017      !! order for DME Equipment being ordered: left thumb spica splint  Qty: 1 Device, Refills: 0    Associated Diagnoses: Left wrist pain      !! order for DME Equipment being ordered: portable Oxygen concentrator  Qty: 1 Device, Refills: 0    Associated Diagnoses: Chronic obstructive pulmonary disease with acute exacerbation (H); Pneumonia due to infectious organism, unspecified laterality, unspecified part of lung      !! order for DME Equipment being ordered: Four wheeled walker  Qty: 1 Device, Refills: 0    Associated Diagnoses: DDD (degenerative disc disease), lumbar; Impaired gait      !! order for DME Equipment being ordered: Other: Nebulizer  machine  Treatment Diagnosis: COPD  Qty: 1 Device, Refills: 0    Associated Diagnoses: COPD exacerbation (H)      Respiratory Therapy Supplies (NEBULIZER/ADULT MASK) KIT 1 Device as needed  Qty: 1 kit, Refills: 0    Associated Diagnoses: Chronic obstructive pulmonary disease with acute exacerbation (H)       !! - Potential duplicate medications found. Please discuss with provider.      STOP taking these medications       predniSONE (DELTASONE) 20 MG tablet Comments:   Reason for Stopping:             Allergies   Allergies   Allergen Reactions     Levaquin [Levofloxacin]      edema     Pioglitazone Other (See Comments)     Edema & hay fever     Vytorin Other (See Comments)     Muscle aches

## 2019-04-25 NOTE — PROGRESS NOTES
Blood sugar 69, pt had juice, refusing recheck. 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

## 2019-04-25 NOTE — PLAN OF CARE
A/Ox4, garbled speech at times. VSS. BG checks before meals with sliding scale insulin, refused 0200 recheck. Flexeril given x1. Miralax x1 overnight for constipation. Dialysis patient. PT ordered, refused to get out of bed this shift. Plan to discharge to TCU.

## 2019-04-25 NOTE — PLAN OF CARE
"Discharge Planner PT   Patient plan for discharge: Did not state, has \"a lot to consider\"  Current status: PT orders received, eval completed, treatment initiated. Pt is an 83yo male admitted for evaluation of back pain and left sided leg pain. CT and MRI scanning showed an L4 compression fracture as well as a malignant appearing lung nodule and possible metastatic liver lesions. Orthopedics consulted and recommending LSO brace for OOB activity. Pt lives with son/DIL/grandchildren in a house with 3 steps to enter; reports up until 1 month ago was IND with ambulation, ADLs/IADLs, driving; in past month has needed increasing assist for household distance ambulation, ADLs/IADLs.    Pt rates pain \"pretty good\" at rest, 6/10 with OOB activity reporting \"muscle spasms\". Pt transfers supine<>sit with Eric, sit<>stand to FWW with Eric, took a few steps at bedside with FWW and Eric; no buckling noted, refused further gait. Needs encouragement.   Barriers to return to prior living situation: Level of assist, stairs to enter, weakness, fall risk  Recommendations for discharge: TCU  Rationale for recommendations: Pt would benefit from continued skilled PT at TCU to maximize functional strength and activity tolerance prior to returning home and prior to possible anticipated cancer treatment; pt reports his family is unable to provide 24hr care. Pt would require 24hr Ax1 for all mobility, ADLs/IADLs and Home PT if pt chooses to return home.       Entered by: Brenda Meehan 04/25/2019 2:35 PM       "

## 2019-04-25 NOTE — PLAN OF CARE
Discharge Planner OT   Patient plan for discharge: TCU per SW note  Current status: OT order received and chart reviewed. Pt admitted w/ L4 compression fx and malignant appearing lung nodule w/ poss metastatic liver lesions. Pt lives w/ son/DIL/grandkids in home w/ 3 FERNANAD. Was indep w/ ADL's/IADL's up until 1 month ago. Per PT, pt min A for transfers and taking a few steps.   Per EMR, plan already in place for TCU and pt w/ limited tolerance for activity at this time. Will defer OT evaluation to next level of care at TCU and complete OT order  Barriers to return to prior living situation: Defer to PT note  Recommendations for discharge: Defer to PT note  Rationale for recommendations: Will defer OT eval to TCU as plan already in place to discharge there and pt w/ limited tolerance for activity currently.        Entered by: Hemalatha Akins 04/25/2019 2:59 PM

## 2019-04-26 VITALS
BODY MASS INDEX: 26.4 KG/M2 | TEMPERATURE: 97.4 F | WEIGHT: 168.2 LBS | HEIGHT: 67 IN | HEART RATE: 61 BPM | DIASTOLIC BLOOD PRESSURE: 46 MMHG | SYSTOLIC BLOOD PRESSURE: 139 MMHG | RESPIRATION RATE: 16 BRPM | OXYGEN SATURATION: 98 %

## 2019-04-26 LAB
GLUCOSE BLDC GLUCOMTR-MCNC: 119 MG/DL (ref 70–99)
GLUCOSE BLDC GLUCOMTR-MCNC: 139 MG/DL (ref 70–99)
GLUCOSE BLDC GLUCOMTR-MCNC: 62 MG/DL (ref 70–99)
POTASSIUM SERPL-SCNC: 4.4 MMOL/L (ref 3.4–5.3)

## 2019-04-26 PROCEDURE — 25000128 H RX IP 250 OP 636: Performed by: INTERNAL MEDICINE

## 2019-04-26 PROCEDURE — 36415 COLL VENOUS BLD VENIPUNCTURE: CPT | Performed by: INTERNAL MEDICINE

## 2019-04-26 PROCEDURE — 84132 ASSAY OF SERUM POTASSIUM: CPT | Performed by: INTERNAL MEDICINE

## 2019-04-26 PROCEDURE — 25000132 ZZH RX MED GY IP 250 OP 250 PS 637: Performed by: STUDENT IN AN ORGANIZED HEALTH CARE EDUCATION/TRAINING PROGRAM

## 2019-04-26 PROCEDURE — 25000132 ZZH RX MED GY IP 250 OP 250 PS 637: Performed by: NURSE PRACTITIONER

## 2019-04-26 PROCEDURE — 99207 ZZC CDG-CODE INCORRECT PER BILLING BASED ON TIME: CPT | Performed by: HOSPITALIST

## 2019-04-26 PROCEDURE — A9270 NON-COVERED ITEM OR SERVICE: HCPCS | Performed by: NURSE PRACTITIONER

## 2019-04-26 PROCEDURE — A9270 NON-COVERED ITEM OR SERVICE: HCPCS | Performed by: STUDENT IN AN ORGANIZED HEALTH CARE EDUCATION/TRAINING PROGRAM

## 2019-04-26 PROCEDURE — 25000132 ZZH RX MED GY IP 250 OP 250 PS 637: Performed by: HOSPITALIST

## 2019-04-26 PROCEDURE — 99238 HOSP IP/OBS DSCHRG MGMT 30/<: CPT | Performed by: HOSPITALIST

## 2019-04-26 PROCEDURE — 90937 HEMODIALYSIS REPEATED EVAL: CPT

## 2019-04-26 PROCEDURE — 00000146 ZZHCL STATISTIC GLUCOSE BY METER IP

## 2019-04-26 PROCEDURE — A9270 NON-COVERED ITEM OR SERVICE: HCPCS | Performed by: HOSPITALIST

## 2019-04-26 RX ORDER — DOXERCALCIFEROL 4 UG/2ML
5.5 INJECTION INTRAVENOUS ONCE
Status: COMPLETED | OUTPATIENT
Start: 2019-04-26 | End: 2019-04-26

## 2019-04-26 RX ORDER — HYDROCODONE BITARTRATE AND ACETAMINOPHEN 5; 325 MG/1; MG/1
1 TABLET ORAL EVERY 6 HOURS PRN
Qty: 18 TABLET | Refills: 0 | Status: SHIPPED | OUTPATIENT
Start: 2019-04-26 | End: 2019-05-03

## 2019-04-26 RX ADMIN — HYDROMORPHONE HYDROCHLORIDE 2 MG: 2 TABLET ORAL at 13:23

## 2019-04-26 RX ADMIN — CARVEDILOL 12.5 MG: 6.25 TABLET, FILM COATED ORAL at 12:29

## 2019-04-26 RX ADMIN — CYCLOBENZAPRINE HYDROCHLORIDE 5 MG: 5 TABLET, FILM COATED ORAL at 12:26

## 2019-04-26 RX ADMIN — DILTIAZEM HYDROCHLORIDE 300 MG: 180 CAPSULE, EXTENDED RELEASE ORAL at 12:29

## 2019-04-26 RX ADMIN — DOXERCALCIFEROL 5.5 MCG: 4 INJECTION, SOLUTION INTRAVENOUS at 10:39

## 2019-04-26 RX ADMIN — SODIUM CHLORIDE 250 ML: 9 INJECTION, SOLUTION INTRAVENOUS at 10:42

## 2019-04-26 RX ADMIN — UMECLIDINIUM BROMIDE AND VILANTEROL TRIFENATATE 1 PUFF: 62.5; 25 POWDER RESPIRATORY (INHALATION) at 07:52

## 2019-04-26 RX ADMIN — SENNOSIDES AND DOCUSATE SODIUM 2 TABLET: 8.6; 5 TABLET ORAL at 12:25

## 2019-04-26 RX ADMIN — SODIUM CHLORIDE 300 ML: 9 INJECTION, SOLUTION INTRAVENOUS at 10:39

## 2019-04-26 RX ADMIN — HYDROMORPHONE HYDROCHLORIDE 2 MG: 2 TABLET ORAL at 07:52

## 2019-04-26 RX ADMIN — FLUTICASONE PROPIONATE 1 PUFF: 220 AEROSOL, METERED RESPIRATORY (INHALATION) at 07:52

## 2019-04-26 RX ADMIN — ASPIRIN 81 MG 81 MG: 81 TABLET ORAL at 12:25

## 2019-04-26 RX ADMIN — ATORVASTATIN CALCIUM 40 MG: 40 TABLET, FILM COATED ORAL at 12:25

## 2019-04-26 RX ADMIN — EPLERENONE 50 MG: 25 TABLET ORAL at 12:25

## 2019-04-26 RX ADMIN — IRBESARTAN 150 MG: 150 TABLET ORAL at 12:25

## 2019-04-26 ASSESSMENT — ACTIVITIES OF DAILY LIVING (ADL)
ADLS_ACUITY_SCORE: 21

## 2019-04-26 ASSESSMENT — MIFFLIN-ST. JEOR: SCORE: 1411.58

## 2019-04-26 NOTE — PLAN OF CARE
Pt A&O, garbeled speech. VSS on 2L. IV SL. Abd binder in place. Shower given. C/o muscle spasms, Flexeril given. Blood sugars stable. Up w/ 1 and walker. Mod carb diet. Plan for dialysis tomorrow and discharge to TCU. Will continue to monitor.

## 2019-04-26 NOTE — PROGRESS NOTES
Potassium   Date Value Ref Range Status   04/26/2019 4.4 3.4 - 5.3 mmol/L Final     Lab Results   Component Value Date    HGB 9.4 04/24/2019     Weight: 76.3 kg (168 lb 3.2 oz)  POST WT 75.3kg  DIALYSIS PROCEDURE NOTE  Hepatitis status of previous patient on machine log was checked and verified ok to use with this patients hepatitis status.  Patient dialyzed for 2.75 hrs. on a 3 K bath with a net fluid removal of  1L.  A BFR of 400 ml/min was obtained via a LAVF using 15 gauge needles.    The patient was seen by Dr. Waldrop during the treatment.  Total heparin received during the treatment: 0 units. Sites held x 10 min then  pressure drsgs applied.    Meds  Given: hectorol. Complications: none.  Procedure and ESRD teaching done with patient understanding.   See flowsheet in EPIC for further details and post assessment.  Machine water alarm in place and functioning. Transducer pods intact and checked every 15min.  Pt returned via wheelchair  Vascular Access: Aseptic prep done for both on/off.  Report received from: GAYATHRI Cash RN  Report given to: GAYATHRI Cash RN  HEPATITIS B SURFACE ANTIGEN negative DATE 12-3-18    HEPATITIS B SURFACE ANTIBODY immune DATE 11-19-18  Chlorine/Chloramine water system checked every 4 hours.    Ellen Botello RN  [facility]

## 2019-04-26 NOTE — PLAN OF CARE
Physical Therapy Discharge Summary    Reason for therapy discharge:    Discharged to transitional care facility.    Progress towards therapy goal(s). See goals on Care Plan in [facility] electronic health record for goal details.  Goals not met.  Barriers to achieving goals:   discharge from facility.    Therapy recommendation(s):    Continued therapy is recommended.  Rationale/Recommendations:  Pt will benefit from continued skilled PT at TCU to progress functional strength, activity tolerance, balance, safety and IND with functional mobilty prior to returning home with family.

## 2019-04-26 NOTE — PROVIDER NOTIFICATION
MD Notification    Person notified: primary hospitalist    Person Name: Dr. Griffiths    Date/Time: 4/26/19 at 0955    Interaction: web-based page    Purpose of Notification: pt at dialysis now, has ride set-up for discharge at 1330, med-rec not complete for discharge, please finish.    Orders Received:    Comments:

## 2019-04-26 NOTE — PROGRESS NOTES
Inpatient Dialysis Progress Note            Assessment and Plan:     Seven Savage Jr. is a 84 year old male with ESKD who was admitted on 4/22/2019.      1) Lung Mass: Likely a lung cancer with metastatic disease to multiple sites including liver and skeleton.       2) ESKD: Due for HD today.  He has hemodialysis at [facility] under direction of Dr. Samuel/Bethanie Cervantes NP.  He runs MWF for 2.75 H via a L AVF at a BFR of 450 to a target weight of 80 kg.       3) Anemia:  He has been under evaluation from Dr. Robles for same.  He has been on Epogen 12,000 units three times weekly.  I will hold this in light of new likely malignancy.       4) MBD:  His most recent PTH is 297.  He is on Hectorol 5.5 mcg three times weekly     Plan:     HD today per usual.  Next run Monday  Hold ALLIE in light of new Dx of probably malignancy.  Will run it by the oncology team.            Interval History:     Going home today.        Dialysis Parameters:     Wt Readings from Last 4 Encounters:   04/26/19 76.3 kg (168 lb 3.2 oz)   03/28/19 80.1 kg (176 lb 9.6 oz)   11/15/18 86.7 kg (191 lb 3.2 oz)   11/07/18 80.7 kg (177 lb 14.6 oz)     I/O last 3 completed shifts:  In: 300 [P.O.:300]  Out: 100 [Urine:100]  BP Readings from Last 3 Encounters:   04/26/19 130/58   04/18/19 136/70   04/16/19 170/70       Routine, ONE TIME, Starting today For 1 Occurrences  Weight Loss (kg): 1 kg  Dialysis Temp: 36.5  C  Access Device: AVF  Access Site: L arm  Dialyzer: Revaclear  Dialysis Bath: K 3  Blood Flow Rate (mL/min): 400  Total Treatment Time (hrs): 2.75  Heparin: no         Medications and Allergies:   Reviewed in EPIC      - MEDICATION INSTRUCTIONS for Dialysis Patients -   Does not apply See Admin Instructions     sodium chloride 0.9%  250 mL Intravenous Once in dialysis     sodium chloride 0.9%  300 mL Hemodialysis Machine Once     aspirin  81 mg Oral Daily     atorvastatin  40 mg Oral Daily     carvedilol  12.5 mg Oral BID  w/meals     zz extemporaneous template  300 mg Oral Daily     doxercalciferol  5.5 mcg Intravenous Once     eplerenone  50 mg Oral Daily     fluticasone  1 puff Inhalation BID     insulin aspart  1-3 Units Subcutaneous TID AC     insulin aspart  1-3 Units Subcutaneous At Bedtime     insulin glargine  10 Units Subcutaneous At Bedtime     irbesartan  150 mg Oral Daily     - MEDICATION INSTRUCTIONS -   Does not apply Once     senna-docusate  1-2 tablet Oral BID     sodium chloride (PF)  3 mL Intracatheter Q8H     umeclidinium-vilanterol  1 puff Inhalation Daily     sodium chloride 0.9%, albuterol, albuterol, cyclobenzaprine, glucose **OR** dextrose **OR** glucagon, HYDROmorphone, HYDROmorphone, melatonin, naloxone, ondansetron **OR** ondansetron, polyethylene glycol, ranitidine     Allergies   Allergen Reactions     Levaquin [Levofloxacin]      edema     Pioglitazone Other (See Comments)     Edema & hay fever     Vytorin Other (See Comments)     Muscle aches              Labs:     BMP  Recent Labs   Lab 04/26/19  0650 04/24/19  0734 04/22/19  1615   NA  --  140 137   POTASSIUM 4.4 4.0 3.6   CHLORIDE  --  104 99   AARON  --  9.9 9.4   CO2  --  29 31   BUN  --  40* 20   CR  --  5.02* 2.98*   GLC  --  95 193*     CBC  Recent Labs   Lab 04/24/19  0734 04/23/19  0713 04/22/19  1615   WBC  --  8.0 11.6*   HGB 9.4* 9.2* 10.0*   HCT  --  30.2* 32.7*   MCV  --  84 84   PLT  --  314 362     Lab Results   Component Value Date    AST 15 04/22/2019    ALT 12 04/22/2019    ALKPHOS 61 04/22/2019    BILITOTAL 1.4 (H) 04/22/2019            Physical Exam:   Vitals were reviewed in [facility]    Wt Readings from Last 3 Encounters:   04/26/19 76.3 kg (168 lb 3.2 oz)   03/28/19 80.1 kg (176 lb 9.6 oz)   11/15/18 86.7 kg (191 lb 3.2 oz)       Intake/Output Summary (Last 24 hours) at 4/26/2019 1034  Last data filed at 4/25/2019 1834  Gross per 24 hour   Intake 200 ml   Output --   Net 200 ml       GENERAL APPEARANCE: resting in HD  HEENT:   Eyes/ears/nose grossly normal, neck supple  RESP: lungs clear to auscultation with good efforts, no crackles, rhonchi or wheezes  CV: regular rate and rhythm, normal S1 S2,  No murmur, click or rub   ABDOMEN: soft, nontender, bowel sounds normal  EXTREMITIES/SKIN: no edema, no rashes or lesions     Pt seen on dialysis.  Stable run.  Good BFR.      Attestation:  I have reviewed today's vital signs, notes, medications, labs and imaging.     Jeremy Waldrop MD  [facility] - Nephrology  [phone]

## 2019-04-26 NOTE — PLAN OF CARE
Patient discharged at 1:30 PM to Discharged to assisted living  IV was discontinued. Pain at time of discharge was 0/10. Pt refused to have a recheck of BG (12 noon check with 62 and MD notified, pt had apple juice and ate lunch). Belongings returned to patient including portable/personal O2 tank.  Discharge instructions and packet sent with patient.  Patient verbalized understanding and all questions were answered.  At time of discharge, patient condition was stable and left the unit  escorted by [facility].

## 2019-04-26 NOTE — DISCHARGE SUMMARY
[facility]  Hospitalist Discharge Summary       Date of Admission:  4/22/2019  Date of Discharge:  4/26/2019  Discharging Provider: Erick Griffiths MD  Discharge Diagnoses   1. L4 vertebral body with mild anterior compression, possibly pathologic   2. Malignant appearing lesions in lung, liver, spine and possible kidneys and adrenals.    History of     COPD (chronic obstructive pulmonary disease)    Chronic respiratory failure on home oxygen  End-stage renal disease/stage 5 chronic kidney disease on hemodialysis     Anemia of end-stage renal disease/  Essential hypertension    Hyperlipidemia  Type 2 diabetes mellitus with long-term current use of insulin    Follow-ups Needed After Discharge   Follow-up Appointments     Follow Up and recommended labs and tests      Hemodialysis as scheduled.  Patient will resume his outpatient chair time at [facility] on   Monday April 29th at 11:00 a.m.  Please make sure that the patient has transportation with family or that   transportation is arranged for the patient to attend his Monday,   Wednesday, Friday Chair.  Phone# [phone]    Oncology clinic as scheduled  Please make sure that the patient is scheduled for follow up with [facility] at discharge from TCU if not previously arranged.  Phone number   for [facility] is [phone]             Unresulted Labs Ordered in the Past 30 Days of this Admission     No orders found from 2/21/2019 to 4/23/2019.      These results will be followed up by oncologist    Discharge Disposition   Discharged to short-term care facility  Condition at discharge: Stable    Hospital Course   Seven Savage Jr. is a 84 year old male admitted on 4/22/2019. He presents with back pain and left sided leg pain. CT and MRI scanning showed an L4 compression fracture as well as a malignant appearing lung nodule and possible metastatic liver lesions.     L4 vertebral body with mild anterior compression, possibly  pathologic   Pain is currently controlled.    Orthopedist recommends conservative management with a brace.  PT consulted.  If this is determined to be pathologic then radiation therapy would be recommended.    Malignant appearing lesions in lung, liver, spine and possible kidneys and adrenals.  After discussion with the oncologist, the patient will follow-up in the clinic and he has not decided whether or not he wants a biopsy and/or PET scan. He will follow up in the oncology clinic.    COPD (chronic obstructive pulmonary disease)  Chronic respiratory failure on home oxygen  Continue PTA inhaled bronchodilators and oxygen by nasal canula     End-stage renal disease  Continue hemodialysis  MWF      Essential hypertension  Hyperlipidemia  Continue Coreg/ASA/Statin/diltiazem/Lasix    Type 2 diabetes mellitus with long-term current use of insulin   Hgb A1c 5.8%  Has been having morning hypoglycemia  Insulin adjusted  Discharge on 10 units of Lantus and Novolog 4 units tid plus correction scale insulin     Consultations This Hospital Stay   HEMATOLOGY & ONCOLOGY IP CONSULT  PALLIATIVE CARE ADULT IP CONSULT  ORTHOPEDIC SURGERY IP CONSULT  NEPHROLOGY IP CONSULT  ORTHOSIS BRACE IP CONSULT  PALLIATIVE CARE ADULT IP CONSULT  SOCIAL WORK IP CONSULT  PHYSICAL THERAPY ADULT IP CONSULT  OCCUPATIONAL THERAPY ADULT IP CONSULT  PHYSICAL THERAPY ADULT IP CONSULT  OCCUPATIONAL THERAPY ADULT IP CONSULT    Code Status   DNR/DNI    Time Spent on this Encounter   I, Erick Griffiths, personally saw the patient today and spent less than or equal to 30 minutes discharging this patient.       Erick Griffiths MD  [facility]  ______________________________________________________________________    Physical Exam   Vital Signs: Temp: 97.4  F (36.3  C) Temp src: Oral BP: 139/46 Pulse: 61 Heart Rate: 66 Resp: 16 SpO2: 98 % O2 Device: None (Room air) Oxygen Delivery: 3 LPM  Weight: 168 lbs 3.2 oz  Constitutional:  awake, alert, cooperative, no apparent distress, and appears stated age  Respiratory: clear to auscultation   Cardiovascular:  regular rate and rhythm, normal S1 and S2, no S3 or S4, and no murmur noted  Neurologic: Awake, alert, oriented to name, place and time.  Cranial nerves II-XII are grossly intact.  Motor is 5 out of 5 bilaterally.         Primary Care Physician   Sandip Antunez    Discharge Orders      General info for SNF    Length of Stay Estimate: Short Term Care: Estimated # of Days <30  Condition at Discharge: Improving  Level of care:skilled   Rehabilitation Potential: Good  Admission H&P remains valid and up-to-date: Yes  Recent Chemotherapy: N/A  Use Nursing Home Standing Orders: Yes     Mantoux instructions    Give two-step Mantoux (PPD) Per Facility Policy Yes     Reason for your hospital stay    Back pain from spine fracture     Glucose monitor nursing POCT    Before meals and at bedtime     Follow Up and recommended labs and tests    Hemodialysis as scheduled.  Patient will resume his outpatient chair time at [facility] on Monday April 29th at 11:00 a.m.  Please make sure that the patient has transportation with family or that transportation is arranged for the patient to attend his Monday, Wednesday, Friday Chair.  Phone# [phone]    Oncology clinic as scheduled  Please make sure that the patient is scheduled for follow up with [facility] at discharge from TCU if not previously arranged.  Phone number for [facility] is [phone]     Activity - Up with nursing assistance    Use back brace     DNR/DNI     Physical Therapy Adult Consult    Evaluate and treat as clinically indicated.    Reason:  Compression fracture     Occupational Therapy Adult Consult    Evaluate and treat as clinically indicated.    Reason:  Compression fracture     Oxygen - Nasal cannula    2 liters/m by nasal canula continuously     Advance Diet as Tolerated    Follow this diet upon discharge: dialysis and  diabetic diet       Significant Results and Procedures   Most Recent 3 CBC's:  Recent Labs   Lab Test 04/24/19  0734 04/23/19  0713 04/22/19  1615 02/06/19  1520   WBC  --  8.0 11.6* 9.0   HGB 9.4* 9.2* 10.0* 7.4*   MCV  --  84 84 82   PLT  --  314 362 295     Most Recent 3 BMP's:  Recent Labs   Lab Test 04/26/19  0650 04/24/19  0734 04/22/19  1615 02/06/19  1520   NA  --  140 137 138   POTASSIUM 4.4 4.0 3.6 3.3*   CHLORIDE  --  104 99 100   CO2  --  29 31 32   BUN  --  40* 20 13   CR  --  5.02* 2.98* 2.33*   ANIONGAP  --  7 7 6   AARON  --  9.9 9.4 8.4*   GLC  --  95 193* 115*   ,   Results for orders placed or performed during the hospital encounter of 04/22/19   CT Chest Abdomen Pelvis w/o Contrast    Narrative    CT CHEST, ABDOMEN, PELVIS WITHOUT CONTRAST   4/22/2019 5:47 PM     HISTORY: Subacute low back pain. Evaluate for metastatic disease.  History of end-stage renal disease.    TECHNIQUE: Volumetric helical sections were acquired from the thoracic  inlet to the ischial tuberosities. Coronal images were also  reconstructed. Radiation dose for this scan was reduced using  automated exposure control, adjustment of the mA and/or kV according  to patient size, or iterative reconstruction technique.    COMPARISON: None.    FINDINGS:    Chest: There is a spiculated nodule in the right upper lobe laterally  abutting the pleura, and measuring 2.7 x 2.1 cm (series 3 image 13).  Emphysematous changes are noted throughout both lungs. There are small  bilateral pleural effusions. Mild associated consolidation at both  lung bases posteriorly most likely represent atelectasis, although  pneumonia could also possibly have this appearance. Small pericardial  effusion. Coronary artery calcification. Atherosclerotic calcification  of the thoracic aorta and coronary arteries. There is mild mediastinal  adenopathy. For example, an enlarged subcarinal lymph node measures  2.6 x 1.8 cm.    Abdomen and Pelvis: Multiple hypoenhancing  liver lesions are not well  characterized on this noncontrast scan, but are suspicious for  metastatic disease. The spleen, gallbladder, and pancreas have  unremarkable noncontrast appearances. Multiple bilateral adrenal  nodules are indeterminate, and metastatic disease cannot be excluded,  with the largest on the left measuring 3 cm. Bilateral renal atrophy  is noted, moderate on the left and marked on the right. Multiple  hyperdense liver lesions could represent hemorrhagic or proteinaceous  renal cysts, although solid renal neoplasm cannot be excluded, with  the largest on the left measuring 3.3 cm. A few smaller bilateral  renal cysts are also noted. No hydronephrosis. Curvilinear  calcifications in both renal sowmya are most likely vascular.    There is advanced atherosclerotic aortoiliac calcification. Infrarenal  abdominal aortic aneurysm measures 3.2 cm AP x 2.9 cm transverse. No  bowel obstruction. Colonic diverticulosis, without convincing evidence  for diverticulitis. There is a moderate amount of stool in the  ascending and transverse colon. Unremarkable appendix. No free fluid  in the pelvis. There is moderate prostatic enlargement. A prominent  duodenal diverticulum is noted. Degenerative changes are noted  throughout the thoracolumbar spine. Ill-defined lytic change within  the L4 vertebral body is suspicious for metastatic disease. There is  mild age-indeterminate anterior compression also noted in the L4  vertebral body, suspicious for a pathologic compression fracture.      Impression    IMPRESSION:   1. A spiculated right upper lobe pulmonary nodule is highly suspicious  for malignancy, such as bronchogenic carcinoma.  2. Ill-defined lytic change in the L4 vertebral body with mild  anterior compression is suspicious for a metastatic lesion and an  associated pathologic compression fracture, age indeterminate.  3. Multiple low density liver lesions are not well characterized on  this noncontrast  scan, but are considered suspicious for metastatic  disease.  4. Emphysema.  5. Mild mediastinal adenopathy is nonspecific, however metastatic  involvement cannot be excluded.  6. Multiple bilateral adrenal nodules are indeterminate, and  metastatic disease is not excluded.  7. Bilateral renal atrophy.  8. Multiple hyperdense renal lesions bilaterally could represent  hemorrhagic or proteinaceous renal cysts, however solid renal neoplasm  cannot be excluded.  9. Moderate prostatic enlargement.  10. Infrarenal abdominal aortic aneurysm measures up to 3.2 cm AP.      CLAUDIO DAO MD   US Lower Extremity Venous Duplex Left    Narrative    VENOUS ULTRASOUND LEFT LEG  4/22/2019 5:29 PM     HISTORY: Left lower extremity pain and swelling.    COMPARISON: None.    FINDINGS:  Examination of the deep veins with graded compression and  color flow Doppler with spectral wave form analysis shows no evidence  of thrombus in the common femoral vein, femoral vein, popliteal vein  or calf veins.  There is no venous insufficiency.      Impression    IMPRESSION: No evidence of deep venous thrombosis.    SARABJIT JERRY MD   XR Femur Left 2 Views    Narrative    XR LEFT FEMUR TWO VIEWS   4/22/2019 6:34 PM     HISTORY: Acute atraumatic left thigh pain, possible cancer, evaluate  for bony pathology.    COMPARISON: None.      Impression    IMPRESSION: Extensive atherosclerotic calcified plaque in the  superficial femoral artery. Normal bony structures.    SARABJIT JERRY MD       Discharge Medications   Current Discharge Medication List      START taking these medications    Details   insulin aspart (NOVOLOG PEN) 100 UNIT/ML pen Do Not give Correction Insulin if BG <140.  For  - 214 give 1 unit.  For  - 289 give 2 unit.  For  - 364 give 4 units.  For BG = or > 365 give 6 units    Associated Diagnoses: Type 2 diabetes mellitus with chronic kidney disease on chronic dialysis, with long-term current use of insulin (H)          CONTINUE these medications which have CHANGED    Details   insulin aspart (NOVOLOG VIAL) 100 UNITS/ML vial Inject 4 Units Subcutaneous 3 times daily (with meals) Patient states with large meals he will increase to 14-16 units.    Associated Diagnoses: Type 2 diabetes mellitus with chronic kidney disease on chronic dialysis, with long-term current use of insulin (H)      insulin glargine (LANTUS SOLOSTAR PEN) 100 UNIT/ML pen Inject 10 Units Subcutaneous At Bedtime (Patient will increase to 26 units at bedtime he states when BG is elevated.    Associated Diagnoses: Type 2 diabetes mellitus with chronic kidney disease on chronic dialysis, with long-term current use of insulin (H)         CONTINUE these medications which have NOT CHANGED    Details   albuterol (2.5 MG/3ML) 0.083% neb solution TAKE 1 VIAL BY NEBULIZATION EVERY 6 HOURS AS NEEDED FOR SHORNESS OF BREATH/DYSPNEA OR WHEEZING  Qty: 75 mL, Refills: 7    Associated Diagnoses: COPD exacerbation (H)      albuterol (PROAIR HFA/PROVENTIL HFA/VENTOLIN HFA) 108 (90 BASE) MCG/ACT Inhaler Inhale 1-2 puffs into the lungs every 4 hours as needed for shortness of breath / dyspnea or wheezing  Qty: 1 Inhaler, Refills: 5    Associated Diagnoses: COPD exacerbation (H)      aspirin 81 MG chewable tablet Take 81 mg by mouth daily      atorvastatin (LIPITOR) 40 MG tablet Take 40 mg by mouth daily      B Complex-C-Folic Acid (BRANDI-HEIDY) TABS Take 1 tablet by mouth daily  Qty: 90 tablet, Refills: 3    Associated Diagnoses: ESRD (end stage renal disease) on dialysis (H)      carvedilol (COREG) 12.5 MG tablet TAKE 1 TABLET(12.5 MG) BY MOUTH TWICE DAILY  Qty: 180 tablet, Refills: 3    Associated Diagnoses: Aortic valve stenosis, unspecified etiology; Essential hypertension, benign      cyclobenzaprine (FLEXERIL) 5 MG tablet Take 1 tablet (5 mg) by mouth 3 times daily as needed for muscle spasms  Qty: 90 tablet, Refills: 3      diltiazem ER (TIAZAC) 300 MG 24 hr ER beaded capsule  TAKE 1 CAPSULE(300 MG) BY MOUTH DAILY  Qty: 90 capsule, Refills: 0    Associated Diagnoses: Essential hypertension, benign      doxazosin (CARDURA) 8 MG tablet TAKE 1 TABLET BY MOUTH EVERY NIGHT AT BEDTIME.  Qty: 90 tablet, Refills: 0    Associated Diagnoses: Urinary retention      eplerenone (INSPRA) 50 MG tablet Take 1 tablet (50 mg) by mouth daily  Qty: 90 tablet, Refills: 4    Associated Diagnoses: Essential hypertension, benign      fish oil-omega-3 fatty acids 1000 MG capsule Take 1 g by mouth daily      fluticasone (FLOVENT HFA) 220 MCG/ACT inhaler Inhale 1 puff into the lungs 2 times daily      furosemide (LASIX) 20 MG tablet Take 1 tablet (20 mg) by mouth daily  Qty: 90 tablet, Refills: 3    Associated Diagnoses: ESRD (end stage renal disease) on dialysis (H); Essential hypertension, benign      HYDROcodone-acetaminophen (NORCO) 5-325 MG tablet Take 0.5 tablets by mouth every 6 hours as needed for severe pain  Qty: 30 tablet, Refills: 0      irbesartan (AVAPRO) 150 MG tablet TAKE 1 TABLET(150 MG) BY MOUTH DAILY  Qty: 90 tablet, Refills: 3    Associated Diagnoses: Acute on chronic respiratory failure with hypoxia (H)      ranitidine (ZANTAC) 150 MG tablet Take 1 tablet (150 mg) by mouth daily as needed for heartburn  Qty: 90 tablet, Refills: 3    Associated Diagnoses: Gastroesophageal reflux disease without esophagitis      STIOLTO RESPIMAT 2.5-2.5 MCG/ACT AERS INHALE 2 PUFFS INTO THE LUNGS DAILY  Qty: 4 g, Refills: 9    Associated Diagnoses: COPD exacerbation (H)      VITAMIN D, CHOLECALCIFEROL, PO Take 2,000 Units by mouth daily       B-D U/F 31G X 8 MM insulin pen needle USE AS DIRECTED FOUR TIMES DAILY.  Qty: 400 each, Refills: 1    Associated Diagnoses: Type 2 diabetes mellitus with chronic kidney disease on chronic dialysis, with long-term current use of insulin (H)      blood glucose monitoring (NO BRAND SPECIFIED) test strip Use to test blood sugars 3 times daily or as directed. Uses onetouch  Qty:  300 strip, Refills: 11    Associated Diagnoses: Type 2 diabetes mellitus with chronic kidney disease on chronic dialysis, with long-term current use of insulin (H)      COLCHICINE PO Take 0.6 mg by mouth every 48 hours as needed (gout attacks) Reported on 3/30/2017      !! order for DME Equipment being ordered: left thumb spica splint  Qty: 1 Device, Refills: 0    Associated Diagnoses: Left wrist pain      !! order for DME Equipment being ordered: portable Oxygen concentrator  Qty: 1 Device, Refills: 0    Associated Diagnoses: Chronic obstructive pulmonary disease with acute exacerbation (H); Pneumonia due to infectious organism, unspecified laterality, unspecified part of lung      !! order for DME Equipment being ordered: Four wheeled walker  Qty: 1 Device, Refills: 0    Associated Diagnoses: DDD (degenerative disc disease), lumbar; Impaired gait      !! order for DME Equipment being ordered: Other: Nebulizer machine  Treatment Diagnosis: COPD  Qty: 1 Device, Refills: 0    Associated Diagnoses: COPD exacerbation (H)      Respiratory Therapy Supplies (NEBULIZER/ADULT MASK) KIT 1 Device as needed  Qty: 1 kit, Refills: 0    Associated Diagnoses: Chronic obstructive pulmonary disease with acute exacerbation (H)       !! - Potential duplicate medications found. Please discuss with provider.      STOP taking these medications       predniSONE (DELTASONE) 20 MG tablet Comments:   Reason for Stopping:             Allergies   Allergies   Allergen Reactions     Levaquin [Levofloxacin]      edema     Pioglitazone Other (See Comments)     Edema & hay fever     Vytorin Other (See Comments)     Muscle aches

## 2019-04-26 NOTE — PLAN OF CARE
A/Ox4, garbled speech at times. VSS. Denies pain overnight. Up A1 with walker - soft brace in place. Mod carb diet with BG checks. No Bm, declined interventions. Dialysis patient - plan to run in AM. Plan to discharge to TCU.

## 2019-04-26 NOTE — PROGRESS NOTES
SWAPNIL Discharge Note:    D/I:  SW is following for discharge planning.  Patient has a bed available at [facility] for today.  Patient is asking for transportation to be arranged.  Explained this will be private pay and he is in agreement.  Call placed to [facility] to arrange for wheelchair transport at 1:30 today.  Patient had his son bring in his portable oxygen tank for transport.  Patient informed of plan and is in agreement with the discharge plan.  Updated facility.    PAS-RR    D: Per DHS regulation, SWAPNIL completed and submitted PAS-RR to [place] [employer] via the Senior LinkAge Line.  PAS-RR confirmation # is : [id number].    P: Further questions may be directed to [facility] LinkAge Line at #[phone], option #4 for PAS-RR staff.    Addendum:  Patient is requesting to have his transport changed to a stretcher ride secondary to L4 Compression Fracture and back pain.  SW placed call to [facility] to change ride and confirmed as a stretcher ride.  PCS form completed, faxed to [facility] and provided to [facility].    Addendum: Orders faxed to facility.       Gianni Up, MIC, LGSW

## 2019-04-29 ENCOUNTER — PATIENT OUTREACH (OUTPATIENT)
Dept: CARE COORDINATION | Facility: CLINIC | Age: 84
End: 2019-04-29

## 2019-04-29 ENCOUNTER — NURSING HOME VISIT (OUTPATIENT)
Dept: GERIATRICS | Facility: CLINIC | Age: 84
End: 2019-04-29
Payer: MEDICARE

## 2019-04-29 VITALS
TEMPERATURE: 97.2 F | BODY MASS INDEX: 26.37 KG/M2 | DIASTOLIC BLOOD PRESSURE: 72 MMHG | HEART RATE: 60 BPM | SYSTOLIC BLOOD PRESSURE: 142 MMHG | HEIGHT: 67 IN | WEIGHT: 168 LBS | OXYGEN SATURATION: 94 % | RESPIRATION RATE: 18 BRPM

## 2019-04-29 DIAGNOSIS — R53.81 PHYSICAL DECONDITIONING: ICD-10-CM

## 2019-04-29 DIAGNOSIS — J44.9 CHRONIC OBSTRUCTIVE PULMONARY DISEASE, UNSPECIFIED COPD TYPE (H): ICD-10-CM

## 2019-04-29 DIAGNOSIS — Z99.2 TYPE 2 DIABETES MELLITUS WITH CHRONIC KIDNEY DISEASE ON CHRONIC DIALYSIS, WITH LONG-TERM CURRENT USE OF INSULIN (H): ICD-10-CM

## 2019-04-29 DIAGNOSIS — Z99.2 CHRONIC KIDNEY DISEASE WITH END STAGE RENAL FAILURE ON DIALYSIS (H): ICD-10-CM

## 2019-04-29 DIAGNOSIS — N18.6 CHRONIC KIDNEY DISEASE WITH END STAGE RENAL FAILURE ON DIALYSIS (H): ICD-10-CM

## 2019-04-29 DIAGNOSIS — N18.6 TYPE 2 DIABETES MELLITUS WITH CHRONIC KIDNEY DISEASE ON CHRONIC DIALYSIS, WITH LONG-TERM CURRENT USE OF INSULIN (H): ICD-10-CM

## 2019-04-29 DIAGNOSIS — R33.9 URINARY RETENTION: ICD-10-CM

## 2019-04-29 DIAGNOSIS — E11.22 TYPE 2 DIABETES MELLITUS WITH CHRONIC KIDNEY DISEASE ON CHRONIC DIALYSIS, WITH LONG-TERM CURRENT USE OF INSULIN (H): ICD-10-CM

## 2019-04-29 DIAGNOSIS — J96.11 CHRONIC RESPIRATORY FAILURE WITH HYPOXIA (H): ICD-10-CM

## 2019-04-29 DIAGNOSIS — I10 BENIGN ESSENTIAL HYPERTENSION: ICD-10-CM

## 2019-04-29 DIAGNOSIS — Z79.4 TYPE 2 DIABETES MELLITUS WITH CHRONIC KIDNEY DISEASE ON CHRONIC DIALYSIS, WITH LONG-TERM CURRENT USE OF INSULIN (H): ICD-10-CM

## 2019-04-29 DIAGNOSIS — S32.040D CLOSED COMPRESSION FRACTURE OF L4 LUMBAR VERTEBRA WITH ROUTINE HEALING, SUBSEQUENT ENCOUNTER: Primary | ICD-10-CM

## 2019-04-29 PROCEDURE — 99207 ZZC CDG-MDM COMPONENT: MEETS LOW - DOWN CODED: CPT | Performed by: NURSE PRACTITIONER

## 2019-04-29 PROCEDURE — 99309 SBSQ NF CARE MODERATE MDM 30: CPT | Performed by: NURSE PRACTITIONER

## 2019-04-29 RX ORDER — AMOXICILLIN 250 MG
1 CAPSULE ORAL 2 TIMES DAILY
COMMUNITY

## 2019-04-29 RX ORDER — POLYETHYLENE GLYCOL 3350 17 G/17G
17 POWDER, FOR SOLUTION ORAL DAILY
COMMUNITY

## 2019-04-29 RX ORDER — DOXAZOSIN 8 MG/1
8 TABLET ORAL AT BEDTIME
COMMUNITY

## 2019-04-29 RX ORDER — DOXAZOSIN 8 MG/1
TABLET ORAL
Qty: 90 TABLET | Refills: 0 | OUTPATIENT
Start: 2019-04-29

## 2019-04-29 ASSESSMENT — MIFFLIN-ST. JEOR: SCORE: 1410.67

## 2019-04-29 NOTE — TELEPHONE ENCOUNTER
Spoke with patient and he states that he does not need a refill at this time.          Can PCP please deny medication.

## 2019-04-29 NOTE — TELEPHONE ENCOUNTER
Option cannot be refilled until it is verified how patient is taking the medication i.e. 1 tablet daily or 1/2 tablet daily.  Please verify and adjust prescription request accordingly, thank you

## 2019-04-29 NOTE — PROGRESS NOTES
[place] [facility]  PRIMARY CARE PROVIDER AND CLINIC:  Sandip Antunez MD, [address] [place] [address] / [facility] [address]  Chief Complaint   Patient presents with     Hospital F/U     [facility] Medical Record Number:  [id number]  Place of Service where encounter took place:  [facility] ([facility]) [[id number]]    Seven Savage Jr.  is a 84 year old  (1935), admitted to the above facility from  [facility]. Hospital stay 4/22/19 through 4/26/19..  Admitted to this facility for  rehab, medical management and nursing care.    HPI:    HPI information obtained from: facility chart records, facility staff, patient report and [facility] chart review.     Brief Summary of Hospital Course:   Patient admitted to [facility] 4/22-4/26 due to back pain and left sided leg pain. Noted to have fallen approx 6 months ago. CT and MRI found L4 compression fracture and malignant appearing lesions of lung, liver, spine and possible kidneys and adrenals.     Updates on Status Since Skilled nursing Admission:     L4 compression fracture  Ortho not recommending surgery, patient wearing brace. During exam, admits to moderate back pain, has been taking norco prn with relief. Denies constipation. Patient to follow-up with Spinal Clinic as directed.    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   Oncology recommending outpatient PET scan. Patient to follow-up with Oncology as directed.     COPD  Chronic respiratory failure  Requires continuous O2. Currently taking albuterol neb prn, fluticasone.     ESRD  Requires dialysis MWF.     Benign essential hypertension   Currently taking coreg, ASA, diltiazem, inspra, irbesartan, and lasix. Denies chest pain, SOB, headaches, syncope.     Type II diabetes with long-term use of insulin  Last A1C 5.8%. Currently taking lantus 10u every day and novolog 4u w/meals +sliding scale insulin.     Physical deconditioning  PTA  lives with son. Patient is actively participating in therapy sessions. Requires assistance with activity and ADLs. Cognitive testing to be done in therapy. SW following for discharge planning.           CODE STATUS/ADVANCE DIRECTIVES DISCUSSION:   DNR / DNI    Patient's living condition: lives with family, son     ALLERGIES: Levaquin [levofloxacin]; Pioglitazone; and Vytorin  PAST MEDICAL HISTORY:  has a past medical history of COPD (chronic obstructive pulmonary disease) (H), CRF (chronic renal failure), Heart murmur, HLD (hyperlipidemia), HTN (hypertension), IDDM (insulin dependent diabetes mellitus) (H), and Renal calculi.  PAST SURGICAL HISTORY:   has a past surgical history that includes Tonsillectomy; Adenoidectomy; Endoscopic polypectomy nasal; and Renal Stent.  FAMILY HISTORY: Family history is unknown by patient.  SOCIAL HISTORY:   reports that he quit smoking about 8 years ago. His smoking use included cigarettes. He has a 70.00 pack-year smoking history. He has never used smokeless tobacco. He reports that he drinks alcohol. He reports that he does not use drugs.    Post Discharge Medication Reconciliation Status: discharge medications reconciled and changed, per note/orders (see AVS)    Current Outpatient Medications   Medication Sig Dispense Refill     albuterol (2.5 MG/3ML) 0.083% neb solution TAKE 1 VIAL BY NEBULIZATION EVERY 6 HOURS AS NEEDED FOR SHORNESS OF BREATH/DYSPNEA OR WHEEZING 75 mL 7     albuterol (PROAIR HFA/PROVENTIL HFA/VENTOLIN HFA) 108 (90 BASE) MCG/ACT Inhaler Inhale 1-2 puffs into the lungs every 4 hours as needed for shortness of breath / dyspnea or wheezing 1 Inhaler 5     aspirin 81 MG chewable tablet Take 81 mg by mouth daily       atorvastatin (LIPITOR) 40 MG tablet Take 40 mg by mouth daily       B Complex-C-Folic Acid (BRANDI-HEIDY) TABS Take 1 tablet by mouth daily 90 tablet 3     carvedilol (COREG) 12.5 MG tablet TAKE 1 TABLET(12.5 MG) BY MOUTH TWICE DAILY 180 tablet 3      COLCHICINE PO Take 0.6 mg by mouth every 48 hours as needed (gout attacks) Reported on 3/30/2017       cyclobenzaprine (FLEXERIL) 5 MG tablet Take 1 tablet (5 mg) by mouth 3 times daily as needed for muscle spasms 90 tablet 3     diltiazem ER (TIAZAC) 300 MG 24 hr ER beaded capsule TAKE 1 CAPSULE(300 MG) BY MOUTH DAILY 90 capsule 0     doxazosin (CARDURA) 8 MG tablet Take 8 mg by mouth At Bedtime       eplerenone (INSPRA) 50 MG tablet Take 1 tablet (50 mg) by mouth daily 90 tablet 4     fish oil-omega-3 fatty acids 1000 MG capsule Take 1 g by mouth daily       fluticasone (FLOVENT HFA) 220 MCG/ACT inhaler Inhale 1 puff into the lungs 2 times daily       furosemide (LASIX) 20 MG tablet Take 1 tablet (20 mg) by mouth daily 90 tablet 3     HYDROcodone-acetaminophen (NORCO) 5-325 MG tablet Take 0.5 tablets by mouth every 6 hours as needed for severe pain 30 tablet 0     insulin aspart (NOVOLOG PEN) 100 UNIT/ML pen Do Not give Correction Insulin if BG <140.  For  - 214 give 1 unit.  For  - 289 give 2 unit.  For  - 364 give 4 units.  For BG = or > 365 give 6 units       insulin aspart (NOVOLOG VIAL) 100 UNITS/ML vial Inject 4 Units Subcutaneous 3 times daily (with meals) Patient states with large meals he will increase to 14-16 units.       insulin glargine (LANTUS SOLOSTAR PEN) 100 UNIT/ML pen Inject 10 Units Subcutaneous At Bedtime (Patient will increase to 26 units at bedtime he states when BG is elevated.       irbesartan (AVAPRO) 150 MG tablet TAKE 1 TABLET(150 MG) BY MOUTH DAILY 90 tablet 3     polyethylene glycol (MIRALAX/GLYCOLAX) packet Take 17 g by mouth daily       ranitidine (ZANTAC) 150 MG tablet Take 1 tablet (150 mg) by mouth daily as needed for heartburn 90 tablet 3     senna-docusate (SENOKOT-S/PERICOLACE) 8.6-50 MG tablet Take 1 tablet by mouth 2 times daily And bid prn       STIOLTO RESPIMAT 2.5-2.5 MCG/ACT AERS INHALE 2 PUFFS INTO THE LUNGS DAILY 4 g 9     VITAMIN D, CHOLECALCIFEROL,  "PO Take 2,000 Units by mouth daily            ROS:  10 point ROS of systems including Constitutional, Eyes, Respiratory, Cardiovascular, Gastroenterology, Genitourinary, Integumentary, Musculoskeletal, Psychiatric were all negative except for pertinent positives noted in my HPI.      Vitals:  /72   Pulse 60   Temp 97.2  F (36.2  C)   Resp 18   Ht 1.702 m (5' 7\")   Wt 76.2 kg (168 lb)   SpO2 94%   BMI 26.31 kg/m    Exam:  GENERAL APPEARANCE:  Alert, in no distress, appears healthy, oriented, cooperative  ENT:  Mouth and posterior oropharynx normal, moist mucous membranes, normal hearing acuity  EYES:  EOM, conjunctivae, lids, pupils and irises normal, PERRL  NECK:  No adenopathy,masses or thyromegaly  RESP:  respiratory effort and palpation of chest normal, lungs clear to auscultation , no respiratory distress  CV:  Palpation and auscultation of heart done , regular rate and rhythm, no murmur, rub, or gallop, no edema, +2 pedal pulses  ABDOMEN:  normal bowel sounds, soft, nontender, no guarding or rebound  M/S:   Gait and station abnormal, requires assistance with activity and ADLs. Digits and nails normal  SKIN:  Inspection of skin and subcutaneous tissue baseline, Palpation of skin and subcutaneous tissue baseline  NEURO:   Cranial nerves 2-12 are normal tested and grossly at patient's baseline, Examination of sensation by touch normal  PSYCH:  oriented X 3, affect and mood normal      Lab/Diagnostic data:  Labs done in SNF are in [facility]. Please refer to them using [employer]/Care Everywhere., Recent labs in EPIC reviewed by me today.  and   Most Recent 3 CBC's:  Recent Labs   Lab Test 04/24/19  0734 04/23/19  0713 04/22/19  1615 02/06/19  1520   WBC  --  8.0 11.6* 9.0   HGB 9.4* 9.2* 10.0* 7.4*   MCV  --  84 84 82   PLT  --  314 362 295     Most Recent 3 BMP's:  Recent Labs   Lab Test 04/26/19  0650 04/24/19  0734 04/22/19  1615 02/06/19  1520   NA  --  140 137 138   POTASSIUM 4.4 4.0 3.6 3.3* "   CHLORIDE  --  104 99 100   CO2  --  29 31 32   BUN  --  40* 20 13   CR  --  5.02* 2.98* 2.33*   ANIONGAP  --  7 7 6   AARON  --  9.9 9.4 8.4*   GLC  --  95 193* 115*               ASSESSMENT/PLAN:    [facility] scheduled appointments:  1. Patient to follow-up with Ortho as directed  2. Patient to follow-up with Oncology as directed  3. Patient to follow-up with Nephrology as directed      (S32.040D) Closed compression fracture of L4 lumbar vertebra with routine healing, subsequent encounter  (primary encounter diagnosis)  Comment: L4 compression fracture  Plan: Continue plan of care. Monitor pain. Continue to wear brace. Patient to follow-up with Spinal Clinic as directed.     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   Comment: New finding on CT/MRI  Plan: Oncology recommending outpatient PET scan. Patient to follow-up with Oncology as directed.     (J44.9) Chronic obstructive pulmonary disease, unspecified COPD type (H)  (J96.11) Chronic respiratory failure with hypoxia (H)  Comment: Chronic respiratory failure with COPD, requires continuous O2.   Plan: Continue plan of care. Monitor O2 sats and respiratory status.     (N18.6,  Z99.2) Chronic kidney disease with end stage renal failure on dialysis (H)  Comment: ESRD on dialysis  Plan: Continue dialysis MWF. Patient to follow-up with Nephrology as directed.    (I10) Benign essential hypertension  Comment: Controlled  Plan: Continue plan of care. Monitor BP/HR.     (E11.22,  N18.6,  Z99.2,  Z79.4) Type 2 diabetes mellitus with chronic kidney disease on chronic dialysis, with long-term current use of insulin (H)  Comment: Controlled  Plan: Continue plan of care. Monitor BG.     (R53.81) Physical deconditioning  Comment: Ongoing  Plan: Encourage active participation in therapy session to increase strength and promote independence in activities and ADLs. Cognitive testing to be done in therapy. SW following for discharge  planning.                 Total time spent with patient visit at the skilled nursing facility was 40 min including patient visit and review of past records. Greater than 50% of total time spent with counseling and coordinating care due to hospital f/u     Electronically signed by:  NADIR Bunn CNP

## 2019-04-29 NOTE — PROGRESS NOTES
Clinic Care Coordination Contact  Care Coordination Transition Communication    Referral Source: ED Follow-Up    Clinical Data: Patient was hospitalized at [facility] from 4/22/2019 to 4/26/2019 with diagnosis of Malignant appearing lesions in lung, liver, spine and possible kidneys and adrenals, physical deconditioning and diabetes.     Transition to Facility:              Facility Name: [facility]              Contact name and phone number/fax: [phone]    Plan: RN/SW Care Coordinator will await notification from facility staff informing RN/SW Care Coordinator of patient's discharge plans/needs. RN/SW Care Coordinator will review chart and outreach to facility staff every 4 weeks and as needed.     PATO Bell  Clinic Care Coordinator  [facility] ([facility]3]  [phone]  [email or web address]

## 2019-04-29 NOTE — LETTER
4/29/2019        RE: Seven Savage Jr.  [address]  [facility] [address]        [facility]  PRIMARY CARE PROVIDER AND CLINIC:  Sandip Antunez MD, [address] [facility] [address]  Chief Complaint   Patient presents with     Hospital F/U     [facility] Medical Record Number:  [id number]  Place of Service where encounter took place:  [facility] ([facility]) [[id number]]    Seven Savage Jr.  is a 84 year old  (1935), admitted to the above facility from  [facility]. Hospital stay 4/22/19 through 4/26/19..  Admitted to this facility for  rehab, medical management and nursing care.    HPI:    HPI information obtained from: facility chart records, facility staff, patient report and [facility] chart review.     Brief Summary of Hospital Course:   Patient admitted to [facility] 4/22-4/26 due to back pain and left sided leg pain. Noted to have fallen approx 6 months ago. CT and MRI found L4 compression fracture and malignant appearing lesions of lung, liver, spine and possible kidneys and adrenals.     Updates on Status Since Skilled nursing Admission:     L4 compression fracture  Ortho not recommending surgery, patient wearing brace. During exam, admits to moderate back pain, has been taking norco prn with relief. Denies constipation. Patient to follow-up with Spinal Clinic as directed.    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   Oncology recommending outpatient PET scan. Patient to follow-up with Oncology as directed.     COPD  Chronic respiratory failure  Requires continuous O2. Currently taking albuterol neb prn, fluticasone.     ESRD  Requires dialysis MWF.     Benign essential hypertension   Currently taking coreg, ASA, diltiazem, inspra, irbesartan, and lasix. Denies chest pain, SOB, headaches, syncope.     Type II diabetes with long-term use of insulin  Last A1C 5.8%. Currently taking  lantus 10u every day and novolog 4u w/meals +sliding scale insulin.     Physical deconditioning  PTA lives with son. Patient is actively participating in therapy sessions. Requires assistance with activity and ADLs. Cognitive testing to be done in therapy. SW following for discharge planning.           CODE STATUS/ADVANCE DIRECTIVES DISCUSSION:   DNR / DNI    Patient's living condition: lives with family, son     ALLERGIES: Levaquin [levofloxacin]; Pioglitazone; and Vytorin  PAST MEDICAL HISTORY:  has a past medical history of COPD (chronic obstructive pulmonary disease) (H), CRF (chronic renal failure), Heart murmur, HLD (hyperlipidemia), HTN (hypertension), IDDM (insulin dependent diabetes mellitus) (H), and Renal calculi.  PAST SURGICAL HISTORY:   has a past surgical history that includes Tonsillectomy; Adenoidectomy; Endoscopic polypectomy nasal; and Renal Stent.  FAMILY HISTORY: Family history is unknown by patient.  SOCIAL HISTORY:   reports that he quit smoking about 8 years ago. His smoking use included cigarettes. He has a 70.00 pack-year smoking history. He has never used smokeless tobacco. He reports that he drinks alcohol. He reports that he does not use drugs.    Post Discharge Medication Reconciliation Status: discharge medications reconciled and changed, per note/orders (see AVS)    Current Outpatient Medications   Medication Sig Dispense Refill     albuterol (2.5 MG/3ML) 0.083% neb solution TAKE 1 VIAL BY NEBULIZATION EVERY 6 HOURS AS NEEDED FOR SHORNESS OF BREATH/DYSPNEA OR WHEEZING 75 mL 7     albuterol (PROAIR HFA/PROVENTIL HFA/VENTOLIN HFA) 108 (90 BASE) MCG/ACT Inhaler Inhale 1-2 puffs into the lungs every 4 hours as needed for shortness of breath / dyspnea or wheezing 1 Inhaler 5     aspirin 81 MG chewable tablet Take 81 mg by mouth daily       atorvastatin (LIPITOR) 40 MG tablet Take 40 mg by mouth daily       B Complex-C-Folic Acid (BRANDI-HEIDY) TABS Take 1 tablet by mouth daily 90 tablet 3      carvedilol (COREG) 12.5 MG tablet TAKE 1 TABLET(12.5 MG) BY MOUTH TWICE DAILY 180 tablet 3     COLCHICINE PO Take 0.6 mg by mouth every 48 hours as needed (gout attacks) Reported on 3/30/2017       cyclobenzaprine (FLEXERIL) 5 MG tablet Take 1 tablet (5 mg) by mouth 3 times daily as needed for muscle spasms 90 tablet 3     diltiazem ER (TIAZAC) 300 MG 24 hr ER beaded capsule TAKE 1 CAPSULE(300 MG) BY MOUTH DAILY 90 capsule 0     doxazosin (CARDURA) 8 MG tablet Take 8 mg by mouth At Bedtime       eplerenone (INSPRA) 50 MG tablet Take 1 tablet (50 mg) by mouth daily 90 tablet 4     fish oil-omega-3 fatty acids 1000 MG capsule Take 1 g by mouth daily       fluticasone (FLOVENT HFA) 220 MCG/ACT inhaler Inhale 1 puff into the lungs 2 times daily       furosemide (LASIX) 20 MG tablet Take 1 tablet (20 mg) by mouth daily 90 tablet 3     HYDROcodone-acetaminophen (NORCO) 5-325 MG tablet Take 0.5 tablets by mouth every 6 hours as needed for severe pain 30 tablet 0     insulin aspart (NOVOLOG PEN) 100 UNIT/ML pen Do Not give Correction Insulin if BG <140.  For  - 214 give 1 unit.  For  - 289 give 2 unit.  For  - 364 give 4 units.  For BG = or > 365 give 6 units       insulin aspart (NOVOLOG VIAL) 100 UNITS/ML vial Inject 4 Units Subcutaneous 3 times daily (with meals) Patient states with large meals he will increase to 14-16 units.       insulin glargine (LANTUS SOLOSTAR PEN) 100 UNIT/ML pen Inject 10 Units Subcutaneous At Bedtime (Patient will increase to 26 units at bedtime he states when BG is elevated.       irbesartan (AVAPRO) 150 MG tablet TAKE 1 TABLET(150 MG) BY MOUTH DAILY 90 tablet 3     polyethylene glycol (MIRALAX/GLYCOLAX) packet Take 17 g by mouth daily       ranitidine (ZANTAC) 150 MG tablet Take 1 tablet (150 mg) by mouth daily as needed for heartburn 90 tablet 3     senna-docusate (SENOKOT-S/PERICOLACE) 8.6-50 MG tablet Take 1 tablet by mouth 2 times daily And bid prn       STIOLTO RESPIMAT  "2.5-2.5 MCG/ACT AERS INHALE 2 PUFFS INTO THE LUNGS DAILY 4 g 9     VITAMIN D, CHOLECALCIFEROL, PO Take 2,000 Units by mouth daily            ROS:  10 point ROS of systems including Constitutional, Eyes, Respiratory, Cardiovascular, Gastroenterology, Genitourinary, Integumentary, Musculoskeletal, Psychiatric were all negative except for pertinent positives noted in my HPI.      Vitals:  /72   Pulse 60   Temp 97.2  F (36.2  C)   Resp 18   Ht 1.702 m (5' 7\")   Wt 76.2 kg (168 lb)   SpO2 94%   BMI 26.31 kg/m     Exam:  GENERAL APPEARANCE:  Alert, in no distress, appears healthy, oriented, cooperative  ENT:  Mouth and posterior oropharynx normal, moist mucous membranes, normal hearing acuity  EYES:  EOM, conjunctivae, lids, pupils and irises normal, PERRL  NECK:  No adenopathy,masses or thyromegaly  RESP:  respiratory effort and palpation of chest normal, lungs clear to auscultation , no respiratory distress  CV:  Palpation and auscultation of heart done , regular rate and rhythm, no murmur, rub, or gallop, no edema, +2 pedal pulses  ABDOMEN:  normal bowel sounds, soft, nontender, no guarding or rebound  M/S:   Gait and station abnormal, requires assistance with activity and ADLs. Digits and nails normal  SKIN:  Inspection of skin and subcutaneous tissue baseline, Palpation of skin and subcutaneous tissue baseline  NEURO:   Cranial nerves 2-12 are normal tested and grossly at patient's baseline, Examination of sensation by touch normal  PSYCH:  oriented X 3, affect and mood normal      Lab/Diagnostic data:  Labs done in SNF are in [facility]. Please refer to them using [employer]/Care Everywhere., Recent labs in EPIC reviewed by me today.  and   Most Recent 3 CBC's:  Recent Labs   Lab Test 04/24/19  0734 04/23/19  0713 04/22/19  1615 02/06/19  1520   WBC  --  8.0 11.6* 9.0   HGB 9.4* 9.2* 10.0* 7.4*   MCV  --  84 84 82   PLT  --  314 362 295     Most Recent 3 BMP's:  Recent Labs   Lab Test 04/26/19  0650 " 04/24/19  0734 04/22/19  1615 02/06/19  1520   NA  --  140 137 138   POTASSIUM 4.4 4.0 3.6 3.3*   CHLORIDE  --  104 99 100   CO2  --  29 31 32   BUN  --  40* 20 13   CR  --  5.02* 2.98* 2.33*   ANIONGAP  --  7 7 6   AARON  --  9.9 9.4 8.4*   GLC  --  95 193* 115*               ASSESSMENT/PLAN:    Current [place] scheduled appointments:  1. Patient to follow-up with Ortho as directed  2. Patient to follow-up with Oncology as directed  3. Patient to follow-up with Nephrology as directed      (S32.040D) Closed compression fracture of L4 lumbar vertebra with routine healing, subsequent encounter  (primary encounter diagnosis)  Comment: L4 compression fracture  Plan: Continue plan of care. Monitor pain. Continue to wear brace. Patient to follow-up with Spinal Clinic as directed.     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   Comment: New finding on CT/MRI  Plan: Oncology recommending outpatient PET scan. Patient to follow-up with Oncology as directed.     (J44.9) Chronic obstructive pulmonary disease, unspecified COPD type (H)  (J96.11) Chronic respiratory failure with hypoxia (H)  Comment: Chronic respiratory failure with COPD, requires continuous O2.   Plan: Continue plan of care. Monitor O2 sats and respiratory status.     (N18.6,  Z99.2) Chronic kidney disease with end stage renal failure on dialysis (H)  Comment: ESRD on dialysis  Plan: Continue dialysis MWF. Patient to follow-up with Nephrology as directed.    (I10) Benign essential hypertension  Comment: Controlled  Plan: Continue plan of care. Monitor BP/HR.     (E11.22,  N18.6,  Z99.2,  Z79.4) Type 2 diabetes mellitus with chronic kidney disease on chronic dialysis, with long-term current use of insulin (H)  Comment: Controlled  Plan: Continue plan of care. Monitor BG.     (R53.81) Physical deconditioning  Comment: Ongoing  Plan: Encourage active participation in therapy session to increase strength and promote  independence in activities and ADLs. Cognitive testing to be done in therapy. SW following for discharge planning.                 Total time spent with patient visit at the skilled nursing facility was 40 min including patient visit and review of past records. Greater than 50% of total time spent with counseling and coordinating care due to hospital f/u     Electronically signed by:  NADIR Bunn CNP                    Sincerely,        NADIR Bunn CNP

## 2019-04-29 NOTE — TELEPHONE ENCOUNTER
Patient has been discharged and discontinue summary states 8 MG every HS, last refill was prescribed by PCP.

## 2019-05-02 ENCOUNTER — NURSING HOME VISIT (OUTPATIENT)
Dept: GERIATRICS | Facility: CLINIC | Age: 84
End: 2019-05-02

## 2019-05-02 ENCOUNTER — APPOINTMENT (OUTPATIENT)
Dept: GERIATRICS | Facility: CLINIC | Age: 84
End: 2019-05-02
Payer: MEDICARE

## 2019-05-02 DIAGNOSIS — N18.6 CHRONIC KIDNEY DISEASE WITH END STAGE RENAL FAILURE ON DIALYSIS (H): ICD-10-CM

## 2019-05-02 DIAGNOSIS — R91.8 LUNG MASS: ICD-10-CM

## 2019-05-02 DIAGNOSIS — S32.040D CLOSED COMPRESSION FRACTURE OF L4 LUMBAR VERTEBRA WITH ROUTINE HEALING, SUBSEQUENT ENCOUNTER: Primary | ICD-10-CM

## 2019-05-02 DIAGNOSIS — J44.9 CHRONIC OBSTRUCTIVE PULMONARY DISEASE, UNSPECIFIED COPD TYPE (H): ICD-10-CM

## 2019-05-02 DIAGNOSIS — Z99.2 CHRONIC KIDNEY DISEASE WITH END STAGE RENAL FAILURE ON DIALYSIS (H): ICD-10-CM

## 2019-05-02 PROCEDURE — 99305 1ST NF CARE MODERATE MDM 35: CPT | Performed by: INTERNAL MEDICINE

## 2019-05-03 ENCOUNTER — DISCHARGE SUMMARY NURSING HOME (OUTPATIENT)
Dept: GERIATRICS | Facility: CLINIC | Age: 84
End: 2019-05-03
Payer: MEDICARE

## 2019-05-03 VITALS
HEART RATE: 66 BPM | RESPIRATION RATE: 14 BRPM | HEIGHT: 67 IN | BODY MASS INDEX: 25.8 KG/M2 | DIASTOLIC BLOOD PRESSURE: 65 MMHG | SYSTOLIC BLOOD PRESSURE: 160 MMHG | WEIGHT: 164.4 LBS | TEMPERATURE: 98.1 F | OXYGEN SATURATION: 96 %

## 2019-05-03 DIAGNOSIS — I10 BENIGN ESSENTIAL HYPERTENSION: ICD-10-CM

## 2019-05-03 DIAGNOSIS — R53.81 PHYSICAL DECONDITIONING: ICD-10-CM

## 2019-05-03 DIAGNOSIS — Z99.2 TYPE 2 DIABETES MELLITUS WITH CHRONIC KIDNEY DISEASE ON CHRONIC DIALYSIS, WITH LONG-TERM CURRENT USE OF INSULIN (H): ICD-10-CM

## 2019-05-03 DIAGNOSIS — N18.6 TYPE 2 DIABETES MELLITUS WITH CHRONIC KIDNEY DISEASE ON CHRONIC DIALYSIS, WITH LONG-TERM CURRENT USE OF INSULIN (H): ICD-10-CM

## 2019-05-03 DIAGNOSIS — J44.9 CHRONIC OBSTRUCTIVE PULMONARY DISEASE, UNSPECIFIED COPD TYPE (H): ICD-10-CM

## 2019-05-03 DIAGNOSIS — Z79.4 TYPE 2 DIABETES MELLITUS WITH CHRONIC KIDNEY DISEASE ON CHRONIC DIALYSIS, WITH LONG-TERM CURRENT USE OF INSULIN (H): ICD-10-CM

## 2019-05-03 DIAGNOSIS — S32.040D CLOSED COMPRESSION FRACTURE OF L4 LUMBAR VERTEBRA WITH ROUTINE HEALING, SUBSEQUENT ENCOUNTER: Primary | ICD-10-CM

## 2019-05-03 DIAGNOSIS — Z99.2 CHRONIC KIDNEY DISEASE WITH END STAGE RENAL FAILURE ON DIALYSIS (H): ICD-10-CM

## 2019-05-03 DIAGNOSIS — E11.22 TYPE 2 DIABETES MELLITUS WITH CHRONIC KIDNEY DISEASE ON CHRONIC DIALYSIS, WITH LONG-TERM CURRENT USE OF INSULIN (H): ICD-10-CM

## 2019-05-03 DIAGNOSIS — J96.11 CHRONIC RESPIRATORY FAILURE WITH HYPOXIA (H): ICD-10-CM

## 2019-05-03 DIAGNOSIS — R91.8 LUNG MASS: ICD-10-CM

## 2019-05-03 DIAGNOSIS — N18.6 CHRONIC KIDNEY DISEASE WITH END STAGE RENAL FAILURE ON DIALYSIS (H): ICD-10-CM

## 2019-05-03 PROCEDURE — 99316 NF DSCHRG MGMT 30 MIN+: CPT | Performed by: NURSE PRACTITIONER

## 2019-05-03 RX ORDER — HYDROCODONE BITARTRATE AND ACETAMINOPHEN 5; 325 MG/1; MG/1
1 TABLET ORAL EVERY 6 HOURS PRN
COMMUNITY
End: 2019-05-03

## 2019-05-03 RX ORDER — HYDROCODONE BITARTRATE AND ACETAMINOPHEN 5; 325 MG/1; MG/1
1 TABLET ORAL EVERY 6 HOURS PRN
Qty: 30 TABLET | Refills: 0 | Status: ON HOLD | OUTPATIENT
Start: 2019-05-03 | End: 2019-05-23

## 2019-05-03 ASSESSMENT — MIFFLIN-ST. JEOR: SCORE: 1394.34

## 2019-05-03 NOTE — LETTER
5/3/2019        RE: Seven Savage Jr.  [address]  [facility] [address]        [facility] DISCHARGE SUMMARY    PATIENT'S NAME: Seven Savgae Jr.  YOB: 1935  MEDICAL RECORD NUMBER:  [id number]  Place of Service where encounter took place:  [facility] [[id number]]    PRIMARY CARE PROVIDER AND CLINIC RESPONSIBLE AFTER TRANSFER:   Sandip Antunez MD, [address]  / [facility] [address]    [facility] Provider     Transferring providers: NADIR Bunn CNP; Bong Walls MD  Recent Hospitalization/ED:  [facility] Hospital stay 4/22/19 to 4/26/19.  Date of SNF Admission: April / 26 / 2019  Date of SNF (anticipated) Discharge: May / 06 / 2019  Discharged to: previous independent home with family  Cognitive Scores: SLUMS: 21/30  Physical Function: Ambulating 10 ft with walker  DME: Walker    CODE STATUS/ADVANCE DIRECTIVES DISCUSSION:  DNR / DNI   ALLERGIES: Levaquin [levofloxacin]; Pioglitazone; and Vytorin    DISCHARGE DIAGNOSIS/NURSING FACILITY COURSE:     L4 compression fracture  Patient admitted to [facility] 4/22-4/26 due to back pain and left sided leg pain. Noted to have fallen approx 6 months ago. CT and MRI found L4 compression fracture and malignant appearing lesions of lung, liver, spine and possible kidneys and adrenals. Ortho not recommending surgery, patient wears brace. During exam, admits to moderate back pain, has been taking norco prn with relief. Denies constipation. Patient to follow-up with Spinal Clinic as directed.     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   Oncology recommending outpatient PET scan, patient reports has appt set up for next week. Patient to follow-up with Oncology as directed.      COPD  Chronic respiratory failure  Requires continuous O2 (baseline). Currently taking albuterol neb prn, fluticasone.      ESRD  Requires dialysis MWF.  Patient to follow-up with Nephrologist as directed.     Benign essential hypertension   Currently taking coreg, ASA, diltiazem, inspra, irbesartan, and lasix. Denies chest pain, SOB, headaches, syncope.     Wt Readings from Last 4 Encounters:   05/03/19 74.6 kg (164 lb 6.4 oz)   04/29/19 76.2 kg (168 lb)   04/26/19 76.3 kg (168 lb 3.2 oz)   03/28/19 80.1 kg (176 lb 9.6 oz)       Type II diabetes with long-term use of insulin  Last A1C 5.8%. Currently taking lantus 10u every day and novolog 4u w/meals +sliding scale insulin.      Physical deconditioning  PTA lives with son. Ambulates 10ft with walker. Requires SBA with activity and ADLs. Cognitive testing SLUMS 21/30. SW following for discharge planning.  Patient discharging home w/son, as well as [facility] home care services, including home PT, OT, RN, HHA.           Past Medical History:  has a past medical history of COPD (chronic obstructive pulmonary disease) (H), CRF (chronic renal failure), Heart murmur, HLD (hyperlipidemia), HTN (hypertension), IDDM (insulin dependent diabetes mellitus) (H), and Renal calculi.    Discharge Medications:  Current Outpatient Medications   Medication Sig Dispense Refill     albuterol (2.5 MG/3ML) 0.083% neb solution TAKE 1 VIAL BY NEBULIZATION EVERY 6 HOURS AS NEEDED FOR SHORNESS OF BREATH/DYSPNEA OR WHEEZING 75 mL 7     albuterol (PROAIR HFA/PROVENTIL HFA/VENTOLIN HFA) 108 (90 BASE) MCG/ACT Inhaler Inhale 1-2 puffs into the lungs every 4 hours as needed for shortness of breath / dyspnea or wheezing 1 Inhaler 5     aspirin 81 MG chewable tablet Take 81 mg by mouth daily       atorvastatin (LIPITOR) 40 MG tablet Take 40 mg by mouth daily       B Complex-C-Folic Acid (BRANDI-HEIDY) TABS Take 1 tablet by mouth daily 90 tablet 3     carvedilol (COREG) 12.5 MG tablet TAKE 1 TABLET(12.5 MG) BY MOUTH TWICE DAILY 180 tablet 3     cyclobenzaprine (FLEXERIL) 5 MG tablet Take 1 tablet (5 mg) by mouth 3 times daily as needed for muscle spasms 90  tablet 3     diltiazem ER (TIAZAC) 300 MG 24 hr ER beaded capsule TAKE 1 CAPSULE(300 MG) BY MOUTH DAILY 90 capsule 0     doxazosin (CARDURA) 8 MG tablet Take 8 mg by mouth At Bedtime       eplerenone (INSPRA) 50 MG tablet Take 1 tablet (50 mg) by mouth daily 90 tablet 4     fish oil-omega-3 fatty acids 1000 MG capsule Take 1 g by mouth daily       fluticasone (FLOVENT HFA) 220 MCG/ACT inhaler Inhale 1 puff into the lungs 2 times daily       furosemide (LASIX) 20 MG tablet Take 1 tablet (20 mg) by mouth daily 90 tablet 3     HYDROcodone-acetaminophen (NORCO) 5-325 MG tablet Take 1 tablet by mouth every 6 hours as needed for severe pain 30 tablet 0     insulin aspart (NOVOLOG PEN) 100 UNIT/ML pen Do Not give Correction Insulin if BG <140.  For  - 214 give 1 unit.  For  - 289 give 2 unit.  For  - 364 give 4 units.  For BG = or > 365 give 6 units       insulin aspart (NOVOLOG VIAL) 100 UNITS/ML vial Inject 4 Units Subcutaneous 3 times daily (with meals) Patient states with large meals he will increase to 14-16 units.       insulin glargine (LANTUS SOLOSTAR PEN) 100 UNIT/ML pen Inject 10 Units Subcutaneous At Bedtime (Patient will increase to 26 units at bedtime he states when BG is elevated.       irbesartan (AVAPRO) 150 MG tablet TAKE 1 TABLET(150 MG) BY MOUTH DAILY 90 tablet 3     polyethylene glycol (MIRALAX/GLYCOLAX) packet Take 17 g by mouth daily       ranitidine (ZANTAC) 150 MG tablet Take 1 tablet (150 mg) by mouth daily as needed for heartburn 90 tablet 3     senna-docusate (SENOKOT-S/PERICOLACE) 8.6-50 MG tablet Take 1 tablet by mouth 2 times daily And bid prn       STIOLTO RESPIMAT 2.5-2.5 MCG/ACT AERS INHALE 2 PUFFS INTO THE LUNGS DAILY 4 g 9     VITAMIN D, CHOLECALCIFEROL, PO Take 2,000 Units by mouth daily            Medication Changes/Rationale:   N/A    Controlled medications sent with patient:   Medication: Norco, #20 tabs given to patient at the time of discharge to take  "home         ROS:   10 point ROS of systems including Constitutional, Eyes, Respiratory, Cardiovascular, Gastroenterology, Genitourinary, Integumentary, Musculoskeletal, Psychiatric were all negative except for pertinent positives noted in my HPI.      Physical Exam:   Vitals: /65   Pulse 66   Temp 98.1  F (36.7  C)   Resp 14   Ht 1.702 m (5' 7\")   Wt 74.6 kg (164 lb 6.4 oz)   SpO2 96%   BMI 25.75 kg/m     BMI= Body mass index is 25.75 kg/m .  GENERAL APPEARANCE:  Alert, in no distress, appears healthy, oriented, cooperative  ENT:  Mouth and posterior oropharynx normal, moist mucous membranes, normal hearing acuity  EYES:  EOM, conjunctivae, lids, pupils and irises normal, PERRL  NECK:  No adenopathy,masses or thyromegaly  RESP:  respiratory effort and palpation of chest normal, lungs clear to auscultation , no respiratory distress  CV:  Palpation and auscultation of heart done , regular rate and rhythm, no murmur, rub, or gallop, no edema, +2 pedal pulses  ABDOMEN:  normal bowel sounds, soft, nontender, no guarding or rebound  M/S:  Wears back brace. Gait and station abnormal, requires assistance with activity and ADLs. Digits and nails normal.   SKIN:  Inspection of skin and subcutaneous tissue baseline, Palpation of skin and subcutaneous tissue baseline  NEURO:   Cranial nerves 2-12 are normal tested and grossly at patient's baseline, Examination of sensation by touch normal  PSYCH:  oriented X 3, affect and mood normal, SLUMS 21/30        SNF labs: Labs done in SNF are in [facility]. Please refer to them using [facility]Care Everywhere., Recent labs in EPIC reviewed by me today.  and   Most Recent 3 CBC's:  Recent Labs   Lab Test 04/24/19  0734 04/23/19  0713 04/22/19  1615 02/06/19  1520   WBC  --  8.0 11.6* 9.0   HGB 9.4* 9.2* 10.0* 7.4*   MCV  --  84 84 82   PLT  --  314 362 295     Most Recent 3 BMP's:  Recent Labs   Lab Test 04/26/19  0650 04/24/19  0734 04/22/19  1615 02/06/19  1520   NA  --  " 140 137 138   POTASSIUM 4.4 4.0 3.6 3.3*   CHLORIDE  --  104 99 100   CO2  --  29 31 32   BUN  --  40* 20 13   CR  --  5.02* 2.98* 2.33*   ANIONGAP  --  7 7 6   AARON  --  9.9 9.4 8.4*   GLC  --  95 193* 115*            DISCHARGE PLAN:    Follow up labs: No labs orders/due    Medical Follow Up:      Follow up with primary care provider in 1-2 weeks   Follow up with specialist: Oncology, Spinal Clinic, Nephrology     MTM referral needed and placed by this provider: No    Discharge Services: Home Care:  Occupational Therapy, Physical Therapy, Registered Nurse, Home Health Aide and From:  [facility]      Discharge Instructions Verbalized to Patient at Discharge:     Monitor blood glucose monitoring 3-4 times a day. Keep a record and bring it to your next primary provider visit.     Notify PCP if you have a fever greater than 100.5 degrees.     DO NOT DRIVE while taking narcotic pain medications.             TOTAL DISCHARGE TIME:   Greater than 30 minutes     Electronically signed by:  NADIR Bunn CNP                         Sincerely,        NADIR Bunn CNP

## 2019-05-04 NOTE — PROGRESS NOTES
[facility]  PRIMARY CARE PROVIDER AND CLINIC:  Sandip Antunez MD, [address] [place] [address] / [facility] [address]    Patient was seen by Dr. Walls at the [facility] on May 2, 2019, for a initial TCU visit     patient is a 84 year old  (1935), admitted to the above facility from  [facility]. Hospital stay 4/22/19 through 4/26/19..      Admitted to this facility for  rehab, medical management and nursing care.    HPI:    HPI information obtained from: facility chart records, facility staff, patient report and [facility] chart review.       P wast  admitted to [facility] on 4/22/19 for the evaluation of back and left leg pain.  Back pain and left sided leg pain.      CT and MRI revealed a L4 vertebral compression  fracture and malignant appearing lesions of lung, liver, spine and possible kidneys and adrenals.  CT chest revealed a spiculated mass, concerning for lung cancer.    Patient was fitted for a back brace.  He is scheduled to have a PET scan performed next week with outpatient oncology follow-up.    History of COPD on continuous oxygen, ESRD on hemodialysis, hypertension on multiple medications, diabetes mellitus type 2, on Lantus and NovoLog with meals.       PTA lives with son.  He is ambulating short distances with a walker.  He states he is comfortable while lying in bed but has moderately severe right low back pain with standing.  He would like PRN Flexeril available immediately when he returns from dialysis  He denies shortness of breath at rest, frequent cough, chest pain, abdominal pain.  Appetite is fair.  He is hoping to discharge to home early next week.      CODE STATUS/ADVANCE DIRECTIVES DISCUSSION:   DNR / DNI    Patient's living condition: lives with family, son     ALLERGIES: Levaquin [levofloxacin]; Pioglitazone; and Vytorin  PAST MEDICAL HISTORY:  has a past medical history of COPD (chronic obstructive pulmonary disease) (H), CRF (chronic renal  failure), Heart murmur, HLD (hyperlipidemia), HTN (hypertension), IDDM (insulin dependent diabetes mellitus) (H), and Renal calculi.  PAST SURGICAL HISTORY:   has a past surgical history that includes Tonsillectomy; Adenoidectomy; Endoscopic polypectomy nasal; and Renal Stent.  FAMILY HISTORY: Family history is unknown by patient.  SOCIAL HISTORY:   reports that he quit smoking about 8 years ago. His smoking use included cigarettes. He has a 70.00 pack-year smoking history. He has never used smokeless tobacco. He reports that he drinks alcohol. He reports that he does not use drugs.      Current Outpatient Medications   Medication Sig Dispense Refill     albuterol (2.5 MG/3ML) 0.083% neb solution TAKE 1 VIAL BY NEBULIZATION EVERY 6 HOURS AS NEEDED FOR SHORNESS OF BREATH/DYSPNEA OR WHEEZING 75 mL 7     albuterol (PROAIR HFA/PROVENTIL HFA/VENTOLIN HFA) 108 (90 BASE) MCG/ACT Inhaler Inhale 1-2 puffs into the lungs every 4 hours as needed for shortness of breath / dyspnea or wheezing 1 Inhaler 5     aspirin 81 MG chewable tablet Take 81 mg by mouth daily       atorvastatin (LIPITOR) 40 MG tablet Take 40 mg by mouth daily       B Complex-C-Folic Acid (BRANDI-HEIDY) TABS Take 1 tablet by mouth daily 90 tablet 3     carvedilol (COREG) 12.5 MG tablet TAKE 1 TABLET(12.5 MG) BY MOUTH TWICE DAILY 180 tablet 3     COLCHICINE PO Take 0.6 mg by mouth every 48 hours as needed (gout attacks) Reported on 3/30/2017       cyclobenzaprine (FLEXERIL) 5 MG tablet Take 1 tablet (5 mg) by mouth 3 times daily as needed for muscle spasms 90 tablet 3     diltiazem ER (TIAZAC) 300 MG 24 hr ER beaded capsule TAKE 1 CAPSULE(300 MG) BY MOUTH DAILY 90 capsule 0     doxazosin (CARDURA) 8 MG tablet Take 8 mg by mouth At Bedtime       eplerenone (INSPRA) 50 MG tablet Take 1 tablet (50 mg) by mouth daily 90 tablet 4     fish oil-omega-3 fatty acids 1000 MG capsule Take 1 g by mouth daily       fluticasone (FLOVENT HFA) 220 MCG/ACT inhaler Inhale 1 puff  into the lungs 2 times daily       furosemide (LASIX) 20 MG tablet Take 1 tablet (20 mg) by mouth daily 90 tablet 3     HYDROcodone-acetaminophen (NORCO) 5-325 MG tablet Take 1 tablet by mouth every 6 hours as needed for severe pain 30 tablet 0     insulin aspart (NOVOLOG PEN) 100 UNIT/ML pen Do Not give Correction Insulin if BG <140.  For  - 214 give 1 unit.  For  - 289 give 2 unit.  For  - 364 give 4 units.  For BG = or > 365 give 6 units       insulin aspart (NOVOLOG VIAL) 100 UNITS/ML vial Inject 4 Units Subcutaneous 3 times daily (with meals) Patient states with large meals he will increase to 14-16 units.       insulin glargine (LANTUS SOLOSTAR PEN) 100 UNIT/ML pen Inject 10 Units Subcutaneous At Bedtime (Patient will increase to 26 units at bedtime he states when BG is elevated.       irbesartan (AVAPRO) 150 MG tablet TAKE 1 TABLET(150 MG) BY MOUTH DAILY 90 tablet 3     polyethylene glycol (MIRALAX/GLYCOLAX) packet Take 17 g by mouth daily       ranitidine (ZANTAC) 150 MG tablet Take 1 tablet (150 mg) by mouth daily as needed for heartburn 90 tablet 3     senna-docusate (SENOKOT-S/PERICOLACE) 8.6-50 MG tablet Take 1 tablet by mouth 2 times daily And bid prn       STIOLTO RESPIMAT 2.5-2.5 MCG/ACT AERS INHALE 2 PUFFS INTO THE LUNGS DAILY 4 g 9     VITAMIN D, CHOLECALCIFEROL, PO Take 2,000 Units by mouth daily            ROS:  10 point ROS negative except as noted above       Exam:  GENERAL APPEARANCE:  Alert, in no distress, chronically ill-appearing, lying in bed  ENT: Oral mucosa moist  EYES: No eye redness or drainage  NECK: Supple  RESP: Wearing oxygen.  Respiratory rate 20, unlabored.  Lungs clear  CV: Regular rate and rhythm, 2/6 systolic murmur  ABDOMEN: Soft, protuberant, nontender M/S: No lower extremity edema.    SKIN: No rash  NEURO: Alert, fully oriented, very pleasant.  Cranial nerves intact.  No focal weakness as tested with patient lying in bed.   PSYCH:  oriented X 3, affect  and mood normal, anxious to have PET scan performed    Most Recent 3 CBC's:  Recent Labs   Lab Test 04/24/19  0734 04/23/19  0713 04/22/19  1615 02/06/19  1520   WBC  --  8.0 11.6* 9.0   HGB 9.4* 9.2* 10.0* 7.4*   MCV  --  84 84 82   PLT  --  314 362 295     Most Recent 3 BMP's:  Recent Labs   Lab Test 04/26/19  0650 04/24/19  0734 04/22/19  1615 02/06/19  1520   NA  --  140 137 138   POTASSIUM 4.4 4.0 3.6 3.3*   CHLORIDE  --  104 99 100   CO2  --  29 31 32   BUN  --  40* 20 13   CR  --  5.02* 2.98* 2.33*   ANIONGAP  --  7 7 6   AARON  --  9.9 9.4 8.4*   GLC  --  95 193* 115*               ASSESSMENT/PLAN:      (S32.040D) Closed compression fracture of L4 lumbar vertebra with routine healing, subsequent encounter  (primary encounter diagnosis)  Comment: L4 compression fracture, possibly pathologic  Plan: Continue plan of care. Monitor pain. Continue to wear brace. Patient to follow-up with Spinal Clinic as directed.  Continue therapies    Malignant appearing lesions of lung, liver, spine and possible kidneys and adrenals.   Comment: Picture most concerning for metastatic lung cancer.  No tissue diagnosis yet  Plan PET scan next week.  Oncology follow-up    (J44.9) Chronic obstructive pulmonary disease, unspecified COPD type (H)  (J96.11) Chronic respiratory failure with hypoxia (H)  Comment: Chronic respiratory failure with COPD, requires continuous O2.  Restaurant status appears stable  Plan: Continue current treatments    (N18.6,  Z99.2) Chronic kidney disease with end stage renal failure on dialysis (H)  Hypertension  Comment: ESRD on dialysis  Plan: Continue dialysis MWF.  Continue current antihypertensive medications      (E11.22,  N18.6,  Z99.2,  Z79.4) Type 2 diabetes mellitus with chronic kidney disease on chronic dialysis, with long-term current use of insulin (H)  Comment: Controlled  Plan: Continue plan of care. Monitor BG.     Anemia  Likely related to chronic illness including end-stage renal disease and  possible malignancy  Plan monitor hemoglobin.  Follow-up with oncology.      Bong Walls MD

## 2019-05-06 ENCOUNTER — DOCUMENTATION ONLY (OUTPATIENT)
Dept: INTERNAL MEDICINE | Facility: CLINIC | Age: 84
End: 2019-05-06

## 2019-05-06 NOTE — PROGRESS NOTES
Koki KANG with  HC calling,     Ok for HC nurse assessment after nursing facility discharge.     Informed approved per standing orders. HC staff will fax these orders for MD signature.    Katie AVENDANO RN, BSN, PHN

## 2019-05-06 NOTE — PATIENT INSTRUCTIONS
[facility] Discharge Orders    Name: Seven Savage Jr.  : 1935  Planned Discharge Date: 19  Discharged to: previous independent home w/family support    MEDICAL FOLLOW UP  Follow up with PCP in 1-2 weeks   Follow up with Specialists: Oncology, Spinal Clinic, Nephrology       FUTURE LABS: No labs orders/due      ORDER CHANGES:  N/A    DISCHARGE MEDICATIONS:  The patient s pharmacy is authorized to dispense a 30-day supply of medications. Refill requests should be directed to the primary provider, Sandip Antunez   Controlled medications sent with patient:   Medication: Norco, #20 tabs given to patient at the time of discharge to take home    Current Outpatient Medications   Medication Sig Dispense Refill     albuterol (2.5 MG/3ML) 0.083% neb solution TAKE 1 VIAL BY NEBULIZATION EVERY 6 HOURS AS NEEDED FOR SHORNESS OF BREATH/DYSPNEA OR WHEEZING 75 mL 7     albuterol (PROAIR HFA/PROVENTIL HFA/VENTOLIN HFA) 108 (90 BASE) MCG/ACT Inhaler Inhale 1-2 puffs into the lungs every 4 hours as needed for shortness of breath / dyspnea or wheezing 1 Inhaler 5     aspirin 81 MG chewable tablet Take 81 mg by mouth daily       atorvastatin (LIPITOR) 40 MG tablet Take 40 mg by mouth daily       B Complex-C-Folic Acid (BRANDI-HEIDY) TABS Take 1 tablet by mouth daily 90 tablet 3     carvedilol (COREG) 12.5 MG tablet TAKE 1 TABLET(12.5 MG) BY MOUTH TWICE DAILY 180 tablet 3     cyclobenzaprine (FLEXERIL) 5 MG tablet Take 1 tablet (5 mg) by mouth 3 times daily as needed for muscle spasms 90 tablet 3     diltiazem ER (TIAZAC) 300 MG 24 hr ER beaded capsule TAKE 1 CAPSULE(300 MG) BY MOUTH DAILY 90 capsule 0     doxazosin (CARDURA) 8 MG tablet Take 8 mg by mouth At Bedtime       eplerenone (INSPRA) 50 MG tablet Take 1 tablet (50 mg) by mouth daily 90 tablet 4     fish oil-omega-3 fatty acids 1000 MG capsule Take 1 g by mouth daily       fluticasone (FLOVENT HFA) 220 MCG/ACT inhaler Inhale 1 puff into the lungs 2  times daily       furosemide (LASIX) 20 MG tablet Take 1 tablet (20 mg) by mouth daily 90 tablet 3     HYDROcodone-acetaminophen (NORCO) 5-325 MG tablet Take 1 tablet by mouth every 6 hours as needed for severe pain 30 tablet 0     insulin aspart (NOVOLOG PEN) 100 UNIT/ML pen Do Not give Correction Insulin if BG <140.  For  - 214 give 1 unit.  For  - 289 give 2 unit.  For  - 364 give 4 units.  For BG = or > 365 give 6 units       insulin aspart (NOVOLOG VIAL) 100 UNITS/ML vial Inject 4 Units Subcutaneous 3 times daily (with meals) Patient states with large meals he will increase to 14-16 units.       insulin glargine (LANTUS SOLOSTAR PEN) 100 UNIT/ML pen Inject 10 Units Subcutaneous At Bedtime (Patient will increase to 26 units at bedtime he states when BG is elevated.       irbesartan (AVAPRO) 150 MG tablet TAKE 1 TABLET(150 MG) BY MOUTH DAILY 90 tablet 3     polyethylene glycol (MIRALAX/GLYCOLAX) packet Take 17 g by mouth daily       ranitidine (ZANTAC) 150 MG tablet Take 1 tablet (150 mg) by mouth daily as needed for heartburn 90 tablet 3     senna-docusate (SENOKOT-S/PERICOLACE) 8.6-50 MG tablet Take 1 tablet by mouth 2 times daily And bid prn       STIOLTO RESPIMAT 2.5-2.5 MCG/ACT AERS INHALE 2 PUFFS INTO THE LUNGS DAILY 4 g 9     VITAMIN D, CHOLECALCIFEROL, PO Take 2,000 Units by mouth daily          SERVICES:  Home Care:  Occupational Therapy, Physical Therapy, Registered Nurse, Home Health Aide and From:  [facility]      ADDITIONAL INSTRUCTIONS:  ? Monitor blood glucose monitoring 3-4 times a day. Keep a record and bring it to your next primary provider visit.   ? Notify PCP if you have a fever greater than 100.5 degrees.   ? DO NOT DRIVE while taking narcotic pain medications.         NADIR Bunn CNP  This document was electronically signed on May 5, 2019

## 2019-05-07 ENCOUNTER — TELEPHONE (OUTPATIENT)
Dept: INTERNAL MEDICINE | Facility: CLINIC | Age: 84
End: 2019-05-07

## 2019-05-07 ENCOUNTER — PATIENT OUTREACH (OUTPATIENT)
Dept: CARE COORDINATION | Facility: CLINIC | Age: 84
End: 2019-05-07

## 2019-05-07 ASSESSMENT — ACTIVITIES OF DAILY LIVING (ADL): DEPENDENT_IADLS:: CLEANING;COOKING;LAUNDRY;SHOPPING;TRANSPORTATION

## 2019-05-07 NOTE — LETTER
[facility] CARE COORDINATION  [address] [facility] [address]    May 7, 2019    Seven SEMAJ Savage .  [address]  [facility] [address]      Dear Seven,    I am a clinic care coordinator who works with Sandip Antunez MD. I wanted to thank you for spending the time to talk with me.  I wanted to provide you with my contact information so that you can call me with questions or concerns about your health care. Below is a description of clinic care coordination and how I can further assist you.     The clinic care coordinator is a registered nurse and/or  who understand the health care system. The goal of clinic care coordination is to help you manage your health and improve access to the [facility] system in the most efficient manner. The registered nurse can assist you in meeting your health care goals by providing education, coordinating services, and strengthening the communication among your providers. The  can assist you with financial, behavioral, psychosocial, chemical dependency, counseling, and/or psychiatric resources.    Please feel free to contact me at [phone], with any questions or concerns. We at [facility] are focused on providing you with the highest-quality healthcare experience possible and that all starts with you.     Sincerely,     Gianni Dobson [facility]  Clinic Care Coordinator  [facility]  [phone]  [email or web address]    Enclosed: I have enclosed a copy of a 24 Hour Access Plan. This has helpful phone numbers for you to call when needed. Please keep this in an easy to access place to use as needed.

## 2019-05-07 NOTE — PROGRESS NOTES
Clinic Care Coordination Contact    Clinic Care Coordination Contact  OUTREACH    Referral Information:  Referral Source: SNF/TCU    Primary Diagnosis: Orthopedic    Chief Complaint   Patient presents with     Clinic Care Coordination - Post Hospital     DC from TCU        [place] Utilization:   Clinic Utilization  Difficulty keeping appointments:: No  Compliance Concerns: No  No-Show Concerns: No  No PCP office visit in Past Year: No  Utilization    Last refreshed: 5/6/2019  3:46 PM:  Hospital Admissions 2           Last refreshed: 5/6/2019  3:46 PM:  ED Visits 1           Last refreshed: 5/6/2019  3:46 PM:  No Show Count (past year) 1              Current as of: 5/6/2019  3:46 PM              Clinical Concerns:  Pt was discharged from [facility] 5/6/2019 with  home care- PT/OT, RN, HHA.  Pt reports continued pain and continues to need pain medication for management.    Pain  Pain (GOAL):: Yes  Type: Acute (<3mo)  Location of chronic pain:: back- Compression fracture  Radiating: No  Progression: Unchanged  Description of pain: Aching, Tender  Chronic pain severity:: 6  Limitation of routine activities due to chronic pain:: Yes  Description: Able to do moderate activities  Alleviating Factors: Rest, Pain Medication  Aggravating Factors: Activity    Health Maintenance Reviewed: Due/Overdue   Health Maintenance Due   Topic Date Due     MEDICARE ANNUAL WELLNESS VISIT  1935     HF ACTION PLAN Q3 YR  1935     COPD ACTION PLAN Q1 YR  1935     ZOSTER IMMUNIZATION (1 of 2) 04/18/1985     EYE EXAM Q1 YEAR  04/01/2018     LIPID MONITORING Q1 YEAR  08/15/2018     MICROALBUMIN Q1 YEAR  08/15/2018     A1C Q6 MO  10/06/2018     FALL RISK ASSESSMENT  03/20/2019       Medication Management:  Independent.  Pt reports Rx are filled.      Functional Status:  Dependent ADLs:: Ambulation-walker, Bathing  Dependent IADLs:: Cleaning, Cooking, Laundry, Shopping, Transportation  Bed or wheelchair confined:: No  Mobility  Status: Independent w/Device  Fallen 2 or more times in the past year?: No  Any fall with injury in the past year?: No    Living Situation:  Current living arrangement:: I live in a private home with family  Type of residence:: Private home - stairs    Diet/Exercise/Sleep:  Diet:: Diabetic diet  Inadequate nutrition (GOAL):: No  Food Insecurity: No  Tube Feeding: No  Exercise:: Currently not exercising  Inadequate activity/exercise (GOAL):: No  Significant changes in sleep pattern (GOAL): No    Transportation:  Transportation concerns (GOAL):: No  Transportation means:: Family, Regular car     Psychosocial:  Muslim or spiritual beliefs that impact treatment:: No  Mental health DX:: No  Mental health management concern (GOAL):: No  Informal Support system:: Children     Financial/Insurance:   Financial/Insurance concerns (GOAL):: No     Resources and Interventions:  Current Resources:   List of home care services:: Skilled Nursing, Home Health Aid, Physicial Therapy, Occupational Therapy;   Community Resources: None  Supplies used at home:: Oxygen Tubing/Supplies  Equipment Currently Used at Home: walker, standard    Advance Care Plan/Directive  Advanced Care Plans/Directives on file:: No  Advanced Care Plan/Directive Status: Not Applicable    Referrals Placed: None     Patient/Caregiver understanding: Pt reports understanding and denies any additional questions or concerns at this times. SWAPNIL CC engaged in AIDET communication during encounter.    Plan: Pt has follow up apt with specialist: Dr. Robles as well as will be scheduling with PCP in the next couple weeks.  Discharge instructions encourage follow up with PCP within 1-2 weeks. Pt declined ongoing care coordination at this time stating he has appropriate supports in the home, home care, Family support, walker, and O2. No further outreaches will be made at this time unless a new referral is made or a change in the pt's status occurs. Patient was provided  with this writer's contact information and encouraged to call with any questions or concerns.    Gianni Dobson, [facility]  Clinic Care Coordinator  [facility]  [phone]  [email or web address]

## 2019-05-07 NOTE — LETTER
Health Care Home - Access Care Plan    About Me:    Patient Name:  Seven Sanders Jr.    YOB: 1935  Age:                      84 year old   Deng MRN:     [id number] Telephone Information:   Home Phone [phone]   Mobile [phone]       Address:  [address]  [facility] [place] [address] Email address:  No e-mail address on record      Emergency Contact(s)   Name Relationship Lgl Grd Work Phone Home Phone Mobile Phone   Jamar SANDERS III, AN* Son No   [phone]             Health Maintenance: Routine Health maintenance Reviewed: Due/Overdue   Health Maintenance Due   Topic Date Due     MEDICARE ANNUAL WELLNESS VISIT  1935     HF ACTION PLAN Q3 YR  1935     COPD ACTION PLAN Q1 YR  1935     ZOSTER IMMUNIZATION (1 of 2) 04/18/1985     EYE EXAM Q1 YEAR  04/01/2018     LIPID MONITORING Q1 YEAR  08/15/2018     MICROALBUMIN Q1 YEAR  08/15/2018     A1C Q6 MO  10/06/2018     FALL RISK ASSESSMENT  03/20/2019         My Access Plan  Medical Emergency 911   Questions or concerns during clinic hours Primary Clinic Line, I will call the clinic directly: [facility] [phone]   24 Hour Appointment Line [phone] or  [phone]) (toll free)   24 Hour Nurse Line [phone] (toll free)   Questions or concerns outside clinic hours 24 Hour Appointment Line, I will call the after-hours on-call line:   [facility] [phone] or [phone]) (toll-free)   Preferred Urgent Care [facility] [phone]   Preferred Hospital [facility]  [phone]   Preferred Pharmacy [facility] - [facility] [address] AT [facility] [address]     Behavioral Health Crisis Line The National Suicide Prevention Lifeline at 1-294.337.9024 or 911                     My Care Team Members  Patient Care Team       Relationship Specialty Notifications Start End    Sandip Antunez  MD ALEKSANDR PCP - General Internal Medicine  12/4/17     Phone: [phone] Fax: [phone]         [address] [place] [id number]    Sandip Antunez MD Assigned PCP   2/5/17     Phone: [phone] Fax: [phone]         [address] [place] [id number]    [facility] ([facility] ([place])  5/5/19     Phone: [phone]                My Medical and Care Information  Problem List   Patient Active Problem List   Diagnosis     Type 2 diabetes mellitus with chronic kidney disease on chronic dialysis, with long-term current use of insulin (H)     Hyperlipidemia LDL goal <100     Aortic valve stenosis, unspecified etiology     ESRD (end stage renal disease) on dialysis (H)     Chronic obstructive pulmonary disease with acute exacerbation (H)     Essential hypertension, benign     COPD exacerbation (H)     Thoracic segment dysfunction     Acute on chronic respiratory failure with hypoxia (H)     Acute right-sided low back pain without sciatica     Compression fracture of L4 lumbar vertebra (H)      Current Medications:   Current Outpatient Medications   Medication     albuterol (2.5 MG/3ML) 0.083% neb solution     albuterol (PROAIR HFA/PROVENTIL HFA/VENTOLIN HFA) 108 (90 BASE) MCG/ACT Inhaler     aspirin 81 MG chewable tablet     atorvastatin (LIPITOR) 40 MG tablet     B Complex-C-Folic Acid (BRANDI-HEIDY) TABS     carvedilol (COREG) 12.5 MG tablet     cyclobenzaprine (FLEXERIL) 5 MG tablet     diltiazem ER (TIAZAC) 300 MG 24 hr ER beaded capsule     doxazosin (CARDURA) 8 MG tablet     eplerenone (INSPRA) 50 MG tablet     fish oil-omega-3 fatty acids 1000 MG capsule     fluticasone (FLOVENT HFA) 220 MCG/ACT inhaler     furosemide (LASIX) 20 MG tablet     HYDROcodone-acetaminophen (NORCO) 5-325 MG tablet     insulin aspart (NOVOLOG PEN) 100 UNIT/ML pen     insulin aspart (NOVOLOG VIAL) 100 UNITS/ML vial     insulin glargine (LANTUS SOLOSTAR PEN) 100 UNIT/ML pen     irbesartan  (AVAPRO) 150 MG tablet     polyethylene glycol (MIRALAX/GLYCOLAX) packet     ranitidine (ZANTAC) 150 MG tablet     senna-docusate (SENOKOT-S/PERICOLACE) 8.6-50 MG tablet     STIOLTO RESPIMAT 2.5-2.5 MCG/ACT AERS     VITAMIN D, CHOLECALCIFEROL, PO     No current facility-administered medications for this visit.

## 2019-05-08 ENCOUNTER — DOCUMENTATION ONLY (OUTPATIENT)
Dept: INTERNAL MEDICINE | Facility: CLINIC | Age: 84
End: 2019-05-08

## 2019-05-08 NOTE — PROGRESS NOTES
Blaire with  HC calling,     Patient requesting to have start of care delayed d/t dialysis and not feeling well. Plant to start Saturday or Sunday    Katie AVENDANO RN, BSN, PHN

## 2019-05-09 ENCOUNTER — HOSPITAL ENCOUNTER (INPATIENT)
Facility: CLINIC | Age: 84
LOS: 14 days | Discharge: NURSING, REHAB OR LONG-TERM CARE | DRG: 477 | End: 2019-05-23
Attending: NURSE PRACTITIONER | Admitting: INTERNAL MEDICINE
Payer: MEDICARE

## 2019-05-09 DIAGNOSIS — R62.7 FAILURE TO THRIVE IN ADULT: ICD-10-CM

## 2019-05-09 DIAGNOSIS — M54.50 MIDLINE LOW BACK PAIN WITHOUT SCIATICA, UNSPECIFIED CHRONICITY: ICD-10-CM

## 2019-05-09 DIAGNOSIS — C79.9 METASTATIC DISEASE (H): ICD-10-CM

## 2019-05-09 DIAGNOSIS — M54.5 ACUTE BILATERAL LOW BACK PAIN, WITH SCIATICA PRESENCE UNSPECIFIED: Primary | ICD-10-CM

## 2019-05-09 PROBLEM — M54.9 BACK PAIN: Status: ACTIVE | Noted: 2019-05-09

## 2019-05-09 LAB
ANION GAP SERPL CALCULATED.3IONS-SCNC: 7 MMOL/L (ref 3–14)
BASOPHILS # BLD AUTO: 0 10E9/L (ref 0–0.2)
BASOPHILS NFR BLD AUTO: 0.2 %
BUN SERPL-MCNC: 21 MG/DL (ref 7–30)
CALCIUM SERPL-MCNC: 8.9 MG/DL (ref 8.5–10.1)
CHLORIDE SERPL-SCNC: 101 MMOL/L (ref 94–109)
CO2 SERPL-SCNC: 31 MMOL/L (ref 20–32)
CREAT SERPL-MCNC: 2.65 MG/DL (ref 0.66–1.25)
DIFFERENTIAL METHOD BLD: ABNORMAL
EOSINOPHIL # BLD AUTO: 0.2 10E9/L (ref 0–0.7)
EOSINOPHIL NFR BLD AUTO: 1.9 %
ERYTHROCYTE [DISTWIDTH] IN BLOOD BY AUTOMATED COUNT: 18.1 % (ref 10–15)
GFR SERPL CREATININE-BSD FRML MDRD: 21 ML/MIN/{1.73_M2}
GLUCOSE BLDC GLUCOMTR-MCNC: 161 MG/DL (ref 70–99)
GLUCOSE SERPL-MCNC: 128 MG/DL (ref 70–99)
HBA1C MFR BLD: 4.6 % (ref 0–5.6)
HCT VFR BLD AUTO: 31.5 % (ref 40–53)
HGB BLD-MCNC: 9.5 G/DL (ref 13.3–17.7)
IMM GRANULOCYTES # BLD: 0 10E9/L (ref 0–0.4)
IMM GRANULOCYTES NFR BLD: 0.4 %
LYMPHOCYTES # BLD AUTO: 0.6 10E9/L (ref 0.8–5.3)
LYMPHOCYTES NFR BLD AUTO: 5.8 %
MCH RBC QN AUTO: 25.3 PG (ref 26.5–33)
MCHC RBC AUTO-ENTMCNC: 30.2 G/DL (ref 31.5–36.5)
MCV RBC AUTO: 84 FL (ref 78–100)
MONOCYTES # BLD AUTO: 1.2 10E9/L (ref 0–1.3)
MONOCYTES NFR BLD AUTO: 10.8 %
NEUTROPHILS # BLD AUTO: 8.9 10E9/L (ref 1.6–8.3)
NEUTROPHILS NFR BLD AUTO: 80.9 %
NRBC # BLD AUTO: 0 10*3/UL
NRBC BLD AUTO-RTO: 0 /100
PLATELET # BLD AUTO: 369 10E9/L (ref 150–450)
POTASSIUM SERPL-SCNC: 3.3 MMOL/L (ref 3.4–5.3)
RBC # BLD AUTO: 3.75 10E12/L (ref 4.4–5.9)
SODIUM SERPL-SCNC: 139 MMOL/L (ref 133–144)
WBC # BLD AUTO: 11 10E9/L (ref 4–11)

## 2019-05-09 PROCEDURE — 83036 HEMOGLOBIN GLYCOSYLATED A1C: CPT | Performed by: NURSE PRACTITIONER

## 2019-05-09 PROCEDURE — 25000132 ZZH RX MED GY IP 250 OP 250 PS 637: Performed by: NURSE PRACTITIONER

## 2019-05-09 PROCEDURE — 00000146 ZZHCL STATISTIC GLUCOSE BY METER IP

## 2019-05-09 PROCEDURE — 25000132 ZZH RX MED GY IP 250 OP 250 PS 637: Performed by: INTERNAL MEDICINE

## 2019-05-09 PROCEDURE — 99222 1ST HOSP IP/OBS MODERATE 55: CPT | Mod: AI | Performed by: INTERNAL MEDICINE

## 2019-05-09 PROCEDURE — A9270 NON-COVERED ITEM OR SERVICE: HCPCS | Performed by: NURSE PRACTITIONER

## 2019-05-09 PROCEDURE — 12000000 ZZH R&B MED SURG/OB

## 2019-05-09 PROCEDURE — 99285 EMERGENCY DEPT VISIT HI MDM: CPT

## 2019-05-09 PROCEDURE — 25000131 ZZH RX MED GY IP 250 OP 636 PS 637: Performed by: INTERNAL MEDICINE

## 2019-05-09 PROCEDURE — A9270 NON-COVERED ITEM OR SERVICE: HCPCS | Performed by: INTERNAL MEDICINE

## 2019-05-09 PROCEDURE — 80048 BASIC METABOLIC PNL TOTAL CA: CPT | Performed by: NURSE PRACTITIONER

## 2019-05-09 PROCEDURE — 85025 COMPLETE CBC W/AUTO DIFF WBC: CPT | Performed by: NURSE PRACTITIONER

## 2019-05-09 RX ORDER — DEXTROSE MONOHYDRATE 25 G/50ML
25-50 INJECTION, SOLUTION INTRAVENOUS
Status: DISCONTINUED | OUTPATIENT
Start: 2019-05-09 | End: 2019-05-23 | Stop reason: HOSPADM

## 2019-05-09 RX ORDER — ALBUTEROL SULFATE 0.83 MG/ML
1.25 SOLUTION RESPIRATORY (INHALATION) EVERY 4 HOURS PRN
Status: DISCONTINUED | OUTPATIENT
Start: 2019-05-09 | End: 2019-05-23 | Stop reason: HOSPADM

## 2019-05-09 RX ORDER — ONDANSETRON 4 MG/1
4 TABLET, ORALLY DISINTEGRATING ORAL EVERY 6 HOURS PRN
Status: DISCONTINUED | OUTPATIENT
Start: 2019-05-09 | End: 2019-05-23 | Stop reason: HOSPADM

## 2019-05-09 RX ORDER — HYDROCODONE BITARTRATE AND ACETAMINOPHEN 5; 325 MG/1; MG/1
2 TABLET ORAL ONCE
Status: COMPLETED | OUTPATIENT
Start: 2019-05-09 | End: 2019-05-09

## 2019-05-09 RX ORDER — DOXAZOSIN 4 MG/1
8 TABLET ORAL AT BEDTIME
Status: DISCONTINUED | OUTPATIENT
Start: 2019-05-09 | End: 2019-05-23 | Stop reason: HOSPADM

## 2019-05-09 RX ORDER — NALOXONE HYDROCHLORIDE 0.4 MG/ML
.1-.4 INJECTION, SOLUTION INTRAMUSCULAR; INTRAVENOUS; SUBCUTANEOUS
Status: DISCONTINUED | OUTPATIENT
Start: 2019-05-09 | End: 2019-05-23 | Stop reason: HOSPADM

## 2019-05-09 RX ORDER — CARVEDILOL 12.5 MG/1
12.5 TABLET ORAL 2 TIMES DAILY WITH MEALS
Status: DISCONTINUED | OUTPATIENT
Start: 2019-05-09 | End: 2019-05-23 | Stop reason: HOSPADM

## 2019-05-09 RX ORDER — ASPIRIN 81 MG/1
81 TABLET, CHEWABLE ORAL DAILY
Status: DISCONTINUED | OUTPATIENT
Start: 2019-05-10 | End: 2019-05-13

## 2019-05-09 RX ORDER — EPLERENONE 25 MG/1
50 TABLET, FILM COATED ORAL DAILY
Status: DISCONTINUED | OUTPATIENT
Start: 2019-05-10 | End: 2019-05-23 | Stop reason: HOSPADM

## 2019-05-09 RX ORDER — NICOTINE POLACRILEX 4 MG
15-30 LOZENGE BUCCAL
Status: DISCONTINUED | OUTPATIENT
Start: 2019-05-09 | End: 2019-05-23 | Stop reason: HOSPADM

## 2019-05-09 RX ORDER — CYCLOBENZAPRINE HCL 5 MG
5 TABLET ORAL 3 TIMES DAILY PRN
Status: DISCONTINUED | OUTPATIENT
Start: 2019-05-09 | End: 2019-05-21

## 2019-05-09 RX ORDER — ACETAMINOPHEN 325 MG/1
650 TABLET ORAL EVERY 4 HOURS PRN
Status: DISCONTINUED | OUTPATIENT
Start: 2019-05-09 | End: 2019-05-12

## 2019-05-09 RX ORDER — ATORVASTATIN CALCIUM 40 MG/1
40 TABLET, FILM COATED ORAL DAILY
Status: DISCONTINUED | OUTPATIENT
Start: 2019-05-09 | End: 2019-05-23 | Stop reason: HOSPADM

## 2019-05-09 RX ORDER — FUROSEMIDE 20 MG
20 TABLET ORAL DAILY
Status: DISCONTINUED | OUTPATIENT
Start: 2019-05-10 | End: 2019-05-23 | Stop reason: HOSPADM

## 2019-05-09 RX ORDER — PROCHLORPERAZINE MALEATE 5 MG
5 TABLET ORAL EVERY 6 HOURS PRN
Status: DISCONTINUED | OUTPATIENT
Start: 2019-05-09 | End: 2019-05-23 | Stop reason: HOSPADM

## 2019-05-09 RX ORDER — IRBESARTAN 150 MG/1
150 TABLET ORAL DAILY
Status: DISCONTINUED | OUTPATIENT
Start: 2019-05-10 | End: 2019-05-23 | Stop reason: HOSPADM

## 2019-05-09 RX ORDER — ONDANSETRON 2 MG/ML
4 INJECTION INTRAMUSCULAR; INTRAVENOUS EVERY 6 HOURS PRN
Status: DISCONTINUED | OUTPATIENT
Start: 2019-05-09 | End: 2019-05-23 | Stop reason: HOSPADM

## 2019-05-09 RX ORDER — PROCHLORPERAZINE 25 MG
12.5 SUPPOSITORY, RECTAL RECTAL EVERY 12 HOURS PRN
Status: DISCONTINUED | OUTPATIENT
Start: 2019-05-09 | End: 2019-05-23 | Stop reason: HOSPADM

## 2019-05-09 RX ADMIN — HYDROCODONE BITARTRATE AND ACETAMINOPHEN 2 TABLET: 5; 325 TABLET ORAL at 17:16

## 2019-05-09 RX ADMIN — ATORVASTATIN CALCIUM 40 MG: 40 TABLET, FILM COATED ORAL at 20:46

## 2019-05-09 RX ADMIN — INSULIN GLARGINE 10 UNITS: 100 INJECTION, SOLUTION SUBCUTANEOUS at 22:24

## 2019-05-09 RX ADMIN — CARVEDILOL 12.5 MG: 12.5 TABLET, FILM COATED ORAL at 20:47

## 2019-05-09 RX ADMIN — CYCLOBENZAPRINE HYDROCHLORIDE 5 MG: 5 TABLET, FILM COATED ORAL at 22:29

## 2019-05-09 RX ADMIN — DOXAZOSIN 8 MG: 4 TABLET ORAL at 22:24

## 2019-05-09 ASSESSMENT — ENCOUNTER SYMPTOMS
BACK PAIN: 1
CHILLS: 0
WEAKNESS: 1
ABDOMINAL PAIN: 0
SHORTNESS OF BREATH: 0
VOMITING: 0
FEVER: 0
NAUSEA: 0
NUMBNESS: 0

## 2019-05-09 ASSESSMENT — ACTIVITIES OF DAILY LIVING (ADL)
FALL_HISTORY_WITHIN_LAST_SIX_MONTHS: YES
TRANSFERRING: 2-->ASSISTIVE PERSON
SWALLOWING: 0-->SWALLOWS FOODS/LIQUIDS WITHOUT DIFFICULTY
RETIRED_EATING: 0-->INDEPENDENT
NUMBER_OF_TIMES_PATIENT_HAS_FALLEN_WITHIN_LAST_SIX_MONTHS: 1
AMBULATION: 0-->INDEPENDENT
WHICH_OF_THE_ABOVE_FUNCTIONAL_RISKS_HAD_A_RECENT_ONSET_OR_CHANGE?: AMBULATION
RETIRED_COMMUNICATION: 0-->UNDERSTANDS/COMMUNICATES WITHOUT DIFFICULTY
COGNITION: 0 - NO COGNITION ISSUES REPORTED
ADLS_ACUITY_SCORE: 17
BATHING: 0-->INDEPENDENT
TOILETING: 0-->INDEPENDENT
DRESS: 0-->INDEPENDENT

## 2019-05-09 ASSESSMENT — MIFFLIN-ST. JEOR: SCORE: 1451.49

## 2019-05-09 NOTE — ED NOTES
"[facility]  ED Nurse Handoff Report    ED Chief complaint: Back Pain (new L4 fracture, also has chronic pain. was at dialysis today, can't get control of his pain. took hydrocodone at 1345, helping some)      ED Diagnosis:   Final diagnoses:   Midline low back pain without sciatica, unspecified chronicity   Metastatic disease (H)   Failure to thrive in adult       Code Status: DNR / DNI    Allergies:   Allergies   Allergen Reactions     Levaquin [Levofloxacin]      edema     Pioglitazone Other (See Comments)     Edema & hay fever     Vytorin Other (See Comments)     Muscle aches       Activity level - Baseline/Home:  Stand with Assist    Activity Level - Current:   Stand with Assist     Needed?: No    Isolation: No  Infection: Not Applicable  Bariatric?: No    Vital Signs:   Vitals:    05/09/19 1443   BP: 155/56   Resp: 16   Temp: 98  F (36.7  C)   TempSrc: Oral   SpO2: 100%   Weight: 77.1 kg (170 lb)   Height: 1.753 m (5' 9\")       Cardiac Rhythm: ,        Pain level:      Is this patient confused?: No   Does this patient have a guardian?  No         If yes, is there guardianship documents in the Epic \"Code/ACP\" activity?  N/A         Guardian Notified?  N/A  [place] - Suicide Severity Rating Scale Completed?  Yes  If yes, what color did the patient score?  White    Patient Report: Initial Complaint: back pain, chronic  Focused Assessment: back pain, neuro assessment, respirations d/t narc use  Tests Performed: labs  Abnormal Results: BUN/creatinine, on dialysis  Treatments provided: pain meds    Family Comments:     OBS brochure/video discussed/provided to patient/family: N/A              Name of person given brochure if not patient:               Relationship to patient:     ED Medications:   Medications   HYDROcodone-acetaminophen (NORCO) 5-325 MG per tablet 2 tablet (2 tablets Oral Given 5/9/19 1716)       Drips infusing?:  No    For the majority of the shift this patient was Green. "   Interventions performed were .    Severe Sepsis OR Septic Shock Diagnosis Present: No    To be done/followed up on inpatient unit:      ED NURSE PHONE NUMBER: [phone]

## 2019-05-09 NOTE — LETTER
Transition Communication Hand-off for Care Transitions to Next Level of Care Provider    Name: Seven Savage Jr.  : 1935  MRN #: [id number]  Primary Care Provider: Sandip Antunez  Primary Care MD Name: Dr Antunez  Primary Clinic: [address] [facility] [address]  Primary Care Clinic Name: [facility]  Reason for Hospitalization:  Metastatic disease (H) [C79.9]  Failure to thrive in adult [R62.7]  Midline low back pain without sciatica, unspecified chronicity [M54.5]  Admit Date/Time: 2019  2:38 PM  Discharge Date: 19  Payor Source: Payor: MEDICARE / Plan: MEDICARE / Product Type: Medicare /     Readmission Assessment Measure (KHURRAM) Risk Score/category: Elevated    Reason for Communication Hand-off Referral: Fragility    Discharge Plan:[facility]       Concern for non-adherence with plan of care:     Discharge Needs Assessment:  Needs      Most Recent Value   # of Referrals Placed by CTS  Internal Clinic Care Coordination          Follow-up specialty is recommended: Yes, he will need to see Dr Robles (Oncology)  in 4 weeks  Follow-up plan:    Future Appointments   Date Time Provider Department Center   2019  8:00 AM Gibran Arevalo, NADIR CNP [facility] [place] [facility]       Any outstanding tests or procedures:    Procedures     Future Labs/Procedures    Oxygen - Nasal cannula     Comments:    1-6 Lpm by nasal cannula to keep O2 sats 92% or greater.          Referrals     Future Labs/Procedures    Occupational Therapy Adult Consult     Comments:    Evaluate and treat as clinically indicated.    Reason:  Back pain , pathologic compression fracture    Physical Therapy Adult Consult     Comments:    Evaluate and treat as clinically indicated.    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  Pt was hospitalized in April and went to [facility]  Due severe back  pain, he was not able to participate with therapies and was discharged home to his son's home.  Readmitted with severe back pain, L4 compression fx.   He had five palliative radiation treatments to his spine.  His tolerance for activity is better, but still poor.  He is aware that there are no treatment options for his malignancy.  He was not interested in transitioning to Hospice, so will continue his hemodialysis.  [facility].  He will need a Care Coordinator to follow once he is out of the rehab facility.    Thank-you,    Re Arce  RN care Coordinator  [facility]    AVS/Discharge Summary is the source of truth; this is a helpful guide for improved communication of patient story

## 2019-05-09 NOTE — ED PROVIDER NOTES
History     Chief Complaint:  Back Pain      HPI   Seven Savage Jr. is a 84 year old male with end stage renal disease on dialysis and respiratory failure on continuous home oxygen who presents with acute on chronic back pain.  He was admitted at the end of April and found to have metastatic cancer with unknown primary source and had scheduled further work-up of this outpatient but was unable ot make the appointments due to pain and inability to get out of bed and out of his chair. The patient was at dialysis earlier today when he was unable to sit up from the chair due to his back pain, so they called 911. He took hydrocodone around 1345, but no relief. He had a clinic appointment with Dr. Robles, but was in too much pain to go. From discharge here on 4/26 he went to TCU and then on Monday returned to his sons house where he has been unable to manage his pain or go to his appointments. He reports that he used to have a comfortable position for his pain, but has been having increased pain and is constant in nature. He has a known L4 vertebral body compression fracture that is suspected to be pathologic in nature.  He denies fever, chest pain, increased SOB or radiating pain into the legs. No bowel or bladder incontinence.     Allergies:  Levaquin  Pioglitazone  Vytorin       Medications:    Flexeril  Hydrocodone  Albuterol inhaler  81 mg Aspirin  Lipitor  Coreg  Diltiazem  Cardura  Eplerenone  Fluticasone  Lasix  Insulin aspart  Insulin glargine  Avapro  Zantac  Senokot       Past Medical History:    COPD  Chronic renal failure  Heart murmur  Hyperlipidemia  Hypertension  Insulin dependent type 2 diabetes mellitus  Renal calculi  End stage renal disease      Past Surgical History:    Adenoidectomy  Endoscopic polypectomy nasal  Renal stent  Tonsillectomy      Family History:    History reviewed. No pertinent family history.       Social History:  Smoking status: Former smoker, quit 2011  Alcohol use:  "Yes  Drug use: No  PCP: Sandip Antunez  Presents to the ED with himself  Marital Status:   [5]       Review of Systems   Constitutional: Negative for chills and fever.   Respiratory: Negative for shortness of breath (no change from baseline).    Gastrointestinal: Negative for abdominal pain, nausea and vomiting.   Musculoskeletal: Positive for back pain (low back).   Neurological: Positive for weakness. Negative for numbness.   All other systems reviewed and are negative.        Physical Exam     Patient Vitals for the past 24 hrs:   BP Temp Temp src Heart Rate Resp SpO2 Height Weight   05/09/19 1443 155/56 98  F (36.7  C) Oral 68 16 100 % 1.753 m (5' 9\") 77.1 kg (170 lb)       Physical Exam  Physical Exam   Constitutional:  Resting in bed, appears uncomfortable. Not toxic appearing. Nasal cannula in place on chronic oxygen.  Head: Head moves freely with normal range of motion.   ENT: Oropharynx is clear and moist.   Eyes: Conjunctivae pink. EOMs intact.   Neck: Normal range of motion.   Cardiovascular: Regular rate and rhythm. Normal heart sounds. No concerning murmur. Intact distal pulses: radial pulses 2+ on the right, 2+ on the left.   Pulmonary/Chest: No respiratory distress. No decreased breath sounds. No wheezes. No rhonchi. No rales. Lungs clear throughout.   Abdominal: Soft. Non-tender. No rebound, no guarding. No stool or urine incontinence.   Musculoskeletal: Diffuse low back pain. Negative straight leg raise and crossover. Difficult to assess patellar reflexed based on position and unable to sit up due to pain.No peripheral edema. Distal capillary refill and sensation intact.    Neurological: Oriented to person, place, and time. No focal deficits. No saddle anesthesia.   Skin: Skin is warm and normal in color. AV fistula to left upper arm.      Emergency Department Course   Laboratory:  CBC: HGB 9.5 (L) o/w WNL (WBC 11.0, )  BMP: Potassium 3.3 (L), Glucose 128 (H), Creatinine 2.65 (H), GFR " 25 (L)      Interventions:  1716: 2 tablet Norco PO      Emergency Department Course:  Past medical records, nursing notes, and vitals reviewed.  1458: I performed an exam of the patient and obtained history, as documented above.    1521: I spoke to Dr. Robles regarding the patient and his end of care plan.    1525: I rechecked the patient. Explained findings to patient following conversation with Dr. Robles.    1705: Discussed the patient with Dr. Strong, who will admit the patient to an inpatient med bed for further monitoring, evaluation, and treatment.     Findings and plan explained to the patient who consents to admission.     Impression & Plan    Medical Decision Making:  Seven Savage Jr. is a 84 year old male with recent diagnosis of metastatic cancer although unknown primary source. He notes increasing low back pain to the point of not being able to go to outpatient appointments. He is on oxygen chronically and on dialysis, last dialyzed today. He is alert and oriented, but tangential making it difficult to get a complete HPI. Chart review with no Oncology outpatient visits. I did talk to Dr Robles and he was able to tell me more about the patient, they do not have a current plan in place and are awaiting tests, patient declined bone marrow biopsy. Lab work with improved hgb from recent comparison. Known ESRD on dialysis. Discussed admission for plan control and plan of care. I did discuss the possibility of palliative care with Mr Savage as I suspect there is little cancer treatment that he would be a candidate for. He was aware of this. Dr Strong will admit him to a medical bed. He was amenable to plan.        Diagnosis:    ICD-10-CM    1. Midline low back pain without sciatica, unspecified chronicity M54.5 CBC with platelets + differential     Basic metabolic panel   2. Metastatic disease (H) C79.9        Disposition: Admitted to inpatient med bed.      I, Rosaline Fields, am serving as a  scribe at 2:58 PM on 5/9/2019 to document services personally performed by Arpita Sanchez based on my observations and the provider's statements to me.     Rosaline Fields  5/9/2019    EMERGENCY DEPARTMENT       Arpita Sanchez, APRN CNP  05/09/19 1916

## 2019-05-09 NOTE — PROGRESS NOTES
RECEIVING UNIT ED HANDOFF REVIEW    ED Nurse Handoff Report was reviewed by: Theresa Larsen on May 9, 2019 at 6:12 PM

## 2019-05-09 NOTE — PHARMACY-ADMISSION MEDICATION HISTORY
Admission medication history interview status for the 5/9/2019  admission is complete. See EPIC admission navigator for prior to admission medications     Medication history source reliability:Good    Actions taken by pharmacist (provider contacted, etc):None     Additional medication history information not noted on PTA med list :None    Medication reconciliation/reorder completed by provider prior to medication history? No    Time spent in this activity: 20min    Prior to Admission medications    Medication Sig Last Dose Taking? Auth Provider   albuterol (2.5 MG/3ML) 0.083% neb solution TAKE 1 VIAL BY NEBULIZATION EVERY 6 HOURS AS NEEDED FOR SHORNESS OF BREATH/DYSPNEA OR WHEEZING  Yes Sandip Antunez MD   albuterol (PROAIR HFA/PROVENTIL HFA/VENTOLIN HFA) 108 (90 BASE) MCG/ACT Inhaler Inhale 1-2 puffs into the lungs every 4 hours as needed for shortness of breath / dyspnea or wheezing  Yes Sandip Antunez MD   aspirin 81 MG chewable tablet Take 81 mg by mouth daily 5/9/2019 at Unknown time Yes Unknown, Entered By History   atorvastatin (LIPITOR) 40 MG tablet Take 40 mg by mouth daily 5/8/2019 at pm Yes Unknown, Entered By History   B Complex-C-Folic Acid (BRANDI-HEIDY) TABS Take 1 tablet by mouth daily 5/9/2019 at Unknown time Yes Sandip Antunez MD   carvedilol (COREG) 12.5 MG tablet TAKE 1 TABLET(12.5 MG) BY MOUTH TWICE DAILY 5/9/2019 at Unknown time Yes Sandip Antunez MD   cyclobenzaprine (FLEXERIL) 5 MG tablet Take 1 tablet (5 mg) by mouth 3 times daily as needed for muscle spasms 5/9/2019 at Unknown time Yes Laila Nelson MD   diltiazem ER (TIAZAC) 300 MG 24 hr ER beaded capsule TAKE 1 CAPSULE(300 MG) BY MOUTH DAILY 5/9/2019 at Unknown time Yes Sandip Antunez MD   doxazosin (CARDURA) 8 MG tablet Take 8 mg by mouth At Bedtime 5/8/2019 at hs Yes Reported, Patient   eplerenone (INSPRA) 50 MG tablet Take 1 tablet (50 mg) by mouth daily 5/9/2019 at Unknown time Yes Daria Hastings, DO dietrich  oil-omega-3 fatty acids 1000 MG capsule Take 1 g by mouth daily 5/9/2019 at Unknown time Yes Unknown, Entered By History   fluticasone (FLOVENT HFA) 220 MCG/ACT inhaler Inhale 1 puff into the lungs 2 times daily 5/9/2019 at Unknown time Yes Unknown, Entered By History   furosemide (LASIX) 20 MG tablet Take 1 tablet (20 mg) by mouth daily 5/9/2019 at Unknown time Yes Sandip Antunez MD   HYDROcodone-acetaminophen (NORCO) 5-325 MG tablet Take 1 tablet by mouth every 6 hours as needed for severe pain 5/9/2019 at 1345 Yes Dixie Sherman APRN CNP   insulin aspart (NOVOLOG PEN) 100 UNIT/ML pen Do Not give Correction Insulin if BG <140.  For  - 214 give 1 unit.  For  - 289 give 2 unit.  For  - 364 give 4 units.  For BG = or > 365 give 6 units 5/9/2019 at Unknown time Yes Erick Griffiths MD   insulin aspart (NOVOLOG VIAL) 100 UNITS/ML vial Inject 4 Units Subcutaneous 3 times daily (with meals) Patient states with large meals he will increase to 14-16 units. 5/9/2019 at Unknown time Yes Erick Griffiths MD   insulin glargine (LANTUS SOLOSTAR PEN) 100 UNIT/ML pen Inject 10 Units Subcutaneous At Bedtime (Patient will increase to 26 units at bedtime he states when BG is elevated. 5/8/2019 at hs Yes Erick Griffiths MD   irbesartan (AVAPRO) 150 MG tablet TAKE 1 TABLET(150 MG) BY MOUTH DAILY 5/9/2019 at Unknown time Yes Sandip Antunez MD   polyethylene glycol (MIRALAX/GLYCOLAX) packet Take 17 g by mouth daily 5/9/2019 at Unknown time Yes Reported, Patient   ranitidine (ZANTAC) 150 MG tablet Take 1 tablet (150 mg) by mouth daily as needed for heartburn  Yes Sandip Antunez MD   senna-docusate (SENOKOT-S/PERICOLACE) 8.6-50 MG tablet Take 1 tablet by mouth 2 times daily And bid prn 5/9/2019 at Unknown time Yes Reported, Patient   STIOLTO RESPIMAT 2.5-2.5 MCG/ACT AERS INHALE 2 PUFFS INTO THE LUNGS DAILY 5/9/2019 at Unknown time Yes Sandip Antunez MD   VITAMIN D, CHOLECALCIFEROL,  PO Take 2,000 Units by mouth daily  5/9/2019 at Unknown time Yes Unknown, Entered By History

## 2019-05-09 NOTE — ED NOTES
Bed: ED04  Expected date: 5/9/19  Expected time: 2:24 PM  Means of arrival: Ambulance  Comments:  518 84m increase chronic pain h/o 14 fx

## 2019-05-09 NOTE — PROGRESS NOTES
I was asked about this patient re: a discharge plan.  This patient has metastatic CA, he is a dialysis patient (MWF at [facility]), his is on home O2 for COPD and is having a lot of pain--so much so that he couldn't complete dialysis so was sent to the ER.  He was just discharged from [facility]  On 5/6/19. He is being followed by Dr. Robles for his CA.  He has many medical issues and due to his severe pain from his L4 compression fx I won't be able to place him from the ER.  CTS will need to follow this patient for discharge needs.  I updated the ER staff.

## 2019-05-09 NOTE — PLAN OF CARE
Arrived on [address] at about 1820    ORIENTATION: A&O  BEHAVIOR & AGGRESSION TOOL COLOR: Green, calm/cooperative with cares  CIWA SCORE: NA  ABNL VS/O2: VSS on 2L O2 via NC (baseline per patient)  MOBILITY: Up with SBA  PAIN MANAGMENT: C/o low back pain, PRN pain medication available (got Norco in ED)  DIET: Moderate carb  BOWEL/BLADDER: Patient on hemodialysis  ABNL LAB/BG: BG checks  DRAIN/DEVICES: Fistula to LUE  TELEMETRY RHYTHM: NA  SKIN: Intact  TESTS/PROCEDURES: Dialysis tomorrow  D/C DAY/GOALS/PLACE: Pending  OTHER IMPORTANT INFO:

## 2019-05-10 ENCOUNTER — APPOINTMENT (OUTPATIENT)
Dept: PHYSICAL THERAPY | Facility: CLINIC | Age: 84
DRG: 477 | End: 2019-05-10
Attending: HOSPITALIST
Payer: MEDICARE

## 2019-05-10 LAB
ANION GAP SERPL CALCULATED.3IONS-SCNC: 5 MMOL/L (ref 3–14)
BUN SERPL-MCNC: 29 MG/DL (ref 7–30)
CALCIUM SERPL-MCNC: 9.8 MG/DL (ref 8.5–10.1)
CHLORIDE SERPL-SCNC: 104 MMOL/L (ref 94–109)
CO2 SERPL-SCNC: 31 MMOL/L (ref 20–32)
CREAT SERPL-MCNC: 3.76 MG/DL (ref 0.66–1.25)
GFR SERPL CREATININE-BSD FRML MDRD: 14 ML/MIN/{1.73_M2}
GLUCOSE BLDC GLUCOMTR-MCNC: 113 MG/DL (ref 70–99)
GLUCOSE BLDC GLUCOMTR-MCNC: 142 MG/DL (ref 70–99)
GLUCOSE BLDC GLUCOMTR-MCNC: 156 MG/DL (ref 70–99)
GLUCOSE BLDC GLUCOMTR-MCNC: 158 MG/DL (ref 70–99)
GLUCOSE BLDC GLUCOMTR-MCNC: 88 MG/DL (ref 70–99)
GLUCOSE SERPL-MCNC: 98 MG/DL (ref 70–99)
POTASSIUM SERPL-SCNC: 3.8 MMOL/L (ref 3.4–5.3)
SODIUM SERPL-SCNC: 140 MMOL/L (ref 133–144)

## 2019-05-10 PROCEDURE — 97530 THERAPEUTIC ACTIVITIES: CPT | Mod: GP

## 2019-05-10 PROCEDURE — A9270 NON-COVERED ITEM OR SERVICE: HCPCS | Performed by: INTERNAL MEDICINE

## 2019-05-10 PROCEDURE — 00000146 ZZHCL STATISTIC GLUCOSE BY METER IP

## 2019-05-10 PROCEDURE — 12000000 ZZH R&B MED SURG/OB

## 2019-05-10 PROCEDURE — 25000132 ZZH RX MED GY IP 250 OP 250 PS 637: Performed by: INTERNAL MEDICINE

## 2019-05-10 PROCEDURE — 99232 SBSQ HOSP IP/OBS MODERATE 35: CPT | Performed by: HOSPITALIST

## 2019-05-10 PROCEDURE — 25000131 ZZH RX MED GY IP 250 OP 636 PS 637: Performed by: INTERNAL MEDICINE

## 2019-05-10 PROCEDURE — 36415 COLL VENOUS BLD VENIPUNCTURE: CPT | Performed by: INTERNAL MEDICINE

## 2019-05-10 PROCEDURE — 80048 BASIC METABOLIC PNL TOTAL CA: CPT | Performed by: INTERNAL MEDICINE

## 2019-05-10 PROCEDURE — 97161 PT EVAL LOW COMPLEX 20 MIN: CPT | Mod: GP

## 2019-05-10 RX ORDER — CHOLECALCIFEROL (VITAMIN D3) 50 MCG
2000 TABLET ORAL DAILY
Status: DISCONTINUED | OUTPATIENT
Start: 2019-05-11 | End: 2019-05-23 | Stop reason: HOSPADM

## 2019-05-10 RX ORDER — AMOXICILLIN 250 MG
1 CAPSULE ORAL 2 TIMES DAILY
Status: DISCONTINUED | OUTPATIENT
Start: 2019-05-10 | End: 2019-05-20

## 2019-05-10 RX ORDER — CHLORAL HYDRATE 500 MG
1 CAPSULE ORAL DAILY
Status: DISCONTINUED | OUTPATIENT
Start: 2019-05-11 | End: 2019-05-23 | Stop reason: HOSPADM

## 2019-05-10 RX ORDER — ALBUTEROL SULFATE 90 UG/1
1-2 AEROSOL, METERED RESPIRATORY (INHALATION) EVERY 4 HOURS PRN
Status: DISCONTINUED | OUTPATIENT
Start: 2019-05-10 | End: 2019-05-23 | Stop reason: HOSPADM

## 2019-05-10 RX ORDER — POLYETHYLENE GLYCOL 3350 17 G/17G
17 POWDER, FOR SOLUTION ORAL DAILY
Status: DISCONTINUED | OUTPATIENT
Start: 2019-05-11 | End: 2019-05-23 | Stop reason: HOSPADM

## 2019-05-10 RX ORDER — HYDROCODONE BITARTRATE AND ACETAMINOPHEN 5; 325 MG/1; MG/1
1 TABLET ORAL EVERY 6 HOURS PRN
Status: DISCONTINUED | OUTPATIENT
Start: 2019-05-10 | End: 2019-05-12

## 2019-05-10 RX ADMIN — INSULIN GLARGINE 10 UNITS: 100 INJECTION, SOLUTION SUBCUTANEOUS at 22:48

## 2019-05-10 RX ADMIN — ATORVASTATIN CALCIUM 40 MG: 40 TABLET, FILM COATED ORAL at 21:53

## 2019-05-10 RX ADMIN — DOXAZOSIN 8 MG: 4 TABLET ORAL at 21:54

## 2019-05-10 RX ADMIN — CYCLOBENZAPRINE HYDROCHLORIDE 5 MG: 5 TABLET, FILM COATED ORAL at 21:52

## 2019-05-10 RX ADMIN — FUROSEMIDE 20 MG: 20 TABLET ORAL at 08:34

## 2019-05-10 RX ADMIN — CARVEDILOL 12.5 MG: 12.5 TABLET, FILM COATED ORAL at 17:51

## 2019-05-10 RX ADMIN — DILTIAZEM HYDROCHLORIDE 300 MG: 180 CAPSULE, EXTENDED RELEASE ORAL at 08:34

## 2019-05-10 RX ADMIN — INSULIN ASPART 1 UNITS: 100 INJECTION, SOLUTION INTRAVENOUS; SUBCUTANEOUS at 13:42

## 2019-05-10 RX ADMIN — IRBESARTAN 150 MG: 150 TABLET ORAL at 08:35

## 2019-05-10 RX ADMIN — ASPIRIN 81 MG 81 MG: 81 TABLET ORAL at 08:34

## 2019-05-10 RX ADMIN — INSULIN ASPART 1 UNITS: 100 INJECTION, SOLUTION INTRAVENOUS; SUBCUTANEOUS at 17:51

## 2019-05-10 RX ADMIN — CARVEDILOL 12.5 MG: 12.5 TABLET, FILM COATED ORAL at 08:34

## 2019-05-10 RX ADMIN — EPLERENONE 50 MG: 25 TABLET ORAL at 08:34

## 2019-05-10 ASSESSMENT — ACTIVITIES OF DAILY LIVING (ADL)
ADLS_ACUITY_SCORE: 17

## 2019-05-10 NOTE — PROGRESS NOTES
05/10/19 1100   Quick Adds   Type of Visit Initial PT Evaluation   Living Environment   Lives With child(maxx), adult   Living Arrangements house   Home Accessibility stairs to enter home   Number of Stairs, Main Entrance 3   Stair Railings, Main Entrance railings on both sides of stairs   Self-Care   Usual Activity Tolerance moderate   Current Activity Tolerance poor   Activity/Exercise/Self-Care Comment Patient states he is on 2L O2 at baseline    Functional Level Prior   Ambulation 0-->independent   Transferring 0-->independent   Toileting 0-->independent   Bathing 2-->assistive person   Fall history within last six months yes   Number of times patient has fallen within last six months 1   Which of the above functional risks had a recent onset or change? ambulation;transferring   Prior Functional Level Comment Patient states he lives with his son. Unclear of level of A he receives at baseline, but does state he receives some help with dressing/showering    General Information   Onset of Illness/Injury or Date of Surgery - Date 05/09/18   Referring Physician Edward Flor MD   Patient/Family Goals Statement Return home    Pertinent History of Current Problem (include personal factors and/or comorbidities that impact the POC) Patient admitted on 5/9/19 for evaluation of  intractable back pain. Of note, patient recently previously diagnosed with an L4 vertebral compression fracture. Patient with PMH of end stage renal disease on dialysis MWF, HTN, type II diabetes, COPD, and Metastatic carcinoma with lesions in the lung, liver, spine and possibly kidneys and adrenals.   Precautions/Limitations fall precautions   Weight-Bearing Status - LLE full weight-bearing   Weight-Bearing Status - RLE full weight-bearing   General Observations Patient supine in bed upon arrival of therapist, agreeable to working with PT, notes pain at rest.    Cognitive Status Examination   Orientation orientation to person, place  "and time   Level of Consciousness alert   Follows Commands and Answers Questions 100% of the time;able to follow multistep instructions   Pain Assessment   Patient Currently in Pain Yes, see Vital Sign flowsheet   Integumentary/Edema   Integumentary/Edema no deficits were identifed   Posture    Posture Forward head position   Range of Motion (ROM)   ROM Comment B LE ROM WNL    Strength   Strength Comments Not formally or functionally assessed, unable to tolerate sitting at EOB 2/2 to lightheadedness and back pain    Bed Mobility   Bed Mobility Comments Supine>sit with SBA and time to complete   Transfer Skills   Transfer Comments Not assessed, patient unable to tolerate sitting at EOB 2/2 to lightheadedness and back pain    Gait   Gait Comments Not assessed, patient unable to tolerate sitting at EOB 2/2 to lightheadedness and back pain    Balance   Balance Comments Sitting balance at EOB, SBA with bilateral UE support on EOB    General Therapy Interventions   Planned Therapy Interventions balance training;bed mobility training;gait training;strengthening;transfer training;home program guidelines   Clinical Impression   Criteria for Skilled Therapeutic Intervention yes, treatment indicated   PT Diagnosis Impaired mobility and gait   Influenced by the following impairments pain, weakness,  lightheadedness   Functional limitations due to impairments bed mobility, transfers, gait, stairs   Clinical Presentation Stable/Uncomplicated   Clinical Presentation Rationale Based on PMH, current presentation, and social support    Clinical Decision Making (Complexity) Low complexity   Therapy Frequency` daily   Predicted Duration of Therapy Intervention (days/wks) 4 days   Anticipated Discharge Disposition Transitional Care Facility   Risk & Benefits of therapy have been explained Yes   Patient, Family & other staff in agreement with plan of care Yes   [facility] AM-PAC TM \"6 Clicks\"   2016, Trustees of [facility], " "under license to [employer].  All rights reserved.   6 Clicks Short Forms Basic Mobility Inpatient Short Form   [facility] AM-PAC  \"6 Clicks\" V.2 Basic Mobility Inpatient Short Form   1. Turning from your back to your side while in a flat bed without using bedrails? 4 - None   2. Moving from lying on your back to sitting on the side of a flat bed without using bedrails? 3 - A Little   3. Moving to and from a bed to a chair (including a wheelchair)? 2 - A Lot   4. Standing up from a chair using your arms (e.g., wheelchair, or bedside chair)? 2 - A Lot   5. To walk in hospital room? 2 - A Lot   6. Climbing 3-5 steps with a railing? 1 - Total   Basic Mobility Raw Score (Score out of 24.Lower scores equate to lower levels of function) 14   Total Evaluation Time   Total Evaluation Time (Minutes) 8     "

## 2019-05-10 NOTE — PROGRESS NOTES
SPIRITUAL HEALTH SERVICES Progress Note  [facility]     has known pt Veronika from his prior hospitalization.     Pt said that he came to the hospital yesterday because of his back pain. Pt reports that he feels better, but still has back pain. Pt states that it is painful, whenever he moves.     Pt looked peaceful and requested to read Mireya 1:1-19, and  provided it and engaged in the conversation.     Pt's doctor came in and the visit was interrupted.  let pt know that SH is available.      provided a reflective conversation, care in presence, reading scripture, and support.     I and SH remain available if additional needs arise.       Deborah Raygoza  Chaplain Resident

## 2019-05-10 NOTE — PROGRESS NOTES
[facility]    Medicine Progress Note - Hospitalist Service       Date of Admission:  5/9/2019  Assessment & Plan       This is an 84-year-old gentleman with a history of end-stage renal disease and acute on chronic back pain that is uncontrolled, likely related to a pathologic compression fracture in the setting of metastatic carcinoma with unknown primary.       Back pain,   L4 vertebral body with mild anterior compression   The patient is being admitted for pain control.  He had been taking hydrocodone/APAP, 2 tablets per day   - - Increased PTA pain regimen to Q 4h prn, additona IV dilaudid prn .   - -  His back pain has aggie evaluated by Orthopedic Surgery in a previous hospitalization who recommended conservative management.  It does not appear that there is an immediate advantage to radiation therapy, although this could be considered pending his clinical course.   - - PT/OT ordered    Aortic stenosis:   Severe, mean 54mmHg, and the  VITO 0.8cm2,   Continue PTA coreg, Diltiazem  And lasix     History of metastatic carcinoma  Malignant appearing lesions in lung, liver, spine and possible kidneys and adrenals.  The patient is followed by Dr. Robles and was still undergoing evaluation for this.  He was supposed to have a PET scan done last Monday, but was unable to make it due to his back pain.  I have consulted Dr. Robles's service to see if they can help in this regard.  With respect to cancer treatment, again, evaluation was still underway.  The patient was actually seen by Palliative Medicine during his previous hospitalization and was not ready, at that point, to consider hospice or end of life cares, although the patient currently does seem to have realistic expectations for ongoing care goals.      Type 2 diabetes.  The patient is maintained on Lantus and scheduled aspart; these will be continued.       End-stage renal disease.  The patient typically has Monday, Wednesday, Friday  dialysis.  His last dialysis was actually today due to missing his dialysis yesterday.  I will tag Nephrology to follow along.        Diet: Moderate Consistent CHO Diet    DVT Prophylaxis: Low Risk/Ambulatory with no VTE prophylaxis indicated  Dougherty Catheter: not present  Code Status: DNR/DNI      Disposition Plan   Expected discharge: Tomorrow, recommended to transitional care unit once when pain is under control and per PT/OT final reccs.  Entered: Edward Flor MD 05/10/2019, 10:14 AM       The patient's care was discussed with the Bedside Nurse.    Edward Flor MD  Hospitalist Service  [facility]    ______________________________________________________________________    Interval History   {Include any events overnight / new information. 4 point ROS is needed for billing a level III (233):  Feels better , pain is better controled, no fever or chills no cough or shortness breath. No new neurologic deficits , specific;y no bowel or bladder complaints     Data reviewed today: I reviewed all medications, new labs and imaging results over the last 24 hours. I personally reviewed no images or EKG's today.    Physical Exam   Vital Signs: Temp: 97.6  F (36.4  C) Temp src: Oral BP: 133/56 Pulse: 55 Heart Rate: 59 Resp: 18 SpO2: 96 % O2 Device: Nasal cannula Oxygen Delivery: 2 LPM  Weight: 170 lbs 0 oz  General Appearance: Alert in NAD  Respiratory:  CTA no wheezing   Cardiovascular:  Regular rhythm load systolic murmur is noted  GI:  Soft NT/ND + BS   Skin:  Warm dry no rashes      Data

## 2019-05-10 NOTE — PLAN OF CARE
DATE & TIME: 05/09/2019 1900-0730  ORIENTATION: Alert and oriented x 4  BEHAVIOR & AGGRESSION TOOL COLOR: Green  CIWA SCORE: NA  ABNL VS/O2: VSS on 2L of O2 via nasal cannula (Baseline per patient)  MOBILITY: Up with one   PAIN MANAGMENT: Flexeril administered x 1  DIET: Tolerating Mod Carb diet   BOWEL/BLADDER: Continent   ABNL LAB/BG: Blood Glucose: HS: 161 / 0200: 113  DRAIN/DEVICES: PIV is saline locked, Fistula to LUE  TELEMETRY RHYTHM: NA  SKIN:   TESTS/PROCEDURES: Dialysis tomorrow  D/C DAY/GOALS/PLACE: Pending clinical improvement  OTHER IMPORTANT INFO:

## 2019-05-10 NOTE — CONSULTS
"BRIEF NUTRITION ASSESSMENT      REASON FOR ASSESSMENT:  Seven Savage Jr. is a 84 year old male seen by Registered Dietitian for Admission Nutrition Risk Screen for reduced oral intake over the last month    NUTRITION HISTORY:  Pt lives with his son, pt does do some of the cooking.  He follows a low salt diet but uses \"lots\" of other seasonings.  He eats 2 meals/day, rarely snacks. He does not pay attention to carbs.  Pt meets regularly with an RD at dialysis.  He t says his intake has been less than his baseline since he moved from [place], he doesn't care for \"[place] cooking - not enough spices\".    CURRENT DIET AND INTAKE:  Diet:  Mod carb           Pt ate well for breakfast but only ordered ice cream and cake for dessert    ANTHROPOMETRICS:  Height: 5' 9\"  Weight:  170 lbs 0 oz (77.1 kg)  Body mass index is 25.1 kg/m .   Weight Status: Normal BMI  IBW:  72.7 kg +/- 10%  %IBW: 106%  Weight History: Note wt has been trending down since last fall.  Wt Readings from Last 10 Encounters:   05/09/19 77.1 kg (170 lb)   05/03/19 74.6 kg (164 lb 6.4 oz)   04/29/19 76.2 kg (168 lb)   04/26/19 76.3 kg (168 lb 3.2 oz)   03/28/19 80.1 kg (176 lb 9.6 oz)   11/15/18 86.7 kg (191 lb 3.2 oz)   11/07/18 80.7 kg (177 lb 14.6 oz)   11/01/18 86.1 kg (189 lb 12.8 oz)   06/21/18 85 kg (187 lb 4.8 oz)   05/22/18 84.4 kg (186 lb)         LABS:  Labs noted    MALNUTRITION:  Patient does not meet two of the following criteria necessary for diagnosing malnutrition: significant weight loss, reduced intake, subcutaneous fat loss, muscle loss or fluid retention    NUTRITION INTERVENTION:  Nutrition Diagnosis:  No nutrition diagnosis at this time.    Implementation:  Nutrition Education:  No education needed    FOLLOW UP/MONITORING:   Will re-evaluate in 7 - 10 days, or sooner, if re-consulted.      Hoa Drake RD  Pager [phone] (M-F) [phone] (W/E & Hol)            "

## 2019-05-10 NOTE — PLAN OF CARE
Discharge Planner PT   Patient plan for discharge: Not stated  Current status: Orders received, chart reviewed, PT evaluation completed and treatment initiated. Patient admitted on evaluation of  intractable back pain. Of note, patient recently previously diagnosed with an L4 vertebral compression fracture in April of 2019. Patient with PMH of end stage renal disease on dialysis MWF, HTN, type II diabetes, COPD, and Metastatic carcinoma with lesions in the lung, liver, spine and possibly kidneys and adrenals. Patient lives in a house with his son with 3 steps to enter with bilateral rails and all needs met on single level of home. Patient states he is independent with mobility/ambulation at baseline, unclear of A for ADLs but states he has some A if needed.     Patient supine in bed upon arrival of therapist, agreeable to working with PT. Educated on role of PT and PT POC. Patient noting back pain at rest as 4/10. SpO2 at rest 91% on 2L O2 (uses 2L O2 at baseline). Supine>sit with SBA and time to complete, patient utilizing log rolling technique. Patient able to sit at EOB for ~1 minute with B UE support on bed and SBA, noting lightheadedness and increased back pain and needing to return to supine. Min A to assist with moving B LE back up into bed. Patient in supine at end of session, /50. Patient noting that is lower than his normal BP. All needs in reach and bed alarm on upon therapist exit.   Barriers to return to prior living situation: Current level of A, pain, weakness  Recommendations for discharge: TCU  Rationale for recommendations: Patient would benefit from continued skilled therapy at TCU to further improve strength, balance, and independence with mobility and ambulation to address functional limitations and decrease falls risk.         Entered by: Mattie Rudolph 05/10/2019 11:45 AM

## 2019-05-10 NOTE — CONSULTS
Consultation    Seven Savage Jr. MRN# [id number]   YOB: 1935 Age: 84 year old   Date of Admission: 5/9/2019  Requesting physician: Dr. Strong  Reason for consult: Back pain, possible metastatic cancer unknown primary           Assessment and Plan:     Sarkis is an 84 year old admitted 5/9 with acute on chronic back pain    1. Pain related to acute/subacute compression fracture at L4.    - Previous imaging suggests that this may be pathologic due to metastatic disease.   - No epidural soft tissue tumor has been identified.   - Has been seen by Orthopedics previously  - Radiation therapy to this site would be beneficial.  - Had been at TCU after last hospitalization, but at home more recently  - Pain uncontrollable therefore admitted   - Doing better this morning    2. Pulmonary nodule with findings concerning for metastatic disease in the liver, bone, kidneys, adrenal glands.   - Work up is in process by Dr. Robles  - PET/CT attempted on 5/6, but pain unbearable  - Biopsy still not completed  - Sarkis was seen by Dr. Robles last hospitalization  - We could revisit radiation to L4, but tissue would be ideal  - He has been seen by Palliative Care in the past during hospitalizations  - Continue to address goals of care     3. Anemia   - Initial work-up not diagnostic for cause of anemia.    - Suspicious for anemia of malignancy      4. ESDR  - Getting dialysis M-W-F  - Nephrology consulted     Quincy SCHMITZ, CNP  [facility]  [phone]             Chief Complaint:   Back Pain (new L4 fracture, also has chronic pain. was at dialysis today, can't get control of his pain. took hydrocodone at 1345, helping some)         History of Present Illness:   Sarkis is an 84 year old admitted 5/9 with acute on chronic back pain    Sarkis started to having increasing low back pain this week. On Monday, he attempted PET/CT scan but was not able to be completed due to pain. His pain has continued and  he came in to the ED for evaluation.    He is currently resting comfortably. The pain is mainly on the right side and stays in the back. No radiculopathy. No new weakness, urine or bowel incontinence.    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  He has a history of hypertension, diabetes and end-stage renal disease.  He has been on dialysis since 2016 and is managed on erythropoietin and venofer. Hemoglobin had been around 10-11 but more recently fell to 7.2. He was given 2 unit(s) [facility].  His work up in our clinic included a haptoglobin of 115.  Reticulocyte count of 3.5.  IgG level of 458.  IgA 199.  IgM 20.  TSH was 2.87.  .  Serum protein electrophoresis demonstrated no monoclonal protein.  Peripheral morphology demonstrated moderate hypochromic, normocytic anemia with mild reticulocytosis.  Rare schistocytes were present.  There were no apparent dysplastic or neoplastic changes.     Given no obvious explanation for the patient's anemia, a bone marrow biopsy was recommended but he declined.     He subsequently seen in the ER on 4/22/19 for evaluation of back and left leg pain.       He had been seen by a chiropractor and underwent an MRI scan of his lumbar spine which demonstrated an L4 vertebral compression fracture which was later over-read as concerning for metastatic disease.       While in the ER, he underwent a CT scan of the chest abdomen and pelvis with contrast which demonstrated a spiculated right upper lobe pulmonary nodule, highly suspicious for malignancy such as bronchogenic carcinoma.  There was lytic change in the L4 vertebral body with mild anterior compression, suspicious for metastatic lesion and an associated pathologic compression fracture, age indeterminate.  Multiple low-density liver lesions, not well characterized on a noncontrast scan but considered suspicious for metastatic disease.  There was mild mediastinal  "adenopathy.  Multiple bilateral adrenal nodules were indeterminant.  There are multiple hyperdense renal lesions bilaterally which could represent hemorrhagic or proteinaceous renal cysts.  Solid renal neoplasm could not be excluded.  There was moderate prostatic enlargement and an infra renal abdominal aortic aneurysm measuring 3.2 cm.           Physical Exam:   Vitals were reviewed  Blood pressure 133/56, pulse 55, temperature 97.6  F (36.4  C), temperature source Oral, resp. rate 18, height 1.753 m (5' 9\"), weight 77.1 kg (170 lb), SpO2 96 %.  Temperatures:  Current - Temp: 97.6  F (36.4  C); Max - Temp  Av.8  F (36.6  C)  Min: 97.4  F (36.3  C)  Max: 98.2  F (36.8  C)  Respiration range: Resp  Av.3  Min: 16  Max: 18  Pulse range: Pulse  Av  Min: 55  Max: 55  Blood pressure range: Systolic (24hrs), Av , Min:111 , Max:155   ; Diastolic (24hrs), Av, Min:43, Max:56    Pulse oximetry range: SpO2  Av.8 %  Min: 96 %  Max: 100 %    Intake/Output Summary (Last 24 hours) at 5/10/2019 1328  Last data filed at 5/10/2019 0900  Gross per 24 hour   Intake 200 ml   Output --   Net 200 ml       GENERAL: No acute distress.  SKIN: No rashes or jaundice.  HEENT: Normocephalic, atraumatic. Eyes anicteric. Oropharynx is clear.  HEART: Regular rate and rhythm with no murmurs.  LUNGS: Clear bilaterally.  ABDOMEN: Soft, nontender, nondistended with no palpable hepatosplenomegaly.  EXTREMITIES: No clubbing, cyanosis, or edema.  MUSCULOSKELETAL: Moves extremities spontaneously.  MENTAL: Alert and oriented to person, place, and time.            Past Medical History:   I have reviewed this patient's past medical history  Past Medical History:   Diagnosis Date     COPD (chronic obstructive pulmonary disease) (H)      CRF (chronic renal failure)      Heart murmur      HLD (hyperlipidemia)      HTN (hypertension)      IDDM (insulin dependent diabetes mellitus) (H)      Renal calculi              Past Surgical " History:   I have reviewed this patient's past surgical history  Past Surgical History:   Procedure Laterality Date     ADENOIDECTOMY       ENDOSCOPIC POLYPECTOMY NASAL      Through vocal cords     Renal Stent      For renal calculi     TONSILLECTOMY                 Social History:   I have reviewed this patient's social history  Social History     Tobacco Use     Smoking status: Former Smoker     Packs/day: 1.00     Years: 70.00     Pack years: 70.00     Types: Cigarettes     Last attempt to quit: 2011     Years since quittin.3     Smokeless tobacco: Never Used   Substance Use Topics     Alcohol use: Yes     Comment: average 1 beer month             Family History:   I have reviewed this patient's family history  Family History   Family history unknown: Yes             Allergies:     Allergies   Allergen Reactions     Levaquin [Levofloxacin]      edema     Pioglitazone Other (See Comments)     Edema & hay fever     Vytorin Other (See Comments)     Muscle aches             Medications:   I have reviewed this patient's current medications  Medications Prior to Admission   Medication Sig Dispense Refill Last Dose     albuterol (2.5 MG/3ML) 0.083% neb solution TAKE 1 VIAL BY NEBULIZATION EVERY 6 HOURS AS NEEDED FOR SHORNESS OF BREATH/DYSPNEA OR WHEEZING 75 mL 7 Taking     albuterol (PROAIR HFA/PROVENTIL HFA/VENTOLIN HFA) 108 (90 BASE) MCG/ACT Inhaler Inhale 1-2 puffs into the lungs every 4 hours as needed for shortness of breath / dyspnea or wheezing 1 Inhaler 5 Taking     aspirin 81 MG chewable tablet Take 81 mg by mouth daily   2019 at Unknown time     atorvastatin (LIPITOR) 40 MG tablet Take 40 mg by mouth daily   2019 at pm     B Complex-C-Folic Acid (BRANDI-HEIDY) TABS Take 1 tablet by mouth daily 90 tablet 3 2019 at Unknown time     carvedilol (COREG) 12.5 MG tablet TAKE 1 TABLET(12.5 MG) BY MOUTH TWICE DAILY 180 tablet 3 2019 at Unknown time     cyclobenzaprine (FLEXERIL) 5 MG tablet Take 1  tablet (5 mg) by mouth 3 times daily as needed for muscle spasms 90 tablet 3 5/9/2019 at Unknown time     diltiazem ER (TIAZAC) 300 MG 24 hr ER beaded capsule TAKE 1 CAPSULE(300 MG) BY MOUTH DAILY 90 capsule 0 5/9/2019 at Unknown time     doxazosin (CARDURA) 8 MG tablet Take 8 mg by mouth At Bedtime   5/8/2019 at hs     eplerenone (INSPRA) 50 MG tablet Take 1 tablet (50 mg) by mouth daily 90 tablet 4 5/9/2019 at Unknown time     fish oil-omega-3 fatty acids 1000 MG capsule Take 1 g by mouth daily   5/9/2019 at Unknown time     fluticasone (FLOVENT HFA) 220 MCG/ACT inhaler Inhale 1 puff into the lungs 2 times daily   5/9/2019 at Unknown time     furosemide (LASIX) 20 MG tablet Take 1 tablet (20 mg) by mouth daily 90 tablet 3 5/9/2019 at Unknown time     HYDROcodone-acetaminophen (NORCO) 5-325 MG tablet Take 1 tablet by mouth every 6 hours as needed for severe pain 30 tablet 0 5/9/2019 at 1345     insulin aspart (NOVOLOG PEN) 100 UNIT/ML pen Do Not give Correction Insulin if BG <140.  For  - 214 give 1 unit.  For  - 289 give 2 unit.  For  - 364 give 4 units.  For BG = or > 365 give 6 units   5/9/2019 at Unknown time     insulin aspart (NOVOLOG VIAL) 100 UNITS/ML vial Inject 4 Units Subcutaneous 3 times daily (with meals) Patient states with large meals he will increase to 14-16 units.   5/9/2019 at Unknown time     insulin glargine (LANTUS SOLOSTAR PEN) 100 UNIT/ML pen Inject 10 Units Subcutaneous At Bedtime (Patient will increase to 26 units at bedtime he states when BG is elevated.   5/8/2019 at hs     irbesartan (AVAPRO) 150 MG tablet TAKE 1 TABLET(150 MG) BY MOUTH DAILY 90 tablet 3 5/9/2019 at Unknown time     polyethylene glycol (MIRALAX/GLYCOLAX) packet Take 17 g by mouth daily   5/9/2019 at Unknown time     ranitidine (ZANTAC) 150 MG tablet Take 1 tablet (150 mg) by mouth daily as needed for heartburn 90 tablet 3 Taking     senna-docusate (SENOKOT-S/PERICOLACE) 8.6-50 MG tablet Take 1 tablet  by mouth 2 times daily And bid prn   5/9/2019 at Unknown time     STIOLTO RESPIMAT 2.5-2.5 MCG/ACT AERS INHALE 2 PUFFS INTO THE LUNGS DAILY 4 g 9 5/9/2019 at Unknown time     VITAMIN D, CHOLECALCIFEROL, PO Take 2,000 Units by mouth daily    5/9/2019 at Unknown time     Current Facility-Administered Medications Ordered in Epic   Medication Dose Route Frequency Last Rate Last Dose     - MEDICATION INSTRUCTIONS for Dialysis Patients -   Does not apply See Admin Instructions         acetaminophen (TYLENOL) tablet 650 mg  650 mg Oral Q4H PRN         albuterol (PROVENTIL) neb solution 1.25 mg  1.25 mg Nebulization Q4H PRN         aspirin (ASA) chewable tablet 81 mg  81 mg Oral Daily   81 mg at 05/10/19 0834     atorvastatin (LIPITOR) tablet 40 mg  40 mg Oral Daily   40 mg at 05/09/19 2046     carvedilol (COREG) tablet 12.5 mg  12.5 mg Oral BID w/meals   12.5 mg at 05/10/19 0834     cyclobenzaprine (FLEXERIL) tablet 5 mg  5 mg Oral TID PRN   5 mg at 05/09/19 2229     glucose gel 15-30 g  15-30 g Oral Q15 Min PRN        Or     dextrose 50 % injection 25-50 mL  25-50 mL Intravenous Q15 Min PRN        Or     glucagon injection 1 mg  1 mg Subcutaneous Q15 Min PRN         diltiazem ER (DILT-XR) 300 mg  300 mg Oral Daily   300 mg at 05/10/19 0834     doxazosin (CARDURA) tablet 8 mg  8 mg Oral At Bedtime   8 mg at 05/09/19 2224     eplerenone (INSPRA) tablet 50 mg  50 mg Oral Daily   50 mg at 05/10/19 0834     furosemide (LASIX) tablet 20 mg  20 mg Oral Daily   20 mg at 05/10/19 0834     insulin aspart (NovoLOG) inj (RAPID ACTING)  1-7 Units Subcutaneous TID AC         insulin aspart (NovoLOG) inj (RAPID ACTING)  1-5 Units Subcutaneous At Bedtime         insulin aspart (NovoLOG) inj (RAPID ACTING)  4 Units Subcutaneous QAM AC         insulin glargine (LANTUS PEN) injection 10 Units  10 Units Subcutaneous At Bedtime   10 Units at 05/09/19 2224     irbesartan (AVAPRO) tablet 150 mg  150 mg Oral Daily   150 mg at 05/10/19 0835      melatonin tablet 1 mg  1 mg Oral At Bedtime PRN         naloxone (NARCAN) injection 0.1-0.4 mg  0.1-0.4 mg Intravenous Q2 Min PRN         ondansetron (ZOFRAN-ODT) ODT tab 4 mg  4 mg Oral Q6H PRN        Or     ondansetron (ZOFRAN) injection 4 mg  4 mg Intravenous Q6H PRN         prochlorperazine (COMPAZINE) injection 5 mg  5 mg Intravenous Q6H PRN        Or     prochlorperazine (COMPAZINE) tablet 5 mg  5 mg Oral Q6H PRN        Or     prochlorperazine (COMPAZINE) Suppository 12.5 mg  12.5 mg Rectal Q12H PRN         ranitidine (ZANTAC) tablet 150 mg  150 mg Oral Daily PRN         No current [facility]-ordered outpatient medications on file.             Review of Systems:   The 10 point Review of Systems is negative other than noted in the HPI.            Data:   Data   Results for orders placed or performed during the hospital encounter of 05/09/19 (from the past 24 hour(s))   CBC with platelets + differential   Result Value Ref Range    WBC 11.0 4.0 - 11.0 10e9/L    RBC Count 3.75 (L) 4.4 - 5.9 10e12/L    Hemoglobin 9.5 (L) 13.3 - 17.7 g/dL    Hematocrit 31.5 (L) 40.0 - 53.0 %    MCV 84 78 - 100 fl    MCH 25.3 (L) 26.5 - 33.0 pg    MCHC 30.2 (L) 31.5 - 36.5 g/dL    RDW 18.1 (H) 10.0 - 15.0 %    Platelet Count 369 150 - 450 10e9/L    Diff Method Automated Method     % Neutrophils 80.9 %    % Lymphocytes 5.8 %    % Monocytes 10.8 %    % Eosinophils 1.9 %    % Basophils 0.2 %    % Immature Granulocytes 0.4 %    Nucleated RBCs 0 0 /100    Absolute Neutrophil 8.9 (H) 1.6 - 8.3 10e9/L    Absolute Lymphocytes 0.6 (L) 0.8 - 5.3 10e9/L    Absolute Monocytes 1.2 0.0 - 1.3 10e9/L    Absolute Eosinophils 0.2 0.0 - 0.7 10e9/L    Absolute Basophils 0.0 0.0 - 0.2 10e9/L    Abs Immature Granulocytes 0.0 0 - 0.4 10e9/L    Absolute Nucleated RBC 0.0    Basic metabolic panel   Result Value Ref Range    Sodium 139 133 - 144 mmol/L    Potassium 3.3 (L) 3.4 - 5.3 mmol/L    Chloride 101 94 - 109 mmol/L    Carbon Dioxide 31 20 - 32 mmol/L    Anion  Gap 7 3 - 14 mmol/L    Glucose 128 (H) 70 - 99 mg/dL    Urea Nitrogen 21 7 - 30 mg/dL    Creatinine 2.65 (H) 0.66 - 1.25 mg/dL    GFR Estimate 21 (L) >60 mL/min/[1.73_m2]    GFR Estimate If Black 25 (L) >60 mL/min/[1.73_m2]    Calcium 8.9 8.5 - 10.1 mg/dL   Hemoglobin A1c   Result Value Ref Range    Hemoglobin A1C 4.6 0 - 5.6 %   Nephrology IP Consult: Patient to be seen: Routine - within 24 hours; dialysis not urgent; Consultant may enter orders: Yes; Requesting provider? Hospitalist (if different from attending physician)    Tera Griggs MD     5/10/2019  1:08 PM  See dictation.    Dialysis tomorrow, then back to usual [facility] schedule.    Thanks.    Tera Nielsen MD  Nephrology; [employer]  [phone]     Glucose by meter   Result Value Ref Range    Glucose 161 (H) 70 - 99 mg/dL   Glucose by meter   Result Value Ref Range    Glucose 113 (H) 70 - 99 mg/dL   Glucose by meter   Result Value Ref Range    Glucose 88 70 - 99 mg/dL   Basic metabolic panel   Result Value Ref Range    Sodium 140 133 - 144 mmol/L    Potassium 3.8 3.4 - 5.3 mmol/L    Chloride 104 94 - 109 mmol/L    Carbon Dioxide 31 20 - 32 mmol/L    Anion Gap 5 3 - 14 mmol/L    Glucose 98 70 - 99 mg/dL    Urea Nitrogen 29 7 - 30 mg/dL    Creatinine 3.76 (H) 0.66 - 1.25 mg/dL    GFR Estimate 14 (L) >60 mL/min/[1.73_m2]    GFR Estimate If Black 16 (L) >60 mL/min/[1.73_m2]    Calcium 9.8 8.5 - 10.1 mg/dL   Glucose by meter   Result Value Ref Range    Glucose 142 (H) 70 - 99 mg/dL

## 2019-05-10 NOTE — PLAN OF CARE
Pt A&O x4, up w/ assist 1, VSS on room air, zari at times. Pt has sever back pain, difficult to ambulate, declined pain medications, heat packs applied with some relief. Dialysis tomorrow. Discharge pending 1-2 days, follow up with hem/onc/ nephrology outpt

## 2019-05-10 NOTE — CONSULTS
See dictation.    Dialysis tomorrow, then back to usual MWF schedule.    Thanks.    Tera Nielsen MD  Nephrology; [employer]  [phone]

## 2019-05-10 NOTE — H&P
Admitted:     05/09/2019      PRIMARY CARE PROVIDER:  Sandip Antunez MD      CHIEF COMPLAINT:  Back pain.      HISTORY OF PRESENT ILLNESS:  Mr. 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  Of note, he was hospitalized in 04/2019 under similar circumstances and he had been recently previously diagnosed with an L4 vertebral compression fracture.  During the workup for that, he was noted to have metastatic disease and since then he has been undergoing an oncologic evaluation.  After that hospitalization, he was discharged to a TCU.  Discharged from the TCU to home last Sunday, 5 days ago.  It was intended that he was going to undergo a PET scan as well as dialysis that Monday, however, he was unable to attend his PET scan due to the back pain.  He typically takes Norco twice a day.  He ultimately missed his dialysis yesterday due to pain and he was able to make it in today and underwent a complete run.  However, he was essentially unable to get up due to the ongoing back pain and 911 was called.  The patient was brought to the emergency room for further evaluation and was evaluated by Mrs. Sanchez.  He was found to have an essentially unchanged metabolic and hematologic profile, but given ongoing back pain admission was requested for further observation and management.      REVIEW OF SYSTEMS:  A 10-point review of systems was conducted and is negative except as noted above in history of present illness.      PAST MEDICAL HISTORY:   1.  History of end-stage renal disease on Monday, Wednesday, Friday dialysis.   2.  Hypertension.   3.  Type 2 diabetes.   4.  Chronic obstructive pulmonary disease.   5.  L4 vertebral body compression fracture, possibly pathologic.   6.  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  The patient was undergoing  evaluation by Dr. Robles.      MEDICATIONS PRIOR TO ADMISSION:   1.  Norco p.r.n.   2.  Albuterol.   3.  Aspirin.   4.  Lipitor.   5.  Insulin, both Lantus and aspart.   6.  Carvedilol.   7.  Cardura.   8.  Lasix.   9.  Avapro.      SOCIAL HISTORY:  Per the patient, he lives with his son.  He has a 40 pack-year smoking history, not a current smoker.  No alcohol use.      FAMILY HISTORY:  Reviewed and noncontributory.      PHYSICAL EXAMINATION:   VITAL SIGNS:  Blood pressure 142/55, heart rate is 67, temperature is 98.2.   GENERAL:  This is an elderly gentleman.  He is cooperative and appropriate.  His speech is somewhat tangential, but he is cooperative and appropriate.   HEENT:  He has moist mucous membranes.  Sclerae are anicteric.   LUNGS:  Generally clear to auscultation.  Slightly diminished at the base.   HEART:  S1 and S2 present, 1/6 systolic murmur.  Rhythm is regular.   ABDOMEN:  Soft, nontender, nondistended.   EXTREMITIES:  Trace to 1+ lower extremity edema.  Dorsal pedis pulses are 1+ bilaterally.   BACK:  No point tenderness.   NEUROLOGIC:  Bilateral downgoing Babinskis.  Equal  strength.  Deep tendon patellar reflexes are equal.      LABORATORY STUDIES:  Sodium 139, potassium 3.3, chloride 101, BUN 21, creatinine 3.65.  White blood cell count 11, hemoglobin is 9.5, platelets are 369.      ASSESSMENT AND PLAN:  This is an 84-year-old gentleman with a history of end-stage renal disease and acute on chronic back pain that is uncontrolled, likely related to a pathologic compression fracture in the setting of metastatic carcinoma with unknown primary.      1.  Back pain.  The patient is being admitted for pain control.  He had been taking hydrocodone/APAP, but it is unclear if he had been taking an adequate dosage.  He had only been taking 2 per day.  We will restart this medication on a q.4h. basis.  He will also have p.r.n. IV Dilaudid available.  With respect to back pain, he had been seen by  Orthopedic Surgery in a previous hospitalization who recommended conservative management.  It does not appear that there is an immediate advantage to radiation therapy, although this could be considered pending his clinical course.      2.  History of metastatic carcinoma.  The patient is followed by Dr. Robles and was still undergoing evaluation for this.  He was supposed to have a PET scan done last Monday, but was unable to make it due to his back pain.  I have consulted Dr. Robles's service to see if they can help in this regard.  With respect to cancer treatment, again, evaluation was still underway.  The patient was actually seen by Palliative Medicine during his previous hospitalization and was not ready, at that point, to consider hospice or end of life cares, although the patient currently does seem to have realistic expectations for ongoing care goals.      3.  Type 2 diabetes.  The patient is maintained on Lantus and scheduled aspart; these will be continued.      4.  End-stage renal disease.  The patient typically has Monday, Wednesday, Friday dialysis.  His last dialysis was actually today due to missing his dialysis yesterday.  I will tag Nephrology to follow along.      5.  Deep venous thrombosis prophylaxis will be mechanical.      CODE STATUS:  This was discussed with the patient; he wishes to be DNR/DNI.  He does wish to discuss further care goals with his Oncologist.      Total time spent, greater than 50% of which involved counseling and coordination of care, was 75 minutes.         JUSTINE MELISSA MD             D: 2019   T: 2019   MT: YANI      Name:     MISHA SANDERS   MRN:      [id number]        Account:      [id number]   :      1935        Admitted:     2019                   Document: [id number]       cc: Sandip Antunez MD

## 2019-05-11 ENCOUNTER — TRANSFERRED RECORDS (OUTPATIENT)
Dept: HEALTH INFORMATION MANAGEMENT | Facility: CLINIC | Age: 84
End: 2019-05-11

## 2019-05-11 LAB
ANION GAP SERPL CALCULATED.3IONS-SCNC: 7 MMOL/L (ref 3–14)
BUN SERPL-MCNC: 38 MG/DL (ref 7–30)
CALCIUM SERPL-MCNC: 9.9 MG/DL (ref 8.5–10.1)
CHLORIDE SERPL-SCNC: 102 MMOL/L (ref 94–109)
CO2 SERPL-SCNC: 29 MMOL/L (ref 20–32)
CREAT SERPL-MCNC: 4.94 MG/DL (ref 0.66–1.25)
GFR SERPL CREATININE-BSD FRML MDRD: 10 ML/MIN/{1.73_M2}
GLUCOSE BLDC GLUCOMTR-MCNC: 145 MG/DL (ref 70–99)
GLUCOSE BLDC GLUCOMTR-MCNC: 170 MG/DL (ref 70–99)
GLUCOSE BLDC GLUCOMTR-MCNC: 71 MG/DL (ref 70–99)
GLUCOSE BLDC GLUCOMTR-MCNC: 83 MG/DL (ref 70–99)
GLUCOSE SERPL-MCNC: 131 MG/DL (ref 70–99)
HGB BLD-MCNC: 9.3 G/DL (ref 13.3–17.7)
POTASSIUM SERPL-SCNC: 3.8 MMOL/L (ref 3.4–5.3)
SODIUM SERPL-SCNC: 138 MMOL/L (ref 133–144)

## 2019-05-11 PROCEDURE — A9270 NON-COVERED ITEM OR SERVICE: HCPCS | Performed by: INTERNAL MEDICINE

## 2019-05-11 PROCEDURE — 25000132 ZZH RX MED GY IP 250 OP 250 PS 637: Performed by: INTERNAL MEDICINE

## 2019-05-11 PROCEDURE — 00000146 ZZHCL STATISTIC GLUCOSE BY METER IP

## 2019-05-11 PROCEDURE — 80048 BASIC METABOLIC PNL TOTAL CA: CPT | Performed by: INTERNAL MEDICINE

## 2019-05-11 PROCEDURE — 63400005 ZZH RX 634: Performed by: INTERNAL MEDICINE

## 2019-05-11 PROCEDURE — 25000128 H RX IP 250 OP 636: Performed by: INTERNAL MEDICINE

## 2019-05-11 PROCEDURE — 12000000 ZZH R&B MED SURG/OB

## 2019-05-11 PROCEDURE — 25000131 ZZH RX MED GY IP 250 OP 636 PS 637: Performed by: INTERNAL MEDICINE

## 2019-05-11 PROCEDURE — 85018 HEMOGLOBIN: CPT | Performed by: INTERNAL MEDICINE

## 2019-05-11 PROCEDURE — 99232 SBSQ HOSP IP/OBS MODERATE 35: CPT | Performed by: HOSPITALIST

## 2019-05-11 PROCEDURE — 90937 HEMODIALYSIS REPEATED EVAL: CPT

## 2019-05-11 PROCEDURE — 5A1D70Z PERFORMANCE OF URINARY FILTRATION, INTERMITTENT, LESS THAN 6 HOURS PER DAY: ICD-10-PCS | Performed by: INTERNAL MEDICINE

## 2019-05-11 RX ORDER — HEPARIN SODIUM 1000 [USP'U]/ML
500 INJECTION, SOLUTION INTRAVENOUS; SUBCUTANEOUS CONTINUOUS
Status: DISCONTINUED | OUTPATIENT
Start: 2019-05-11 | End: 2019-05-11

## 2019-05-11 RX ORDER — HEPARIN SODIUM 1000 [USP'U]/ML
500 INJECTION, SOLUTION INTRAVENOUS; SUBCUTANEOUS
Status: DISCONTINUED | OUTPATIENT
Start: 2019-05-11 | End: 2019-05-11

## 2019-05-11 RX ADMIN — ATORVASTATIN CALCIUM 40 MG: 40 TABLET, FILM COATED ORAL at 21:42

## 2019-05-11 RX ADMIN — DILTIAZEM HYDROCHLORIDE 300 MG: 180 CAPSULE, EXTENDED RELEASE ORAL at 13:38

## 2019-05-11 RX ADMIN — ASPIRIN 81 MG 81 MG: 81 TABLET ORAL at 13:39

## 2019-05-11 RX ADMIN — CYCLOBENZAPRINE HYDROCHLORIDE 5 MG: 5 TABLET, FILM COATED ORAL at 23:20

## 2019-05-11 RX ADMIN — EPOETIN ALFA 12000 UNITS: 10000 SOLUTION INTRAVENOUS; SUBCUTANEOUS at 10:16

## 2019-05-11 RX ADMIN — INSULIN ASPART 1 UNITS: 100 INJECTION, SOLUTION INTRAVENOUS; SUBCUTANEOUS at 18:20

## 2019-05-11 RX ADMIN — CARVEDILOL 12.5 MG: 12.5 TABLET, FILM COATED ORAL at 13:47

## 2019-05-11 RX ADMIN — EPLERENONE 50 MG: 25 TABLET ORAL at 13:39

## 2019-05-11 RX ADMIN — FLUTICASONE FUROATE 1 PUFF: 200 POWDER RESPIRATORY (INHALATION) at 13:37

## 2019-05-11 RX ADMIN — INSULIN GLARGINE 10 UNITS: 100 INJECTION, SOLUTION SUBCUTANEOUS at 21:43

## 2019-05-11 RX ADMIN — OMEGA-3 FATTY ACIDS CAP 1000 MG 1 G: 1000 CAP at 13:40

## 2019-05-11 RX ADMIN — SODIUM CHLORIDE 300 ML: 9 INJECTION, SOLUTION INTRAVENOUS at 10:15

## 2019-05-11 RX ADMIN — IRBESARTAN 150 MG: 150 TABLET ORAL at 13:39

## 2019-05-11 RX ADMIN — Medication 1 CAPSULE: at 13:39

## 2019-05-11 RX ADMIN — HYDROCODONE BITARTRATE AND ACETAMINOPHEN 1 TABLET: 5; 325 TABLET ORAL at 08:42

## 2019-05-11 RX ADMIN — FUROSEMIDE 20 MG: 20 TABLET ORAL at 13:39

## 2019-05-11 RX ADMIN — SODIUM CHLORIDE 250 ML: 9 INJECTION, SOLUTION INTRAVENOUS at 10:16

## 2019-05-11 RX ADMIN — SENNOSIDES AND DOCUSATE SODIUM 1 TABLET: 8.6; 5 TABLET ORAL at 23:22

## 2019-05-11 RX ADMIN — CHOLECALCIFEROL TAB 50 MCG (2000 UNIT) 2000 UNITS: 50 TAB at 13:40

## 2019-05-11 ASSESSMENT — ACTIVITIES OF DAILY LIVING (ADL)
ADLS_ACUITY_SCORE: 17
ADLS_ACUITY_SCORE: 19
ADLS_ACUITY_SCORE: 17
ADLS_ACUITY_SCORE: 19
ADLS_ACUITY_SCORE: 17
ADLS_ACUITY_SCORE: 17

## 2019-05-11 ASSESSMENT — MIFFLIN-ST. JEOR: SCORE: 1410.67

## 2019-05-11 NOTE — PLAN OF CARE
DATE & TIME: 5/11/2019 5894-4049  ORIENTATION: A & O x4  BEHAVIOR & AGGRESSION TOOL COLOR: Green  ABNL VS/O2: VSS on 2L nasal cannula (O2 is baseline)  MOBILITY: A2  PAIN MANAGMENT: Declined Norco  DIET: Mod carb   BOWEL/BLADDER: Small amount of urine produced overnight. BM yesterday, BS +  ABNL LAB/BG: Refused BGM check.   DRAIN/DEVICES: PIV SL. Dialysis fistula LUE, WDL.   TELEMETRY RHYTHM: N/A  SKIN: No issues   TESTS/PROCEDURES: Dialysis today.  D/C DAY/GOALS/PLACE: Pending, per MD note possibly today? TCU  OTHER IMPORTANT INFO:

## 2019-05-11 NOTE — PROGRESS NOTES
Inpatient Dialysis Progress Note           Assessment and Plan:   ESRD status quo.  Stable dialysis run today.  Expect good chemical exchange and improved fluid balance by end of run.  Next HD 5/13.  Pt states that he would like to have a biopsy for identification of his presumed malignancy during this hospital stay.      Back pain    * No resolved hospital problems. *               Interval History:   no new complaints, alert, oriented to person, place and time and doing well; no cp, sob, n/v/d, or abd pain.  Examined during run.  Stable throughout except for soft BP.  Fair intake.  Small UO.  Meds and labs reviewed.  Lytes nl. Other chemistries in expected range.  Hgb stable, 9.3.                        Dialysis Details:   Current weight: 161 kg (actual weight)  Dry Weight: TBD  Dialysis Temp: 36.5  C  Access Device: AVF  Access Site: left  Dialyzer: Optiflux 160  Dialysis Bath: K+ 3  Sodium Profile: no  UF Goal: 1.7L  Blood Flow Rate (mL/min): 450  Total Treatment Time (hrs): 2.75  Heparin: none    Medications:  EPO dose: 82888  Zemplar: no  IV Fe: no            Medications:       - MEDICATION INSTRUCTIONS for Dialysis Patients -   Does not apply See Admin Instructions     aspirin  81 mg Oral Daily     atorvastatin  40 mg Oral Daily     carvedilol  12.5 mg Oral BID w/meals     zz extemporaneous template  300 mg Oral Daily     doxazosin  8 mg Oral At Bedtime     eplerenone  50 mg Oral Daily     fish oil-omega-3 fatty acids  1 g Oral Daily     fluticasone  1 puff Inhalation Daily     furosemide  20 mg Oral Daily     insulin aspart  1-7 Units Subcutaneous TID AC     insulin aspart  1-5 Units Subcutaneous At Bedtime     insulin aspart  4 Units Subcutaneous QAM AC     insulin glargine  10 Units Subcutaneous At Bedtime     irbesartan  150 mg Oral Daily     multivitamin RENAL  1 capsule Oral Daily     polyethylene glycol  17 g Oral Daily     senna-docusate  1 tablet Oral BID     vitamin D3  2,000 Units Oral Daily                       Physical Exam:       Vital Sign Ranges  Temp:  [97.4  F (36.3  C)-98.9  F (37.2  C)] 97.4  F (36.3  C)  Pulse:  [56-66] 63  Heart Rate:  [51-65] 65  Resp:  [16-29] 18  BP: ()/(41-60) 124/52  SpO2:  [94 %-98 %] 97 %    Weight, current: 161 kg (actual weight)  Weight change: 83.9 kg (184 lb 15.1 oz)    I/O last 3 completed shifts:  In: 470 [P.O.:470]  Out: -     Physical Exam:   General:  Patient comfortable, in no apparent distress.  Awake, alert, oriented x3.  Neck:  Supple, no JVD.  Lungs:  Clear to auscultation bilaterally.  Cardiac:  Regular rate and rhythm, no murmurs, rub, or gallops.  Abdomen:  Soft, nontender, physiologic sounds.  Extremities:  Without edema.  2+ pulses.  Skin:  Warm, dry.  Neurologic:  No focal deficits.             Data:        Lab Results   Component Value Date     05/11/2019    Lab Results   Component Value Date    CHLORIDE 102 05/11/2019    Lab Results   Component Value Date    BUN 38 (H) 05/11/2019      Lab Results   Component Value Date    POTASSIUM 3.8 05/11/2019    Lab Results   Component Value Date    CO2 29 05/11/2019    Lab Results   Component Value Date    CR 4.94 (H) 05/11/2019        Lab Results   Component Value Date     05/11/2019     05/10/2019     05/09/2019     Lab Results   Component Value Date    POTASSIUM 3.8 05/11/2019    POTASSIUM 3.8 05/10/2019    POTASSIUM 3.3 (L) 05/09/2019     Lab Results   Component Value Date    CHLORIDE 102 05/11/2019    CHLORIDE 104 05/10/2019    CHLORIDE 101 05/09/2019     Lab Results   Component Value Date    CO2 29 05/11/2019    CO2 31 05/10/2019    CO2 31 05/09/2019     Lab Results   Component Value Date    CR 4.94 (H) 05/11/2019    CR 3.76 (H) 05/10/2019    CR 2.65 (H) 05/09/2019     Lab Results   Component Value Date    BUN 38 (H) 05/11/2019    BUN 29 05/10/2019    BUN 21 05/09/2019     Lab Results   Component Value Date    HGB 9.3 (L) 05/11/2019    HGB 9.5 (L) 05/09/2019    HGB 9.4 (L)  04/24/2019     No results found for: PH, PHARTERIAL, PO2, IA9DEFTSHFL, SAT, PCO2, HCO3, BASEEXCESS, ERWIN, BEB        Tera Nielsen MD  Nephrology; [employer]  [phone]

## 2019-05-11 NOTE — PLAN OF CARE
[id number]: A&Ox4 w/ VSS. 2L of O2 via NC. Flexeril given x1 for back pain. Up Ax1-2 w/ walker + gait belt. Plan for dialysis tomorrow. Will continue POC.

## 2019-05-11 NOTE — PROGRESS NOTES
Pt refused to work with PT to show them he was able to walk short distances in order to return to son's house.  MD updated on refusal

## 2019-05-11 NOTE — PROGRESS NOTES
[facility]    Medicine Progress Note - Hospitalist Service       Date of Admission:  5/9/2019  Assessment & Plan       This is an 84-year-old gentleman with a history of end-stage renal disease and acute on chronic back pain that is uncontrolled, likely related to a pathologic compression fracture in the setting of metastatic carcinoma with unknown primary.       Back pain,   L4 vertebral body with mild anterior compression   The patient is being admitted for pain control.  He had been taking hydrocodone/APAP, 2 tablets per day   - - Increased PTA pain regimen to Q 6h prn, additona dilaudid prn .   - -  His back pain has aggie evaluated by Orthopedic Surgery in a previous hospitalization who recommended conservative management.  It does not appear that there is an immediate advantage to radiation therapy, although this could be considered pending his clinical course.   - - PT/OT recommended to TCU, how ever he is refusing to do that, and asking to be discharged to his sone's home     ESRD; followed by Nephrology On HD    Aortic stenosis:   Severe, mean 54mmHg, and the  VITO 0.8cm2,   Continue PTA coreg, Diltiazem  And lasix     History of metastatic carcinoma  Malignant appearing lesions in lung, liver, spine and possible kidneys and adrenals.  The patient is followed by Dr. Robles and was still undergoing evaluation for this.  He was supposed to have a PET scan done last Monday, but was unable to make it due to his back pain.  I have consulted Dr. Robles's service to see if they can help in this regard.  With respect to cancer treatment, again, evaluation was still underway.  The patient was actually seen by Palliative Medicine during his previous hospitalization and was not ready, at that point, to consider hospice or end of life cares, although the patient currently does seem to have realistic expectations for ongoing care goals.      Type 2 diabetes.  The patient is maintained on Lantus and  scheduled aspart; these will be continued.       End-stage renal disease.  The patient typically has Monday, Wednesday, Friday dialysis.  His last dialysis was actually today due to missing his dialysis yesterday.  I will tag Nephrology to follow along.        Diet: Moderate Consistent CHO Diet    DVT Prophylaxis: Low Risk/Ambulatory with no VTE prophylaxis indicated  Dougherty Catheter: not present  Code Status: DNR/DNI      Disposition Plan   Expected discharge: Tomorrow, recommended to transitional care unit once when pain is under control and per PT/OT final reccs.  Entered: Edward Flor MD 05/11/2019, 8:13 AM       The patient's care was discussed with the Bedside Nurse.    Edward Flor MD  Hospitalist Service  [facility]    ______________________________________________________________________    Interval History   {Include any events overnight / new information. 4 point ROS is needed for billing a level III (233):  Feels same, eating breakfast,  Pain is controled with current  Pain mediations regimen. Denies fever or chills, denies cough SOB, chest pain palpitations,       Data reviewed today: I reviewed all medications, new labs and imaging results over the last 24 hours. I personally reviewed no images or EKG's today.    Physical Exam   Vital Signs: Temp: 97.4  F (36.3  C) Temp src: Oral BP: 120/56 Pulse: 60 Heart Rate: 61 Resp: 16 SpO2: 98 % O2 Device: Nasal cannula Oxygen Delivery: 2 LPM  Weight: 354 lbs 15.05 oz  General Appearance: Alert in NAD  Respiratory:  CTA no wheezing   Cardiovascular:  Regular rhythm load systolic murmur is noted  GI:  Soft NT/ND + BS   Skin:  Warm dry no rashes      Data

## 2019-05-11 NOTE — CONSULTS
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  DrBaljinder Savage was admitted yesterday for continued evaluation and management of intractable back pain.  The patient has end-stage renal disease secondary to hypertension and type 2 diabetes.  He dialyzes under the care of Dr. Samuel from our group at the [facility].  His usual dialysis schedule is every Monday, Wednesday and Friday.  He last dialyzed on Thursday of this week, because he did not feel up to having his scheduled dialysis on Wednesday.      Dr. Savage has poorly controlled back pain related to a likely pathologic compression fracture of the L4 vertebral body.  This area likely has been involved with metastatic carcinoma.  It is thought that the patient probably has a primary bronchogenic lung cancer and has metastatic lesions also to liver, adrenals and other bone areas.  No tissue has yet been obtained to confirm and further define his suspected malignancy.  The patient was just here for further evaluation of his pain and generally declining condition about 2 weeks ago and has been at the [facility] for continuing care until he presented through the emergency room yesterday for assistance in managing his pain better.      The patient's dialysis has generally been going well.  He has a satisfactorily functioning left arm simple AV fistula and dialyzes for a 2.75-hour treatment length.  He has become more anemic recently, but he is receiving generous doses of erythropoietin, and a hemoglobin this week in the outpatient setting was up to 9.5 from about 7-8.  Other noteworthy recent outpatient labs include somewhat elevated calcium, but electrolytes and other blood chemistries have been satisfactory.  The  patient's diabetic control has also been managed well with his usual insulin therapy.  His other home medications include a combination of carvedilol, diltiazem, doxazosin, eplerenone, and irbesartan for hypertension.  He also takes a small dose of furosemide on a daily basis and continues to make some urine.      He has continued his usual antihypertensive therapy since his admission.  Other medications include daily low-dose aspirin, atorvastatin, vitamin D3 and a vitamin supplement.  He is receiving hydrocodone for pain management.      Since admission, he has been hemodynamically stable and afebrile.  His labs in the hospital show a hemoglobin similar to the outpatient value recently, 9.5.  White blood cell count and platelets are normal.  Electrolytes are normal.  BUN and creatinine today are 29 and 3.76.  Glucoses are approximately 140-160.      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  He is not in acute distress.  He states that his pain is easily managed as long as he is in a supine position.   VITAL SIGNS:  Temperature is normal.  pulse is regular, rate about 60, blood pressure 139/60, respirations 16 and unlabored.  Oxygen saturation on room air is 96%.   SKIN:  Shows satisfactory color and turgor with no skin lesions.     HEENT AND NECK:  Unremarkable.   LUNGS:  Clear.   HEART:  Normal, without murmur, rub or gallop.   ABDOMEN:  Soft and nontender.  No masses or organomegaly are appreciated.   EXTREMITIES:  Warm.  There is no significant edema.  Peripheral pulses are satisfactory.  The left arm simple AV fistula is patent and shows no findings of concern.   NEUROLOGIC:  Demonstrates no focal deficit.      We will continue Mr. Savage's dialysis therapy with a run tomorrow, 05/11.  If he continues to remain in the hospital thereafter, we will dialyze him next on Monday 05/13 and continue his usual Monday, Wednesday, Friday schedule at that time.  I have made no  changes in his medication regimen.  I see that he is on a diabetic diet with no other restrictions.  I think that this minimally restricted diet is appropriate.      Thank you for the opportunity to assist in Misha Sanders's care.  Our group will follow during the remainder of his hospital stay.         EVITA HORTON MD             D: 2019   T: 2019   MT: JOANA      Name:     MISHA SANDERS   MRN:      [id number]        Account:       [id number]   :      1935           Consult Date:  05/10/2019      Document: [id number]       cc: Edward Antunez MD

## 2019-05-11 NOTE — PROGRESS NOTES
Potassium   Date Value Ref Range Status   05/10/2019 3.8 3.4 - 5.3 mmol/L Final     Lab Results   Component Value Date    HGB 9.5 05/09/2019     Weight: (!) 161 kg (354 lb 15.1 oz)    POST WT 72.4 kg    DIALYSIS PROCEDURE NOTE  Hepatitis status of previous patient on machine log was checked and verified ok to use with this patients hepatitis status.    Patient dialyzed for 2.75 hrs. on a 3 K bath with a net fluid removal of  1.7L.  A BFR of 450 ml/min was obtained via a LUE AVF using 15 gauge needles.    The patient was seen by Dr. Nielsen during the treatment.      Sites held x 10 min then  pressure drsgs applied.      Total heparin received during the treatment: 0 units.  Meds  Given: epogen, see MAR     Complications: hypotension resolved with UF break.      Procedure and ESRD teaching done and questions answered. See flowsheet in EPIC for further details and post assessment.    Machine water alarm in place and functioning. Chlorine/Chloramine water system checked every 4 hours.    Pt returned via bed, A&Ox4, calm, cooperative, denies pain, VSS    Vascular Access: Aseptic prep done for both on/off.    Educated patient on access care via verbal instruction. Patient verbalized understanding.     Report received from: NICHELLE Moscoso RN  Report given to: NICHELLE Moscoso RN    HEPATITIS B SURFACE ANTIGEN negative DATE 5/6/19 HEPATITIS B SURFACE ANTIBODY immune DATE 5/6/19    Outpatient Dialysis at [facility]    Pebbles Nava, Dialysis RN

## 2019-05-11 NOTE — PLAN OF CARE
"PT: Attempted to see patient for PT session. Pt given a pain pill as he requested before getting up. Pt adamantly refusing to get up with PT as it is \"excruciating pain\" and that he has been doing this for 3 weeks and why should he have to show the PT right now? Discussed with RN and encouragement given, pt again refusing to show PT that he can walk a short distance safely in order to go home.  "

## 2019-05-11 NOTE — PLAN OF CARE
DATE & TIME: 5/11/19  ORIENTATION: X4  BEHAVIOR & AGGRESSION TOOL COLOR: Green  CIWA SCORE: NA  ABNL VS/O2:   O2 2L baseline.  LS dim  MOBILITY:  Refuses to walk  PAIN MANAGMENT:  norco  DIET: mod carb  BOWEL/BLADDER:  Dialysis.  Continent bowel  ABNL LAB/BG:  BS 83 and 71.  Jefferson juice gien  DRAIN/DEVICES: rt upper arm fistula.  +bruit  TELEMETRY RHYTHM:  SKIN:  Ecchymosis.    TESTS/PROCEDURES:  Had dialysis today.    D/C DAY/GOALS/PLACE:  Possibly tomorrow.  Pt refuses TCU.  Wants to return to son's house.  OTHER IMPORTANT INFO:

## 2019-05-11 NOTE — PROGRESS NOTES
"Pt refusing BGM @ 0200, attempted to educate pt on why we like to monitor blood glucose closely in hospital. He stated he has been a diabetic since he was \"50\" and he is fine. Refusing an assessment or vitals until 0600.   "

## 2019-05-12 ENCOUNTER — APPOINTMENT (OUTPATIENT)
Dept: CT IMAGING | Facility: CLINIC | Age: 84
DRG: 477 | End: 2019-05-12
Attending: PHYSICIAN ASSISTANT
Payer: MEDICARE

## 2019-05-12 LAB
GLUCOSE BLDC GLUCOMTR-MCNC: 143 MG/DL (ref 70–99)
GLUCOSE BLDC GLUCOMTR-MCNC: 180 MG/DL (ref 70–99)
GLUCOSE BLDC GLUCOMTR-MCNC: 85 MG/DL (ref 70–99)
GLUCOSE BLDC GLUCOMTR-MCNC: 95 MG/DL (ref 70–99)

## 2019-05-12 PROCEDURE — A9270 NON-COVERED ITEM OR SERVICE: HCPCS | Performed by: INTERNAL MEDICINE

## 2019-05-12 PROCEDURE — 25000132 ZZH RX MED GY IP 250 OP 250 PS 637: Performed by: INTERNAL MEDICINE

## 2019-05-12 PROCEDURE — 00000146 ZZHCL STATISTIC GLUCOSE BY METER IP

## 2019-05-12 PROCEDURE — 12000000 ZZH R&B MED SURG/OB

## 2019-05-12 PROCEDURE — 99232 SBSQ HOSP IP/OBS MODERATE 35: CPT | Performed by: INTERNAL MEDICINE

## 2019-05-12 PROCEDURE — 25000131 ZZH RX MED GY IP 250 OP 636 PS 637: Performed by: INTERNAL MEDICINE

## 2019-05-12 PROCEDURE — 72131 CT LUMBAR SPINE W/O DYE: CPT

## 2019-05-12 RX ORDER — ACETAMINOPHEN 325 MG/1
975 TABLET ORAL
Status: DISCONTINUED | OUTPATIENT
Start: 2019-05-12 | End: 2019-05-23 | Stop reason: HOSPADM

## 2019-05-12 RX ORDER — OXYCODONE HYDROCHLORIDE 5 MG/1
5-10 TABLET ORAL EVERY 6 HOURS PRN
Status: DISCONTINUED | OUTPATIENT
Start: 2019-05-12 | End: 2019-05-21

## 2019-05-12 RX ADMIN — FLUTICASONE FUROATE 1 PUFF: 200 POWDER RESPIRATORY (INHALATION) at 10:35

## 2019-05-12 RX ADMIN — ASPIRIN 81 MG 81 MG: 81 TABLET ORAL at 10:02

## 2019-05-12 RX ADMIN — SENNOSIDES AND DOCUSATE SODIUM 1 TABLET: 8.6; 5 TABLET ORAL at 10:02

## 2019-05-12 RX ADMIN — CHOLECALCIFEROL TAB 50 MCG (2000 UNIT) 2000 UNITS: 50 TAB at 10:02

## 2019-05-12 RX ADMIN — FUROSEMIDE 20 MG: 20 TABLET ORAL at 10:02

## 2019-05-12 RX ADMIN — POLYETHYLENE GLYCOL 3350 17 G: 17 POWDER, FOR SOLUTION ORAL at 10:01

## 2019-05-12 RX ADMIN — SENNOSIDES AND DOCUSATE SODIUM 1 TABLET: 8.6; 5 TABLET ORAL at 22:18

## 2019-05-12 RX ADMIN — ATORVASTATIN CALCIUM 40 MG: 40 TABLET, FILM COATED ORAL at 22:18

## 2019-05-12 RX ADMIN — OMEGA-3 FATTY ACIDS CAP 1000 MG 1 G: 1000 CAP at 10:02

## 2019-05-12 RX ADMIN — EPLERENONE 50 MG: 25 TABLET ORAL at 10:02

## 2019-05-12 RX ADMIN — DILTIAZEM HYDROCHLORIDE 300 MG: 180 CAPSULE, EXTENDED RELEASE ORAL at 10:02

## 2019-05-12 RX ADMIN — INSULIN GLARGINE 10 UNITS: 100 INJECTION, SOLUTION SUBCUTANEOUS at 22:18

## 2019-05-12 RX ADMIN — CARVEDILOL 12.5 MG: 12.5 TABLET, FILM COATED ORAL at 10:02

## 2019-05-12 RX ADMIN — CARVEDILOL 12.5 MG: 12.5 TABLET, FILM COATED ORAL at 18:37

## 2019-05-12 RX ADMIN — DOXAZOSIN 8 MG: 4 TABLET ORAL at 22:18

## 2019-05-12 RX ADMIN — Medication 1 CAPSULE: at 10:02

## 2019-05-12 RX ADMIN — INSULIN ASPART 1 UNITS: 100 INJECTION, SOLUTION INTRAVENOUS; SUBCUTANEOUS at 18:37

## 2019-05-12 RX ADMIN — IRBESARTAN 150 MG: 150 TABLET ORAL at 10:02

## 2019-05-12 RX ADMIN — ACETAMINOPHEN 650 MG: 325 TABLET, FILM COATED ORAL at 15:26

## 2019-05-12 ASSESSMENT — ACTIVITIES OF DAILY LIVING (ADL)
ADLS_ACUITY_SCORE: 17

## 2019-05-12 NOTE — PLAN OF CARE
"DATE & TIME: 5/12/2019 6322-7623  ORIENTATION: A & O x4  BEHAVIOR & AGGRESSION TOOL COLOR: Green  ABNL VS/O2: VSS on 2L nasal cannula   MOBILITY: A2, not out of bed overnight. Refusing repositioning  PAIN MANAGMENT:Denies  DIET: Mod carb   BOWEL/BLADDER: No issues  ABNL LAB/BG: Refusing 0200 BGM check  DRAIN/DEVICES: PIV SL. LUE fistula, WDL.   TELEMETRY RHYTHM: N/A  SKIN: Intact, bruising. R elbow discoloration pt states it is present baseline.   TESTS/PROCEDURES: None  D/C DAY/GOALS/PLACE: Pending. PT recommending TCU, pt does not want TCU. Refused to get out of bed yesterday.   OTHER IMPORTANT INFO: Dialysis (MWF), next run Monday. Refused PCDs, applied and pt wanted them off and stated he would wear them during the \"day\".   "

## 2019-05-12 NOTE — PLAN OF CARE
PT: Attempted to see patient for PT session. Pt declined getting out of bed due to back pain with movement. Encouragement provided and pt again refused. Provided a heat pack to help with back pain.

## 2019-05-12 NOTE — PLAN OF CARE
DATE & TIME: 5/12/2019 5073-3200  ORIENTATION: A&O x4  BEHAVIOR & AGGRESSION TOOL COLOR: Green  ABNL VS/O2: VSS on 2L nasal cannula (at baseline)  MOBILITY: A2, refused therapy and to get out of bed d/t pain  PAIN MANAGMENT: Heating pack, scheduled Tylenol  DIET: Mod carb- refused meals today   BOWEL/BLADDER: No issues  ABNL LAB/BG: BG 95/85/143  DRAIN/DEVICES: PIV SL. LUE fistula, WDL.   TELEMETRY RHYTHM: N/A  SKIN: Intact, bruising. R elbow discoloration pt states it is present baseline.   TESTS/PROCEDURES: CT spine performed today, results in chart.  D/C DAY/GOALS/PLACE: Pending. PT recommending TCU, pt does not want TCU.   OTHER IMPORTANT INFO: Dialysis (MWF), next run tomorrow. Orthopedic surgical consult planned for tomorrow to discuss possible kyphoplasty.

## 2019-05-12 NOTE — PROGRESS NOTES
Calm and conversant; comfortable when supine.  Volume status looks ok.  Plan 5/13 AM dialysis.  Await disposition plan.    Tera Nielsen MD  Nephrology; [employer]  [phone]

## 2019-05-12 NOTE — PROGRESS NOTES
[facility]    Medicine Progress Note - Hospitalist Service       Date of Admission:  5/9/2019  Assessment & Plan       This is an 84-year-old gentleman with a history of end-stage renal disease and acute on chronic back pain that is uncontrolled, likely related to a pathologic compression fracture in the setting of metastatic carcinoma with unknown primary.       Back pain,   L4 vertebral body with mild anterior compression   The patient is being admitted for pain control.  He had been taking hydrocodone/APAP, 2 tablets per day   - - pain medications adjusted, scheduled tylenol with prn oxycodone added .  - -  His back pain has been evaluated by Orthopedic Surgery in a previous hospitalization who recommended conservative management.requested repeat consult to see if kyphoplasty is an option or not , since pain is an issue if kyphoplasty is not an option then will have to pursue radiation therapy consult, hopefully oncology can shed some light in this situation.  --physical therapy /occupational therapy  Suggesting tcu, patient  does not want tcu discharge . He is debilitated by pain with hope to going home on discharge .   ESRD; followed by Nephrology On HD  Continue hd as per schedule.    Aortic stenosis:   Severe, mean 54mmHg, and the  VITO 0.8cm2,   Continue PTA coreg, Diltiazem  And lasix     History of metastatic carcinoma  Malignant appearing lesions in lung, liver, spine and possible kidneys and adrenals.  The patient is followed by Dr. Robles and was still undergoing evaluation for this.  He was supposed to have a PET scan done last Monday, but was unable to make it due to his back pain.  I have consulted Dr. Robles's service to see if they can help in this regard.  With respect to cancer treatment, again, evaluation was still underway.  The patient was actually seen by Palliative Medicine during his previous hospitalization and was not ready, at that point, to consider hospice or end  of life cares, although the patient currently does seem to have realistic expectations for ongoing care goals.      Type 2 diabetes.  The patient is maintained on Lantus and scheduled aspart; these will be continued.       End-stage renal disease.  The patient typically has Monday, Wednesday, Friday dialysis.  His last dialysis was actually today due to missing his dialysis yesterday.  I will tag Nephrology to follow along.        Diet: Moderate Consistent CHO Diet    DVT Prophylaxis: Low Risk/Ambulatory with no VTE prophylaxis indicated  Dougherty Catheter: not present  Code Status: DNR/DNI      Disposition Plan   Expected discharge: Tomorrow, recommended to transitional care unit once when pain is under control and per PT/OT final reccs.  Entered: Rubi Beebe MD 05/12/2019, 12:30 PM       The patient's care was discussed with the Bedside Nurse.    Rubi Beebe MD  Hospitalist Service  [facility]    ______________________________________________________________________    Interval History   He did sleep ok, still has back pain with slight movement, pain radiating to his bilateral buttocks , patient is not sure when he will get tissue diagnosis, couldn't complete pet scan well.he want to go home but he cannot walk back and forth from restroom due to pain, he had a recent bad experience at nursing home so does not want to pursue that, he lives with son, moved from [place] recently to [place].    Data reviewed today: I reviewed all medications, new labs and imaging results over the last 24 hours. I personally reviewed no images or EKG's today.    Physical Exam   Vital Signs: Temp: 98.1  F (36.7  C) Temp src: Oral BP: 145/54 Pulse: 62 Heart Rate: 63 Resp: 18 SpO2: 98 % O2 Device: Nasal cannula Oxygen Delivery: 2 LPM  Weight: 354 lbs 15.05 oz  Constitutional: Awake, alert, cooperative, no apparent distress  Respiratory: Clear to auscultation bilaterally, no crackles or wheezing  Cardiovascular: Regular  rate and rhythm, normal S1 and S2, and no murmur noted  GI: Normal bowel sounds, soft, non-distended, non-tender  Skin/Integumen: fistula noted, he does have no localized tenderness on his spine but has tenderness on right anton   Neuro : moving all 4 extremities, no focal deficit noted         Data

## 2019-05-13 LAB
ANION GAP SERPL CALCULATED.3IONS-SCNC: 5 MMOL/L (ref 3–14)
BUN SERPL-MCNC: 38 MG/DL (ref 7–30)
CALCIUM SERPL-MCNC: 10.4 MG/DL (ref 8.5–10.1)
CHLORIDE SERPL-SCNC: 102 MMOL/L (ref 94–109)
CO2 SERPL-SCNC: 30 MMOL/L (ref 20–32)
CREAT SERPL-MCNC: 5.01 MG/DL (ref 0.66–1.25)
GFR SERPL CREATININE-BSD FRML MDRD: 10 ML/MIN/{1.73_M2}
GLUCOSE BLDC GLUCOMTR-MCNC: 148 MG/DL (ref 70–99)
GLUCOSE BLDC GLUCOMTR-MCNC: 154 MG/DL (ref 70–99)
GLUCOSE BLDC GLUCOMTR-MCNC: 75 MG/DL (ref 70–99)
GLUCOSE SERPL-MCNC: 85 MG/DL (ref 70–99)
HGB BLD-MCNC: 9.8 G/DL (ref 13.3–17.7)
PHOSPHATE SERPL-MCNC: 5.6 MG/DL (ref 2.5–4.5)
POTASSIUM SERPL-SCNC: 4.3 MMOL/L (ref 3.4–5.3)
SODIUM SERPL-SCNC: 137 MMOL/L (ref 133–144)

## 2019-05-13 PROCEDURE — A9270 NON-COVERED ITEM OR SERVICE: HCPCS | Performed by: INTERNAL MEDICINE

## 2019-05-13 PROCEDURE — 00000146 ZZHCL STATISTIC GLUCOSE BY METER IP

## 2019-05-13 PROCEDURE — 90937 HEMODIALYSIS REPEATED EVAL: CPT

## 2019-05-13 PROCEDURE — 25000132 ZZH RX MED GY IP 250 OP 250 PS 637: Performed by: INTERNAL MEDICINE

## 2019-05-13 PROCEDURE — 80048 BASIC METABOLIC PNL TOTAL CA: CPT | Performed by: INTERNAL MEDICINE

## 2019-05-13 PROCEDURE — 85018 HEMOGLOBIN: CPT | Performed by: INTERNAL MEDICINE

## 2019-05-13 PROCEDURE — 84100 ASSAY OF PHOSPHORUS: CPT | Performed by: INTERNAL MEDICINE

## 2019-05-13 PROCEDURE — 99232 SBSQ HOSP IP/OBS MODERATE 35: CPT | Performed by: INTERNAL MEDICINE

## 2019-05-13 PROCEDURE — 25000128 H RX IP 250 OP 636: Performed by: INTERNAL MEDICINE

## 2019-05-13 PROCEDURE — 12000000 ZZH R&B MED SURG/OB

## 2019-05-13 PROCEDURE — 25000131 ZZH RX MED GY IP 250 OP 636 PS 637: Performed by: INTERNAL MEDICINE

## 2019-05-13 RX ADMIN — OXYCODONE HYDROCHLORIDE 5 MG: 5 TABLET ORAL at 23:48

## 2019-05-13 RX ADMIN — CARVEDILOL 12.5 MG: 12.5 TABLET, FILM COATED ORAL at 12:41

## 2019-05-13 RX ADMIN — ACETAMINOPHEN 1300 MG: 325 TABLET, FILM COATED ORAL at 21:11

## 2019-05-13 RX ADMIN — CHOLECALCIFEROL TAB 50 MCG (2000 UNIT) 2000 UNITS: 50 TAB at 12:40

## 2019-05-13 RX ADMIN — ATORVASTATIN CALCIUM 40 MG: 40 TABLET, FILM COATED ORAL at 21:12

## 2019-05-13 RX ADMIN — FUROSEMIDE 20 MG: 20 TABLET ORAL at 12:40

## 2019-05-13 RX ADMIN — CYCLOBENZAPRINE HYDROCHLORIDE 5 MG: 5 TABLET, FILM COATED ORAL at 23:48

## 2019-05-13 RX ADMIN — CYCLOBENZAPRINE HYDROCHLORIDE 5 MG: 5 TABLET, FILM COATED ORAL at 09:34

## 2019-05-13 RX ADMIN — INSULIN ASPART 1 UNITS: 100 INJECTION, SOLUTION INTRAVENOUS; SUBCUTANEOUS at 12:51

## 2019-05-13 RX ADMIN — FLUTICASONE FUROATE 1 PUFF: 200 POWDER RESPIRATORY (INHALATION) at 12:52

## 2019-05-13 RX ADMIN — INSULIN GLARGINE 10 UNITS: 100 INJECTION, SOLUTION SUBCUTANEOUS at 21:15

## 2019-05-13 RX ADMIN — IRBESARTAN 150 MG: 150 TABLET ORAL at 12:41

## 2019-05-13 RX ADMIN — EPLERENONE 50 MG: 25 TABLET ORAL at 12:42

## 2019-05-13 RX ADMIN — INSULIN ASPART 1 UNITS: 100 INJECTION, SOLUTION INTRAVENOUS; SUBCUTANEOUS at 17:51

## 2019-05-13 RX ADMIN — OXYCODONE HYDROCHLORIDE 5 MG: 5 TABLET ORAL at 09:34

## 2019-05-13 RX ADMIN — SODIUM CHLORIDE 300 ML: 9 INJECTION, SOLUTION INTRAVENOUS at 09:42

## 2019-05-13 RX ADMIN — DILTIAZEM HYDROCHLORIDE 300 MG: 180 CAPSULE, EXTENDED RELEASE ORAL at 12:46

## 2019-05-13 RX ADMIN — OMEGA-3 FATTY ACIDS CAP 1000 MG 1 G: 1000 CAP at 12:42

## 2019-05-13 RX ADMIN — Medication 1 CAPSULE: at 12:42

## 2019-05-13 RX ADMIN — SENNOSIDES AND DOCUSATE SODIUM 1 TABLET: 8.6; 5 TABLET ORAL at 12:42

## 2019-05-13 RX ADMIN — SODIUM CHLORIDE 250 ML: 9 INJECTION, SOLUTION INTRAVENOUS at 09:43

## 2019-05-13 ASSESSMENT — ACTIVITIES OF DAILY LIVING (ADL)
ADLS_ACUITY_SCORE: 17

## 2019-05-13 NOTE — CONSULTS
RADIATION ONCOLOGY CONSULTATION    Full dictated consult note to follow. Briefly, patient is scheduled for possible L4 biopsy and vertebroplasty tomorrow. He would be a candidate for palliative radiotherapy directed at L4 thereafter. Will continue to follow.    Bong Dexter M.D.  Radiation Oncology  [phone]

## 2019-05-13 NOTE — PLAN OF CARE
DATE & TIME: 5/13/2019 7146-8904  ORIENTATION: A&O x4  BEHAVIOR & AGGRESSION TOOL COLOR: Green  ABNL VS/O2: VSS on 2L nasal cannula (at baseline)  MOBILITY: A2, did not get out of bed d/t pain  PAIN MANAGMENT: Heating pack, PRN Oxycodone and Flexeril   DIET: Mod carb- ate lunch 75% tolerated.   BOWEL/BLADDER: Dialysis pt, using urinal appropriately.  ABNL LAB/BG: BG 75/148   DRAIN/DEVICES: PIV SL. LUE fistula, WDL.   TELEMETRY RHYTHM: N/A  SKIN: Intact, bruising. R elbow discoloration pt states it is present baseline.   TESTS/PROCEDURES: Dialysis performed today, 0.5L out, CT guided biopsy with Vertebroplasty pos tomorrow.   D/C DAY/GOALS/PLACE: Pending. PT recommending TCU, pt does not want TCU.   OTHER IMPORTANT INFO: Dialysis (MWF), next run Wednesday.   Marissa Rodas RN on 5/13/2019 at 2:20 PM

## 2019-05-13 NOTE — PLAN OF CARE
PT: Noted plan for possible L4 biopsy and vertebroplasty. Will hold PT until after procedure, RN aware.

## 2019-05-13 NOTE — PROGRESS NOTES
Potassium   Date Value Ref Range Status   05/13/2019 4.3 3.4 - 5.3 mmol/L Final   ]  Lab Results   Component Value Date    HGB 9.8 05/13/2019     Weight: (!) 161 kg (354 lb 15.1 oz)  POST WT -0.5kg  DIALYSIS PROCEDURE NOTE  Hepatitis status of previous patient on machine log was checked and ok to use with this patient hepatitis status.  Patient dialyzed for 2.75 hrs on a 3 K bath with a  net fluid removal of 0.5L.  A BFR of 450ml/min was obtained via ULAVF.  Patient was seen by Dr. Diaz during treatment.  Total heparin received during treatment: 0 units.  Heparin instilled in both ports post run.  Meds given: none Complications: Hypotension resulting in significant decrease in UF goal, MD notified. See flowsheet in EPIC for further details and post assessment. Transducer pods intact and checked every 15 mins.  Machine water alarm in place and functioning.  HEPATITIS B SURFACE ANTIGEN Negative Date 5/6/19 HEPATITIS B SURFACE ANTIBODY Immune DATE 5/6/19  CHLORINE AND CHLORAMINES CHECK on WATER SYSTEM EVERY 4 hours.   PT returned via bed  Catheter Access: Aseptic prep done for both on/off.  Report received from: ESPERANZA Rodas RN  Report given to: ESPERANZA Rodas RN  Outpatient Dialysis at [facility]

## 2019-05-13 NOTE — CONSULTS
Consult Date:  05/13/2019      CHIEF COMPLAINT:  I was asked by Dr. Beebe to evaluate Mr. Saavge for his L4 vertebral fracture.      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  He had an MRI scan on 04/19/2019 which showed a pathologic fracture of the L4 vertebral body.  His symptoms have been progressive and at this point, he is unable to ambulate due to pain.  He is relatively comfortable, however, lying in bed.  There is no known primary.      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  During his previous admission when the pathologic fracture was noted, he had a Palliative Care consult but was not yet ready to consider Hospice.  He is DNR/DNI.      PREADMISSION MEDICATIONS:  Include:   1.  Aspirin 81 mg a day.   2.  Atorvastatin 40 mg a day.   3.  Carvedilol 12.5 mg twice a day.   4.  Doxazosin 8 mg at bedtime.   5.  Eplerenone 50 mg p.o. each day.   6.  Omega-3 fish oil 1 gram a day   7.  Fluticasone 1 puff daily.   8.  Furosemide 20 mg a day.   9.  Insulin.   10.  Irbesartan 150 mg a day.   11.  Multivitamin.   12.  Polyethylene glycol 17 grams a day.   13.  Senna 1 tablet b.i.d.   14.  Vitamin D3, 2000 international units a day.      SOCIAL HISTORY:  He is  and he lives with his son.  He practiced in [place].  Prior to the onset of his back pain, he was a community ambulator without a walker or cane.  He does have a history of spinal stenosis limiting his ambulation.      PHYSICAL EXAMINATION:     VITAL SIGNS:  Most recent vital signs show a temperature 97.5, pulse 66, blood pressure 158/34, respirations 18, oxygen saturation 98% on 2 liters by nasal cannula.   GENERAL:  When I entered the room, he was lying on his left side, which is more comfortable  for him.  He was alert and oriented x 3, pleasant, cooperative with the exam and in no apparent distress.     BACK:  Examination of his low back showed no abnormal skin markings or hairy patches, no surgical scars.  He was tender with palpation over his right flank/paraspinal muscles/sacroiliac region.  Sciatic notch was nontender.   NEUROLOGIC:  Motor strength to be grade 5/5.  Sensation intact to light touch.  His reflexes were absent at the knees and grade 1 at the ankles.  He had a negative Radha sign.  He had no ankle clonus.  Tension signs were negative.  He had no significant swelling in his ankles.  His toes were warm, and he had good capillary refill.      MRI of the lumbar spine was reviewed from 04/19/2019.  It showed a pathologic fracture of the L4 vertebral body with a large amount of tumor in the vertebral body, predominantly on the right side extending into the right pedicle.  He had spinal stenosis from facet hypertrophy.  There did appear to be some tumor extending into the spinal canal.      CT scan of the lumbar spine was done yesterday showing the fracture at L4 and central stenosis.  There had not been a progression of the collapse of the fracture between the 2 scans.      ASSESSMENT:  Seven Savage is an 84-year-old man with pathologic fracture of the L4 vertebral body with a marked amount of pain associated with it.  He has skeletal metastases including lung, liver, kidney, adrenal gland.      RECOMMENDATIONS:  I would recommend a transpedicular biopsy of the lesion in Interventional Radiology followed by vertebroplasty, as this often results in a marked relief of pain and would prevent the necessity of an extensive anterior and posterior surgical procedure.  Would recommend a Radiation Oncology consult as well, depending on how aggressive the patient would like to be with treatment.      I discussed this at length with the patient today.  He does seem somewhat interested.  He would like  to discuss with son prior to consenting.  He was dialyzing today and therefore, if he consents, we will plan the procedure for tomorrow.         ALICE PULIDO MD             D: 2019   T: 2019   MT: KIM      Name:     MISHA SANDERS   MRN:      [id number]        Account:       [id number]   :      1935           Consult Date:  2019      Document: [id number]       cc: Alice Pulido MD

## 2019-05-13 NOTE — PROGRESS NOTES
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 Hypercalcemia and hyperphosphatemia    Plan:  # CT guided bone biopsy tomorrow?  # Hold Epogen with HD for now  # Next HD treatment on Wed         Interval History:     Patient does not have any new complaints. DBP is low.          Dialysis Parameters:     Vitals:    05/09/19 1443 05/11/19 0700   Weight: 77.1 kg (170 lb) (!) 161 kg (354 lb 15.1 oz)     I/O last 3 completed shifts:  In: 90 [P.O.:90]  Out: 175 [Urine:175]  Temp:  [97.5  F (36.4  C)-98.3  F (36.8  C)] 97.5  F (36.4  C)  Pulse:  [62-70] 67  Heart Rate:  [59-61] 61  Resp:  [17-18] 17  BP: ()/(41-65) 132/52  SpO2:  [97 %-100 %] 100 %    Current Weight: ??  Dry Weight: 77?  Dialysis Temp: 36.5  C  Access Device: AVF  Access Site: L arm  Dialyzer: Revaclear  Dialysis Bath: 3K  Sodium Profile: none  UF Goal: 500 ml  Blood Flow Rate (mL/min): 450  Total Treatment Time (hrs): 2.45  Heparin: none (use heparin outpatient)    Review of Systems:          Medications:     EPO dose: 17822 units  Hectorol: ?    Physical Exam:     Vitals were reviewed  Patient Vitals for the past 24 hrs:   BP Temp Temp src Pulse Heart Rate Resp SpO2   05/13/19 1100 132/52 -- -- 67 -- -- 100 %   05/13/19 1045 120/51 -- -- 65 -- -- 100 %   05/13/19 1030 102/41 -- -- 62 -- -- 100 %   05/13/19 1015 115/50 -- -- 64 -- -- 98 %   05/13/19 1000 112/41 -- -- 65 -- -- 99 %   05/13/19 0945 94/44 -- -- 70 -- -- 100 %   05/13/19 0930 125/45 -- -- 63 -- -- 99 %   05/13/19 0915 135/58 -- -- 67 -- -- 100 %   05/13/19 0900 111/42 -- -- 63 -- -- 100 %   05/13/19 0848 137/47 -- -- 62 -- -- 100 %   05/13/19 0840 162/65 97.5  F (36.4  C) Oral 62 -- 17 100 %   05/13/19 0818 158/43 97.5  F (36.4  C) Oral 66 -- 18 98 %   05/12/19 2216 134/55 98  F (36.7  C) Oral -- 61 18 98 %   05/12/19 1947 -- -- -- -- -- 18 --   05/12/19 1535  123/48 98.3  F (36.8  C) Oral -- 59 18 97 %       Temperatures:  Current - Temp: 97.5  F (36.4  C); Max - Temp  Av.8  F (36.6  C)  Min: 97.5  F (36.4  C)  Max: 98.3  F (36.8  C)  Respiration range: Resp  Av.8  Min: 17  Max: 18  Pulse range: Pulse  Av.7  Min: 62  Max: 70  Blood pressure range: Systolic (24hrs), Av , Min:94 , Max:162   ; Diastolic (24hrs), Av, Min:41, Max:65    Pulse oximetry range: SpO2  Av.2 %  Min: 97 %  Max: 100 %  I/O last 3 completed shifts:  In: 90 [P.O.:90]  Out: 175 [Urine:175]    Intake/Output Summary (Last 24 hours) at 2019 1115  Last data filed at 2019 1930  Gross per 24 hour   Intake 90 ml   Output 175 ml   Net -85 ml     Gen: NAD  CV: RRR  Pulm: Ctab  Abd: soft, NT  Ext: no edema  Neuro: a/o x 3      Data:     Recent Labs   Lab Test 19  0845 19  0925    138   POTASSIUM 4.3 3.8   CHLORIDE 102 102   CO2 30 29   ANIONGAP 5 7   GLC 85 131*   BUN 38* 38*   CR 5.01* 4.94*   AARON 10.4* 9.9       Hemoglobin   Date Value Ref Range Status   2019 9.8 (L) 13.3 - 17.7 g/dL Final              Eliezer Diaz MD

## 2019-05-13 NOTE — PROGRESS NOTES
Orthopaedic Spine Consult - Dictated   For full details please see dictation.  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 Primary tumor is unknown at this time.  Recommendations:  CT-guided transpedicular biopsy followed by vertebroplasty in interventional radiology.  Radiation oncology consult.  Patient would like to discuss with his son before consenting to biopsy and vertebroplasty.  Is dialyzing today.  We will tentatively schedule the procedure for tomorrow.

## 2019-05-13 NOTE — PLAN OF CARE
A&Ox4. VSS on 2L O2 at baseline. IV SL. Up A2 W/GB, but did not want to ambulate this shift because it causes a lot of pain. Using urinal overnight. . Had some jello and chips. LUE fistula WDL. Pt states tylenol does not help his pain and prefers oxycodone and flexeril when having pain. Plan for dialysis today. Continue to monitor.

## 2019-05-13 NOTE — PROGRESS NOTES
[facility]    Medicine Progress Note - Hospitalist Service       Date of Admission:  5/9/2019  Assessment & Plan       This is an 84-year-old gentleman with a history of end-stage renal disease and acute on chronic back pain that is uncontrolled, likely related to a pathologic compression fracture in the setting of metastatic carcinoma with unknown primary.       Back pain,   L4 vertebral body with mild anterior compression   The patient is being admitted for pain control.  He had been taking hydrocodone/APAP, 2 tablets per day   - - pain medications adjusted, scheduled tylenol with prn oxycodone added .  - -  appreciate ortho spine input , had CT spine done today with new concern of epidural tumor , plan for biopsy of the growth and kyphoplasty tomorrow if patient  Consents .  --radiation oncology consulted and suggesting radiation if pain persists after kyphoplasty.  --physical therapy /occupational therapy  Suggesting tcu, patient  does not want tcu discharge . He is debilitated by pain with hope to going home on discharge .   ESRD; followed by Nephrology On HD  Continue hd as per schedule.    Aortic stenosis:   Severe, mean 54mmHg, and the  VITO 0.8cm2,   Continue PTA coreg, Diltiazem  And lasix     History of metastatic carcinoma  Malignant appearing lesions in lung, liver, spine and possible kidneys and adrenals.  The patient is followed by Dr. Robles and was still undergoing evaluation for this.  He was supposed to have a PET scan done last Monday, but was unable to make it due to his back pain.  I have consulted Dr. Robles's service to see if they can help in this regard.  With respect to cancer treatment, again, evaluation was still underway.  The patient was actually seen by Palliative Medicine during his previous hospitalization and was not ready, at that point, to consider hospice or end of life cares, although the patient currently does seem to have realistic expectations for  ongoing care goals.  --possibly biopsy tomorrow of the epidural growth.      Type 2 diabetes.  The patient is maintained on Lantus and scheduled aspart; these will be continued.       End-stage renal disease.  The patient typically has Monday, Wednesday, Friday dialysis.  His last dialysis was actually today due to missing his dialysis yesterday.  I will tag Nephrology to follow along.        Diet: Moderate Consistent CHO Diet    DVT Prophylaxis: Low Risk/Ambulatory with no VTE prophylaxis indicated  Dougherty Catheter: not present  Code Status: DNR/DNI      Disposition Plan   Expected discharge: Tomorrow, recommended to transitional care unit once when pain is under control and per PT/OT final reccs.  Entered: Rubi Beebe MD 05/13/2019, 1:35 PM       The patient's care was discussed with the Bedside Nurse.    Rubi Beebe MD  Hospitalist Service  [facility]    ______________________________________________________________________    Interval History   Had dialysis today ,pain still an issue, seen by orthopedics , thinking about the procedure tomorrow.    Data reviewed today: I reviewed all medications, new labs and imaging results over the last 24 hours. I personally reviewed no images or EKG's today.    Physical Exam   Vital Signs: Temp: 97.6  F (36.4  C) Temp src: Oral BP: 129/53 Pulse: 72 Heart Rate: 70 Resp: 17 SpO2: 98 % O2 Device: Nasal cannula Oxygen Delivery: 2 LPM  Weight: 354 lbs 15.05 oz  Constitutional: Awake, alert, cooperative, no apparent distress  Respiratory: Clear to auscultation bilaterally, no crackles or wheezing  Cardiovascular: Regular rate and rhythm, normal S1 and S2, and no murmur noted  GI: Normal bowel sounds, soft, non-distended, non-tender  Skin/Integumen: fistula noted, he does have no localized tenderness on his spine but has tenderness on right anton   Neuro : moving all 4 extremities, no focal deficit noted         Data

## 2019-05-14 ENCOUNTER — APPOINTMENT (OUTPATIENT)
Dept: INTERVENTIONAL RADIOLOGY/VASCULAR | Facility: CLINIC | Age: 84
DRG: 477 | End: 2019-05-14
Attending: ORTHOPAEDIC SURGERY
Payer: MEDICARE

## 2019-05-14 LAB
GLUCOSE BLDC GLUCOMTR-MCNC: 109 MG/DL (ref 70–99)
GLUCOSE BLDC GLUCOMTR-MCNC: 129 MG/DL (ref 70–99)
GLUCOSE BLDC GLUCOMTR-MCNC: 134 MG/DL (ref 70–99)
GLUCOSE BLDC GLUCOMTR-MCNC: 156 MG/DL (ref 70–99)
GLUCOSE BLDC GLUCOMTR-MCNC: 65 MG/DL (ref 70–99)
GLUCOSE BLDC GLUCOMTR-MCNC: 81 MG/DL (ref 70–99)
GLUCOSE BLDC GLUCOMTR-MCNC: 95 MG/DL (ref 70–99)
GLUCOSE BLDC GLUCOMTR-MCNC: 96 MG/DL (ref 70–99)

## 2019-05-14 PROCEDURE — 27210905 ZZH KIT CR7

## 2019-05-14 PROCEDURE — 25000125 ZZHC RX 250

## 2019-05-14 PROCEDURE — 99232 SBSQ HOSP IP/OBS MODERATE 35: CPT | Performed by: INTERNAL MEDICINE

## 2019-05-14 PROCEDURE — 0QB03ZX EXCISION OF LUMBAR VERTEBRA, PERCUTANEOUS APPROACH, DIAGNOSTIC: ICD-10-PCS | Performed by: RADIOLOGY

## 2019-05-14 PROCEDURE — 88342 IMHCHEM/IMCYTCHM 1ST ANTB: CPT | Mod: 26 | Performed by: RADIOLOGY

## 2019-05-14 PROCEDURE — 00000146 ZZHCL STATISTIC GLUCOSE BY METER IP

## 2019-05-14 PROCEDURE — A9270 NON-COVERED ITEM OR SERVICE: HCPCS | Performed by: INTERNAL MEDICINE

## 2019-05-14 PROCEDURE — 25000132 ZZH RX MED GY IP 250 OP 250 PS 637: Performed by: INTERNAL MEDICINE

## 2019-05-14 PROCEDURE — 27810332 IR LUMBAR VERTEBROPLASTY

## 2019-05-14 PROCEDURE — 88307 TISSUE EXAM BY PATHOLOGIST: CPT | Mod: 26 | Performed by: RADIOLOGY

## 2019-05-14 PROCEDURE — 88341 IMHCHEM/IMCYTCHM EA ADD ANTB: CPT | Mod: 26 | Performed by: RADIOLOGY

## 2019-05-14 PROCEDURE — 0QU03JZ SUPPLEMENT LUMBAR VERTEBRA WITH SYNTHETIC SUBSTITUTE, PERCUTANEOUS APPROACH: ICD-10-PCS | Performed by: RADIOLOGY

## 2019-05-14 PROCEDURE — 25000128 H RX IP 250 OP 636: Performed by: RADIOLOGY

## 2019-05-14 PROCEDURE — 25000128 H RX IP 250 OP 636

## 2019-05-14 PROCEDURE — 12000000 ZZH R&B MED SURG/OB

## 2019-05-14 PROCEDURE — 88307 TISSUE EXAM BY PATHOLOGIST: CPT | Performed by: RADIOLOGY

## 2019-05-14 PROCEDURE — 88341 IMHCHEM/IMCYTCHM EA ADD ANTB: CPT | Performed by: RADIOLOGY

## 2019-05-14 PROCEDURE — 88342 IMHCHEM/IMCYTCHM 1ST ANTB: CPT | Performed by: RADIOLOGY

## 2019-05-14 PROCEDURE — 25000131 ZZH RX MED GY IP 250 OP 636 PS 637: Performed by: INTERNAL MEDICINE

## 2019-05-14 RX ORDER — CEFAZOLIN SODIUM 2 G/100ML
2 INJECTION, SOLUTION INTRAVENOUS
Status: COMPLETED | OUTPATIENT
Start: 2019-05-14 | End: 2019-05-14

## 2019-05-14 RX ORDER — NALOXONE HYDROCHLORIDE 0.4 MG/ML
.1-.4 INJECTION, SOLUTION INTRAMUSCULAR; INTRAVENOUS; SUBCUTANEOUS
Status: DISCONTINUED | OUTPATIENT
Start: 2019-05-14 | End: 2019-05-14 | Stop reason: HOSPADM

## 2019-05-14 RX ORDER — FENTANYL CITRATE 50 UG/ML
INJECTION, SOLUTION INTRAMUSCULAR; INTRAVENOUS
Status: COMPLETED
Start: 2019-05-14 | End: 2019-05-14

## 2019-05-14 RX ORDER — DEXTROSE MONOHYDRATE 25 G/50ML
25-50 INJECTION, SOLUTION INTRAVENOUS
Status: DISCONTINUED | OUTPATIENT
Start: 2019-05-14 | End: 2019-05-14

## 2019-05-14 RX ORDER — LIDOCAINE HYDROCHLORIDE 10 MG/ML
INJECTION, SOLUTION INFILTRATION; PERINEURAL
Status: COMPLETED
Start: 2019-05-14 | End: 2019-05-14

## 2019-05-14 RX ORDER — NICOTINE POLACRILEX 4 MG
15-30 LOZENGE BUCCAL
Status: DISCONTINUED | OUTPATIENT
Start: 2019-05-14 | End: 2019-05-14

## 2019-05-14 RX ORDER — HEPARIN SODIUM 200 [USP'U]/100ML
500 INJECTION, SOLUTION INTRAVENOUS CONTINUOUS PRN
Status: DISCONTINUED | OUTPATIENT
Start: 2019-05-14 | End: 2019-05-14 | Stop reason: HOSPADM

## 2019-05-14 RX ORDER — CEFAZOLIN SODIUM 2 G/100ML
INJECTION, SOLUTION INTRAVENOUS
Status: COMPLETED
Start: 2019-05-14 | End: 2019-05-14

## 2019-05-14 RX ORDER — FLUMAZENIL 0.1 MG/ML
0.2 INJECTION, SOLUTION INTRAVENOUS
Status: DISCONTINUED | OUTPATIENT
Start: 2019-05-14 | End: 2019-05-14 | Stop reason: HOSPADM

## 2019-05-14 RX ORDER — FENTANYL CITRATE 50 UG/ML
25-50 INJECTION, SOLUTION INTRAMUSCULAR; INTRAVENOUS EVERY 5 MIN PRN
Status: DISCONTINUED | OUTPATIENT
Start: 2019-05-14 | End: 2019-05-14 | Stop reason: HOSPADM

## 2019-05-14 RX ADMIN — MIDAZOLAM HYDROCHLORIDE 0.5 MG: 1 INJECTION, SOLUTION INTRAMUSCULAR; INTRAVENOUS at 10:00

## 2019-05-14 RX ADMIN — CHOLECALCIFEROL TAB 50 MCG (2000 UNIT) 2000 UNITS: 50 TAB at 08:51

## 2019-05-14 RX ADMIN — FENTANYL CITRATE 25 MCG: 50 INJECTION, SOLUTION INTRAMUSCULAR; INTRAVENOUS at 09:51

## 2019-05-14 RX ADMIN — MIDAZOLAM HYDROCHLORIDE 0.5 MG: 1 INJECTION, SOLUTION INTRAMUSCULAR; INTRAVENOUS at 10:40

## 2019-05-14 RX ADMIN — ACETAMINOPHEN 975 MG: 325 TABLET, FILM COATED ORAL at 17:49

## 2019-05-14 RX ADMIN — Medication 1 CAPSULE: at 08:51

## 2019-05-14 RX ADMIN — MIDAZOLAM HYDROCHLORIDE 0.5 MG: 1 INJECTION, SOLUTION INTRAMUSCULAR; INTRAVENOUS at 09:51

## 2019-05-14 RX ADMIN — ACETAMINOPHEN 975 MG: 325 TABLET, FILM COATED ORAL at 21:02

## 2019-05-14 RX ADMIN — LIDOCAINE HYDROCHLORIDE 18 ML: 10 INJECTION, SOLUTION INFILTRATION; PERINEURAL at 10:22

## 2019-05-14 RX ADMIN — INSULIN GLARGINE 10 UNITS: 100 INJECTION, SOLUTION SUBCUTANEOUS at 21:28

## 2019-05-14 RX ADMIN — OXYCODONE HYDROCHLORIDE 5 MG: 5 TABLET ORAL at 17:49

## 2019-05-14 RX ADMIN — FENTANYL CITRATE 25 MCG: 50 INJECTION, SOLUTION INTRAMUSCULAR; INTRAVENOUS at 10:40

## 2019-05-14 RX ADMIN — SENNOSIDES AND DOCUSATE SODIUM 1 TABLET: 8.6; 5 TABLET ORAL at 21:02

## 2019-05-14 RX ADMIN — FENTANYL CITRATE 25 MCG: 50 INJECTION, SOLUTION INTRAMUSCULAR; INTRAVENOUS at 10:35

## 2019-05-14 RX ADMIN — FENTANYL CITRATE 25 MCG: 50 INJECTION, SOLUTION INTRAMUSCULAR; INTRAVENOUS at 10:00

## 2019-05-14 RX ADMIN — ATORVASTATIN CALCIUM 40 MG: 40 TABLET, FILM COATED ORAL at 21:01

## 2019-05-14 RX ADMIN — CEFAZOLIN SODIUM 2 G: 2 INJECTION, SOLUTION INTRAVENOUS at 09:43

## 2019-05-14 RX ADMIN — ACETAMINOPHEN 975 MG: 325 TABLET, FILM COATED ORAL at 11:54

## 2019-05-14 RX ADMIN — OXYCODONE HYDROCHLORIDE 5 MG: 5 TABLET ORAL at 11:53

## 2019-05-14 RX ADMIN — OMEGA-3 FATTY ACIDS CAP 1000 MG 1 G: 1000 CAP at 08:51

## 2019-05-14 RX ADMIN — CARVEDILOL 12.5 MG: 12.5 TABLET, FILM COATED ORAL at 17:49

## 2019-05-14 RX ADMIN — DOXAZOSIN 8 MG: 4 TABLET ORAL at 21:01

## 2019-05-14 RX ADMIN — MIDAZOLAM HYDROCHLORIDE 0.5 MG: 1 INJECTION, SOLUTION INTRAMUSCULAR; INTRAVENOUS at 10:35

## 2019-05-14 RX ADMIN — CYCLOBENZAPRINE HYDROCHLORIDE 5 MG: 5 TABLET, FILM COATED ORAL at 15:02

## 2019-05-14 RX ADMIN — FUROSEMIDE 20 MG: 20 TABLET ORAL at 08:51

## 2019-05-14 ASSESSMENT — MIFFLIN-ST. JEOR: SCORE: 1411.58

## 2019-05-14 ASSESSMENT — ACTIVITIES OF DAILY LIVING (ADL)
ADLS_ACUITY_SCORE: 17

## 2019-05-14 NOTE — PLAN OF CARE
DATE & TIME:5-13-19  3-1130  ORIENTATION: alert and oriented  BEHAVIOR & AGGRESSION TOOL COLOR:green  CIWA SCORE: N/A  ABNL VS/O2: BP low and BP meds held  MOBILITY: bedrest pt declined to be out of bed  PAIN MANAGMENT: Tylenol given once.  Declined other doses  DIET:Mod Carb  BOWEL/BLADDER: Voided per urinal   ABNL LAB/BG:   156  DRAIN/DEVICES: Left arm fistula +bruit and thrill   TELEMETRY RHYTHM:  SKIN:bruised area noted on elbow  TESTS/PROCEDURES: Pt to have possible Vertebroplasty   D/C DAY/GOALS/PLACE:   OTHER IMPORTANT INFO:

## 2019-05-14 NOTE — PLAN OF CARE
PT: Attempted to see patient for PT session. Pt had a kyphoplasty today for back pain. Pt states he cannot tell a difference in pain yet but he expects it to take some time. Refused trying to get up out of bed.

## 2019-05-14 NOTE — PROGRESS NOTES
RADIOLOGY PROCEDURE NOTE  Patient name: Seven Savage Jr.  MRN: [id number]  : 1935    Pre-procedure diagnosis: L4 lesion and compression fracture  Post-procedure diagnosis: Same    Procedure Date/Time: May 14, 2019  10:45 AM  Procedure: L4 Biopsy and L4 Vertebroplasty  Estimated blood loss: None  Specimen(s) collected with description: 2 core bone biopsies. 3 ml of cement placed on the left pedicle and 4 ml of cement placed on the right side.  The patient tolerated the procedure well with no immediate complications.  Significant findings:none    See imaging dictation for procedural details.    Provider name: Jamey Mary and Dr. Ocampo  Assistant(s):None

## 2019-05-14 NOTE — IR NOTE
Interventional Radiology Intra-procedural Nursing Note    Patient Name: Seven Savage Jr.  Medical Record Number: [id number]  Today's Date: May 14, 2019    Start Time: 0954  End of procedure time: 1044  Procedure: L4 Bone Biopsy amd L4 Vertebroplasty  Report given to: PEARL Ann on station [id number]  Time pt departs:  1100       Other Notes: Pt. transferred to IR table. Prepped and draped appropriately. Monitoring equipment applied. NC applied. No complaints of pain at this time. Timeout recorded.    Posterior lumbar puncture sites (2) are dressed with dry gauze and covered with tegaderm. C/D/I, soft.    Procedure done by HAWK Garcia and Dr. Ocampo.     Pt. Tolerated procedure well, relaxed. VSS throughout. CMS intact.     Bone spec collected, properly labeled and placed in sterile cup with saline. Sent to lab.    Medications administered:    Fentanyl 100mcg IVP  Versed 2 mg IVP  Ancef 2g IV

## 2019-05-14 NOTE — PLAN OF CARE
DATE & TIME:5/14/18 [id number]   ORIENTATION: alert and oriented  BEHAVIOR & AGGRESSION TOOL COLOR:green  CIWA SCORE: N/A  ABNL VS/O2: 2L NC at baseline, BP 110s/40s BP meds held this AM prior to IR procedure. HR 50s.  MOBILITY: bedrest until 1330 from procedure, has not been OOB this shift, A2 gb/walker  PAIN MANAGMENT: Oxy given with Tylenol x1, still rating pain 4-5/10.   DIET: Prev Mod CHO, Full liquid now and adv as tolerated  BOWEL/BLADDER: Voids per urinal, hemodialysis pt.  ABNL LAB/BG: BGM 95, 81  DRAIN/DEVICES: Left arm fistula +bruit and +thrill, back orthosis on.   TELEMETRY RHYTHM: n/a  SKIN:bruised area noted on elbow, dusky skin  TESTS/PROCEDURES: Dialysis pt, next tomorrow. Vertebroplasty today, VSS since procedure, CMS intact, pain managed.  D/C DAY/GOALS/PLACE: Pending  OTHER IMPORTANT INFO: 2 puncture sites on lower back from procedure, covered with FER lara&I. Biopsy results pending.   Marissa Rodas RN on 5/14/2019 at 3:22 PM

## 2019-05-14 NOTE — CONSULTS
Consult Date:  05/13/2019      REQUESTING PHYSICIAN:  Eric Pulido MD.     OTHER PHYSICIANS:  Sandip Antunez MD.        REASON FOR CONSULTATION:  Discussion of possible palliative radiotherapy directed at the L4 vertebral body due to presumed metastatic disease, following biopsy and vertebroplasty scheduled for 05/14/2019.        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  Lumbar spine MRI on 04/16/2019 revealed L4 vertebral body compression fracture, concerning for pathologic fracture from an underlying undiagnosed malignancy.  Torso CT on 04/22/2019 revealed a 2.7 cm spiculated pulmonary nodule in the right upper lobe, with possible mild mediastinal adenopathy, multiple low density liver lesions and multiple bilateral adrenal nodules concerning for metastastases, and ill-defined lytic changes in the L4 vertebral body with mild anterior compression suspicious for pathologic fracture.      The patient was admitted on 05/09/2019 due to further pain escalation, and lumbar spine CT on that day revealed an infiltrative process in the L4 vertebral body with a moth-eaten appearance and pathologic fracture, with a small amount of epidural soft tissue posterior to the L4 vertebral body concerning for epidural tumor (thought to be new compared to the prior MRI).  The patient was evaluated by Dr. Pulido earlier this morning, and he recommended transpedicular biopsy of the L4 lesion followed by vertebroplasty, as well as consideration of palliative radiotherapy to this region if malignancy is confirmed.      The patient today notes ongoing moderate pain in his low back region.  He otherwise denies any significant increasing dyspnea, cough, chest pain, fevers, or decrease in his ability to care for his activities of daily living.      REVIEW OF SYSTEMS:   Increasing back pain, as described above.  The patient denies any fevers, chills, headaches, vision changes, sore throat, chest pain, palpitations, shortness of breath, cough, abdominal pain, nausea, diarrhea, dysuria, muscle aches, sensitivity to cold, easy bruising, or weakness.  All other systems are negative        PAST MEDICAL / SURGICAL HISTORY:  COPD, end-stage renal failure, hypertension, hyperlipidemia, insulin-dependent diabetes mellitus, prior renal stent procedure, tonsillectomy and adenoidectomy.      OUTPATIENT MEDICATIONS:     1.  Aspirin 81 mg p.o. daily.   2.  Atorvastatin 40 mg p.o. daily.   3.  Carvedilol 12.5 mg p.o. b.i.d.   4.  Doxazosin 8 mg p.o. at bedtime.     5.  Fluticasone 1 puff daily.   6.  Furosemide 20 mg p.o. daily.   7.  Insulin subcutaneous   8.  Irbesartan 150 mg p.o. daily.   9.  Multivitamin 1 tab p.o. daily.   10.  Polyethylene glycol 17 grams p.o. daily.   11.  Senna 1 tablet b.i.d.        ALLERGIES:  LEVOFLOXACIN, PIOGLITAZONE, VYTORIN.        SOCIAL HISTORY:  The patient lives in [place], [place], with his grown son.  He is a .  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  He is a former smoker and quit in 2011, and he reports occasional alcohol use.      FAMILY HISTORY:  None significant.      PHYSICAL EXAMINATION:     ECOG performance status is 2.  Pain scale:  2/10 currently.     VITAL SIGNS:  Blood pressure 122/44, heart rate 72, temperature 97.7.   GENERAL:  The patient is alert and oriented, and is sitting comfortably in no acute distress.     HEAD:  Normocephalic, atraumatic.   ENT:  Mucous membranes moist without lesions.   NECK:  Supple and without palpable adenopathy.   HEART:  Regular rate and rhythm.   LUNGS:  Clear to auscultation bilaterally.   ABDOMEN:  Soft and nondistended.     BACK:  Increased spine tenderness in the L4 region.   EXTREMITIES:  Moving all extremities, and no edema in bilateral lower extremities.   SKIN:  No  rashes or lesions.   NEUROLOGIC:  Cranial nerves II-XII grossly intact, and strength is 5/5 in all extremities.      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  He presented with intractable pain related to this L4 lesion.  He has been evaluated by Dr. Pulido, who has recommended transpedicular biopsy of the lesion followed by vertebroplasty, which is scheduled for tomorrow.  He also recommended consideration of palliative radiotherapy to the region following the above procedure.      I described to the patient that, if metastatic malignancy is confirmed from tomorrow's biopsy, palliative radiotherapy to the L4 region would be a consideration in order to treat the extensive disease in the region and prevent further disease growth compromising mechanical stability, pain control, and possibly neurologic function.  I explained the general logistics of this treatment, as well as the possible short-term side effects and long-term risks.      The patient plans to undergo biopsy and vertebroplasty, scheduled for tomorrow.  I will continue to follow the patient and will plan to offer palliative radiotherapy to the region if appropriate, when he is deemed ready.      Thank you again for referring Dr. Savage for consideration of treatment.  Please feel free to contact me with any questions.         HERMELINDO KITCHEN MD             D: 2019   T: 2019   MT: WT      Name:     MISHA SAVAGE   MRN:      [id number]        Account:       [id number]   :      1935            Consult Date:  05/13/2019      Document: [id number]       cc: Eric Robles MD

## 2019-05-14 NOTE — CONSULTS
Patient Name: Seven Savage Jr.  Patient MRN: [id number]  Patient : 1935    Interventional Radiology Consult    VERTEBROPLASTY PAIN MANAGEMENT CONSULT  Patient Name: Seven Savage Jr.  Patient MRN: [id number]  Patient : 1935    S: Mr. 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  These symptoms have been occuring on and off for the past several months however he was evaluated in the middle of April with an lumbar MRI which revealed an L4 compression fracture.  In addition, a chest CT was done on 19 which showed a suspicious lesion in the right upper lobe of the lung. Conservative therapy has been attempted since his MRI.utilizing techniques such as bed rest and medicinal pain management. The patient currently rates the pain as 7-8/10. The symptoms are primarily located in the low back with movement.  The symptoms are alleviated by bed rest.  The patient's activities of daily living have been compromised significantly.       Past Medical History:   Diagnosis Date     COPD (chronic obstructive pulmonary disease) (H)      CRF (chronic renal failure)      Heart murmur      HLD (hyperlipidemia)      HTN (hypertension)      IDDM (insulin dependent diabetes mellitus) (H)      Renal calculi        Prescription Medications as of 2019       Rx Number Disp Refills Start End Last Dispensed Date Next Fill Date Owning Pharmacy    albuterol (2.5 MG/3ML) 0.083% neb solution  75 mL 7 10/2/2018    [facility] [address] [place] [address] [address] AT [address]    Sig: TAKE 1 VIAL BY NEBULIZATION EVERY 6 HOURS AS NEEDED FOR SHORNESS OF BREATH/DYSPNEA OR WHEEZING    Class: E-Prescribe    albuterol (PROAIR HFA/PROVENTIL HFA/VENTOLIN HFA) 108 (90 BASE) MCG/ACT Inhaler  1 Inhaler 5 2017    [facility] [address] [place] [address] [address] AT [address]    Sig: Inhale 1-2 puffs into the  lungs every 4 hours as needed for shortness of breath / dyspnea or wheezing    Class: E-Prescribe    Route: Inhalation    aspirin 81 MG chewable tablet            Sig: Take 81 mg by mouth daily    Class: Historical    Route: Oral    atorvastatin (LIPITOR) 40 MG tablet            Sig: Take 40 mg by mouth daily    Class: Historical    Route: Oral    B Complex-C-Folic Acid (BRANDI-HEIDY) TABS  90 tablet 3 11/15/2018    [facility] [address] [place] [address] [address] AT [address]    Sig: Take 1 tablet by mouth daily    Class: E-Prescribe    Route: Oral    carvedilol (COREG) 12.5 MG tablet  180 tablet 3 11/15/2018    [facility] [address] AT [address]    Sig: TAKE 1 TABLET(12.5 MG) BY MOUTH TWICE DAILY    Class: E-Prescribe    cyclobenzaprine (FLEXERIL) 5 MG tablet  90 tablet 3 4/16/2019    [facility] [address] AT [address]    Sig: Take 1 tablet (5 mg) by mouth 3 times daily as needed for muscle spasms    Class: E-Prescribe    Route: Oral    diltiazem ER (TIAZAC) 300 MG 24 hr ER beaded capsule  90 capsule 0 2/26/2019    [facility] [address] AT [address]    Sig: TAKE 1 CAPSULE(300 MG) BY MOUTH DAILY    Class: E-Prescribe    doxazosin (CARDURA) 8 MG tablet            Sig: Take 8 mg by mouth At Bedtime    Class: Historical    Route: Oral    eplerenone (INSPRA) 50 MG tablet  90 tablet 4 6/21/2018    [facility] [address] AT [address]    Sig: Take 1 tablet (50 mg) by mouth daily    Class: E-Prescribe    Route: Oral    fish oil-omega-3 fatty acids 1000 MG capsule            Sig: Take 1 g by mouth daily    Class: Historical    Route: Oral    fluticasone (FLOVENT HFA) 220 MCG/ACT inhaler            Sig: Inhale 1 puff into the lungs 2 times daily    Class: Historical    Route: Inhalation    furosemide (LASIX)  20 MG tablet  90 tablet 3 11/15/2018    [facility] [id number] [place] [address] [address] AT [address]    Sig: Take 1 tablet (20 mg) by mouth daily    Class: E-Prescribe    Route: Oral    HYDROcodone-acetaminophen (NORCO) 5-325 MG tablet  30 tablet 0 5/3/2019    [address]    Sig: Take 1 tablet by mouth every 6 hours as needed for severe pain    Class: E-Prescribe    Earliest Fill Date: 5/3/2019    Route: Oral    insulin aspart (NOVOLOG PEN) 100 UNIT/ML pen    4/25/2019        Sig: Do Not give Correction Insulin if BG <140.  For  - 214 give 1 unit.  For  - 289 give 2 unit.  For  - 364 give 4 units.  For BG = or > 365 give 6 units    Class: No Print Out    insulin aspart (NOVOLOG VIAL) 100 UNITS/ML vial    4/25/2019        Sig: Inject 4 Units Subcutaneous 3 times daily (with meals) Patient states with large meals he will increase to 14-16 units.    Class: No Print Out    Route: Subcutaneous    insulin glargine (LANTUS SOLOSTAR PEN) 100 UNIT/ML pen    4/25/2019        Sig: Inject 10 Units Subcutaneous At Bedtime (Patient will increase to 26 units at bedtime he states when BG is elevated.    Class: No Print Out    Route: Subcutaneous    irbesartan (AVAPRO) 150 MG tablet  90 tablet 3 3/11/2019    [facility] [address] [place] [address] [address] AT [address]    Sig: TAKE 1 TABLET(150 MG) BY MOUTH DAILY    Class: E-Prescribe    polyethylene glycol (MIRALAX/GLYCOLAX) packet            Sig: Take 17 g by mouth daily    Class: Historical    Route: Oral    ranitidine (ZANTAC) 150 MG tablet  90 tablet 3 11/15/2018    [facility] [address] [place] [address] [address] AT [address]    Sig: Take 1 tablet (150 mg) by mouth daily as needed for heartburn    Class: E-Prescribe    Route: Oral    senna-docusate (SENOKOT-S/PERICOLACE) 8.6-50 MG tablet            Sig: Take 1 tablet by mouth 2  times daily And bid prn    Class: Historical    Route: Oral    STIOLTO RESPIMAT 2.5-2.5 MCG/ACT AERS  4 g 9 2019    [facility] [id number] - [place], [place] - [address] AT [facility] [address]    Sig: INHALE 2 PUFFS INTO THE LUNGS DAILY    Class: E-Prescribe    VITAMIN D, CHOLECALCIFEROL, PO            Sig: Take 2,000 Units by mouth daily     Class: Historical    Route: Oral      Hospital Medications as of 2019       Dose Frequency Start End    - MEDICATION INSTRUCTIONS for Dialysis Patients -  SEE ADMIN INSTRUCTIONS 2019     Sig: See Admin Instructions    Class: E-Prescribe    Route: Does not apply    0.9% sodium chloride BOLUS (Completed) 250 mL ONCE IN DIALYSIS 2019    Sig: Inject 250 mLs into the vein Once in dialysis    Class: E-Prescribe    Route: Intravenous    0.9% sodium chloride BOLUS (Completed) 300 mL ONCE 2019    Si mLs by Hemodialysis Machine route once    Class: E-Prescribe    Route: Hemodialysis Machine    acetaminophen (TYLENOL) tablet 975 mg 975 mg 3 TIMES DAILY 2019     Sig: Take 3 tablets (975 mg) by mouth three times daily    Class: E-Prescribe    Route: Oral    albuterol (PROAIR HFA/PROVENTIL HFA/VENTOLIN HFA) 108 (90 Base) MCG/ACT inhaler 1-2 puff 1-2 puff EVERY 4 HOURS PRN 5/10/2019     Sig: Inhale 1-2 puffs into the lungs every 4 hours as needed for shortness of breath / dyspnea or wheezing    Class: E-Prescribe    Route: Inhalation    albuterol (PROVENTIL) neb solution 1.25 mg 1.25 mg EVERY 4 HOURS PRN 2019     Sig: Take 1.5 mLs (1.25 mg) by nebulization every 4 hours as needed for wheezing    Class: E-Prescribe    Route: Nebulization    atorvastatin (LIPITOR) tablet 40 mg 40 mg DAILY 2019     Sig: Take 1 tablet (40 mg) by mouth daily    Class: E-Prescribe    Route: Oral    carvedilol (COREG) tablet 12.5 mg 12.5 mg 2 TIMES DAILY WITH MEALS 2019     Sig: Take 1 tablet (12.5 mg) by mouth 2 times daily (with meals)     "Class: E-Prescribe    Route: Oral    cyclobenzaprine (FLEXERIL) tablet 5 mg 5 mg 3 TIMES DAILY PRN 5/9/2019     Sig: Take 1 tablet (5 mg) by mouth 3 times daily as needed for muscle spasms    Class: E-Prescribe    Route: Oral    dextrose 50 % injection 25-50 mL 25-50 mL EVERY 15 MIN PRN 5/9/2019     Sig: Inject 25-50 mLs into the vein every 15 minutes as needed for low blood sugar    Class: E-Prescribe    Route: Intravenous    Linked Group 1:  \"Or\" Linked Group Details        diltiazem ER (DILT-XR) 300 mg 300 mg DAILY 5/10/2019     Sig: Take 300 mg by mouth daily    Class: E-Prescribe    Route: Oral    doxazosin (CARDURA) tablet 8 mg 8 mg AT BEDTIME 5/9/2019     Sig: Take 2 tablets (8 mg) by mouth At Bedtime    Class: E-Prescribe    Route: Oral    eplerenone (INSPRA) tablet 50 mg 50 mg DAILY 5/10/2019     Sig: Take 2 tablets (50 mg) by mouth daily    Class: E-Prescribe    Route: Oral    epoetin libby (EPOGEN/PROCRIT) injection 12,000 Units 12,000 Units THREE TIMES WEEKLY 5/13/2019     Sig: Inject 1.2 mLs (12,000 Units) into the vein Once per day on Mon Wed Fri    Class: E-Prescribe    Route: Intravenous    fish oil-omega-3 fatty acids capsule 1 g 1 g DAILY 5/11/2019     Sig: Take 1 capsule (1 g) by mouth daily    Class: E-Prescribe    Route: Oral    fluticasone (ARNUITY ELLIPTA) 200 MCG/ACT inhaler 1 puff 1 puff DAILY 5/11/2019     Sig: Inhale 1 puff into the lungs daily    Class: E-Prescribe    Route: Inhalation    furosemide (LASIX) tablet 20 mg 20 mg DAILY 5/10/2019     Sig: Take 1 tablet (20 mg) by mouth daily    Class: E-Prescribe    Route: Oral    glucagon injection 1 mg 1 mg EVERY 15 MIN PRN 5/9/2019     Sig: Inject 1 mg Subcutaneous every 15 minutes as needed for low blood sugar (May repeat x 1 only)    Class: E-Prescribe    Route: Subcutaneous    Linked Group 1:  \"Or\" Linked Group Details        glucose gel 15-30 g 15-30 g EVERY 15 MIN PRN 5/9/2019     Sig: Take 15-30 g by mouth every 15 minutes as needed " "for low blood sugar    Class: E-Prescribe    Route: Oral    Linked Group 1:  \"Or\" Linked Group Details        insulin aspart (NovoLOG) inj (RAPID ACTING) 1-7 Units 3 TIMES DAILY BEFORE MEALS 5/9/2019     Sig: Inject 1-7 Units Subcutaneous 3 times daily (before meals)    Class: E-Prescribe    Route: Subcutaneous    insulin aspart (NovoLOG) inj (RAPID ACTING) 1-5 Units AT BEDTIME 5/9/2019     Sig: Inject 1-5 Units Subcutaneous At Bedtime    Class: E-Prescribe    Route: Subcutaneous    insulin aspart (NovoLOG) inj (RAPID ACTING) 4 Units EVERY MORNING BEFORE BREAKFAST 5/10/2019     Sig: Inject 4 Units Subcutaneous every morning (before breakfast)    Class: E-Prescribe    Route: Subcutaneous    insulin glargine (LANTUS PEN) injection 10 Units 10 Units AT BEDTIME 5/9/2019     Sig: Inject 10 Units Subcutaneous At Bedtime    Class: E-Prescribe    Route: Subcutaneous    irbesartan (AVAPRO) tablet 150 mg 150 mg DAILY 5/10/2019     Sig: Take 1 tablet (150 mg) by mouth daily    Class: E-Prescribe    Route: Oral    melatonin tablet 1 mg 1 mg AT BEDTIME PRN 5/9/2019     Sig: Take 1 tablet (1 mg) by mouth nightly as needed for sleep    Class: E-Prescribe    Route: Oral    multivitamin RENAL (NEPHROCAPS/TRIPHROCAPS) capsule 1 capsule 1 capsule DAILY 5/11/2019     Sig: Take 1 capsule by mouth daily    Class: E-Prescribe    Route: Oral    naloxone (NARCAN) injection 0.1-0.4 mg 0.1-0.4 mg EVERY 2 MIN PRN 5/9/2019     Sig: Inject 0.25-1 mLs (0.1-0.4 mg) into the vein every 2 minutes as needed for opioid reversal    Class: E-Prescribe    Route: Intravenous    ondansetron (ZOFRAN) injection 4 mg 4 mg EVERY 6 HOURS PRN 5/9/2019     Sig: Inject 2 mLs (4 mg) into the vein every 6 hours as needed for nausea or vomiting    Class: E-Prescribe    Route: Intravenous    Linked Group 2:  \"Or\" Linked Group Details        ondansetron (ZOFRAN-ODT) ODT tab 4 mg 4 mg EVERY 6 HOURS PRN 5/9/2019     Sig: Take 1 tablet (4 mg) by mouth every 6 hours as " "needed for nausea or vomiting    Class: E-Prescribe    Route: Oral    Linked Group 2:  \"Or\" Linked Group Details        oxyCODONE (ROXICODONE) tablet 5-10 mg 5-10 mg EVERY 6 HOURS PRN 5/12/2019     Sig: Take 1-2 tablets (5-10 mg) by mouth every 6 hours as needed for moderate to severe pain    Route: Oral    polyethylene glycol (MIRALAX/GLYCOLAX) Packet 17 g 17 g DAILY 5/11/2019     Sig: Take 17 g by mouth daily    Class: E-Prescribe    Route: Oral    prochlorperazine (COMPAZINE) injection 5 mg 5 mg EVERY 6 HOURS PRN 5/9/2019     Sig: Inject 1 mL (5 mg) into the vein every 6 hours as needed for nausea or vomiting    Class: E-Prescribe    Route: Intravenous    Linked Group 3:  \"Or\" Linked Group Details        prochlorperazine (COMPAZINE) Suppository 12.5 mg 12.5 mg EVERY 12 HOURS PRN 5/9/2019     Sig: Place 0.5 suppositories (12.5 mg) rectally every 12 hours as needed for nausea or vomiting    Class: E-Prescribe    Route: Rectal    Linked Group 3:  \"Or\" Linked Group Details        prochlorperazine (COMPAZINE) tablet 5 mg 5 mg EVERY 6 HOURS PRN 5/9/2019     Sig: Take 1 tablet (5 mg) by mouth every 6 hours as needed for vomiting    Class: E-Prescribe    Route: Oral    Linked Group 3:  \"Or\" Linked Group Details        ranitidine (ZANTAC) tablet 150 mg 150 mg DAILY PRN 5/9/2019     Sig: Take 1 tablet (150 mg) by mouth daily as needed for heartburn    Class: E-Prescribe    Route: Oral    senna-docusate (SENOKOT-S/PERICOLACE) 8.6-50 MG per tablet 1 tablet 1 tablet 2 TIMES DAILY 5/10/2019     Sig: Take 1 tablet by mouth 2 times daily    Class: E-Prescribe    Route: Oral    vitamin D3 (CHOLECALCIFEROL) 1000 units (25 mcg) tablet 2,000 Units 2,000 Units DAILY 5/11/2019     Sig: Take 2 tablets (2,000 Units) by mouth daily    Route: Oral          Allergies   Allergen Reactions     Levaquin [Levofloxacin]      edema     Pioglitazone Other (See Comments)     Edema & hay fever     Vytorin Other (See Comments)     Muscle aches "       Review Of Systems  Respiratory: No shortness of breath, dyspnea on exertion, cough, or hemoptysis  Cardiovascular: negative  Gastrointestinal: negative  Genitourinary: negative  Musculoskeletal: positive for negative and back pain  Neurologic: negative  Hematologic/Lymphatic/Immunologic: Anemia    Objective:  Vitals: B/P: 116/44, T: 97.1, P: 51, R: 16  GA: WDWN male in NAD. Wearing a back brace.  BACK: TTP of the low back  LOWER EXTREMITIES: Normal circulation, sensation, and strength.     Imaging:   Results for orders placed or performed during the hospital encounter of 05/09/19   CT Lumbar Spine w/o Contrast    Narrative    CT LUMBAR SPINE WITHOUT CONTRAST  5/12/2019 1:48 PM     HISTORY: Bone neoplasm, lumbosacral spine, check for local recurrence.  Compression fracture of L4 with concern of metastasis.     TECHNIQUE: Axial images of the lumbar spine were obtained without  intravenous contrast. Multiplanar reformations were performed.   Radiation dose for this scan was reduced using automated exposure  control, adjustment of the mA and/or kV according to patient size, or  iterative reconstruction technique.    COMPARISON: MR scan dated 4/16/2019.    FINDINGS:  There is a compression fracture of the L4 vertebral body.  The fracture appears to be a pathologic fracture. The vertebrae has a  mottled appearance which is very suspicious for metastatic disease.  There also appears to be a small amount of soft tissue material in the  ventral epidural space which is new since the MR scan and is  suspicious for epidural tumor. The central canal is narrowed at this  level due to the epidural soft tissue. The paraspinous soft tissues  appear normal.    T12-L1:  Normal disc, facet joints, spinal canal and neural foramina.     L1-L2:  Mild annular disc bulge. Neural foramen are patent.    L2-L3:  Mild annular disc bulge. Central canal normal.     L3-L4:  Diffuse annular disc bulge. Degenerative change in the  facet  joints. Central canal appears mildly narrowed.     L4-L5:  Suspect small amount of epidural tumor posterior to the  vertebral body and extending into the right L4-L5 neural foramen.  Diffuse annular disc bulge. Degenerative change in the facet joints.  Mild-moderate central spinal stenosis.     L5-S1:  Diffuse annular disc bulge. Central canal mild-moderately  narrowed. Neural foramen are patent.        Impression    IMPRESSION:    1. Infiltrative process in the L4 vertebral body with a moth-eaten  appearance to the vertebral body. Suspect metastatic disease.  2. Fracture of L4. This probably represents a pathologic fracture.  3. Small amount of epidural soft tissue posterior to the L4 vertebral  body. 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 This is more prominent than on the  previous MR scan.  4. Multilevel degenerative change with spinal stenosis at L3-L4 an  L4-L5 an L5-S1 due to the degenerative changes.    SKY ESCOBAR MD       Assessment/Plan:  1. L4 pathologic fracture.  Request for biopsy and vertebroplasty. To be done on 5/13/2019 in the interventional radiology department.  2. The patient should work with the oncologist for next steps after the biopsy is completed.  Possible lung lesion.  3. Follow-up for the vertebroplasty will occur at 1 week, 1 month, and 3 months over the phone. The patient can call [phone] if he has any questions.    Thank you for the consultation. We will continue to monitor this patient after the procedure.    I meet with this patient for 25 minutes, of which greater than 50% revolved around discussion of treatment options and coordination of care.      Jamey Mary PA-C

## 2019-05-14 NOTE — PROGRESS NOTES
[facility]  [facility]  Hematology/oncology Progress Note            History:   Sarkis is an 84 year old admitted 5/9 with acute on chronic back pain     Sarkis started to having increasing low back pain this week. On Monday, he attempted PET/CT scan but was not able to be completed due to pain. His pain has continued and he came in to the ED for evaluation.     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  He has a history of hypertension, diabetes and end-stage renal disease.  He has been on dialysis since 2016 and is managed on erythropoietin and venofer. Hemoglobin had been around 10-11 but more recently fell to 7.2. He was given 2 unit(s) [facility]BRC.  His work up in our clinic included a haptoglobin of 115.  Reticulocyte count of 3.5.  IgG level of 458.  IgA 199.  IgM 20.  TSH was 2.87.  .  Serum protein electrophoresis demonstrated no monoclonal protein.  Peripheral morphology demonstrated moderate hypochromic, normocytic anemia with mild reticulocytosis.  Rare schistocytes were present.  There were no apparent dysplastic or neoplastic changes.     Given no obvious explanation for the patient's anemia, a bone marrow biopsy was recommended but he declined.     He subsequently seen in the ER on 4/22/19 for evaluation of back and left leg pain.       He had been seen by a chiropractor and underwent an MRI scan of his lumbar spine which demonstrated an L4 vertebral compression fracture which was later over-read as concerning for metastatic disease.       While in the ER, he underwent a CT scan of the chest abdomen and pelvis with contrast which demonstrated a spiculated right upper lobe pulmonary nodule, highly suspicious for malignancy such as bronchogenic carcinoma.  There was lytic change in the L4 vertebral body with mild anterior compression, suspicious for metastatic lesion and an associated pathologic compression fracture, age  indeterminate.  Multiple low-density liver lesions, not well characterized on a noncontrast scan but considered suspicious for metastatic disease.  There was mild mediastinal adenopathy.  Multiple bilateral adrenal nodules were indeterminant.  There are multiple hyperdense renal lesions bilaterally which could represent hemorrhagic or proteinaceous renal cysts.  Solid renal neoplasm could not be excluded.  There was moderate prostatic enlargement and an infra renal abdominal aortic aneurysm measuring 3.2 cm.     Has back pain fairly controlled with oxy , currently level 5.  Had L4 Biopsy and L4 Vertebroplasty this am. No complications             Medications:       - MEDICATION INSTRUCTIONS for Dialysis Patients -   Does not apply See Admin Instructions     acetaminophen  975 mg Oral 3 times daily     atorvastatin  40 mg Oral Daily     carvedilol  12.5 mg Oral BID w/meals     zz extemporaneous template  300 mg Oral Daily     doxazosin  8 mg Oral At Bedtime     eplerenone  50 mg Oral Daily     epoetin libby (EPOGEN,PROCRIT) inj ESRD  12,000 Units Intravenous Once per day on Mon Wed Fri     fish oil-omega-3 fatty acids  1 g Oral Daily     fluticasone  1 puff Inhalation Daily     furosemide  20 mg Oral Daily     insulin aspart  1-7 Units Subcutaneous TID AC     insulin aspart  1-5 Units Subcutaneous At Bedtime     insulin aspart  4 Units Subcutaneous QAM AC     insulin glargine  10 Units Subcutaneous At Bedtime     irbesartan  150 mg Oral Daily     multivitamin RENAL  1 capsule Oral Daily     polyethylene glycol  17 g Oral Daily     senna-docusate  1 tablet Oral BID     vitamin D3  2,000 Units Oral Daily                ROS:   RESP, CV, GI,, MUSCULOSKELETAL, HEME/ALLERGY/IMMUNE,  Skin: reviewed and negative except the positives mentioned in this note            Physical Exam:       Vital Sign Ranges  Temp:  [97.1  F (36.2  C)-97.8  F (36.6  C)] 97.8  F (36.6  C)  Pulse:  [51-67] 63  Heart Rate:  [54-70] 57  Resp:   [13-28] 16  BP: (103-170)/(40-93) 155/58  SpO2:  [96 %-100 %] 99 %      I/O last 3 completed shifts:  In: 1120 [P.O.:1120]  Out: 1050 [Urine:550; Other:500]    Constitutional: Awake, alert, cooperative, no apparent distress  HEENT: unremarkable  Neck: Supple, no adenopathy, thyroid symmetric, not enlarged and no tenderness.  Abdomen: No scars, normal bowel sounds, soft, non-distended, non-tender, no masses palpated, no hepatosplenomegally.  Ext: no edema, cyanosis    Neuro: unremarkable, nl LE strength.         Data:       ROUTINE LABS (Last four results)  CMP  Recent Labs   Lab 05/13/19  0845 05/11/19  0925 05/10/19  0845 05/09/19  1619    138 140 139   POTASSIUM 4.3 3.8 3.8 3.3*   CHLORIDE 102 102 104 101   CO2 30 29 31 31   ANIONGAP 5 7 5 7   GLC 85 131* 98 128*   BUN 38* 38* 29 21   CR 5.01* 4.94* 3.76* 2.65*   GFRESTIMATED 10* 10* 14* 21*   GFRESTBLACK 11* 12* 16* 25*   AARON 10.4* 9.9 9.8 8.9   PHOS 5.6*  --   --   --      CBC  Recent Labs   Lab 05/13/19 0845 05/11/19 0925 05/09/19  1619   WBC  --   --  11.0   RBC  --   --  3.75*   HGB 9.8* 9.3* 9.5*   HCT  --   --  31.5*   MCV  --   --  84   MCH  --   --  25.3*   MCHC  --   --  30.2*   RDW  --   --  18.1*   PLT  --   --  369     Path pending.         Assessment and Plan:   1. Pain related to acute/subacute compression fracture at L4.    - Radiation therapy recommended.  - continue analgesia.     2. Pulmonary nodule with findings concerning for metastatic disease in the liver, bone, kidneys, adrenal glands.   - Work up is in process  - PET/CT attempted on 5/6, but pain unbearable, aborted.  - Biopsy of L4 done 5/14, path pending.  - we discussed last admission goals of care, and if stage IV lung cancer, will not recommend systemic therapy.     3. Anemia    - Suspicious for anemia of malignancy and CKD      4. ESDR  - Getting dialysis M-W-F  - Nephrology following.         Kevin Robles M.D.

## 2019-05-14 NOTE — PLAN OF CARE
& TIME:5/14/18 Nights  ORIENTATION: alert and oriented  BEHAVIOR & AGGRESSION TOOL COLOR:green  CIWA SCORE: N/A  ABNL VS/O2: 2L NC at baseline, BP low on eves 5/13 and BP meds held on ping 5/13  MOBILITY: bedrest pt declined to be out of bed, A2 gb/walker  PAIN MANAGMENT: Oxy given x1, Flexeril given x1.   DIET:Mod Carb  BOWEL/BLADDER: Voids per urinal   ABNL LAB/BG: BGM   DRAIN/DEVICES: Left arm fistula +bruit and +thrill   TELEMETRY RHYTHM: murmur  SKIN:bruised area noted on elbow  TESTS/PROCEDURES: Dialysis pt. Pt to have Vertebroplasty 5/14  D/C DAY/GOALS/PLACE: Pending  OTHER IMPORTANT INFO    A/Ox4. VSS ex 2L NC at baseline. Fistula in L arm +bruit and + thrill. CMS intact. Abd binder worn. Not OOB this shift, ambulates A2 gb/walker. Voiding in urinal. IV SL. Oxy given x1 and Flexeril given x1 for low back pain with relief. Tolerating mod carb diet. Plan for Lumbar Vertebroplasty today. Discharge pending. Continue to monitor.

## 2019-05-14 NOTE — PROGRESS NOTES
[facility]    Medicine Progress Note - Hospitalist Service       Date of Admission:  5/9/2019  Assessment & Plan       This is an 84-year-old gentleman with a history of end-stage renal disease and acute on chronic back pain that is uncontrolled, likely related to a pathologic compression fracture in the setting of metastatic carcinoma with unknown primary.       Back pain,   L4 vertebral body with mild anterior compression   The patient is being admitted for pain control.  He had been taking hydrocodone/APAP, 2 tablets per day   -- pain medications adjusted, scheduled tylenol with prn oxycodone added .  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     ESRD; followed by Nephrology On HD  Continue hd as per schedule.MWF    Aortic stenosis:   Severe, mean 54mmHg, and the  VITO 0.8cm2,   Continue PTA coreg, Diltiazem  And lasix     History of metastatic carcinoma  Malignant appearing lesions in lung, liver, spine and possible kidneys and adrenals.  The patient is followed by Dr. Robles and was still undergoing evaluation for this.  He was supposed to have a PET scan done last Monday, but was unable to make it due to his back pain.  I have consulted Dr. Robles's service to see if they can help in this regard.  With respect to cancer treatment, again, evaluation was still underway.  The patient was actually seen by Palliative Medicine during his previous hospitalization and was not ready, at that point, to consider hospice or end of life cares, although the patient currently does seem to have realistic expectations for ongoing care goals.  --status post biopsy of epidural growth, pending  pathology 5/14     Type 2 diabetes.  The patient is maintained on Lantus and scheduled aspart; these will be continued.       End-stage renal disease.  The patient typically has Monday, Wednesday, Friday dialysis.  His last dialysis was actually today due to missing his dialysis yesterday.  I will tag Nephrology to follow along.        Diet: Advance Diet as Tolerated: Clear Liquid Diet    DVT Prophylaxis: Low Risk/Ambulatory with no VTE prophylaxis indicated  Dougherty Catheter: not present  Code Status: DNR/DNI      Disposition Plan   Expected discharge: Tomorrow, recommended to transitional care unit once when pain is under control and per PT/OT final reccs.  Entered: Rubi Beebe MD 05/14/2019, 1:07 PM       The patient's care was discussed with the Bedside Nurse.    Rubi Beebe MD  Hospitalist Service  [facility]    ______________________________________________________________________    Interval History   Back from biopsy and kyphoplasty, feeling well, didn't have any pain with procedure, discussed possibility of discharge home if pain controlled, he is expecting biopsy results tomorrow as per his discussion with oncology.    Data reviewed today: I reviewed all medications, new labs and imaging results over the last 24 hours. I personally reviewed no images or EKG's today.    Physical Exam   Vital Signs: Temp: 97.8  F (36.6  C) Temp src: Oral BP: 126/52 Pulse: 63 Heart Rate: 55 Resp: 16 SpO2: 100 % O2 Device: Nasal cannula Oxygen Delivery: 2 LPM  Weight: 161 lbs 3.2 oz  Constitutional: Awake, alert, cooperative, no apparent distress  Respiratory: Clear to auscultation bilaterally, no crackles or wheezing  Cardiovascular: Regular rate and rhythm, normal S1 and S2, and no murmur noted  GI: Normal bowel sounds, soft, non-distended, non-tender  Skin/Integumen: fistula noted, he  have no localized tenderness on his spine but has tenderness on right pelvis   Neuro : moving all 4 extremities, no  focal deficit noted         Data

## 2019-05-14 NOTE — PLAN OF CARE
ORIENTATION: A/O x4  BEHAVIOR & AGGRESSION TOOL COLOR:green  CIWA SCORE: N/A  ABNL VS/O2: 2L NC at baseline, VSS  MOBILITY:  A2 gb/walker- refused to get out of bed  PAIN MANAGMENT: Oxy/tylenol for back pain with some relief  DIET: Tolerating Mod carb diet  BOWEL/BLADDER: Voids per urinal, hemodialysis pt, does not void much  ABNL LAB/BG: BGM 65, given apple juice (30g), recheck 97 and patient ate supper and refused further glucose check. Aysmptomatic  DRAIN/DEVICES: Left arm fistula +bruit and +thrill, back brace on.   TELEMETRY RHYTHM: n/a  SKIN:bruised area noted on elbow, dusky skin  TESTS/PROCEDURES: Dialysis next tomorrow. Vertebroplasty today, VSS, CMS intact, incision dressings CDI   D/C DAY/GOALS/PLACE: Pending, PT rec TCU but patient declines  OTHER IMPORTANT INFO:

## 2019-05-15 LAB
GLUCOSE BLDC GLUCOMTR-MCNC: 115 MG/DL (ref 70–99)
GLUCOSE BLDC GLUCOMTR-MCNC: 135 MG/DL (ref 70–99)
GLUCOSE BLDC GLUCOMTR-MCNC: 137 MG/DL (ref 70–99)
GLUCOSE BLDC GLUCOMTR-MCNC: 75 MG/DL (ref 70–99)
GLUCOSE BLDC GLUCOMTR-MCNC: 94 MG/DL (ref 70–99)

## 2019-05-15 PROCEDURE — 25000132 ZZH RX MED GY IP 250 OP 250 PS 637: Performed by: INTERNAL MEDICINE

## 2019-05-15 PROCEDURE — 63400005 ZZH RX 634: Performed by: INTERNAL MEDICINE

## 2019-05-15 PROCEDURE — A9270 NON-COVERED ITEM OR SERVICE: HCPCS | Performed by: INTERNAL MEDICINE

## 2019-05-15 PROCEDURE — 12000000 ZZH R&B MED SURG/OB

## 2019-05-15 PROCEDURE — 25000128 H RX IP 250 OP 636: Performed by: INTERNAL MEDICINE

## 2019-05-15 PROCEDURE — 00000146 ZZHCL STATISTIC GLUCOSE BY METER IP

## 2019-05-15 PROCEDURE — 99232 SBSQ HOSP IP/OBS MODERATE 35: CPT | Performed by: INTERNAL MEDICINE

## 2019-05-15 PROCEDURE — 90937 HEMODIALYSIS REPEATED EVAL: CPT

## 2019-05-15 PROCEDURE — 25000131 ZZH RX MED GY IP 250 OP 636 PS 637: Performed by: INTERNAL MEDICINE

## 2019-05-15 RX ORDER — ALBUMIN (HUMAN) 12.5 G/50ML
50 SOLUTION INTRAVENOUS
Status: DISCONTINUED | OUTPATIENT
Start: 2019-05-15 | End: 2019-05-15

## 2019-05-15 RX ORDER — ALBUMIN, HUMAN INJ 5% 5 %
250 SOLUTION INTRAVENOUS
Status: DISCONTINUED | OUTPATIENT
Start: 2019-05-15 | End: 2019-05-15

## 2019-05-15 RX ADMIN — OXYCODONE HYDROCHLORIDE 5 MG: 5 TABLET ORAL at 08:03

## 2019-05-15 RX ADMIN — SODIUM CHLORIDE 250 ML: 9 INJECTION, SOLUTION INTRAVENOUS at 10:13

## 2019-05-15 RX ADMIN — ATORVASTATIN CALCIUM 40 MG: 40 TABLET, FILM COATED ORAL at 20:20

## 2019-05-15 RX ADMIN — CYCLOBENZAPRINE HYDROCHLORIDE 5 MG: 5 TABLET, FILM COATED ORAL at 23:00

## 2019-05-15 RX ADMIN — SENNOSIDES AND DOCUSATE SODIUM 1 TABLET: 8.6; 5 TABLET ORAL at 20:20

## 2019-05-15 RX ADMIN — OXYCODONE HYDROCHLORIDE 5 MG: 5 TABLET ORAL at 16:58

## 2019-05-15 RX ADMIN — FUROSEMIDE 20 MG: 20 TABLET ORAL at 12:39

## 2019-05-15 RX ADMIN — OMEGA-3 FATTY ACIDS CAP 1000 MG 1 G: 1000 CAP at 12:39

## 2019-05-15 RX ADMIN — DILTIAZEM HYDROCHLORIDE 300 MG: 180 CAPSULE, EXTENDED RELEASE ORAL at 12:39

## 2019-05-15 RX ADMIN — CHOLECALCIFEROL TAB 50 MCG (2000 UNIT) 2000 UNITS: 50 TAB at 12:39

## 2019-05-15 RX ADMIN — Medication 1 CAPSULE: at 12:39

## 2019-05-15 RX ADMIN — EPOETIN ALFA 12000 UNITS: 4000 SOLUTION INTRAVENOUS; SUBCUTANEOUS at 10:17

## 2019-05-15 RX ADMIN — SODIUM CHLORIDE 300 ML: 9 INJECTION, SOLUTION INTRAVENOUS at 10:13

## 2019-05-15 RX ADMIN — EPLERENONE 50 MG: 25 TABLET ORAL at 12:38

## 2019-05-15 RX ADMIN — INSULIN GLARGINE 10 UNITS: 100 INJECTION, SOLUTION SUBCUTANEOUS at 21:38

## 2019-05-15 RX ADMIN — DOXAZOSIN 8 MG: 4 TABLET ORAL at 21:37

## 2019-05-15 RX ADMIN — IRBESARTAN 150 MG: 150 TABLET ORAL at 12:39

## 2019-05-15 RX ADMIN — ACETAMINOPHEN 975 MG: 325 TABLET, FILM COATED ORAL at 08:03

## 2019-05-15 RX ADMIN — SENNOSIDES AND DOCUSATE SODIUM 1 TABLET: 8.6; 5 TABLET ORAL at 12:39

## 2019-05-15 RX ADMIN — CYCLOBENZAPRINE HYDROCHLORIDE 5 MG: 5 TABLET, FILM COATED ORAL at 00:14

## 2019-05-15 RX ADMIN — FLUTICASONE FUROATE 1 PUFF: 200 POWDER RESPIRATORY (INHALATION) at 12:43

## 2019-05-15 RX ADMIN — ACETAMINOPHEN 650 MG: 325 TABLET, FILM COATED ORAL at 16:58

## 2019-05-15 RX ADMIN — ACETAMINOPHEN 650 MG: 325 TABLET, FILM COATED ORAL at 20:20

## 2019-05-15 RX ADMIN — OXYCODONE HYDROCHLORIDE 5 MG: 5 TABLET ORAL at 23:00

## 2019-05-15 ASSESSMENT — ACTIVITIES OF DAILY LIVING (ADL)
ADLS_ACUITY_SCORE: 17

## 2019-05-15 ASSESSMENT — MIFFLIN-ST. JEOR
SCORE: 1410.38
SCORE: 1415.38

## 2019-05-15 NOTE — PROGRESS NOTES
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 Hypercalcemia and hyperphosphatemia            Interval History:     AVSS. He looks comfortable on dialysis treatment. No complaints.          Dialysis Parameters:     Vitals:    05/11/19 0700 05/14/19 0500 05/15/19 0815   Weight: 73 kg (161 lb) 73.1 kg (161 lb 3.2 oz) 73.5 kg (162 lb 0.6 oz)     I/O last 3 completed shifts:  In: 760 [P.O.:760]  Out: -   Temp:  [97.6  F (36.4  C)-98.3  F (36.8  C)] 97.6  F (36.4  C)  Pulse:  [58-69] 65  Heart Rate:  [55-74] 69  Resp:  [13-28] 16  BP: (123-170)/(46-93) 123/55  SpO2:  [94 %-100 %] 94 %    Current Weight: 73.5  Dry Weight: below EDW  Dialysis Temp: 36.5  C  Access Device: AVF  Access Site: L arm  Dialyzer: Revaclear  Dialysis Bath: 3K  Sodium Profile: none  UF Goal: 500 ml  Blood Flow Rate (mL/min): 400  Total Treatment Time (hrs): 2.45  Heparin: none (use heparin outpatient)    Review of Systems:        Medications:     none    Physical Exam:     Vitals were reviewed  Patient Vitals for the past 24 hrs:   BP Temp Temp src Pulse Heart Rate Resp SpO2 Weight   05/15/19 0900 123/55 -- -- -- 69 -- -- --   05/15/19 0845 143/63 -- -- -- 69 -- -- --   05/15/19 0829 158/69 -- -- -- 69 -- -- --   05/15/19 0815 156/67 97.6  F (36.4  C) Oral -- 70 16 -- 73.5 kg (162 lb 0.6 oz)   05/15/19 0800 -- -- -- -- -- 18 -- --   05/15/19 0700 155/60 97.6  F (36.4  C) Oral -- 74 18 94 % --   05/15/19 0423 146/60 98.3  F (36.8  C) Oral -- 66 16 97 % --   05/15/19 0010 149/58 98.3  F (36.8  C) Oral -- 64 16 98 % --   05/14/19 2057 160/60 98.2  F (36.8  C) Oral -- 64 19 99 % --   05/14/19 1853 142/56 -- -- 65 -- -- -- --   05/14/19 1750 155/59 -- -- 69 -- -- -- --   05/14/19 1749 -- -- -- -- -- 16 -- --   05/14/19 1507 150/57 97.7  F (36.5  C) Oral -- 61 16 100 % --   05/14/19 1415 -- -- -- -- 61 16 100 % --   05/14/19 1245  126/52 -- -- -- 55 16 100 % --   19 1215 136/49 -- -- -- 63 16 100 % --   19 1200 134/46 -- -- -- 57 16 99 % --   19 1145 155/58 -- -- -- 60 18 99 % --   19 1130 145/60 -- -- -- 59 -- 100 % --   19 1115 136/57 97.8  F (36.6  C) Oral -- 56 16 99 % --   19 1055 -- -- -- -- -- -- 98 % --   19 1050 150/61 -- -- 63 62 28 98 % --   19 1045 152/63 -- -- 62 62 13 97 % --   19 1040 139/65 -- -- 65 68 19 97 % --   19 1035 (!) 143/93 -- -- 67 62 15 97 % --   19 1030 153/67 -- -- 63 63 19 98 % --   19 1025 140/59 -- -- 58 58 27 97 % --   19 1020 135/57 -- -- 60 59 18 97 % --   19 1015 142/58 -- -- 60 59 24 97 % --   19 1010 140/62 -- -- 58 60 16 97 % --   19 1005 130/60 -- -- 59 58 22 97 % --   19 1000 151/58 -- -- 59 60 13 98 % --   19 0955 148/58 -- -- 58 65 18 98 % --   19 0950 155/60 -- -- 61 58 16 100 % --   19 0945 170/63 -- -- 60 60 16 98 % --       Temperatures:  Current - Temp: 97.6  F (36.4  C); Max - Temp  Av.9  F (36.6  C)  Min: 97.6  F (36.4  C)  Max: 98.3  F (36.8  C)  Respiration range: Resp  Av.8  Min: 13  Max: 28  Pulse range: Pulse  Av.7  Min: 58  Max: 69  Blood pressure range: Systolic (24hrs), Av , Min:123 , Max:170   ; Diastolic (24hrs), Av, Min:46, Max:93    Pulse oximetry range: SpO2  Av.1 %  Min: 94 %  Max: 100 %  I/O last 3 completed shifts:  In: 760 [P.O.:760]  Out: -     Intake/Output Summary (Last 24 hours) at 5/15/2019 0917  Last data filed at 5/15/2019 0000  Gross per 24 hour   Intake 760 ml   Output --   Net 760 ml     Gen: NAD  CV: RRR  Pulm: Ctab  Abd: soft, NT  Ext: no edema  Neuro: a/o x 3    Data:     Recent Labs   Lab Test 19  0845 19  0925    138   POTASSIUM 4.3 3.8   CHLORIDE 102 102   CO2 30 29   ANIONGAP 5 7   GLC 85 131*   BUN 38* 38*   CR 5.01* 4.94*   AARON 10.4* 9.9       Hemoglobin   Date Value Ref Range Status    05/13/2019 9.8 (L) 13.3 - 17.7 g/dL Final              Eliezer Diaz MD

## 2019-05-15 NOTE — PLAN OF CARE
DATE & TIME:5/15/18 2398-6815  ORIENTATION: A/O x4  BEHAVIOR & AGGRESSION TOOL COLOR:green  CIWA SCORE: n/a  ABNL VS/O2: VSS on 1.5L O2 NC, which is patients baseline   MOBILITY:  Up w/2 walker/GB, not OOB this shift, refused.   PAIN MANAGMENT: On scheduled Tylenol TID, PRN oxy x1, helpful.   DIET: Mod carb diet, tolerating  BOWEL/BLADDER: Pt on hemodialysis, occasionally voids per urinal  ABNL LAB/BG: BG 75, 115.   DRAIN/DEVICES: Left arm fistula, WDL, bruit/thrill present. Abd binder.   TELEMETRY RHYTHM: n/a  SKIN: bruised area noted on elbow. Incision on back, dressing CDI.   TESTS/PROCEDURES: Dialysis today. S/P vertebroplasty 5/14  D/C DAY/GOALS/PLACE: Pending, PT rec TCU but patient declines  OTHER IMPORTANT INFO: LS diminished, LEE. Ortho, nephrology and oncology following.

## 2019-05-15 NOTE — PROGRESS NOTES
[facility]    Medicine Progress Note - Hospitalist Service       Date of Admission:  5/9/2019  Assessment & Plan       This is an 84-year-old gentleman with a history of end-stage renal disease and acute on chronic back pain that is uncontrolled, likely related to a pathologic compression fracture in the setting of metastatic carcinoma with unknown primary.       Back pain,   L4 vertebral body with mild anterior compression   The patient is being admitted for pain control.  He had been taking hydrocodone/APAP, 2 tablets per day   -- pain medications adjusted, scheduled tylenol with prn oxycodone added .  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  --continue physical therapy /occupational therapy , possibly discharge to home vs tcu in am depending on pain control   --radiation oncology consulted and suggesting radiation if pain persists after kyphoplasty.  --if he continue to have intolerable pain in am will have to try to set up radiation therapy.     ESRD; followed by Nephrology On HD  Continue hd as per schedule.MWF  - The patient typically has Monday, Wednesday, Friday dialysis.     Aortic stenosis:   Severe, mean 54mmHg, and the  VITO 0.8cm2,   Continue PTA coreg, Diltiazem  And lasix     History of metastatic carcinoma  Malignant appearing lesions in lung, liver, spine and possible kidneys and adrenals.  The patient is followed by Dr. Robles and was still undergoing evaluation for this.  He was supposed to have a PET scan done last Monday, but was unable to make it due to his back pain.  I have consulted Dr. Robles's service to see if  they can help in this regard.  With respect to cancer treatment, again, evaluation was still underway.  The patient was actually seen by Palliative Medicine during his previous hospitalization and was not ready, at that point, to consider hospice or end of life cares, although the patient currently does seem to have realistic expectations for ongoing care goals.  --status post biopsy of epidural growth 5/14  --possible metastasis as per prelim report, no plan for systemic therapy      Type 2 diabetes.  The patient is maintained on Lantus and scheduled aspart; these will be continued.         Diet: Moderate Consistent CHO Diet    DVT Prophylaxis: Low Risk/Ambulatory with no VTE prophylaxis indicated  Dougherty Catheter: not present  Code Status: DNR/DNI      Disposition Plan   Expected discharge: Tomorrow, recommended to transitional care unit once when pain is under control and per PT/OT final reccs.  Entered: Rubi Beebe MD 05/15/2019, 3:23 PM       The patient's care was discussed with the Bedside Nurse.    Rubi Beebe MD  Hospitalist Service  [facility]    ______________________________________________________________________    Interval History   Feeling more sore on his back after anaesthesia wore off , had dialysis today, didn't work with physical therapy , dicussed the importance of doing so, he will work with them in am , currently tired.    Data reviewed today: I reviewed all medications, new labs and imaging results over the last 24 hours. I personally reviewed no images or EKG's today.    Physical Exam   Vital Signs: Temp: 97.8  F (36.6  C) Temp src: Oral BP: 123/52 Pulse: 65 Heart Rate: 79 Resp: 16 SpO2: 99 % O2 Device: Nasal cannula Oxygen Delivery: 1.5 LPM  Weight: 160 lbs 14.97 oz  Constitutional: Awake, alert, cooperative, no apparent distress  Respiratory: Clear to auscultation bilaterally, no crackles or wheezing  Cardiovascular: Regular rate and rhythm, normal S1 and S2, and  no murmur noted  GI: Normal bowel sounds, soft, non-distended, non-tender  Skin/Integumen: fistula noted, he  have no localized tenderness on his spine today.  Neuro : moving all 4 extremities, no focal deficit noted         Data

## 2019-05-15 NOTE — PROGRESS NOTES
Patient in dialysis     Chart reviews demonstrate the patient is Neuro intact with improved back pain post L4 Vertebroplasty   Biopsy results are in progress.  Will continue to follow.    Doreen Bailey PA-C on 5/15/2019 at 11:50 AM    Patient was seen and examined.  Reports his LBP was improved last evening.  Slightly increased today.  Denies any low leg symptoms.  No numbness.      Able to dorsi and plantar flex.  Equal sensation to light touch.  Pulses present.    Continue current treatment plan     Doreen Bailey PA-C on 5/15/2019 at 1:20 PM

## 2019-05-15 NOTE — PROGRESS NOTES
Potassium   Date Value Ref Range Status   05/13/2019 4.3 3.4 - 5.3 mmol/L Final     Lab Results   Component Value Date    HGB 9.8 05/13/2019     DIALYSIS PROCEDURE NOTE    Patient dialyzed for 2.45 hrs on a 3 K bath with a net fluid removal of 0.5L.  A BFR of 450ml/min was obtained via LUE AVF with 15 gauge needles. Patient was seen by Dr. Diaz during treatment. Total heparin received during treatment:: 0 units.    Meds given: Epogen   Complications: none        Procedure and ESRD teaching done and questions answered. See flowsheet in EPIC for further details and post assessment. Machine water alarm in place and functioning.CHLORINE AND CHLORAMINES CHECK on WATER SYSTEM EVERY 4 hours. Consent signed: yes  PT returned via bed  Access: Aseptic prep done for both on/off.   Report received from: RICHY Fung RN  Report given to: RICHY Fung RN    Outpatient Dialysis at [facility]    Simin Foreman RN, 5/15/2019, 10:18 AM

## 2019-05-15 NOTE — PLAN OF CARE
PT:  Attempted to see patient for PT this PM. Patient declining participate in therapy despite education on benefits of mobility.

## 2019-05-15 NOTE — PLAN OF CARE
DATE & TIME:5/14-15/18 3600-4374  ORIENTATION: A/O x4  BEHAVIOR & AGGRESSION TOOL COLOR:green  CIWA SCORE: N/A  ABNL VS/O2: 1.5L NC at baseline, VSS  MOBILITY:  A2 gb/walker- bedrest overnight  PAIN MANAGMENT: Flexaril/tylenol for back pain with some relief  DIET: Tolerating Mod carb diet  BOWEL/BLADDER: Voids per urinal, hemodialysis pt, does not void much  ABNL LAB/BG: BGM 94, lantus given  DRAIN/DEVICES: Left arm fistula +bruit and +thrill, back brace on.   TELEMETRY RHYTHM: n/a  SKIN: bruised area noted on elbow  TESTS/PROCEDURES: Dialysis today. Vertebroplasty 5/14, VSS, CMS intact, incision dressings CDI   D/C DAY/GOALS/PLACE: Pending, PT rec TCU but patient declines  OTHER IMPORTANT INFO:

## 2019-05-15 NOTE — PROGRESS NOTES
[facility]  [facility]  Hematology/oncology Progress Note            History:   Sarkis is an 84 year old admitted 5/9 with acute on chronic back pain     Sarkis started to having increasing low back pain this week. On Monday, he attempted PET/CT scan but was not able to be completed due to pain. His pain has continued and he came in to the ED for evaluation.     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  He has a history of hypertension, diabetes and end-stage renal disease.  He has been on dialysis since 2016 and is managed on erythropoietin and venofer. Hemoglobin had been around 10-11 but more recently fell to 7.2. He was given 2 unit(s) [facility].  His work up in our clinic included a haptoglobin of 115.  Reticulocyte count of 3.5.  IgG level of 458.  IgA 199.  IgM 20.  TSH was 2.87.  .  Serum protein electrophoresis demonstrated no monoclonal protein.  Peripheral morphology demonstrated moderate hypochromic, normocytic anemia with mild reticulocytosis.  Rare schistocytes were present.  There were no apparent dysplastic or neoplastic changes.     Given no obvious explanation for the patient's anemia, a bone marrow biopsy was recommended but he declined.     He subsequently seen in the ER on 4/22/19 for evaluation of back and left leg pain.       He had been seen by a chiropractor and underwent an MRI scan of his lumbar spine which demonstrated an L4 vertebral compression fracture which was later over-read as concerning for metastatic disease.       While in the ER, he underwent a CT scan of the chest abdomen and pelvis with contrast which demonstrated a spiculated right upper lobe pulmonary nodule, highly suspicious for malignancy such as bronchogenic carcinoma.  There was lytic change in the L4 vertebral body with mild anterior compression, suspicious for metastatic lesion and an associated pathologic compression fracture, age  indeterminate.  Multiple low-density liver lesions, not well characterized on a noncontrast scan but considered suspicious for metastatic disease.  There was mild mediastinal adenopathy.  Multiple bilateral adrenal nodules were indeterminant.  There are multiple hyperdense renal lesions bilaterally which could represent hemorrhagic or proteinaceous renal cysts.  Solid renal neoplasm could not be excluded.  There was moderate prostatic enlargement and an infra renal abdominal aortic aneurysm measuring 3.2 cm.       Had L4 Biopsy and L4 Vertebroplasty this am. No complications.  Back pain is better since yesterday.  No LE symptoms.             Medications:       - MEDICATION INSTRUCTIONS for Dialysis Patients -   Does not apply See Admin Instructions     acetaminophen  975 mg Oral 3 times daily     atorvastatin  40 mg Oral Daily     carvedilol  12.5 mg Oral BID w/meals     zz extemporaneous template  300 mg Oral Daily     doxazosin  8 mg Oral At Bedtime     eplerenone  50 mg Oral Daily     epoetin libby (EPOGEN,PROCRIT) inj ESRD  12,000 Units Intravenous Once per day on Mon Wed Fri     fish oil-omega-3 fatty acids  1 g Oral Daily     fluticasone  1 puff Inhalation Daily     furosemide  20 mg Oral Daily     insulin aspart  1-7 Units Subcutaneous TID AC     insulin aspart  1-5 Units Subcutaneous At Bedtime     insulin aspart  4 Units Subcutaneous QAM AC     insulin glargine  10 Units Subcutaneous At Bedtime     irbesartan  150 mg Oral Daily     multivitamin RENAL  1 capsule Oral Daily     - MEDICATION INSTRUCTIONS -   Does not apply Once     polyethylene glycol  17 g Oral Daily     senna-docusate  1 tablet Oral BID     vitamin D3  2,000 Units Oral Daily                ROS:   RESP, CV, GI,, MUSCULOSKELETAL, HEME/ALLERGY/IMMUNE,  Skin: reviewed and negative except the positives mentioned in this note            Physical Exam:       Vital Sign Ranges  Temp:  [97.6  F (36.4  C)-98.3  F (36.8  C)] 97.6  F (36.4  C)  Pulse:   [62-69] 65  Heart Rate:  [55-74] 70  Resp:  [13-28] 16  BP: ()/(46-78) 124/78  SpO2:  [94 %-100 %] 94 %      I/O last 3 completed shifts:  In: 760 [P.O.:760]  Out: -     Constitutional: Awake, alert, cooperative, no apparent distress  HEENT: unremarkable  Neck: Supple, no adenopathy, thyroid symmetric, not enlarged and no tenderness.  Abdomen: No scars, normal bowel sounds, soft, non-distended, non-tender, no masses palpated, no hepatosplenomegally.  Ext: no edema, cyanosis    Neuro: unremarkable, nl LE strength.         Data:       ROUTINE LABS (Last four results)  CMP  Recent Labs   Lab 05/13/19  0845 05/11/19  0925 05/10/19  0845 05/09/19  1619    138 140 139   POTASSIUM 4.3 3.8 3.8 3.3*   CHLORIDE 102 102 104 101   CO2 30 29 31 31   ANIONGAP 5 7 5 7   GLC 85 131* 98 128*   BUN 38* 38* 29 21   CR 5.01* 4.94* 3.76* 2.65*   GFRESTIMATED 10* 10* 14* 21*   GFRESTBLACK 11* 12* 16* 25*   AARON 10.4* 9.9 9.8 8.9   PHOS 5.6*  --   --   --      CBC  Recent Labs   Lab 05/13/19 0845 05/11/19 0925 05/09/19  1619   WBC  --   --  11.0   RBC  --   --  3.75*   HGB 9.8* 9.3* 9.5*   HCT  --   --  31.5*   MCV  --   --  84   MCH  --   --  25.3*   MCHC  --   --  30.2*   RDW  --   --  18.1*   PLT  --   --  369     Path pending: I called pathology, preliminary suspicious cells for carcinoma (low number of cells in specimen) pending special studies, no findings to suggest plasma cell neoplasm .         Assessment and Plan:   1. Pain related to acute/subacute pathological compression fracture at L4 s/p vertebroplasty 5/14.    - Radiation therapy recommended.  - continue analgesia.     2. Pulmonary mass with findings consistent with metastatic disease in the liver, bone, kidneys, adrenal glands.   - Biopsy of L4 done 5/14, pathology suspicious pending special studies.  - we discussed last admission goals of care, appears to be stage IV lung cancer, will not recommend systemic therapy.  Palliative approach due to performance  status and ESRD.  He did not want to hear specifics about survival numbers, but aware of incurable disease and interested in palliative approach.    Even if the biopsy is not definitive (but only suspicious) I do not see need for additional biopsy since he is going to be treated with palliative intent.  Specifics about pathology not needed with decision against systemic therapy.      3. Anemia    - Suspicious for anemia of malignancy and CKD      4. ESDR  - Getting dialysis M-W-F  - Nephrology following.         Kevin Robles M.D.

## 2019-05-16 LAB
ANION GAP SERPL CALCULATED.3IONS-SCNC: 4 MMOL/L (ref 3–14)
BUN SERPL-MCNC: 32 MG/DL (ref 7–30)
CALCIUM SERPL-MCNC: 9.8 MG/DL (ref 8.5–10.1)
CHLORIDE SERPL-SCNC: 101 MMOL/L (ref 94–109)
CO2 SERPL-SCNC: 30 MMOL/L (ref 20–32)
COPATH REPORT: NORMAL
CREAT SERPL-MCNC: 4.19 MG/DL (ref 0.66–1.25)
ERYTHROCYTE [DISTWIDTH] IN BLOOD BY AUTOMATED COUNT: 18.1 % (ref 10–15)
GFR SERPL CREATININE-BSD FRML MDRD: 12 ML/MIN/{1.73_M2}
GLUCOSE BLDC GLUCOMTR-MCNC: 113 MG/DL (ref 70–99)
GLUCOSE BLDC GLUCOMTR-MCNC: 114 MG/DL (ref 70–99)
GLUCOSE BLDC GLUCOMTR-MCNC: 114 MG/DL (ref 70–99)
GLUCOSE BLDC GLUCOMTR-MCNC: 125 MG/DL (ref 70–99)
GLUCOSE BLDC GLUCOMTR-MCNC: 144 MG/DL (ref 70–99)
GLUCOSE BLDC GLUCOMTR-MCNC: 94 MG/DL (ref 70–99)
GLUCOSE SERPL-MCNC: 99 MG/DL (ref 70–99)
HCT VFR BLD AUTO: 32.5 % (ref 40–53)
HGB BLD-MCNC: 9.7 G/DL (ref 13.3–17.7)
MCH RBC QN AUTO: 25.2 PG (ref 26.5–33)
MCHC RBC AUTO-ENTMCNC: 29.8 G/DL (ref 31.5–36.5)
MCV RBC AUTO: 84 FL (ref 78–100)
PLATELET # BLD AUTO: 376 10E9/L (ref 150–450)
POTASSIUM SERPL-SCNC: 4.5 MMOL/L (ref 3.4–5.3)
RBC # BLD AUTO: 3.85 10E12/L (ref 4.4–5.9)
SODIUM SERPL-SCNC: 135 MMOL/L (ref 133–144)
WBC # BLD AUTO: 8.6 10E9/L (ref 4–11)

## 2019-05-16 PROCEDURE — 99232 SBSQ HOSP IP/OBS MODERATE 35: CPT | Performed by: INTERNAL MEDICINE

## 2019-05-16 PROCEDURE — 00000146 ZZHCL STATISTIC GLUCOSE BY METER IP

## 2019-05-16 PROCEDURE — 25000132 ZZH RX MED GY IP 250 OP 250 PS 637: Performed by: INTERNAL MEDICINE

## 2019-05-16 PROCEDURE — 77334 RADIATION TREATMENT AID(S): CPT

## 2019-05-16 PROCEDURE — 77290 THER RAD SIMULAJ FIELD CPLX: CPT

## 2019-05-16 PROCEDURE — 36415 COLL VENOUS BLD VENIPUNCTURE: CPT | Performed by: INTERNAL MEDICINE

## 2019-05-16 PROCEDURE — 77412 RADIATION TX DELIVERY LVL 3: CPT | Mod: XE

## 2019-05-16 PROCEDURE — A9270 NON-COVERED ITEM OR SERVICE: HCPCS | Performed by: INTERNAL MEDICINE

## 2019-05-16 PROCEDURE — 77307 TELETHX ISODOSE PLAN CPLX: CPT

## 2019-05-16 PROCEDURE — 80048 BASIC METABOLIC PNL TOTAL CA: CPT | Performed by: INTERNAL MEDICINE

## 2019-05-16 PROCEDURE — 77417 THER RADIOLOGY PORT IMAGE(S): CPT

## 2019-05-16 PROCEDURE — 85027 COMPLETE CBC AUTOMATED: CPT | Performed by: INTERNAL MEDICINE

## 2019-05-16 PROCEDURE — 25000131 ZZH RX MED GY IP 250 OP 636 PS 637: Performed by: INTERNAL MEDICINE

## 2019-05-16 PROCEDURE — 12000000 ZZH R&B MED SURG/OB

## 2019-05-16 RX ORDER — DIAZEPAM 5 MG
5-10 TABLET ORAL
Status: DISCONTINUED | OUTPATIENT
Start: 2019-05-16 | End: 2019-05-23 | Stop reason: HOSPADM

## 2019-05-16 RX ADMIN — OXYCODONE HYDROCHLORIDE 5 MG: 5 TABLET ORAL at 11:14

## 2019-05-16 RX ADMIN — OMEGA-3 FATTY ACIDS CAP 1000 MG 1 G: 1000 CAP at 08:23

## 2019-05-16 RX ADMIN — SENNOSIDES AND DOCUSATE SODIUM 1 TABLET: 8.6; 5 TABLET ORAL at 21:22

## 2019-05-16 RX ADMIN — FUROSEMIDE 20 MG: 20 TABLET ORAL at 08:20

## 2019-05-16 RX ADMIN — POLYETHYLENE GLYCOL 3350 17 G: 17 POWDER, FOR SOLUTION ORAL at 08:26

## 2019-05-16 RX ADMIN — IRBESARTAN 150 MG: 150 TABLET ORAL at 08:22

## 2019-05-16 RX ADMIN — CYCLOBENZAPRINE HYDROCHLORIDE 5 MG: 5 TABLET, FILM COATED ORAL at 08:21

## 2019-05-16 RX ADMIN — EPLERENONE 50 MG: 25 TABLET ORAL at 08:22

## 2019-05-16 RX ADMIN — ATORVASTATIN CALCIUM 40 MG: 40 TABLET, FILM COATED ORAL at 21:22

## 2019-05-16 RX ADMIN — ACETAMINOPHEN 975 MG: 325 TABLET, FILM COATED ORAL at 15:41

## 2019-05-16 RX ADMIN — FLUTICASONE FUROATE 1 PUFF: 200 POWDER RESPIRATORY (INHALATION) at 08:25

## 2019-05-16 RX ADMIN — CARVEDILOL 12.5 MG: 12.5 TABLET, FILM COATED ORAL at 08:24

## 2019-05-16 RX ADMIN — CHOLECALCIFEROL TAB 50 MCG (2000 UNIT) 2000 UNITS: 50 TAB at 08:21

## 2019-05-16 RX ADMIN — CYCLOBENZAPRINE HYDROCHLORIDE 5 MG: 5 TABLET, FILM COATED ORAL at 15:41

## 2019-05-16 RX ADMIN — DOXAZOSIN 8 MG: 4 TABLET ORAL at 21:22

## 2019-05-16 RX ADMIN — DILTIAZEM HYDROCHLORIDE 300 MG: 180 CAPSULE, EXTENDED RELEASE ORAL at 08:32

## 2019-05-16 RX ADMIN — ACETAMINOPHEN 975 MG: 325 TABLET, FILM COATED ORAL at 08:19

## 2019-05-16 RX ADMIN — Medication 1 CAPSULE: at 08:23

## 2019-05-16 RX ADMIN — CARVEDILOL 12.5 MG: 12.5 TABLET, FILM COATED ORAL at 18:18

## 2019-05-16 RX ADMIN — ACETAMINOPHEN 975 MG: 325 TABLET, FILM COATED ORAL at 21:22

## 2019-05-16 RX ADMIN — INSULIN GLARGINE 10 UNITS: 100 INJECTION, SOLUTION SUBCUTANEOUS at 21:30

## 2019-05-16 ASSESSMENT — ACTIVITIES OF DAILY LIVING (ADL)
ADLS_ACUITY_SCORE: 17
ADLS_ACUITY_SCORE: 18
ADLS_ACUITY_SCORE: 17
ADLS_ACUITY_SCORE: 17

## 2019-05-16 NOTE — PROGRESS NOTES
This is this Care Coordinator's first day of following pt. It had been planned that pt could potentially discharge today.  In looking at his needs and how poor he is mobilizing, along with very poor participation with therapies, it is difficult to understand how he could be discharged as early as today, now that radiation therapy was added for the next five days.  At baseline, pt dialyzes M/W/F at the [facility] dialysis unit at 11:00am.  Per discussion with Desriae, [facility] [place] [facility] [place], pt's son has been transporting pt for the last 6 weeks.  Prior to that, pt drove himself.  In discussion with the SW and pt having Medicare, if he went to a TCU he would not be able to have radiation therapy here as an outpatient. He would need to have this at the [facility].  They have already done the mapping this AM.  Will need to have further discussion.  He will only need radiation thru 5/23/19.  With his refusal to transfer to a TCU, coupled with his dialysis and now palliative radiation, question if it wouldn't be of greater benefit to keep pt here and have Palliative Care see him again.  The SW will meet with pt.

## 2019-05-16 NOTE — PLAN OF CARE
DATE & TIME:5/15/18 0568-2244  ORIENTATION: A&Ox4  BEHAVIOR & AGGRESSION TOOL COLOR: Green  CIWA SCORE: n/a  ABNL VS/O2: VSS on 1L O2 nc, baseline   MOBILITY:  Up w/ 2 walker/GB; refused OOB this shift, activity encouraged  PAIN MANAGMENT: c/o 5/10 back pain, scheduled Tylenol & PRN oxy given x1, with improvement.   DIET: Mod carb diet, tolerating, fair appetite  BOWEL/BLADDER: Pt on hemodialysis, occasionally voids via urinal  ABNL LAB/BG:  & 135   DRAIN/DEVICES: Left arm fistula, WDL, bruit/thrill present. PIV SL.   TELEMETRY RHYTHM: n/a  SKIN: bruised area noted on elbow. Incision on back, dressing CDI.   TESTS/PROCEDURES: Dialysis today. S/P vertebroplasty 5/14  D/C DAY/GOALS/PLACE: Possible discharge 5/16, PT rec TCU but patient declines  OTHER IMPORTANT INFO: LS diminished, LEE. Ortho, nephrology and oncology following.

## 2019-05-16 NOTE — PROGRESS NOTES
Orthopedic Surgery  Seven Savage .  2019  Admit Date:  2019  L4 Vertebroplasty 19    Patient feels the vertebroplasty relieved some of his pain but he continues to have spasm with movement.  Receiving palliative radiotherapy      Vital Sign Ranges  Temperature Temp  Av.2  F (36.8  C)  Min: 97.9  F (36.6  C)  Max: 98.4  F (36.9  C)   Blood pressure Systolic (24hrs), Av , Min:128 , Max:139        Diastolic (24hrs), Av, Min:54, Max:60      Pulse No data recorded   Respirations Resp  Av.3  Min: 16  Max: 18   Pulse oximetry SpO2  Av.3 %  Min: 96 %  Max: 97 %       Neuro intact  No numbness to light touch  Pulses present    Labs:  Recent Labs   Lab Test 19  1043 19  0845 19  0925   POTASSIUM 4.5 4.3 3.8     Recent Labs   Lab Test 19  1043 19  0845 19  0925   HGB 9.7* 9.8* 9.3*     Recent Labs   Lab Test 17  1143   INR 1.00     Recent Labs   Lab Test 19  1043 19  1619 19  0713    369 314         A/P  1. PLAN:   Continue current treatment plan with palliative radiotherapy   Gentle PT/OT as patient's pain allows       Doreen Bailey PA-C

## 2019-05-16 NOTE — PROGRESS NOTES
Radiation therapy treatment #1 of 5. Pt received 400 cGy with 10 MV radiation to the Lumbar spine. Plan for 4 more XRT tx with a total end dose of 2,000 cGy. Next tx 5/17/19 at 14:15 in lower level radiation.

## 2019-05-16 NOTE — PROGRESS NOTES
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 Preliminary pathology from the biopsy confirms malignancy. He continues to have pain in the L4 region. Palliative radiotherapy has been recommended by Dr. Robles and Dr. Pulido, with a goal of durable pain control, and prevention of further tumor growth causing possible neurologic compromise and/or mechanical instability. The patient was motivated to proceed with treatment after a discussion of the risks and side effects (including skin redness/irritation, fatigue, loose stools, poor appetite, and low risk of damage/necrosis to treated tissues).    Will bring the patient down to the Radiation Therapy department this morning for planning CT scan, and will plan to commence a 5-fraction course of palliative radiotherapy directed at L4 later today (with treatments to be delivered today, Friday, Monday, Tuesday, and Wednesday). If he is discharged through the course of the above, he may come in for treatments as an outpatient.    Bong Dexter M.D.  Radiation Oncology  [phone]

## 2019-05-16 NOTE — PLAN OF CARE
PT: Pt going down for radiation mapping, unavailable for attempt at PT at this time. Discussed with care coordinator and . Pt was evaluated on 5/10 by PT but did not get out of bed that date. On 5/11 this writer and RN had a long discussion with pt about the importance of getting up, that he needed to show that he can safely ambulate and transfer to be able to go home and pt responded that he did not need to show anybody that he can walk. States he has been doing this for 3 weeks at home with this amount of pain and has been managing, has the help of his son. Refused to even sit EOB with PT. Pt again attempted on 5/12, had his kyphoplasty on 5/13 and was not seen, and again attempted 5/14 and 5/15 with blatant refusals on all attempts. Pt stated on 5/14 that he did not feel his back pain was any if at all improved after his procedure. Pt has also refused to get up at all with nursing his entire hospital stay and is refusing to discharge to TCU at this time.

## 2019-05-16 NOTE — PLAN OF CARE
DATE & TIME:5/16/18   ORIENTATION: A&Ox4  BEHAVIOR & AGGRESSION TOOL COLOR: Green  CIWA SCORE: n/a  ABNL VS/O2: VSS on 1.5L O2 nc, baseline   MOBILITY:  Up w/ 2 walker/GB; not OOB this shift, activity encouraged  PAIN MANAGMENT: Denied pain  DIET: Mod carb diet, tolerating, fair appetite  BOWEL/BLADDER: Pt on hemodialysis, occasionally voids via urinal  ABNL LAB/BG:   DRAIN/DEVICES: Left arm fistula, WDL, bruit/thrill present. PIV SL.   TELEMETRY RHYTHM: n/a  SKIN: Incision on back, dressing CDI.   TESTS/PROCEDURES: Dialysis MWF. S/P vertebroplasty 5/14, biopsy pending  D/C DAY/GOALS/PLACE: Possible discharge 5/16, PT rec TCU but patient declines  OTHER IMPORTANT INFO: KIM rice, LEE. Ortho, nephrology and oncology following.

## 2019-05-16 NOTE — PROGRESS NOTES
Orthopedic Spine Follow Up:    I am going out of town today and unable to see patient today.  From reviewing the chart, I am unable to tell if patient has had much relief from the vertebroplasty procedure.  It appears he has been refusing to get out of bed for PT or nursing staff.  It is not clear if this is because of ongoing back pain or just fatigue.  Typically the pain from a pathologic fracture is dramatically improved after the vertebroplasty procedure.  If that is the case patient can be discharged to rehab or home and follow-up with me in 2 weeks.

## 2019-05-16 NOTE — PROGRESS NOTES
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 He tolerated treatment well and is scheduled for 4 more radiation therapy treatments to the lumbar spine. He is scheduled for treatment #2 on 5/17 at 2:10pm.    Ellen Ponce RTT

## 2019-05-16 NOTE — PROGRESS NOTES
[facility]    Medicine Progress Note - Hospitalist Service       Date of Admission:  5/9/2019  Assessment & Plan       This is an 84-year-old gentleman with a history of end-stage renal disease and acute on chronic back pain that is uncontrolled, likely related to a pathologic compression fracture in the setting of metastatic carcinoma with unknown primary.       Back pain,   L4 vertebral body with mild anterior compression   The patient is being admitted for pain control.  He had been taking hydrocodone/APAP, 2 tablets per day   -- pain medications adjusted, scheduled tylenol with prn oxycodone added .  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  --continue physical therapy /occupational therapy , possibly discharge to home vs tcu in am depending on pain control   --radiation oncology consulted and suggesting radiation if pain persists after kyphoplasty.  --pain persisted and plan is to get radiation initiated 5/16.  --he has ongoing spasms on his back , will add prn flexeril and valium for night time.  --will continue radiation therapy for now and reassess daily for discharge plans, discussed with  Family , patient  Has agreed to go to tcu if he is weak by Monday      ESRD; followed by Nephrology On HD  Continue hd as per schedule.MWF  - The patient typically has Monday, Wednesday, Friday dialysis.     Aortic stenosis:   Severe, mean 54mmHg, and the  VITO 0.8cm2,   Continue PTA coreg, Diltiazem  And lasix     History of metastatic carcinoma  Malignant appearing lesions in lung, liver, spine and possible kidneys and adrenals.  The patient is followed by Dr. Robles and was still undergoing evaluation for this.  He was supposed to have a PET scan done last Monday, but was unable to make it due to his back pain.  I have consulted Dr. Robles's service to see if they can help in this regard.   With respect to cancer treatment, again, evaluation was still underway.  The patient was actually seen by Palliative Medicine during his previous hospitalization and was not ready, at that point, to consider hospice or end of life cares, although the patient currently does seem to have realistic expectations for ongoing care goals.  --status post biopsy of epidural growth 5/14  --possible metastasis as per prelim report, no plan for systemic therapy      Type 2 diabetes.  The patient is maintained on Lantus and scheduled aspart; these will be continued.         Diet: Moderate Consistent CHO Diet    DVT Prophylaxis: Low Risk/Ambulatory with no VTE prophylaxis indicated  Dougherty Catheter: not present  Code Status: DNR/DNI      Disposition Plan   Expected discharge: Tomorrow, recommended to transitional care unit once when pain is under control and per PT/OT final reccs.  Entered: Rubi Beebe MD 05/16/2019, 1:33 PM       The patient's care was discussed with the Bedside Nurse.    Rubi Beebe MD  Hospitalist Service  [facility]    ______________________________________________________________________    Interval History   He had significant pain today am, mostly spasms, plan for radiation therapy to start today, 5 treatments planned . Son near bedside, plan of care discussed.    Data reviewed today: I reviewed all medications, new labs and imaging results over the last 24 hours. I personally reviewed no images or EKG's today.    Physical Exam   Vital Signs: Temp: 97.9  F (36.6  C) Temp src: Oral BP: 139/54   Heart Rate: 63 Resp: 16 SpO2: 96 % O2 Device: Nasal cannula Oxygen Delivery: 1 LPM  Weight: 160 lbs 14.97 oz  Constitutional: Awake, alert, cooperative, no apparent distress  Respiratory: Clear to auscultation bilaterally, no crackles or wheezing  Cardiovascular: Regular rate and rhythm, normal S1 and S2, and no murmur noted  GI: Normal bowel sounds, soft, non-distended,  non-tender  Skin/Integumen: fistula noted, he  have no localized tenderness on his spine today.  Neuro : moving all 4 extremities, no focal deficit noted         Data

## 2019-05-16 NOTE — PROGRESS NOTES
[facility]  [facility]  Hematology/oncology Progress Note            History:   Sarkis is an 84 year old admitted 5/9 with acute on chronic back pain     Sarkis started to having increasing low back pain this week. On Monday, he attempted PET/CT scan but was not able to be completed due to pain. His pain has continued and he came in to the ED for evaluation.     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  He has a history of hypertension, diabetes and end-stage renal disease.  He has been on dialysis since 2016 and is managed on erythropoietin and venofer. Hemoglobin had been around 10-11 but more recently fell to 7.2. He was given 2 unit(s) [facility].  His work up in our clinic included a haptoglobin of 115.  Reticulocyte count of 3.5.  IgG level of 458.  IgA 199.  IgM 20.  TSH was 2.87.  .  Serum protein electrophoresis demonstrated no monoclonal protein.  Peripheral morphology demonstrated moderate hypochromic, normocytic anemia with mild reticulocytosis.  Rare schistocytes were present.  There were no apparent dysplastic or neoplastic changes.     Given no obvious explanation for the patient's anemia, a bone marrow biopsy was recommended but he declined.     He subsequently seen in the ER on 4/22/19 for evaluation of back and left leg pain.       He had been seen by a chiropractor and underwent an MRI scan of his lumbar spine which demonstrated an L4 vertebral compression fracture which was later over-read as concerning for metastatic disease.       While in the ER, he underwent a CT scan of the chest abdomen and pelvis with contrast which demonstrated a spiculated right upper lobe pulmonary nodule, highly suspicious for malignancy such as bronchogenic carcinoma.  There was lytic change in the L4 vertebral body with mild anterior compression, suspicious for metastatic lesion and an associated pathologic compression fracture, age  indeterminate.  Multiple low-density liver lesions, not well characterized on a noncontrast scan but considered suspicious for metastatic disease.  There was mild mediastinal adenopathy.  Multiple bilateral adrenal nodules were indeterminant.  There are multiple hyperdense renal lesions bilaterally which could represent hemorrhagic or proteinaceous renal cysts.  Solid renal neoplasm could not be excluded.  There was moderate prostatic enlargement and an infra renal abdominal aortic aneurysm measuring 3.2 cm.       Had L4 Biopsy and L4 Vertebroplasty this am. No complications.  Back pain is stable. Had first RT today  No LE symptoms.             Medications:       - MEDICATION INSTRUCTIONS for Dialysis Patients -   Does not apply See Admin Instructions     acetaminophen  975 mg Oral 3 times daily     atorvastatin  40 mg Oral Daily     carvedilol  12.5 mg Oral BID w/meals     zz extemporaneous template  300 mg Oral Daily     doxazosin  8 mg Oral At Bedtime     eplerenone  50 mg Oral Daily     epoetin libby (EPOGEN,PROCRIT) inj ESRD  12,000 Units Intravenous Once per day on Mon Wed Fri     fish oil-omega-3 fatty acids  1 g Oral Daily     fluticasone  1 puff Inhalation Daily     furosemide  20 mg Oral Daily     insulin aspart  1-7 Units Subcutaneous TID AC     insulin aspart  1-5 Units Subcutaneous At Bedtime     insulin aspart  4 Units Subcutaneous QAM AC     insulin glargine  10 Units Subcutaneous At Bedtime     irbesartan  150 mg Oral Daily     multivitamin RENAL  1 capsule Oral Daily     polyethylene glycol  17 g Oral Daily     senna-docusate  1 tablet Oral BID     vitamin D3  2,000 Units Oral Daily                ROS:   RESP, CV, GI,, MUSCULOSKELETAL, HEME/ALLERGY/IMMUNE,  Skin: reviewed and negative except the positives mentioned in this note            Physical Exam:       Vital Sign Ranges  Temp:  [97.9  F (36.6  C)-98.4  F (36.9  C)] 97.9  F (36.6  C)  Heart Rate:  [61-73] 63  Resp:  [16-18] 16  BP:  (128-139)/(54-60) 139/54  SpO2:  [96 %-97 %] 96 %      I/O last 3 completed shifts:  In: 360 [P.O.:360]  Out: 100 [Urine:100]    Unchanged           Data:       ROUTINE LABS (Last four results)  CMP  Recent Labs   Lab 05/16/19  1043 05/13/19  0845 05/11/19  0925 05/10/19  0845    137 138 140   POTASSIUM 4.5 4.3 3.8 3.8   CHLORIDE 101 102 102 104   CO2 30 30 29 31   ANIONGAP 4 5 7 5   GLC 99 85 131* 98   BUN 32* 38* 38* 29   CR 4.19* 5.01* 4.94* 3.76*   GFRESTIMATED 12* 10* 10* 14*   GFRESTBLACK 14* 11* 12* 16*   AARON 9.8 10.4* 9.9 9.8   PHOS  --  5.6*  --   --      CBC  Recent Labs   Lab 05/16/19  1043 05/13/19  0845 05/11/19  0925 05/09/19  1619   WBC 8.6  --   --  11.0   RBC 3.85*  --   --  3.75*   HGB 9.7* 9.8* 9.3* 9.5*   HCT 32.5*  --   --  31.5*   MCV 84  --   --  84   MCH 25.2*  --   --  25.3*   MCHC 29.8*  --   --  30.2*   RDW 18.1*  --   --  18.1*     --   --  369     Path: poorly differentiated carcinoma .         Assessment and Plan:   1. Pain related to acute/subacute pathological compression fracture at L4 s/p vertebroplasty 5/14.    - Radiation therapy recommended.  - continue analgesia.     2. 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   - Biopsy of L4 done 5/14, pathology confirmedpoorly differentiated carcinoma .  Palliative approach due to performance status and ESRD, no systemic therapy planned.  He did not want to hear specifics about survival numbers, but aware of incurable disease and interested in palliative approach.        3. Anemia    - Suspicious for anemia of malignancy and CKD      4. ESDR  - Getting dialysis M-W-F  - Nephrology following.         Kevin Robles M.D.

## 2019-05-16 NOTE — CONSULTS
"SW:  D:  Barrier to discharge  Patient reports due to severe back pain he is unable to sit up in w/c or participate in therapy.  He does not qualify for SNF benefits in a TCU at this time as he cannot participate in therapy and has no other skilled need such as IV's or daily wound care. Thus if he went to a TCU for skilled nursing it woulf be private pay. Also TCU's become resposiible for the radiation cost, while in TCU, unless they receive their radiation at the [facility].  If patient were  discharged today, tansportation to dialysis and radiation would need to be by stretcher.    Patient is hopeful that the radiation will provide symptom management for his back and then he can parrticipate in therapy.  Patient's goal is to return to his son's home where he had been living with son, maura in law and family.He reports when he returned from [facility] he was bedbound.  He slept in a regular bed.  He has a urinal, walker and wheelchair at home.  He believes he can return to his son's home with family caring for him.  He reports he has spoken with his son about coming home.    Writer brought up the subject of hospice,  that based on his medical condition and goal to be at home,  Hospice home care would provide the best support.  He acknowledges he is not ready for hospice yet and wants to see how the radiation will help with his symptoms.   Writer is aware of the previous [facility] meeting with patient during his last stay.     Patient's inability to tolerate sitting up right is the major barrier to a discharge plan.  This has  been discussed with care coordinator and Dr Beebe.    Plan is that patient will stay here to see if the radiation helps and base the discharge plan on his functional status.  Patient gives permission for social workers to speak with son and involve him the discharge planning.  He stated \"I'lll leave the discharge plan up to you and my son\"   Patient reports " if after radiation is completed and if a TCU seems to be the best option he requests not to return to [facility].

## 2019-05-16 NOTE — PROGRESS NOTES
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 He is scheduled to return to radiation therapy today at 2:15pm for his first of 5 treatments.

## 2019-05-17 ENCOUNTER — APPOINTMENT (OUTPATIENT)
Dept: PHYSICAL THERAPY | Facility: CLINIC | Age: 84
DRG: 477 | End: 2019-05-17
Payer: MEDICARE

## 2019-05-17 LAB
GLUCOSE BLDC GLUCOMTR-MCNC: 112 MG/DL (ref 70–99)
GLUCOSE BLDC GLUCOMTR-MCNC: 117 MG/DL (ref 70–99)
GLUCOSE BLDC GLUCOMTR-MCNC: 134 MG/DL (ref 70–99)
GLUCOSE BLDC GLUCOMTR-MCNC: 138 MG/DL (ref 70–99)
GLUCOSE BLDC GLUCOMTR-MCNC: 66 MG/DL (ref 70–99)
GLUCOSE BLDC GLUCOMTR-MCNC: 83 MG/DL (ref 70–99)
GLUCOSE BLDC GLUCOMTR-MCNC: 99 MG/DL (ref 70–99)

## 2019-05-17 PROCEDURE — 25000132 ZZH RX MED GY IP 250 OP 250 PS 637: Performed by: INTERNAL MEDICINE

## 2019-05-17 PROCEDURE — 63400005 ZZH RX 634: Performed by: INTERNAL MEDICINE

## 2019-05-17 PROCEDURE — A9270 NON-COVERED ITEM OR SERVICE: HCPCS | Performed by: INTERNAL MEDICINE

## 2019-05-17 PROCEDURE — 77412 RADIATION TX DELIVERY LVL 3: CPT | Mod: XE

## 2019-05-17 PROCEDURE — 12000000 ZZH R&B MED SURG/OB

## 2019-05-17 PROCEDURE — 90937 HEMODIALYSIS REPEATED EVAL: CPT

## 2019-05-17 PROCEDURE — 99232 SBSQ HOSP IP/OBS MODERATE 35: CPT | Performed by: INTERNAL MEDICINE

## 2019-05-17 PROCEDURE — 00000146 ZZHCL STATISTIC GLUCOSE BY METER IP

## 2019-05-17 PROCEDURE — 25000128 H RX IP 250 OP 636: Performed by: INTERNAL MEDICINE

## 2019-05-17 PROCEDURE — 97530 THERAPEUTIC ACTIVITIES: CPT | Mod: GP

## 2019-05-17 PROCEDURE — 25000131 ZZH RX MED GY IP 250 OP 636 PS 637: Performed by: INTERNAL MEDICINE

## 2019-05-17 RX ORDER — ALBUMIN (HUMAN) 12.5 G/50ML
50 SOLUTION INTRAVENOUS
Status: DISCONTINUED | OUTPATIENT
Start: 2019-05-17 | End: 2019-05-17

## 2019-05-17 RX ORDER — ALBUMIN, HUMAN INJ 5% 5 %
250 SOLUTION INTRAVENOUS
Status: DISCONTINUED | OUTPATIENT
Start: 2019-05-17 | End: 2019-05-17

## 2019-05-17 RX ADMIN — EPOETIN ALFA 12000 UNITS: 4000 SOLUTION INTRAVENOUS; SUBCUTANEOUS at 09:11

## 2019-05-17 RX ADMIN — FLUTICASONE FUROATE 1 PUFF: 200 POWDER RESPIRATORY (INHALATION) at 11:20

## 2019-05-17 RX ADMIN — ACETAMINOPHEN 975 MG: 325 TABLET, FILM COATED ORAL at 15:52

## 2019-05-17 RX ADMIN — EPLERENONE 50 MG: 25 TABLET ORAL at 11:19

## 2019-05-17 RX ADMIN — SODIUM CHLORIDE 250 ML: 9 INJECTION, SOLUTION INTRAVENOUS at 08:59

## 2019-05-17 RX ADMIN — INSULIN GLARGINE 10 UNITS: 100 INJECTION, SOLUTION SUBCUTANEOUS at 22:42

## 2019-05-17 RX ADMIN — DOXAZOSIN 8 MG: 4 TABLET ORAL at 22:42

## 2019-05-17 RX ADMIN — CHOLECALCIFEROL TAB 50 MCG (2000 UNIT) 2000 UNITS: 50 TAB at 11:19

## 2019-05-17 RX ADMIN — ATORVASTATIN CALCIUM 40 MG: 40 TABLET, FILM COATED ORAL at 20:21

## 2019-05-17 RX ADMIN — POLYETHYLENE GLYCOL 3350 17 G: 17 POWDER, FOR SOLUTION ORAL at 11:20

## 2019-05-17 RX ADMIN — DIAZEPAM 5 MG: 5 TABLET ORAL at 23:03

## 2019-05-17 RX ADMIN — OXYCODONE HYDROCHLORIDE 5 MG: 5 TABLET ORAL at 15:52

## 2019-05-17 RX ADMIN — SODIUM CHLORIDE 300 ML: 9 INJECTION, SOLUTION INTRAVENOUS at 08:58

## 2019-05-17 RX ADMIN — ACETAMINOPHEN 975 MG: 325 TABLET, FILM COATED ORAL at 20:21

## 2019-05-17 RX ADMIN — CARVEDILOL 12.5 MG: 12.5 TABLET, FILM COATED ORAL at 18:51

## 2019-05-17 RX ADMIN — SENNOSIDES AND DOCUSATE SODIUM 1 TABLET: 8.6; 5 TABLET ORAL at 11:18

## 2019-05-17 RX ADMIN — CYCLOBENZAPRINE HYDROCHLORIDE 5 MG: 5 TABLET, FILM COATED ORAL at 15:52

## 2019-05-17 RX ADMIN — OMEGA-3 FATTY ACIDS CAP 1000 MG 1 G: 1000 CAP at 11:20

## 2019-05-17 RX ADMIN — ACETAMINOPHEN 975 MG: 325 TABLET, FILM COATED ORAL at 11:18

## 2019-05-17 RX ADMIN — Medication 1 CAPSULE: at 11:19

## 2019-05-17 RX ADMIN — FUROSEMIDE 20 MG: 20 TABLET ORAL at 11:19

## 2019-05-17 ASSESSMENT — ACTIVITIES OF DAILY LIVING (ADL)
ADLS_ACUITY_SCORE: 17

## 2019-05-17 NOTE — PLAN OF CARE
ORIENTATION:A&Ox4  BEHAVIOR & AGGRESSION TOOL COLOR:Green  CIWA SCORE:  ABNL VS/O2: VSS on 1L  MOBILITY: Assist of 2  PAIN MANAGEMENT: Scheduled Tylenol for Back spasms  DIET: Moderate consistent CHO  BOWEL/BLADDER: Hemodialysis patient, voids small amounts per urinal  ABNL LAB/BG:   DRAIN/DEVICES: PIV, SHONNA dialysis fistula bruit & thrill present.  TELEMETRY RHYTHM:  SKIN:  TESTS/PROCEDURES: Dialysis today  D/C DAY/GOALS/PLACE: To TCU once pain is under control and per PT/OT final recommendations  OTHER IMPORTANT INFO:

## 2019-05-17 NOTE — PROGRESS NOTES
[facility]    Medicine Progress Note - Hospitalist Service       Date of Admission:  5/9/2019  Assessment & Plan       This is an 84-year-old gentleman with a history of end-stage renal disease and acute on chronic back pain that is uncontrolled, likely related to a pathologic compression fracture in the setting of metastatic carcinoma with unknown primary.       Back pain,   L4 vertebral body with mild anterior compression   The patient is being admitted for pain control.  He had been taking hydrocodone/APAP, 2 tablets per day   -- pain medications adjusted, scheduled tylenol with prn oxycodone added .  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  --continue physical therapy /occupational therapy , possibly discharge to home vs tcu in am depending on pain control   --radiation oncology consulted and suggesting radiation if pain persists after kyphoplasty.  --pain persisted and plan is to get radiation-- initiated 5/16.  --back spasms still present but better .  --will continue radiation therapy for now and reassess daily for discharge plans, discussed with  Family , patient  Has agreed to go to tcu if he is weak by Monday   -day 2 /5 of radiation today      ESRD; followed by Nephrology On HD  Continue hd as per schedule.MWF  - The patient typically has Monday, Wednesday, Friday dialysis.     Aortic stenosis:   Severe, mean 54mmHg, and the  VITO 0.8cm2,   Continue PTA coreg, Diltiazem  And lasix     History of metastatic carcinoma  Malignant appearing lesions in lung, liver, spine and possible kidneys and adrenals.  The patient is followed by Dr. Robles and was still undergoing evaluation for this.  He was supposed to have a PET scan done last Monday, but was unable to make it due to his back pain.  I have consulted Dr. Robles's service to see if they can help in this regard.  With respect to  cancer treatment, again, evaluation was still underway.  The patient was actually seen by Palliative Medicine during his previous hospitalization and was not ready, at that point, to consider hospice or end of life cares, although the patient currently does seem to have realistic expectations for ongoing care goals.  --status post biopsy of epidural growth 5/14  --possible metastasis as per prelim report, no plan for systemic therapy      Type 2 diabetes.  The patient is maintained on Lantus and scheduled aspart; these will be continued.         Diet: Moderate Consistent CHO Diet  Snacks/Supplements Adult: Boost Glucose Control; Between Meals    DVT Prophylaxis: Low Risk/Ambulatory with no VTE prophylaxis indicated  Dougherty Catheter: not present  Code Status: DNR/DNI      Disposition Plan   Expected discharge: Tomorrow, recommended to transitional care unit once when pain is under control and per PT/OT final reccs.  Entered: Rubi Beebe MD 05/17/2019, 1:25 PM       The patient's care was discussed with the Bedside Nurse.    Rubi Beebe MD  Hospitalist Service  [facility]    ______________________________________________________________________    Interval History   Had dialysis today , tolerating diet well, had his first radiation treatment yesterday, pain ok.    Data reviewed today: I reviewed all medications, new labs and imaging results over the last 24 hours. I personally reviewed no images or EKG's today.    Physical Exam   Vital Signs: Temp: 97.7  F (36.5  C) Temp src: Oral BP: 122/48   Heart Rate: 60 Resp: 18 SpO2: 98 % O2 Device: None (Room air) Oxygen Delivery: 1 LPM  Weight: 160 lbs 14.97 oz  Constitutional: Awake, alert, cooperative, no apparent distress  Respiratory: Clear to auscultation bilaterally, no crackles or wheezing  Cardiovascular: Regular rate and rhythm, normal S1 and S2, and no murmur noted  GI: Normal bowel sounds, soft, non-distended, non-tender  Skin/Integumen:  fistula noted, he  have no localized tenderness on his spine today.  Neuro : moving all 4 extremities, no focal deficit noted         Data

## 2019-05-17 NOTE — PROGRESS NOTES
Progress Note     Primary Oncologist/Hematologist:  Dr. Robles          Assessment and Plan:     1. Pain related to acute/subacute pathological compression fracture at L4 s/p vertebroplasty 5/14.    - Still having some pain in the back  - Radiation therapy will continue through 5 fractions  - Continue analgesia.     2. 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   - Biopsy of L4 done 5/14, pathology confirmed poorly differentiated carcinoma .  - Palliative approach due to performance status and ESRD, no systemic therapy planned.  - He did not want to hear specifics about survival numbers, but aware of incurable disease and interested in palliative approach.      3. Anemia    - Suspicious for anemia of malignancy and CKD      4. ESDR  - Getting dialysis M-W-F  - Nephrology following.      Quincy SCHMITZ, CNP  [facility]  [phone]        Interval History:   No new concerns today. Radiation planned.              Review of Systems:   The 5 point Review of Systems is negative other than noted in the HPI             Medications:   Scheduled Medications    - MEDICATION INSTRUCTIONS for Dialysis Patients -   Does not apply See Admin Instructions     acetaminophen  975 mg Oral 3 times daily     atorvastatin  40 mg Oral Daily     carvedilol  12.5 mg Oral BID w/meals     zz extemporaneous template  300 mg Oral Daily     doxazosin  8 mg Oral At Bedtime     eplerenone  50 mg Oral Daily     epoetin libby (EPOGEN,PROCRIT) inj ESRD  12,000 Units Intravenous Once per day on Mon Wed Fri     fish oil-omega-3 fatty acids  1 g Oral Daily     fluticasone  1 puff Inhalation Daily     furosemide  20 mg Oral Daily     insulin aspart  1-7 Units Subcutaneous TID AC     insulin aspart  1-5 Units Subcutaneous At Bedtime     insulin aspart  4 Units Subcutaneous QAM AC     insulin glargine  10 Units Subcutaneous At Bedtime     irbesartan  150 mg Oral Daily      "multivitamin RENAL  1 capsule Oral Daily     polyethylene glycol  17 g Oral Daily     senna-docusate  1 tablet Oral BID     vitamin D3  2,000 Units Oral Daily     PRN Medications  albuterol, albuterol, cyclobenzaprine, glucose **OR** dextrose **OR** glucagon, diazepam, melatonin, naloxone, ondansetron **OR** ondansetron, oxyCODONE, prochlorperazine **OR** prochlorperazine **OR** prochlorperazine, ranitidine               Physical Exam:   Vitals were reviewed  Blood pressure 122/48, pulse 65, temperature 97.7  F (36.5  C), temperature source Oral, resp. rate 18, height 1.753 m (5' 9\"), weight 73 kg (160 lb 15 oz), SpO2 98 %.  Wt Readings from Last 4 Encounters:   05/15/19 73 kg (160 lb 15 oz)   05/03/19 74.6 kg (164 lb 6.4 oz)   04/29/19 76.2 kg (168 lb)   04/26/19 76.3 kg (168 lb 3.2 oz)       I/O last 3 completed shifts:  In: 120 [P.O.:120]  Out: 1145 [Urine:145; Other:1000]      Constitutional: Awake, alert, cooperative, no apparent distress              Data:   All laboratory data and imaging studies reviewed.    CMP  Recent Labs   Lab 05/16/19  1043 05/13/19  0845 05/11/19  0925    137 138   POTASSIUM 4.5 4.3 3.8   CHLORIDE 101 102 102   CO2 30 30 29   ANIONGAP 4 5 7   GLC 99 85 131*   BUN 32* 38* 38*   CR 4.19* 5.01* 4.94*   GFRESTIMATED 12* 10* 10*   GFRESTBLACK 14* 11* 12*   AARON 9.8 10.4* 9.9   PHOS  --  5.6*  --      CBC  Recent Labs   Lab 05/16/19  1043 05/13/19  0845 05/11/19  0925   WBC 8.6  --   --    RBC 3.85*  --   --    HGB 9.7* 9.8* 9.3*   HCT 32.5*  --   --    MCV 84  --   --    MCH 25.2*  --   --    MCHC 29.8*  --   --    RDW 18.1*  --   --      --   --                        "

## 2019-05-17 NOTE — PLAN OF CARE
DATE & TIME: 0063-9431 May 16, 2019  ORIENTATION: A&Ox4  BEHAVIOR & AGGRESSION TOOL COLOR: Green  CIWA SCORE:  NA  ABNL VS/O2: On 1-2 L NC baseline  MOBILITY: Ax2, pt. Refused to get out of bed, up with lift.   PAIN MANAGMENT: Given flexeril and PRN oxycodone with scheduled tylenol.  DIET: Mod carb  BOWEL/BLADDER: uses urinal at bedside  ABNL LAB/BG: Creatinine, 4.19, plan for dialysis tomorrow 5/17.  DRAIN/DEVICES: PIV SL  TELEMETRY RHYTHM:  SKIN: Bruises, incision on back dressing CDI.  TESTS/PROCEDURES: POD #2 verebroplasty, biopsy resulted in malignancy.  Started Radiation today 1 out 5 @ 14:15pm.   D/C DAY/GOALS/PLACE: discharge pending,  He is scheduled for radiation treatment #2 on 5/17 at 2:10pm.  OTHER IMPORTANT INFO: Dialysis M, W, F

## 2019-05-17 NOTE — PROGRESS NOTES
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 Hypercalcemia and hyperphosphatemia         Interval History:     AVSS. He is getting HD treatment. No issues so far with HD. He does not have any particular complaints.          Dialysis Parameters:     Vitals:    05/14/19 0500 05/15/19 0815 05/15/19 1120   Weight: 73.1 kg (161 lb 3.2 oz) 73.5 kg (162 lb 0.6 oz) 73 kg (160 lb 15 oz)     I/O last 3 completed shifts:  In: 600 [P.O.:600]  Out: 145 [Urine:145]  Temp:  [97.4  F (36.3  C)-98.1  F (36.7  C)] 97.6  F (36.4  C)  Heart Rate:  [58-67] 67  Resp:  [16] 16  BP: (109-160)/(41-59) 122/51  SpO2:  [95 %-100 %] 97 %    Current Weight: 73.5  Dry Weight: below EDW  Dialysis Temp: 36.5  C  Access Device: AVF  Access Site: L arm  Dialyzer: Revaclear  Dialysis Bath: 3K  Sodium Profile: none  UF Goal: 500 ml  Blood Flow Rate (mL/min): 400  Total Treatment Time (hrs): 2.45  Heparin: none (use heparin outpatient)    Review of Systems:          Medications:     none    Physical Exam:     Vitals were reviewed  Patient Vitals for the past 24 hrs:   BP Temp Temp src Heart Rate Resp SpO2   05/17/19 0915 122/51 -- -- 67 -- 97 %   05/17/19 0900 113/52 -- -- 66 -- 99 %   05/17/19 0848 115/48 -- -- 66 -- --   05/17/19 0845 109/41 -- -- 65 -- 98 %   05/17/19 0830 119/54 -- -- 66 -- 98 %   05/17/19 0815 120/52 -- -- 64 -- 100 %   05/17/19 0800 135/51 -- -- 61 -- 99 %   05/17/19 0745 154/59 -- -- 59 -- 100 %   05/17/19 0730 160/59 97.6  F (36.4  C) Oral 60 16 95 %   05/17/19 0724 147/53 97.4  F (36.3  C) Oral 62 16 95 %   05/17/19 0500 138/56 98  F (36.7  C) Oral 59 16 98 %   05/16/19 2251 -- -- -- -- 16 --   05/16/19 2116 142/54 98.1  F (36.7  C) Oral 61 16 96 %   05/16/19 1818 141/55 -- -- 58 16 98 %   05/16/19 1537 132/53 97.8  F (36.6  C) Oral 58 16 99 %   05/16/19 1200 -- -- -- -- 16 --       Temperatures:   Current - Temp: 97.6  F (36.4  C); Max - Temp  Av.8  F (36.6  C)  Min: 97.4  F (36.3  C)  Max: 98.1  F (36.7  C)  Respiration range: Resp  Av  Min: 16  Max: 16  Pulse range: No data recorded  Blood pressure range: Systolic (24hrs), Av , Min:109 , Max:160   ; Diastolic (24hrs), Av, Min:41, Max:59    Pulse oximetry range: SpO2  Av.8 %  Min: 95 %  Max: 100 %  I/O last 3 completed shifts:  In: 600 [P.O.:600]  Out: 145 [Urine:145]    Intake/Output Summary (Last 24 hours) at 2019 0929  Last data filed at 2019 0200  Gross per 24 hour   Intake 480 ml   Output 145 ml   Net 335 ml       Gen: NAD  CV: RRR  Pulm: Ctab  Abd: soft, NT  Ext: no edema  Neuro: a/o x 3      Data:     Recent Labs   Lab Test 19  1043 19  0845    137   POTASSIUM 4.5 4.3   CHLORIDE 101 102   CO2 30 30   ANIONGAP 4 5   GLC 99 85   BUN 32* 38*   CR 4.19* 5.01*   AARON 9.8 10.4*       Hemoglobin   Date Value Ref Range Status   2019 9.7 (L) 13.3 - 17.7 g/dL Final              Eliezer Diaz MD

## 2019-05-17 NOTE — PROGRESS NOTES
Care Transition Initial Assessment -      Met with: patient and son  Active Problems:    Back pain       DATA  Lives With: child(maxx), adult   Living Arrangements: house     Identified issues/concerns regarding health management: [place] [facility] [place].  He reports he was unable to participate in therapy due to back pain and discharged home to his son and daughter in law's home on 5/6 and admitted here on 5/9.  During the few days at home, he reports he was in bed (standard bed not hospital) Used a urinal for voiding and stated they were planning to purchase a bed pan for bowel movements.  His goal is to return home with his son.  His son, who was present for a short portion of the meeting, stated in order for his father to come home, he needs to be able to tolerate sitting up in order to be able to be transported to dialysis.     ASSESSMENT  Cognitive Status:  Oriented, engaged in discussion and involving son in the discharge plan.   Concerns to be addressed: Disposition.    In order for patient to return home, which is his goal, he will need to have substantial improvement in pain and be able to transport to and from dialysis in his son's car or a medivan.  Depending upon patient's abilities, he may be appropriate for TCU with therapies, assuming the pain subsizes and he can participate in therpay   In pain continues to immobilize patient, he may need to enter a nursing home for correction care.    We expect to re-involve [facility] to continue their previous discussion during his last stay regarding goals of care.     PLAN  CT team will be following progress of patient.

## 2019-05-17 NOTE — PLAN OF CARE
Discharge Planner PT   Patient plan for discharge: home  Current status: Continued education on role of PT in the acute setting both for mobilization and assisting with discharge recommendations. Pt reports feeling that the back pain and muscle spasms are slowly improving due to meds, radiation and the vertebroplasty. Educated pt on benefits of using a log roll to minimize pain with bed mobility. Pt is able to roll side to side with SBA. Attempted supine>sit several times but limited by increased muscle spasms and pain. Cues for breathing throughout. Pt did agree to try sitting on a BSC with nuring staff when next needed. Pt agreeable to try standing tomorrow with PT, aware that he needs to participate in order to get stronger to return home. Pt reports being open to TCU if needed at the conclusion of his radiation treatments.  Barriers to return to prior living situation: pain, level of assist required, unable to ambulate, fall risk, stairs  Recommendations for discharge: TCU  Rationale for recommendations: Pt would benefit from PT at TCU to progress strength, balance and mobility so pt can return home safely. Pt reports that he would need to be able to mobilize independently in order to return home as he is alone for periods of time.       Entered by: Desirae Velasco 05/17/2019 3:53 PM

## 2019-05-17 NOTE — PROGRESS NOTES
Report given by night shift to dialysis RN. Pt reports being comfortable laying on left side. BGM 99. Pt transported in bed to dialysis.

## 2019-05-17 NOTE — PROGRESS NOTES
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 He received a dose of 400 cGy to the L-spine using 10 MV and has now received a total dose to the L-spine of 800 cGy. He has 3 treatments remaining.    Ellen Ponce RTT

## 2019-05-17 NOTE — PLAN OF CARE
A&Ox4, pleasant, coop. Bedrest, pt requests to lay on left side with HOB flat for comfort, has back brace in room when upright. VSS. Sched tylenol for back pain, POD 2 s/p vertebroplasty, drsg x2 CDI. Chay diet, minimal appetite, BGM 99,66,112. Dialysis run completed, L UE fistula +bruit/thrill, drsg CDI. LS dim, chay 1L O2 baseline. Radiation tx #2/5 this shift. Plan for discharge to TCU once five radiation tx are complete.

## 2019-05-18 LAB
GLUCOSE BLDC GLUCOMTR-MCNC: 128 MG/DL (ref 70–99)
GLUCOSE BLDC GLUCOMTR-MCNC: 160 MG/DL (ref 70–99)
GLUCOSE BLDC GLUCOMTR-MCNC: 79 MG/DL (ref 70–99)

## 2019-05-18 PROCEDURE — 25000132 ZZH RX MED GY IP 250 OP 250 PS 637: Performed by: INTERNAL MEDICINE

## 2019-05-18 PROCEDURE — 25000131 ZZH RX MED GY IP 250 OP 636 PS 637: Performed by: INTERNAL MEDICINE

## 2019-05-18 PROCEDURE — A9270 NON-COVERED ITEM OR SERVICE: HCPCS | Performed by: INTERNAL MEDICINE

## 2019-05-18 PROCEDURE — 00000146 ZZHCL STATISTIC GLUCOSE BY METER IP

## 2019-05-18 PROCEDURE — 99232 SBSQ HOSP IP/OBS MODERATE 35: CPT | Performed by: INTERNAL MEDICINE

## 2019-05-18 PROCEDURE — 12000000 ZZH R&B MED SURG/OB

## 2019-05-18 RX ADMIN — ACETAMINOPHEN 325 MG: 325 TABLET, FILM COATED ORAL at 21:39

## 2019-05-18 RX ADMIN — CARVEDILOL 12.5 MG: 12.5 TABLET, FILM COATED ORAL at 18:43

## 2019-05-18 RX ADMIN — OMEGA-3 FATTY ACIDS CAP 1000 MG 1 G: 1000 CAP at 09:01

## 2019-05-18 RX ADMIN — DOXAZOSIN 8 MG: 4 TABLET ORAL at 21:36

## 2019-05-18 RX ADMIN — ATORVASTATIN CALCIUM 40 MG: 40 TABLET, FILM COATED ORAL at 21:36

## 2019-05-18 RX ADMIN — ACETAMINOPHEN 650 MG: 325 TABLET, FILM COATED ORAL at 09:01

## 2019-05-18 RX ADMIN — IRBESARTAN 150 MG: 150 TABLET ORAL at 09:01

## 2019-05-18 RX ADMIN — INSULIN GLARGINE 10 UNITS: 100 INJECTION, SOLUTION SUBCUTANEOUS at 21:36

## 2019-05-18 RX ADMIN — DIAZEPAM 5 MG: 5 TABLET ORAL at 21:36

## 2019-05-18 RX ADMIN — DILTIAZEM HYDROCHLORIDE 300 MG: 180 CAPSULE, EXTENDED RELEASE ORAL at 09:55

## 2019-05-18 RX ADMIN — CHOLECALCIFEROL TAB 50 MCG (2000 UNIT) 2000 UNITS: 50 TAB at 09:01

## 2019-05-18 RX ADMIN — Medication 1 CAPSULE: at 09:01

## 2019-05-18 RX ADMIN — FUROSEMIDE 20 MG: 20 TABLET ORAL at 09:01

## 2019-05-18 RX ADMIN — ACETAMINOPHEN 650 MG: 325 TABLET, FILM COATED ORAL at 16:14

## 2019-05-18 RX ADMIN — EPLERENONE 50 MG: 25 TABLET ORAL at 09:01

## 2019-05-18 RX ADMIN — FLUTICASONE FUROATE 1 PUFF: 200 POWDER RESPIRATORY (INHALATION) at 09:01

## 2019-05-18 RX ADMIN — CARVEDILOL 12.5 MG: 12.5 TABLET, FILM COATED ORAL at 09:01

## 2019-05-18 ASSESSMENT — ACTIVITIES OF DAILY LIVING (ADL)
ADLS_ACUITY_SCORE: 17
ADLS_ACUITY_SCORE: 21
ADLS_ACUITY_SCORE: 17
ADLS_ACUITY_SCORE: 20
ADLS_ACUITY_SCORE: 17
ADLS_ACUITY_SCORE: 17

## 2019-05-18 NOTE — PROGRESS NOTES
[facility]    Medicine Progress Note - Hospitalist Service       Date of Admission:  5/9/2019  Assessment & Plan       This is an 84-year-old gentleman with a history of end-stage renal disease and acute on chronic back pain that is uncontrolled, likely related to a pathologic compression fracture in the setting of metastatic carcinoma with unknown primary.       Back pain,   L4 vertebral body with mild anterior compression   The patient is being admitted for pain control.  He had been taking hydrocodone/APAP, 2 tablets per day   -- pain medications adjusted, scheduled tylenol with prn oxycodone added .  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  --continue physical therapy /occupational therapy , possibly discharge to home vs tcu in am depending on pain control   --radiation oncology consulted and suggesting radiation if pain persists after kyphoplasty.  --pain persisted and plan is to get radiation-- initiated 5/16.  --back spasms still present but better .  --will continue radiation therapy for now and reassess daily for discharge plans, discussed with  Family , patient  Has agreed to go to tcu if he is weak by Monday   -day 2 /5 of radiation done, 3  More pending but will only be on Monday .     ESRD; followed by Nephrology On HD  Continue hd as per schedule.MWF  - The patient typically has Monday, Wednesday, Friday dialysis.     Aortic stenosis:   Severe, mean 54mmHg, and the  VITO 0.8cm2,   Continue PTA coreg, Diltiazem  And lasix     History of metastatic carcinoma  Malignant appearing lesions in lung, liver, spine and possible kidneys and adrenals.  The patient is followed by Dr. Robles and was still undergoing evaluation for this.  He was supposed to have a PET scan done last Monday, but was unable to make it due to his back pain.  I have consulted Dr. Robles's service to see if  they can help in this regard.  With respect to cancer treatment, again, evaluation was still underway.  The patient was actually seen by Palliative Medicine during his previous hospitalization and was not ready, at that point, to consider hospice or end of life cares, although the patient currently does seem to have realistic expectations for ongoing care goals.  --status post biopsy of epidural growth 5/14  --poorly differentiated metastatic malignancy as per pathology.     Type 2 diabetes.  The patient is maintained on Lantus and scheduled aspart; these will be continued.         Diet: Moderate Consistent CHO Diet  Snacks/Supplements Adult: Boost Glucose Control; Between Meals    DVT Prophylaxis: Low Risk/Ambulatory with no VTE prophylaxis indicated  Dougherty Catheter: not present  Code Status: DNR/DNI      Disposition Plan   Expected discharge: Tomorrow, recommended to transitional care unit once when pain is under control and per PT/OT final reccs.  Entered: Rubi Beebe MD 05/18/2019, 9:04 AM       The patient's care was discussed with the Bedside Nurse.    Rubi Beebe MD  Hospitalist Service  [facility]    ______________________________________________________________________    Interval History   Pain better, seems he does not get radiation today, he already had 2/5 of his treatments, got out of bed twice today, pain is slowly improving.    Data reviewed today: I reviewed all medications, new labs and imaging results over the last 24 hours. I personally reviewed no images or EKG's today.    Physical Exam   Vital Signs: Temp: 97.5  F (36.4  C) Temp src: Oral BP: 154/59   Heart Rate: 69 Resp: 18 SpO2: 97 % O2 Device: Nasal cannula Oxygen Delivery: 1 LPM  Weight: 160 lbs 14.97 oz  Constitutional: Awake, alert, cooperative, no apparent distress  Respiratory: Clear to auscultation bilaterally, no crackles or wheezing  Cardiovascular: Regular rate and rhythm, normal S1 and S2, and no murmur  noted  GI: Normal bowel sounds, soft, non-distended, non-tender  Skin/Integumen: fistula noted, he  have no localized tenderness on his spine today.  Neuro : moving all 4 extremities, no focal deficit noted         Data

## 2019-05-18 NOTE — PROGRESS NOTES
Oncology/Hematology Follow Up Note:    Assessment and Plan:  1. Pain related to acute/subacute pathological compression fracture at L4 s/p vertebroplasty 5/14.    - Still having some pain in the back  - Radiation therapy will continue through 5 fractions  - Continue analgesia.     2. 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   - Biopsy of L4 done 5/14, pathology confirmed poorly differentiated carcinoma .  - Palliative approach due to performance status and ESRD, no systemic therapy planned.  - He did not want to hear specifics about survival numbers, but aware of incurable disease and interested in palliative approach.  - We spoke today and he is accepting of his diagnosis, does not want chemo and wants to go home and enjoy whatever time he has left.      3. Anemia    - Suspicious for anemia of malignancy and CKD      4. ESDR  - Getting dialysis M-W-F  - Nephrology following        Subjective:    Feeling ok, hoping to go home orTCU, denies new pain, pain under control      Labs:  All labs reviewed    CBC  Recent Labs   Lab 05/16/19  1043 05/13/19  0845   WBC 8.6  --    HGB 9.7* 9.8*   MCV 84  --      --        CMP  Recent Labs   Lab 05/16/19  1043 05/13/19  0845    137   POTASSIUM 4.5 4.3   CHLORIDE 101 102   CO2 30 30   ANIONGAP 4 5   GLC 99 85   BUN 32* 38*   CR 4.19* 5.01*   GFRESTIMATED 12* 10*   GFRESTBLACK 14* 11*   AARON 9.8 10.4*   PHOS  --  5.6*       INRNo lab results found in last 7 days.    Blood CultureNo results for input(s): CULT in the last 168 hours.      Joseph Lisa MD  [facility]  5/18/2019 1:53 PM

## 2019-05-18 NOTE — PLAN OF CARE
DATE & TIME: 5/18/2019  1500  ORIENTATION: A&OX4  BEHAVIOR & AGGRESSION TOOL COLOR: Green  CIWA SCORE:N/A  ABNL VS/O2: VSS on 1L NC for comfort, can be off O2.  MOBILITY: Up AX2 GB and walker. Refuses to get OOB this shift.  PAIN MANAGMENT: Denies pain  DIET: Mod carb diet  BOWEL/BLADDER: Continent, using urinal   ABNL LAB/BG: BG has been on the lower end around 70s this shift. Oral intake encouraged.  DRAIN/DEVICES: Back brace on. IV saline locked  TELEMETRY RHYTHM: N/A  SKIN: Refusing skin assessment, refusing turn and repo, states pt can turn himself. Educated on the prevention of PI.  TESTS/PROCEDURES: N/A  D/C DAY/GOALS/PLACE: Discharge to TCU pending placement and adequate pain control  OTHER IMPORTANT INFO: Pt getting palliative radiation therapy, 2/5 completed. Next dose on Monday per pt report. On dialysis MWF, Left AVF with bruit and thrill.

## 2019-05-18 NOTE — PLAN OF CARE
DATE & TIME: 5/17/19, 2587 - 8874  ORIENTATION: A&O x 4  BEHAVIOR & AGGRESSION TOOL COLOR: Green  ABNL VS/O2: VSS on O2 1 LPM  MOBILITY: Assist x 2  PAIN MANAGMENT: Denied pain up to this time  DIET: Mod carb  BOWEL/BLADDER: Patient on Hemodialysis  ABNL LAB/BG: Refused to have blood sugar done at 0200  DRAIN/DEVICES: PIV SL. Dialysis fistula to left upper arm. Bruit and thrill present  SKIN: Dressings to incision site lower back CDI  TESTS/PROCEDURES: For hemodialysis on M-W-F. To continue with radiation therapy  D/C DAY/GOALS/PLACE: For discharge to TCU pending adequate pain control nd PT/OT recommendation  OTHER IMPORTANT INFO: Slept with back brace on for comfort

## 2019-05-18 NOTE — PLAN OF CARE
DATE & TIME: 5/17/19 2250  ORIENTATION: A&Ox4  BEHAVIOR & AGGRESSION TOOL COLOR: Green  CIWA SCORE: NA  ABNL VS/O2: VSS on RA  MOBILITY: Up with 2 assist  PAIN MANAGMENT: C/o back spasms and pain with transferring. PRN oxycodone x1 and flexeril x1 given along with scheduled tylenol  DIET: Mod Carb  BOWEL/BLADDER: Pt on HD, voids small amounts per urinal, large BM x2 this shift, help HS senna.  ABNL LAB/BG: /117  DRAIN/DEVICES: PIV SL, SHONNA dialysis fistula bruit/thrill present, dressing CDI  TELEMETRY RHYTHM: NA  SKIN: POD 2 s/p vertebroplasty  TESTS/PROCEDURES: Dialysis MWF and Radiation every day 2/5 treatments done.  D/C DAY/GOALS/PLACE: To TCU pending pain control and PT/OT recommendations  OTHER IMPORTANT INFO: Pt was up to BSC x2 today with large soft BMs. Uses back brace for transfers and anytime sitting up. Pt requested to keep on while in bed for comfort.

## 2019-05-19 ENCOUNTER — APPOINTMENT (OUTPATIENT)
Dept: PHYSICAL THERAPY | Facility: CLINIC | Age: 84
DRG: 477 | End: 2019-05-19
Payer: MEDICARE

## 2019-05-19 LAB
GLUCOSE BLDC GLUCOMTR-MCNC: 125 MG/DL (ref 70–99)
GLUCOSE BLDC GLUCOMTR-MCNC: 126 MG/DL (ref 70–99)
GLUCOSE BLDC GLUCOMTR-MCNC: 128 MG/DL (ref 70–99)
GLUCOSE BLDC GLUCOMTR-MCNC: 162 MG/DL (ref 70–99)
GLUCOSE BLDC GLUCOMTR-MCNC: 87 MG/DL (ref 70–99)

## 2019-05-19 PROCEDURE — 12000000 ZZH R&B MED SURG/OB

## 2019-05-19 PROCEDURE — 97530 THERAPEUTIC ACTIVITIES: CPT | Mod: GP

## 2019-05-19 PROCEDURE — 99232 SBSQ HOSP IP/OBS MODERATE 35: CPT | Performed by: INTERNAL MEDICINE

## 2019-05-19 PROCEDURE — A9270 NON-COVERED ITEM OR SERVICE: HCPCS | Performed by: INTERNAL MEDICINE

## 2019-05-19 PROCEDURE — 25000132 ZZH RX MED GY IP 250 OP 250 PS 637: Performed by: INTERNAL MEDICINE

## 2019-05-19 PROCEDURE — 00000146 ZZHCL STATISTIC GLUCOSE BY METER IP

## 2019-05-19 PROCEDURE — 25000131 ZZH RX MED GY IP 250 OP 636 PS 637: Performed by: INTERNAL MEDICINE

## 2019-05-19 RX ADMIN — OMEGA-3 FATTY ACIDS CAP 1000 MG 1 G: 1000 CAP at 10:13

## 2019-05-19 RX ADMIN — ACETAMINOPHEN 650 MG: 325 TABLET, FILM COATED ORAL at 08:52

## 2019-05-19 RX ADMIN — DIAZEPAM 5 MG: 5 TABLET ORAL at 22:02

## 2019-05-19 RX ADMIN — INSULIN GLARGINE 10 UNITS: 100 INJECTION, SOLUTION SUBCUTANEOUS at 22:04

## 2019-05-19 RX ADMIN — IRBESARTAN 150 MG: 150 TABLET ORAL at 10:12

## 2019-05-19 RX ADMIN — Medication 1 CAPSULE: at 10:12

## 2019-05-19 RX ADMIN — CYCLOBENZAPRINE HYDROCHLORIDE 5 MG: 5 TABLET, FILM COATED ORAL at 08:53

## 2019-05-19 RX ADMIN — FLUTICASONE FUROATE 1 PUFF: 200 POWDER RESPIRATORY (INHALATION) at 10:17

## 2019-05-19 RX ADMIN — EPLERENONE 50 MG: 25 TABLET ORAL at 10:12

## 2019-05-19 RX ADMIN — DOXAZOSIN 4 MG: 4 TABLET ORAL at 22:02

## 2019-05-19 RX ADMIN — OXYCODONE HYDROCHLORIDE 5 MG: 5 TABLET ORAL at 10:48

## 2019-05-19 RX ADMIN — FUROSEMIDE 20 MG: 20 TABLET ORAL at 10:12

## 2019-05-19 RX ADMIN — ATORVASTATIN CALCIUM 40 MG: 40 TABLET, FILM COATED ORAL at 22:02

## 2019-05-19 RX ADMIN — CHOLECALCIFEROL TAB 50 MCG (2000 UNIT) 2000 UNITS: 50 TAB at 10:12

## 2019-05-19 RX ADMIN — SENNOSIDES AND DOCUSATE SODIUM 1 TABLET: 8.6; 5 TABLET ORAL at 10:12

## 2019-05-19 RX ADMIN — CARVEDILOL 12.5 MG: 12.5 TABLET, FILM COATED ORAL at 10:11

## 2019-05-19 RX ADMIN — DILTIAZEM HYDROCHLORIDE 300 MG: 180 CAPSULE, EXTENDED RELEASE ORAL at 10:16

## 2019-05-19 ASSESSMENT — ACTIVITIES OF DAILY LIVING (ADL)
ADLS_ACUITY_SCORE: 20
ADLS_ACUITY_SCORE: 20
ADLS_ACUITY_SCORE: 21
ADLS_ACUITY_SCORE: 21
ADLS_ACUITY_SCORE: 23
ADLS_ACUITY_SCORE: 20

## 2019-05-19 NOTE — PLAN OF CARE
DATE & TIME: 5/19/19 1430  ORIENTATION: x4  BEHAVIOR & AGGRESSION TOOL COLOR: green  CIWA SCORE: n/a  ABNL VS/O2: RA sat 87%; sat on 1 lpm n/c 93-97%; oxygen at baseline at home;  HR 58  MOBILITY: up with assist of 1-2 to commode; increase in pain with transfers and sitting; seen by PT, rec TCU.  PAIN MANAGMENT: flexeril and oxycodone x 1 with relief.  Positions self on left side for comfort;  Back binder is on.  DIET: mod cho; fair intake at breakfast, taking supplements, no lunch yet  BOWEL/BLADDER: voided x 1 per urinal; fluids encouraged; no BM today, had large on the 17th, bowel sounds active.  ABNL LAB/BG: BG 87, 126; no labs today  DRAIN/DEVICES: PIV R; SHONNA fistula with good bruit/thrill  TELEMETRY RHYTHM:    SKIN: 2 dressings over sacrum clean/dry/intact.    TESTS/PROCEDURES:   D/C DAY/GOALS/PLACE:  probable TCU.  OTHER IMPORTANT INFO: Due for dialysis tomorrow and radiation.  Voided only 125cc.  Refused to have bladder scanned.  Fluids encouraged.

## 2019-05-19 NOTE — PROGRESS NOTES
Pt. A&Ox4. Felt very weak and experienced increased pain upon getting up to commode with A/2, gait belt, walker. Pain decreased with meds given by RN. Did not void or have BM all shift. Provided perineal care, patient tolerated well but refused all other hygiene cares. Encouraged activity, patient will try later.

## 2019-05-19 NOTE — PROGRESS NOTES
Orthopedic Surgery  Seven Savage   2019  Admit Date:  2019  L4 vertebroplasty 19 with Dr. Pulido     Patient resting comfortably in bed.    Patient states his pain feels improved after vertebroplasty   Patient does complain of TLSO brace sliding up while in bed.   He continues to receive palliative radiotherapy.     Alert and orient to person, place, and time.  Vital Sign Ranges  Temperature Temp  Av.9  F (36.6  C)  Min: 97.5  F (36.4  C)  Max: 98.2  F (36.8  C)   Blood pressure Systolic (24hrs), Av , Min:126 , Max:159        Diastolic (24hrs), Av, Min:53, Max:59      Pulse Pulse  Av  Min: 58  Max: 62   Respirations Resp  Av.3  Min: 16  Max: 18   Pulse oximetry SpO2  Av %  Min: 87 %  Max: 97 %     Active PF and DF of bilateral LEs.     Neurovascularly intact  No numbness to light touch to bilateral lower extremities   Pulses present        Labs:  Recent Labs   Lab Test 19  1043 19  0845 19  0925   POTASSIUM 4.5 4.3 3.8     Recent Labs   Lab Test 19  1043 19  0845 19  0925   HGB 9.7* 9.8* 9.3*     Recent Labs   Lab Test 17  1143   INR 1.00     Recent Labs   Lab Test 19  1043 19  1619 19  0713    369 314       A/P  1. PLAN:              Continue current treatment plan with palliative radiotherapy              Gentle PT/OT as patient's pain allows   Adjust TLSO brace - patient may have off while laying in bed and put on when sitting or ambulating about the room.         Patricia Campos PA-C

## 2019-05-19 NOTE — PROGRESS NOTES
[facility]    Medicine Progress Note - Hospitalist Service       Date of Admission:  5/9/2019  Assessment & Plan       This is an 84-year-old gentleman with a history of end-stage renal disease and acute on chronic back pain that is uncontrolled, likely related to a pathologic compression fracture in the setting of metastatic carcinoma with unknown primary.       Back pain,   L4 vertebral body with mild anterior compression   The patient is being admitted for pain control.  He had been taking hydrocodone/APAP, 2 tablets per day   -- pain medications adjusted, scheduled tylenol with prn oxycodone added .  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  --continue physical therapy /occupational therapy , possibly discharge to home vs tcu in am depending on pain control   --radiation oncology consulted and suggesting radiation if pain persists after kyphoplasty.  --pain persisted and plan is to get radiation-- initiated 5/16.  --back spasms still present but better .  --will continue radiation therapy for now and reassess daily for discharge plans, discussed with  Family , patient  Has agreed to go to tcu if he is weak by Monday   -day 2 /5 of radiation done, 3  More pending but will only be on Monday .     ESRD; followed by Nephrology On HD  Continue hd as per schedule.MWF  - The patient typically has Monday, Wednesday, Friday dialysis.     Aortic stenosis:   Severe, mean 54mmHg, and the  VITO 0.8cm2,   Continue PTA coreg, Diltiazem  And lasix     History of metastatic carcinoma  Malignant appearing lesions in lung, liver, spine and possible kidneys and adrenals.  The patient is followed by Dr. Robles and was still undergoing evaluation for this.  He was supposed to have a PET scan done last Monday, but was unable to make it due to his back pain.  I have consulted Dr. Robles's service to see if  they can help in this regard.  With respect to cancer treatment, again, evaluation was still underway.  The patient was actually seen by Palliative Medicine during his previous hospitalization and was not ready, at that point, to consider hospice or end of life cares, although the patient currently does seem to have realistic expectations for ongoing care goals.  --status post biopsy of epidural growth 5/14  --poorly differentiated metastatic malignancy as per pathology.     Type 2 diabetes.  The patient is maintained on Lantus and scheduled aspart; these will be continued.         Diet: Moderate Consistent CHO Diet  Snacks/Supplements Adult: Boost Glucose Control; Between Meals    DVT Prophylaxis: Low Risk/Ambulatory with no VTE prophylaxis indicated  Dougherty Catheter: not present  Code Status: DNR/DNI      Disposition Plan   Expected discharge: Tomorrow, recommended to transitional care unit once when pain is under control and per PT/OT final reccs.  Entered: Rubi Beebe MD 05/19/2019, 1:40 PM       The patient's care was discussed with the Bedside Nurse.    Rubi Beebe MD  Hospitalist Service  [facility]    ______________________________________________________________________    Interval History   No new issues, pain appears to be improving, patient  At this point is not hopeful that he will be going home, will need to try arranging tcu for him .    Data reviewed today: I reviewed all medications, new labs and imaging results over the last 24 hours. I personally reviewed no images or EKG's today.    Physical Exam   Vital Signs: Temp: 97.5  F (36.4  C) Temp src: Oral BP: 126/55 Pulse: 62 Heart Rate: 58 Resp: 16 SpO2: (!) 87 % O2 Device: Nasal cannula Oxygen Delivery: 1 LPM  Weight: 160 lbs 14.97 oz  Constitutional: Awake, alert, cooperative, no apparent distress  Respiratory: Clear to auscultation bilaterally, no crackles or wheezing  Cardiovascular: Regular rate and rhythm, normal S1 and  S2, and no murmur noted  GI: Normal bowel sounds, soft, non-distended, non-tender  Skin/Integumen: fistula noted, he  have no localized tenderness on his spine today.  Neuro : moving all 4 extremities, no focal deficit noted         Data

## 2019-05-19 NOTE — PLAN OF CARE
DATE & TIME: 5/18/19 9005-7606  ORIENTATION: A&Ox4  BEHAVIOR & AGGRESSION TOOL COLOR: Green  CIWA SCORE: NA  ABNL VS/O2: VSS on 1L, sats high 90s refuses to take off and wants it on for comfort  MOBILITY: Up with two assist, refused to get OOB, weight shifts himself in bed. Using back brace for comfort while in bed also.  PAIN MANAGMENT: Scheduled tylenol continued  DIET: Mod Carb  BOWEL/BLADDER: Pt on HD, urinates small amounts per urinal. No BM this shift  ABNL LAB/BG: /160  DRAIN/DEVICES: PIV SL, Fistula to LUE  TELEMETRY RHYTHM: NA  SKIN: Dusky, dressings to vertebroplasty site CDI  TESTS/PROCEDURES: NA - next radiation session on Monday 5/20  D/C DAY/GOALS/PLACE: discharge pending pain management and TCU placement   OTHER IMPORTANT INFO:

## 2019-05-19 NOTE — PLAN OF CARE
"Discharge Planner PT   Patient plan for discharge: did not discuss with pt  Current status:  Pt received supine in bed, nursing in room. Pt agreeable to stand up and use bedside commode. Roll L with use of rail, Sidelying>sit with HOB slightly elevated and Floyd. Min dizziness sitting initially, resolves with seated rest. Sit>stand with CGA and FWW, pt insists on hand placement on walker despite cues to push off bed. Flexed posture in standing. Pivoted to BS commode with CGA, stand>sit at BS commode with CGA. Pt tends to lean laterally at commode despite cues to sit upright, stating it helps relieve pain. Performed sit>stand from commode with FWW and CGA x2 , with hands on FWW despite cues , stating \"let me do it my way.\" Flexed posture over FWW standing, pivots to bed with CGA x2 and FWW. Sit>supine with SBA, pt demos tendency to not use log roll technique. Able to scoot up in bed with ModA x2, use of hand rail and pushing through LEs. Pt agreeable to attempt to take a few steps tomorrow. Pt in bed with RN in room upon departure of PT.   Barriers to return to prior living situation: pain, level of assist required, unable to ambulate, fall risk, stairs  Recommendations for discharge: TCU  Rationale for recommendations: Pt would benefit from PT at TCU to progress strength, balance and mobility so pt can return home safely. Pt reports that he would need to be able to mobilize independently in order to return home as he is alone for periods of time.         Entered by: Kecia Randolph 05/19/2019 10:43 AM       "

## 2019-05-19 NOTE — PROGRESS NOTES
ONCOLOGY CC:    Patient seen yesterday.    Stage IV NSCLC, no plans for chemo.   Ongoing radiation therapy to the L spine.  No new recommendations.

## 2019-05-19 NOTE — PLAN OF CARE
DATE & TIME: 5/18/19, 4786 - 4091  ORIENTATION: A&O x 3. Disoriented to time  BEHAVIOR & AGGRESSION TOOL COLOR: Green  ABNL VS/O2: VSS on O2 1 LPM  MOBILITY: Up assist x 2  PAIN MANAGMENT: Denied pain up to this time  DIET: Mod carb  BOWEL/BLADDER: Patient on Hemodialysis. Voided small amounts in urinal  ABNL LAB/BG:   DRAIN/DEVICES: PIV SL. Dialysis fistula to left upper arm. Bruit and thrill present  SKIN: Dressings to incision sites lower back CDI  TESTS/PROCEDURES: For hemodialysis on M-W-F. To continue with radiation therapy  D/C DAY/GOALS/PLACE: For discharge to TCU pending adequate pain control and PT/OT recommendation  OTHER IMPORTANT INFO: Slept with back brace on for comfort

## 2019-05-20 ENCOUNTER — APPOINTMENT (OUTPATIENT)
Dept: PHYSICAL THERAPY | Facility: CLINIC | Age: 84
DRG: 477 | End: 2019-05-20
Payer: MEDICARE

## 2019-05-20 LAB
ALBUMIN SERPL-MCNC: 2.8 G/DL (ref 3.4–5)
ANION GAP SERPL CALCULATED.3IONS-SCNC: 7 MMOL/L (ref 3–14)
BUN SERPL-MCNC: 54 MG/DL (ref 7–30)
CALCIUM SERPL-MCNC: 10.7 MG/DL (ref 8.5–10.1)
CHLORIDE SERPL-SCNC: 98 MMOL/L (ref 94–109)
CO2 SERPL-SCNC: 33 MMOL/L (ref 20–32)
CREAT SERPL-MCNC: 5.34 MG/DL (ref 0.66–1.25)
ERYTHROCYTE [DISTWIDTH] IN BLOOD BY AUTOMATED COUNT: 18.2 % (ref 10–15)
GFR SERPL CREATININE-BSD FRML MDRD: 9 ML/MIN/{1.73_M2}
GLUCOSE BLDC GLUCOMTR-MCNC: 104 MG/DL (ref 70–99)
GLUCOSE BLDC GLUCOMTR-MCNC: 130 MG/DL (ref 70–99)
GLUCOSE BLDC GLUCOMTR-MCNC: 145 MG/DL (ref 70–99)
GLUCOSE BLDC GLUCOMTR-MCNC: 176 MG/DL (ref 70–99)
GLUCOSE SERPL-MCNC: 105 MG/DL (ref 70–99)
HCT VFR BLD AUTO: 32.7 % (ref 40–53)
HGB BLD-MCNC: 10 G/DL (ref 13.3–17.7)
MCH RBC QN AUTO: 25.5 PG (ref 26.5–33)
MCHC RBC AUTO-ENTMCNC: 30.6 G/DL (ref 31.5–36.5)
MCV RBC AUTO: 83 FL (ref 78–100)
PHOSPHATE SERPL-MCNC: 6.9 MG/DL (ref 2.5–4.5)
PLATELET # BLD AUTO: 444 10E9/L (ref 150–450)
POTASSIUM SERPL-SCNC: 4.6 MMOL/L (ref 3.4–5.3)
RBC # BLD AUTO: 3.92 10E12/L (ref 4.4–5.9)
SODIUM SERPL-SCNC: 138 MMOL/L (ref 133–144)
WBC # BLD AUTO: 9.9 10E9/L (ref 4–11)

## 2019-05-20 PROCEDURE — A9270 NON-COVERED ITEM OR SERVICE: HCPCS | Performed by: INTERNAL MEDICINE

## 2019-05-20 PROCEDURE — 80069 RENAL FUNCTION PANEL: CPT | Performed by: INTERNAL MEDICINE

## 2019-05-20 PROCEDURE — 77412 RADIATION TX DELIVERY LVL 3: CPT | Mod: XE

## 2019-05-20 PROCEDURE — 25000132 ZZH RX MED GY IP 250 OP 250 PS 637: Performed by: INTERNAL MEDICINE

## 2019-05-20 PROCEDURE — 63400005 ZZH RX 634: Performed by: INTERNAL MEDICINE

## 2019-05-20 PROCEDURE — 97116 GAIT TRAINING THERAPY: CPT | Mod: GP | Performed by: PHYSICAL THERAPIST

## 2019-05-20 PROCEDURE — 25000128 H RX IP 250 OP 636: Performed by: INTERNAL MEDICINE

## 2019-05-20 PROCEDURE — 25000131 ZZH RX MED GY IP 250 OP 636 PS 637: Performed by: INTERNAL MEDICINE

## 2019-05-20 PROCEDURE — 99233 SBSQ HOSP IP/OBS HIGH 50: CPT | Performed by: NURSE PRACTITIONER

## 2019-05-20 PROCEDURE — 00000146 ZZHCL STATISTIC GLUCOSE BY METER IP

## 2019-05-20 PROCEDURE — 97530 THERAPEUTIC ACTIVITIES: CPT | Mod: GP | Performed by: PHYSICAL THERAPIST

## 2019-05-20 PROCEDURE — 85027 COMPLETE CBC AUTOMATED: CPT | Performed by: INTERNAL MEDICINE

## 2019-05-20 PROCEDURE — 99232 SBSQ HOSP IP/OBS MODERATE 35: CPT | Performed by: INTERNAL MEDICINE

## 2019-05-20 PROCEDURE — 25000132 ZZH RX MED GY IP 250 OP 250 PS 637: Performed by: NURSE PRACTITIONER

## 2019-05-20 PROCEDURE — A9270 NON-COVERED ITEM OR SERVICE: HCPCS | Performed by: NURSE PRACTITIONER

## 2019-05-20 PROCEDURE — 12000000 ZZH R&B MED SURG/OB

## 2019-05-20 PROCEDURE — 90937 HEMODIALYSIS REPEATED EVAL: CPT

## 2019-05-20 RX ORDER — ALBUMIN, HUMAN INJ 5% 5 %
250 SOLUTION INTRAVENOUS
Status: DISCONTINUED | OUTPATIENT
Start: 2019-05-20 | End: 2019-05-20

## 2019-05-20 RX ORDER — AMOXICILLIN 250 MG
1-2 CAPSULE ORAL 2 TIMES DAILY
Status: DISCONTINUED | OUTPATIENT
Start: 2019-05-20 | End: 2019-05-23 | Stop reason: HOSPADM

## 2019-05-20 RX ORDER — ALBUMIN (HUMAN) 12.5 G/50ML
50 SOLUTION INTRAVENOUS
Status: DISCONTINUED | OUTPATIENT
Start: 2019-05-20 | End: 2019-05-20

## 2019-05-20 RX ADMIN — ACETAMINOPHEN 325 MG: 325 TABLET, FILM COATED ORAL at 09:33

## 2019-05-20 RX ADMIN — CARVEDILOL 12.5 MG: 12.5 TABLET, FILM COATED ORAL at 19:20

## 2019-05-20 RX ADMIN — FLUTICASONE FUROATE 1 PUFF: 200 POWDER RESPIRATORY (INHALATION) at 09:38

## 2019-05-20 RX ADMIN — SENNOSIDES AND DOCUSATE SODIUM 1 TABLET: 8.6; 5 TABLET ORAL at 22:02

## 2019-05-20 RX ADMIN — DOXAZOSIN 4 MG: 4 TABLET ORAL at 22:02

## 2019-05-20 RX ADMIN — OMEGA-3 FATTY ACIDS CAP 1000 MG 1 G: 1000 CAP at 09:33

## 2019-05-20 RX ADMIN — INSULIN GLARGINE 10 UNITS: 100 INJECTION, SOLUTION SUBCUTANEOUS at 22:02

## 2019-05-20 RX ADMIN — OXYCODONE HYDROCHLORIDE 5 MG: 5 TABLET ORAL at 13:50

## 2019-05-20 RX ADMIN — ATORVASTATIN CALCIUM 40 MG: 40 TABLET, FILM COATED ORAL at 22:02

## 2019-05-20 RX ADMIN — DIAZEPAM 5 MG: 5 TABLET ORAL at 22:02

## 2019-05-20 RX ADMIN — CYCLOBENZAPRINE HYDROCHLORIDE 5 MG: 5 TABLET, FILM COATED ORAL at 09:33

## 2019-05-20 RX ADMIN — Medication 1 CAPSULE: at 09:33

## 2019-05-20 RX ADMIN — EPOETIN ALFA 12000 UNITS: 4000 SOLUTION INTRAVENOUS; SUBCUTANEOUS at 17:59

## 2019-05-20 RX ADMIN — OXYCODONE HYDROCHLORIDE 5 MG: 5 TABLET ORAL at 10:11

## 2019-05-20 RX ADMIN — ACETAMINOPHEN 650 MG: 325 TABLET, FILM COATED ORAL at 16:19

## 2019-05-20 RX ADMIN — CHOLECALCIFEROL TAB 50 MCG (2000 UNIT) 2000 UNITS: 50 TAB at 09:33

## 2019-05-20 RX ADMIN — SODIUM CHLORIDE 200 ML: 9 INJECTION, SOLUTION INTRAVENOUS at 16:10

## 2019-05-20 RX ADMIN — SODIUM CHLORIDE 200 ML: 9 INJECTION, SOLUTION INTRAVENOUS at 16:11

## 2019-05-20 ASSESSMENT — MIFFLIN-ST. JEOR: SCORE: 1412.03

## 2019-05-20 ASSESSMENT — ACTIVITIES OF DAILY LIVING (ADL)
ADLS_ACUITY_SCORE: 21
ADLS_ACUITY_SCORE: 20
ADLS_ACUITY_SCORE: 20
ADLS_ACUITY_SCORE: 21
ADLS_ACUITY_SCORE: 21
ADLS_ACUITY_SCORE: 20

## 2019-05-20 NOTE — PROGRESS NOTES
Radiation therapy treatment #3 of 5. Pt received 400 cGy with 10 MV radiation to the lumbar spine for a total dose of 1,200 cGy given thus far. Plan for 2 more XRT tx with an end total dose of 2,000 cGy. Next  tx is 5/21/19 at 10:30 in lower level radiation.

## 2019-05-20 NOTE — PLAN OF CARE
DATE & TIME: 5/20/19 1400  ORIENTATION: x4  BEHAVIOR & AGGRESSION TOOL COLOR: green  CIWA SCORE: n/a  ABNL VS/O2: room air sat 87%, continues on 1 lpm nasal cannula  MOBILITY:  ambulated with PT and up to commode, assist of 1; turns self in bed;   PAIN MANAGMENT: oxycodone 5 mg x 2 this shift and flexeril x 1 this shift with good response.  Back binder is on.  DIET: moderate carb; appetite good for breakfast, declined lunch, is taking supplements between meals.  BOWEL/BLADDER: voiding per urinal, no BM today, declined bowel meds, good bowel sounds.  ABNL LAB/BG: last .  No other labs today.  DRAIN/DEVICES: PIV R arm  TELEMETRY RHYTHM:  SKIN: intact  TESTS/PROCEDURES: had radiation today; dialysis will start approximately 3pm today.  D/C DAY/GOALS/PLACE:  TCU, pending  OTHER IMPORTANT INFO:  declines PCD's; fistula SHONNA.  Palliative care consult pending.

## 2019-05-20 NOTE — PROGRESS NOTES
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 Hypercalcemia and hyperphosphatemia      PLan -   Uneventful HD so far.   Check RFP , CBC today   Next HD on Wed       Interval History:     Seen/ examined on dialysis.  He is getting HD treatment. No issues so far with HD. AAO.          Dialysis Parameters:     Vitals:    05/15/19 0815 05/15/19 1120 05/20/19 0541   Weight: 73.5 kg (162 lb 0.6 oz) 73 kg (160 lb 15 oz) 73.2 kg (161 lb 4.8 oz)     I/O last 3 completed shifts:  In: 600 [P.O.:600]  Out: 325 [Urine:325]  Temp:  [97.9  F (36.6  C)-98.9  F (37.2  C)] 98.9  F (37.2  C)  Heart Rate:  [52-69] 68  Resp:  [14-18] 15  BP: (125-162)/(44-85) 130/65  SpO2:  [95 %-98 %] 98 %    Current Weight: 73.2  Dry Weight: below EDW  Dialysis Temp: 36.5  C  Access Device: AVF  Access Site: L arm  Dialyzer: Revaclear  Dialysis Bath: 3K  Sodium Profile: none  UF Goal: 500 ml  Blood Flow Rate (mL/min): 400  Total Treatment Time (hrs): 2.45  Heparin: none (use heparin outpatient)    Review of Systems:          Medications:     none    Physical Exam:     /65   Pulse 62   Temp 98.9  F (37.2  C) (Oral)   Resp 15   Ht 1.753 m (5' 9\")   Wt 73.2 kg (161 lb 4.8 oz)   SpO2 98%   BMI 23.82 kg/m        Gen: NAD  CV: RRR  Pulm: Ctab  Abd: soft, NT  Ext: no edema  Neuro: a/o x 3      Data:     Recent Labs   Lab Test 05/16/19  1043 05/13/19  0845    137   POTASSIUM 4.5 4.3   CHLORIDE 101 102   CO2 30 30   ANIONGAP 4 5   GLC 99 85   BUN 32* 38*   CR 4.19* 5.01*   AARON 9.8 10.4*         Hemoglobin   Date Value Ref Range Status   05/16/2019 9.7 (L) 13.3 - 17.7 g/dL Final              Kallie Lin MD              "

## 2019-05-20 NOTE — PROGRESS NOTES
[facility]  [facility]  Hematology/oncology Progress Note            History:   Sarkis is an 84 year old admitted 5/9 with acute on chronic back pain     Sarkis started to having increasing low back pain this week. On Monday, he attempted PET/CT scan but was not able to be completed due to pain. His pain has continued and he came in to the ED for evaluation.     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  He has a history of hypertension, diabetes and end-stage renal disease.  He has been on dialysis since 2016 and is managed on erythropoietin and venofer. Hemoglobin had been around 10-11 but more recently fell to 7.2. He was given 2 unit(s) [facility].  His work up in our clinic included a haptoglobin of 115.  Reticulocyte count of 3.5.  IgG level of 458.  IgA 199.  IgM 20.  TSH was 2.87.  .  Serum protein electrophoresis demonstrated no monoclonal protein.  Peripheral morphology demonstrated moderate hypochromic, normocytic anemia with mild reticulocytosis.  Rare schistocytes were present.  There were no apparent dysplastic or neoplastic changes.     Given no obvious explanation for the patient's anemia, a bone marrow biopsy was recommended but he declined.     He subsequently seen in the ER on 4/22/19 for evaluation of back and left leg pain.       He had been seen by a chiropractor and underwent an MRI scan of his lumbar spine which demonstrated an L4 vertebral compression fracture which was later over-read as concerning for metastatic disease.       While in the ER, he underwent a CT scan of the chest abdomen and pelvis with contrast which demonstrated a spiculated right upper lobe pulmonary nodule, highly suspicious for malignancy such as bronchogenic carcinoma.  There was lytic change in the L4 vertebral body with mild anterior compression, suspicious for metastatic lesion and an associated pathologic compression fracture, age  indeterminate.  Multiple low-density liver lesions, not well characterized on a noncontrast scan but considered suspicious for metastatic disease.  There was mild mediastinal adenopathy.  Multiple bilateral adrenal nodules were indeterminant.  There are multiple hyperdense renal lesions bilaterally which could represent hemorrhagic or proteinaceous renal cysts.  Solid renal neoplasm could not be excluded.  There was moderate prostatic enlargement and an infra renal abdominal aortic aneurysm measuring 3.2 cm.       Had L4 Biopsy and L4 Vertebroplasty this am. No complications.  Back pain is stable.   Continues radiation through Wednesday             Medications:       - MEDICATION INSTRUCTIONS for Dialysis Patients -   Does not apply See Admin Instructions     acetaminophen  975 mg Oral 3 times daily     atorvastatin  40 mg Oral Daily     carvedilol  12.5 mg Oral BID w/meals     zz extemporaneous template  300 mg Oral Daily     doxazosin  8 mg Oral At Bedtime     eplerenone  50 mg Oral Daily     epoetin libby (EPOGEN,PROCRIT) inj ESRD  12,000 Units Intravenous Once per day on Mon Wed Fri     fish oil-omega-3 fatty acids  1 g Oral Daily     fluticasone  1 puff Inhalation Daily     furosemide  20 mg Oral Daily     insulin aspart  1-7 Units Subcutaneous TID AC     insulin aspart  1-5 Units Subcutaneous At Bedtime     insulin aspart  4 Units Subcutaneous QAM AC     insulin glargine  10 Units Subcutaneous At Bedtime     irbesartan  150 mg Oral Daily     multivitamin RENAL  1 capsule Oral Daily     polyethylene glycol  17 g Oral Daily     senna-docusate  1 tablet Oral BID     vitamin D3  2,000 Units Oral Daily                ROS:              Physical Exam:       Vital Sign Ranges  Temp:  [97.6  F (36.4  C)-98.1  F (36.7  C)] 97.9  F (36.6  C)  Heart Rate:  [52-63] 60  Resp:  [14-18] 18  BP: (111-153)/(44-57) 136/47  SpO2:  [93 %-96 %] 95 %      I/O last 3 completed shifts:  In: 470 [P.O.:470]  Out: 250  [Urine:250]    Unchanged           Data:       ROUTINE LABS (Last four results)  CMP  Recent Labs   Lab 05/16/19  1043      POTASSIUM 4.5   CHLORIDE 101   CO2 30   ANIONGAP 4   GLC 99   BUN 32*   CR 4.19*   GFRESTIMATED 12*   GFRESTBLACK 14*   AARON 9.8     CBC  Recent Labs   Lab 05/16/19  1043   WBC 8.6   RBC 3.85*   HGB 9.7*   HCT 32.5*   MCV 84   MCH 25.2*   MCHC 29.8*   RDW 18.1*        Path: poorly differentiated carcinoma .         Assessment and Plan:   1. Pain related to acute/subacute pathological compression fracture at L4 s/p vertebroplasty 5/14.    - Radiation therapy underway.     2. 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   - Biopsy of L4 done 5/14, pathology confirmedpoorly differentiated carcinoma .  Palliative approach due to performance status and ESRD, no systemic therapy planned.  He did not want to hear specifics about survival numbers, but aware of incurable disease and interested in palliative approach.  Will follow peripherally.      3. Anemia    - Suspicious for anemia of malignancy and CKD      4. ESDR  - Getting dialysis M-W-F  - Nephrology following.         Kevin Robles M.D.

## 2019-05-20 NOTE — PROGRESS NOTES
"BRIEF NUTRITION RE-ASSESSMENT      REASON FOR RE-ASSESSMENT:  Seven Savage Jr. is a 84 year old male seen by Registered Dietitian for LOS Follow-Up      CURRENT DIET AND INTAKE:  Diet:  Moderate CHO + Boost Glucose Control with meals               Patient reports that he ate well for breakfast today:  Oatmeal, Boost, gonzalez x 2, and a hard cooked egg  He had a Boost for lunch today  Patient states that his biggest issue is the dislike of our food -- would like to see Creole gumbo, ribs, prasanna greeens, and scotch.     Noted patient with metastatic CA and approach is more palliative.     ANTHROPOMETRICS:  Height: 5' 9\"  Weight:  73.2 kg (161#)(5/20)  Body mass index is 23.82 kg/m    Weight Status: Normal BMI  IBW:  72.7 kg   %IBW: 101%  Weight History: Weight stable since admit (appears that admit weight was reported)  Patient states that his weight was trending downward prior to admit - attributes this to fluid shifts + GERD and need to take Pepcid     LABS:  Labs noted    MALNUTRITION:  Patient does not meet two of the following criteria necessary for diagnosing malnutrition: significant weight loss, reduced intake, subcutaneous fat loss, muscle loss or fluid retention    NUTRITION INTERVENTION:  Nutrition Diagnosis:  No nutrition diagnosis at this time.    Implementation:  Nutrition Education:  Not appropriate at this time - patient reports that he follows with an RD at the [facility] dialysis unit.    FOLLOW UP/MONITORING:   Will re-evaluate in 7 - 10 days, or sooner, if re-consulted.    Bre Lundberg, RD, LD, CNSC   Clinical Dietitian - [facility]             "

## 2019-05-20 NOTE — PROGRESS NOTES
Potassium   Date Value Ref Range Status   05/20/2019 4.6 3.4 - 5.3 mmol/L Final     Lab Results   Component Value Date    HGB 10.0 05/20/2019     Weight: 73.2 kg (161 lb 4.8 oz)  POST WT 72.7kg  DIALYSIS PROCEDURE NOTE  Hepatitis status of previous patient on machine log was checked and verified ok to use with this patients hepatitis status.  Patient dialyzed for 2.75 hrs. on a 3/2 K bath with a net fluid removal of  0.5L.  A BFR of 400 ml/min was obtained via a LUAF using 15gauge needles.    The patient was seen by Dr. Lin during the treatment.  Total heparin received during the treatment: 0 units. Sites held x 15 min then  pressure drsgs applied.    Meds  Given: EPO 12,000units IV Complications:  NONE.  Procedure and ESRD teaching diet control verbalizes understanding.   See flowsheet in EPIC for further details and post assessment.  Machine water alarm in place and functioning. Transducer pods intact and checked every 15min.  Pt was dialyzed in his own room  Vascular Access: Aseptic prep done for both on/off.  Report received from:  Ilene Lopez Rn  Report given to: Sonny LugoRjhon  HEPATITIS B SURFACE ANTIGEN  Neg  DATE  5/6/19    HEPATITIS B SURFACE ANTIBODY  Immune  DATE 5/6/19  Chlorine/Chloramine water system checked every 4 hours.  Outpatient Dialysis at [facility]

## 2019-05-20 NOTE — PLAN OF CARE
"Discharge Planner PT   Patient plan for discharge: unsure - home with son or TCU  Current status: PT - pt initially declined PT as heading to dialysis soon, but easily able to convince pt to participate. Supine to sit with logrolling and SBA with railing, transferred to commode with walker and CGA, ambulated 10' with wh walker and CGA. Pt performs transfers and gait in an unsafe manner, pulling on walker and leaning way over walker/holding on to it on the front bar and not where the handholds are, resistant to using walker correctly. However, pt is moving better and had no loss of balance with mobility, rates pain 4/10. When asked if pain is better, he reports \"just a little bit better.\" Pt acknowledges that getting to dialysis from his son's home is going to be very difficult d/t car transfer and pain.  Barriers to return to prior living situation: Pt requires assist 1 for mobility  Recommendations for discharge: home to son's with assist for all mobility or TCU pending pt's ability to participate in therapy and discussion with palliative.   Rationale for recommendations: Pt is moving a bit better and walked today for the first time with PT this admission. Depending on goals of care, pt may be appropriate to go directly home with son. Will cont PT to address mobility deficits.       Entered by: Monique Euceda 05/20/2019 2:13 PM       "

## 2019-05-20 NOTE — PLAN OF CARE
DATE & TIME: 5/19/19 9942-1043  ORIENTATION: A&Ox4  BEHAVIOR & AGGRESSION TOOL COLOR: Green  CIWA SCORE: NA  ABNL VS/O2: Bradycardic (held Coreg) otherwise VSS on 1L sats mid/high 90s, baseline uses 1L O2 at home  MOBILITY: Up with two assist  PAIN MANAGMENT: Denied pain, Refused scheduled Tylenol, pt worried about taking too much tylenol  DIET: Mod Carb  BOWEL/BLADDER: Pt on HD, urinates only small amounts usually once per shift. Encouraging fluids  ABNL LAB/BG: /163  DRAIN/DEVICES: PIV SL, LUE fistula, bruit/thrill present  TELEMETRY RHYTHM: NA  SKIN: Dressings to sacrum CDI  TESTS/PROCEDURES: Dialysis tomorrow and Radiation tomorrow  D/C DAY/GOALS/PLACE: Discharge pending pain control and radiation treatments and TCU placement.   OTHER IMPORTANT INFO: Has been using back brace while in bed and has been comfortable while in bed.     Pt requested only taking half the dose of his Cardura. MD updated and was ok with this.

## 2019-05-20 NOTE — CONSULTS
[facility]    Palliative Care Consultation     Seven Savage Jr.  MRN# [id number]  Date of Admission:  5/9/2019  Date of Service (when I saw the patient): 05/20/19  Reason for consult: Consulted by Dr. Beebe for Goals of care    Assessment & Plan   Seven Savage Jr. is a 84 year old male with PMH significant for ESRD on hemodialysis, COPD on home O2, DM2, HTN, HLD, and recent admission and diagnosis of L4 vertebral compression fracture, along with a right upper lobe lung nodule and liver lesions concerning for metastatic malignancy. He presents again with intractable back pain. 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 He was then initiated on palliative XRT for a total of 5 fractions for pain management. Pt otherwise declines further cancer directed therapy. We are consulted for goals of care. of note, I met him on 4/23 during his previous hospitalization.      Introduced the scope of our practice to Sarkis. Discussed our potential roles for symptom management, support/coping, and decisional support (aka goals of care).      Symptoms/Recommendations   1. Back pain. 2/2 L4 vertebral compression fracture along with malignancy. Now s/p L4 kyphoplasty. Undergoing palliative XRT to L4, will complete 5 fractions on Wed 5/22. Pain is overall managed at this point.   -Continue APAP 1g PO TID  -Ok to continue with oxycodone 5-10mg PO Q6hrs PRN for now. Probably not the best choice of opioid in the setting of ESRD, but given low doses and lack of adverse effects thus far, ok to continue for now. If pain worsens and warrants higher doses of opioids, would consider switching to dilaudid for breakthrough, along with consideration of fentanyl patch vs methadone for a long acting opioid option, as these are all safe options for pain management in ESRD  -Pt was fitted with a LSO corset during previous admission   -Flexeril 5mg  PO TID PRN or diazepam 5-10mg PO at bedtime PRN muscle spasms   -Can consider steroids for addition to pain management. I usually recommend dexamethasone 4mg PO daily for 1 week or so   -Pt will follow outpatient with Dr. Robles for ongoing pain management. Pt can be referred to palliative care if further assistance is needed, either through [facility] or [facility]. Pt would like to maintain his care at [facility] for now, ideally      2. Constipation. LBM 2 days ago.   -Increase senna 1-2 tabs PO BID  -Miralax daily     3. Goals of care. Pt is s/p surgery to his low back and biopsy of his L4 lesion, now diagnosed as NSCLC poorly differentiated carcinoma. He is recovering from surgery, and undergoing radiation to L4 for improved pain management. Similar to our discussion last month, Sarkis ideally will not pursue any additional cancer directed therapy. He understands he will die from this condition, but does not feel like he is dying now, nor does he feel ready to die. He understands what hospice is, and believes hospice is truly there for a person in his/her final days of life, perhaps when life (pain) becomes too unbearable. For now, Sarkis hopes to recover, have good pain management, and return to his son's home. He wants to continue with dialysis. He is open to having home care services. He reports needing assistance in getting to his dialysis appointments, and would like  to assist with this. He is adamantly opposed to TCU, as he had a bad experience at [facility] where they asked him to ambulate with poor pain control/management. When he declined activity due to uncontrolled pain, [facility] ultimately said he was noncompliant with the TCU medicare regulations. Sarkis fully plans to continue to follow with Dr. Robles outpatient. He trusts him and believes Dr. Robles will guide him as to the best plan of care. I again offer the option of palliative care either through [facility] vs [facility], for ongoing support in  symptom management. He would like to stick with [facility] for now. If pt is considering hospice, may be reasonable to look into whether he can qualify under his metastatic cancer and be able to continue his dialysis, given 2 separate terminal diagnoses. Similar to last month, he did not express interest in me checking into this today.      Support/Coping  -Wife  in . Pt has a supportive and local son with whom he lives. Grandchildren are very important to him   -Appreciate spiritual health support, as pt reports having a strong aranza      Decisional Support, Goals of Care, Counseling & Coordination  Decisional Capacity Intact?  -Yes   Health Care Directive on File?  -No  Code Status/Resuscitation Preferences?  -DNR/DNI      Case reviewed with Dr. Beebe, bedside RN Haylie, and unit care coordinator Re.     Thank you for involving us in the care of this patient and family. We will sign off at this time. Please do not hesitate to contact me with questions or concerns or the on-call provider for our team if evening or weekend.    Teresa SCHMITZ, [facility]  Palliative Medicine   Pager [phone]    Attestation:  Total time on the floor involved in the patient's care: 70 minutes  Total time spent in counseling/care coordination: >50%    Chief Complaint   Intractable back pain     History is obtained from the patient, staff, and extensive chart review.     Past Medical History    I have reviewed this patient's medical history and updated it with pertinent information if needed.   Past Medical History:   Diagnosis Date     COPD (chronic obstructive pulmonary disease) (H)      CRF (chronic renal failure)      Heart murmur      HLD (hyperlipidemia)      HTN (hypertension)      IDDM (insulin dependent diabetes mellitus) (H)      Renal calculi        Past Surgical History   I have reviewed this patient's surgical history and updated it with pertinent information if needed.  Past Surgical History:   Procedure  Laterality Date     ADENOIDECTOMY       ENDOSCOPIC POLYPECTOMY NASAL      Through vocal cords     IR LUMBAR VERTEBROPLASTY  2019     Renal Stent      For renal calculi     TONSILLECTOMY         Social History   Living situation: Lives in a house with his son, MARGARITA, and 2 grandchildren. PTA he was at [employer] [facility] [employer]      Family system: Wife  in . 1 son supportive and local. Grandchildren are important to him      Self-identified support system: As above      Employment/education: He graduated from [employer]. Psychiatrist, also worked in addiction medicine.      Activities/interests: Medicine, talking about God and Orthodoxy, enjoys watching basketball, being with his grandchildren      Use of community resources: None      Mormonism affiliation: Yes      Involvement in aranza community: Yes      Impact of illness on patient: Pt has ESRD and now met cancer. He is working through his choices, but does not necessarily feel ready for hospice     Family History   I have reviewed this patient's family history and updated it with pertinent information if needed.   Family History   Family history unknown: Yes       Allergies   Allergies   Allergen Reactions     Levaquin [Levofloxacin]      edema     Pioglitazone Other (See Comments)     Edema & hay fever     Vytorin Other (See Comments)     Muscle aches       Medications   Current Facility-Administered Medications Ordered in Epic   Medication Dose Route Frequency Last Rate Last Dose     - MEDICATION INSTRUCTIONS for Dialysis Patients -   Does not apply See Admin Instructions         0.9% sodium chloride BOLUS  250 mL Intravenous Once in dialysis         0.9% sodium chloride BOLUS  300 mL Hemodialysis Machine Once         0.9% sodium chloride BOLUS  100-150 mL Intravenous Q15 Min PRN         acetaminophen (TYLENOL) tablet 975 mg  975 mg Oral 3 times daily   325 mg at 19 0933     albumin human 25 % injection 50 mL  50 mL Intravenous Q1H PRN         albumin human 5  % injection 250 mL  250 mL Intravenous Q1H PRN         albuterol (PROAIR HFA/PROVENTIL HFA/VENTOLIN HFA) 108 (90 Base) MCG/ACT inhaler 1-2 puff  1-2 puff Inhalation Q4H PRN         albuterol (PROVENTIL) neb solution 1.25 mg  1.25 mg Nebulization Q4H PRN         atorvastatin (LIPITOR) tablet 40 mg  40 mg Oral Daily   40 mg at 05/19/19 2202     carvedilol (COREG) tablet 12.5 mg  12.5 mg Oral BID w/meals   12.5 mg at 05/19/19 1011     cyclobenzaprine (FLEXERIL) tablet 5 mg  5 mg Oral TID PRN   5 mg at 05/20/19 0933     glucose gel 15-30 g  15-30 g Oral Q15 Min PRN        Or     dextrose 50 % injection 25-50 mL  25-50 mL Intravenous Q15 Min PRN        Or     glucagon injection 1 mg  1 mg Subcutaneous Q15 Min PRN         diazepam (VALIUM) tablet 5-10 mg  5-10 mg Oral At Bedtime PRN   5 mg at 05/19/19 2202     diltiazem ER (DILT-XR) 300 mg  300 mg Oral Daily   300 mg at 05/19/19 1016     doxazosin (CARDURA) tablet 8 mg  8 mg Oral At Bedtime   4 mg at 05/19/19 2202     eplerenone (INSPRA) tablet 50 mg  50 mg Oral Daily   50 mg at 05/19/19 1012     epoetin libby (EPOGEN/PROCRIT) injection 12,000 Units  12,000 Units Intravenous Once per day on Mon Wed Fri   12,000 Units at 05/17/19 0911     fish oil-omega-3 fatty acids capsule 1 g  1 g Oral Daily   1 g at 05/20/19 0933     fluticasone (ARNUITY ELLIPTA) 200 MCG/ACT inhaler 1 puff  1 puff Inhalation Daily   1 puff at 05/20/19 0938     furosemide (LASIX) tablet 20 mg  20 mg Oral Daily   20 mg at 05/19/19 1012     insulin aspart (NovoLOG) inj (RAPID ACTING)  1-7 Units Subcutaneous TID AC   1 Units at 05/13/19 1751     insulin aspart (NovoLOG) inj (RAPID ACTING)  1-5 Units Subcutaneous At Bedtime         insulin aspart (NovoLOG) inj (RAPID ACTING)  4 Units Subcutaneous QAM AC   4 Units at 05/20/19 0931     insulin glargine (LANTUS PEN) injection 10 Units  10 Units Subcutaneous At Bedtime   10 Units at 05/19/19 2204     irbesartan (AVAPRO) tablet 150 mg  150 mg Oral Daily   150 mg  at 05/19/19 1012     lidocaine 1 % 0.5 mL  0.5 mL Intradermal Once PRN         lidocaine 1 % 0.5 mL  0.5 mL Intradermal Once PRN         melatonin tablet 1 mg  1 mg Oral At Bedtime PRN         multivitamin RENAL (NEPHROCAPS/TRIPHROCAPS) capsule 1 capsule  1 capsule Oral Daily   1 capsule at 05/20/19 0933     naloxone (NARCAN) injection 0.1-0.4 mg  0.1-0.4 mg Intravenous Q2 Min PRN         No heparin via hemodialysis machine   Does not apply Once         ondansetron (ZOFRAN-ODT) ODT tab 4 mg  4 mg Oral Q6H PRN        Or     ondansetron (ZOFRAN) injection 4 mg  4 mg Intravenous Q6H PRN         oxyCODONE (ROXICODONE) tablet 5-10 mg  5-10 mg Oral Q6H PRN   5 mg at 05/20/19 1350     polyethylene glycol (MIRALAX/GLYCOLAX) Packet 17 g  17 g Oral Daily   17 g at 05/17/19 1120     prochlorperazine (COMPAZINE) injection 5 mg  5 mg Intravenous Q6H PRN        Or     prochlorperazine (COMPAZINE) tablet 5 mg  5 mg Oral Q6H PRN        Or     prochlorperazine (COMPAZINE) Suppository 12.5 mg  12.5 mg Rectal Q12H PRN         ranitidine (ZANTAC) tablet 150 mg  150 mg Oral Daily PRN         senna-docusate (SENOKOT-S/PERICOLACE) 8.6-50 MG per tablet 1 tablet  1 tablet Oral BID   1 tablet at 05/19/19 1012     Stop Heparin 60 minutes before end of treatment   Does not apply Continuous PRN         vitamin D3 (CHOLECALCIFEROL) 1000 units (25 mcg) tablet 2,000 Units  2,000 Units Oral Daily   2,000 Units at 05/20/19 0933     No current Epic-ordered outpatient medications on file.       Review of Systems   The comprehensive review of systems is negative other than noted here and in the assessment/plan.    Palliative Symptom Review (0=no symptom/no concern, 1=mild, 2=moderate, 3=severe):  Pain: 2    Physical Exam   Temp: 97.9  F (36.6  C) Temp src: Oral BP: 136/47   Heart Rate: 60 Resp: 18 SpO2: 95 % O2 Device: Nasal cannula Oxygen Delivery: 1 LPM  Vitals:    05/15/19 0815 05/15/19 1120 05/20/19 0541   Weight: 73.5 kg (162 lb 0.6 oz) 73 kg (160  lb 15 oz) 73.2 kg (161 lb 4.8 oz)     CONSTITUTIONAL: Chronically ill elderly man seen lying in bed in NAD, A&Ox3. Calm and cooperative.  HEENT: NCAT  RESPIRATORY: NL respiratory effort on NC at rest   NEUROLOGIC: Appropriately responsive during interview  PSYCH: Affect congruent     Data   Results for orders placed or performed during the hospital encounter of 05/09/19 (from the past 24 hour(s))   Glucose by meter   Result Value Ref Range    Glucose 125 (H) 70 - 99 mg/dL   Glucose by meter   Result Value Ref Range    Glucose 162 (H) 70 - 99 mg/dL   Glucose by meter   Result Value Ref Range    Glucose 176 (H) 70 - 99 mg/dL   Glucose by meter   Result Value Ref Range    Glucose 130 (H) 70 - 99 mg/dL

## 2019-05-20 NOTE — PLAN OF CARE
DATE & TIME: 5/20/19 night shift  ORIENTATION: A&Ox4  BEHAVIOR & AGGRESSION TOOL COLOR: Green  CIWA SCORE: NA  ABNL VS/O2: VSS on 1L sats mid/high 90s, baseline uses 1L O2 at home  MOBILITY: Up with two assist  PAIN MANAGMENT: Denied pain, did say he was having spasm, declined medication  DIET: Mod Carb, refused 0200 blood sugar  BOWEL/BLADDER: Pt on HD, 125ml output over night  ABNL LAB/BG:   DRAIN/DEVICES: PIV SL, LUE fistula, bruit/thrill present  TELEMETRY RHYTHM: NA  SKIN: Dressings to sacrum CDI  TESTS/PROCEDURES: Dialysis tomorrow and Radiation tomorrow  D/C DAY/GOALS/PLACE: Discharge pending pain control and radiation treatments and TCU placement.   OTHER IMPORTANT INFO: Has been using back brace while in bed and has been comfortable while in bed.

## 2019-05-20 NOTE — PROGRESS NOTES
[facility]    Medicine Progress Note - Hospitalist Service       Date of Admission:  5/9/2019  Assessment & Plan       This is an 84-year-old gentleman with a history of end-stage renal disease and acute on chronic back pain that is uncontrolled, likely related to a pathologic compression fracture in the setting of metastatic carcinoma with unknown primary.       Back pain,   L4 vertebral body with mild anterior compression   The patient is being admitted for pain control.  He had been taking hydrocodone/APAP, 2 tablets per day   -- pain medications adjusted, scheduled tylenol with prn oxycodone added .  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  --continue physical therapy /occupational therapy , possibly discharge to home vs tcu in am depending on pain control   --radiation oncology consulted and suggesting radiation if pain persists after kyphoplasty.  --pain persisted and plan is to get radiation-- initiated 5/16.  --back spasms still present but better .  --will continue radiation therapy for now and reassess daily for discharge plans, discussed with  Family , patient  Has agreed to go to tcu if he is weak by Monday   -day 3 /5 of radiation today, will need tcu on discharge   --palliative care consulted today, discussed with  Them .  --possible tcu discharge on Wednesday      ESRD; followed by Nephrology On HD  Continue hd as per schedule.MWF  - The patient typically has Monday, Wednesday, Friday dialysis.     Aortic stenosis:   Severe, mean 54mmHg, and the  VITO 0.8cm2,   Continue PTA coreg, Diltiazem  And lasix     History of metastatic carcinoma  Malignant appearing lesions in lung, liver, spine and possible kidneys and adrenals.  The patient is followed by Dr. Robles and was still undergoing evaluation for this.  He was supposed to have a PET scan done last Monday, but was unable to  make it due to his back pain.  I have consulted Dr. Robles's service to see if they can help in this regard.  With respect to cancer treatment, again, evaluation was still underway.  The patient was actually seen by Palliative Medicine during his previous hospitalization and was not ready, at that point, to consider hospice or end of life cares, although the patient currently does seem to have realistic expectations for ongoing care goals.  --status post biopsy of epidural growth 5/14  --poorly differentiated metastatic malignancy as per pathology.     Type 2 diabetes.  The patient is maintained on Lantus and scheduled aspart; these will be continued.         Diet: Moderate Consistent CHO Diet  Snacks/Supplements Adult: Boost Glucose Control; Between Meals    DVT Prophylaxis: Low Risk/Ambulatory with no VTE prophylaxis indicated  Dougherty Catheter: not present  Code Status: DNR/DNI      Disposition Plan   Expected discharge: Tomorrow, recommended to transitional care unit once when pain is under control and per PT/OT final reccs.  Entered: Rubi Beebe MD 05/20/2019, 10:06 AM       The patient's care was discussed with the Bedside Nurse.    Rubi Beebe MD  Hospitalist Service  [facility]    ______________________________________________________________________    Interval History   Pain better today but he is not hopeful now that he can directly go home from here so planning to go to tcu after radiation treatment done.    Data reviewed today: I reviewed all medications, new labs and imaging results over the last 24 hours. I personally reviewed no images or EKG's today.    Physical Exam   Vital Signs: Temp: 97.9  F (36.6  C) Temp src: Oral BP: 136/47 Pulse: 62 Heart Rate: 60 Resp: 18 SpO2: 95 % O2 Device: Nasal cannula Oxygen Delivery: 1 LPM  Weight: 161 lbs 4.8 oz  Constitutional: Awake, alert, cooperative, no apparent distress  Respiratory: Clear to auscultation bilaterally, no crackles or  wheezing  Cardiovascular: Regular rate and rhythm, normal S1 and S2, and no murmur noted  GI: Normal bowel sounds, soft, non-distended, non-tender  Skin/Integumen: fistula noted, he  have no localized tenderness on his spine today.  Neuro : moving all 4 extremities, no focal deficit noted     Data

## 2019-05-21 LAB
GLUCOSE BLDC GLUCOMTR-MCNC: 115 MG/DL (ref 70–99)
GLUCOSE BLDC GLUCOMTR-MCNC: 124 MG/DL (ref 70–99)
GLUCOSE BLDC GLUCOMTR-MCNC: 135 MG/DL (ref 70–99)
GLUCOSE BLDC GLUCOMTR-MCNC: 179 MG/DL (ref 70–99)
GLUCOSE BLDC GLUCOMTR-MCNC: 77 MG/DL (ref 70–99)

## 2019-05-21 PROCEDURE — A9270 NON-COVERED ITEM OR SERVICE: HCPCS | Performed by: NURSE PRACTITIONER

## 2019-05-21 PROCEDURE — 25000132 ZZH RX MED GY IP 250 OP 250 PS 637: Performed by: INTERNAL MEDICINE

## 2019-05-21 PROCEDURE — 25000132 ZZH RX MED GY IP 250 OP 250 PS 637: Performed by: NURSE PRACTITIONER

## 2019-05-21 PROCEDURE — A9270 NON-COVERED ITEM OR SERVICE: HCPCS | Performed by: INTERNAL MEDICINE

## 2019-05-21 PROCEDURE — 25000131 ZZH RX MED GY IP 250 OP 636 PS 637: Performed by: INTERNAL MEDICINE

## 2019-05-21 PROCEDURE — 00000146 ZZHCL STATISTIC GLUCOSE BY METER IP

## 2019-05-21 PROCEDURE — 12000000 ZZH R&B MED SURG/OB

## 2019-05-21 PROCEDURE — 99232 SBSQ HOSP IP/OBS MODERATE 35: CPT | Performed by: INTERNAL MEDICINE

## 2019-05-21 PROCEDURE — 77412 RADIATION TX DELIVERY LVL 3: CPT | Mod: XE

## 2019-05-21 RX ORDER — OXYCODONE HYDROCHLORIDE 5 MG/1
5-10 TABLET ORAL EVERY 4 HOURS PRN
Status: DISCONTINUED | OUTPATIENT
Start: 2019-05-21 | End: 2019-05-23 | Stop reason: HOSPADM

## 2019-05-21 RX ORDER — CYCLOBENZAPRINE HCL 5 MG
10 TABLET ORAL 3 TIMES DAILY PRN
Status: DISCONTINUED | OUTPATIENT
Start: 2019-05-21 | End: 2019-05-23 | Stop reason: HOSPADM

## 2019-05-21 RX ORDER — FENTANYL 25 UG/1
25 PATCH TRANSDERMAL
Status: DISCONTINUED | OUTPATIENT
Start: 2019-05-21 | End: 2019-05-23 | Stop reason: HOSPADM

## 2019-05-21 RX ADMIN — Medication 1 CAPSULE: at 09:18

## 2019-05-21 RX ADMIN — FLUTICASONE FUROATE 1 PUFF: 200 POWDER RESPIRATORY (INHALATION) at 09:25

## 2019-05-21 RX ADMIN — ACETAMINOPHEN 975 MG: 325 TABLET, FILM COATED ORAL at 09:19

## 2019-05-21 RX ADMIN — CHOLECALCIFEROL TAB 50 MCG (2000 UNIT) 2000 UNITS: 50 TAB at 09:18

## 2019-05-21 RX ADMIN — DIAZEPAM 5 MG: 5 TABLET ORAL at 21:40

## 2019-05-21 RX ADMIN — DOXAZOSIN 8 MG: 4 TABLET ORAL at 21:41

## 2019-05-21 RX ADMIN — EPLERENONE 50 MG: 25 TABLET ORAL at 09:18

## 2019-05-21 RX ADMIN — IRBESARTAN 150 MG: 150 TABLET ORAL at 09:19

## 2019-05-21 RX ADMIN — FENTANYL 1 PATCH: 25 PATCH, EXTENDED RELEASE TRANSDERMAL at 19:07

## 2019-05-21 RX ADMIN — DILTIAZEM HYDROCHLORIDE 300 MG: 180 CAPSULE, EXTENDED RELEASE ORAL at 09:24

## 2019-05-21 RX ADMIN — INSULIN GLARGINE 10 UNITS: 100 INJECTION, SOLUTION SUBCUTANEOUS at 21:39

## 2019-05-21 RX ADMIN — ACETAMINOPHEN 975 MG: 325 TABLET, FILM COATED ORAL at 20:13

## 2019-05-21 RX ADMIN — OMEGA-3 FATTY ACIDS CAP 1000 MG 1 G: 1000 CAP at 09:18

## 2019-05-21 RX ADMIN — CARVEDILOL 12.5 MG: 12.5 TABLET, FILM COATED ORAL at 17:53

## 2019-05-21 RX ADMIN — ATORVASTATIN CALCIUM 40 MG: 40 TABLET, FILM COATED ORAL at 20:13

## 2019-05-21 RX ADMIN — FUROSEMIDE 20 MG: 20 TABLET ORAL at 09:19

## 2019-05-21 RX ADMIN — CYCLOBENZAPRINE HYDROCHLORIDE 5 MG: 5 TABLET, FILM COATED ORAL at 09:19

## 2019-05-21 RX ADMIN — OXYCODONE HYDROCHLORIDE 5 MG: 5 TABLET ORAL at 09:19

## 2019-05-21 RX ADMIN — ACETAMINOPHEN 975 MG: 325 TABLET, FILM COATED ORAL at 17:53

## 2019-05-21 RX ADMIN — OXYCODONE HYDROCHLORIDE 5 MG: 5 TABLET ORAL at 13:51

## 2019-05-21 RX ADMIN — CARVEDILOL 12.5 MG: 12.5 TABLET, FILM COATED ORAL at 09:18

## 2019-05-21 RX ADMIN — CYCLOBENZAPRINE HYDROCHLORIDE 5 MG: 5 TABLET, FILM COATED ORAL at 13:51

## 2019-05-21 RX ADMIN — SENNOSIDES AND DOCUSATE SODIUM 1 TABLET: 8.6; 5 TABLET ORAL at 09:18

## 2019-05-21 ASSESSMENT — ACTIVITIES OF DAILY LIVING (ADL)
ADLS_ACUITY_SCORE: 19
ADLS_ACUITY_SCORE: 20
ADLS_ACUITY_SCORE: 18
ADLS_ACUITY_SCORE: 19

## 2019-05-21 NOTE — PROGRESS NOTES
Chart reviewed. Labs and vitals reviewed.   Plan for HD tomorrow.     Recent Labs   Lab Test 05/20/19  1600 05/16/19  1043    135   POTASSIUM 4.6 4.5   CHLORIDE 98 101   CO2 33* 30   ANIONGAP 7 4   * 99   BUN 54* 32*   CR 5.34* 4.19*   AARON 10.7* 9.8       Kallie Lin MD  [facility] - Nephrology   [phone]

## 2019-05-21 NOTE — PROGRESS NOTES
SW:  D:  Writer spoke with son. Explained patient is willing to enter a TCU.   Son reports he has family at home to care for patient, his only concern with discharge to his home is the difficulty in transporting patient to dialysis.   Son stated he will be here and speak with his father.

## 2019-05-21 NOTE — PLAN OF CARE
DATE & TIME: 5/20/19 3128-9180  ORIENTATION: A&Ox4  BEHAVIOR & AGGRESSION TOOL COLOR: Green  CIWA SCORE: NA  ABNL VS/O2: VSS on 1L  MOBILITY: Up with two assist, refused to get OOB this shift, weight shifts himself but prefers left side  PAIN MANAGMENT: C/o back pain, scheduled tylenol (2 tabs) given with relief  DIET: Mod Carb  BOWEL/BLADDER: Pt on HD, voids small amounts, 175 out this shift  ABNL LAB/BG: /168  DRAIN/DEVICES: PIV SL to R hand, LUE fistula for dialysis   TELEMETRY RHYTHM: NA  SKIN: Intact, dressings to sacrum CDI  TESTS/PROCEDURES: Plan for Radiation tomorrow morning 1030. Tomorrow will be the 4th out of 5 treatments.   D/C DAY/GOALS/PLACE: Discharge pending progress and whether pt going to TCU or Home.   OTHER IMPORTANT INFO:

## 2019-05-21 NOTE — PROGRESS NOTES
[facility]    Medicine Progress Note - Hospitalist Service       Date of Admission:  5/9/2019  Assessment & Plan       This is an 84-year-old gentleman with a history of end-stage renal disease and acute on chronic back pain that is uncontrolled, likely related to a pathologic compression fracture in the setting of metastatic carcinoma with unknown primary.       Back pain,   L4 vertebral body with mild anterior compression   The patient is being admitted for pain control.  He had been taking hydrocodone/APAP, 2 tablets per day   -- pain medications adjusted, scheduled tylenol with prn oxycodone added .  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  --continue physical therapy /occupational therapy , possibly discharge to home vs tcu in am depending on pain control   --radiation oncology consulted and suggesting radiation if pain persists after kyphoplasty.  --pain persisted and plan is to get radiation-- initiated 5/16.  --back spasms still present but better .  --will continue radiation therapy for now and reassess daily for discharge plans, discussed with  Family , patient  Has agreed to go to tcu if he is weak by Monday   -day 4 /5 of radiation today, will need tcu on discharge   --plan for now is discharge to tcu on Wednesday after dialysis and 5/5 radiation treatment.     ESRD; followed by Nephrology On HD  Continue hd as per schedule.MWF  - The patient typically has Monday, Wednesday, Friday dialysis.     Aortic stenosis:   Severe, mean 54mmHg, and the  VITO 0.8cm2,   Continue PTA coreg, Diltiazem  And lasix     History of metastatic carcinoma  Malignant appearing lesions in lung, liver, spine and possible kidneys and adrenals.  The patient is followed by Dr. Robles and was still undergoing evaluation for this.  He was supposed to have a PET scan done last Monday, but was unable to make it  due to his back pain.  I have consulted Dr. Robles's service to see if they can help in this regard.  With respect to cancer treatment, again, evaluation was still underway.  The patient was actually seen by Palliative Medicine during his previous hospitalization and was not ready, at that point, to consider hospice or end of life cares, although the patient currently does seem to have realistic expectations for ongoing care goals.  --status post biopsy of epidural growth 5/14  --poorly differentiated metastatic malignancy as per pathology.     Type 2 diabetes.  The patient is maintained on Lantus and scheduled aspart; these will be continued.         Diet: Moderate Consistent CHO Diet  Snacks/Supplements Adult: Boost Glucose Control; Between Meals    DVT Prophylaxis: Low Risk/Ambulatory with no VTE prophylaxis indicated  Dougherty Catheter: not present  Code Status: DNR/DNI      Disposition Plan   Expected discharge: Tomorrow, recommended to transitional care unit once dialysis and radiation 5/5 treatment over.  Entered: Rubi Beebe MD 05/21/2019, 10:02 AM       The patient's care was discussed with the Bedside Nurse.    Rubi Beebe MD  Hospitalist Service  [facility]    ______________________________________________________________________    Interval History   Pain has improved much but occasionally it gets worse with movement, discussed tcu discharge plans possibly in am after dialysis and last radiation treatment of this course, patient  Did use oxygen prior to this admission.    Data reviewed today: I reviewed all medications, new labs and imaging results over the last 24 hours. I personally reviewed no images or EKG's today.    Physical Exam   Vital Signs: Temp: 97.6  F (36.4  C) Temp src: Oral BP: 149/61   Heart Rate: 71 Resp: 16 SpO2: 98 % O2 Device: Nasal cannula Oxygen Delivery: 1 LPM  Weight: 161 lbs 4.8 oz  Constitutional: Awake, alert, cooperative, no apparent  distress  Respiratory: Clear to auscultation bilaterally, no crackles or wheezing  Cardiovascular: Regular rate and rhythm, normal S1 and S2, and no murmur noted  GI: Normal bowel sounds, soft, non-distended, non-tender  Skin/Integumen: fistula noted, he  have no localized tenderness on his spine today.  Neuro : moving all 4 extremities, no focal deficit noted     Data

## 2019-05-21 NOTE — PROGRESS NOTES
"D:  Discharge is anticipated tomorrow after radiation and dialysis.  [facility] note indicates patient continues to decline TCU and plans to return to his son's home.   Patient did participate in PT yesterday.    I:  Writer placed a call to son to discuss the discharge plan with him. Patient had agreed to involving son during our initial conversation last week.  Met with patient to discuss his plans to return to his son's home directly rather than enter a TCU.  Discussed consideration of TCU  He understands in order for his TCU to be covered by Medicare he must be willing to participate in therapy twice a day or least once a day.    Method of transportation to and from dialysis discussed emphasizing the w/c in the [facility] is not able to be reclined.  Also explained the [facility] transport is not covered by his supplement.  Discussed applying for [employer]. He believes the application was started while he was at [facility] however doesn't know if the application was completed.    When asked if patient will consider a TCU he said he will \"work with the team\". In other words he is willing to change his mind and enter a TCU.    He prefers a private room and is aware of the private room fee.    Encouraged patient to spend time up out of bed and to consistently participate in therapy while acknowledging his pain.  .  Explained the TCU's want to see that he will cooperate with out of bed activity and therapy.    P: Referral to [facility], [facility].    "

## 2019-05-21 NOTE — PROGRESS NOTES
[facility]  [facility]  Hematology/oncology Progress Note            History:   Sarkis is an 84 year old admitted 5/9 with acute on chronic back pain     Sarkis started to having increasing low back pain this week. On Monday, he attempted PET/CT scan but was not able to be completed due to pain. His pain has continued and he came in to the ED for evaluation.     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  He has a history of hypertension, diabetes and end-stage renal disease.  He has been on dialysis since 2016 and is managed on erythropoietin and venofer. Hemoglobin had been around 10-11 but more recently fell to 7.2. He was given 2 unit(s) [facility].  His work up in our clinic included a haptoglobin of 115.  Reticulocyte count of 3.5.  IgG level of 458.  IgA 199.  IgM 20.  TSH was 2.87.  .  Serum protein electrophoresis demonstrated no monoclonal protein.  Peripheral morphology demonstrated moderate hypochromic, normocytic anemia with mild reticulocytosis.  Rare schistocytes were present.  There were no apparent dysplastic or neoplastic changes.     Given no obvious explanation for the patient's anemia, a bone marrow biopsy was recommended but he declined.     He subsequently seen in the ER on 4/22/19 for evaluation of back and left leg pain.       He had been seen by a chiropractor and underwent an MRI scan of his lumbar spine which demonstrated an L4 vertebral compression fracture which was later over-read as concerning for metastatic disease.       While in the ER, he underwent a CT scan of the chest abdomen and pelvis with contrast which demonstrated a spiculated right upper lobe pulmonary nodule, highly suspicious for malignancy such as bronchogenic carcinoma.  There was lytic change in the L4 vertebral body with mild anterior compression, suspicious for metastatic lesion and an associated pathologic compression fracture, age  indeterminate.  Multiple low-density liver lesions, not well characterized on a noncontrast scan but considered suspicious for metastatic disease.  There was mild mediastinal adenopathy.  Multiple bilateral adrenal nodules were indeterminant.  There are multiple hyperdense renal lesions bilaterally which could represent hemorrhagic or proteinaceous renal cysts.  Solid renal neoplasm could not be excluded.  There was moderate prostatic enlargement and an infra renal abdominal aortic aneurysm measuring 3.2 cm.       Had L4 Biopsy and L4 Vertebroplasty this am. No complications.  Back pain is stable.   Continues radiation through Wednesday    Feels back pain improving         Medications:       - MEDICATION INSTRUCTIONS for Dialysis Patients -   Does not apply See Admin Instructions     acetaminophen  975 mg Oral 3 times daily     atorvastatin  40 mg Oral Daily     carvedilol  12.5 mg Oral BID w/meals     zz extemporaneous template  300 mg Oral Daily     doxazosin  8 mg Oral At Bedtime     eplerenone  50 mg Oral Daily     epoetin libby (EPOGEN,PROCRIT) inj ESRD  12,000 Units Intravenous Once per day on Mon Wed Fri     fentaNYL  25 mcg Transdermal Q72H     fentaNYL   Transdermal Q8H     [START ON 5/24/2019] fentaNYL   Transdermal Q72H     fish oil-omega-3 fatty acids  1 g Oral Daily     fluticasone  1 puff Inhalation Daily     furosemide  20 mg Oral Daily     insulin aspart  1-7 Units Subcutaneous TID AC     insulin aspart  1-5 Units Subcutaneous At Bedtime     insulin aspart  4 Units Subcutaneous QAM AC     insulin glargine  10 Units Subcutaneous At Bedtime     irbesartan  150 mg Oral Daily     multivitamin RENAL  1 capsule Oral Daily     polyethylene glycol  17 g Oral Daily     senna-docusate  1-2 tablet Oral BID     vitamin D3  2,000 Units Oral Daily                ROS:              Physical Exam:       Vital Sign Ranges  Temp:  [97.3  F (36.3  C)-98  F (36.7  C)] 97.5  F (36.4  C)  Pulse:  [72] 72  Heart Rate:   [60-76] 64  Resp:  [16] 16  BP: (123-157)/(48-93) 138/56  SpO2:  [97 %-99 %] 99 %      I/O last 3 completed shifts:  In: 480 [P.O.:480]  Out: 675 [Urine:175; Other:500]    Unchanged           Data:       ROUTINE LABS (Last four results)  CMP  Recent Labs   Lab 05/20/19  1600 05/16/19  1043    135   POTASSIUM 4.6 4.5   CHLORIDE 98 101   CO2 33* 30   ANIONGAP 7 4   * 99   BUN 54* 32*   CR 5.34* 4.19*   GFRESTIMATED 9* 12*   GFRESTBLACK 11* 14*   AARON 10.7* 9.8   PHOS 6.9*  --    ALBUMIN 2.8*  --      CBC  Recent Labs   Lab 05/20/19  1600 05/16/19  1043   WBC 9.9 8.6   RBC 3.92* 3.85*   HGB 10.0* 9.7*   HCT 32.7* 32.5*   MCV 83 84   MCH 25.5* 25.2*   MCHC 30.6* 29.8*   RDW 18.2* 18.1*    376     Path: poorly differentiated carcinoma .         Assessment and Plan:   1. Pain related to acute/subacute pathological compression fracture at L4 s/p vertebroplasty 5/14.    - Radiation therapy underway.     2. 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   - Biopsy of L4 done 5/14, pathology confirmedpoorly differentiated carcinoma .  Palliative approach due to performance status and ESRD, no systemic therapy planned.  He did not want to hear specifics about survival numbers, but aware of incurable disease and interested in palliative approach.  Will follow peripherally.      3. Anemia    - Suspicious for anemia of malignancy and CKD      4. ESDR  - Getting dialysis M-W-F  - Nephrology following.     PLAN; will arrange f/u in 1 month, recommend to continue f/u with palliative team after discharge.    Kevin Robles M.D.

## 2019-05-21 NOTE — PROGRESS NOTES
[facility]:    Reviewed pt for admission and, at this time, the pt does not appear to meet the medical criteria required for LTAC.     Please re-refer this pt if there is an increase in the pt's medical complexity that would warrant further consideration.    Referral status update provided to RN CC.    Thank you for the referral, please feel free to contact me with any questions or concerns.         Thank you,    Tiffanie Danielle  Referral Specialist  [facility]   [email or web address]  [phone] (direct)

## 2019-05-21 NOTE — PLAN OF CARE
DATE & TIME: 5/21/2019 0600  ORIENTATION: A&Ox4  BEHAVIOR & AGGRESSION TOOL COLOR: Green  CIWA SCORE: NA  ABNL VS/O2: 1L  MOBILITY: Ax2, gb, walker, back brace when OOB  PAIN MANAGMENT: Valium, oxycodone, tylenol  DIET: Mod carb diet   BOWEL/BLADDER: Continent, on hemodialysis   ABNL LAB/BG:  DRAIN/DEVICES: LUE fistula   TELEMETRY RHYTHM:   SKIN: Incision to lower back, dressings CDI  TESTS/PROCEDURES:   D/C DAY/GOALS/PLACE: Pending  OTHER IMPORTANT INFO: Dialysis MWF, radiation today

## 2019-05-21 NOTE — PLAN OF CARE
DATE & TIME: 5/21/19, day shift  ORIENTATION: alert and oriented x4  BEHAVIOR & AGGRESSION TOOL COLOR: GREEN  CIWA SCORE: NA  ABNL VS/O2: 144/61  MOBILITY: SBAx1 GB walker  PAIN MANAGMENT: 5mg oxycodone and flexeril given at same time x2 with relief  DIET: Diabetic diet, fair appetite  BOWEL/BLADDER: continent, no BM, feels constipated  ABNL LAB/BG:  BGMs 77 and 125, fair appetite  DRAIN/DEVICES: none  TELEMETRY RHYTHM: NA  SKIN: intact, except 2 incision sites from bone biopsy on lower back  TESTS/PROCEDURES: none  D/C DAY/GOALS/PLACE: Goal is to get patient to TCU, however patient needed much encouragement to get up and moving. After many discussions, was able to get up to commode very well with SBAx1 and GB and walker.   OTHER IMPORTANT INFO: Son in room and witnessed patient moving. SW involved as well.   Addendum: [phone] Patient refused to get up to the chair for dinner. Wanted to eat in bed. Did not want any more pain medications since he wasn't having any spasms at this time,while laying in bed. BGM was 124. Agreed to get up to chair tomorrow AM for breakfast and with PT.

## 2019-05-21 NOTE — PROGRESS NOTES
Radiation therapy treatment #4 of 5. Pt received 400 cGy with 10 MV radiation to the lumbar spine for a total dose of 1,600 cGy given thus far. Plan for 1 more XRT tx with an end dose of 2,000 cGy. Next tx 5/22/19 at 10:30 in lower level radiation.

## 2019-05-21 NOTE — PROGRESS NOTES
SW:  D:  Writer met with patient and son.  Bedside nurse also involved to discuss symptom management of back pain which is the current barrier to patient's limited mobility and participation in therapy.    Son has expressed to patient that he supports TCU prior to patient returning home.  Patient accepting of TCU and does verbalize understanding that TCU is only an option if he can increase his time out of bed and participate in therapy.    At time of discharge son is requesting [facility] if they are available.  If not, he and patient accept the use of [facility] and understand the medivan ride to TCU is private pay.    A:  Anticipate patient will be declined by TCU unless he can begin to show more ability to be out of bed for meals and participate in therapy.      P: Will speak with the TCU's tomorrow.

## 2019-05-21 NOTE — PLAN OF CARE
PT: Attempted to see patient for pm PT session; reports he had been OOB earlier today and now is having too much pain to participate; nurse made aware. Will attempt to have patient premedicated prior to attempted session on 5/22/19 (per plan with nurse)

## 2019-05-22 ENCOUNTER — APPOINTMENT (OUTPATIENT)
Dept: PHYSICAL THERAPY | Facility: CLINIC | Age: 84
DRG: 477 | End: 2019-05-22
Payer: MEDICARE

## 2019-05-22 ENCOUNTER — TRANSFERRED RECORDS (OUTPATIENT)
Dept: HEALTH INFORMATION MANAGEMENT | Facility: CLINIC | Age: 84
End: 2019-05-22

## 2019-05-22 LAB
GLUCOSE BLDC GLUCOMTR-MCNC: 140 MG/DL (ref 70–99)
GLUCOSE BLDC GLUCOMTR-MCNC: 151 MG/DL (ref 70–99)
GLUCOSE BLDC GLUCOMTR-MCNC: 62 MG/DL (ref 70–99)
GLUCOSE BLDC GLUCOMTR-MCNC: 69 MG/DL (ref 70–99)
GLUCOSE BLDC GLUCOMTR-MCNC: 84 MG/DL (ref 70–99)
GLUCOSE BLDC GLUCOMTR-MCNC: 95 MG/DL (ref 70–99)
GLUCOSE BLDC GLUCOMTR-MCNC: 96 MG/DL (ref 70–99)
GLUCOSE BLDC GLUCOMTR-MCNC: 96 MG/DL (ref 70–99)

## 2019-05-22 PROCEDURE — 97530 THERAPEUTIC ACTIVITIES: CPT | Mod: GP | Performed by: PHYSICAL THERAPIST

## 2019-05-22 PROCEDURE — A9270 NON-COVERED ITEM OR SERVICE: HCPCS | Performed by: INTERNAL MEDICINE

## 2019-05-22 PROCEDURE — 99231 SBSQ HOSP IP/OBS SF/LOW 25: CPT | Performed by: NURSE PRACTITIONER

## 2019-05-22 PROCEDURE — 99232 SBSQ HOSP IP/OBS MODERATE 35: CPT | Performed by: INTERNAL MEDICINE

## 2019-05-22 PROCEDURE — 25000132 ZZH RX MED GY IP 250 OP 250 PS 637: Performed by: INTERNAL MEDICINE

## 2019-05-22 PROCEDURE — 12000000 ZZH R&B MED SURG/OB

## 2019-05-22 PROCEDURE — 77412 RADIATION TX DELIVERY LVL 3: CPT | Mod: XE

## 2019-05-22 PROCEDURE — 63400005 ZZH RX 634: Performed by: INTERNAL MEDICINE

## 2019-05-22 PROCEDURE — 77336 RADIATION PHYSICS CONSULT: CPT

## 2019-05-22 PROCEDURE — 25000128 H RX IP 250 OP 636: Performed by: INTERNAL MEDICINE

## 2019-05-22 PROCEDURE — 00000146 ZZHCL STATISTIC GLUCOSE BY METER IP

## 2019-05-22 PROCEDURE — 90937 HEMODIALYSIS REPEATED EVAL: CPT

## 2019-05-22 PROCEDURE — 25000132 ZZH RX MED GY IP 250 OP 250 PS 637: Performed by: NURSE PRACTITIONER

## 2019-05-22 PROCEDURE — 25000131 ZZH RX MED GY IP 250 OP 636 PS 637: Performed by: INTERNAL MEDICINE

## 2019-05-22 PROCEDURE — A9270 NON-COVERED ITEM OR SERVICE: HCPCS | Performed by: NURSE PRACTITIONER

## 2019-05-22 RX ADMIN — SODIUM CHLORIDE 300 ML: 9 INJECTION, SOLUTION INTRAVENOUS at 14:07

## 2019-05-22 RX ADMIN — ACETAMINOPHEN 975 MG: 325 TABLET, FILM COATED ORAL at 08:25

## 2019-05-22 RX ADMIN — OXYCODONE HYDROCHLORIDE 10 MG: 5 TABLET ORAL at 08:24

## 2019-05-22 RX ADMIN — EPOETIN ALFA 12000 UNITS: 4000 SOLUTION INTRAVENOUS; SUBCUTANEOUS at 14:07

## 2019-05-22 RX ADMIN — DOXAZOSIN 4 MG: 4 TABLET ORAL at 21:39

## 2019-05-22 RX ADMIN — POLYETHYLENE GLYCOL 3350 17 G: 17 POWDER, FOR SOLUTION ORAL at 08:25

## 2019-05-22 RX ADMIN — OMEGA-3 FATTY ACIDS CAP 1000 MG 1 G: 1000 CAP at 08:27

## 2019-05-22 RX ADMIN — EPLERENONE 50 MG: 25 TABLET ORAL at 08:26

## 2019-05-22 RX ADMIN — IRBESARTAN 150 MG: 150 TABLET ORAL at 08:27

## 2019-05-22 RX ADMIN — CYCLOBENZAPRINE HYDROCHLORIDE 10 MG: 5 TABLET, FILM COATED ORAL at 08:24

## 2019-05-22 RX ADMIN — DIAZEPAM 5 MG: 5 TABLET ORAL at 21:39

## 2019-05-22 RX ADMIN — FUROSEMIDE 20 MG: 20 TABLET ORAL at 08:27

## 2019-05-22 RX ADMIN — SENNOSIDES AND DOCUSATE SODIUM 2 TABLET: 8.6; 5 TABLET ORAL at 08:27

## 2019-05-22 RX ADMIN — DILTIAZEM HYDROCHLORIDE 300 MG: 180 CAPSULE, EXTENDED RELEASE ORAL at 10:58

## 2019-05-22 RX ADMIN — CHOLECALCIFEROL TAB 50 MCG (2000 UNIT) 2000 UNITS: 50 TAB at 08:27

## 2019-05-22 RX ADMIN — SENNOSIDES AND DOCUSATE SODIUM 1 TABLET: 8.6; 5 TABLET ORAL at 21:39

## 2019-05-22 RX ADMIN — FLUTICASONE FUROATE 1 PUFF: 200 POWDER RESPIRATORY (INHALATION) at 10:59

## 2019-05-22 RX ADMIN — ACETAMINOPHEN 325 MG: 325 TABLET, FILM COATED ORAL at 15:46

## 2019-05-22 RX ADMIN — ACETAMINOPHEN 325 MG: 325 TABLET, FILM COATED ORAL at 21:38

## 2019-05-22 RX ADMIN — INSULIN GLARGINE 10 UNITS: 100 INJECTION, SOLUTION SUBCUTANEOUS at 21:43

## 2019-05-22 RX ADMIN — ATORVASTATIN CALCIUM 40 MG: 40 TABLET, FILM COATED ORAL at 21:39

## 2019-05-22 RX ADMIN — CARVEDILOL 12.5 MG: 12.5 TABLET, FILM COATED ORAL at 08:27

## 2019-05-22 RX ADMIN — Medication 1 CAPSULE: at 08:27

## 2019-05-22 ASSESSMENT — ACTIVITIES OF DAILY LIVING (ADL)
ADLS_ACUITY_SCORE: 18
ADLS_ACUITY_SCORE: 19
ADLS_ACUITY_SCORE: 17
ADLS_ACUITY_SCORE: 19
ADLS_ACUITY_SCORE: 17
ADLS_ACUITY_SCORE: 19

## 2019-05-22 ASSESSMENT — MIFFLIN-ST. JEOR
SCORE: 1385.38
SCORE: 1385.38

## 2019-05-22 NOTE — PROGRESS NOTES
RN Care Coordinator has been involved with helping in disposition planning due to being a dialysis pt.  This is hospital day #13.  We have needed to keep pt hospitalized through his Palliative radiation treatments to inability to sit up.  Had asked [facility] to evaluate if he would meet LTACH criteria due to poor tolerance of sitting up and [facility] having a dialysis center on site.  LTACH admission was declined.  This leaves this discharging to a TCU.  The SW has noted pt was accepted at [facility] and asked if we could switch his dialysis temporarily to the [facility] dialysis unit.  [facility] was contacted with this request.  The Coordinator is Alyssa ([phone]) [phone] Reference #[id number].  Writer did not hear back from Alyssa, so called her to check on status.  Conversed with Zev that had noted request was submitted and he will have Alyssa call writer.  They are aware that discharge plan is for tomorrow.    Addendum:  Care Coordinator has conversed with BRETT Campbell for the [facility] unit.  They are able to accept pt at the [place] unit M/W/F at 11:45am starting on Friday 5/24/19.  SW was updated.  Mamie will be getting a reclining wheelchair for pt, as he has better tolerance sitting up if he is in a reclined position.

## 2019-05-22 NOTE — PROGRESS NOTES
[facility]    Medicine Progress Note - Hospitalist Service       Date of Admission:  5/9/2019  Assessment & Plan       This is an 84-year-old gentleman with a history of end-stage renal disease and acute on chronic back pain that is uncontrolled, likely related to a pathologic compression fracture in the setting of metastatic carcinoma with unknown primary.       Back pain,   L4 vertebral body with mild anterior compression   The patient is being admitted for pain control.  He had been taking hydrocodone/APAP, 2 tablets per day   -- pain medications adjusted, scheduled tylenol with prn oxycodone added .  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  --continue physical therapy /occupational therapy , possibly discharge to home vs tcu in am depending on pain control   --radiation oncology consulted and suggesting radiation if pain persists after kyphoplasty.  --pain persisted and plan is to get radiation-- initiated 5/16.  --back spasms still present but better .  --will continue radiation therapy for now and reassess daily for discharge plans, discussed with  Family , patient  Has agreed to go to tcu if he is weak by Monday   -day 5 /5 of radiation today, will need tcu on discharge   --plan for now is discharge to tcu on Wednesday after dialysis and 5/5 radiation treatment.  --seems a tcu bed is not available for him, will discharge when bed available .  --5/21 adjusted his pain medications by including fentanyl patch . Can readjust dosage by 5/23 or later to determine the plateau level.     ESRD; followed by Nephrology On HD  Continue hd as per schedule.MWF  - The patient typically has Monday, Wednesday, Friday dialysis.     Aortic stenosis:   Severe, mean 54mmHg, and the  VITO 0.8cm2,   Continue PTA coreg, Diltiazem  And lasix     History of metastatic carcinoma  Malignant appearing lesions in  lung, liver, spine and possible kidneys and adrenals.  The patient is followed by Dr. Robles and was still undergoing evaluation for this.  He was supposed to have a PET scan done last Monday, but was unable to make it due to his back pain.  I have consulted Dr. Robles's service to see if they can help in this regard.  With respect to cancer treatment, again, evaluation was still underway.  The patient was actually seen by Palliative Medicine during his previous hospitalization and was not ready, at that point, to consider hospice or end of life cares, although the patient currently does seem to have realistic expectations for ongoing care goals.  --status post biopsy of epidural growth 5/14  --poorly differentiated metastatic malignancy as per pathology.     Type 2 diabetes.  The patient is maintained on Lantus and scheduled aspart; these will be continued.         Diet: Moderate Consistent CHO Diet  Snacks/Supplements Adult: Boost Glucose Control; Between Meals    DVT Prophylaxis: Low Risk/Ambulatory with no VTE prophylaxis indicated  Dougherty Catheter: not present  Code Status: DNR/DNI      Disposition Plan   Expected discharge: Tomorrow, recommended to transitional care unit once dialysis and radiation 5/5 treatment over.  Entered: Rubi Beebe MD 05/22/2019, 1:58 PM       The patient's care was discussed with the Bedside Nurse.    Rubi Beebe MD  Hospitalist Service  [facility]    ______________________________________________________________________    Interval History   He is trying to participate with physical therapy  Only in last 2 days, pain is prohibitive for him it seems, he haven't shared his difficulty until past 2-3 days .    Data reviewed today: I reviewed all medications, new labs and imaging results over the last 24 hours. I personally reviewed no images or EKG's today.    Physical Exam   Vital Signs: Temp: 97.7  F (36.5  C) Temp src: Oral BP: 102/45 Pulse: 72 Heart  Rate: 60 Resp: 16 SpO2: 100 % O2 Device: Nasal cannula Oxygen Delivery: 2 LPM  Weight: 155 lbs 6.79 oz  Constitutional: Awake, alert, cooperative, no apparent distress  Respiratory: Clear to auscultation bilaterally, no crackles or wheezing  Cardiovascular: Regular rate and rhythm, normal S1 and S2, and no murmur noted  GI: Normal bowel sounds, soft, non-distended, non-tender  Skin/Integumen: fistula noted, he  have no localized tenderness on his spine today.  Neuro : moving all 4 extremities, no focal deficit noted     Data

## 2019-05-22 NOTE — PROGRESS NOTES
[facility]    Palliative Care Progress Note    Seven Savage Jr.  MRN# [id number]  Date of Admission:  5/9/2019  Date of Service (when I saw the patient): 05/22/2019    Assessment & Plan   Seven Savage Jr. is a 84 year old male with PMH significant for ESRD on hemodialysis, COPD on home O2, DM2, HTN, HLD, and recent admission and diagnosis of L4 vertebral compression fracture, along with a right upper lobe lung nodule and liver lesions concerning for metastatic malignancy. He presents again with intractable back pain. 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 He was then initiated on palliative XRT for a total of 5 fractions for pain management. Pt otherwise declines further cancer directed therapy. We are consulted for goals of care. Of note, I met him on 4/23 during his previous hospitalization.      Symptoms/Recommendations   1. Back pain. 2/2 L4 vertebral compression fracture along with malignancy. Now s/p L4 kyphoplasty. Undergoing palliative XRT to L4, complete 5 fractions today. Pt declined pain assessment today, but indicates his pain is a zero.   -Continue APAP 1g PO TID  -Fentanyl 25mcg patch was started on 5/21 evening by primary team. This is a reasonable addition to his regimen, but does take 12-48 hours to reach peak effect. He should be monitored very closely for somnolence today, as that is a higher opioid dose than he was previously requiring. If excess sedation is noted, fentanyl patch should be removed. No fentanyl dose escalation should otherwise be done now, or sooner than Q48hrs   -Continue oxycodone 5-10mg PO Q4hrs PRN for now. May possibly need more breakthrough oxy as fentanyl patch reaches peak effect. Oxy is probably not the best choice of opioid in the setting of ESRD, but given low doses and lack of adverse effects thus far, ok to continue for now  -Post radiation effects can take  up to 2 weeks to see improvement in pain. Pain can get worse before it gets better, due to inflammation. Can consider steroids for addition to pain management. I usually recommend dexamethasone 4mg PO daily for 1 week or so   -Pt was fitted with a LSO corset during previous admission   -Flexeril 5mg PO TID PRN or diazepam 5-10mg PO at bedtime PRN muscle spasms   -Pt will follow outpatient with Dr. Robles for ongoing pain management. Pt can be referred to palliative care if further assistance is needed, either through [facility] or [facility]. Pt would like to maintain his care at [facility] for now, ideally      2. Constipation ppx.   -Senna 1-2 tabs PO BID  -Miralax daily     3. Goals of care. Per my consult note 5/20: Pt is s/p surgery to his low back and biopsy of his L4 lesion, now diagnosed as NSCLC poorly differentiated carcinoma. He is recovering from surgery, and has now completed radiation to L4 for improved pain management. Similar to our discussion last month, Sarkis ideally will not pursue any additional cancer directed therapy. He understands he will die from this condition, but does not feel like he is dying now, nor does he feel ready to die. He understands what hospice is, and believes hospice is truly there for a person in his/her final days of life, perhaps when life (pain) becomes too unbearable. For now, Sarkis hopes to recover, have good pain management, and return to his son's home. He wants to continue with dialysis. He is open to having home care services. He reports needing assistance in getting to his dialysis appointments, and would like  to assist with this. He is adamantly opposed to TCU. Sarkis fully plans to continue to follow with Dr. Robles outpatient. He trusts him and believes Dr. Robles will guide him as to the best plan of care. If alyse is considering hospice, may be reasonable to look into whether he can qualify under his metastatic cancer and be able to continue his  dialysis, given 2 separate terminal diagnoses. Similar to last month, he did not express interest in me checking into this.      Support/Coping  -Wife  in . Pt has a supportive and local son with whom he lives. Grandchildren are very important to him   -Appreciate spiritual health support, as pt reports having a strong aranza      Decisional Support, Goals of Care, Counseling & Coordination  Decisional Capacity Intact?  -Not assessed today   Health Care Directive on File?  -No  Code Status/Resuscitation Preferences?  -DNR/DNI     Case reviewed with bedside PEARL Bailey and Quincy Byrne NP.     Thank you for involving us in the care of this patient and family. We will continue to follow. Please do not hesitate to contact me with questions or concerns or the on-call provider for our team if evening or weekend.    Teresa SCHMITZ, [facility]  Palliative Medicine   Pager [phone]    Attestation:  Total time on the floor involved in the patient's care: 20 minutes  Total time spent in counseling/care coordination: >50%    Interval History   Contacted by bedside PEARL Bailey that pt is having uncontrolled pain despite oxycodone 10mg PO this AM. Unable to get out of bed to work with PT. Attempted to see pt 2x today. Upon 2nd attempt, pt was resting and did not want to wake up for exam. He indicated to me that his pain is zero, and is under control at this time.     Medications   Current Facility-Administered Medications Ordered in Epic   Medication Dose Route Frequency Last Rate Last Dose     - MEDICATION INSTRUCTIONS for Dialysis Patients -   Does not apply See Admin Instructions         acetaminophen (TYLENOL) tablet 975 mg  975 mg Oral 3 times daily   975 mg at 19 0825     albuterol (PROAIR HFA/PROVENTIL HFA/VENTOLIN HFA) 108 (90 Base) MCG/ACT inhaler 1-2 puff  1-2 puff Inhalation Q4H PRN         albuterol (PROVENTIL) neb solution 1.25 mg  1.25 mg Nebulization Q4H PRN         atorvastatin (LIPITOR) tablet 40 mg   40 mg Oral Daily   40 mg at 05/21/19 2013     carvedilol (COREG) tablet 12.5 mg  12.5 mg Oral BID w/meals   12.5 mg at 05/22/19 0827     cyclobenzaprine (FLEXERIL) tablet 10 mg  10 mg Oral TID PRN   10 mg at 05/22/19 0824     glucose gel 15-30 g  15-30 g Oral Q15 Min PRN        Or     dextrose 50 % injection 25-50 mL  25-50 mL Intravenous Q15 Min PRN        Or     glucagon injection 1 mg  1 mg Subcutaneous Q15 Min PRN         diazepam (VALIUM) tablet 5-10 mg  5-10 mg Oral At Bedtime PRN   5 mg at 05/21/19 2140     diltiazem ER (DILT-XR) 300 mg  300 mg Oral Daily   300 mg at 05/22/19 1058     doxazosin (CARDURA) tablet 8 mg  8 mg Oral At Bedtime   8 mg at 05/21/19 2141     eplerenone (INSPRA) tablet 50 mg  50 mg Oral Daily   50 mg at 05/22/19 0826     epoetin libby (EPOGEN/PROCRIT) injection 12,000 Units  12,000 Units Intravenous Once per day on Mon Wed Fri   12,000 Units at 05/20/19 1759     fentaNYL (DURAGESIC) 25 mcg/hr 72 hr patch 1 patch  25 mcg Transdermal Q72H   1 patch at 05/21/19 1907     fentaNYL (DURAGESIC) Patch in Place   Transdermal Q8H         [START ON 5/24/2019] fentaNYL (DURAGESIC) patch REMOVAL   Transdermal Q72H         fish oil-omega-3 fatty acids capsule 1 g  1 g Oral Daily   1 g at 05/22/19 0827     fluticasone (ARNUITY ELLIPTA) 200 MCG/ACT inhaler 1 puff  1 puff Inhalation Daily   1 puff at 05/22/19 1059     furosemide (LASIX) tablet 20 mg  20 mg Oral Daily   20 mg at 05/22/19 0827     insulin aspart (NovoLOG) inj (RAPID ACTING)  1-7 Units Subcutaneous TID AC   1 Units at 05/13/19 1751     insulin aspart (NovoLOG) inj (RAPID ACTING)  1-5 Units Subcutaneous At Bedtime         insulin aspart (NovoLOG) inj (RAPID ACTING)  4 Units Subcutaneous QAM AC   4 Units at 05/20/19 0931     insulin glargine (LANTUS PEN) injection 10 Units  10 Units Subcutaneous At Bedtime   10 Units at 05/21/19 2139     irbesartan (AVAPRO) tablet 150 mg  150 mg Oral Daily   150 mg at 05/22/19 0827     melatonin tablet 1 mg   1 mg Oral At Bedtime PRN         multivitamin RENAL (NEPHROCAPS/TRIPHROCAPS) capsule 1 capsule  1 capsule Oral Daily   1 capsule at 05/22/19 0827     naloxone (NARCAN) injection 0.1-0.4 mg  0.1-0.4 mg Intravenous Q2 Min PRN         ondansetron (ZOFRAN-ODT) ODT tab 4 mg  4 mg Oral Q6H PRN        Or     ondansetron (ZOFRAN) injection 4 mg  4 mg Intravenous Q6H PRN         oxyCODONE (ROXICODONE) tablet 5-10 mg  5-10 mg Oral Q4H PRN   10 mg at 05/22/19 0824     polyethylene glycol (MIRALAX/GLYCOLAX) Packet 17 g  17 g Oral Daily   17 g at 05/22/19 0825     prochlorperazine (COMPAZINE) injection 5 mg  5 mg Intravenous Q6H PRN        Or     prochlorperazine (COMPAZINE) tablet 5 mg  5 mg Oral Q6H PRN        Or     prochlorperazine (COMPAZINE) Suppository 12.5 mg  12.5 mg Rectal Q12H PRN         ranitidine (ZANTAC) tablet 150 mg  150 mg Oral Daily PRN         senna-docusate (SENOKOT-S/PERICOLACE) 8.6-50 MG per tablet 1-2 tablet  1-2 tablet Oral BID   2 tablet at 05/22/19 0827     vitamin D3 (CHOLECALCIFEROL) 2000 units (50 mcg) tablet 2,000 Units  2,000 Units Oral Daily   2,000 Units at 05/22/19 0827     No current [facility]-ordered outpatient medications on file.       Physical Exam   Temp: 97.6  F (36.4  C) Temp src: Oral BP: 130/46 Pulse: 72 Heart Rate: 60 Resp: 16 SpO2: 100 % O2 Device: Nasal cannula Oxygen Delivery: 2 LPM  Vitals:    05/15/19 1120 05/20/19 0541 05/22/19 0618   Weight: 73 kg (160 lb 15 oz) 73.2 kg (161 lb 4.8 oz) 70.5 kg (155 lb 6.8 oz)     CONSTITUTIONAL: Chronically ill man seen sleeping in bed in NAD, currently receiving dialysis   RESPIRATORY: NL respiratory effort on NC    Data   Results for orders placed or performed during the hospital encounter of 05/09/19 (from the past 24 hour(s))   Glucose by meter   Result Value Ref Range    Glucose 135 (H) 70 - 99 mg/dL   Glucose by meter   Result Value Ref Range    Glucose 124 (H) 70 - 99 mg/dL   Glucose by meter   Result Value Ref Range    Glucose 179 (H) 70 -  99 mg/dL   Glucose by meter   Result Value Ref Range    Glucose 95 70 - 99 mg/dL   Glucose by meter   Result Value Ref Range    Glucose 96 70 - 99 mg/dL

## 2019-05-22 NOTE — PLAN OF CARE
RN [id number]  ORIENTATION: Alert and oriented x 4  BEHAVIOR & AGGRESSION TOOL COLOR: Green  CIWA SCORE: n/a  ABNL VS/O2: VSS 98% 2 lpm NC  MOBILITY: Assist of one to two with walker/gait belt  PAIN MANAGMENT: Denies, has new fentanyl patch on right arm   DIET: Mod Consist CHO  BOWEL/BLADDER: Continent  ABNL LAB/BG: Blood glucose 179  DRAIN/DEVICES: Dialysis fistula left arm +B/T.  Peripheral IV SL right hand  TELEMETRY RHYTHM: n/a  SKIN: Left and right right incisions lower back CDI.  Bruising.    TESTS/PROCEDURES: PT 0900, Radiation 10:30, Dialysis 12 noon Wednesday  D/C DAY/GOALS/PLACE: Possible discharge after dialysis Wednesday   OTHER IMPORTANT INFO:  DNR-DNI

## 2019-05-22 NOTE — PROGRESS NOTES
Dialysis Run:  Previous Hep B status of Patient on machine verified by me   Pt dialyzed at bedside   Time out taken  Pt ran for 2.75 hours on a K3 with a net fluid removal of .0L  A BF of 450 was maintained via LAVF which was cannulated with 15 gauge needles whout difficulty  Meds :Epo  Complications:None  Slight hypotension. Reduced Uf Pt not eating.  Pt very lethargic thru out run  Pt was seen by Dr Lin during run  Aseptic prep both on and off procedure. Hemostasis of needle sites achieved after 10 minutes Dressing Dr  And intact   Procedure and ESRD discussed and all questions answered  VSS post run See EPIC for more details  Water detection monitor on floor during run  Pt dialyzes at [place] [facility]  Pre Weight  70.5 kg    Post Weight 70.5   EDW  77kg??  HEP B  Neg / Immune 5/6/2019    Potassium   Date Value Ref Range Status   05/20/2019 4.6 3.4 - 5.3 mmol/L Final   ]  Hemoglobin   Date Value Ref Range Status   05/20/2019 10.0 (L) 13.3 - 17.7 g/dL Final   ]  Creatinine   Date Value Ref Range Status   05/20/2019 5.34 (H) 0.66 - 1.25 mg/dL Final   ]      Zeinab Rock RN [facility]

## 2019-05-22 NOTE — PLAN OF CARE
Discharge Planner PT   Patient plan for discharge: TCU  Current status: Pt was in bed in sidelying. Pt was agreeable to get up and demonstrate mobility. Pt sat EOB Brooke using bedrails. Pt has to take time in transitions to manage spasms and pain. Pt using O2 and denied any SOB. Pt stood from bed using walker and with CGA took a few steps to chair. Pt sat in chair on pillow but had to lean to side and keep legs extended due to pain. Pt reports that he can sit if reclined because he reclines seat in son's car to get to dialysis. The pt sat for 10-15 minutes and reported that he can do what it takes to get to dialysis because he has to have dialysis. All he wants is a recliner chair to get there. Pt stood from chair with CGA/Eric and used walker with CGA to get back to bed taking some steps. Pt was able to return to supine Brooke. Pt was wearing abdominal brace with assist to correct positioning prior to mobility.  Barriers to return to prior living situation: Pain and increased level of assist and decreased tolerance for mobility.  Recommendations for discharge: TCU  Rationale for recommendations: Pt is motivated to keep doing dialysis and will perform the necessary mobility to go to dialysis and to keep strength to keep current mobility.       Entered by: Kori Schumacher 05/22/2019 9:55 AM

## 2019-05-22 NOTE — PROGRESS NOTES
SW:  D:  As noted by care coordinator, patient has been accepted by [facility].  Reviewed plan with patient and son.  Explained that while patient is  at [facility] he will  receive dialysis at [facility].  Generally [facility] will have staff than can transport him to dialysis however on the days transport is not available either son can transport or [facility] can arrange for Neelam's transport.  Son is willing to transport as long as he is given adequate notice.    Patient is aware of the $36.00 daily fee for the private room he requested.    Both son and patient are pleased with the plan.    PLAN:  Anticipated discharge on Thursday at 1130 via [facility] staff, reclining w/c with oxygen.

## 2019-05-22 NOTE — PROGRESS NOTES
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 Hypercalcemia and hyperphosphatemia      PLan -   Uneventful HD so far. 2.75 hrs, 500 ml UF.   Next HD on Fri.   Strongly encourage palliative care involvement.        Interval History:     Seen/ examined on dialysis.  He is getting HD treatment. No issues so far with HD. AAO. Fatigued. Poor Po intake.          Dialysis Parameters:     Vitals:    05/20/19 0541 05/22/19 0618 05/22/19 1133   Weight: 73.2 kg (161 lb 4.8 oz) 70.5 kg (155 lb 6.8 oz) 70.5 kg (155 lb 6.8 oz)     I/O last 3 completed shifts:  In: 160 [P.O.:160]  Out: -   Temp:  [97.5  F (36.4  C)-98.5  F (36.9  C)] 97.7  F (36.5  C)  Pulse:  [72] 72  Heart Rate:  [54-64] 61  Resp:  [16] 16  BP: ()/(42-64) 124/48  SpO2:  [98 %-100 %] 100 %    Current Weight: 73.2  Dry Weight: below EDW  Dialysis Temp: 36.5  C  Access Device: AVF  Access Site: L arm  Dialyzer: Revaclear  Dialysis Bath: 3K  Sodium Profile: none  UF Goal: 500 ml  Blood Flow Rate (mL/min): 400  Total Treatment Time (hrs): 2.45  Heparin: none (use heparin outpatient)    Review of Systems:          Medications:     none    Physical Exam:     /48   Pulse 72   Temp 97.7  F (36.5  C) (Oral)   Resp 16   Ht 1.753 m (5' 9\")   Wt 70.5 kg (155 lb 6.8 oz)   SpO2 100%   BMI 22.95 kg/m        Gen: NAD  CV: RRR  Pulm: Ctab  Abd: soft, NT  Ext: no edema  Neuro: a/o x 3      Data:     Recent Labs   Lab Test 05/16/19  1043 05/13/19  0845    137   POTASSIUM 4.5 4.3   CHLORIDE 101 102   CO2 30 30   ANIONGAP 4 5   GLC 99 85   BUN 32* 38*   CR 4.19* 5.01*   AARON 9.8 10.4*         Hemoglobin   Date Value Ref Range Status   05/20/2019 10.0 (L) 13.3 - 17.7 g/dL Final              Kallie Lin MD              "

## 2019-05-22 NOTE — PLAN OF CARE
DATE & TIME: 5/22/19, day shift  ORIENTATION: alert and oriented, lethargic today  BEHAVIOR & AGGRESSION TOOL COLOR: GREEN   CIWA SCORE: NA  ABNL VS/O2:WDL, on 1-2L O2 (baseline)  MOBILITY: SBAx1 walker/GB  PAIN MANAGMENT: gave 10mg oxycodone, 10mg flexeril and scheduled tylenol this AM before PT  DIET: Diabetic diet, fair breakfast  BOWEL/BLADDER: continent, no BM yet. Gave several stool softeners  ABNL LAB/BG: BGMs 96 and 106  DRAIN/DEVICES: Fistural L arm  TELEMETRY RHYTHM: NA  SKIN: intact, except for bruising and 2 small incisions on low back from bone biopsy  TESTS/PROCEDURES: none today. Had radiation treatment and dialysis today  D/C DAY/GOALS/PLACE: Worked well with PT today, was able to tolerate sitting straight up for about 15 minutes before needing to lay down. Able to get up well. SW/CC following for discharge planning.   OTHER IMPORTANT INFO:

## 2019-05-22 NOTE — PROGRESS NOTES
The patient was brought to radiation therapy and given his 5th and final treatment to the L-spine using 10 MV. He received  a dose of 400 cGy to the L-spine today and has now received a total dose of 2000 cGy.     Ellen Ponce RTT

## 2019-05-22 NOTE — PROGRESS NOTES
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 Still having back pain. Last radiation today.    We will continue to follow peripherally. Plan to follow up with Dr. Robles in a month from discharge.    Quincy SCHMITZ, CNP

## 2019-05-22 NOTE — PLAN OF CARE
ORIENTATION: A&O x 4  BEHAVIOR & AGGRESSION TOOL COLOR: Green  CIWA SCORE: n/a  ABNL VS/O2: VSS 2L O2 NC  MOBILITY: Ax1-2 with walker/gait belt  PAIN MANAGMENT: Denies, has fentanyl patch on R arm   DIET: Mod Consist CHO  BOWEL/BLADDER: Continent  ABNL LAB/BG: BG 95  DRAIN/DEVICES: Dialysis fistula left arm +B/T.  PIV SL right hand  TELEMETRY RHYTHM: n/a  SKIN: Left and right incisions lower back CDI.  Bruising.    TESTS/PROCEDURES: PT 0900, Radiation 10:30, Dialysis 12 noon 5/22/19  D/C DAY/GOALS/PLACE: Possible discharge after dialysis Wednesday 5/22  OTHER IMPORTANT INFO:  DNR-DNI, abdominal binder in place

## 2019-05-22 NOTE — PROGRESS NOTES
SPIRITUAL HEALTH SERVICES Progress Note  [facility]    Follow up with pt Seven. Pt was alert and oriented.     Pt reports that he is receving radiation therapy, and his back pain is getting better because of his medication.     Pt said that his spirit is peaceful, and pt shared his understanding of God.  listened to pt words and engaged in the conversation and provided a reflective conversation.      provided a reflective conversation, presence in care, reading poetry, and support. Pt states that he will be discharged tomorrow.     There is no further plan for the pt, but I and SH remain available if additional needs arise.       Deborah Raygoza  Chaplain Resident

## 2019-05-23 VITALS
DIASTOLIC BLOOD PRESSURE: 57 MMHG | RESPIRATION RATE: 18 BRPM | WEIGHT: 155.42 LBS | TEMPERATURE: 97.6 F | OXYGEN SATURATION: 98 % | HEIGHT: 69 IN | BODY MASS INDEX: 23.02 KG/M2 | HEART RATE: 65 BPM | SYSTOLIC BLOOD PRESSURE: 140 MMHG

## 2019-05-23 LAB
GLUCOSE BLDC GLUCOMTR-MCNC: 131 MG/DL (ref 70–99)
GLUCOSE BLDC GLUCOMTR-MCNC: 91 MG/DL (ref 70–99)

## 2019-05-23 PROCEDURE — 25000132 ZZH RX MED GY IP 250 OP 250 PS 637: Performed by: INTERNAL MEDICINE

## 2019-05-23 PROCEDURE — A9270 NON-COVERED ITEM OR SERVICE: HCPCS | Performed by: INTERNAL MEDICINE

## 2019-05-23 PROCEDURE — 00000146 ZZHCL STATISTIC GLUCOSE BY METER IP

## 2019-05-23 PROCEDURE — A9270 NON-COVERED ITEM OR SERVICE: HCPCS | Performed by: NURSE PRACTITIONER

## 2019-05-23 PROCEDURE — 99239 HOSP IP/OBS DSCHRG MGMT >30: CPT | Performed by: INTERNAL MEDICINE

## 2019-05-23 PROCEDURE — 25000132 ZZH RX MED GY IP 250 OP 250 PS 637: Performed by: NURSE PRACTITIONER

## 2019-05-23 RX ORDER — ACETAMINOPHEN 325 MG/1
975 TABLET ORAL 3 TIMES DAILY
DISCHARGE
Start: 2019-05-23

## 2019-05-23 RX ORDER — DIAZEPAM 5 MG
10 TABLET ORAL
Qty: 10 TABLET | Refills: 0 | Status: SHIPPED | OUTPATIENT
Start: 2019-05-23 | End: 2019-06-25

## 2019-05-23 RX ORDER — OXYCODONE HYDROCHLORIDE 5 MG/1
10 TABLET ORAL EVERY 4 HOURS PRN
Qty: 20 TABLET | Refills: 0 | Status: SHIPPED | OUTPATIENT
Start: 2019-05-23

## 2019-05-23 RX ORDER — FENTANYL 25 UG/1
1 PATCH TRANSDERMAL
Qty: 10 PATCH | Refills: 0 | Status: SHIPPED | DISCHARGE
Start: 2019-05-24 | End: 2019-06-25

## 2019-05-23 RX ADMIN — OMEGA-3 FATTY ACIDS CAP 1000 MG 1 G: 1000 CAP at 09:20

## 2019-05-23 RX ADMIN — IRBESARTAN 150 MG: 150 TABLET ORAL at 09:20

## 2019-05-23 RX ADMIN — CHOLECALCIFEROL TAB 50 MCG (2000 UNIT) 2000 UNITS: 50 TAB at 09:20

## 2019-05-23 RX ADMIN — DILTIAZEM HYDROCHLORIDE 300 MG: 180 CAPSULE, EXTENDED RELEASE ORAL at 09:20

## 2019-05-23 RX ADMIN — ACETAMINOPHEN 325 MG: 325 TABLET, FILM COATED ORAL at 09:20

## 2019-05-23 RX ADMIN — SENNOSIDES AND DOCUSATE SODIUM 2 TABLET: 8.6; 5 TABLET ORAL at 09:20

## 2019-05-23 RX ADMIN — FUROSEMIDE 20 MG: 20 TABLET ORAL at 09:20

## 2019-05-23 RX ADMIN — CYCLOBENZAPRINE HYDROCHLORIDE 10 MG: 5 TABLET, FILM COATED ORAL at 12:09

## 2019-05-23 RX ADMIN — EPLERENONE 50 MG: 25 TABLET ORAL at 09:20

## 2019-05-23 RX ADMIN — FLUTICASONE FUROATE 1 PUFF: 200 POWDER RESPIRATORY (INHALATION) at 09:21

## 2019-05-23 RX ADMIN — CARVEDILOL 12.5 MG: 12.5 TABLET, FILM COATED ORAL at 09:20

## 2019-05-23 RX ADMIN — Medication 1 CAPSULE: at 09:20

## 2019-05-23 ASSESSMENT — ACTIVITIES OF DAILY LIVING (ADL)
ADLS_ACUITY_SCORE: 19
ADLS_ACUITY_SCORE: 19
ADLS_ACUITY_SCORE: 16
ADLS_ACUITY_SCORE: 19

## 2019-05-23 NOTE — DISCHARGE INSTRUCTIONS
Discharge to [facility] [phone].    Outpatient Dialysis  [facility]   Monday/Wednesday/Friday at 11:45am starting on Friday 5/24/19  [address] , [address] [place] [address]  [phone]

## 2019-05-23 NOTE — PLAN OF CARE
ORIENTATION: A&Ox4  BEHAVIOR & AGGRESSION TOOL COLOR: GREEN   CIWA SCORE: NA  ABNL VS/O2:WDL, 1-2L O2 (baseline)  MOBILITY: Ax1 walker/GB  PAIN MANAGMENT: taking scheduled tylenol, recieved valium at bedtime, no pain meds given NOC  DIET: Diabetic diet  BOWEL/BLADDER: continent, no BM this shift  ABNL LAB/BG:   DRAIN/DEVICES: Fistura L arm, dressed after dialysis 5/22, PIV SL.  TELEMETRY RHYTHM: NA  SKIN: intact, some bruising and 2 small incisions on low back from bone biopsy  TESTS/PROCEDURES:   D/C DAY/GOALS/PLACE: SW/CC following discharge set up for 11:30 back to [facility]. Will need to go on oxygen (baseline need).

## 2019-05-23 NOTE — PROGRESS NOTES
SW:  D: Orders have been sent thru the In Basket and three scripts faxed to [phone].  Both patient and son are prepared for the discharge.  Patient in good spirits.  Our bedside nurse will call report to the receiving nurse.  [facility] staff will transport at 1130.PAS completed on 4/26 is valid.  The number is [phone].

## 2019-05-23 NOTE — PROGRESS NOTES
Chart Check Heme/Onc:    He is discharging to [facility] today. Radiation completed 5/22. Pain is a little better, but back spasms are the most bothersome symptom.     Plan to follow up with Dr. Robles in a month from hospital  discharge. I would ask the TCU staff to call for a follow up ([phone]) when he is discharging.      Quincy SHCMITZ, CNP

## 2019-05-23 NOTE — PLAN OF CARE
Physical Therapy Discharge Summary    Reason for therapy discharge:    Discharged to transitional care facility.    Progress towards therapy goal(s). See goals on Care Plan in [facility] electronic health record for goal details.  Goals partially met.  Barriers to achieving goals:   discharge from facility.    Therapy recommendation(s):    Continued therapy is recommended.  Rationale/Recommendations:  Pt is below baseline for mobility due to pain and weakness and needs continued care.

## 2019-05-23 NOTE — DISCHARGE SUMMARY
[facility]    Discharge Summary  Hospitalist    Date of Admission:  5/9/2019  Date of Discharge:  5/23/2019 12:15 PM  Discharging Provider: Rubi Beebe MD    Discharge Diagnoses   Back pain   L4 vertebral body with mild anterior compression      History of Present Illness   Mr. 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  Of note, he was hospitalized in 04/2019 under similar circumstances and he had been recently previously diagnosed with an L4 vertebral compression fracture.  During the workup for that, he was noted to have metastatic disease and since then he has been undergoing an oncologic evaluation.  After that hospitalization, he was discharged to a TCU.  Discharged from the TCU to home last Sunday, 5 days ago.  It was intended that he was going to undergo a PET scan as well as dialysis that Monday, however, he was unable to attend his PET scan due to the back pain.  He typically takes Norco twice a day.  He ultimately missed his dialysis yesterday due to pain and he was able to make it in today and underwent a complete run.  However, he was essentially unable to get up due to the ongoing back pain and 911 was called.  The patient was brought to the emergency room for further evaluation and was evaluated by Mrs. Sanchez.  He was found to have an essentially unchanged metabolic and hematologic profile, but given ongoing back pain admission was requested for further observation and management.         Hospital Course   Seven Savage Jr. was admitted on 5/9/2019.  The following problems were addressed during his hospitalization:    Active Problems:    Back pain  Back pain,   L4 vertebral body with mild anterior compression   The patient is being admitted for pain control.  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 Finished 5/5 radiation treatment.   plan for now is discharge to tcu on Wednesday after dialysis and 5/5 radiation treatment.  5/21 adjusted his pain medications by including fentanyl patch . Can readjust dosage by 5/23 or later to determine the plateau level.      ESRD; followed by Nephrology On [facility]  Continue hd as per schedule.MWF  - The patient typically has Monday, Wednesday, Friday dialysis.     Aortic stenosis:   Severe, mean 54mmHg, and the  VITO 0.8cm2,   Continue PTA coreg, Diltiazem  And lasix     History of metastatic carcinoma  Malignant appearing lesions in lung, liver, spine and possible kidneys and adrenals.  The patient is followed by Dr. Robles and was still undergoing evaluation for this.  He was supposed to have a PET scan done last Monday, but was unable to make it due to his back pain.  I have consulted Dr. Robles's service to see if they can help in this regard.  With respect to cancer treatment, again, evaluation was still underway.  The patient was actually seen by Palliative Medicine during his previous hospitalization and was not ready, at that point, to consider hospice or end of life cares, although the patient currently does seem to have realistic expectations for ongoing care goals.  --status post biopsy of epidural growth 5/14  --poorly differentiated metastatic malignancy as per pathology.     Type 2 diabetes.  The patient is maintained on Lantus and scheduled aspart; these will be continued.       Rubi Beebe MD    Significant Results and Procedures       Pending Results     Unresulted Labs Ordered in the Past 30 Days of this Admission     No orders found from 3/10/2019 to 5/10/2019.          Code Status    DNR / DNI       Primary Care Physician   Sandip Antunez    Physical Exam   Temp: 97.6  F (36.4  C) Temp src: Oral BP: 140/57 Pulse: 65 Heart Rate: 71 Resp: 18 SpO2: 98 % O2 Device: Nasal cannula Oxygen Delivery: 2 LPM  Vitals:    05/20/19 0541 05/22/19 0618 05/22/19 1133   Weight: 73.2 kg (161 lb 4.8 oz) 70.5 kg (155 lb 6.8 oz) 70.5 kg (155 lb 6.8 oz)     Vital Signs with Ranges  Temp:  [97.6  F (36.4  C)-98  F (36.7  C)] 97.6  F (36.4  C)  Pulse:  [65] 65  Heart Rate:  [55-71] 71  Resp:  [16-18] 18  BP: ()/(40-60) 140/57  SpO2:  [98 %-100 %] 98 %  I/O last 3 completed shifts:  In: -   Out: 250 [Urine:250]    The patient was examined on the day of discharge.    Discharge Disposition   Discharged to nursing home  Condition at discharge: Stable    Consultations This Hospital Stay   HEMATOLOGY & ONCOLOGY IP CONSULT  NEPHROLOGY IP CONSULT  PHYSICAL THERAPY ADULT IP CONSULT  CARE TRANSITION RN/SW IP CONSULT  SPINE SURGERY ADULT IP CONSULT  RADIATION ONCOLOGY IP CONSULT  PALLIATIVE CARE ADULT IP CONSULT  PHYSICAL THERAPY ADULT IP CONSULT  OCCUPATIONAL THERAPY ADULT IP CONSULT    Time Spent on this Encounter   IRubi, personally saw the patient today and spent greater than 30 minutes discharging this patient.    Discharge Orders      Follow Up and recommended labs and tests    Please have staff at [facility] call to schedule follow up with Dr. Robles at [facility] in [place] ([phone]) at the time of TCU discharge. Dr. Robles would like to see Mr. Savage in about 1 month from hospital discharge. Thanks.     General info for SNF    Length of Stay Estimate: Short Term Care: Estimated # of Days <30  Condition at Discharge: Improving  Level of care:skilled   Rehabilitation Potential: Good  Admission H&P remains valid and up-to-date: Yes  Recent Chemotherapy: N/A  Use Nursing Home Standing Orders: Yes     Mantoux instructions    Give two-step Mantoux (PPD) Per Facility Policy Yes     Reason for your  hospital stay    Back pain     Glucose monitor nursing POCT    Before meals and at bedtime     Intake and output    Every shift     Daily weights    Call Provider for weight gain of more than 2 pounds per day or 5 pounds per week.     Follow Up and recommended labs and tests    Follow up with correction physician.  The following labs/tests are recommended: basic metabolic panel and cbc in 1 week , follow up for dialysis and oncology as scheduled ..     Activity - Up with nursing assistance     Physical Therapy Adult Consult    Evaluate and treat as clinically indicated.    Reason:  Compression fracture     Occupational Therapy Adult Consult    Evaluate and treat as clinically indicated.    Reason:  Back pain , pathologic compression fracture     Oxygen - Nasal cannula    1-6 Lpm by nasal cannula to keep O2 sats 92% or greater.     Fall precautions     Advance Diet as Tolerated    Follow this diet upon discharge: Orders Placed This Encounter      Snacks/Supplements Adult: Boost Glucose Control; Between Meals      Moderate Consistent CHO Diet     Discharge Medications   Current Discharge Medication List      START taking these medications    Details   acetaminophen (TYLENOL) 325 MG tablet Take 3 tablets (975 mg) by mouth 3 times daily    Associated Diagnoses: Acute bilateral low back pain, with sciatica presence unspecified      diazepam (VALIUM) 5 MG tablet Take 2 tablets (10 mg) by mouth nightly as needed for anxiety, sleep or muscle spasms  Qty: 10 tablet, Refills: 0    Associated Diagnoses: Acute bilateral low back pain, with sciatica presence unspecified      fentaNYL (DURAGESIC) 25 mcg/hr 72 hr patch Place 1 patch onto the skin every 72 hours remove old patch.  Qty: 10 patch, Refills: 0    Associated Diagnoses: Acute bilateral low back pain, with sciatica presence unspecified      melatonin 1 MG TABS tablet Take 1 tablet (1 mg) by mouth nightly as needed for sleep    Associated Diagnoses: Acute bilateral low  back pain, with sciatica presence unspecified      oxyCODONE (ROXICODONE) 5 MG tablet Take 2 tablets (10 mg) by mouth every 4 hours as needed for moderate to severe pain  Qty: 20 tablet, Refills: 0    Associated Diagnoses: Acute bilateral low back pain, with sciatica presence unspecified         CONTINUE these medications which have NOT CHANGED    Details   albuterol (2.5 MG/3ML) 0.083% neb solution TAKE 1 VIAL BY NEBULIZATION EVERY 6 HOURS AS NEEDED FOR SHORNESS OF BREATH/DYSPNEA OR WHEEZING  Qty: 75 mL, Refills: 7    Associated Diagnoses: COPD exacerbation (H)      albuterol (PROAIR HFA/PROVENTIL HFA/VENTOLIN HFA) 108 (90 BASE) MCG/ACT Inhaler Inhale 1-2 puffs into the lungs every 4 hours as needed for shortness of breath / dyspnea or wheezing  Qty: 1 Inhaler, Refills: 5    Associated Diagnoses: COPD exacerbation (H)      aspirin 81 MG chewable tablet Take 81 mg by mouth daily      atorvastatin (LIPITOR) 40 MG tablet Take 40 mg by mouth daily      B Complex-C-Folic Acid (BRANDI-HEIDY) TABS Take 1 tablet by mouth daily  Qty: 90 tablet, Refills: 3    Associated Diagnoses: ESRD (end stage renal disease) on dialysis (H)      carvedilol (COREG) 12.5 MG tablet TAKE 1 TABLET(12.5 MG) BY MOUTH TWICE DAILY  Qty: 180 tablet, Refills: 3    Associated Diagnoses: Aortic valve stenosis, unspecified etiology; Essential hypertension, benign      cyclobenzaprine (FLEXERIL) 5 MG tablet Take 1 tablet (5 mg) by mouth 3 times daily as needed for muscle spasms  Qty: 90 tablet, Refills: 3      diltiazem ER (TIAZAC) 300 MG 24 hr ER beaded capsule TAKE 1 CAPSULE(300 MG) BY MOUTH DAILY  Qty: 90 capsule, Refills: 0    Associated Diagnoses: Essential hypertension, benign      doxazosin (CARDURA) 8 MG tablet Take 8 mg by mouth At Bedtime      eplerenone (INSPRA) 50 MG tablet Take 1 tablet (50 mg) by mouth daily  Qty: 90 tablet, Refills: 4    Associated Diagnoses: Essential hypertension, benign      fish oil-omega-3 fatty acids 1000 MG capsule  Take 1 g by mouth daily      fluticasone (FLOVENT HFA) 220 MCG/ACT inhaler Inhale 1 puff into the lungs 2 times daily      furosemide (LASIX) 20 MG tablet Take 1 tablet (20 mg) by mouth daily  Qty: 90 tablet, Refills: 3    Associated Diagnoses: ESRD (end stage renal disease) on dialysis (H); Essential hypertension, benign      insulin aspart (NOVOLOG PEN) 100 UNIT/ML pen Do Not give Correction Insulin if BG <140.  For  - 214 give 1 unit.  For  - 289 give 2 unit.  For  - 364 give 4 units.  For BG = or > 365 give 6 units    Associated Diagnoses: Type 2 diabetes mellitus with chronic kidney disease on chronic dialysis, with long-term current use of insulin (H)      insulin aspart (NOVOLOG VIAL) 100 UNITS/ML vial Inject 4 Units Subcutaneous 3 times daily (with meals) Patient states with large meals he will increase to 14-16 units.    Associated Diagnoses: Type 2 diabetes mellitus with chronic kidney disease on chronic dialysis, with long-term current use of insulin (H)      insulin glargine (LANTUS SOLOSTAR PEN) 100 UNIT/ML pen Inject 10 Units Subcutaneous At Bedtime (Patient will increase to 26 units at bedtime he states when BG is elevated.    Associated Diagnoses: Type 2 diabetes mellitus with chronic kidney disease on chronic dialysis, with long-term current use of insulin (H)      irbesartan (AVAPRO) 150 MG tablet TAKE 1 TABLET(150 MG) BY MOUTH DAILY  Qty: 90 tablet, Refills: 3    Associated Diagnoses: Acute on chronic respiratory failure with hypoxia (H)      polyethylene glycol (MIRALAX/GLYCOLAX) packet Take 17 g by mouth daily      ranitidine (ZANTAC) 150 MG tablet Take 1 tablet (150 mg) by mouth daily as needed for heartburn  Qty: 90 tablet, Refills: 3    Associated Diagnoses: Gastroesophageal reflux disease without esophagitis      senna-docusate (SENOKOT-S/PERICOLACE) 8.6-50 MG tablet Take 1 tablet by mouth 2 times daily And bid prn      STIOLTO RESPIMAT 2.5-2.5 MCG/ACT AERS INHALE 2 PUFFS  INTO THE LUNGS DAILY  Qty: 4 g, Refills: 9    Associated Diagnoses: COPD exacerbation (H)      VITAMIN D, CHOLECALCIFEROL, PO Take 2,000 Units by mouth daily          STOP taking these medications       HYDROcodone-acetaminophen (NORCO) 5-325 MG tablet Comments:   Reason for Stopping:             Allergies   Allergies   Allergen Reactions     Levaquin [Levofloxacin]      edema     Pioglitazone Other (See Comments)     Edema & hay fever     Vytorin Other (See Comments)     Muscle aches     Data   Most Recent 3 CBC's:  Recent Labs   Lab Test 05/20/19  1600 05/16/19  1043 05/13/19  0845  05/09/19  1619   WBC 9.9 8.6  --   --  11.0   HGB 10.0* 9.7* 9.8*   < > 9.5*   MCV 83 84  --   --  84    376  --   --  369    < > = values in this interval not displayed.      Most Recent 3 BMP's:  Recent Labs   Lab Test 05/20/19  1600 05/16/19  1043 05/13/19  0845    135 137   POTASSIUM 4.6 4.5 4.3   CHLORIDE 98 101 102   CO2 33* 30 30   BUN 54* 32* 38*   CR 5.34* 4.19* 5.01*   ANIONGAP 7 4 5   AARON 10.7* 9.8 10.4*   * 99 85     Most Recent 2 LFT's:  Recent Labs   Lab Test 04/22/19  1615 11/05/18  0802   AST 15 13   ALT 12 15   ALKPHOS 61 55   BILITOTAL 1.4* 0.5     Most Recent INR's and Anticoagulation Dosing History:  Anticoagulation Dose History     Recent Dosing and Labs Latest Ref Rng & Units 11/27/2017    INR 0.86 - 1.14 1.00        Most Recent 3 Troponin's:  Recent Labs   Lab Test 11/05/18  1330 11/05/18  0802 11/27/17  1143   TROPI 0.070* 0.046* 0.100*     Most Recent Cholesterol Panel:  Recent Labs   Lab Test 08/15/17  1219   CHOL 145   LDL 83   HDL 41   TRIG 106     Most Recent 6 Bacteria Isolates From Any Culture (See EPIC Reports for Culture Details):  Recent Labs   Lab Test 04/06/18  0823 04/06/18  0640   CULT No growth No growth     Most Recent TSH, T4 and A1c Labs:  Recent Labs   Lab Test 05/09/19  1619  08/15/17  1219   TSH  --   --  2.18   A1C 4.6   < > 6.1*    < > = values in this interval not  displayed.     Results for orders placed or performed during the hospital encounter of 05/09/19   CT Lumbar Spine w/o Contrast    Narrative    CT LUMBAR SPINE WITHOUT CONTRAST  5/12/2019 1:48 PM     HISTORY: Bone neoplasm, lumbosacral spine, check for local recurrence.  Compression fracture of L4 with concern of metastasis.     TECHNIQUE: Axial images of the lumbar spine were obtained without  intravenous contrast. Multiplanar reformations were performed.   Radiation dose for this scan was reduced using automated exposure  control, adjustment of the mA and/or kV according to patient size, or  iterative reconstruction technique.    COMPARISON: MR scan dated 4/16/2019.    FINDINGS:  There is a compression fracture of the L4 vertebral body.  The fracture appears to be a pathologic fracture. The vertebrae has a  mottled appearance which is very suspicious for metastatic disease.  There also appears to be a small amount of soft tissue material in the  ventral epidural space which is new since the MR scan and is  suspicious for epidural tumor. The central canal is narrowed at this  level due to the epidural soft tissue. The paraspinous soft tissues  appear normal.    T12-L1:  Normal disc, facet joints, spinal canal and neural foramina.     L1-L2:  Mild annular disc bulge. Neural foramen are patent.    L2-L3:  Mild annular disc bulge. Central canal normal.     L3-L4:  Diffuse annular disc bulge. Degenerative change in the facet  joints. Central canal appears mildly narrowed.     L4-L5:  Suspect small amount of epidural tumor posterior to the  vertebral body and extending into the right L4-L5 neural foramen.  Diffuse annular disc bulge. Degenerative change in the facet joints.  Mild-moderate central spinal stenosis.     L5-S1:  Diffuse annular disc bulge. Central canal mild-moderately  narrowed. Neural foramen are patent.        Impression    IMPRESSION:    1. Infiltrative process in the L4 vertebral body with a  moth-eaten  appearance to the vertebral body. Suspect metastatic disease.  2. Fracture of L4. This probably represents a pathologic fracture.  3. Small amount of epidural soft tissue posterior to the L4 vertebral  body. 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 This is more prominent than on the  previous MR scan.  4. Multilevel degenerative change with spinal stenosis at L3-L4 an  L4-L5 an L5-S1 due to the degenerative changes.    SKY ESCOBAR MD   IR Lumbar Vertebroplasty    Narrative    L4 VERTEBROPLASTY  5/14/2019 10:45 AM     HISTORY:  4 pathologic pain with unremitting pain despite conservative  therapy.    PROCEDURE:  The risks and benefits of the procedure were explained to  the patient and consent was obtained.  The skin overlying the back was  prepped and draped in the usual sterile manner. Using 10 mL 1%  Lidocaine for local anesthesia, sterile technique and 13.5 minutes of  low dose fluoroscopic guidance, bilateral transpedicular 13 gauge  needles were placed into the L4 vertebral body. About 8 mL of Avamax  acrylic resin with tantalum opacifier particles were injected under  fluoroscopic observation.  There was primarily filling of the left  side of the vertebral body as well as a cleft in the superior right  vertebral body comprised of a tumor mass with bony destruction noted  on previous CT and MRI  There was no initial complication.    I determined this patient to be an appropriate candidate for the  planned sedation and procedure and reassessed the patient immediately  prior to sedation and procedure. Moderate intravenous conscious  sedation was supervised by me. The patient was independently monitored  by a registered nurse assigned to the Department of radiology using  automated blood pressure, EKG and pulse oximetry. The patient  tolerated the procedure well. There were no immediate postprocedure  complications. The patient's vital signs were monitored by radiology  nursing staff under my supervision and remained  stable throughout the  study. Radiation dose for this scan was reduced using automated  exposure control, adjustment of the mA and/or kV according to patient  size, or iterative reconstruction technique.    MEDICATIONS: 2 mg Versed, 100 mg fentanyl    SEDATION TIME: 53 minutes    FLUOROSCOPY TIME: 13.5 minutes    FLUOROSCOPIC DOSE: 828.73 mGy Air Kerma    NUMBER OF FLUOROSCOPIC IMAGES: Routine      Impression    IMPRESSION:  Technically successful L4 vertebroplasty.    MARCELO TALBOT MD

## 2019-05-23 NOTE — PLAN OF CARE
ORIENTATION: alert and oriented, more alert in evening after eating  BEHAVIOR & AGGRESSION TOOL COLOR: GREEN   CIWA SCORE: NA  ABNL VS/O2:WDL, on 1-2L O2 (baseline)  MOBILITY: Ax1 walker/GB  PAIN MANAGMENT: taking scheduled tylenol for backpain, requesting valium at bedtime to help with muscle spasms.  DIET: Diabetic diet, ate well for dinner 75% +boost shake  BOWEL/BLADDER: continent, no BM yet. Pt feels like he may go soon  ABNL LAB/BG: BGMs 62,84, 140, 151. c-xray showing compression fracture.  DRAIN/DEVICES: Fistura L arm, dressed after dialysis today, PIV is SL.  TELEMETRY RHYTHM: NA  SKIN: intact, some bruising and 2 small incisions on low back from bone biopsy  TESTS/PROCEDURES: none today. Had radiation treatment and dialysis today  D/C DAY/GOALS/PLACE: Worked with PT today,only tolerated 15mins sitting in chair in AM, declined to get up for dinner, SW/CC following discharge set up for 11:30 back to [facility]. Will need to go on oxygen (baseline need).

## 2019-05-23 NOTE — PLAN OF CARE
A&O. VSS. Pain managed with schedule tylenol. Discussed discharge instructions with pt, verbalized understanding. Pt discharged to [facility] with belongings and packet. Back brace in place. Transportation provided by [facility]

## 2019-05-24 ENCOUNTER — PATIENT OUTREACH (OUTPATIENT)
Dept: CARE COORDINATION | Facility: CLINIC | Age: 84
End: 2019-05-24

## 2019-05-24 ENCOUNTER — NURSING HOME VISIT (OUTPATIENT)
Dept: GERIATRICS | Facility: CLINIC | Age: 84
End: 2019-05-24
Payer: MEDICARE

## 2019-05-24 VITALS
OXYGEN SATURATION: 92 % | SYSTOLIC BLOOD PRESSURE: 117 MMHG | RESPIRATION RATE: 18 BRPM | WEIGHT: 170.2 LBS | BODY MASS INDEX: 25.13 KG/M2 | TEMPERATURE: 98.1 F | DIASTOLIC BLOOD PRESSURE: 56 MMHG | HEART RATE: 75 BPM

## 2019-05-24 DIAGNOSIS — Z53.9 ERRONEOUS ENCOUNTER--DISREGARD: Primary | ICD-10-CM

## 2019-05-24 NOTE — LETTER
[facility]   To:   Mamie Veliz          Please give to facility    From:   Gianni Dobson  [facility]  Care Coordinator   [facility]   P: [phone]  [email or web address]   Patient Name:  Seven Savage Jr. YOB: 1935   Admit date: 5/23/2019      *Information Needed:  Please contact me when the patient will discharge (or if they will move to long term care)- include the discharge date, disposition, and main diagnosis   - If the patient is discharged with home care services, please provide the name of the agency    Also- Please inform me if a care conference is being held.   Phone, Fax or Email with information                              Thank you

## 2019-05-24 NOTE — PROGRESS NOTES
Clinic Care Coordination Contact  Care Coordination Transition Communication    Referral Source: ED Follow-Up    Clinical Data: Patient was hospitalized at [facility] from 5/9/2019 to 5/23/3019 with diagnosis of L4 vertebral body with mild anterior compression      Transition to Facility:              [facility] Name: [facility]              Contact name and phone number/fax: [phone]    Plan: RN/SW Care Coordinator will await notification from facility staff informing RN/SW Care Coordinator of patient's discharge plans/needs. RN/SW Care Coordinator will review chart and outreach to facility staff every 4 weeks and as needed.     PATO Bell  Clinic Care Coordinator  [facility]  [phone]  [email or web address]

## 2019-05-27 ENCOUNTER — TELEPHONE (OUTPATIENT)
Dept: GERIATRICS | Facility: CLINIC | Age: 84
End: 2019-05-27

## 2019-05-27 NOTE — TELEPHONE ENCOUNTER
Nursing updating this NP that Kirsten is unable to attend dialysis today due to the tunnel that he is taken through was closed for holiday.  Staff attempted to call the unit again and no answer as they where trying to find an appt for tomorrow for him.    Will continue to monitor his status and then talk with dialysis unit tomorrow    Electronically signed by Xenia Cruz RN, CNP

## 2019-05-28 ENCOUNTER — HOSPITAL ENCOUNTER (EMERGENCY)
Facility: CLINIC | Age: 84
Discharge: HOME | End: 2019-05-28
Attending: EMERGENCY MEDICINE | Admitting: EMERGENCY MEDICINE
Payer: MEDICARE

## 2019-05-28 ENCOUNTER — APPOINTMENT (OUTPATIENT)
Dept: GENERAL RADIOLOGY | Facility: CLINIC | Age: 84
End: 2019-05-28
Attending: EMERGENCY MEDICINE
Payer: MEDICARE

## 2019-05-28 ENCOUNTER — NURSING HOME VISIT (OUTPATIENT)
Dept: GERIATRICS | Facility: CLINIC | Age: 84
End: 2019-05-28
Payer: MEDICARE

## 2019-05-28 ENCOUNTER — HOSPITAL LABORATORY (OUTPATIENT)
Dept: OTHER | Facility: CLINIC | Age: 84
End: 2019-05-28

## 2019-05-28 VITALS
TEMPERATURE: 96.6 F | OXYGEN SATURATION: 96 % | BODY MASS INDEX: 25.13 KG/M2 | WEIGHT: 170.2 LBS | RESPIRATION RATE: 18 BRPM | SYSTOLIC BLOOD PRESSURE: 115 MMHG | HEART RATE: 57 BPM | DIASTOLIC BLOOD PRESSURE: 81 MMHG

## 2019-05-28 VITALS
SYSTOLIC BLOOD PRESSURE: 151 MMHG | OXYGEN SATURATION: 97 % | HEIGHT: 69 IN | HEART RATE: 66 BPM | WEIGHT: 168.3 LBS | RESPIRATION RATE: 18 BRPM | BODY MASS INDEX: 24.93 KG/M2 | TEMPERATURE: 98.2 F | DIASTOLIC BLOOD PRESSURE: 64 MMHG

## 2019-05-28 DIAGNOSIS — N18.6 CHRONIC KIDNEY DISEASE WITH END STAGE RENAL FAILURE ON DIALYSIS (H): ICD-10-CM

## 2019-05-28 DIAGNOSIS — I35.0 AORTIC VALVE STENOSIS, UNSPECIFIED ETIOLOGY: ICD-10-CM

## 2019-05-28 DIAGNOSIS — R53.81 PHYSICAL DECONDITIONING: ICD-10-CM

## 2019-05-28 DIAGNOSIS — J44.9 CHRONIC OBSTRUCTIVE PULMONARY DISEASE, UNSPECIFIED COPD TYPE (H): ICD-10-CM

## 2019-05-28 DIAGNOSIS — E11.22 TYPE 2 DIABETES MELLITUS WITH CHRONIC KIDNEY DISEASE ON CHRONIC DIALYSIS, WITH LONG-TERM CURRENT USE OF INSULIN (H): ICD-10-CM

## 2019-05-28 DIAGNOSIS — E87.5 HYPERKALEMIA: ICD-10-CM

## 2019-05-28 DIAGNOSIS — Z99.2 TYPE 2 DIABETES MELLITUS WITH CHRONIC KIDNEY DISEASE ON CHRONIC DIALYSIS, WITH LONG-TERM CURRENT USE OF INSULIN (H): ICD-10-CM

## 2019-05-28 DIAGNOSIS — I10 BENIGN ESSENTIAL HYPERTENSION: ICD-10-CM

## 2019-05-28 DIAGNOSIS — C79.9 METASTATIC CANCER (H): Primary | ICD-10-CM

## 2019-05-28 DIAGNOSIS — Z99.2 CHRONIC KIDNEY DISEASE WITH END STAGE RENAL FAILURE ON DIALYSIS (H): ICD-10-CM

## 2019-05-28 DIAGNOSIS — M48.50XD PATHOLOGICAL COMPRESSION FRACTURE OF VERTEBRA WITH ROUTINE HEALING, SUBSEQUENT ENCOUNTER: ICD-10-CM

## 2019-05-28 DIAGNOSIS — N18.6 TYPE 2 DIABETES MELLITUS WITH CHRONIC KIDNEY DISEASE ON CHRONIC DIALYSIS, WITH LONG-TERM CURRENT USE OF INSULIN (H): ICD-10-CM

## 2019-05-28 DIAGNOSIS — Z79.4 TYPE 2 DIABETES MELLITUS WITH CHRONIC KIDNEY DISEASE ON CHRONIC DIALYSIS, WITH LONG-TERM CURRENT USE OF INSULIN (H): ICD-10-CM

## 2019-05-28 LAB
ANION GAP SERPL CALCULATED.3IONS-SCNC: 7 MMOL/L (ref 3–14)
ANION GAP SERPL CALCULATED.3IONS-SCNC: 8 MMOL/L (ref 3–14)
BASOPHILS # BLD AUTO: 0 10E9/L (ref 0–0.2)
BASOPHILS NFR BLD AUTO: 0.2 %
BUN SERPL-MCNC: 71 MG/DL (ref 7–30)
BUN SERPL-MCNC: 72 MG/DL (ref 7–30)
CALCIUM SERPL-MCNC: 10.5 MG/DL (ref 8.5–10.1)
CALCIUM SERPL-MCNC: 10.7 MG/DL (ref 8.5–10.1)
CHLORIDE SERPL-SCNC: 101 MMOL/L (ref 94–109)
CHLORIDE SERPL-SCNC: 101 MMOL/L (ref 94–109)
CO2 SERPL-SCNC: 27 MMOL/L (ref 20–32)
CO2 SERPL-SCNC: 28 MMOL/L (ref 20–32)
CREAT SERPL-MCNC: 6.1 MG/DL (ref 0.66–1.25)
CREAT SERPL-MCNC: 6.25 MG/DL (ref 0.66–1.25)
DIFFERENTIAL METHOD BLD: ABNORMAL
EOSINOPHIL # BLD AUTO: 0.3 10E9/L (ref 0–0.7)
EOSINOPHIL NFR BLD AUTO: 3.7 %
ERYTHROCYTE [DISTWIDTH] IN BLOOD BY AUTOMATED COUNT: 18 % (ref 10–15)
ERYTHROCYTE [DISTWIDTH] IN BLOOD BY AUTOMATED COUNT: 18.1 % (ref 10–15)
GFR SERPL CREATININE-BSD FRML MDRD: 8 ML/MIN/{1.73_M2}
GFR SERPL CREATININE-BSD FRML MDRD: 8 ML/MIN/{1.73_M2}
GLUCOSE BLDC GLUCOMTR-MCNC: 137 MG/DL (ref 70–99)
GLUCOSE BLDC GLUCOMTR-MCNC: 150 MG/DL (ref 70–99)
GLUCOSE BLDC GLUCOMTR-MCNC: 92 MG/DL (ref 70–99)
GLUCOSE SERPL-MCNC: 70 MG/DL (ref 70–99)
GLUCOSE SERPL-MCNC: 78 MG/DL (ref 70–99)
HCT VFR BLD AUTO: 31.9 % (ref 40–53)
HCT VFR BLD AUTO: 35.4 % (ref 40–53)
HCT VFR BLD CALC: 29 %PCV (ref 40–53)
HGB BLD CALC-MCNC: 9.9 G/DL (ref 13.3–17.7)
HGB BLD-MCNC: 10.7 G/DL (ref 13.3–17.7)
HGB BLD-MCNC: 9.6 G/DL (ref 13.3–17.7)
IMM GRANULOCYTES # BLD: 0 10E9/L (ref 0–0.4)
IMM GRANULOCYTES NFR BLD: 0.1 %
INTERPRETATION ECG - MUSE: NORMAL
LYMPHOCYTES # BLD AUTO: 1.1 10E9/L (ref 0.8–5.3)
LYMPHOCYTES NFR BLD AUTO: 12.3 %
MCH RBC QN AUTO: 25.2 PG (ref 26.5–33)
MCH RBC QN AUTO: 25.4 PG (ref 26.5–33)
MCHC RBC AUTO-ENTMCNC: 30.1 G/DL (ref 31.5–36.5)
MCHC RBC AUTO-ENTMCNC: 30.2 G/DL (ref 31.5–36.5)
MCV RBC AUTO: 84 FL (ref 78–100)
MCV RBC AUTO: 84 FL (ref 78–100)
MONOCYTES # BLD AUTO: 0.6 10E9/L (ref 0–1.3)
MONOCYTES NFR BLD AUTO: 6.6 %
NEUTROPHILS # BLD AUTO: 6.8 10E9/L (ref 1.6–8.3)
NEUTROPHILS NFR BLD AUTO: 77.1 %
NRBC # BLD AUTO: 0 10*3/UL
NRBC BLD AUTO-RTO: 0 /100
PLATELET # BLD AUTO: 308 10E9/L (ref 150–450)
PLATELET # BLD AUTO: 311 10E9/L (ref 150–450)
POTASSIUM BLD-SCNC: 5.6 MMOL/L (ref 3.4–5.3)
POTASSIUM SERPL-SCNC: 5.8 MMOL/L (ref 3.4–5.3)
POTASSIUM SERPL-SCNC: 6.1 MMOL/L (ref 3.4–5.3)
RBC # BLD AUTO: 3.81 10E12/L (ref 4.4–5.9)
RBC # BLD AUTO: 4.22 10E12/L (ref 4.4–5.9)
SODIUM BLD-SCNC: 135 MMOL/L (ref 133–144)
SODIUM SERPL-SCNC: 136 MMOL/L (ref 133–144)
SODIUM SERPL-SCNC: 136 MMOL/L (ref 133–144)
WBC # BLD AUTO: 8.4 10E9/L (ref 4–11)
WBC # BLD AUTO: 8.8 10E9/L (ref 4–11)

## 2019-05-28 PROCEDURE — 25800025 ZZH RX 258: Performed by: EMERGENCY MEDICINE

## 2019-05-28 PROCEDURE — 80048 BASIC METABOLIC PNL TOTAL CA: CPT | Performed by: EMERGENCY MEDICINE

## 2019-05-28 PROCEDURE — 71046 X-RAY EXAM CHEST 2 VIEWS: CPT

## 2019-05-28 PROCEDURE — 96366 THER/PROPH/DIAG IV INF ADDON: CPT

## 2019-05-28 PROCEDURE — 25000125 ZZHC RX 250: Performed by: EMERGENCY MEDICINE

## 2019-05-28 PROCEDURE — 96365 THER/PROPH/DIAG IV INF INIT: CPT

## 2019-05-28 PROCEDURE — 96375 TX/PRO/DX INJ NEW DRUG ADDON: CPT

## 2019-05-28 PROCEDURE — 93005 ELECTROCARDIOGRAM TRACING: CPT

## 2019-05-28 PROCEDURE — 40000501 ZZHCL STATISTIC HEMATOCRIT ED POCT

## 2019-05-28 PROCEDURE — 99285 EMERGENCY DEPT VISIT HI MDM: CPT | Mod: 25

## 2019-05-28 PROCEDURE — 40000497 ZZHCL STATISTIC SODIUM ED POCT

## 2019-05-28 PROCEDURE — 25800030 ZZH RX IP 258 OP 636: Performed by: EMERGENCY MEDICINE

## 2019-05-28 PROCEDURE — 00000146 ZZHCL STATISTIC GLUCOSE BY METER IP

## 2019-05-28 PROCEDURE — 99310 SBSQ NF CARE HIGH MDM 45: CPT | Performed by: NURSE PRACTITIONER

## 2019-05-28 PROCEDURE — 40000498 ZZHCL STATISTIC POTASSIUM ED POCT

## 2019-05-28 PROCEDURE — 96376 TX/PRO/DX INJ SAME DRUG ADON: CPT

## 2019-05-28 PROCEDURE — 25000131 ZZH RX MED GY IP 250 OP 636 PS 637: Performed by: EMERGENCY MEDICINE

## 2019-05-28 PROCEDURE — 85025 COMPLETE CBC W/AUTO DIFF WBC: CPT | Performed by: EMERGENCY MEDICINE

## 2019-05-28 RX ORDER — DEXTROSE MONOHYDRATE 100 MG/ML
INJECTION, SOLUTION INTRAVENOUS CONTINUOUS
Status: DISCONTINUED | OUTPATIENT
Start: 2019-05-28 | End: 2019-05-28 | Stop reason: HOSPADM

## 2019-05-28 RX ORDER — ALBUTEROL SULFATE 5 MG/ML
10 SOLUTION RESPIRATORY (INHALATION) ONCE
Status: COMPLETED | OUTPATIENT
Start: 2019-05-28 | End: 2019-05-28

## 2019-05-28 RX ORDER — DEXTROSE MONOHYDRATE 25 G/50ML
25 INJECTION, SOLUTION INTRAVENOUS ONCE
Status: COMPLETED | OUTPATIENT
Start: 2019-05-28 | End: 2019-05-28

## 2019-05-28 RX ORDER — ONDANSETRON 4 MG/1
TABLET, FILM COATED ORAL EVERY 6 HOURS PRN
COMMUNITY

## 2019-05-28 RX ORDER — DEXTROSE MONOHYDRATE 25 G/50ML
25-50 INJECTION, SOLUTION INTRAVENOUS
Status: DISCONTINUED | OUTPATIENT
Start: 2019-05-28 | End: 2019-05-28 | Stop reason: HOSPADM

## 2019-05-28 RX ORDER — NICOTINE POLACRILEX 4 MG
15-30 LOZENGE BUCCAL
Status: DISCONTINUED | OUTPATIENT
Start: 2019-05-28 | End: 2019-05-28 | Stop reason: HOSPADM

## 2019-05-28 RX ADMIN — DEXTROSE MONOHYDRATE: 100 INJECTION, SOLUTION INTRAVENOUS at 18:34

## 2019-05-28 RX ADMIN — ALBUTEROL SULFATE 10 MG: 2.5 SOLUTION RESPIRATORY (INHALATION) at 18:01

## 2019-05-28 RX ADMIN — SODIUM CHLORIDE 3.8 UNITS: 9 INJECTION, SOLUTION INTRAVENOUS at 18:17

## 2019-05-28 RX ADMIN — DEXTROSE MONOHYDRATE 25 G: 500 INJECTION PARENTERAL at 18:17

## 2019-05-28 ASSESSMENT — MIFFLIN-ST. JEOR: SCORE: 1443.78

## 2019-05-28 ASSESSMENT — ENCOUNTER SYMPTOMS
ABDOMINAL PAIN: 0
FEVER: 0
VOMITING: 0
DIAPHORESIS: 0
DIARRHEA: 0
CONSTITUTIONAL NEGATIVE: 1
WEAKNESS: 1
CONFUSION: 1

## 2019-05-28 NOTE — ED NOTES
Bed: ED26  Expected date: 5/28/19  Expected time: 4:19 PM  Means of arrival:   Comments:  carmen 1-84M weakness

## 2019-05-28 NOTE — ED AVS SNAPSHOT
Emergency Department  [address] [place] [address]  Phone:  [phone]  Fax:  [phone]                                    Seven Savage Jr.   MRN: [id number]    Department:   Emergency Department   Date of Visit:  5/28/2019           After Visit Summary Signature Page    I have received my discharge instructions, and my questions have been answered. I have discussed any challenges I see with this plan with the nurse or doctor.    ..........................................................................................................................................  Patient/Patient Representative Signature      ..........................................................................................................................................  Patient Representative Print Name and Relationship to Patient    ..................................................               ................................................  Date                                   Time    ..........................................................................................................................................  Reviewed by Signature/Title    ...................................................              ..............................................  Date                                               Time          22EPIC Rev 08/18

## 2019-05-28 NOTE — ED TRIAGE NOTES
From [place], staff reported to EMS that pt missed dialysis yesterday and has had increased weakness and confusion today.

## 2019-05-28 NOTE — ED PROVIDER NOTES
History     Chief Complaint:  Altered Mental Status and Generalized Weakness      HPI   Seven Savage Jr. is a 84 year old male, with a past medical history significant for Hypertension, DM2, metastatic cancer, COPD on chronic oxygen, chronic back pain who presents from [facility] for evaluation of increased alteration in his mental status as well as generalized weakness. Mr. Savage is quite upset because, per his report, the staff at [facility] have caused him to miss his dialysis treatment that was due yesterday. He reports that he has not had a dialysis treatment for 4 days (typically goes MWF). Additionally, he also reports some discomfort at the site of his chronic back pain. He otherwise denies any fevers, coughs, chest pain or abdominal pain. He has no other concerns to report at this time.     Allergies:  Levaquin [Levofloxacin]  Pioglitazone  Vytorin      Medications:    Acetaminophen (tylenol) 325 mg tablet  Albuterol (2.5 mg/3ml) 0.083% neb solution  Albuterol (proair hfa/proventil hfa/ventolin hfa) 108 (90 base) mcg/act inhaler  Aspirin 81 mg chewable tablet  Atorvastatin (lipitor) 40 mg tablet  B complex-c-folic acid (sai-ngozi) tabs  Carvedilol (coreg) 12.5 mg tablet  Diazepam (valium) 5 mg tablet  Diltiazem er (tiazac) 300 mg 24 hr er beaded capsule  Doxazosin (cardura) 8 mg tablet  Eplerenone (inspra) 50 mg tablet  Fentanyl (duragesic) 25 mcg/hr 72 hr patch  Fish oil-omega-3 fatty acids 1000 mg capsule  Fluticasone (flovent hfa) 220 mcg/act inhaler  Furosemide (lasix) 20 mg tablet  Insulin aspart (novolog pen) 100 unit/ml pen  Insulin glargine (lantus solostar pen) 100 unit/ml pen  Irbesartan (avapro) 150 mg tablet  Melatonin 1 mg tabs tablet  Ondansetron (zofran) 4 mg tablet  Polyethylene glycol (miralax/glycolax) packet  Ranitidine (zantac) 150 mg tablet  Senna-docusate (senokot-s/pericolace) 8.6-50 mg tablet  Stiolto respimat 2.5-2.5 mcg/act aers  Vitamin d, cholecalciferol, PO      Past Medical  History:    Type 2 diabetes mellitus with chronic kidney disease on chronic dialysis, with long-term current use of insulin (H)  Hyperlipidemia LDL goal <100  Aortic valve stenosis, unspecified etiology  ESRD (end stage renal disease) on dialysis (H)  Chronic obstructive pulmonary disease with acute exacerbation (H)  Essential hypertension, benign  COPD exacerbation (H)  Thoracic segment dysfunction  Acute on chronic respiratory failure with hypoxia (H)  Acute right-sided low back pain without sciatica  Compression fracture of L4 lumbar vertebra (H)  Back pain  CRF  Renal calculi    Past Surgical History:    Tonsillectomy;   Adenoidectomy;   Endoscopic polypectomy nasal;  Renal Stent;   IR Lumbar Vertebroplasty (5/14/2019).     Family History:    Family history not pertinent.     Social History:  The patient  reports that he quit smoking about 8 years ago. His smoking use included cigarettes. He has a 70.00 pack-year smoking history. He has never used smokeless tobacco. He reports that he drinks alcohol. He reports that he does not use drugs.   Marital Status:   [5]     Review of Systems   Constitutional: Negative.  Negative for diaphoresis and fever.   HENT: Negative.    Cardiovascular: Negative for chest pain.   Gastrointestinal: Negative for abdominal pain, diarrhea and vomiting.   Neurological: Positive for weakness.   Psychiatric/Behavioral: Positive for confusion.   All other systems reviewed and are negative.      Physical Exam     Vital signs  Patient Vitals for the past 24 hrs:   BP Temp Temp src Pulse Heart Rate Resp SpO2 Height Weight   05/28/19 2000 151/64 -- -- 66 66 18 97 % -- --   05/28/19 1900 156/56 -- -- 65 66 11 97 % -- --   05/28/19 1850 151/56 -- -- 66 -- -- 99 % -- --   05/28/19 1800 160/64 -- -- 64 -- -- 100 % -- --   05/28/19 1730 160/57 -- -- 62 -- -- 99 % -- --   05/28/19 1700 165/58 -- -- -- -- -- 95 % -- --   05/28/19 1645 161/59 -- -- -- -- -- 100 % -- --   05/28/19 1633 -- -- --  "-- -- -- 97 % -- --   05/28/19 1632 (!) 153/91 98.2  F (36.8  C) Oral 68 -- 18 91 % 1.753 m (5' 9\") 76.3 kg (168 lb 4.8 oz)          Physical Exam  General: Alert and cooperative with exam. Patient in mild distress. Normal mentation.  Head:  Scalp is NC/AT  Eyes:  No scleral icterus, PERRL  ENT:  The external nose and ears are normal. The oropharynx is normal and without erythema; mucus membranes are moist. Uvula midline, no evidence of deep space infection.  Neck:  Normal range of motion without rigidity.  CV:  Regular rate and rhythm    Moderate systolic murmur.   Resp:  Breath sounds are clear bilaterally.  Nasal cannula in place    Non-labored, no retractions or accessory muscle use  GI:  Abdomen is soft, no distension, no tenderness. No peritoneal signs  MS:  No lower extremity edema   Skin:  Left upper extremity dialysis fistula. C-D-I. Warm and dry, No rash or lesions noted.  Neuro: Oriented x 3. No gross motor deficits.  Emergency Department Course   ECG (17:25:36):  Rate 63 bpm. HI interval 208. QRS duration 112. QT/QTc 390/399. P-R-T axes 64 -55 83. Normal sinus rhythm. Left axis deviation. Abnormal EKG. Interpreted at 1730 by Erick Betancourt. No significant change when compared to previous EKG.    Imaging:  Chest XR,  PA & LAT   Final Result   IMPRESSION:    1. Enlarging right upper lobe lung nodule.   2. Bilateral pleural effusions.      MONTSE KIRBY MD        Results as read by radiology.   I communicated the results of the imaging studies with the patient who expressed understanding of these findings.      Laboratory:  Glucose by meter: 137 @ 1958  Glucose by meter: 157 @ 1901    ISTAT electrolytes POCT: K 5.6, HGB 9.9, Hematocrit POCT 29    CBC: RBC 4.22, HGB 10.7, HCT 35.4, MCH 25.4, MCHC 30.2, RDW 18.0, otherwise normal   BMP: K 6.1, BUN 72, Creatine 6.25, GFR Estimate 8, Ca 10.7, otherwise normal     Interventions:  1801: Albuterol 10 mg Neb  1817: D50 25 g  1817: Humulin 3.8 units " IV    Emergency Department Course:  Past medical records, nursing notes, and vitals reviewed.  1655: I performed an exam of the patient and obtained history, as documented above.    IV inserted and blood drawn for the above work up to be conducted.     EKG was obtained, findings as reported above.     The patient was sent for imaging studies (CXR) while in the emergency department, findings above.      2020: Rechecked the patient, findings and plan explained to the patient. Patient discharged home, status improved, with instructions regarding supportive care, medications, and reasons to return as well as the importance of close follow-up was reviewed.    Impression & Plan    Medical Decision Making:  Patient is a 84-year-old male who presents from transitional care facility ([facility]) with reported hyperkalemia as well as confusion earlier today; now resolved.  Patient's medical history and records were reviewed.  On evaluation this patient has no additional complaints and feels at his baseline health.  Initial check of his potassium found to be 6.1.  EKG without findings of hyperkalemia or other significant acute findings.  Patient was provided insulin, glucose, and albuterol nebulizer.  Chest x-ray demonstrates increasing size of lung nodules; patient with known metastatic cancer; no other significant acute findings.  Nursing did check his potassium prior to administration of these interventions and potassium was 5.6.  Multiple repeat checks of patient's blood sugars found them to be greater than 70.  Patient was offered additional lab testing to recheck potassium prior to discharge but deferred, understanding that he is taking on a small risk of increased morbidity/mortality.  At this time patient is felt safe and appropriate for discharge as he is scheduled for dialysis tomorrow.  I did call [facility] and emphasized the importance of making sure patient does not miss future dialysis appointments.  Return precautions  were discussed.  Patient discharged home.  At the time of discharge patient was hemodynamically stable, neurologically at baseline with decision-making capacity, and afebrile.      Diagnosis:    ICD-10-CM    1. Hyperkalemia E87.5        Disposition:  discharged to home      Juvenal BUI am serving as a scribe at 5:47 PM on 5/28/2019 to document services personally performed by Erick Betancourt DO based on my observations and the provider's statements to me.     EMERGENCY DEPARTMENT       Erick Betancourt DO  05/29/19 0116

## 2019-05-28 NOTE — ED NOTES
DATE:  5/28/2019   TIME OF RECEIPT FROM LAB:  6863  LAB TEST:  Potassium  LAB VALUE:  6.1  RESULTS GIVEN WITH READ-BACK TO (PROVIDER):  Erick Betancourt*  TIME LAB VALUE REPORTED TO PROVIDER:   6316

## 2019-05-28 NOTE — LETTER
5/28/2019        RE: Seven Savage Jr.  [address]  [facility] [address]        [facility]  PRIMARY CARE PROVIDER AND CLINIC:  Sandip Antunez MD, [address] [facility] [address]  Chief Complaint   Patient presents with     Hospital F/U     [facility] Medical Record Number:  [id number]  Place of Service where encounter took place:  [facility]) [[id number]]    Seven Savage Jr.  is a 84 year old  (1935), admitted to the above facility from  [facility]. Hospital stay 5/9/2019 through 5/23/2019..  Admitted to this facility for  rehab, medical management and nursing care.    HPI:    HPI information obtained from: facility chart records, facility staff, patient report and [facility] chart review.   Brief Summary of Hospital Course:   He has medical history significant for ESRD on dialysis, diabetes type 2, COPD O2 dependent, aortic stenosis and was hospitalized after presenting to the ED with intractable back pain. He had been hospitalized 4/2019 with back pain and was found to have an L4 vertebral compression fracture. 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 He was scheduled for a PET scan and follow up with Dr Robles but was unable to attend  due to back pain. He had been taking Norco with no relief.  In the ED 5/9/2019: Hgb 9.5, creatinine 2.65, GFR 25, K 3.3. He was admitted for pain control. 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 He underwent L4 biopsy and vertebroplasty 5/14/2019. Pathology showed poorly differential carcinoma. Dr Robles consulted and no systemic therapy is planned due to his poor performance status and ESRD. He received palliative radiation to the lumbar spine, last dose 5/22/2019.  Palliative Care consulted. Fentanyl patch, oxycodone, valium and flexeril were started.     Updates on  Status Since Skilled nursing Admission:     Metastatic cancer (H)-reports slight improvement in back pain. Has been using flexeril prn, but feels valium is more effective. Pain is worse when sitting up.   Pathological compression fracture of vertebra with routine healing, subsequent encounter  Chronic kidney disease with end stage renal failure on dialysis (H)-he missed dialysis yesterday as the tunnel between the facility and the dialysis center was locked for the holiday. Staff was unable to reach the dialysis center. He feels tired today, but denies feeling ill. Has urinated about 150 ml this morning. Denies worsening shortness of breath and has no edema.   Nurse reports that he seems more confused today than usual. His conversation is mostly appropriate. He  first told me that he lived with his wife, then corrected himself and said that his wife  several years ago.   Chronic obstructive pulmonary disease, unspecified COPD type (H)-reports his breathing is at baseline.   Type 2 diabetes mellitus with chronic kidney disease on chronic dialysis, with long-term current use of insulin (H)-blood sugars:   Poor appetite with occasional nausea. Denies vomiting or abdominal pain. No constipation.   Aortic valve stenosis, unspecified etiology  Benign essential hypertension-BPs: 103/53, 119/60, 129/61  HR: 62-70  Physical deconditioning-able to stand for transfers and toileting. Requires assist of 1-2 with transfers and cares.       CODE STATUS/ADVANCE DIRECTIVES DISCUSSION:   DNR / DNI  Patient's living condition: lives with his son.  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 Ha been in [place] for 3 yrs.     ALLERGIES: Levaquin [levofloxacin]; Pioglitazone; and Vytorin  PAST MEDICAL HISTORY:  has a past medical history of COPD (chronic obstructive pulmonary disease) (H), CRF (chronic renal failure), Heart murmur, HLD (hyperlipidemia), HTN (hypertension), IDDM (insulin  dependent diabetes mellitus) (H), and Renal calculi.  PAST SURGICAL HISTORY:   has a past surgical history that includes Tonsillectomy; Adenoidectomy; Endoscopic polypectomy nasal; Renal Stent; and IR Lumbar Vertebroplasty (5/14/2019).  FAMILY HISTORY: Family history is unknown by patient.  SOCIAL HISTORY:   reports that he quit smoking about 8 years ago. His smoking use included cigarettes. He has a 70.00 pack-year smoking history. He has never used smokeless tobacco. He reports that he drinks alcohol. He reports that he does not use drugs.    Post Discharge Medication Reconciliation Status: discharge medications reconciled and changed, per note/orders (see AVS)    Current Outpatient Medications   Medication Sig Dispense Refill     acetaminophen (TYLENOL) 325 MG tablet Take 3 tablets (975 mg) by mouth 3 times daily       albuterol (2.5 MG/3ML) 0.083% neb solution TAKE 1 VIAL BY NEBULIZATION EVERY 6 HOURS AS NEEDED FOR SHORNESS OF BREATH/DYSPNEA OR WHEEZING 75 mL 7     albuterol (PROAIR HFA/PROVENTIL HFA/VENTOLIN HFA) 108 (90 BASE) MCG/ACT Inhaler Inhale 1-2 puffs into the lungs every 4 hours as needed for shortness of breath / dyspnea or wheezing 1 Inhaler 5     aspirin 81 MG chewable tablet Take 81 mg by mouth daily       atorvastatin (LIPITOR) 40 MG tablet Take 40 mg by mouth daily       B Complex-C-Folic Acid (BRANDI-HEIDY) TABS Take 1 tablet by mouth daily 90 tablet 3     carvedilol (COREG) 12.5 MG tablet TAKE 1 TABLET(12.5 MG) BY MOUTH TWICE DAILY 180 tablet 3     diazepam (VALIUM) 5 MG tablet Take 2 tablets (10 mg) by mouth nightly as needed for anxiety, sleep or muscle spasms 10 tablet 0     diltiazem ER (TIAZAC) 300 MG 24 hr ER beaded capsule TAKE 1 CAPSULE(300 MG) BY MOUTH DAILY 90 capsule 0     doxazosin (CARDURA) 8 MG tablet Take 8 mg by mouth At Bedtime       eplerenone (INSPRA) 50 MG tablet Take 1 tablet (50 mg) by mouth daily 90 tablet 4     fentaNYL (DURAGESIC) 25 mcg/hr 72 hr patch Place 1 patch onto  the skin every 72 hours remove old patch. 10 patch 0     fish oil-omega-3 fatty acids 1000 MG capsule Take 1 g by mouth daily       fluticasone (FLOVENT HFA) 220 MCG/ACT inhaler Inhale 1 puff into the lungs 2 times daily       furosemide (LASIX) 20 MG tablet Take 1 tablet (20 mg) by mouth daily 90 tablet 3     insulin aspart (NOVOLOG PEN) 100 UNIT/ML pen Do Not give Correction Insulin if BG <140.  For  - 214 give 1 unit.  For  - 289 give 2 unit.  For  - 364 give 4 units.  For BG = or > 365 give 6 units       insulin aspart (NOVOLOG VIAL) 100 UNITS/ML vial Inject 4 Units Subcutaneous 3 times daily (with meals) Patient states with large meals he will increase to 14-16 units.       insulin glargine (LANTUS SOLOSTAR PEN) 100 UNIT/ML pen Inject 10 Units Subcutaneous At Bedtime (Patient will increase to 26 units at bedtime he states when BG is elevated.       irbesartan (AVAPRO) 150 MG tablet TAKE 1 TABLET(150 MG) BY MOUTH DAILY 90 tablet 3     melatonin 1 MG TABS tablet Take 1 tablet (1 mg) by mouth nightly as needed for sleep       ondansetron (ZOFRAN) 4 MG tablet Take by mouth every 6 hours as needed for nausea       oxyCODONE (ROXICODONE) 5 MG tablet Take 2 tablets (10 mg) by mouth every 4 hours as needed for moderate to severe pain 20 tablet 0     polyethylene glycol (MIRALAX/GLYCOLAX) packet Take 17 g by mouth daily       ranitidine (ZANTAC) 150 MG tablet Take 1 tablet (150 mg) by mouth daily as needed for heartburn 90 tablet 3     senna-docusate (SENOKOT-S/PERICOLACE) 8.6-50 MG tablet Take 1 tablet by mouth 2 times daily And bid prn       STIOLTO RESPIMAT 2.5-2.5 MCG/ACT AERS INHALE 2 PUFFS INTO THE LUNGS DAILY 4 g 9     VITAMIN D, CHOLECALCIFEROL, PO Take 2,000 Units by mouth daily          ROS:  10 point ROS of systems including Constitutional, Eyes, Respiratory, Cardiovascular, Gastroenterology, Genitourinary, Integumentary, Musculoskeletal, Psychiatric were all negative except for pertinent  positives noted in my HPI.    Vitals:  /81   Pulse 57   Temp 96.6  F (35.9  C)   Resp 18   Wt 77.2 kg (170 lb 3.2 oz)   SpO2 96%   BMI 25.13 kg/m     Exam:  GENERAL APPEARANCE:  Alert, in no distress, chronically ill appearing  ENT:  Mouth and posterior oropharynx normal, moist mucous membranes, [place]  EYES:  EOM normal, conjunctiva and lids normal, PERRL  NECK:  No adenopathy,masses or thyromegaly  RESP:  respiratory effort and palpation of chest normal, lungs clear to auscultation , no respiratory distress  CV:  Palpation and auscultation of heart done , regular rate and rhythm, 3/6 murmur, no edema, +2 pedal pulses  ABDOMEN:  normal bowel sounds, soft, nontender, no hepatosplenomegaly or other masses  M/S:   in bed. YOON with good strength. No joint inflammation.   SKIN:  no rashes or open areas. Fistula LUE asymptomatic  PSYCH:  oriented X 3, memory impaired , affect and mood normal    Lab/Diagnostic data:  Recent labs in [facility] reviewed by me today.     ASSESSMENT / PLAN:  (C79.9) Metastatic cancer (H)  (primary encounter diagnosis)  (M48.50XD) Pathological compression fracture of vertebra with routine healing, subsequent encounter  Comment: slight improvement in pain. Possibly hasn't seen full benefit of radiation at this time.   Plan: continue fentanyl, oxycodone, tylenol. Discontinue flexeril and continue valium prn spasms. Continue bowel regimen. Zofran prn. Follow up with Dr Robles as scheduled.     (N18.6,  Z99.2) Chronic kidney disease with end stage renal failure on dialysis (H)  Comment: missed dialysis yesterday. He is urinating in his usual amounts and has no signs of volume overload. Appears mildly confused, although pain meds may be contributing.   Plan: his dialysis center is closed today and the  facility unit coordinator was unable to get him in at a different center.  Patient  thinks he'll be fine to wait until his run tomorrow. Closely monitor for change in status.   Discussed with  staff.     (J44.9) Chronic obstructive pulmonary disease, unspecified COPD type (H)  Comment: respiratory status appears to be at baseline. O2 dependent   Plan: continue prn albuterol, fluticasone, Stiolto respimat.     (E11.22,  N18.6,  Z99.2,  Z79.4) Type 2 diabetes mellitus with chronic kidney disease on chronic dialysis, with long-term current use of insulin (H)  Comment: controlled. Poor oral intake  Plan: continue Lantus and Novolog. Hold Novolog if he doesn't eat his meal. Monitor blood sugars qid. Dietician to consult for food preferences.     (I35.0) Aortic valve stenosis, unspecified etiology  Comment: severe   Plan: continue ASA, lasix, carvedilol, irbesartan, diltiazem. Monitor for volume overload.     (I10) Benign essential hypertension  Comment: controlled. Mild hypotension at times.   Plan: continue carvedilol, diltiazem, Inspra, doxazosin, lasix, irbesartan. Monitor VS and decreases doses as able.     (R53.81) Physical deconditioning  Comment: slow progress in therapies  Plan: continue PHYSICAL THERAPY/OT. Goal is to return home with his son and home services.         Total time spent with patient visit at the skilled nursing facility was 42 mins including patient visit and review of past records. Greater than 50% of total time spent with counseling and coordinating care due to complexity of care  Electronically signed by:  NADIR Vega CNP                         Sincerely,        NADIR Vega CNP

## 2019-05-28 NOTE — PROGRESS NOTES
[facility]  PRIMARY CARE PROVIDER AND CLINIC:  Sandip Antunez MD, [address] [place] [id number]  Chief Complaint   Patient presents with     Hospital F/U     [facility] Record Number:  [id number]  [facility] of Service where encounter took place:  [facility] [[id number]]    Seven Savage Jr.  is a 84 year old  (1935), admitted to the above facility from  [facility]. Hospital stay 5/9/2019 through 5/23/2019..  Admitted to this facility for  rehab, medical management and nursing care.    HPI:    HPI information obtained from: facility chart records, facility staff, patient report and [facility] chart review.   Brief Summary of Hospital Course:   He has medical history significant for ESRD on dialysis, diabetes type 2, COPD O2 dependent, aortic stenosis and was hospitalized after presenting to the ED with intractable back pain. He had been hospitalized 4/2019 with back pain and was found to have an L4 vertebral compression fracture. 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 He was scheduled for a PET scan and follow up with Dr Robles but was unable to attend  due to back pain. He had been taking Norco with no relief.  In the ED 5/9/2019: Hgb 9.5, creatinine 2.65, GFR 25, K 3.3. He was admitted for pain control. 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 He underwent L4 biopsy and vertebroplasty 5/14/2019. Pathology showed poorly differential carcinoma. Dr Robles consulted and no systemic therapy is planned due to his poor performance status and ESRD. He received palliative radiation to the lumbar spine, last dose 5/22/2019.  Palliative Care consulted. Fentanyl patch, oxycodone, valium and flexeril were started.     Updates on Status Since Skilled nursing Admission:     Metastatic cancer (H)-reports slight improvement in back  pain. Has been using flexeril prn, but feels valium is more effective. Pain is worse when sitting up.   Pathological compression fracture of vertebra with routine healing, subsequent encounter  Chronic kidney disease with end stage renal failure on dialysis (H)-he missed dialysis yesterday as the tunnel between the facility and the dialysis center was locked for the holiday. Staff was unable to reach the dialysis center. He feels tired today, but denies feeling ill. Has urinated about 150 ml this morning. Denies worsening shortness of breath and has no edema.   Nurse reports that he seems more confused today than usual. His conversation is mostly appropriate. He  first told me that he lived with his wife, then corrected himself and said that his wife  several years ago.   Chronic obstructive pulmonary disease, unspecified COPD type (H)-reports his breathing is at baseline.   Type 2 diabetes mellitus with chronic kidney disease on chronic dialysis, with long-term current use of insulin (H)-blood sugars:   Poor appetite with occasional nausea. Denies vomiting or abdominal pain. No constipation.   Aortic valve stenosis, unspecified etiology  Benign essential hypertension-BPs: 103/53, 119/60, 129/61  HR: 62-70  Physical deconditioning-able to stand for transfers and toileting. Requires assist of 1-2 with transfers and cares.       CODE STATUS/ADVANCE DIRECTIVES DISCUSSION:   DNR / DNI  Patient's living condition: lives with his son.  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 Ha been in [place] for 3 yrs.     ALLERGIES: Levaquin [levofloxacin]; Pioglitazone; and Vytorin  PAST MEDICAL HISTORY:  has a past medical history of COPD (chronic obstructive pulmonary disease) (H), CRF (chronic renal failure), Heart murmur, HLD (hyperlipidemia), HTN (hypertension), IDDM (insulin dependent diabetes mellitus) (H), and Renal calculi.  PAST SURGICAL HISTORY:   has a past surgical  history that includes Tonsillectomy; Adenoidectomy; Endoscopic polypectomy nasal; Renal Stent; and IR Lumbar Vertebroplasty (5/14/2019).  FAMILY HISTORY: Family history is unknown by patient.  SOCIAL HISTORY:   reports that he quit smoking about 8 years ago. His smoking use included cigarettes. He has a 70.00 pack-year smoking history. He has never used smokeless tobacco. He reports that he drinks alcohol. He reports that he does not use drugs.    Post Discharge Medication Reconciliation Status: discharge medications reconciled and changed, per note/orders (see AVS)    Current Outpatient Medications   Medication Sig Dispense Refill     acetaminophen (TYLENOL) 325 MG tablet Take 3 tablets (975 mg) by mouth 3 times daily       albuterol (2.5 MG/3ML) 0.083% neb solution TAKE 1 VIAL BY NEBULIZATION EVERY 6 HOURS AS NEEDED FOR SHORNESS OF BREATH/DYSPNEA OR WHEEZING 75 mL 7     albuterol (PROAIR HFA/PROVENTIL HFA/VENTOLIN HFA) 108 (90 BASE) MCG/ACT Inhaler Inhale 1-2 puffs into the lungs every 4 hours as needed for shortness of breath / dyspnea or wheezing 1 Inhaler 5     aspirin 81 MG chewable tablet Take 81 mg by mouth daily       atorvastatin (LIPITOR) 40 MG tablet Take 40 mg by mouth daily       B Complex-C-Folic Acid (BRANDI-HEIDY) TABS Take 1 tablet by mouth daily 90 tablet 3     carvedilol (COREG) 12.5 MG tablet TAKE 1 TABLET(12.5 MG) BY MOUTH TWICE DAILY 180 tablet 3     diazepam (VALIUM) 5 MG tablet Take 2 tablets (10 mg) by mouth nightly as needed for anxiety, sleep or muscle spasms 10 tablet 0     diltiazem ER (TIAZAC) 300 MG 24 hr ER beaded capsule TAKE 1 CAPSULE(300 MG) BY MOUTH DAILY 90 capsule 0     doxazosin (CARDURA) 8 MG tablet Take 8 mg by mouth At Bedtime       eplerenone (INSPRA) 50 MG tablet Take 1 tablet (50 mg) by mouth daily 90 tablet 4     fentaNYL (DURAGESIC) 25 mcg/hr 72 hr patch Place 1 patch onto the skin every 72 hours remove old patch. 10 patch 0     fish oil-omega-3 fatty acids 1000 MG  capsule Take 1 g by mouth daily       fluticasone (FLOVENT HFA) 220 MCG/ACT inhaler Inhale 1 puff into the lungs 2 times daily       furosemide (LASIX) 20 MG tablet Take 1 tablet (20 mg) by mouth daily 90 tablet 3     insulin aspart (NOVOLOG PEN) 100 UNIT/ML pen Do Not give Correction Insulin if BG <140.  For  - 214 give 1 unit.  For  - 289 give 2 unit.  For  - 364 give 4 units.  For BG = or > 365 give 6 units       insulin aspart (NOVOLOG VIAL) 100 UNITS/ML vial Inject 4 Units Subcutaneous 3 times daily (with meals) Patient states with large meals he will increase to 14-16 units.       insulin glargine (LANTUS SOLOSTAR PEN) 100 UNIT/ML pen Inject 10 Units Subcutaneous At Bedtime (Patient will increase to 26 units at bedtime he states when BG is elevated.       irbesartan (AVAPRO) 150 MG tablet TAKE 1 TABLET(150 MG) BY MOUTH DAILY 90 tablet 3     melatonin 1 MG TABS tablet Take 1 tablet (1 mg) by mouth nightly as needed for sleep       ondansetron (ZOFRAN) 4 MG tablet Take by mouth every 6 hours as needed for nausea       oxyCODONE (ROXICODONE) 5 MG tablet Take 2 tablets (10 mg) by mouth every 4 hours as needed for moderate to severe pain 20 tablet 0     polyethylene glycol (MIRALAX/GLYCOLAX) packet Take 17 g by mouth daily       ranitidine (ZANTAC) 150 MG tablet Take 1 tablet (150 mg) by mouth daily as needed for heartburn 90 tablet 3     senna-docusate (SENOKOT-S/PERICOLACE) 8.6-50 MG tablet Take 1 tablet by mouth 2 times daily And bid prn       STIOLTO RESPIMAT 2.5-2.5 MCG/ACT AERS INHALE 2 PUFFS INTO THE LUNGS DAILY 4 g 9     VITAMIN D, CHOLECALCIFEROL, PO Take 2,000 Units by mouth daily          ROS:  10 point ROS of systems including Constitutional, Eyes, Respiratory, Cardiovascular, Gastroenterology, Genitourinary, Integumentary, Musculoskeletal, Psychiatric were all negative except for pertinent positives noted in my HPI.    Vitals:  /81   Pulse 57   Temp 96.6  F (35.9  C)   Resp  18   Wt 77.2 kg (170 lb 3.2 oz)   SpO2 96%   BMI 25.13 kg/m    Exam:  GENERAL APPEARANCE:  Alert, in no distress, chronically ill appearing  ENT:  Mouth and posterior oropharynx normal, moist mucous membranes, [place]  EYES:  EOM normal, conjunctiva and lids normal, PERRL  NECK:  No adenopathy,masses or thyromegaly  RESP:  respiratory effort and palpation of chest normal, lungs clear to auscultation , no respiratory distress  CV:  Palpation and auscultation of heart done , regular rate and rhythm, 3/6 murmur, no edema, +2 pedal pulses  ABDOMEN:  normal bowel sounds, soft, nontender, no hepatosplenomegaly or other masses  M/S:   in bed. YOON with good strength. No joint inflammation.   SKIN:  no rashes or open areas. Fistula LUE asymptomatic  PSYCH:  oriented X 3, memory impaired , affect and mood normal    Lab/Diagnostic data:  Recent labs in [facility] reviewed by me today.     ASSESSMENT / PLAN:  (C79.9) Metastatic cancer (H)  (primary encounter diagnosis)  (M48.50XD) Pathological compression fracture of vertebra with routine healing, subsequent encounter  Comment: slight improvement in pain. Possibly hasn't seen full benefit of radiation at this time.   Plan: continue fentanyl, oxycodone, tylenol. Discontinue flexeril and continue valium prn spasms. Continue bowel regimen. Zofran prn. Follow up with Dr Robles as scheduled.     (N18.6,  Z99.2) Chronic kidney disease with end stage renal failure on dialysis (H)  Comment: missed dialysis yesterday. He is urinating in his usual amounts and has no signs of volume overload. Appears mildly confused, although pain meds may be contributing.   Plan: his dialysis center is closed today and the  facility unit coordinator was unable to get him in at a different center.  Patient  thinks he'll be fine to wait until his run tomorrow. Closely monitor for change in status.   Discussed with staff.     (J44.9) Chronic obstructive pulmonary disease, unspecified COPD type (H)  Comment:  respiratory status appears to be at baseline. O2 dependent   Plan: continue prn albuterol, fluticasone, Stiolto respimat.     (E11.22,  N18.6,  Z99.2,  Z79.4) Type 2 diabetes mellitus with chronic kidney disease on chronic dialysis, with long-term current use of insulin (H)  Comment: controlled. Poor oral intake  Plan: continue Lantus and Novolog. Hold Novolog if he doesn't eat his meal. Monitor blood sugars qid. Dietician to consult for food preferences.     (I35.0) Aortic valve stenosis, unspecified etiology  Comment: severe   Plan: continue ASA, lasix, carvedilol, irbesartan, diltiazem. Monitor for volume overload.     (I10) Benign essential hypertension  Comment: controlled. Mild hypotension at times.   Plan: continue carvedilol, diltiazem, Inspra, doxazosin, lasix, irbesartan. Monitor VS and decreases doses as able.     (R53.81) Physical deconditioning  Comment: slow progress in therapies  Plan: continue PHYSICAL THERAPY/OT. Goal is to return home with his son and home services.         Total time spent with patient visit at the skilled nursing facility was 42 mins including patient visit and review of past records. Greater than 50% of total time spent with counseling and coordinating care due to complexity of care  Electronically signed by:  NADIR Vega CNP

## 2019-05-29 ENCOUNTER — PATIENT OUTREACH (OUTPATIENT)
Dept: CARE COORDINATION | Facility: CLINIC | Age: 84
End: 2019-05-29

## 2019-05-29 DIAGNOSIS — I10 ESSENTIAL HYPERTENSION, BENIGN: ICD-10-CM

## 2019-05-29 RX ORDER — DILTIAZEM HYDROCHLORIDE 300 MG/1
CAPSULE, EXTENDED RELEASE ORAL
Qty: 90 CAPSULE | Refills: 0 | Status: SHIPPED | OUTPATIENT
Start: 2019-05-29

## 2019-05-29 NOTE — TELEPHONE ENCOUNTER
"Requested Prescriptions   Pending Prescriptions Disp Refills     diltiazem ER (TIAZAC) 300 MG 24 hr ER beaded capsule [Pharmacy Med Name: DILTIAZEM ER 300MG CAPSULES (24 HR)] 90 capsule 0     Sig: TAKE 1 CAPSULE(300 MG) BY MOUTH DAILY       Calcium Channel Blockers Protocol  Failed - 5/29/2019  3:46 AM        Failed - Blood pressure under 140/90 in past 12 months     BP Readings from Last 3 Encounters:   05/28/19 151/64   05/28/19 115/81   05/24/19 117/56                 Failed - Normal serum creatinine on file in past 12 months     Recent Labs   Lab Test 05/28/19  1708   CR 6.25*             Passed - Normal ALT in past 12 months     Recent Labs   Lab Test 04/22/19  1615   ALT 12             Passed - Recent (12 mo) or future (30 days) visit within the authorizing provider's specialty     Patient had office visit in the last 12 months or has a visit in the next 30 days with authorizing provider or within the authorizing provider's specialty.  See \"Patient Info\" tab in inbasket, or \"Choose Columns\" in Meds & Orders section of the refill encounter.              Passed - Medication is active on med list        Passed - Patient is age 18 or older        Routing refill request to provider for review/approval because:  Labs out of range:  Creat, bp        "

## 2019-05-29 NOTE — PROGRESS NOTES
Clinic Care Coordination Contact    Situation: Patient chart reviewed by care coordinator.    Background: Pt was receiving Rehab at [facility]     Assessment: Admitted to ED from TCU for generalized weakness and AMS.  Pt missed most recent dialysis run.       Plan/Recommendations: Pt was discharged back to TCU to continue rehabilitation with instructions not to miss dialysis.  SW to f/u again in 4 weeks to check on rehab status and discharge plan.     Gianni Dobson [facility]  Clinic Care Coordinator  [facility]  [phone]  [email or web address]

## 2019-05-30 ENCOUNTER — NURSING HOME VISIT (OUTPATIENT)
Dept: GERIATRICS | Facility: CLINIC | Age: 84
End: 2019-05-30
Payer: MEDICARE

## 2019-05-30 VITALS
DIASTOLIC BLOOD PRESSURE: 58 MMHG | OXYGEN SATURATION: 99 % | TEMPERATURE: 97.7 F | SYSTOLIC BLOOD PRESSURE: 116 MMHG | WEIGHT: 170.2 LBS | BODY MASS INDEX: 25.13 KG/M2 | HEART RATE: 63 BPM | RESPIRATION RATE: 20 BRPM

## 2019-05-30 DIAGNOSIS — Z99.2 TYPE 2 DIABETES MELLITUS WITH CHRONIC KIDNEY DISEASE ON CHRONIC DIALYSIS, WITH LONG-TERM CURRENT USE OF INSULIN (H): ICD-10-CM

## 2019-05-30 DIAGNOSIS — N18.6 TYPE 2 DIABETES MELLITUS WITH CHRONIC KIDNEY DISEASE ON CHRONIC DIALYSIS, WITH LONG-TERM CURRENT USE OF INSULIN (H): ICD-10-CM

## 2019-05-30 DIAGNOSIS — C79.9 METASTATIC CANCER (H): Primary | ICD-10-CM

## 2019-05-30 DIAGNOSIS — N18.6 CHRONIC KIDNEY DISEASE WITH END STAGE RENAL FAILURE ON DIALYSIS (H): ICD-10-CM

## 2019-05-30 DIAGNOSIS — Z79.4 TYPE 2 DIABETES MELLITUS WITH CHRONIC KIDNEY DISEASE ON CHRONIC DIALYSIS, WITH LONG-TERM CURRENT USE OF INSULIN (H): ICD-10-CM

## 2019-05-30 DIAGNOSIS — I35.0 AORTIC VALVE STENOSIS, UNSPECIFIED ETIOLOGY: ICD-10-CM

## 2019-05-30 DIAGNOSIS — I10 BENIGN ESSENTIAL HYPERTENSION: ICD-10-CM

## 2019-05-30 DIAGNOSIS — R41.89 COGNITIVE IMPAIRMENT: ICD-10-CM

## 2019-05-30 DIAGNOSIS — R53.81 PHYSICAL DECONDITIONING: ICD-10-CM

## 2019-05-30 DIAGNOSIS — J44.9 CHRONIC OBSTRUCTIVE PULMONARY DISEASE, UNSPECIFIED COPD TYPE (H): ICD-10-CM

## 2019-05-30 DIAGNOSIS — M48.50XD PATHOLOGICAL COMPRESSION FRACTURE OF VERTEBRA WITH ROUTINE HEALING, SUBSEQUENT ENCOUNTER: ICD-10-CM

## 2019-05-30 DIAGNOSIS — E11.22 TYPE 2 DIABETES MELLITUS WITH CHRONIC KIDNEY DISEASE ON CHRONIC DIALYSIS, WITH LONG-TERM CURRENT USE OF INSULIN (H): ICD-10-CM

## 2019-05-30 DIAGNOSIS — Z99.2 CHRONIC KIDNEY DISEASE WITH END STAGE RENAL FAILURE ON DIALYSIS (H): ICD-10-CM

## 2019-05-30 PROCEDURE — 99309 SBSQ NF CARE MODERATE MDM 30: CPT | Performed by: NURSE PRACTITIONER

## 2019-05-30 NOTE — LETTER
5/30/2019        RE: Seven Savage Jr.  [address]  [facility] [address]        [facility]  [facility] Medical Record Number:  [id number]  [facility] [[id number]]  Chief Complaint   Patient presents with     RECHECK       HPI:    Seven Savage Jr.  is a 84 year old (1935), who is being seen today for an episodic care visit.  HPI information obtained from: facility chart records, facility staff, patient report and [facility] chart review  He came to this facility 5/23/2019 for short term rehab and medical management following hospitalization after presenting to the ED with intractable back pain. He had been hospitalized 4/2019 with back pain and was found to have an L4 vertebral compression fracture. 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 He was scheduled for a PET scan and follow up with Dr Robles but was unable to attend  due to back pain. He had been taking Norco with no relief.  In the ED 5/9/2019: Hgb 9.5, creatinine 2.65, GFR 25, K 3.3. He was admitted for pain control. 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 He underwent L4 biopsy and vertebroplasty 5/14/2019. Pathology showed poorly differential carcinoma. Dr Robles consulted and no systemic therapy is planned due to his poor performance status and ESRD. He received palliative radiation to the lumbar spine, last dose 5/22/2019.  Palliative Care consulted. Fentanyl patch, oxycodone, valium and flexeril were started.     Today's concern is:     Metastatic cancer (H)-reports back pain has improved and is well controlled, except when sitting up in the wheelchair. He would like a tilt chair and has discussed this with therapy. Using prn oxycodone 10 mg once or twice daily. No constipation   Pathological compression fracture of  vertebra with routine healing, subsequent encounter  Chronic kidney disease with end stage renal failure on dialysis (H)-he missed dialysis 5/28 due to transportation issue and was sent to the ED 5/29 for dialysis, per his son request.   Chronic obstructive pulmonary disease, unspecified COPD type (H)-O2 dependent. Reports his breathing is at baseline.   Type 2 diabetes mellitus with chronic kidney disease on chronic dialysis, with long-term current use of insulin (H)-blood sugars: . Reports improved appetite the past few days.   Aortic valve stenosis, unspecified etiology  Benign essential hypertension-BPs: 116/58, 136/55, 146/66  HR: 63-68  Cognitive impairment-more alert and talkative today. Conversation is appropriate. SLUMS 20/30  Physical deconditioning-poor participation in therapies. Has ambulated up to 15 ft with walker and min assist. Requires max assist  with all cares.     Past Medical and Surgical History reviewed in Epic today.    MEDICATIONS:  Current Outpatient Medications   Medication Sig Dispense Refill     acetaminophen (TYLENOL) 325 MG tablet Take 3 tablets (975 mg) by mouth 3 times daily       albuterol (2.5 MG/3ML) 0.083% neb solution TAKE 1 VIAL BY NEBULIZATION EVERY 6 HOURS AS NEEDED FOR SHORNESS OF BREATH/DYSPNEA OR WHEEZING 75 mL 7     albuterol (PROAIR HFA/PROVENTIL HFA/VENTOLIN HFA) 108 (90 BASE) MCG/ACT Inhaler Inhale 1-2 puffs into the lungs every 4 hours as needed for shortness of breath / dyspnea or wheezing 1 Inhaler 5     aspirin 81 MG chewable tablet Take 81 mg by mouth daily       atorvastatin (LIPITOR) 40 MG tablet Take 40 mg by mouth daily       B Complex-C-Folic Acid (BRANDI-HEIDY) TABS Take 1 tablet by mouth daily 90 tablet 3     carvedilol (COREG) 12.5 MG tablet TAKE 1 TABLET(12.5 MG) BY MOUTH TWICE DAILY 180 tablet 3     diazepam (VALIUM) 5 MG tablet Take 2 tablets (10 mg) by mouth nightly as needed for anxiety, sleep or muscle spasms 10 tablet 0     diltiazem ER (TIAZAC)  300 MG 24 hr ER beaded capsule TAKE 1 CAPSULE(300 MG) BY MOUTH DAILY 90 capsule 0     doxazosin (CARDURA) 8 MG tablet Take 8 mg by mouth At Bedtime       eplerenone (INSPRA) 50 MG tablet Take 1 tablet (50 mg) by mouth daily 90 tablet 4     fentaNYL (DURAGESIC) 25 mcg/hr 72 hr patch Place 1 patch onto the skin every 72 hours remove old patch. 10 patch 0     fish oil-omega-3 fatty acids 1000 MG capsule Take 1 g by mouth daily       fluticasone (FLOVENT HFA) 220 MCG/ACT inhaler Inhale 1 puff into the lungs 2 times daily       furosemide (LASIX) 20 MG tablet Take 1 tablet (20 mg) by mouth daily 90 tablet 3     insulin aspart (NOVOLOG PEN) 100 UNIT/ML pen Do Not give Correction Insulin if BG <140.  For  - 214 give 1 unit.  For  - 289 give 2 unit.  For  - 364 give 4 units.  For BG = or > 365 give 6 units       insulin aspart (NOVOLOG VIAL) 100 UNITS/ML vial Inject 4 Units Subcutaneous 3 times daily (with meals) Patient states with large meals he will increase to 14-16 units.       insulin glargine (LANTUS SOLOSTAR PEN) 100 UNIT/ML pen Inject 10 Units Subcutaneous At Bedtime (Patient will increase to 26 units at bedtime he states when BG is elevated.       irbesartan (AVAPRO) 150 MG tablet TAKE 1 TABLET(150 MG) BY MOUTH DAILY 90 tablet 3     melatonin 1 MG TABS tablet Take 1 tablet (1 mg) by mouth nightly as needed for sleep       ondansetron (ZOFRAN) 4 MG tablet Take by mouth every 6 hours as needed for nausea       oxyCODONE (ROXICODONE) 5 MG tablet Take 2 tablets (10 mg) by mouth every 4 hours as needed for moderate to severe pain 20 tablet 0     polyethylene glycol (MIRALAX/GLYCOLAX) packet Take 17 g by mouth daily       ranitidine (ZANTAC) 150 MG tablet Take 1 tablet (150 mg) by mouth daily as needed for heartburn 90 tablet 3     senna-docusate (SENOKOT-S/PERICOLACE) 8.6-50 MG tablet Take 1 tablet by mouth 2 times daily And bid prn       STIOLTO RESPIMAT 2.5-2.5 MCG/ACT AERS INHALE 2 PUFFS INTO THE  LUNGS DAILY 4 g 9     VITAMIN D, CHOLECALCIFEROL, PO Take 2,000 Units by mouth daily          REVIEW OF SYSTEMS:  4 point ROS including Respiratory, CV, GI and , other than that noted in the HPI,  is negative    Objective:  /58   Pulse 63   Temp 97.7  F (36.5  C)   Resp 20   Wt 77.2 kg (170 lb 3.2 oz)   SpO2 99%   BMI 25.13 kg/m     Exam:  GENERAL APPEARANCE:  Alert, in no distress, chronically ill appearing  ENT:  Mouth and posterior oropharynx normal, moist mucous membranes, [place]  EYES:  EOM normal, conjunctiva and lids normal  NECK:  No adenopathy,masses or thyromegaly  RESP:  respiratory effort and palpation of chest normal, lungs clear to auscultation , no respiratory distress  CV:  Palpation and auscultation of heart done , regular rate and rhythm, 3/6 murmur, no edema, +2 pedal pulses  ABDOMEN:   soft, nontender, no hepatosplenomegaly or other masses  M/S:   in bed. YOON with good strength. No joint inflammation.   SKIN:  no rashes or open areas. Fistula LUE asymptomatic  PSYCH:  oriented X 3, memory impaired , affect and mood normal        Labs:   Labs done in SNF are in [facility] EPIC. Please refer to them using [facility] Everywhere.    ASSESSMENT / PLAN:  (C79.9) Metastatic cancer (H)  (primary encounter diagnosis)  (M48.50XD) Pathological compression fracture of vertebra with routine healing, subsequent encounter  Comment: some improvement in back pain. He declines an increase in pain meds.   Plan: continue fentanyl, oxycodone, tylenol, valium. Follow up with Dr Robles 6/18/2019.     (N18.6,  Z99.2) Chronic kidney disease with end stage renal failure on dialysis (H)  Comment: tolerating dialysis  Plan: continue dialysis MWF.     (J44.9) Chronic obstructive pulmonary disease, unspecified COPD type (H)  Comment: respiratory status at baseline. O2 dependent  Plan: continue  prn albuterol, fluticasone, Stiolto respimat.     (E11.22,  N18.6,  Z99.2,  Z79.4) Type 2 diabetes mellitus with chronic  kidney disease on chronic dialysis, with long-term current use of insulin (H)  Comment: controlled. Some improvement in appetite  Plan: continue Lantus and Novolog. Hold Novolog is he doesn't eat. Monitor blood sugars qid.     (I35.0) Aortic valve stenosis, unspecified etiology  Comment: severe. No signs of volume overload.   Plan: continue ASA, lasix, carvedilol, irbesartan, diltiazem.     (I10) Benign essential hypertension  Comment: controlled  Plan:continue carvedilol, diltiazem, Inspra, doxazosin, lasix, irbesartan. Monitor VS and adjust doses as warranted.      (R41.89) Cognitive impairment  Comment: appears to have mild deficits. He's more conversational today  Plan: supportive care    (R53.81) Physical deconditioning  Comment: poor progress in therapies with limited rehab potential.   Plan: continue PHYSICAL THERAPY/OT. Care conference is scheduled for later today. His goal is to return home with family, but  long term care may be more feasible given his high level care needs.     Electronically signed by:  NADIR Vega CNP       Sincerely,        NADIR Vega CNP

## 2019-05-30 NOTE — PROGRESS NOTES
[facility]  [facility] Record Number:  [id number]  [facility] of [facility] where encounter took place[facility] [[id number]]  Chief Complaint   Patient presents with     RECHECK       HPI:    Seven Savage Jr.  is a 84 year old (1935), who is being seen today for an episodic care visit.  HPI information obtained from: facility chart records, facility staff, patient report and [facility] chart review  He came to this facility 5/23/2019 for short term rehab and medical management following hospitalization after presenting to the ED with intractable back pain. He had been hospitalized 4/2019 with back pain and was found to have an L4 vertebral compression fracture. 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 He was scheduled for a PET scan and follow up with Dr Robles but was unable to attend  due to back pain. He had been taking Norco with no relief.  In the ED 5/9/2019: Hgb 9.5, creatinine 2.65, GFR 25, K 3.3. He was admitted for pain control. 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 He underwent L4 biopsy and vertebroplasty 5/14/2019. Pathology showed poorly differential carcinoma. Dr Robles consulted and no systemic therapy is planned due to his poor performance status and ESRD. He received palliative radiation to the lumbar spine, last dose 5/22/2019.  Palliative Care consulted. Fentanyl patch, oxycodone, valium and flexeril were started.     Today's concern is:     Metastatic cancer (H)-reports back pain has improved and is well controlled, except when sitting up in the wheelchair. He would like a tilt chair and has discussed this with therapy. Using prn oxycodone 10 mg once or twice daily. 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   Chronic obstructive pulmonary disease, unspecified COPD type (H)-O2 dependent. Reports his breathing is at baseline.   Type 2 diabetes mellitus with chronic kidney disease on chronic dialysis, with long-term current use of insulin (H)-blood sugars: . Reports improved appetite the past few days.   Aortic valve stenosis, unspecified etiology  Benign essential hypertension-BPs: 116/58, 136/55, 146/66  HR: 63-68  Cognitive impairment-more alert and talkative today. Conversation is appropriate. SLUMS 20/30  Physical deconditioning-poor participation in therapies. Has ambulated up to 15 ft with walker and min assist. Requires max assist  with all cares.     Past Medical and Surgical History reviewed in Epic today.    MEDICATIONS:  Current Outpatient Medications   Medication Sig Dispense Refill     acetaminophen (TYLENOL) 325 MG tablet Take 3 tablets (975 mg) by mouth 3 times daily       albuterol (2.5 MG/3ML) 0.083% neb solution TAKE 1 VIAL BY NEBULIZATION EVERY 6 HOURS AS NEEDED FOR SHORNESS OF BREATH/DYSPNEA OR WHEEZING 75 mL 7     albuterol (PROAIR HFA/PROVENTIL HFA/VENTOLIN HFA) 108 (90 BASE) MCG/ACT Inhaler Inhale 1-2 puffs into the lungs every 4 hours as needed for shortness of breath / dyspnea or wheezing 1 Inhaler 5     aspirin 81 MG chewable tablet Take 81 mg by mouth daily       atorvastatin (LIPITOR) 40 MG tablet Take 40 mg by mouth daily       B Complex-C-Folic Acid (BRANDI-HEIDY) TABS Take 1 tablet by mouth daily 90 tablet 3     carvedilol (COREG) 12.5 MG tablet TAKE 1 TABLET(12.5 MG) BY MOUTH TWICE DAILY 180 tablet 3     diazepam (VALIUM) 5 MG tablet Take 2 tablets (10 mg) by mouth nightly as needed for anxiety, sleep or muscle spasms 10 tablet 0     diltiazem ER (TIAZAC) 300 MG 24 hr ER beaded capsule TAKE 1 CAPSULE(300 MG) BY MOUTH DAILY 90 capsule 0     doxazosin  (CARDURA) 8 MG tablet Take 8 mg by mouth At Bedtime       eplerenone (INSPRA) 50 MG tablet Take 1 tablet (50 mg) by mouth daily 90 tablet 4     fentaNYL (DURAGESIC) 25 mcg/hr 72 hr patch Place 1 patch onto the skin every 72 hours remove old patch. 10 patch 0     fish oil-omega-3 fatty acids 1000 MG capsule Take 1 g by mouth daily       fluticasone (FLOVENT HFA) 220 MCG/ACT inhaler Inhale 1 puff into the lungs 2 times daily       furosemide (LASIX) 20 MG tablet Take 1 tablet (20 mg) by mouth daily 90 tablet 3     insulin aspart (NOVOLOG PEN) 100 UNIT/ML pen Do Not give Correction Insulin if BG <140.  For  - 214 give 1 unit.  For  - 289 give 2 unit.  For  - 364 give 4 units.  For BG = or > 365 give 6 units       insulin aspart (NOVOLOG VIAL) 100 UNITS/ML vial Inject 4 Units Subcutaneous 3 times daily (with meals) Patient states with large meals he will increase to 14-16 units.       insulin glargine (LANTUS SOLOSTAR PEN) 100 UNIT/ML pen Inject 10 Units Subcutaneous At Bedtime (Patient will increase to 26 units at bedtime he states when BG is elevated.       irbesartan (AVAPRO) 150 MG tablet TAKE 1 TABLET(150 MG) BY MOUTH DAILY 90 tablet 3     melatonin 1 MG TABS tablet Take 1 tablet (1 mg) by mouth nightly as needed for sleep       ondansetron (ZOFRAN) 4 MG tablet Take by mouth every 6 hours as needed for nausea       oxyCODONE (ROXICODONE) 5 MG tablet Take 2 tablets (10 mg) by mouth every 4 hours as needed for moderate to severe pain 20 tablet 0     polyethylene glycol (MIRALAX/GLYCOLAX) packet Take 17 g by mouth daily       ranitidine (ZANTAC) 150 MG tablet Take 1 tablet (150 mg) by mouth daily as needed for heartburn 90 tablet 3     senna-docusate (SENOKOT-S/PERICOLACE) 8.6-50 MG tablet Take 1 tablet by mouth 2 times daily And bid prn       STIOLTO RESPIMAT 2.5-2.5 MCG/ACT AERS INHALE 2 PUFFS INTO THE LUNGS DAILY 4 g 9     VITAMIN D, CHOLECALCIFEROL, PO Take 2,000 Units by mouth daily           REVIEW OF SYSTEMS:  4 point ROS including Respiratory, CV, GI and , other than that noted in the HPI,  is negative    Objective:  /58   Pulse 63   Temp 97.7  F (36.5  C)   Resp 20   Wt 77.2 kg (170 lb 3.2 oz)   SpO2 99%   BMI 25.13 kg/m    Exam:  GENERAL APPEARANCE:  Alert, in no distress, chronically ill appearing  ENT:  Mouth and posterior oropharynx normal, moist mucous membranes, [place]  EYES:  EOM normal, conjunctiva and lids normal  NECK:  No adenopathy,masses or thyromegaly  RESP:  respiratory effort and palpation of chest normal, lungs clear to auscultation , no respiratory distress  CV:  Palpation and auscultation of heart done , regular rate and rhythm, 3/6 murmur, no edema, +2 pedal pulses  ABDOMEN:   soft, nontender, no hepatosplenomegaly or other masses  M/S:   in bed. YOON with good strength. No joint inflammation.   SKIN:  no rashes or open areas. Fistula LUE asymptomatic  PSYCH:  oriented X 3, memory impaired , affect and mood normal        Labs:   Labs done in SNF are in [facility] EPIC. Please refer to them using [facility] Everywhere.    ASSESSMENT / PLAN:  (C79.9) Metastatic cancer (H)  (primary encounter diagnosis)  (M48.50XD) Pathological compression fracture of vertebra with routine healing, subsequent encounter  Comment: some improvement in back pain. He declines an increase in pain meds.   Plan: continue fentanyl, oxycodone, tylenol, valium. Follow up with Dr Robles 6/18/2019.     (N18.6,  Z99.2) Chronic kidney disease with end stage renal failure on dialysis (H)  Comment: tolerating dialysis  Plan: continue dialysis MWF.     (J44.9) Chronic obstructive pulmonary disease, unspecified COPD type (H)  Comment: respiratory status at baseline. O2 dependent  Plan: continue  prn albuterol, fluticasone, Stiolto respimat.     (E11.22,  N18.6,  Z99.2,  Z79.4) Type 2 diabetes mellitus with chronic kidney disease on chronic dialysis, with long-term current use of insulin (H)  Comment:  controlled. Some improvement in appetite  Plan: continue Lantus and Novolog. Hold Novolog is he doesn't eat. Monitor blood sugars qid.     (I35.0) Aortic valve stenosis, unspecified etiology  Comment: severe. No signs of volume overload.   Plan: continue ASA, lasix, carvedilol, irbesartan, diltiazem.     (I10) Benign essential hypertension  Comment: controlled  Plan:continue carvedilol, diltiazem, Inspra, doxazosin, lasix, irbesartan. Monitor VS and adjust doses as warranted.      (R41.89) Cognitive impairment  Comment: appears to have mild deficits. He's more conversational today  Plan: supportive care    (R53.81) Physical deconditioning  Comment: poor progress in therapies with limited rehab potential.   Plan: continue PHYSICAL THERAPY/OT. Care conference is scheduled for later today. His goal is to return home with family, but  long term care may be more feasible given his high level care needs.     Electronically signed by:  NADIR Vega CNP

## 2019-06-03 VITALS
OXYGEN SATURATION: 98 % | BODY MASS INDEX: 25.1 KG/M2 | WEIGHT: 170 LBS | TEMPERATURE: 97.9 F | SYSTOLIC BLOOD PRESSURE: 153 MMHG | HEART RATE: 66 BPM | RESPIRATION RATE: 18 BRPM | DIASTOLIC BLOOD PRESSURE: 62 MMHG

## 2019-06-04 ENCOUNTER — NURSING HOME VISIT (OUTPATIENT)
Dept: GERIATRICS | Facility: CLINIC | Age: 84
End: 2019-06-04
Payer: MEDICARE

## 2019-06-04 DIAGNOSIS — Z99.2 TYPE 2 DIABETES MELLITUS WITH CHRONIC KIDNEY DISEASE ON CHRONIC DIALYSIS, WITH LONG-TERM CURRENT USE OF INSULIN (H): ICD-10-CM

## 2019-06-04 DIAGNOSIS — N18.6 TYPE 2 DIABETES MELLITUS WITH CHRONIC KIDNEY DISEASE ON CHRONIC DIALYSIS, WITH LONG-TERM CURRENT USE OF INSULIN (H): ICD-10-CM

## 2019-06-04 DIAGNOSIS — I35.0 AORTIC VALVE STENOSIS, UNSPECIFIED ETIOLOGY: ICD-10-CM

## 2019-06-04 DIAGNOSIS — C79.9 METASTATIC CANCER (H): Primary | ICD-10-CM

## 2019-06-04 DIAGNOSIS — R41.89 COGNITIVE IMPAIRMENT: ICD-10-CM

## 2019-06-04 DIAGNOSIS — S32.040D CLOSED COMPRESSION FRACTURE OF L4 LUMBAR VERTEBRA WITH ROUTINE HEALING, SUBSEQUENT ENCOUNTER: ICD-10-CM

## 2019-06-04 DIAGNOSIS — Z99.2 ESRD (END STAGE RENAL DISEASE) ON DIALYSIS (H): ICD-10-CM

## 2019-06-04 DIAGNOSIS — E11.22 TYPE 2 DIABETES MELLITUS WITH CHRONIC KIDNEY DISEASE ON CHRONIC DIALYSIS, WITH LONG-TERM CURRENT USE OF INSULIN (H): ICD-10-CM

## 2019-06-04 DIAGNOSIS — N18.6 ESRD (END STAGE RENAL DISEASE) ON DIALYSIS (H): ICD-10-CM

## 2019-06-04 DIAGNOSIS — J44.9 CHRONIC OBSTRUCTIVE PULMONARY DISEASE, UNSPECIFIED COPD TYPE (H): ICD-10-CM

## 2019-06-04 DIAGNOSIS — Z79.4 TYPE 2 DIABETES MELLITUS WITH CHRONIC KIDNEY DISEASE ON CHRONIC DIALYSIS, WITH LONG-TERM CURRENT USE OF INSULIN (H): ICD-10-CM

## 2019-06-04 PROCEDURE — 99304 1ST NF CARE SF/LOW MDM 25: CPT | Performed by: INTERNAL MEDICINE

## 2019-06-04 NOTE — PROGRESS NOTES
"[facility]  PHYSICIAN NOTE    PRIMARY CARE PROVIDER AND CLINIC:  Sandip Antunez MD, [address] [place] [id number]    Chief Complaint   Patient presents with     Hospital F/U     [facility] Record Number:  [id number]  [facility] where encounter took place[facility] [[id number]]    Seven Savage Jr. is a 84 year old (1935), admitted to the above facility from  [facility]. Hospital stay 5/9/19 through 5/23/19. Admitted to this facility for  rehab, medical management and nursing care.     HPI:    HPI information obtained from: facility chart records, patient report and [facility] chart review.     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 Robles as an outpatient; dx Stage IV metastatic NSCLC (spiculated right upper lobe pulmonary nodule) poorly differentiated carcinoma with mets to the liver, bones, kidneys and adrenal glands. He also has ESRD d/t h/o HTN and DM2 which started in 2016. He has severe aortic stenosis, is a former smoker and has O2-dependent COPD. He has fentanyl patch for pain and PRN Oxycodone and has declined further dose adjustment recently. Was seen by palliative while hospitalized to discuss symptom control and is not a candidate for systemic therapy with poor performance status in the setting of widespread metastatic disease.      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 He and I had a nice conversation. He reports pain is controlled but would like Flexeril added back PRN as he'd prefer that for muscle spasm over Valium. Especially finds " "Flexeril helpful prior to dialysis. Breathing is \"wonderful\" though is O2 dependent. Bowels \"come and go\" and he finds Miralax helpful. He is looking forward to Dr. Robles follow up. We talked about his past history such as with dialysis and regarding his wife's MS.    CODE STATUS/ADVANCE DIRECTIVES DISCUSSION:   DNR / DNI  Patient's living condition: lives with family, son ;  as wife had MS    ALLERGIES: Levaquin [levofloxacin]; Pioglitazone; and Vytorin    Past Medical History:   Diagnosis Date     COPD (chronic obstructive pulmonary disease) (H)      CRF (chronic renal failure)      Heart murmur      HLD (hyperlipidemia)      HTN (hypertension)      IDDM (insulin dependent diabetes mellitus) (H)      Renal calculi       Past Surgical History:   Procedure Laterality Date     ADENOIDECTOMY       ENDOSCOPIC POLYPECTOMY NASAL      Through vocal cords     IR LUMBAR VERTEBROPLASTY  2019     Renal Stent      For renal calculi     TONSILLECTOMY       Family History   Family history unknown: Yes     Social History     Tobacco Use     Smoking status: Former Smoker     Packs/day: 1.00     Years: 70.00     Pack years: 70.00     Types: Cigarettes     Last attempt to quit:      Years since quittin.4     Smokeless tobacco: Never Used   Substance Use Topics     Alcohol use: Yes     Comment: average 1 beer month     Drug use: No        Post-discharge medication reconciliation status: Adding back Flexeril today    Current Outpatient Medications   Medication Sig Dispense Refill     acetaminophen (TYLENOL) 325 MG tablet Take 3 tablets (975 mg) by mouth 3 times daily       albuterol (2.5 MG/3ML) 0.083% neb solution TAKE 1 VIAL BY NEBULIZATION EVERY 6 HOURS AS NEEDED FOR SHORNESS OF BREATH/DYSPNEA OR WHEEZING 75 mL 7     albuterol (PROAIR HFA/PROVENTIL HFA/VENTOLIN HFA) 108 (90 BASE) MCG/ACT Inhaler Inhale 1-2 puffs into the lungs every 4 hours as needed for shortness of breath / dyspnea or wheezing 1 Inhaler 5 "     aspirin 81 MG chewable tablet Take 81 mg by mouth daily       atorvastatin (LIPITOR) 40 MG tablet Take 40 mg by mouth daily       B Complex-C-Folic Acid (BRANDI-HEIDY) TABS Take 1 tablet by mouth daily 90 tablet 3     carvedilol (COREG) 12.5 MG tablet TAKE 1 TABLET(12.5 MG) BY MOUTH TWICE DAILY 180 tablet 3     diazepam (VALIUM) 5 MG tablet Take 2 tablets (10 mg) by mouth nightly as needed for anxiety, sleep or muscle spasms 10 tablet 0     diltiazem ER (TIAZAC) 300 MG 24 hr ER beaded capsule TAKE 1 CAPSULE(300 MG) BY MOUTH DAILY 90 capsule 0     doxazosin (CARDURA) 8 MG tablet Take 8 mg by mouth At Bedtime       eplerenone (INSPRA) 50 MG tablet Take 1 tablet (50 mg) by mouth daily 90 tablet 4     fentaNYL (DURAGESIC) 25 mcg/hr 72 hr patch Place 1 patch onto the skin every 72 hours remove old patch. 10 patch 0     fish oil-omega-3 fatty acids 1000 MG capsule Take 1 g by mouth daily       fluticasone (FLOVENT HFA) 220 MCG/ACT inhaler Inhale 1 puff into the lungs 2 times daily       furosemide (LASIX) 20 MG tablet Take 1 tablet (20 mg) by mouth daily 90 tablet 3     insulin aspart (NOVOLOG PEN) 100 UNIT/ML pen Do Not give Correction Insulin if BG <140.  For  - 214 give 1 unit.  For  - 289 give 2 unit.  For  - 364 give 4 units.  For BG = or > 365 give 6 units       insulin aspart (NOVOLOG VIAL) 100 UNITS/ML vial Inject 4 Units Subcutaneous 3 times daily (with meals) Patient states with large meals he will increase to 14-16 units.       insulin glargine (LANTUS SOLOSTAR PEN) 100 UNIT/ML pen Inject 10 Units Subcutaneous At Bedtime (Patient will increase to 26 units at bedtime he states when BG is elevated.       irbesartan (AVAPRO) 150 MG tablet TAKE 1 TABLET(150 MG) BY MOUTH DAILY 90 tablet 3     melatonin 1 MG TABS tablet Take 1 tablet (1 mg) by mouth nightly as needed for sleep       ondansetron (ZOFRAN) 4 MG tablet Take by mouth every 6 hours as needed for nausea       oxyCODONE (ROXICODONE) 5 MG  tablet Take 2 tablets (10 mg) by mouth every 4 hours as needed for moderate to severe pain 20 tablet 0     polyethylene glycol (MIRALAX/GLYCOLAX) packet Take 17 g by mouth daily       ranitidine (ZANTAC) 150 MG tablet Take 1 tablet (150 mg) by mouth daily as needed for heartburn 90 tablet 3     senna-docusate (SENOKOT-S/PERICOLACE) 8.6-50 MG tablet Take 1 tablet by mouth 2 times daily And bid prn       STIOLTO RESPIMAT 2.5-2.5 MCG/ACT AERS INHALE 2 PUFFS INTO THE LUNGS DAILY 4 g 9     VITAMIN D, CHOLECALCIFEROL, PO Take 2,000 Units by mouth daily          ROS:  10 point ROS of systems including Constitutional, Eyes, Respiratory, Cardiovascular, Gastroenterology, Genitourinary, Integumentary, Musculoskeletal, Psychiatric were all negative except for pertinent positives noted in my HPI.    Exam:  /62   Pulse 66   Temp 97.9  F (36.6  C)   Resp 18   Wt 77.1 kg (170 lb)   SpO2 98%   BMI 25.10 kg/m    Frail appearing gentleman lying in bed wearing O2  Slightly mumbled/rapid speech  Fistula left arm  HRRR with 3/6 systolic murmur  No edema  Extremities warm  Lungs clear but dim  Wearing O2 and lying flat without respiratory distress  Abdomen soft, NT, +BS  Radiation stickers lower chest/upper abdomen  Pleasant and professional    Lab/Diagnostic data:  Labs per dialysis  Hemoglobin   Date Value Ref Range Status   05/28/2019 9.9 (L) 13.3 - 17.7 g/dL Final     Lab Results   Component Value Date    A1C 4.6 05/09/2019       ASSESSMENT/PLAN:  Stage IV metastatic NSCLC (spiculated right upper lobe pulmonary nodule) poorly differentiated carcinoma with mets to the liver, bones, kidneys and adrenal glands  See inpatient palliative care notes; no adjustment of opiate pain medication today but will add back Flexeril 5 mg daily PRN (rather than TID PRN) for back muscle spasms; he now reports preferring this over Valium; monitor for delirium/somnolence  Future follow up with Dr. Robles of heme/onc; did have palliative  radiation to spine but not a candidate for chemotherapy    Type 2 diabetes mellitus with chronic kidney disease on chronic dialysis, with long-term current use of insulin (H)  Ok to cut back on insulin especially depending on oral intake though A1c may not be fully accurate d/t anemia and dialysis  Sugars here 70s-150s without hypoglycemia per QID checks but monitor for that  Lab Results   Component Value Date    A1C 4.6 05/09/2019       ESRD (end stage renal disease) on dialysis   MWF dialysis since 2016 d/t comorbidities of HTN and DM2    Chronic obstructive pulmonary disease on chronic O2  He reports breathing is at baseline    Aortic valve stenosis   Follow volume status    Cognitive impairment  SLUMS 21/30 last TCU stay and this stay 20/30  Requiring assist with cares d/t frailty      Total time spent with patient visit at the skilled nursing facility was >25 minutes including patient visit and review of past records. Greater than 50% of total time spent with counseling and coordinating care due to medical comorbidities as noted above and discussion with primary NP NADIR Delgado CNP re: addition of Flexeril again.    Electronically signed by:  Amada Turner DO

## 2019-06-04 NOTE — LETTER
"    6/4/2019        RE: Seven Savage Jr.  [address]  [facility] [address]        [place] [facility]  PHYSICIAN NOTE    PRIMARY CARE PROVIDER AND CLINIC:  Sandip Antunez MD, [address]  / [facility] [id number]    Chief Complaint   Patient presents with     Hospital F/U     [place] Medical Record Number:  [id number]  Place of Service where encounter took place:  [facility] [[id number]]    Seven Savage Jr. is a 84 year old (1935), admitted to the above facility from  [facility]. Hospital stay 5/9/19 through 5/23/19. Admitted to this facility for  rehab, medical management and nursing care.     HPI:    HPI information obtained from: facility chart records, patient report and [facility] chart review.     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 Robles as an outpatient; dx Stage IV metastatic NSCLC (spiculated right upper lobe pulmonary nodule) poorly differentiated carcinoma with mets to the liver, bones, kidneys and adrenal glands. He also has ESRD d/t h/o HTN and DM2 which started in 2016. He has severe aortic stenosis, is a former smoker and has O2-dependent COPD. He has fentanyl patch for pain and PRN Oxycodone and has declined further dose adjustment recently. Was seen by palliative while hospitalized to discuss symptom control and is not a candidate for systemic therapy with poor performance status in the setting of widespread metastatic disease.      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 He and I had a nice conversation. He reports pain is controlled but would " "like Flexeril added back PRN as he'd prefer that for muscle spasm over Valium. Especially finds Flexeril helpful prior to dialysis. Breathing is \"wonderful\" though is O2 dependent. Bowels \"come and go\" and he finds Miralax helpful. He is looking forward to Dr. Robles follow up. We talked about his past history such as with dialysis and regarding his wife's MS.    CODE STATUS/ADVANCE DIRECTIVES DISCUSSION:   DNR / DNI  Patient's living condition: lives with family, son ;  as wife had MS    ALLERGIES: Levaquin [levofloxacin]; Pioglitazone; and Vytorin    Past Medical History:   Diagnosis Date     COPD (chronic obstructive pulmonary disease) (H)      CRF (chronic renal failure)      Heart murmur      HLD (hyperlipidemia)      HTN (hypertension)      IDDM (insulin dependent diabetes mellitus) (H)      Renal calculi       Past Surgical History:   Procedure Laterality Date     ADENOIDECTOMY       ENDOSCOPIC POLYPECTOMY NASAL      Through vocal cords     IR LUMBAR VERTEBROPLASTY  2019     Renal Stent      For renal calculi     TONSILLECTOMY       Family History   Family history unknown: Yes     Social History     Tobacco Use     Smoking status: Former Smoker     Packs/day: 1.00     Years: 70.00     Pack years: 70.00     Types: Cigarettes     Last attempt to quit:      Years since quittin.4     Smokeless tobacco: Never Used   Substance Use Topics     Alcohol use: Yes     Comment: average 1 beer month     Drug use: No        Post-discharge medication reconciliation status: Adding back Flexeril today    Current Outpatient Medications   Medication Sig Dispense Refill     acetaminophen (TYLENOL) 325 MG tablet Take 3 tablets (975 mg) by mouth 3 times daily       albuterol (2.5 MG/3ML) 0.083% neb solution TAKE 1 VIAL BY NEBULIZATION EVERY 6 HOURS AS NEEDED FOR SHORNESS OF BREATH/DYSPNEA OR WHEEZING 75 mL 7     albuterol (PROAIR HFA/PROVENTIL HFA/VENTOLIN HFA) 108 (90 BASE) MCG/ACT Inhaler Inhale 1-2 puffs " into the lungs every 4 hours as needed for shortness of breath / dyspnea or wheezing 1 Inhaler 5     aspirin 81 MG chewable tablet Take 81 mg by mouth daily       atorvastatin (LIPITOR) 40 MG tablet Take 40 mg by mouth daily       B Complex-C-Folic Acid (BRANDI-HEIDY) TABS Take 1 tablet by mouth daily 90 tablet 3     carvedilol (COREG) 12.5 MG tablet TAKE 1 TABLET(12.5 MG) BY MOUTH TWICE DAILY 180 tablet 3     diazepam (VALIUM) 5 MG tablet Take 2 tablets (10 mg) by mouth nightly as needed for anxiety, sleep or muscle spasms 10 tablet 0     diltiazem ER (TIAZAC) 300 MG 24 hr ER beaded capsule TAKE 1 CAPSULE(300 MG) BY MOUTH DAILY 90 capsule 0     doxazosin (CARDURA) 8 MG tablet Take 8 mg by mouth At Bedtime       eplerenone (INSPRA) 50 MG tablet Take 1 tablet (50 mg) by mouth daily 90 tablet 4     fentaNYL (DURAGESIC) 25 mcg/hr 72 hr patch Place 1 patch onto the skin every 72 hours remove old patch. 10 patch 0     fish oil-omega-3 fatty acids 1000 MG capsule Take 1 g by mouth daily       fluticasone (FLOVENT HFA) 220 MCG/ACT inhaler Inhale 1 puff into the lungs 2 times daily       furosemide (LASIX) 20 MG tablet Take 1 tablet (20 mg) by mouth daily 90 tablet 3     insulin aspart (NOVOLOG PEN) 100 UNIT/ML pen Do Not give Correction Insulin if BG <140.  For  - 214 give 1 unit.  For  - 289 give 2 unit.  For  - 364 give 4 units.  For BG = or > 365 give 6 units       insulin aspart (NOVOLOG VIAL) 100 UNITS/ML vial Inject 4 Units Subcutaneous 3 times daily (with meals) Patient states with large meals he will increase to 14-16 units.       insulin glargine (LANTUS SOLOSTAR PEN) 100 UNIT/ML pen Inject 10 Units Subcutaneous At Bedtime (Patient will increase to 26 units at bedtime he states when BG is elevated.       irbesartan (AVAPRO) 150 MG tablet TAKE 1 TABLET(150 MG) BY MOUTH DAILY 90 tablet 3     melatonin 1 MG TABS tablet Take 1 tablet (1 mg) by mouth nightly as needed for sleep       ondansetron (ZOFRAN)  4 MG tablet Take by mouth every 6 hours as needed for nausea       oxyCODONE (ROXICODONE) 5 MG tablet Take 2 tablets (10 mg) by mouth every 4 hours as needed for moderate to severe pain 20 tablet 0     polyethylene glycol (MIRALAX/GLYCOLAX) packet Take 17 g by mouth daily       ranitidine (ZANTAC) 150 MG tablet Take 1 tablet (150 mg) by mouth daily as needed for heartburn 90 tablet 3     senna-docusate (SENOKOT-S/PERICOLACE) 8.6-50 MG tablet Take 1 tablet by mouth 2 times daily And bid prn       STIOLTO RESPIMAT 2.5-2.5 MCG/ACT AERS INHALE 2 PUFFS INTO THE LUNGS DAILY 4 g 9     VITAMIN D, CHOLECALCIFEROL, PO Take 2,000 Units by mouth daily          ROS:  10 point ROS of systems including Constitutional, Eyes, Respiratory, Cardiovascular, Gastroenterology, Genitourinary, Integumentary, Musculoskeletal, Psychiatric were all negative except for pertinent positives noted in my HPI.    Exam:  /62   Pulse 66   Temp 97.9  F (36.6  C)   Resp 18   Wt 77.1 kg (170 lb)   SpO2 98%   BMI 25.10 kg/m     Frail appearing gentleman lying in bed wearing O2  Slightly mumbled/rapid speech  Fistula left arm  HRRR with 3/6 systolic murmur  No edema  Extremities warm  Lungs clear but dim  Wearing O2 and lying flat without respiratory distress  Abdomen soft, NT, +BS  Radiation stickers lower chest/upper abdomen  Pleasant and professional    Lab/Diagnostic data:  Labs per dialysis  Hemoglobin   Date Value Ref Range Status   05/28/2019 9.9 (L) 13.3 - 17.7 g/dL Final     Lab Results   Component Value Date    A1C 4.6 05/09/2019       ASSESSMENT/PLAN:  Stage IV metastatic NSCLC (spiculated right upper lobe pulmonary nodule) poorly differentiated carcinoma with mets to the liver, bones, kidneys and adrenal glands  See inpatient palliative care notes; no adjustment of opiate pain medication today but will add back Flexeril 5 mg daily PRN (rather than TID PRN) for back muscle spasms; he now reports preferring this over Valium; monitor  for delirium/somnolence  Future follow up with Dr. Robles of heme/onc; did have palliative radiation to spine but not a candidate for chemotherapy    Type 2 diabetes mellitus with chronic kidney disease on chronic dialysis, with long-term current use of insulin (H)  Ok to cut back on insulin especially depending on oral intake though A1c may not be fully accurate d/t anemia and dialysis  Sugars here 70s-150s without hypoglycemia per QID checks but monitor for that  Lab Results   Component Value Date    A1C 4.6 05/09/2019       ESRD (end stage renal disease) on dialysis   MWF dialysis since 2016 d/t comorbidities of HTN and DM2    Chronic obstructive pulmonary disease on chronic O2  He reports breathing is at baseline    Aortic valve stenosis   Follow volume status    Cognitive impairment  SLUMS 21/30 last TCU stay and this stay 20/30  Requiring assist with cares d/t frailty      Total time spent with patient visit at the skilled nursing facility was >25 minutes including patient visit and review of past records. Greater than 50% of total time spent with counseling and coordinating care due to medical comorbidities as noted above and discussion with primary NP NADIR Delgado CNP re: addition of Flexeril again.    Electronically signed by:  Amada Turner DO        Sincerely,        Amada Turner DO

## 2019-06-05 RX ORDER — CYCLOBENZAPRINE HCL 5 MG
5 TABLET ORAL DAILY PRN
COMMUNITY

## 2019-06-06 ENCOUNTER — NURSING HOME VISIT (OUTPATIENT)
Dept: GERIATRICS | Facility: CLINIC | Age: 84
End: 2019-06-06
Payer: MEDICARE

## 2019-06-06 VITALS
WEIGHT: 170.2 LBS | BODY MASS INDEX: 25.13 KG/M2 | RESPIRATION RATE: 18 BRPM | TEMPERATURE: 98.5 F | DIASTOLIC BLOOD PRESSURE: 65 MMHG | OXYGEN SATURATION: 99 % | HEART RATE: 61 BPM | SYSTOLIC BLOOD PRESSURE: 152 MMHG

## 2019-06-06 DIAGNOSIS — S32.040D CLOSED COMPRESSION FRACTURE OF L4 LUMBAR VERTEBRA WITH ROUTINE HEALING, SUBSEQUENT ENCOUNTER: ICD-10-CM

## 2019-06-06 DIAGNOSIS — Z99.2 ESRD (END STAGE RENAL DISEASE) ON DIALYSIS (H): ICD-10-CM

## 2019-06-06 DIAGNOSIS — M54.5 ACUTE BILATERAL LOW BACK PAIN, WITH SCIATICA PRESENCE UNSPECIFIED: ICD-10-CM

## 2019-06-06 DIAGNOSIS — C79.9 METASTATIC CANCER (H): Primary | ICD-10-CM

## 2019-06-06 DIAGNOSIS — N18.6 ESRD (END STAGE RENAL DISEASE) ON DIALYSIS (H): ICD-10-CM

## 2019-06-06 PROCEDURE — 99309 SBSQ NF CARE MODERATE MDM 30: CPT | Performed by: NURSE PRACTITIONER

## 2019-06-06 NOTE — LETTER
6/6/2019        RE: Seven Savage Jr.  [address]  [facility] [address]        [place] [facility]  [place] Medical Record Number:  [id number]  Place of Service where encounter took place:  [facility] [[id number]]  Chief Complaint   Patient presents with     RECHECK       HPI:    Seven Savage Jr.  is a 84 year old (1935), who is being seen today for an episodic care visit.  HPI information obtained from: facility chart records, facility staff, patient report and [facility] chart review.     Per recent TCU provider notes:  Patient with h/o ESRD on dialysis, diabetes type 2, COPD O2 dependent, aortic stenosis and hospitalized with intractable back pain. He had been hospitalized 4/2019 with back pain and was found to have an L4 vertebral compression fracture. He was also found to have poorly differentiated metastatic cancer involving the lung, liver, spine and possibly kidneys and adrenals. He was scheduled for a PET scan and follow up with Dr Robles but was unable to attend due to back pain. He had been taking Norco with no relief.  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 He underwent L4 biopsy and vertebroplasty 5/14/2019. Pathology showed poorly differential carcinoma. Dr Robles consulted and no systemic therapy is planned due to his poor performance status and ESRD. He received palliative radiation to the lumbar spine, last dose 5/22/2019.  Palliative Care consulted. Fentanyl patch, oxycodone, valium and flexeril were started. Recently added Cyclobenzaprine 5 mg daily PRN muscle spasm.    Today's concern is:  Patient seen today per request of dialysis NP to eval pain. Patient complained of significant uncontrolled pain while at dialysis on 6/5. Reported the transportation ride was over rough roads and he was quite achy. Of note, although he is on scheduled tylenol he often refuses the scheduled doses due to concerns about his  liver. He also did not take any PRN oxycodone yesterday or the day before. He is on scheduled Duragesic patch 25 mcg/hr and feels this is usually effective. Patient tells me he would like to have a scheduled oxycodone 5 mg every morning in addition to other meds, keep PRN oxycodone as changed and also continue daily PRN flexeril 5 mg. Mentation appears at baseline - alert and lucid.     Past Medical and Surgical History reviewed in Epic today.    MEDICATIONS:  Current Outpatient Medications   Medication Sig Dispense Refill     acetaminophen (TYLENOL) 325 MG tablet Take 3 tablets (975 mg) by mouth 3 times daily       albuterol (2.5 MG/3ML) 0.083% neb solution TAKE 1 VIAL BY NEBULIZATION EVERY 6 HOURS AS NEEDED FOR SHORNESS OF BREATH/DYSPNEA OR WHEEZING 75 mL 7     albuterol (PROAIR HFA/PROVENTIL HFA/VENTOLIN HFA) 108 (90 BASE) MCG/ACT Inhaler Inhale 1-2 puffs into the lungs every 4 hours as needed for shortness of breath / dyspnea or wheezing 1 Inhaler 5     aspirin 81 MG chewable tablet Take 81 mg by mouth daily       atorvastatin (LIPITOR) 40 MG tablet Take 40 mg by mouth daily       B Complex-C-Folic Acid (BRANDI-HEIDY) TABS Take 1 tablet by mouth daily 90 tablet 3     carvedilol (COREG) 12.5 MG tablet TAKE 1 TABLET(12.5 MG) BY MOUTH TWICE DAILY 180 tablet 3     cyclobenzaprine (FLEXERIL) 5 MG tablet Take 5 mg by mouth daily as needed for muscle spasms       diazepam (VALIUM) 5 MG tablet Take 2 tablets (10 mg) by mouth nightly as needed for anxiety, sleep or muscle spasms 10 tablet 0     diltiazem ER (TIAZAC) 300 MG 24 hr ER beaded capsule TAKE 1 CAPSULE(300 MG) BY MOUTH DAILY 90 capsule 0     doxazosin (CARDURA) 8 MG tablet Take 8 mg by mouth At Bedtime       eplerenone (INSPRA) 50 MG tablet Take 1 tablet (50 mg) by mouth daily 90 tablet 4     fentaNYL (DURAGESIC) 25 mcg/hr 72 hr patch Place 1 patch onto the skin every 72 hours remove old patch. 10 patch 0     fish oil-omega-3 fatty acids 1000 MG capsule Take 1 g by  mouth daily       fluticasone (FLOVENT HFA) 220 MCG/ACT inhaler Inhale 1 puff into the lungs 2 times daily       furosemide (LASIX) 20 MG tablet Take 1 tablet (20 mg) by mouth daily 90 tablet 3     insulin aspart (NOVOLOG PEN) 100 UNIT/ML pen Do Not give Correction Insulin if BG <140.  For  - 214 give 1 unit.  For  - 289 give 2 unit.  For  - 364 give 4 units.  For BG = or > 365 give 6 units       insulin aspart (NOVOLOG VIAL) 100 UNITS/ML vial Inject 4 Units Subcutaneous 3 times daily (with meals) Patient states with large meals he will increase to 14-16 units.       insulin glargine (LANTUS SOLOSTAR PEN) 100 UNIT/ML pen Inject 10 Units Subcutaneous At Bedtime (Patient will increase to 26 units at bedtime he states when BG is elevated.       irbesartan (AVAPRO) 150 MG tablet TAKE 1 TABLET(150 MG) BY MOUTH DAILY 90 tablet 3     melatonin 1 MG TABS tablet Take 1 tablet (1 mg) by mouth nightly as needed for sleep       ondansetron (ZOFRAN) 4 MG tablet Take by mouth every 6 hours as needed for nausea       oxyCODONE (ROXICODONE) 5 MG tablet Take 2 tablets (10 mg) by mouth every 4 hours as needed for moderate to severe pain 20 tablet 0     polyethylene glycol (MIRALAX/GLYCOLAX) packet Take 17 g by mouth daily       ranitidine (ZANTAC) 150 MG tablet Take 1 tablet (150 mg) by mouth daily as needed for heartburn 90 tablet 3     senna-docusate (SENOKOT-S/PERICOLACE) 8.6-50 MG tablet Take 1 tablet by mouth 2 times daily And bid prn       STIOLTO RESPIMAT 2.5-2.5 MCG/ACT AERS INHALE 2 PUFFS INTO THE LUNGS DAILY 4 g 9     VITAMIN D, CHOLECALCIFEROL, PO Take 2,000 Units by mouth daily          REVIEW OF SYSTEMS:  4 point ROS including Respiratory, CV, GI and , other than that noted in the HPI,  is negative    Objective:  /65   Pulse 61   Temp 98.5  F (36.9  C)   Resp 18   Wt 77.2 kg (170 lb 3.2 oz)   SpO2 99%   BMI 25.13 kg/m     Exam:  Awake male, resting in bed, pleasant, oriented x 3  On 02, no  dyspnea.   No LE edema.   Abdomen soft and full.   No facial asymmetry and speech is clear  Recent SBP range 131-153 and BG   Sats 99% on oxygen    Labs:   Recent labs in [facility] reviewed by me today.     ASSESSMENT/PLAN:  1. Metastatic cancer (H) involving the lung, liver, spine and ?kidneys and adrenals    2. Acute bilateral low back pain, with sciatica presence unspecified    3. Closed compression fracture of L4 lumbar vertebra with routine healing, subsequent encounter    4. ESRD (end stage renal disease) on dialysis (H)    chronic issues, pain not well controlled - patient does not ask for PRN oxy and is then quite miserable at dialysis.     Orders written by provider at facility  Schedule oxycodone 5 mg PO daily in AM. Continue oxycodone 10 mg PO every 4 hrs PRN as well as scheduled Tylenol, Duragesic and PRN flexeril. Staff to update provider if not effective.     Electronically signed by:  NADIR Strickland CNP               Sincerely,        NADIR Strickland CNP

## 2019-06-06 NOTE — PROGRESS NOTES
[place] [facility]  [place] Medical Record Number:  [id number]  Place of Service where encounter took place:  [facility] ([facility]) [[id number]]  Chief Complaint   Patient presents with     RECHECK       HPI:    Seven Savage Jr.  is a 84 year old (1935), who is being seen today for an episodic care visit.  HPI information obtained from: facility chart records, facility staff, patient report and [facility] chart review.     Per recent TCU provider notes:  Patient with h/o ESRD on dialysis, diabetes type 2, COPD O2 dependent, aortic stenosis and hospitalized with intractable back pain. He had been hospitalized 4/2019 with back pain and was found to have an L4 vertebral compression fracture. He was also found to have poorly differentiated metastatic cancer involving the lung, liver, spine and possibly kidneys and adrenals. He was scheduled for a PET scan and follow up with Dr Robles but was unable to attend due to back pain. He had been taking Norco with no relief.  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 He underwent L4 biopsy and vertebroplasty 5/14/2019. Pathology showed poorly differential carcinoma. Dr Robles consulted and no systemic therapy is planned due to his poor performance status and ESRD. He received palliative radiation to the lumbar spine, last dose 5/22/2019.  Palliative Care consulted. Fentanyl patch, oxycodone, valium and flexeril were started. Recently added Cyclobenzaprine 5 mg daily PRN muscle spasm.    Today's concern is:  Patient seen today per request of dialysis NP to eval pain. Patient complained of significant uncontrolled pain while at dialysis on 6/5. Reported the transportation ride was over rough roads and he was quite achy. Of note, although he is on scheduled tylenol he often refuses the scheduled doses due to concerns about his liver. He also did not take any PRN oxycodone yesterday or the day before. He is on scheduled  Duragesic patch 25 mcg/hr and feels this is usually effective. Patient tells me he would like to have a scheduled oxycodone 5 mg every morning in addition to other meds, keep PRN oxycodone as changed and also continue daily PRN flexeril 5 mg. Mentation appears at baseline - alert and lucid.     Past Medical and Surgical History reviewed in Epic today.    MEDICATIONS:  Current Outpatient Medications   Medication Sig Dispense Refill     acetaminophen (TYLENOL) 325 MG tablet Take 3 tablets (975 mg) by mouth 3 times daily       albuterol (2.5 MG/3ML) 0.083% neb solution TAKE 1 VIAL BY NEBULIZATION EVERY 6 HOURS AS NEEDED FOR SHORNESS OF BREATH/DYSPNEA OR WHEEZING 75 mL 7     albuterol (PROAIR HFA/PROVENTIL HFA/VENTOLIN HFA) 108 (90 BASE) MCG/ACT Inhaler Inhale 1-2 puffs into the lungs every 4 hours as needed for shortness of breath / dyspnea or wheezing 1 Inhaler 5     aspirin 81 MG chewable tablet Take 81 mg by mouth daily       atorvastatin (LIPITOR) 40 MG tablet Take 40 mg by mouth daily       B Complex-C-Folic Acid (BRANDI-HEIDY) TABS Take 1 tablet by mouth daily 90 tablet 3     carvedilol (COREG) 12.5 MG tablet TAKE 1 TABLET(12.5 MG) BY MOUTH TWICE DAILY 180 tablet 3     cyclobenzaprine (FLEXERIL) 5 MG tablet Take 5 mg by mouth daily as needed for muscle spasms       diazepam (VALIUM) 5 MG tablet Take 2 tablets (10 mg) by mouth nightly as needed for anxiety, sleep or muscle spasms 10 tablet 0     diltiazem ER (TIAZAC) 300 MG 24 hr ER beaded capsule TAKE 1 CAPSULE(300 MG) BY MOUTH DAILY 90 capsule 0     doxazosin (CARDURA) 8 MG tablet Take 8 mg by mouth At Bedtime       eplerenone (INSPRA) 50 MG tablet Take 1 tablet (50 mg) by mouth daily 90 tablet 4     fentaNYL (DURAGESIC) 25 mcg/hr 72 hr patch Place 1 patch onto the skin every 72 hours remove old patch. 10 patch 0     fish oil-omega-3 fatty acids 1000 MG capsule Take 1 g by mouth daily       fluticasone (FLOVENT HFA) 220 MCG/ACT inhaler Inhale 1 puff into the lungs  2 times daily       furosemide (LASIX) 20 MG tablet Take 1 tablet (20 mg) by mouth daily 90 tablet 3     insulin aspart (NOVOLOG PEN) 100 UNIT/ML pen Do Not give Correction Insulin if BG <140.  For  - 214 give 1 unit.  For  - 289 give 2 unit.  For  - 364 give 4 units.  For BG = or > 365 give 6 units       insulin aspart (NOVOLOG VIAL) 100 UNITS/ML vial Inject 4 Units Subcutaneous 3 times daily (with meals) Patient states with large meals he will increase to 14-16 units.       insulin glargine (LANTUS SOLOSTAR PEN) 100 UNIT/ML pen Inject 10 Units Subcutaneous At Bedtime (Patient will increase to 26 units at bedtime he states when BG is elevated.       irbesartan (AVAPRO) 150 MG tablet TAKE 1 TABLET(150 MG) BY MOUTH DAILY 90 tablet 3     melatonin 1 MG TABS tablet Take 1 tablet (1 mg) by mouth nightly as needed for sleep       ondansetron (ZOFRAN) 4 MG tablet Take by mouth every 6 hours as needed for nausea       oxyCODONE (ROXICODONE) 5 MG tablet Take 2 tablets (10 mg) by mouth every 4 hours as needed for moderate to severe pain 20 tablet 0     polyethylene glycol (MIRALAX/GLYCOLAX) packet Take 17 g by mouth daily       ranitidine (ZANTAC) 150 MG tablet Take 1 tablet (150 mg) by mouth daily as needed for heartburn 90 tablet 3     senna-docusate (SENOKOT-S/PERICOLACE) 8.6-50 MG tablet Take 1 tablet by mouth 2 times daily And bid prn       STIOLTO RESPIMAT 2.5-2.5 MCG/ACT AERS INHALE 2 PUFFS INTO THE LUNGS DAILY 4 g 9     VITAMIN D, CHOLECALCIFEROL, PO Take 2,000 Units by mouth daily          REVIEW OF SYSTEMS:  4 point ROS including Respiratory, CV, GI and , other than that noted in the HPI,  is negative    Objective:  /65   Pulse 61   Temp 98.5  F (36.9  C)   Resp 18   Wt 77.2 kg (170 lb 3.2 oz)   SpO2 99%   BMI 25.13 kg/m    Exam:  Awake male, resting in bed, pleasant, oriented x 3  On 02, no dyspnea.   No LE edema.   Abdomen soft and full.   No facial asymmetry and speech is  clear  Recent SBP range 131-153 and BG   Sats 99% on oxygen    Labs:   Recent labs in [facility] reviewed by me today.     ASSESSMENT/PLAN:  1. Metastatic cancer (H) involving the lung, liver, spine and ?kidneys and adrenals    2. Acute bilateral low back pain, with sciatica presence unspecified    3. Closed compression fracture of L4 lumbar vertebra with routine healing, subsequent encounter    4. ESRD (end stage renal disease) on dialysis (H)    chronic issues, pain not well controlled - patient does not ask for PRN oxy and is then quite miserable at dialysis.     Orders written by provider at facility  Schedule oxycodone 5 mg PO daily in AM. Continue oxycodone 10 mg PO every 4 hrs PRN as well as scheduled Tylenol, Duragesic and PRN flexeril. Staff to update provider if not effective.     Electronically signed by:  NADIR Strickland CNP

## 2019-06-11 ENCOUNTER — DISCHARGE SUMMARY NURSING HOME (OUTPATIENT)
Dept: GERIATRICS | Facility: CLINIC | Age: 84
End: 2019-06-11
Payer: MEDICARE

## 2019-06-11 VITALS
WEIGHT: 170.2 LBS | DIASTOLIC BLOOD PRESSURE: 58 MMHG | SYSTOLIC BLOOD PRESSURE: 153 MMHG | OXYGEN SATURATION: 100 % | BODY MASS INDEX: 25.21 KG/M2 | HEART RATE: 63 BPM | RESPIRATION RATE: 18 BRPM | HEIGHT: 69 IN | TEMPERATURE: 98.2 F

## 2019-06-11 DIAGNOSIS — I35.0 AORTIC VALVE STENOSIS, UNSPECIFIED ETIOLOGY: ICD-10-CM

## 2019-06-11 DIAGNOSIS — Z99.2 TYPE 2 DIABETES MELLITUS WITH CHRONIC KIDNEY DISEASE ON CHRONIC DIALYSIS, WITH LONG-TERM CURRENT USE OF INSULIN (H): ICD-10-CM

## 2019-06-11 DIAGNOSIS — J44.9 CHRONIC OBSTRUCTIVE PULMONARY DISEASE, UNSPECIFIED COPD TYPE (H): ICD-10-CM

## 2019-06-11 DIAGNOSIS — E11.22 TYPE 2 DIABETES MELLITUS WITH CHRONIC KIDNEY DISEASE ON CHRONIC DIALYSIS, WITH LONG-TERM CURRENT USE OF INSULIN (H): ICD-10-CM

## 2019-06-11 DIAGNOSIS — Z99.2 ESRD (END STAGE RENAL DISEASE) ON DIALYSIS (H): ICD-10-CM

## 2019-06-11 DIAGNOSIS — Z79.4 TYPE 2 DIABETES MELLITUS WITH CHRONIC KIDNEY DISEASE ON CHRONIC DIALYSIS, WITH LONG-TERM CURRENT USE OF INSULIN (H): ICD-10-CM

## 2019-06-11 DIAGNOSIS — N18.6 ESRD (END STAGE RENAL DISEASE) ON DIALYSIS (H): ICD-10-CM

## 2019-06-11 DIAGNOSIS — R41.89 COGNITIVE IMPAIRMENT: ICD-10-CM

## 2019-06-11 DIAGNOSIS — I10 BENIGN ESSENTIAL HYPERTENSION: ICD-10-CM

## 2019-06-11 DIAGNOSIS — C79.9 METASTATIC CANCER (H): Primary | ICD-10-CM

## 2019-06-11 DIAGNOSIS — R53.81 PHYSICAL DECONDITIONING: ICD-10-CM

## 2019-06-11 DIAGNOSIS — N18.6 TYPE 2 DIABETES MELLITUS WITH CHRONIC KIDNEY DISEASE ON CHRONIC DIALYSIS, WITH LONG-TERM CURRENT USE OF INSULIN (H): ICD-10-CM

## 2019-06-11 DIAGNOSIS — M48.50XD PATHOLOGICAL COMPRESSION FRACTURE OF VERTEBRA WITH ROUTINE HEALING, SUBSEQUENT ENCOUNTER: ICD-10-CM

## 2019-06-11 PROCEDURE — 99316 NF DSCHRG MGMT 30 MIN+: CPT | Performed by: NURSE PRACTITIONER

## 2019-06-11 ASSESSMENT — MIFFLIN-ST. JEOR: SCORE: 1452.4

## 2019-06-11 NOTE — PROGRESS NOTES
[place] [facility] DISCHARGE SUMMARY  PATIENT'S NAME: Seven Savage Jr.  YOB: 1935  MEDICAL RECORD NUMBER:  [id number]  Place of Service where encounter took place:  [facility] [[id number]]    PRIMARY CARE PROVIDER AND CLINIC RESPONSIBLE AFTER TRANSFER:   Sandip Antunez MD, [address] [place] [address] / [facility] [id number]    [facility] Provider     Transferring providers: NADIR Vega CNP, Amada Turner MD  Recent Hospitalization/ED:  [facility] Hospital stay 5/9/2019 to 5/23/2019.  Date of SNF Admission: May / 23 / 2019  Date of SNF (anticipated) Discharge: June / 13 / 2019  Discharged to: previous independent home with family   Cognitive Scores: SLUMS: 20/30 and CPT: 4.3/5.6  DME: Wheelchair    CODE STATUS/ADVANCE DIRECTIVES DISCUSSION:  Full Code   ALLERGIES: Levaquin [levofloxacin]; Pioglitazone; and Vytorin    DISCHARGE DIAGNOSIS/NURSING FACILITY COURSE:   He came to this facility  for short term rehab and medical management following hospitalization after presenting to the ED with intractable back pain. He had been hospitalized 4/2019 with back pain and was found to have an L4 vertebral compression fracture. 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 He was scheduled for a PET scan and follow up with Dr Robles but was unable to attend  due to back pain. He had been taking Norco with no relief.  In the ED 5/9/2019: Hgb 9.5, creatinine 2.65, GFR 25, K 3.3. He was admitted for pain control. 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 He underwent L4 biopsy and vertebroplasty 5/14/2019. Pathology showed poorly differential carcinoma. Dr Robles consulted and no systemic therapy is planned due to his poor performance status and ESRD. He received palliative radiation to the lumbar spine, last dose 5/22/2019.  Palliative Care  consulted. Fentanyl patch, oxycodone, valium and flexeril were started.     He has worked with PHYSICAL THERAPY and OT with fair progress. Ambulating up to 15 ft with walker and contact guard assist. Requires max assist of 1 with transfers and all cares.  Long term care has been discussed and family prefers to take care of him at home.   He is in the process of obtaining a tilt in space wheelchair.  is Xiomy Lawson at [facility] and she will be following  up with him at home. His son has her contact info.   ASSESSMENT / PLAN:  (C79.9) Metastatic cancer (H) involving the lung, liver, spine and kidneys and adrenals  (primary encounter diagnosis)  (M48.50XD) Pathological compression fracture of vertebra with routine healing, subsequent encounter  Comment: spiculated mass on CT, felt to be NSCLC. Reports back pain is slightly improved and he has no pain when in bed.  He has been resistant to increasing fentanyl patch and only recently agreed to using oxycodone more regularly. He has not used flexeril or valium but would like both available at home, requests  flexeril tid prn and valium prn HS.   Plan: discharge home with his son's family. Follow up with Dr Robles 6/18/2019 for results of recent PET scan. Continue tylenol, oxycodone, flexeril, valium. Home services as below.     (N18.6,  Z99.2) ESRD (end stage renal disease) on dialysis (H)  Comment: tolerating dialysis without issue  Plan: continue dialysis MWF    (E11.22,  N18.6,  Z99.2,  Z79.4) Type 2 diabetes mellitus with chronic kidney disease on chronic dialysis, with long-term current use of insulin (H)  Comment: controlled with blood sugars: 103-173, outlier 242. Slight improvement in appetite. Weight stable at 170 lbs.   Plan: continue Lantus and Novolog, hold Novolog if he doesn't eat.     (J44.9) Chronic obstructive pulmonary disease, unspecified COPD type (H)  Comment: O2 dependent. Respiratory status is at baseline. He has  O2 equipment and nebulizer machine at home.   Plan: continuous O2. Continue prn albuterol, fluticasone, Stiolto respimat.     (I35.0) Aortic valve stenosis, unspecified etiology  Comment: severe. Volume status managed with dialysis  Plan: continue ASA, lasix, carvedilol, irbesartan, diltiazem.     (I10) Benign essential hypertension  Comment: controlled with BPs: 153/65, 153/60, 143/62   HR: 59-78  Plan: continue carvedilol, diltiazem, Inspra, doxazosin, lasix, irbesartan.    (R41.89) Cognitive impairment  Comment: his cognition and mood are variable. Moderate deficits on cognitive testing and requires 24 hr care.   Plan: supportive care    (R53.81) Physical deconditioning  Comment: progress in therapies as above. He has reached max potential in rehab  Plan: home therapies for ongoing gait training, strengthening and ADL safety       Past Medical History:  has a past medical history of COPD (chronic obstructive pulmonary disease) (H), CRF (chronic renal failure), Heart murmur, HLD (hyperlipidemia), HTN (hypertension), IDDM (insulin dependent diabetes mellitus) (H), and Renal calculi.    Discharge Medications:  Current Outpatient Medications   Medication Sig Dispense Refill     acetaminophen (TYLENOL) 325 MG tablet Take 3 tablets (975 mg) by mouth 3 times daily       albuterol (2.5 MG/3ML) 0.083% neb solution TAKE 1 VIAL BY NEBULIZATION EVERY 6 HOURS AS NEEDED FOR SHORNESS OF BREATH/DYSPNEA OR WHEEZING 75 mL 7     albuterol (PROAIR HFA/PROVENTIL HFA/VENTOLIN HFA) 108 (90 BASE) MCG/ACT Inhaler Inhale 1-2 puffs into the lungs every 4 hours as needed for shortness of breath / dyspnea or wheezing 1 Inhaler 5     aspirin 81 MG chewable tablet Take 81 mg by mouth daily       atorvastatin (LIPITOR) 40 MG tablet Take 40 mg by mouth daily       B Complex-C-Folic Acid (BRANDI-HEIDY) TABS Take 1 tablet by mouth daily 90 tablet 3     carvedilol (COREG) 12.5 MG tablet TAKE 1 TABLET(12.5 MG) BY MOUTH TWICE DAILY 180 tablet 3      cyclobenzaprine (FLEXERIL) 5 MG tablet Take 5 mg by mouth daily as needed for muscle spasms       diltiazem ER (TIAZAC) 300 MG 24 hr ER beaded capsule TAKE 1 CAPSULE(300 MG) BY MOUTH DAILY 90 capsule 0     doxazosin (CARDURA) 8 MG tablet Take 8 mg by mouth At Bedtime       eplerenone (INSPRA) 50 MG tablet Take 1 tablet (50 mg) by mouth daily 90 tablet 4     fentaNYL (DURAGESIC) 25 mcg/hr 72 hr patch Place 1 patch onto the skin every 72 hours remove old patch. 10 patch 0     fish oil-omega-3 fatty acids 1000 MG capsule Take 1 g by mouth daily       fluticasone (FLOVENT HFA) 220 MCG/ACT inhaler Inhale 1 puff into the lungs 2 times daily       furosemide (LASIX) 20 MG tablet Take 1 tablet (20 mg) by mouth daily 90 tablet 3     insulin aspart (NOVOLOG PEN) 100 UNIT/ML pen Do Not give Correction Insulin if BG <140.  For  - 214 give 1 unit.  For  - 289 give 2 unit.  For  - 364 give 4 units.  For BG = or > 365 give 6 units       insulin aspart (NOVOLOG VIAL) 100 UNITS/ML vial Inject 4 Units Subcutaneous 3 times daily (with meals) Patient states with large meals he will increase to 14-16 units.       insulin glargine (LANTUS SOLOSTAR PEN) 100 UNIT/ML pen Inject 10 Units Subcutaneous At Bedtime (Patient will increase to 26 units at bedtime he states when BG is elevated.       irbesartan (AVAPRO) 150 MG tablet TAKE 1 TABLET(150 MG) BY MOUTH DAILY 90 tablet 3     melatonin 1 MG TABS tablet Take 1 tablet (1 mg) by mouth nightly as needed for sleep       ondansetron (ZOFRAN) 4 MG tablet Take by mouth every 6 hours as needed for nausea       oxyCODONE (ROXICODONE) 5 MG tablet Take 2 tablets (10 mg) by mouth every 4 hours as needed for moderate to severe pain (Patient taking differently: Take 10 mg by mouth every 4 hours as needed for moderate to severe pain And 5 mg PO scheduled every morning) 20 tablet 0     polyethylene glycol (MIRALAX/GLYCOLAX) packet Take 17 g by mouth daily       ranitidine (ZANTAC) 150 MG  "tablet Take 1 tablet (150 mg) by mouth daily as needed for heartburn 90 tablet 3     senna-docusate (SENOKOT-S/PERICOLACE) 8.6-50 MG tablet Take 1 tablet by mouth 2 times daily And bid prn       STIOLTO RESPIMAT 2.5-2.5 MCG/ACT AERS INHALE 2 PUFFS INTO THE LUNGS DAILY 4 g 9     VITAMIN D, CHOLECALCIFEROL, PO Take 2,000 Units by mouth daily        diazepam (VALIUM) 5 MG tablet Take 2 tablets (10 mg) by mouth nightly as needed for anxiety, sleep or muscle spasms 10 tablet 0       Medication Changes/Rationale:     Oxycodone scheduled in the am, prn continued     Novolog is held if he doesn't eat a meal    Controlled medications sent with patient:   Medication: fentanyl patches,  5 given to patient at the time of discharge to take home  Medication: oxycodone , 30 tabs given to patient at the time of discharge to take home  Medication: valium,  20 tabs given to patient at the time of discharge to take home     ROS:   4 point ROS including Respiratory, CV, GI and , other than that noted in the HPI,  is negative    Physical Exam:   Vitals: /58   Pulse 63   Temp 98.2  F (36.8  C)   Resp 18   Ht 1.753 m (5' 9\")   Wt 77.2 kg (170 lb 3.2 oz)   SpO2 100%   BMI 25.13 kg/m    BMI= Body mass index is 25.13 kg/m .  GENERAL APPEARANCE:  Alert, in no distress, chronically ill appearing  ENT:  Mouth and posterior oropharynx normal, moist mucous membranes, [place]  EYES:  EOM normal, conjunctiva and lids normal  NECK:  No adenopathy,masses or thyromegaly  RESP:  respiratory effort and palpation of chest normal, lungs clear to auscultation,  no respiratory distress  CV:  Palpation and auscultation of heart done , regular rate and rhythm, 3/6 murmur, no edema, +2 pedal pulses  ABDOMEN:   soft, nontender, no hepatosplenomegaly or other masses  M/S:   in bed. YOON with good strength. No joint inflammation.   SKIN:  no rashes or open areas. Fistula LUE asymptomatic  PSYCH:  oriented X 3, memory impaired , affect and mood " normal      SNF labs:  Lab Results   Component Value Date    WBC 8.8 05/28/2019     Lab Results   Component Value Date    RBC 4.22 05/28/2019     Lab Results   Component Value Date    HGB 9.9 05/28/2019     Lab Results   Component Value Date    HCT 35.4 05/28/2019     Lab Results   Component Value Date    MCV 84 05/28/2019     Lab Results   Component Value Date    MCH 25.4 05/28/2019     Lab Results   Component Value Date    MCHC 30.2 05/28/2019     Lab Results   Component Value Date    RDW 18.0 05/28/2019     Lab Results   Component Value Date     05/28/2019     Last Comprehensive Metabolic Panel:  Sodium   Date Value Ref Range Status   05/28/2019 135 133 - 144 mmol/L Final     Potassium   Date Value Ref Range Status   05/28/2019 5.6 (H) 3.4 - 5.3 mmol/L Final     Chloride   Date Value Ref Range Status   05/28/2019 101 94 - 109 mmol/L Final     Carbon Dioxide   Date Value Ref Range Status   05/28/2019 28 20 - 32 mmol/L Final     Anion Gap   Date Value Ref Range Status   05/28/2019 7 3 - 14 mmol/L Final     Glucose   Date Value Ref Range Status   05/28/2019 78 70 - 99 mg/dL Final     Urea Nitrogen   Date Value Ref Range Status   05/28/2019 72 (H) 7 - 30 mg/dL Final     Creatinine   Date Value Ref Range Status   05/28/2019 6.25 (H) 0.66 - 1.25 mg/dL Final     GFR Estimate   Date Value Ref Range Status   05/28/2019 8 (L) >60 mL/min/[1.73_m2] Final     Comment:     Non  GFR Calc  Starting 12/18/2018, serum creatinine based estimated GFR (eGFR) will be   calculated using the Chronic Kidney Disease Epidemiology Collaboration   (CKD-EPI) equation.       Calcium   Date Value Ref Range Status   05/28/2019 10.7 (H) 8.5 - 10.1 mg/dL Final       DISCHARGE PLAN:    Follow up labs: per Oncology and PCP    Medical Follow Up:      Follow up with primary care provider in 1-2 weeks   Follow up with Oncology 6/18/2019    MTM referral needed and placed by this provider: No     Discharge Services: Home Care:   Occupational Therapy, Physical Therapy, Registered Nurse, Home Health Aide and From:  [facility]    Discharge Instructions Verbalized to Patient at Discharge:     Monitor blood glucose per usual home routine     Continuous O2      TOTAL DISCHARGE TIME:   Greater than 30 minutes  Electronically signed by:  NADIR Vega CNP

## 2019-06-11 NOTE — LETTER
6/11/2019        RE: Seven Savage Jr.  [address]  [facility] [address]        [place] [facility] DISCHARGE SUMMARY  PATIENT'S NAME: Seven Savage Jr.  YOB: 1935  MEDICAL RECORD NUMBER:  [id number]  Place of Service where encounter took place:  [facility] [[id number]]    PRIMARY CARE PROVIDER AND CLINIC RESPONSIBLE AFTER TRANSFER:   Sandip Antunez MD, [address]  / [facility] [address]    [facility] [place]     Transferring providers: NADIR Vega CNP, Amada Turner MD  Recent Hospitalization/ED:  [facility] Hospital stay 5/9/2019 to 5/23/2019.  Date of SNF Admission: May / 23 / 2019  Date of SNF (anticipated) Discharge: June / 13 / 2019  Discharged to: previous independent home with family   Cognitive Scores: SLUMS: 20/30 and CPT: 4.3/5.6  DME: Wheelchair    CODE STATUS/ADVANCE DIRECTIVES DISCUSSION:  Full Code   ALLERGIES: Levaquin [levofloxacin]; Pioglitazone; and Vytorin    DISCHARGE DIAGNOSIS/NURSING FACILITY COURSE:   He came to this facility  for short term rehab and medical management following hospitalization after presenting to the ED with intractable back pain. He had been hospitalized 4/2019 with back pain and was found to have an L4 vertebral compression fracture. 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 He was scheduled for a PET scan and follow up with Dr Robles but was unable to attend  due to back pain. He had been taking Norco with no relief.  In the ED 5/9/2019: Hgb 9.5, creatinine 2.65, GFR 25, K 3.3. He was admitted for pain control. 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 He underwent L4 biopsy and vertebroplasty 5/14/2019. Pathology showed poorly differential carcinoma. Dr Robles consulted and no systemic therapy is planned due to his poor performance status and ESRD.  He received palliative radiation to the lumbar spine, last dose 5/22/2019.  Palliative Care consulted. Fentanyl patch, oxycodone, valium and flexeril were started.     He has worked with PHYSICAL THERAPY and OT with fair progress. Ambulating up to 15 ft with walker and contact guard assist. Requires max assist of 1 with transfers and all cares.  Long term care has been discussed and family prefers to take care of him at home.   He is in the process of obtaining a tilt in space wheelchair.  is Xiomy Lawson at [employer] and she will be following  up with him at home. His son has her contact info.   ASSESSMENT / PLAN:  (C79.9) Metastatic cancer (H) involving the lung, liver, spine and kidneys and adrenals  (primary encounter diagnosis)  (M48.50XD) Pathological compression fracture of vertebra with routine healing, subsequent encounter  Comment: spiculated mass on CT, felt to be NSCLC. Reports back pain is slightly improved and he has no pain when in bed.  He has been resistant to increasing fentanyl patch and only recently agreed to using oxycodone more regularly. He has not used flexeril or valium but would like both available at home, requests  flexeril tid prn and valium prn HS.   Plan: discharge home with his son's family. Follow up with Dr Robles 6/18/2019 for results of recent PET scan. Continue tylenol, oxycodone, flexeril, valium. Home services as below.     (N18.6,  Z99.2) ESRD (end stage renal disease) on dialysis (H)  Comment: tolerating dialysis without issue  Plan: continue dialysis MWF    (E11.22,  N18.6,  Z99.2,  Z79.4) Type 2 diabetes mellitus with chronic kidney disease on chronic dialysis, with long-term current use of insulin (H)  Comment: controlled with blood sugars: 103-173, outlier 242. Slight improvement in appetite. Weight stable at 170 lbs.   Plan: continue Lantus and Novolog, hold Novolog if he doesn't eat.     (J44.9) Chronic obstructive pulmonary disease,  unspecified COPD type (H)  Comment: O2 dependent. Respiratory status is at baseline. He has O2 equipment and nebulizer machine at home.   Plan: continuous O2. Continue prn albuterol, fluticasone, Stiolto respimat.     (I35.0) Aortic valve stenosis, unspecified etiology  Comment: severe. Volume status managed with dialysis  Plan: continue ASA, lasix, carvedilol, irbesartan, diltiazem.     (I10) Benign essential hypertension  Comment: controlled with BPs: 153/65, 153/60, 143/62   HR: 59-78  Plan: continue carvedilol, diltiazem, Inspra, doxazosin, lasix, irbesartan.    (R41.89) Cognitive impairment  Comment: his cognition and mood are variable. Moderate deficits on cognitive testing and requires 24 hr care.   Plan: supportive care    (R53.81) Physical deconditioning  Comment: progress in therapies as above. He has reached max potential in rehab  Plan: home therapies for ongoing gait training, strengthening and ADL safety       Past Medical History:  has a past medical history of COPD (chronic obstructive pulmonary disease) (H), CRF (chronic renal failure), Heart murmur, HLD (hyperlipidemia), HTN (hypertension), IDDM (insulin dependent diabetes mellitus) (H), and Renal calculi.    Discharge Medications:  Current Outpatient Medications   Medication Sig Dispense Refill     acetaminophen (TYLENOL) 325 MG tablet Take 3 tablets (975 mg) by mouth 3 times daily       albuterol (2.5 MG/3ML) 0.083% neb solution TAKE 1 VIAL BY NEBULIZATION EVERY 6 HOURS AS NEEDED FOR SHORNESS OF BREATH/DYSPNEA OR WHEEZING 75 mL 7     albuterol (PROAIR HFA/PROVENTIL HFA/VENTOLIN HFA) 108 (90 BASE) MCG/ACT Inhaler Inhale 1-2 puffs into the lungs every 4 hours as needed for shortness of breath / dyspnea or wheezing 1 Inhaler 5     aspirin 81 MG chewable tablet Take 81 mg by mouth daily       atorvastatin (LIPITOR) 40 MG tablet Take 40 mg by mouth daily       B Complex-C-Folic Acid (BRANDI-HEIDY) TABS Take 1 tablet by mouth daily 90 tablet 3      carvedilol (COREG) 12.5 MG tablet TAKE 1 TABLET(12.5 MG) BY MOUTH TWICE DAILY 180 tablet 3     cyclobenzaprine (FLEXERIL) 5 MG tablet Take 5 mg by mouth daily as needed for muscle spasms       diltiazem ER (TIAZAC) 300 MG 24 hr ER beaded capsule TAKE 1 CAPSULE(300 MG) BY MOUTH DAILY 90 capsule 0     doxazosin (CARDURA) 8 MG tablet Take 8 mg by mouth At Bedtime       eplerenone (INSPRA) 50 MG tablet Take 1 tablet (50 mg) by mouth daily 90 tablet 4     fentaNYL (DURAGESIC) 25 mcg/hr 72 hr patch Place 1 patch onto the skin every 72 hours remove old patch. 10 patch 0     fish oil-omega-3 fatty acids 1000 MG capsule Take 1 g by mouth daily       fluticasone (FLOVENT HFA) 220 MCG/ACT inhaler Inhale 1 puff into the lungs 2 times daily       furosemide (LASIX) 20 MG tablet Take 1 tablet (20 mg) by mouth daily 90 tablet 3     insulin aspart (NOVOLOG PEN) 100 UNIT/ML pen Do Not give Correction Insulin if BG <140.  For  - 214 give 1 unit.  For  - 289 give 2 unit.  For  - 364 give 4 units.  For BG = or > 365 give 6 units       insulin aspart (NOVOLOG VIAL) 100 UNITS/ML vial Inject 4 Units Subcutaneous 3 times daily (with meals) Patient states with large meals he will increase to 14-16 units.       insulin glargine (LANTUS SOLOSTAR PEN) 100 UNIT/ML pen Inject 10 Units Subcutaneous At Bedtime (Patient will increase to 26 units at bedtime he states when BG is elevated.       irbesartan (AVAPRO) 150 MG tablet TAKE 1 TABLET(150 MG) BY MOUTH DAILY 90 tablet 3     melatonin 1 MG TABS tablet Take 1 tablet (1 mg) by mouth nightly as needed for sleep       ondansetron (ZOFRAN) 4 MG tablet Take by mouth every 6 hours as needed for nausea       oxyCODONE (ROXICODONE) 5 MG tablet Take 2 tablets (10 mg) by mouth every 4 hours as needed for moderate to severe pain (Patient taking differently: Take 10 mg by mouth every 4 hours as needed for moderate to severe pain And 5 mg PO scheduled every morning) 20 tablet 0      "polyethylene glycol (MIRALAX/GLYCOLAX) packet Take 17 g by mouth daily       ranitidine (ZANTAC) 150 MG tablet Take 1 tablet (150 mg) by mouth daily as needed for heartburn 90 tablet 3     senna-docusate (SENOKOT-S/PERICOLACE) 8.6-50 MG tablet Take 1 tablet by mouth 2 times daily And bid prn       STIOLTO RESPIMAT 2.5-2.5 MCG/ACT AERS INHALE 2 PUFFS INTO THE LUNGS DAILY 4 g 9     VITAMIN D, CHOLECALCIFEROL, PO Take 2,000 Units by mouth daily        diazepam (VALIUM) 5 MG tablet Take 2 tablets (10 mg) by mouth nightly as needed for anxiety, sleep or muscle spasms 10 tablet 0       Medication Changes/Rationale:     Oxycodone scheduled in the am, prn continued     Novolog is held if he doesn't eat a meal    Controlled medications sent with patient:   Medication: fentanyl patches,  5 given to patient at the time of discharge to take home  Medication: oxycodone , 30 tabs given to patient at the time of discharge to take home  Medication: valium,  20 tabs given to patient at the time of discharge to take home     ROS:   4 point ROS including Respiratory, CV, GI and , other than that noted in the HPI,  is negative    Physical Exam:   Vitals: /58   Pulse 63   Temp 98.2  F (36.8  C)   Resp 18   Ht 1.753 m (5' 9\")   Wt 77.2 kg (170 lb 3.2 oz)   SpO2 100%   BMI 25.13 kg/m     BMI= Body mass index is 25.13 kg/m .  GENERAL APPEARANCE:  Alert, in no distress, chronically ill appearing  ENT:  Mouth and posterior oropharynx normal, moist mucous membranes, [place]  EYES:  EOM normal, conjunctiva and lids normal  NECK:  No adenopathy,masses or thyromegaly  RESP:  respiratory effort and palpation of chest normal, lungs clear to auscultation,  no respiratory distress  CV:  Palpation and auscultation of heart done , regular rate and rhythm, 3/6 murmur, no edema, +2 pedal pulses  ABDOMEN:   soft, nontender, no hepatosplenomegaly or other masses  M/S:   in bed. YOON with good strength. No joint inflammation.   SKIN:  no rashes " or open areas. Fistula LUE asymptomatic  PSYCH:  oriented X 3, memory impaired , affect and mood normal      SNF labs:  Lab Results   Component Value Date    WBC 8.8 05/28/2019     Lab Results   Component Value Date    RBC 4.22 05/28/2019     Lab Results   Component Value Date    HGB 9.9 05/28/2019     Lab Results   Component Value Date    HCT 35.4 05/28/2019     Lab Results   Component Value Date    MCV 84 05/28/2019     Lab Results   Component Value Date    MCH 25.4 05/28/2019     Lab Results   Component Value Date    MCHC 30.2 05/28/2019     Lab Results   Component Value Date    RDW 18.0 05/28/2019     Lab Results   Component Value Date     05/28/2019     Last Comprehensive Metabolic Panel:  Sodium   Date Value Ref Range Status   05/28/2019 135 133 - 144 mmol/L Final     Potassium   Date Value Ref Range Status   05/28/2019 5.6 (H) 3.4 - 5.3 mmol/L Final     Chloride   Date Value Ref Range Status   05/28/2019 101 94 - 109 mmol/L Final     Carbon Dioxide   Date Value Ref Range Status   05/28/2019 28 20 - 32 mmol/L Final     Anion Gap   Date Value Ref Range Status   05/28/2019 7 3 - 14 mmol/L Final     Glucose   Date Value Ref Range Status   05/28/2019 78 70 - 99 mg/dL Final     Urea Nitrogen   Date Value Ref Range Status   05/28/2019 72 (H) 7 - 30 mg/dL Final     Creatinine   Date Value Ref Range Status   05/28/2019 6.25 (H) 0.66 - 1.25 mg/dL Final     GFR Estimate   Date Value Ref Range Status   05/28/2019 8 (L) >60 mL/min/[1.73_m2] Final     Comment:     Non  GFR Calc  Starting 12/18/2018, serum creatinine based estimated GFR (eGFR) will be   calculated using the Chronic Kidney Disease Epidemiology Collaboration   (CKD-EPI) equation.       Calcium   Date Value Ref Range Status   05/28/2019 10.7 (H) 8.5 - 10.1 mg/dL Final       DISCHARGE PLAN:    Follow up labs: per Oncology and PCP    Medical Follow Up:      Follow up with primary care provider in 1-2 weeks   Follow up with Oncology  6/18/2019    MTM referral needed and placed by this provider: No     Discharge Services: Home Care:  Occupational Therapy, Physical Therapy, Registered Nurse, Home Health Aide and From:  [facility]    Discharge Instructions Verbalized to Patient at Discharge:     Monitor blood glucose per usual home routine     Continuous O2      TOTAL DISCHARGE TIME:   Greater than 30 minutes  Electronically signed by:  NADIR Vega CNP                     Sincerely,        NADIR Vega CNP

## 2019-06-15 ENCOUNTER — DOCUMENTATION ONLY (OUTPATIENT)
Dept: CARE COORDINATION | Facility: CLINIC | Age: 84
End: 2019-06-15

## 2019-06-15 NOTE — PROGRESS NOTES
[facility] now requests orders and shares plan of care/discharge summaries for some patients through [facility].  Please REPLY TO THIS MESSAGE OR ROUTE BACK TO THE AUTHOR in order to give authorization for orders when needed.  This is considered a verbal order, you will still receive a faxed copy of orders for signature.  Thank you for your assistance in improving collaboration for our patients.    ORDER    Skilled nurse 1week9 plus 3 PRN visits  PT eval and treat  OT eval and treat  HA 2week9 for bathing assistance

## 2019-06-17 ENCOUNTER — TELEPHONE (OUTPATIENT)
Dept: INTERNAL MEDICINE | Facility: CLINIC | Age: 84
End: 2019-06-17

## 2019-06-17 ENCOUNTER — PATIENT OUTREACH (OUTPATIENT)
Dept: CARE COORDINATION | Facility: CLINIC | Age: 84
End: 2019-06-17

## 2019-06-17 NOTE — PROGRESS NOTES
Clinic Care Coordination Contact  [facility]/Voicemail    Referral Source: SNF/TCU  Clinical Data: Care Coordinator Outreach  Outreach attempted x 1.  Left message on voicemail with call back information and requested return call.  Plan: Care Coordinator will try to reach patient again in 1-2 business days.    Gianni Dobson [facility]  Clinic Care Coordinator  [facility]  [phone]  [email or web address]

## 2019-06-18 ENCOUNTER — TRANSFERRED RECORDS (OUTPATIENT)
Dept: HEALTH INFORMATION MANAGEMENT | Facility: CLINIC | Age: 84
End: 2019-06-18

## 2019-06-19 NOTE — PROGRESS NOTES
Clinic Care Coordination Contact  [facility]/Voicemail    Referral Source: SNF/TCU  Clinical Data: Care Coordinator Outreach  Outreach attempted x 2.  Left message on voicemail with call back information and requested return call.  Plan: Care Coordinator mailed out care coordination introduction letter on 5/24/2019. Care Coordinator will do no further outreaches at this time.    Gianni Dobson [facility]  Clinic Care Coordinator  [facility]  [phone]  [email or web address]

## 2019-06-22 ENCOUNTER — TELEPHONE (OUTPATIENT)
Dept: INTERNAL MEDICINE | Facility: CLINIC | Age: 84
End: 2019-06-22

## 2019-06-22 NOTE — TELEPHONE ENCOUNTER
Clinic Action Needed:Yes, please return call    Reason for Call: Seven's son, Ana BOONE called today to inquire about a refill on his Fentanyl patch.  Seven was discharged to home from TCU and he is on Fentanyl, Oxycodon, Valium and Flexeril.  Today Anya, with [facility] is at home doing an assessment/admission on Seven, but at time of call he wasn't technically on hospice.  They were asking for refill of Fentanyl Patch.  Paged on call provider for [facility] to speak to me at [facility]. Dr. Garcia is on call, page sent at 10:12 am via smart web.  Dr. Garcia returned page and gave a verbal for two Fentanyl patches 25 mcg Q 72 hours (as previously written in Epic). Dispense two patches no refills.  Called pharmacy and spoke to pharmacist, they refused to take verbal order with provider's EMI number.  Fentanyl is Schedule II drug and they need a hard copy Rx with physical signature.  Notified patient's son Seven BOONE that I wasn't able to fill Fentanyl Rx for them this weekend.    Please return call to discuss getting Fentanyl Rx for Seven on Monday.  Thank you.     Routed to: MD Connie Miguel, RN  [facility]

## 2019-06-24 NOTE — TELEPHONE ENCOUNTER
I do not have any openings today.  Essentially patient could be seen in my same-day appointment on a nondialysis day if that would work

## 2019-06-24 NOTE — TELEPHONE ENCOUNTER
Pt has dialysis today from 11-3 MWF, son is picking him up at 10:30 for dialysis could we work pt in with PCP after his dialysis for OV regarding fentanyl patch? Pt has difficulty getting into his wheel chair, has  Been having severe back spasms. (Misses appts the pain radiates down back and legs making it difficult for him to sit, at times is a 10/10 for pain). Can call his cell # [phone] after PCP response.   Please advise  Isabella Borrego RN

## 2019-06-24 NOTE — TELEPHONE ENCOUNTER
Ongoing prescription refills for narcotics will require a clinic visit as this patient has not been prescribed narcotics by me.

## 2019-06-25 ENCOUNTER — OFFICE VISIT (OUTPATIENT)
Dept: INTERNAL MEDICINE | Facility: CLINIC | Age: 84
End: 2019-06-25
Payer: MEDICARE

## 2019-06-25 VITALS — TEMPERATURE: 97.7 F | OXYGEN SATURATION: 80 % | RESPIRATION RATE: 18 BRPM | HEART RATE: 76 BPM

## 2019-06-25 DIAGNOSIS — N18.6 TYPE 2 DIABETES MELLITUS WITH CHRONIC KIDNEY DISEASE ON CHRONIC DIALYSIS, WITH LONG-TERM CURRENT USE OF INSULIN (H): ICD-10-CM

## 2019-06-25 DIAGNOSIS — Z99.2 TYPE 2 DIABETES MELLITUS WITH CHRONIC KIDNEY DISEASE ON CHRONIC DIALYSIS, WITH LONG-TERM CURRENT USE OF INSULIN (H): ICD-10-CM

## 2019-06-25 DIAGNOSIS — Z99.2 ESRD (END STAGE RENAL DISEASE) ON DIALYSIS (H): ICD-10-CM

## 2019-06-25 DIAGNOSIS — J44.1 CHRONIC OBSTRUCTIVE PULMONARY DISEASE WITH ACUTE EXACERBATION (H): Chronic | ICD-10-CM

## 2019-06-25 DIAGNOSIS — C79.9 METASTATIC CANCER (H): ICD-10-CM

## 2019-06-25 DIAGNOSIS — Z79.4 TYPE 2 DIABETES MELLITUS WITH CHRONIC KIDNEY DISEASE ON CHRONIC DIALYSIS, WITH LONG-TERM CURRENT USE OF INSULIN (H): ICD-10-CM

## 2019-06-25 DIAGNOSIS — N18.6 ESRD (END STAGE RENAL DISEASE) ON DIALYSIS (H): ICD-10-CM

## 2019-06-25 DIAGNOSIS — F11.90 CHRONIC NARCOTIC USE: ICD-10-CM

## 2019-06-25 DIAGNOSIS — Z09 HOSPITAL DISCHARGE FOLLOW-UP: Primary | ICD-10-CM

## 2019-06-25 DIAGNOSIS — E11.22 TYPE 2 DIABETES MELLITUS WITH CHRONIC KIDNEY DISEASE ON CHRONIC DIALYSIS, WITH LONG-TERM CURRENT USE OF INSULIN (H): ICD-10-CM

## 2019-06-25 PROCEDURE — 99215 OFFICE O/P EST HI 40 MIN: CPT | Performed by: INTERNAL MEDICINE

## 2019-06-25 RX ORDER — DIAZEPAM 5 MG
10 TABLET ORAL
Qty: 10 TABLET | Refills: 0 | Status: SHIPPED | OUTPATIENT
Start: 2019-06-25

## 2019-06-25 RX ORDER — FENTANYL 25 UG/1
1 PATCH TRANSDERMAL
Qty: 10 PATCH | Refills: 0 | Status: SHIPPED | OUTPATIENT
Start: 2019-06-25

## 2019-06-25 NOTE — PROGRESS NOTES
Subjective     Seven Savage Jr. is a 84 year old male who presents to clinic today for the following health issues:    [facility]       Hospital Follow-up Visit:    Hospital/Nursing Home/ Rehab Facility: [facility]   Date of Admission: 5/9/19  Date of Discharge: 6/13/19  Reason(s) for Admission: Acute bilateral lower back pain, metastatic cancer             Problems taking medications regularly:  None       Medication changes since discharge: None       Problems adhering to non-medication therapy:  None    Summary of hospitalization:  [facility] discharge summary reviewed  Diagnostic Tests/Treatments reviewed.  Follow up needed: Oncology, therapy for radiation  Other Healthcare Providers Involved in Patient s Care:         Homecare  Update since discharge: stable.     Post Discharge Medication Reconciliation: discharge medications reconciled, continue medications without change.  Plan of care communicated with patient     Coding guidelines for this visit:  Type of Medical   Decision Making Face-to-Face Visit       within 7 Days of discharge Face-to-Face Visit        within 14 days of discharge   Moderate Complexity [id number]   High Complexity [id number]          Back pain,   L4 vertebral body with mild anterior compression   The patient is being admitted for pain control.  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 Finished 5/5 radiation treatment.   plan for now is discharge to [facility] on Wednesday after dialysis and 5/5 radiation treatment.  5/21 adjusted his pain medications by including fentanyl patch . Can readjust dosage by 5/23 or  later to determine the plateau level.      ESRD; followed by Nephrology On HD  Continue hd as per schedule.MWF  - The patient typically has Monday, Wednesday, Friday dialysis.     Aortic stenosis:   Severe, mean 54mmHg, and the  VITO 0.8cm2,   Continue PTA coreg, Diltiazem  And lasix     History of metastatic carcinoma  Malignant appearing lesions in lung, liver, spine and possible kidneys and adrenals.  The patient is followed by Dr. Robles and was still undergoing evaluation for this.  He was supposed to have a PET scan done last Monday, but was unable to make it due to his back pain.  I have consulted Dr. Robles's service to see if they can help in this regard.  With respect to cancer treatment, again, evaluation was still underway.  The patient was actually seen by Palliative Medicine during his previous hospitalization and was not ready, at that point, to consider hospice or end of life cares, although the patient currently does seem to have realistic expectations for ongoing care goals.  --status post biopsy of epidural growth 5/14  --poorly differentiated metastatic malignancy as per pathology.     Type 2 diabetes.  The patient is maintained on Lantus and scheduled aspart; these will be continued.        Patient Active Problem List   Diagnosis     Type 2 diabetes mellitus with chronic kidney disease on chronic dialysis, with long-term current use of insulin (H)     Hyperlipidemia LDL goal <100     Aortic valve stenosis, unspecified etiology     ESRD (end stage renal disease) on dialysis (H)     Chronic obstructive pulmonary disease with acute exacerbation (H)     Essential hypertension, benign     COPD exacerbation (H)     Thoracic segment dysfunction     Acute on chronic respiratory failure with hypoxia (H)     Acute right-sided low back pain without sciatica     Compression fracture of L4 lumbar vertebra     Back pain     Stage IV metastatic NSCLC (spiculated right upper lobe pulmonary 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, bones, kidneys and adrenal glands     Past Surgical History:   Procedure Laterality Date     ADENOIDECTOMY       ENDOSCOPIC POLYPECTOMY NASAL      Through vocal cords     IR LUMBAR VERTEBROPLASTY  2019     Renal Stent      For renal calculi     TONSILLECTOMY         Social History     Tobacco Use     Smoking status: Former Smoker     Packs/day: 1.00     Years: 70.00     Pack years: 70.00     Types: Cigarettes     Last attempt to quit: 2011     Years since quittin.4     Smokeless tobacco: Never Used   Substance Use Topics     Alcohol use: Yes     Comment: average 1 beer month     Family History   Family history unknown: Yes         Current Outpatient Medications   Medication Sig Dispense Refill     acetaminophen (TYLENOL) 325 MG tablet Take 3 tablets (975 mg) by mouth 3 times daily       albuterol (2.5 MG/3ML) 0.083% neb solution TAKE 1 VIAL BY NEBULIZATION EVERY 6 HOURS AS NEEDED FOR SHORNESS OF BREATH/DYSPNEA OR WHEEZING 75 mL 7     albuterol (PROAIR HFA/PROVENTIL HFA/VENTOLIN HFA) 108 (90 BASE) MCG/ACT Inhaler Inhale 1-2 puffs into the lungs every 4 hours as needed for shortness of breath / dyspnea or wheezing 1 Inhaler 5     aspirin 81 MG chewable tablet Take 81 mg by mouth daily       atorvastatin (LIPITOR) 40 MG tablet Take 40 mg by mouth daily       B Complex-C-Folic Acid (BRANDI-HEIDY) TABS Take 1 tablet by mouth daily 90 tablet 3     carvedilol (COREG) 12.5 MG tablet TAKE 1 TABLET(12.5 MG) BY MOUTH TWICE DAILY 180 tablet 3     cyclobenzaprine (FLEXERIL) 5 MG tablet Take 5 mg by mouth daily as needed for muscle spasms       diazepam (VALIUM) 5 MG tablet Take 2 tablets (10 mg) by mouth nightly as needed for anxiety, sleep or muscle spasms 10 tablet 0     diltiazem ER (TIAZAC) 300 MG 24 hr ER beaded capsule TAKE 1 CAPSULE(300 MG) BY MOUTH DAILY 90 capsule 0     doxazosin (CARDURA) 8 MG tablet Take 8 mg by mouth At Bedtime       eplerenone (INSPRA) 50 MG  tablet Take 1 tablet (50 mg) by mouth daily 90 tablet 4     fentaNYL (DURAGESIC) 25 mcg/hr 72 hr patch Place 1 patch onto the skin every 72 hours remove old patch. 10 patch 0     fish oil-omega-3 fatty acids 1000 MG capsule Take 1 g by mouth daily       fluticasone (FLOVENT HFA) 220 MCG/ACT inhaler Inhale 1 puff into the lungs 2 times daily       furosemide (LASIX) 20 MG tablet Take 1 tablet (20 mg) by mouth daily 90 tablet 3     insulin aspart (NOVOLOG PEN) 100 UNIT/ML pen Do Not give Correction Insulin if BG <140.  For  - 214 give 1 unit.  For  - 289 give 2 unit.  For  - 364 give 4 units.  For BG = or > 365 give 6 units       insulin aspart (NOVOLOG VIAL) 100 UNITS/ML vial Inject 4 Units Subcutaneous 3 times daily (with meals) Patient states with large meals he will increase to 14-16 units.       insulin glargine (LANTUS SOLOSTAR PEN) 100 UNIT/ML pen Inject 10 Units Subcutaneous At Bedtime (Patient will increase to 26 units at bedtime he states when BG is elevated.       irbesartan (AVAPRO) 150 MG tablet TAKE 1 TABLET(150 MG) BY MOUTH DAILY 90 tablet 3     melatonin 1 MG TABS tablet Take 1 tablet (1 mg) by mouth nightly as needed for sleep       ondansetron (ZOFRAN) 4 MG tablet Take by mouth every 6 hours as needed for nausea       oxyCODONE (ROXICODONE) 5 MG tablet Take 2 tablets (10 mg) by mouth every 4 hours as needed for moderate to severe pain (Patient taking differently: Take 10 mg by mouth every 4 hours as needed for moderate to severe pain And 5 mg PO scheduled every morning) 20 tablet 0     polyethylene glycol (MIRALAX/GLYCOLAX) packet Take 17 g by mouth daily       ranitidine (ZANTAC) 150 MG tablet Take 1 tablet (150 mg) by mouth daily as needed for heartburn 90 tablet 3     senna-docusate (SENOKOT-S/PERICOLACE) 8.6-50 MG tablet Take 1 tablet by mouth 2 times daily And bid prn       STIOLTO RESPIMAT 2.5-2.5 MCG/ACT AERS INHALE 2 PUFFS INTO THE LUNGS DAILY 4 g 9     VITAMIN D,  CHOLECALCIFEROL, PO Take 2,000 Units by mouth daily        Allergies   Allergen Reactions     Levaquin [Levofloxacin]      edema     Pioglitazone Other (See Comments)     Edema & hay fever     Vytorin Other (See Comments)     Muscle aches     BP Readings from Last 3 Encounters:   06/11/19 153/58   06/06/19 152/65   06/03/19 153/62    Wt Readings from Last 3 Encounters:   06/11/19 77.2 kg (170 lb 3.2 oz)   06/06/19 77.2 kg (170 lb 3.2 oz)   06/03/19 77.1 kg (170 lb)         Reviewed and updated as needed this visit by Provider         Review of Systems   ROS COMP: CONSTITUTIONAL: NEGATIVE for fever, chills, change in weight  ENT/MOUTH: NEGATIVE for ear, mouth and throat problems  RESP: NEGATIVE for significant cough or SOB  CV: NEGATIVE for chest pain, palpitations or peripheral edema  GI: NEGATIVE for nausea, abdominal pain, heartburn, or change in bowel habits  : NEGATIVE for frequency, dysuria, or hematuria  MUSCULOSKELETAL: NEGATIVE for significant arthralgias or myalgia  NEURO: NEGATIVE for weakness, dizziness or paresthesias  HEME: NEGATIVE for bleeding problems  PSYCHIATRIC: NEGATIVE for changes in mood or affect      Objective    Pulse 76   Temp 97.7  F (36.5  C) (Oral)   Resp 18   SpO2 (!) 80%   Unable to obtain weight or BP today.    Physical Exam   GENERAL:  alert and in mild pain to back laying down.  On supplemental O2 therapy  EYES: Eyes grossly normal to inspection, PERRL and conjunctivae and sclerae normal  HENT: ear canals and TM's normal, nose and mouth without ulcers or lesions  NECK: no adenopathy, no asymmetry, masses, or scars and thyroid normal to palpation  RESP: lungs clear to auscultation - no rales, rhonchi or wheezes  CV: regular rate and rhythm, normal S1 S2, no S3 or S4, no murmur, click or rub, no peripheral edema and peripheral pulses strong  PSYCH: mentation appears normal, affect normal/bright      Lab Results   Component Value Date    A1C 4.6 05/09/2019    A1C 5.8 04/06/2018     A1C 5.9 03/20/2018    A1C 6.1 08/15/2017    A1C 6.4 02/12/2017     Creatinine   Date Value Ref Range Status   05/28/2019 6.25 (H) 0.66 - 1.25 mg/dL Final     LDL Cholesterol Calculated   Date Value Ref Range Status   08/15/2017 83 <100 mg/dL Final     Comment:     Desirable:       <100 mg/dl       Assessment & Plan     1. Hospital discharge follow-up  Stable with noted progressive malignant disease.  Recommending hospice care.    2. Stage IV metastatic NSCLC (spiculated right upper lobe pulmonary nodule) poorly differentiated carcinoma with mets to the liver, bones, kidneys and adrenal glands  Following up with oncology as scheduled and patient will be incorporating hospice care evaluation shortly.  Refilled pain meds for appropriate palliative care therapy  - fentaNYL (DURAGESIC) 25 mcg/hr 72 hr patch; Place 1 patch onto the skin every 72 hours remove old patch.  Dispense: 10 patch; Refill: 0  - diazepam (VALIUM) 5 MG tablet; Take 2 tablets (10 mg) by mouth nightly as needed for anxiety, sleep or muscle spasms  Dispense: 10 tablet; Refill: 0    3. ESRD (end stage renal disease) on dialysis (H)  Dialysis as scheduled with noted baseline creatinine    4. Type 2 diabetes mellitus with chronic kidney disease on chronic dialysis, with long-term current use of insulin (H)  Stable on therapy without recommendations for changing of current medication    5. Chronic narcotic use  Discussed use of stool softeners and ongoing use of chronic pain meds for end-of-life palliative pain control  - fentaNYL (DURAGESIC) 25 mcg/hr 72 hr patch; Place 1 patch onto the skin every 72 hours remove old patch.  Dispense: 10 patch; Refill: 0    6. Chronic obstructive pulmonary disease with acute exacerbation (H)  Comment: O2 dependent. Respiratory status is at baseline. He has O2 equipment and nebulizer machine at home.   Plan: continuous O2. Continue prn albuterol, fluticasone, Stiolto respimat.      BMI:   Estimated body mass index is 25.13  "kg/m  as calculated from the following:    Height as of 6/11/19: 1.753 m (5' 9\").    Weight as of 6/11/19: 77.2 kg (170 lb 3.2 oz).     See Patient Instructions    Return in about 2 weeks (around 7/9/2019) for if symptoms recur or worsen.    Sandip Antunez MD  [facility]      "

## 2019-06-26 DIAGNOSIS — I10 ESSENTIAL HYPERTENSION, BENIGN: ICD-10-CM

## 2019-06-26 RX ORDER — EPLERENONE 50 MG/1
50 TABLET, FILM COATED ORAL DAILY
Qty: 90 TABLET | Refills: 1 | Status: SHIPPED | OUTPATIENT
Start: 2019-06-26

## 2019-07-08 ENCOUNTER — MEDICAL CORRESPONDENCE (OUTPATIENT)
Dept: HEALTH INFORMATION MANAGEMENT | Facility: CLINIC | Age: 84
End: 2019-07-08

## 2019-07-11 PROBLEM — M54.50 ACUTE RIGHT-SIDED LOW BACK PAIN WITHOUT SCIATICA: Status: RESOLVED | Noted: 2019-04-02 | Resolved: 2019-07-11

## 2019-07-12 DIAGNOSIS — N18.6 TYPE 2 DIABETES MELLITUS WITH CHRONIC KIDNEY DISEASE ON CHRONIC DIALYSIS, WITH LONG-TERM CURRENT USE OF INSULIN (H): ICD-10-CM

## 2019-07-12 DIAGNOSIS — Z99.2 TYPE 2 DIABETES MELLITUS WITH CHRONIC KIDNEY DISEASE ON CHRONIC DIALYSIS, WITH LONG-TERM CURRENT USE OF INSULIN (H): ICD-10-CM

## 2019-07-12 DIAGNOSIS — E11.22 TYPE 2 DIABETES MELLITUS WITH CHRONIC KIDNEY DISEASE ON CHRONIC DIALYSIS, WITH LONG-TERM CURRENT USE OF INSULIN (H): ICD-10-CM

## 2019-07-12 DIAGNOSIS — Z79.4 TYPE 2 DIABETES MELLITUS WITH CHRONIC KIDNEY DISEASE ON CHRONIC DIALYSIS, WITH LONG-TERM CURRENT USE OF INSULIN (H): ICD-10-CM

## 2019-07-12 RX ORDER — INSULIN GLARGINE 100 [IU]/ML
INJECTION, SOLUTION SUBCUTANEOUS
Qty: 3 ML | Refills: 0 | Status: SHIPPED | OUTPATIENT
Start: 2019-07-12

## 2019-08-13 DIAGNOSIS — I10 ESSENTIAL HYPERTENSION, BENIGN: ICD-10-CM

## 2019-08-13 RX ORDER — EPLERENONE 50 MG/1
TABLET, FILM COATED ORAL
Qty: 30 TABLET | Refills: 0 | OUTPATIENT
Start: 2019-08-13

## 2019-08-14 DIAGNOSIS — J43.1 PANLOBULAR EMPHYSEMA (H): Primary | ICD-10-CM

## 2019-08-14 RX ORDER — FLUTICASONE PROPIONATE 220 UG/1
AEROSOL, METERED RESPIRATORY (INHALATION)
Qty: 12 G | Refills: 0 | Status: SHIPPED | OUTPATIENT
Start: 2019-08-14

## 2019-09-30 PROBLEM — C79.9 METASTATIC CANCER (H): Status: ACTIVE | Noted: 2019-04-22

## 2020-02-25 NOTE — PROGRESS NOTES
[facility] DISCHARGE SUMMARY    PATIENT'S NAME: Seven Savage Jr.  YOB: 1935  MEDICAL RECORD NUMBER:  [id number]  Place of Service where encounter took place:  [facility] [[id number]]    PRIMARY CARE PROVIDER AND CLINIC RESPONSIBLE AFTER TRANSFER:   Sandip Antunez MD, [address] [place] [address] / [facility] [id number]    [facility] Provider     Transferring providers: NADIR Bunn CNP; Bong Walls MD  Recent Hospitalization/ED:  [facility] Hospital stay 4/22/19 to 4/26/19.  Date of SNF Admission: April / 26 / 2019  Date of SNF (anticipated) Discharge: May / 06 / 2019  Discharged to: previous independent home with family  Cognitive Scores: SLUMS: 21/30  Physical Function: Ambulating 10 ft with walker  DME: Walker    CODE STATUS/ADVANCE DIRECTIVES DISCUSSION:  DNR / DNI   ALLERGIES: Levaquin [levofloxacin]; Pioglitazone; and Vytorin    DISCHARGE DIAGNOSIS/NURSING FACILITY COURSE:     L4 compression fracture  Patient admitted to [facility] 4/22-4/26 due to back pain and left sided leg pain. Noted to have fallen approx 6 months ago. CT and MRI found L4 compression fracture and malignant appearing lesions of lung, liver, spine and possible kidneys and adrenals. Ortho not recommending surgery, patient wears brace. During exam, admits to moderate back pain, has been taking norco prn with relief. Denies constipation. Patient to follow-up with Spinal Clinic as directed.     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   Oncology recommending outpatient PET scan, patient reports has appt set up for next week. Patient to follow-up with Oncology as directed.      COPD  Chronic respiratory failure  Requires continuous O2 (baseline). Currently taking albuterol neb prn, fluticasone.      ESRD  Requires dialysis MWF. Patient to follow-up with Nephrologist as directed.     Benign essential hypertension   Currently  taking coreg, ASA, diltiazem, inspra, irbesartan, and lasix. Denies chest pain, SOB, headaches, syncope.     Wt Readings from Last 4 Encounters:   05/03/19 74.6 kg (164 lb 6.4 oz)   04/29/19 76.2 kg (168 lb)   04/26/19 76.3 kg (168 lb 3.2 oz)   03/28/19 80.1 kg (176 lb 9.6 oz)       Type II diabetes with long-term use of insulin  Last A1C 5.8%. Currently taking lantus 10u every day and novolog 4u w/meals +sliding scale insulin.      Physical deconditioning  PTA lives with son. Ambulates 10ft with walker. Requires SBA with activity and ADLs. Cognitive testing SLUMS 21/30. SW following for discharge planning. Patient discharging home w/son, as well as [facility] home care services, including home PT, OT, RN, HHA.           Past Medical History:  has a past medical history of COPD (chronic obstructive pulmonary disease) (H), CRF (chronic renal failure), Heart murmur, HLD (hyperlipidemia), HTN (hypertension), IDDM (insulin dependent diabetes mellitus) (H), and Renal calculi.    Discharge Medications:  Current Outpatient Medications   Medication Sig Dispense Refill     albuterol (2.5 MG/3ML) 0.083% neb solution TAKE 1 VIAL BY NEBULIZATION EVERY 6 HOURS AS NEEDED FOR SHORNESS OF BREATH/DYSPNEA OR WHEEZING 75 mL 7     albuterol (PROAIR HFA/PROVENTIL HFA/VENTOLIN HFA) 108 (90 BASE) MCG/ACT Inhaler Inhale 1-2 puffs into the lungs every 4 hours as needed for shortness of breath / dyspnea or wheezing 1 Inhaler 5     aspirin 81 MG chewable tablet Take 81 mg by mouth daily       atorvastatin (LIPITOR) 40 MG tablet Take 40 mg by mouth daily       B Complex-C-Folic Acid (BRANDI-HEIDY) TABS Take 1 tablet by mouth daily 90 tablet 3     carvedilol (COREG) 12.5 MG tablet TAKE 1 TABLET(12.5 MG) BY MOUTH TWICE DAILY 180 tablet 3     cyclobenzaprine (FLEXERIL) 5 MG tablet Take 1 tablet (5 mg) by mouth 3 times daily as needed for muscle spasms 90 tablet 3     diltiazem ER (TIAZAC) 300 MG 24 hr ER beaded capsule TAKE 1 CAPSULE(300 MG) BY MOUTH DAILY  90 capsule 0     doxazosin (CARDURA) 8 MG tablet Take 8 mg by mouth At Bedtime       eplerenone (INSPRA) 50 MG tablet Take 1 tablet (50 mg) by mouth daily 90 tablet 4     fish oil-omega-3 fatty acids 1000 MG capsule Take 1 g by mouth daily       fluticasone (FLOVENT HFA) 220 MCG/ACT inhaler Inhale 1 puff into the lungs 2 times daily       furosemide (LASIX) 20 MG tablet Take 1 tablet (20 mg) by mouth daily 90 tablet 3     HYDROcodone-acetaminophen (NORCO) 5-325 MG tablet Take 1 tablet by mouth every 6 hours as needed for severe pain 30 tablet 0     insulin aspart (NOVOLOG PEN) 100 UNIT/ML pen Do Not give Correction Insulin if BG <140.  For  - 214 give 1 unit.  For  - 289 give 2 unit.  For  - 364 give 4 units.  For BG = or > 365 give 6 units       insulin aspart (NOVOLOG VIAL) 100 UNITS/ML vial Inject 4 Units Subcutaneous 3 times daily (with meals) Patient states with large meals he will increase to 14-16 units.       insulin glargine (LANTUS SOLOSTAR PEN) 100 UNIT/ML pen Inject 10 Units Subcutaneous At Bedtime (Patient will increase to 26 units at bedtime he states when BG is elevated.       irbesartan (AVAPRO) 150 MG tablet TAKE 1 TABLET(150 MG) BY MOUTH DAILY 90 tablet 3     polyethylene glycol (MIRALAX/GLYCOLAX) packet Take 17 g by mouth daily       ranitidine (ZANTAC) 150 MG tablet Take 1 tablet (150 mg) by mouth daily as needed for heartburn 90 tablet 3     senna-docusate (SENOKOT-S/PERICOLACE) 8.6-50 MG tablet Take 1 tablet by mouth 2 times daily And bid prn       STIOLTO RESPIMAT 2.5-2.5 MCG/ACT AERS INHALE 2 PUFFS INTO THE LUNGS DAILY 4 g 9     VITAMIN D, CHOLECALCIFEROL, PO Take 2,000 Units by mouth daily            Medication Changes/Rationale:   N/A    Controlled medications sent with patient:   Medication: Norco, #20 tabs given to patient at the time of discharge to take home         ROS:   10 point ROS of systems including Constitutional, Eyes, Respiratory, Cardiovascular,  "Gastroenterology, Genitourinary, Integumentary, Musculoskeletal, Psychiatric were all negative except for pertinent positives noted in my HPI.      Physical Exam:   Vitals: /65   Pulse 66   Temp 98.1  F (36.7  C)   Resp 14   Ht 1.702 m (5' 7\")   Wt 74.6 kg (164 lb 6.4 oz)   SpO2 96%   BMI 25.75 kg/m    BMI= Body mass index is 25.75 kg/m .  GENERAL APPEARANCE:  Alert, in no distress, appears healthy, oriented, cooperative  ENT:  Mouth and posterior oropharynx normal, moist mucous membranes, normal hearing acuity  EYES:  EOM, conjunctivae, lids, pupils and irises normal, PERRL  NECK:  No adenopathy,masses or thyromegaly  RESP:  respiratory effort and palpation of chest normal, lungs clear to auscultation , no respiratory distress  CV:  Palpation and auscultation of heart done , regular rate and rhythm, no murmur, rub, or gallop, no edema, +2 pedal pulses  ABDOMEN:  normal bowel sounds, soft, nontender, no guarding or rebound  M/S: Wears back brace. Gait and station abnormal, requires assistance with activity and ADLs. Digits and nails normal.   SKIN:  Inspection of skin and subcutaneous tissue baseline, Palpation of skin and subcutaneous tissue baseline  NEURO:   Cranial nerves 2-12 are normal tested and grossly at patient's baseline, Examination of sensation by touch normal  PSYCH:  oriented X 3, affect and mood normal, SLUMS 21/30        SNF labs: Labs done in SNF are in [facility]. Please refer to them using [facility]Care Everywhere., Recent labs in EPIC reviewed by me today.  and   Most Recent 3 CBC's:  Recent Labs   Lab Test 04/24/19  0734 04/23/19  0713 04/22/19  1615 02/06/19  1520   WBC  --  8.0 11.6* 9.0   HGB 9.4* 9.2* 10.0* 7.4*   MCV  --  84 84 82   PLT  --  314 362 295     Most Recent 3 BMP's:  Recent Labs   Lab Test 04/26/19  0650 04/24/19  0734 04/22/19  1615 02/06/19  1520   NA  --  140 137 138   POTASSIUM 4.4 4.0 3.6 3.3*   CHLORIDE  --  104 99 100   CO2  --  29 31 32   BUN  --  40* 20 13 "   CR  --  5.02* 2.98* 2.33*   ANIONGAP  --  7 7 6   AARON  --  9.9 9.4 8.4*   GLC  --  95 193* 115*            DISCHARGE PLAN:    Follow up labs: No labs orders/due    Medical Follow Up:      Follow up with primary care provider in 1-2 weeks   Follow up with specialist: Oncology, Spinal Clinic, Nephrology     MTM referral needed and placed by this provider: No    Discharge Services: Home Care:  Occupational Therapy, Physical Therapy, Registered Nurse, Home Health Aide and From:  [facility]      Discharge Instructions Verbalized to Patient at Discharge:     Monitor blood glucose monitoring 3-4 times a day. Keep a record and bring it to your next primary provider visit.     Notify PCP if you have a fever greater than 100.5 degrees.     DO NOT DRIVE while taking narcotic pain medications.             TOTAL DISCHARGE TIME:   Greater than 30 minutes     Electronically signed by:  NADIR Bunn CNP                      Was done for at least one hour Was done for less than one hour

## 2020-12-02 NOTE — PROGRESS NOTES
RAFFY  [facility]  Hematology/oncology Progress Note            History:   Sarkis is an 84 year old admitted 5/9 with acute on chronic back pain     Sarkis started to having increasing low back pain this week. On Monday, he attempted PET/CT scan but was not able to be completed due to pain. His pain has continued and he came in to the ED for evaluation.     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  He has a history of hypertension, diabetes and end-stage renal disease.  He has been on dialysis since 2016 and is managed on erythropoietin and venofer. Hemoglobin had been around 10-11 but more recently fell to 7.2. He was given 2 unit(s) [facility].  His work up in our clinic included a haptoglobin of 115.  Reticulocyte count of 3.5.  IgG level of 458.  IgA 199.  IgM 20.  TSH was 2.87.  .  Serum protein electrophoresis demonstrated no monoclonal protein.  Peripheral morphology demonstrated moderate hypochromic, normocytic anemia with mild reticulocytosis.  Rare schistocytes were present.  There were no apparent dysplastic or neoplastic changes.     Given no obvious explanation for the patient's anemia, a bone marrow biopsy was recommended but he declined.     He subsequently seen in the ER on 4/22/19 for evaluation of back and left leg pain.       He had been seen by a chiropractor and underwent an MRI scan of his lumbar spine which demonstrated an L4 vertebral compression fracture which was later over-read as concerning for metastatic disease.       While in the ER, he underwent a CT scan of the chest abdomen and pelvis with contrast which demonstrated a spiculated right upper lobe pulmonary nodule, highly suspicious for malignancy such as bronchogenic carcinoma.  There was lytic change in the L4 vertebral body with mild anterior compression, suspicious for metastatic lesion and an associated pathologic compression fracture, age  indeterminate.  Multiple low-density liver lesions, not well characterized on a noncontrast scan but considered suspicious for metastatic disease.  There was mild mediastinal adenopathy.  Multiple bilateral adrenal nodules were indeterminant.  There are multiple hyperdense renal lesions bilaterally which could represent hemorrhagic or proteinaceous renal cysts.  Solid renal neoplasm could not be excluded.  There was moderate prostatic enlargement and an infra renal abdominal aortic aneurysm measuring 3.2 cm.     Has back pain fairly controlled with oxy and muscle relaxant, can not find comfortable position.  Seen by ortho and recommended vertebroplasty with biopsy          Medications:       - MEDICATION INSTRUCTIONS for Dialysis Patients -   Does not apply See Admin Instructions     acetaminophen  975 mg Oral 3 times daily     atorvastatin  40 mg Oral Daily     carvedilol  12.5 mg Oral BID w/meals     zz extemporaneous template  300 mg Oral Daily     doxazosin  8 mg Oral At Bedtime     eplerenone  50 mg Oral Daily     epoetin libby (EPOGEN,PROCRIT) inj ESRD  12,000 Units Intravenous Once per day on Mon Wed Fri     fish oil-omega-3 fatty acids  1 g Oral Daily     fluticasone  1 puff Inhalation Daily     furosemide  20 mg Oral Daily     insulin aspart  1-7 Units Subcutaneous TID AC     insulin aspart  1-5 Units Subcutaneous At Bedtime     insulin aspart  4 Units Subcutaneous QAM AC     insulin glargine  10 Units Subcutaneous At Bedtime     irbesartan  150 mg Oral Daily     multivitamin RENAL  1 capsule Oral Daily     polyethylene glycol  17 g Oral Daily     senna-docusate  1 tablet Oral BID     vitamin D3  2,000 Units Oral Daily                ROS:   RESP, CV, GI,, MUSCULOSKELETAL, HEME/ALLERGY/IMMUNE,  Skin: reviewed and negative except the positives mentioned in this note            Physical Exam:       Vital Sign Ranges  Temp:  [97.3  F (36.3  C)-98  F (36.7  C)] 97.3  F (36.3  C)  Pulse:  [62-72] 63  Heart Rate:   [61-70] 65  Resp:  [16-18] 16  BP: ()/(37-65) 121/46  SpO2:  [96 %-100 %] 96 %      I/O last 3 completed shifts:  In: 570 [P.O.:570]  Out: 500 [Other:500]    Constitutional: Awake, alert, cooperative, no apparent distress  HEENT: unremarkable  Neck: Supple, no adenopathy, thyroid symmetric, not enlarged and no tenderness.  Abdomen: No scars, normal bowel sounds, soft, non-distended, non-tender, no masses palpated, no hepatosplenomegally.  Ext: no edema, cyanosis    Neuro: unremarkable, nl LE strength.         Data:       ROUTINE LABS (Last four results)  CMP  Recent Labs   Lab 05/13/19  0845 05/11/19  0925 05/10/19  0845 05/09/19  1619    138 140 139   POTASSIUM 4.3 3.8 3.8 3.3*   CHLORIDE 102 102 104 101   CO2 30 29 31 31   ANIONGAP 5 7 5 7   GLC 85 131* 98 128*   BUN 38* 38* 29 21   CR 5.01* 4.94* 3.76* 2.65*   GFRESTIMATED 10* 10* 14* 21*   GFRESTBLACK 11* 12* 16* 25*   AARON 10.4* 9.9 9.8 8.9   PHOS 5.6*  --   --   --      CBC  Recent Labs   Lab 05/13/19 0845 05/11/19 0925 05/09/19  1619   WBC  --   --  11.0   RBC  --   --  3.75*   HGB 9.8* 9.3* 9.5*   HCT  --   --  31.5*   MCV  --   --  84   MCH  --   --  25.3*   MCHC  --   --  30.2*   RDW  --   --  18.1*   PLT  --   --  369     INRNo lab results found in last 7 days.         Assessment and Plan:   1. Pain related to acute/subacute compression fracture at L4.    - Radiation therapy recommended.  - continue analgesia.     2. Pulmonary nodule with findings concerning for metastatic disease in the liver, bone, kidneys, adrenal glands.   - Work up is in process  - PET/CT attempted on 5/6, but pain unbearable  - Biopsy of spine tomorrow which may reveal Dx, but had M protein, therefore unrelated plasma cell disorder is also possible.  - we discussed last admission goals of care, and if stage IV lung cancer, will not recommend systemic therapy.     3. Anemia    - Suspicious for anemia of malignancy      4. ESDR  - Getting dialysis M-W-F  - Nephrology  following.         Kevin Robles M.D.     Patient with one or more new problems requiring additional work-up/treatment.

## 2021-03-31 NOTE — NURSING NOTE
"Seven Savage Jr. is a 81 year old male who presents for:  Chief Complaint   Patient presents with     Neurologic Problem     referral from Dr. Sandip Ames for bilateral low back pain with sciatica        Initial Vitals:  There were no vitals taken for this visit. Estimated body mass index is 28.29 kg/(m^2) as calculated from the following:    Height as of 2/23/17: 5' 9\" (1.753 m).    Weight as of 2/23/17: 191 lb 9.6 oz (86.9 kg).. There is no height or weight on file to calculate BSA. BP completed using cuff size: regular  Data Unavailable    Do you feel safe in your environment?  Yes  Do you need any refills today? No    Nursing Comments: referral from Dr. Sandip Ames for bilateral low back pain with sciatica.  Patient rates his pain today as  Only discomfort and muscle spasms      5 min. nursing intake time  Nalini Gutiérrez CMA, AAS      Discharge plan:   See providers dictation   2 min. nursing discharge time  Nalini Gutiérrez CMA, AAS       " No

## 2021-05-11 NOTE — Clinical Note
Placed on MD desk for signature   Please abstract the following data from this visit with this patient into the appropriate field in Epic:  Eye exam with ophthalmology on this date: 4/2017

## 2023-11-07 NOTE — PROGRESS NOTES
Potassium   Date Value Ref Range Status   11/07/2018 4.7 3.4 - 5.3 mmol/L Final     Lab Results   Component Value Date    HGB 8.5 11/05/2018     Weight: 77.4 kg (170 lb 9.6 oz)  POST WT 75.4kg  DIALYSIS PROCEDURE NOTE  Hepatitis status of previous patient on machine log was checked and verified ok to use with this patients hepatitis status.  Patient dialyzed for 2.45 hrs. on a 3/2 K bath with a net fluid removal of  2L.  A BFR of 450 ml/min was obtained via a LAF using 15gauge needles.    The patient was seen by Dr. Waldrop during the treatment.  Total heparin received during the treatment: 1500 units. Sites held x 15 min then  pressure drsgs applied.  Meds  Given:EPO 10,000units IV Complications: NONE.  Procedure and ESRD teaching done and questions answered. See flowsheet in EPIC for further details and post assessment.  Machine water alarm in place and functioning.  Pt returned via  wheelchair  Vascular Access: Aseptic prep done for both on/off.  Report received from:Ophelia Leon R.N.  Report given to:Ophelia Leon R.N.  HEPATITIS B SURFACE ANTIGEN neg DATE 11/5/18 HEPATITIS B SURFACE ANTIBODY Succept DATE 11/5/18  Chlorine/Chloramine water system checked every 4 hours.  Outpatient Dialysis at [facility]   Statement Selected

## 2024-12-06 NOTE — TELEPHONE ENCOUNTER
As FYI to   called patient who was seen in UC for cough /Upper Respiratory Infection (URI)   . Acute non-recurrent pansinusitis    On augmentin   Pt states he will speak to his son and schedule appointment when he is available to bring in patient.   CAITLYN Doran, CMA